# Patient Record
Sex: FEMALE | Race: WHITE | NOT HISPANIC OR LATINO | Employment: OTHER | URBAN - METROPOLITAN AREA
[De-identification: names, ages, dates, MRNs, and addresses within clinical notes are randomized per-mention and may not be internally consistent; named-entity substitution may affect disease eponyms.]

---

## 2017-11-17 ENCOUNTER — HOSPITAL ENCOUNTER (OUTPATIENT)
Dept: FAMILY MEDICINE CLINIC | Facility: CLINIC | Age: 62
Setting detail: SPECIMEN
Discharge: HOME | End: 2017-11-17
Attending: FAMILY MEDICINE | Admitting: FAMILY MEDICINE

## 2017-11-17 LAB
ALBUMIN SERPL-MCNC: 3.8 G/DL (ref 3.5–4.8)
ALBUMIN/GLOB SERPL: 1 {RATIO} (ref 1–1.7)
ALP SERPL-CCNC: 60 IU/L (ref 32–91)
ALT SERPL-CCNC: 13 IU/L (ref 14–54)
ANION GAP SERPL CALC-SCNC: 9.9 MMOL/L (ref 10–20)
AST SERPL-CCNC: 18 IU/L (ref 15–41)
BASOPHILS # BLD AUTO: 0.1 10*3/UL (ref 0–0.2)
BASOPHILS NFR BLD AUTO: 1 % (ref 0–2)
BILIRUB SERPL-MCNC: 0.7 MG/DL (ref 0.3–1.2)
BUN SERPL-MCNC: 11 MG/DL (ref 8–20)
BUN/CREAT SERPL: 12.2 (ref 5.4–26.2)
CALCIUM SERPL-MCNC: 9.4 MG/DL (ref 8.9–10.3)
CHLORIDE SERPL-SCNC: 101 MMOL/L (ref 101–111)
CHOLEST SERPL-MCNC: 262 MG/DL
CHOLEST/HDLC SERPL: 3.2 {RATIO}
CONV CO2: 30 MMOL/L (ref 22–32)
CONV LDL CHOLESTEROL DIRECT: 154 MG/DL (ref 0–100)
CONV TOTAL PROTEIN: 7.5 G/DL (ref 6.1–7.9)
CREAT UR-MCNC: 0.9 MG/DL (ref 0.4–1)
DIFFERENTIAL METHOD BLD: (no result)
EOSINOPHIL # BLD AUTO: 0.2 10*3/UL (ref 0–0.3)
EOSINOPHIL # BLD AUTO: 2 % (ref 0–3)
ERYTHROCYTE [DISTWIDTH] IN BLOOD BY AUTOMATED COUNT: 13.3 % (ref 11.5–14.5)
GLOBULIN UR ELPH-MCNC: 3.7 G/DL (ref 2.5–3.8)
GLUCOSE SERPL-MCNC: 98 MG/DL (ref 65–99)
HCT VFR BLD AUTO: 41.2 % (ref 35–49)
HDLC SERPL-MCNC: 83 MG/DL
HGB BLD-MCNC: 13.5 G/DL (ref 12–15)
LDLC/HDLC SERPL: 1.9 {RATIO}
LIPID INTERPRETATION: ABNORMAL
LYMPHOCYTES # BLD AUTO: 2.9 10*3/UL (ref 0.8–4.8)
LYMPHOCYTES NFR BLD AUTO: 36 % (ref 18–42)
MCH RBC QN AUTO: 29.6 PG (ref 26–32)
MCHC RBC AUTO-ENTMCNC: 32.9 G/DL (ref 32–36)
MCV RBC AUTO: 90.1 FL (ref 80–94)
MONOCYTES # BLD AUTO: 0.5 10*3/UL (ref 0.1–1.3)
MONOCYTES NFR BLD AUTO: 6 % (ref 2–11)
NEUTROPHILS # BLD AUTO: 4.3 10*3/UL (ref 2.3–8.6)
NEUTROPHILS NFR BLD AUTO: 55 % (ref 50–75)
NRBC BLD AUTO-RTO: 0 /100{WBCS}
NRBC/RBC NFR BLD MANUAL: 0 10*3/UL
PLATELET # BLD AUTO: 239 10*3/UL (ref 150–450)
PMV BLD AUTO: 8.8 FL (ref 7.4–10.4)
POTASSIUM SERPL-SCNC: 3.9 MMOL/L (ref 3.6–5.1)
RBC # BLD AUTO: 4.57 10*6/UL (ref 4–5.4)
SODIUM SERPL-SCNC: 137 MMOL/L (ref 136–144)
TRIGL SERPL-MCNC: 142 MG/DL
VIT B12 SERPL-MCNC: 479 PG/ML (ref 180–914)
VLDLC SERPL CALC-MCNC: 25.1 MG/DL
WBC # BLD AUTO: 7.9 10*3/UL (ref 4.5–11.5)

## 2018-11-29 ENCOUNTER — HOSPITAL ENCOUNTER (OUTPATIENT)
Dept: FAMILY MEDICINE CLINIC | Facility: CLINIC | Age: 63
Setting detail: SPECIMEN
Discharge: HOME | End: 2018-11-29
Attending: FAMILY MEDICINE | Admitting: FAMILY MEDICINE

## 2018-11-29 LAB
ALBUMIN SERPL-MCNC: 4.3 G/DL (ref 3.5–4.8)
ALBUMIN/GLOB SERPL: 1.2 {RATIO} (ref 1–1.7)
ALP SERPL-CCNC: 74 IU/L (ref 32–91)
ALT SERPL-CCNC: 15 IU/L (ref 14–54)
ANION GAP SERPL CALC-SCNC: 12.8 MMOL/L (ref 10–20)
AST SERPL-CCNC: 20 IU/L (ref 15–41)
BACTERIA SPEC AEROBE CULT: NORMAL
BASOPHILS # BLD AUTO: 0.1 10*3/UL (ref 0–0.2)
BASOPHILS NFR BLD AUTO: 1 % (ref 0–2)
BILIRUB SERPL-MCNC: 0.7 MG/DL (ref 0.3–1.2)
BILIRUB UR QL STRIP: NEGATIVE MG/DL
BUN SERPL-MCNC: 16 MG/DL (ref 8–20)
BUN/CREAT SERPL: 17.8 (ref 5.4–26.2)
CALCIUM SERPL-MCNC: 9.4 MG/DL (ref 8.9–10.3)
CASTS URNS QL MICRO: ABNORMAL /[LPF]
CHLORIDE SERPL-SCNC: 105 MMOL/L (ref 101–111)
CHOLEST SERPL-MCNC: 254 MG/DL
CHOLEST/HDLC SERPL: 3.5 {RATIO}
COLOR UR: YELLOW
CONV BACTERIA IN URINE MICRO: ABNORMAL
CONV CLARITY OF URINE: CLEAR
CONV CO2: 25 MMOL/L (ref 22–32)
CONV HYALINE CASTS IN URINE MICRO: 3 /[LPF] (ref 0–5)
CONV LDL CHOLESTEROL DIRECT: 163 MG/DL (ref 0–100)
CONV PROTEIN IN URINE BY AUTOMATED TEST STRIP: NEGATIVE MG/DL
CONV SMALL ROUND CELLS: ABNORMAL /[HPF]
CONV TOTAL PROTEIN: 7.8 G/DL (ref 6.1–7.9)
CONV UROBILINOGEN IN URINE BY AUTOMATED TEST STRIP: 0.2 MG/DL
CREAT UR-MCNC: 0.9 MG/DL (ref 0.4–1)
CULTURE INDICATED?: ABNORMAL
DIFFERENTIAL METHOD BLD: (no result)
EOSINOPHIL # BLD AUTO: 0.2 10*3/UL (ref 0–0.3)
EOSINOPHIL # BLD AUTO: 3 % (ref 0–3)
ERYTHROCYTE [DISTWIDTH] IN BLOOD BY AUTOMATED COUNT: 12.7 % (ref 11.5–14.5)
GLOBULIN UR ELPH-MCNC: 3.5 G/DL (ref 2.5–3.8)
GLUCOSE SERPL-MCNC: 114 MG/DL (ref 65–99)
GLUCOSE UR QL: NEGATIVE MG/DL
HCT VFR BLD AUTO: 43.5 % (ref 35–49)
HDLC SERPL-MCNC: 72 MG/DL
HGB BLD-MCNC: 14.8 G/DL (ref 12–15)
HGB UR QL STRIP: ABNORMAL
KETONES UR QL STRIP: NEGATIVE MG/DL
LDLC/HDLC SERPL: 2.3 {RATIO}
LEUKOCYTE ESTERASE UR QL STRIP: ABNORMAL
LIPID INTERPRETATION: ABNORMAL
LYMPHOCYTES # BLD AUTO: 2.5 10*3/UL (ref 0.8–4.8)
LYMPHOCYTES NFR BLD AUTO: 37 % (ref 18–42)
Lab: NORMAL
MCH RBC QN AUTO: 30.6 PG (ref 26–32)
MCHC RBC AUTO-ENTMCNC: 34 G/DL (ref 32–36)
MCV RBC AUTO: 89.8 FL (ref 80–94)
MICRO REPORT STATUS: NORMAL
MONOCYTES # BLD AUTO: 0.5 10*3/UL (ref 0.1–1.3)
MONOCYTES NFR BLD AUTO: 7 % (ref 2–11)
NEUTROPHILS # BLD AUTO: 3.5 10*3/UL (ref 2.3–8.6)
NEUTROPHILS NFR BLD AUTO: 52 % (ref 50–75)
NITRITE UR QL STRIP: NEGATIVE
NRBC BLD AUTO-RTO: 0 /100{WBCS}
NRBC/RBC NFR BLD MANUAL: 0 10*3/UL
PH UR STRIP.AUTO: 5.5 [PH] (ref 4.5–8)
PLATELET # BLD AUTO: 218 10*3/UL (ref 150–450)
PMV BLD AUTO: 9.4 FL (ref 7.4–10.4)
POTASSIUM SERPL-SCNC: 3.8 MMOL/L (ref 3.6–5.1)
RBC # BLD AUTO: 4.84 10*6/UL (ref 4–5.4)
RBC #/AREA URNS HPF: 1 /[HPF] (ref 0–3)
SODIUM SERPL-SCNC: 139 MMOL/L (ref 136–144)
SP GR UR: 1.02 (ref 1–1.03)
SPECIMEN SOURCE: NORMAL
SPERM URNS QL MICRO: ABNORMAL /[HPF]
SQUAMOUS SPT QL MICRO: 6 /[HPF] (ref 0–5)
TRIGL SERPL-MCNC: 148 MG/DL
UNIDENT CRYS URNS QL MICRO: ABNORMAL /[HPF]
VLDLC SERPL CALC-MCNC: 19.2 MG/DL
WBC # BLD AUTO: 6.7 10*3/UL (ref 4.5–11.5)
WBC #/AREA URNS HPF: 30 /[HPF] (ref 0–5)
YEAST SPEC QL WET PREP: ABNORMAL /[HPF]

## 2019-03-28 ENCOUNTER — HOSPITAL ENCOUNTER (OUTPATIENT)
Dept: ORTHOPEDIC SURGERY | Facility: CLINIC | Age: 64
Discharge: HOME | End: 2019-03-28
Attending: ORTHOPAEDIC SURGERY | Admitting: ORTHOPAEDIC SURGERY

## 2019-05-16 ENCOUNTER — HOSPITAL ENCOUNTER (OUTPATIENT)
Dept: PREADMISSION TESTING | Facility: HOSPITAL | Age: 64
Discharge: HOME | End: 2019-05-16
Attending: ORTHOPAEDIC SURGERY | Admitting: ORTHOPAEDIC SURGERY

## 2019-05-16 LAB
ANION GAP SERPL CALC-SCNC: 17.8 MMOL/L (ref 10–20)
APTT BLD: 26.1 SEC (ref 24–31)
BASOPHILS # BLD AUTO: 0.1 10*3/UL (ref 0–0.2)
BASOPHILS NFR BLD AUTO: 1 % (ref 0–2)
BUN SERPL-MCNC: 8 MG/DL (ref 8–20)
BUN/CREAT SERPL: 8 (ref 5.4–26.2)
CALCIUM SERPL-MCNC: 9.6 MG/DL (ref 8.9–10.3)
CHLORIDE SERPL-SCNC: 99 MMOL/L (ref 101–111)
CONV CO2: 24 MMOL/L (ref 22–32)
CREAT UR-MCNC: 1 MG/DL (ref 0.4–1)
DIFFERENTIAL METHOD BLD: (no result)
EOSINOPHIL # BLD AUTO: 0.2 10*3/UL (ref 0–0.3)
EOSINOPHIL # BLD AUTO: 2 % (ref 0–3)
ERYTHROCYTE [DISTWIDTH] IN BLOOD BY AUTOMATED COUNT: 12.5 % (ref 11.5–14.5)
GLUCOSE SERPL-MCNC: 113 MG/DL (ref 65–99)
HCT VFR BLD AUTO: 44.3 % (ref 35–49)
HGB BLD-MCNC: 14.6 G/DL (ref 12–15)
INR PPP: 1
LYMPHOCYTES # BLD AUTO: 2.5 10*3/UL (ref 0.8–4.8)
LYMPHOCYTES NFR BLD AUTO: 28 % (ref 18–42)
MCH RBC QN AUTO: 29.8 PG (ref 26–32)
MCHC RBC AUTO-ENTMCNC: 32.9 G/DL (ref 32–36)
MCV RBC AUTO: 90.7 FL (ref 80–94)
MICRO REPORT STATUS: NORMAL
MONOCYTES # BLD AUTO: 0.5 10*3/UL (ref 0.1–1.3)
MONOCYTES NFR BLD AUTO: 6 % (ref 2–11)
NEUTROPHILS # BLD AUTO: 5.6 10*3/UL (ref 2.3–8.6)
NEUTROPHILS NFR BLD AUTO: 63 % (ref 50–75)
NRBC BLD AUTO-RTO: 0 /100{WBCS}
NRBC/RBC NFR BLD MANUAL: 0 10*3/UL
PLATELET # BLD AUTO: 270 10*3/UL (ref 150–450)
PMV BLD AUTO: 9.1 FL (ref 7.4–10.4)
POTASSIUM SERPL-SCNC: 3.8 MMOL/L (ref 3.6–5.1)
PROTHROMBIN TIME: 10 SEC (ref 9.6–11.7)
RBC # BLD AUTO: 4.89 10*6/UL (ref 4–5.4)
SODIUM SERPL-SCNC: 137 MMOL/L (ref 136–144)
WBC # BLD AUTO: 8.9 10*3/UL (ref 4.5–11.5)

## 2019-05-24 ENCOUNTER — HOSPITAL ENCOUNTER (OUTPATIENT)
Dept: PREOP | Facility: HOSPITAL | Age: 64
Setting detail: HOSPITAL OUTPATIENT SURGERY
Discharge: HOME | End: 2019-05-24
Attending: ORTHOPAEDIC SURGERY | Admitting: ORTHOPAEDIC SURGERY

## 2019-05-28 ENCOUNTER — CONVERSION ENCOUNTER (OUTPATIENT)
Dept: ORTHOPEDIC SURGERY | Facility: CLINIC | Age: 64
End: 2019-05-28

## 2019-06-04 VITALS
HEIGHT: 66 IN | HEART RATE: 57 BPM | WEIGHT: 198.6 LBS | SYSTOLIC BLOOD PRESSURE: 133 MMHG | BODY MASS INDEX: 31.92 KG/M2 | DIASTOLIC BLOOD PRESSURE: 86 MMHG

## 2019-06-06 NOTE — PROGRESS NOTES
Visit Type:  Post op    CC:  Right shoulder scope 19.      Vital Signs:    Patient Profile:    63 Years Old Female  Height:     66 inches  Weight:     198.6 pounds  BMI:        32.05     Pulse rate: 57 / minute  BP Sittin / 86  (left arm)    Cuff size:  large      Problems: Active problems were reviewed with the patient during this visit.  Medications: Medications were reviewed with the patient during this visit.  Allergies: Allergies were reviewed with the patient during this visit.  No Known Allergy.  No Known Drug Allergy.        Vitals Entered By: Amina Huynh CMA (May 28, 2019 12:31 PM)      Past Medical History:     Reviewed history from 2019 and no changes required:        Respiratory Hx: sleep apnea, (obstructive)- never tested        Health Maintenence: stress myoview (normal )        Musculoskeletal Hx: degenerative disc disease        Psychiatric Hx: elke, depression,ADHD        GI Hx: gerd        Cardiovascular Hx: hypertension, hyperlipidemia        Endocrine Hx: obesity         Hx: vaginal delivery x2        Migraine headache, with word finding difficulties and visual auras        high cholesterol        hypertension        No Drug Allergies?     Past Surgical History:     Reviewed history from 2018 and no changes required:        knee surgery (right)()        cholecystectomy        TKR right()        tubal ligation        right ring finger amputation        lap band (2014)        Colonoscopy, normal (2016)        Lap band adjustment (3/20/2018)        Right shoulder scope 19 Dr. Samaniego    Active Medications (reviewed today):  PROMETHAZINE HCL 25 MG ORAL TABLET (PROMETHAZINE HCL) One po every 6 hours as needed for nausea  PERCOCET  MG ORAL TABLET (OXYCODONE-ACETAMINOPHEN) One po every eight hours as needed for pain  NAPROSYN 500 MG ORAL TABLET (NAPROXEN) One PO BID for 14 days  KEFLEX 500 MG ORAL CAPSULE (CEPHALEXIN) One po four times a day for one  day  LAMOTRIGINE TABLET (LAMOTRIGINE TABS)   TOPROL XL 25 MG ORAL TABLET EXTENDED RELEASE 24 HOUR (METOPROLOL SUCCINATE) 1 po q hs for BP  LIPITOR 10 MG ORAL TABLET (ATORVASTATIN CALCIUM) 1 po q d  OMEPRAZOLE DR 40 MG CAPSULE (OMEPRAZOLE) TAKE ONE CAPSULE BY MOUTH DAILY  QUETIAPINE FUMARATE 100 MG TAB (QUETIAPINE FUMARATE) TAKE ONE AND ONE-HALF (1 & 1/2) TABLET BY MOUTH EVERY NIGHT AT BEDTIME  XANAX 2 MG ORAL TABLET (ALPRAZOLAM) take 1 tablet at bedtime    Current Allergies (reviewed today):  No known allergies      Post Operative History:   Facility:       [facility]  Surgeon:        Dr. Be  Surgery date:       05/24/2019  Procedure performed: Right shoulder scope cuff repair  Days post-op:       4        POST OPERATIVE VISIT     CHIEF COMPLAINT:    Andree Deluna Present about 4 days out from having a right shoulder arthroscopy with extensive debridement, rotator cuff repair, and biceps tenodesis.  She reports her pain is controlled.    PHYSICAL EXAMINATION:      Alert, oriented, overweight lady in no acute distress, walking unassisted   right upper extremity shows a well-healing portal sites and incision with no erythema, drainage, open skin lesions.  There is a mild amount of swelling present.  It is grossly well aligned, and she is neurovascularly intact distally.  There is mild   tenderness to palpation.    ASSESSMENT:      Four days status post right shoulder arthroscopy with extensive debridement, rotator cuff repair, biceps tenodesis    PLAN:    Remove dressings in 2 weeks. ok to shower with dressings in place. Continue sling. Start pendulum exercises and shoulder shrugs. protocol given, as well as order to start physical therapy in 2 weeks.  Follow up with Dr. be in 4 weeks.      Impression & Recommendations:    Problem # 1:  Complete rotator cuff tear or rupture of right shoulder, not specified as traumatic (ICD-727.61) (WRA96-W75.121)    Orders:  Post-op Visit (80856)    Future Orders:  Physical Therapy  Consult (PTOC) ... 06/07/2019              Patient Instructions:  1)  Please schedule a follow-up appointment after physical therapy.   2)  Please schedule a follow-up appointment in 4 weeks.             Electronically signed by Jane BURNETTE on 05/28/2019 at 1:10 PM  ________________________________________________________________________       Disclaimer: Converted Note message may not contain all data elements that existed in the [employer] source system. Please see [facility] for the original note details.

## 2019-07-12 ENCOUNTER — TELEPHONE (OUTPATIENT)
Dept: ORTHOPEDIC SURGERY | Facility: CLINIC | Age: 64
End: 2019-07-12

## 2019-07-15 RX ORDER — TRAMADOL HYDROCHLORIDE 50 MG/1
50 TABLET ORAL EVERY 6 HOURS PRN
Qty: 28 TABLET | Refills: 0 | Status: SHIPPED | OUTPATIENT
Start: 2019-07-15 | End: 2019-08-09 | Stop reason: HOSPADM

## 2019-07-18 RX ORDER — QUETIAPINE FUMARATE 100 MG/1
TABLET, FILM COATED ORAL
Qty: 45 TABLET | Refills: 2 | Status: SHIPPED | OUTPATIENT
Start: 2019-07-18 | End: 2019-10-17 | Stop reason: SDUPTHER

## 2019-07-22 RX ORDER — ALPRAZOLAM 2 MG/1
TABLET ORAL
Qty: 30 TABLET | Refills: 2 | Status: SHIPPED | OUTPATIENT
Start: 2019-07-22 | End: 2019-08-09 | Stop reason: HOSPADM

## 2019-08-01 ENCOUNTER — OFFICE VISIT (OUTPATIENT)
Dept: ORTHOPEDIC SURGERY | Facility: CLINIC | Age: 64
End: 2019-08-01

## 2019-08-01 VITALS
WEIGHT: 195 LBS | SYSTOLIC BLOOD PRESSURE: 167 MMHG | DIASTOLIC BLOOD PRESSURE: 96 MMHG | HEART RATE: 74 BPM | BODY MASS INDEX: 31.34 KG/M2 | HEIGHT: 66 IN

## 2019-08-01 DIAGNOSIS — Z47.89 ORTHOPEDIC AFTERCARE: Primary | ICD-10-CM

## 2019-08-01 PROCEDURE — 99024 POSTOP FOLLOW-UP VISIT: CPT | Performed by: ORTHOPAEDIC SURGERY

## 2019-08-01 RX ORDER — ATORVASTATIN CALCIUM 10 MG/1
TABLET, FILM COATED ORAL
COMMUNITY
Start: 2019-06-06 | End: 2019-08-09 | Stop reason: HOSPADM

## 2019-08-01 RX ORDER — OMEPRAZOLE 40 MG/1
CAPSULE, DELAYED RELEASE ORAL
COMMUNITY
Start: 2019-06-24 | End: 2020-03-02

## 2019-08-01 NOTE — PROGRESS NOTES
"     Patient ID: Andree Deluna is a 63 y.o. female.    5/24/19 right shoulder arthroscopy cuff repair bicep tenodesis  Pain mild, in PT    Objective:    /96   Pulse 74   Ht 167.6 cm (66\")   Wt 88.5 kg (195 lb)   BMI 31.47 kg/m²     Physical Examination:    Incisions are healed, passive elevation 120 degrees external rotation 20 degrees and intact repair and tenodesis.Sensory and motor exam are intact all distributions. Radial pulse is palpable and capillary refill is less than two seconds to all digits    Imaging:      Assessment:  Doing well after cuff repair    Plan:  discontinue sling, continue therapy and see me in 6 weeks  "

## 2019-08-04 ENCOUNTER — HOSPITAL ENCOUNTER (EMERGENCY)
Facility: HOSPITAL | Age: 64
Discharge: HOME | End: 2019-08-04
Attending: EMERGENCY MEDICINE | Admitting: EMERGENCY MEDICINE

## 2019-08-04 ENCOUNTER — APPOINTMENT (OUTPATIENT)
Dept: CT IMAGING | Facility: HOSPITAL | Age: 64
End: 2019-08-04

## 2019-08-04 VITALS
HEART RATE: 69 BPM | OXYGEN SATURATION: 97 % | RESPIRATION RATE: 16 BRPM | DIASTOLIC BLOOD PRESSURE: 72 MMHG | BODY MASS INDEX: 29.97 KG/M2 | WEIGHT: 190.92 LBS | HEIGHT: 67 IN | SYSTOLIC BLOOD PRESSURE: 123 MMHG | TEMPERATURE: 98.8 F

## 2019-08-04 DIAGNOSIS — R41.0 CONFUSION: Primary | ICD-10-CM

## 2019-08-04 LAB
ANION GAP SERPL CALCULATED.3IONS-SCNC: 17.9 MMOL/L (ref 5–15)
BASOPHILS # BLD AUTO: 0.1 10*3/MM3 (ref 0–0.2)
BASOPHILS NFR BLD AUTO: 1.3 % (ref 0–1.5)
BUN BLD-MCNC: 12 MG/DL (ref 8–20)
BUN/CREAT SERPL: 12 (ref 5.4–26.2)
CALCIUM SPEC-SCNC: 9.5 MG/DL (ref 8.9–10.3)
CHLORIDE SERPL-SCNC: 95 MMOL/L (ref 101–111)
CO2 SERPL-SCNC: 24 MMOL/L (ref 22–32)
CREAT BLD-MCNC: 1 MG/DL (ref 0.4–1)
DEPRECATED RDW RBC AUTO: 39.4 FL (ref 37–54)
EOSINOPHIL # BLD AUTO: 0.1 10*3/MM3 (ref 0–0.4)
EOSINOPHIL NFR BLD AUTO: 1 % (ref 0.3–6.2)
ERYTHROCYTE [DISTWIDTH] IN BLOOD BY AUTOMATED COUNT: 12.8 % (ref 12.3–15.4)
GFR SERPL CREATININE-BSD FRML MDRD: 56 ML/MIN/1.73
GLUCOSE BLD-MCNC: 108 MG/DL (ref 65–99)
HCT VFR BLD AUTO: 44.8 % (ref 34–46.6)
HGB BLD-MCNC: 15.1 G/DL (ref 12–15.9)
LYMPHOCYTES # BLD AUTO: 3 10*3/MM3 (ref 0.7–3.1)
LYMPHOCYTES NFR BLD AUTO: 28.3 % (ref 19.6–45.3)
MCH RBC QN AUTO: 29.2 PG (ref 26.6–33)
MCHC RBC AUTO-ENTMCNC: 33.8 G/DL (ref 31.5–35.7)
MCV RBC AUTO: 86.6 FL (ref 79–97)
MONOCYTES # BLD AUTO: 0.7 10*3/MM3 (ref 0.1–0.9)
MONOCYTES NFR BLD AUTO: 6.6 % (ref 5–12)
NEUTROPHILS # BLD AUTO: 6.6 10*3/MM3 (ref 1.7–7)
NEUTROPHILS NFR BLD AUTO: 62.8 % (ref 42.7–76)
NRBC BLD AUTO-RTO: 0.1 /100 WBC (ref 0–0.2)
PLATELET # BLD AUTO: 249 10*3/MM3 (ref 140–450)
PMV BLD AUTO: 9.3 FL (ref 6–12)
POTASSIUM BLD-SCNC: 3.9 MMOL/L (ref 3.6–5.1)
RBC # BLD AUTO: 5.17 10*6/MM3 (ref 3.77–5.28)
SODIUM BLD-SCNC: 133 MMOL/L (ref 136–144)
WBC NRBC COR # BLD: 10.5 10*3/MM3 (ref 3.4–10.8)

## 2019-08-04 PROCEDURE — 99284 EMERGENCY DEPT VISIT MOD MDM: CPT

## 2019-08-04 PROCEDURE — 80048 BASIC METABOLIC PNL TOTAL CA: CPT | Performed by: EMERGENCY MEDICINE

## 2019-08-04 PROCEDURE — 70450 CT HEAD/BRAIN W/O DYE: CPT

## 2019-08-04 PROCEDURE — 85025 COMPLETE CBC W/AUTO DIFF WBC: CPT | Performed by: EMERGENCY MEDICINE

## 2019-08-04 RX ORDER — PROMETHAZINE HYDROCHLORIDE 25 MG/1
25 TABLET ORAL EVERY 6 HOURS PRN
COMMUNITY
End: 2019-11-15

## 2019-08-04 RX ORDER — LISINOPRIL AND HYDROCHLOROTHIAZIDE 25; 20 MG/1; MG/1
1 TABLET ORAL DAILY
COMMUNITY
End: 2020-02-24

## 2019-08-04 RX ORDER — LAMOTRIGINE 100 MG/1
100 TABLET ORAL 2 TIMES DAILY
COMMUNITY
End: 2019-12-20

## 2019-08-04 RX ORDER — SODIUM CHLORIDE 0.9 % (FLUSH) 0.9 %
10 SYRINGE (ML) INJECTION AS NEEDED
Status: DISCONTINUED | OUTPATIENT
Start: 2019-08-04 | End: 2019-08-04 | Stop reason: HOSPADM

## 2019-08-04 RX ADMIN — Medication 10 ML: at 13:58

## 2019-08-04 NOTE — ED PROVIDER NOTES
Subjective   Patient is a 63-year-old female who family states has had increasing confusion over the past several months.  They feel like it has been worse over the past several weeks.  The patient's only complaint is mild headache for the past 1 week.  She denies ears throat neck pain cough fever vomiting diarrhea or other complaint.            Review of Systems  Negative for previous headache earache sore throat neck pain cough fever chest pain shortness of breath abdominal pain bombarded dysuria weakness weight loss or other associated complaints  Past Medical History:   Diagnosis Date   • Hypertension        No Known Allergies    Past Surgical History:   Procedure Laterality Date   • CHOLECYSTECTOMY     • FINGER SURGERY     • KNEE SURGERY Right     replaced   • SHOULDER SURGERY Right 05/24/2019    scope/ cuff repair   • TUBAL ABDOMINAL LIGATION         Family History   Problem Relation Age of Onset   • Diabetes Other        Social History     Socioeconomic History   • Marital status:      Spouse name: Not on file   • Number of children: Not on file   • Years of education: Not on file   • Highest education level: Not on file   Tobacco Use   • Smoking status: Never Smoker   • Smokeless tobacco: Never Used   Substance and Sexual Activity   • Alcohol use: No     Frequency: Never   • Drug use: No           Objective   Physical Exam  Neurologic exam is nonfocal.  Patient is alert and oriented x3.  HEENT exam shows TMs to be clear.  Oropharynx, spit sclerae nonicteric.  Neck has no adenopathy JVD or bruits.  Lungs are clear.  Heart has regular rate rhythm without murmur rub or gallop.  Chest is nontender.  Abdomen is soft nontender.  Extremities M shows no cyanosis or edema.  Procedures           ED Course        Results for orders placed or performed during the hospital encounter of 08/04/19   Basic Metabolic Panel   Result Value Ref Range    Glucose 108 (H) 65 - 99 mg/dL    BUN 12 8 - 20 mg/dL    Creatinine  1.00 0.40 - 1.00 mg/dL    Sodium 133 (L) 136 - 144 mmol/L    Potassium 3.9 3.6 - 5.1 mmol/L    Chloride 95 (L) 101 - 111 mmol/L    CO2 24.0 22.0 - 32.0 mmol/L    Calcium 9.5 8.9 - 10.3 mg/dL    eGFR Non African Amer 56 (L) >60 mL/min/1.73    BUN/Creatinine Ratio 12.0 5.4 - 26.2    Anion Gap 17.9 (H) 5.0 - 15.0 mmol/L   CBC Auto Differential   Result Value Ref Range    WBC 10.50 3.40 - 10.80 10*3/mm3    RBC 5.17 3.77 - 5.28 10*6/mm3    Hemoglobin 15.1 12.0 - 15.9 g/dL    Hematocrit 44.8 34.0 - 46.6 %    MCV 86.6 79.0 - 97.0 fL    MCH 29.2 26.6 - 33.0 pg    MCHC 33.8 31.5 - 35.7 g/dL    RDW 12.8 12.3 - 15.4 %    RDW-SD 39.4 37.0 - 54.0 fl    MPV 9.3 6.0 - 12.0 fL    Platelets 249 140 - 450 10*3/mm3    Neutrophil % 62.8 42.7 - 76.0 %    Lymphocyte % 28.3 19.6 - 45.3 %    Monocyte % 6.6 5.0 - 12.0 %    Eosinophil % 1.0 0.3 - 6.2 %    Basophil % 1.3 0.0 - 1.5 %    Neutrophils, Absolute 6.60 1.70 - 7.00 10*3/mm3    Lymphocytes, Absolute 3.00 0.70 - 3.10 10*3/mm3    Monocytes, Absolute 0.70 0.10 - 0.90 10*3/mm3    Eosinophils, Absolute 0.10 0.00 - 0.40 10*3/mm3    Basophils, Absolute 0.10 0.00 - 0.20 10*3/mm3    nRBC 0.1 0.0 - 0.2 /100 WBC     Ct Head Without Contrast    Result Date: 8/4/2019   1. Negative for hemorrhage or mass effect. 2. There is stable, marked decreased density in the white matter, not significantly changed. Differential includes marked chronic small vessel ischemia, demyelinating disease, or other leukoencephalopathy.  Electronically Signed By-Chelsi Worrell On:8/4/2019 2:50 PM This report was finalized on [id number] by  Chelsi Worrell, .              MDM  Number of Diagnoses or Management Options  Diagnosis management comments: Patient is benign physical exam.  She has no focal neurologic deficits.  She is alert and oriented x3.  She has no evidence of infectious process or metabolic abnormalities.  Patient will be discharged and family is instructed to follow-up with her family physician for further  evaluation as needed.    Risk of Complications, Morbidity, and/or Mortality  Presenting problems: high  Diagnostic procedures: high  Management options: high    Patient Progress  Patient progress: stable        Final diagnoses:   Confusion            Von Burks MD  08/04/19 153

## 2019-08-04 NOTE — ED NOTES
Family reports that pt has increased confusion at home over the last few days, reports a fall yesterday, denies hitting the head, family states confusion x saveral months, was seen 2 years ago for same was told pt has decreased vitamin B levels     Toshia Arango, RN  08/04/19 [id number]

## 2019-08-06 ENCOUNTER — APPOINTMENT (OUTPATIENT)
Dept: GENERAL RADIOLOGY | Facility: HOSPITAL | Age: 64
End: 2019-08-06

## 2019-08-06 ENCOUNTER — OFFICE VISIT (OUTPATIENT)
Dept: FAMILY MEDICINE CLINIC | Facility: CLINIC | Age: 64
End: 2019-08-06

## 2019-08-06 ENCOUNTER — HOSPITAL ENCOUNTER (OUTPATIENT)
Dept: CARDIOLOGY | Facility: HOSPITAL | Age: 64
Discharge: HOME | End: 2019-08-06

## 2019-08-06 ENCOUNTER — HOSPITAL ENCOUNTER (INPATIENT)
Facility: HOSPITAL | Age: 64
LOS: 3 days | Discharge: NURSING, REHAB OR LONG-TERM CARE | End: 2019-08-09
Attending: INTERNAL MEDICINE | Admitting: INTERNAL MEDICINE

## 2019-08-06 ENCOUNTER — APPOINTMENT (OUTPATIENT)
Dept: MRI IMAGING | Facility: HOSPITAL | Age: 64
End: 2019-08-06

## 2019-08-06 VITALS
HEART RATE: 80 BPM | WEIGHT: 188 LBS | SYSTOLIC BLOOD PRESSURE: 160 MMHG | RESPIRATION RATE: 8 BRPM | TEMPERATURE: 98.1 F | BODY MASS INDEX: 29.44 KG/M2 | DIASTOLIC BLOOD PRESSURE: 92 MMHG

## 2019-08-06 DIAGNOSIS — I63.89 CEREBROVASCULAR ACCIDENT (CVA) DUE TO OTHER MECHANISM (HCC): ICD-10-CM

## 2019-08-06 DIAGNOSIS — M54.50 ACUTE MIDLINE LOW BACK PAIN WITHOUT SCIATICA: ICD-10-CM

## 2019-08-06 DIAGNOSIS — R41.0 CONFUSION: ICD-10-CM

## 2019-08-06 DIAGNOSIS — H53.40 VISUAL FIELD LOSS: Primary | ICD-10-CM

## 2019-08-06 DIAGNOSIS — I10 ESSENTIAL HYPERTENSION: ICD-10-CM

## 2019-08-06 PROBLEM — F39 MOOD DISORDER: Status: ACTIVE | Noted: 2018-08-21

## 2019-08-06 PROBLEM — R03.0 ELEVATED BLOOD PRESSURE READING WITHOUT DIAGNOSIS OF HYPERTENSION: Status: ACTIVE | Noted: 2018-05-18

## 2019-08-06 PROBLEM — M75.121 COMPLETE ROTATOR CUFF TEAR OR RUPTURE OF RIGHT SHOULDER, NOT SPECIFIED AS TRAUMATIC: Status: ACTIVE | Noted: 2019-05-08

## 2019-08-06 PROBLEM — I63.9 CVA (CEREBRAL VASCULAR ACCIDENT): Status: ACTIVE | Noted: 2019-08-06

## 2019-08-06 PROBLEM — E53.8 B12 DEFICIENCY: Status: ACTIVE | Noted: 2017-08-18

## 2019-08-06 PROBLEM — K21.9 LARYNGOPHARYNGEAL REFLUX: Status: ACTIVE | Noted: 2018-02-16

## 2019-08-06 LAB
ALBUMIN SERPL-MCNC: 4 G/DL (ref 3.5–4.8)
ALBUMIN/GLOB SERPL: 1.1 G/DL (ref 1–1.7)
ALP SERPL-CCNC: 85 U/L (ref 32–91)
ALT SERPL W P-5'-P-CCNC: 16 U/L (ref 14–54)
ANION GAP SERPL CALCULATED.3IONS-SCNC: 18.1 MMOL/L (ref 5–15)
APTT PPP: 22.8 SECONDS (ref 24–31)
ARTICHOKE IGE QN: 126 MG/DL (ref 0–100)
AST SERPL-CCNC: 19 U/L (ref 15–41)
BH CV XLRA MEAS LEFT CCA RATIO VEL: 72.9 CM/SEC
BH CV XLRA MEAS LEFT DIST CCA EDV: 18.2 CM/SEC
BH CV XLRA MEAS LEFT DIST CCA PSV: 57.5 CM/SEC
BH CV XLRA MEAS LEFT DIST ICA EDV: -51 CM/SEC
BH CV XLRA MEAS LEFT DIST ICA PSV: -142 CM/SEC
BH CV XLRA MEAS LEFT ICA RATIO VEL: -142 CM/SEC
BH CV XLRA MEAS LEFT ICA/CCA RATIO: -1.9
BH CV XLRA MEAS LEFT PROX CCA EDV: 21 CM/SEC
BH CV XLRA MEAS LEFT PROX CCA PSV: 72.9 CM/SEC
BH CV XLRA MEAS LEFT PROX ECA PSV: -76.8 CM/SEC
BH CV XLRA MEAS LEFT PROX ICA EDV: -36 CM/SEC
BH CV XLRA MEAS LEFT PROX ICA PSV: -93.3 CM/SEC
BH CV XLRA MEAS LEFT PROX SCLA PSV: 127 CM/SEC
BH CV XLRA MEAS LEFT VERTEBRAL A PSV: -55.7 CM/SEC
BH CV XLRA MEAS RIGHT CCA RATIO VEL: 62.1 CM/SEC
BH CV XLRA MEAS RIGHT DIST CCA EDV: -18.7 CM/SEC
BH CV XLRA MEAS RIGHT DIST CCA PSV: -45.2 CM/SEC
BH CV XLRA MEAS RIGHT DIST ICA EDV: 31.1 CM/SEC
BH CV XLRA MEAS RIGHT DIST ICA PSV: 69.6 CM/SEC
BH CV XLRA MEAS RIGHT ICA RATIO VEL: -132 CM/SEC
BH CV XLRA MEAS RIGHT ICA/CCA RATIO: -2.1
BH CV XLRA MEAS RIGHT MID ICA EDV: -50.3 CM/SEC
BH CV XLRA MEAS RIGHT MID ICA PSV: -121 CM/SEC
BH CV XLRA MEAS RIGHT PROX CCA EDV: 18 CM/SEC
BH CV XLRA MEAS RIGHT PROX CCA PSV: 62.1 CM/SEC
BH CV XLRA MEAS RIGHT PROX ECA PSV: -83.1 CM/SEC
BH CV XLRA MEAS RIGHT PROX ICA EDV: -56.3 CM/SEC
BH CV XLRA MEAS RIGHT PROX ICA PSV: -132 CM/SEC
BH CV XLRA MEAS RIGHT PROX SCLA PSV: 75.8 CM/SEC
BH CV XLRA MEAS RIGHT VERTEBRAL A PSV: -59 CM/SEC
BILIRUB SERPL-MCNC: 1.5 MG/DL (ref 0.3–1.2)
BUN BLD-MCNC: 16 MG/DL (ref 8–20)
BUN/CREAT SERPL: 17.8 (ref 5.4–26.2)
CALCIUM SPEC-SCNC: 9.2 MG/DL (ref 8.9–10.3)
CHLORIDE SERPL-SCNC: 95 MMOL/L (ref 101–111)
CHOLEST SERPL-MCNC: 196 MG/DL
CO2 SERPL-SCNC: 23 MMOL/L (ref 22–32)
CREAT BLD-MCNC: 0.9 MG/DL (ref 0.4–1)
CRP SERPL-MCNC: 0.23 MG/DL (ref 0–0.7)
DEPRECATED RDW RBC AUTO: 39.4 FL (ref 37–54)
ERYTHROCYTE [DISTWIDTH] IN BLOOD BY AUTOMATED COUNT: 12.8 % (ref 12.3–15.4)
ERYTHROCYTE [SEDIMENTATION RATE] IN BLOOD: 20 MM/HR (ref 0–30)
GFR SERPL CREATININE-BSD FRML MDRD: 64 ML/MIN/1.73
GLOBULIN UR ELPH-MCNC: 3.7 GM/DL (ref 2.5–3.8)
GLUCOSE BLD-MCNC: 112 MG/DL (ref 65–99)
GLUCOSE BLDC GLUCOMTR-MCNC: 126 MG/DL (ref 70–105)
HBA1C MFR BLD: 5.4 % (ref 3.5–5.6)
HCT VFR BLD AUTO: 43.2 % (ref 34–46.6)
HDLC SERPL QL: 4.17
HDLC SERPL-MCNC: 47 MG/DL
HGB BLD-MCNC: 14.5 G/DL (ref 12–15.9)
INR PPP: 1.05 (ref 0.9–1.1)
LDLC/HDLC SERPL: 2.46 {RATIO}
MCH RBC QN AUTO: 29.2 PG (ref 26.6–33)
MCHC RBC AUTO-ENTMCNC: 33.7 G/DL (ref 31.5–35.7)
MCV RBC AUTO: 86.7 FL (ref 79–97)
PLATELET # BLD AUTO: 233 10*3/MM3 (ref 140–450)
PMV BLD AUTO: 9.2 FL (ref 6–12)
POTASSIUM BLD-SCNC: 3.1 MMOL/L (ref 3.6–5.1)
PROT SERPL-MCNC: 7.7 G/DL (ref 6.1–7.9)
PROTHROMBIN TIME: 10.7 SECONDS (ref 9.6–11.7)
RBC # BLD AUTO: 4.98 10*6/MM3 (ref 3.77–5.28)
SODIUM BLD-SCNC: 133 MMOL/L (ref 136–144)
TRIGL SERPL-MCNC: 168 MG/DL
TROPONIN I SERPL-MCNC: <0.03 NG/ML (ref 0–0.03)
TROPONIN I SERPL-MCNC: <0.03 NG/ML (ref 0–0.03)
TSH SERPL DL<=0.05 MIU/L-ACNC: 0.83 MIU/ML (ref 0.34–5.6)
VIT B12 BLD-MCNC: 305 PG/ML (ref 180–914)
VLDLC SERPL-MCNC: 33.6 MG/DL
WBC NRBC COR # BLD: 10.2 10*3/MM3 (ref 3.4–10.8)

## 2019-08-06 PROCEDURE — 85027 COMPLETE CBC AUTOMATED: CPT | Performed by: NURSE PRACTITIONER

## 2019-08-06 PROCEDURE — 80061 LIPID PANEL: CPT | Performed by: NURSE PRACTITIONER

## 2019-08-06 PROCEDURE — 86140 C-REACTIVE PROTEIN: CPT | Performed by: PSYCHIATRY & NEUROLOGY

## 2019-08-06 PROCEDURE — 84484 ASSAY OF TROPONIN QUANT: CPT | Performed by: NURSE PRACTITIONER

## 2019-08-06 PROCEDURE — 83036 HEMOGLOBIN GLYCOSYLATED A1C: CPT | Performed by: NURSE PRACTITIONER

## 2019-08-06 PROCEDURE — 84443 ASSAY THYROID STIM HORMONE: CPT | Performed by: NURSE PRACTITIONER

## 2019-08-06 PROCEDURE — 85610 PROTHROMBIN TIME: CPT | Performed by: NURSE PRACTITIONER

## 2019-08-06 PROCEDURE — 85730 THROMBOPLASTIN TIME PARTIAL: CPT | Performed by: PSYCHIATRY & NEUROLOGY

## 2019-08-06 PROCEDURE — 72114 X-RAY EXAM L-S SPINE BENDING: CPT

## 2019-08-06 PROCEDURE — 93880 EXTRACRANIAL BILAT STUDY: CPT

## 2019-08-06 PROCEDURE — 82962 GLUCOSE BLOOD TEST: CPT

## 2019-08-06 PROCEDURE — 72070 X-RAY EXAM THORAC SPINE 2VWS: CPT

## 2019-08-06 PROCEDURE — 99221 1ST HOSP IP/OBS SF/LOW 40: CPT | Performed by: PSYCHIATRY & NEUROLOGY

## 2019-08-06 PROCEDURE — 80053 COMPREHEN METABOLIC PANEL: CPT | Performed by: NURSE PRACTITIONER

## 2019-08-06 PROCEDURE — 70551 MRI BRAIN STEM W/O DYE: CPT

## 2019-08-06 PROCEDURE — 99214 OFFICE O/P EST MOD 30 MIN: CPT | Performed by: FAMILY MEDICINE

## 2019-08-06 PROCEDURE — 85652 RBC SED RATE AUTOMATED: CPT | Performed by: PSYCHIATRY & NEUROLOGY

## 2019-08-06 PROCEDURE — 82607 VITAMIN B-12: CPT | Performed by: NURSE PRACTITIONER

## 2019-08-06 PROCEDURE — 99222 1ST HOSP IP/OBS MODERATE 55: CPT | Performed by: NURSE PRACTITIONER

## 2019-08-06 RX ORDER — HYDROCHLOROTHIAZIDE 12.5 MG/1
12.5 TABLET ORAL DAILY
Status: DISCONTINUED | OUTPATIENT
Start: 2019-08-07 | End: 2019-08-09 | Stop reason: HOSPADM

## 2019-08-06 RX ORDER — MAGNESIUM SULFATE HEPTAHYDRATE 40 MG/ML
2 INJECTION, SOLUTION INTRAVENOUS AS NEEDED
Status: DISCONTINUED | OUTPATIENT
Start: 2019-08-06 | End: 2019-08-09 | Stop reason: HOSPADM

## 2019-08-06 RX ORDER — PROMETHAZINE HYDROCHLORIDE 25 MG/1
25 TABLET ORAL EVERY 6 HOURS PRN
Status: DISCONTINUED | OUTPATIENT
Start: 2019-08-06 | End: 2019-08-09 | Stop reason: HOSPADM

## 2019-08-06 RX ORDER — ASPIRIN 81 MG/1
81 TABLET, CHEWABLE ORAL DAILY
Status: DISCONTINUED | OUTPATIENT
Start: 2019-08-06 | End: 2019-08-09 | Stop reason: HOSPADM

## 2019-08-06 RX ORDER — ATORVASTATIN CALCIUM 10 MG/1
10 TABLET, FILM COATED ORAL NIGHTLY
Status: DISCONTINUED | OUTPATIENT
Start: 2019-08-06 | End: 2019-08-06

## 2019-08-06 RX ORDER — ALPRAZOLAM 1 MG/1
2 TABLET ORAL NIGHTLY PRN
Status: DISCONTINUED | OUTPATIENT
Start: 2019-08-06 | End: 2019-08-09 | Stop reason: HOSPADM

## 2019-08-06 RX ORDER — MAGNESIUM SULFATE 1 G/100ML
1 INJECTION INTRAVENOUS AS NEEDED
Status: DISCONTINUED | OUTPATIENT
Start: 2019-08-06 | End: 2019-08-09 | Stop reason: HOSPADM

## 2019-08-06 RX ORDER — SODIUM CHLORIDE 0.9 % (FLUSH) 0.9 %
3 SYRINGE (ML) INJECTION EVERY 12 HOURS SCHEDULED
Status: DISCONTINUED | OUTPATIENT
Start: 2019-08-06 | End: 2019-08-09 | Stop reason: HOSPADM

## 2019-08-06 RX ORDER — PANTOPRAZOLE SODIUM 40 MG/1
40 TABLET, DELAYED RELEASE ORAL EVERY MORNING
Status: DISCONTINUED | OUTPATIENT
Start: 2019-08-07 | End: 2019-08-09 | Stop reason: HOSPADM

## 2019-08-06 RX ORDER — ACETAMINOPHEN 325 MG/1
650 TABLET ORAL EVERY 4 HOURS PRN
Status: DISCONTINUED | OUTPATIENT
Start: 2019-08-06 | End: 2019-08-09 | Stop reason: HOSPADM

## 2019-08-06 RX ORDER — ONDANSETRON 2 MG/ML
4 INJECTION INTRAMUSCULAR; INTRAVENOUS EVERY 6 HOURS PRN
Status: DISCONTINUED | OUTPATIENT
Start: 2019-08-06 | End: 2019-08-09 | Stop reason: HOSPADM

## 2019-08-06 RX ORDER — ONDANSETRON 2 MG/ML
4 INJECTION INTRAMUSCULAR; INTRAVENOUS EVERY 6 HOURS PRN
Status: DISCONTINUED | OUTPATIENT
Start: 2019-08-06 | End: 2019-08-06

## 2019-08-06 RX ORDER — BISACODYL 5 MG/1
5 TABLET, DELAYED RELEASE ORAL DAILY PRN
Status: DISCONTINUED | OUTPATIENT
Start: 2019-08-06 | End: 2019-08-09 | Stop reason: HOSPADM

## 2019-08-06 RX ORDER — LAMOTRIGINE 100 MG/1
100 TABLET ORAL 2 TIMES DAILY
Status: DISCONTINUED | OUTPATIENT
Start: 2019-08-06 | End: 2019-08-09 | Stop reason: HOSPADM

## 2019-08-06 RX ORDER — TRAMADOL HYDROCHLORIDE 50 MG/1
50 TABLET ORAL EVERY 6 HOURS PRN
Status: DISCONTINUED | OUTPATIENT
Start: 2019-08-06 | End: 2019-08-09 | Stop reason: HOSPADM

## 2019-08-06 RX ORDER — LISINOPRIL 20 MG/1
20 TABLET ORAL
Status: DISCONTINUED | OUTPATIENT
Start: 2019-08-07 | End: 2019-08-09 | Stop reason: HOSPADM

## 2019-08-06 RX ORDER — ACETAMINOPHEN 160 MG/5ML
650 SOLUTION ORAL EVERY 4 HOURS PRN
Status: DISCONTINUED | OUTPATIENT
Start: 2019-08-06 | End: 2019-08-09 | Stop reason: HOSPADM

## 2019-08-06 RX ORDER — ASPIRIN 300 MG/1
300 SUPPOSITORY RECTAL DAILY
Status: DISCONTINUED | OUTPATIENT
Start: 2019-08-06 | End: 2019-08-09 | Stop reason: HOSPADM

## 2019-08-06 RX ORDER — BISACODYL 10 MG
10 SUPPOSITORY, RECTAL RECTAL DAILY PRN
Status: DISCONTINUED | OUTPATIENT
Start: 2019-08-06 | End: 2019-08-09 | Stop reason: HOSPADM

## 2019-08-06 RX ORDER — POTASSIUM CHLORIDE 1.5 G/1.77G
40 POWDER, FOR SOLUTION ORAL AS NEEDED
Status: DISCONTINUED | OUTPATIENT
Start: 2019-08-06 | End: 2019-08-09 | Stop reason: HOSPADM

## 2019-08-06 RX ORDER — BISACODYL 10 MG
10 SUPPOSITORY, RECTAL RECTAL DAILY PRN
Status: DISCONTINUED | OUTPATIENT
Start: 2019-08-06 | End: 2019-08-06

## 2019-08-06 RX ORDER — SODIUM CHLORIDE 0.9 % (FLUSH) 0.9 %
3-10 SYRINGE (ML) INJECTION AS NEEDED
Status: DISCONTINUED | OUTPATIENT
Start: 2019-08-06 | End: 2019-08-09 | Stop reason: HOSPADM

## 2019-08-06 RX ORDER — POTASSIUM CHLORIDE 20 MEQ/1
40 TABLET, EXTENDED RELEASE ORAL AS NEEDED
Status: DISCONTINUED | OUTPATIENT
Start: 2019-08-06 | End: 2019-08-09 | Stop reason: HOSPADM

## 2019-08-06 RX ORDER — ACETAMINOPHEN 650 MG/1
650 SUPPOSITORY RECTAL EVERY 4 HOURS PRN
Status: DISCONTINUED | OUTPATIENT
Start: 2019-08-06 | End: 2019-08-09 | Stop reason: HOSPADM

## 2019-08-06 RX ORDER — ONDANSETRON 4 MG/1
4 TABLET, FILM COATED ORAL EVERY 6 HOURS PRN
Status: DISCONTINUED | OUTPATIENT
Start: 2019-08-06 | End: 2019-08-09 | Stop reason: HOSPADM

## 2019-08-06 RX ORDER — ATORVASTATIN CALCIUM 40 MG/1
80 TABLET, FILM COATED ORAL NIGHTLY
Status: DISCONTINUED | OUTPATIENT
Start: 2019-08-06 | End: 2019-08-09 | Stop reason: HOSPADM

## 2019-08-06 RX ORDER — ASPIRIN 81 MG/1
81 TABLET, CHEWABLE ORAL DAILY
Status: DISCONTINUED | OUTPATIENT
Start: 2019-08-06 | End: 2019-08-06

## 2019-08-06 RX ADMIN — POTASSIUM CHLORIDE 40 MEQ: 20 TABLET, EXTENDED RELEASE ORAL at 20:25

## 2019-08-06 RX ADMIN — POTASSIUM CHLORIDE 40 MEQ: 20 TABLET, EXTENDED RELEASE ORAL at 17:22

## 2019-08-06 RX ADMIN — Medication 3 ML: at 20:22

## 2019-08-06 RX ADMIN — LISINOPRIL: 20 TABLET ORAL at 20:23

## 2019-08-06 RX ADMIN — QUETIAPINE 150 MG: 25 TABLET, FILM COATED ORAL at 20:20

## 2019-08-06 RX ADMIN — METOPROLOL TARTRATE 25 MG: 25 TABLET, FILM COATED ORAL at 20:20

## 2019-08-06 RX ADMIN — LAMOTRIGINE 100 MG: 100 TABLET ORAL at 20:20

## 2019-08-06 RX ADMIN — ASPIRIN 81 MG 81 MG: 81 TABLET ORAL at 17:22

## 2019-08-06 RX ADMIN — ATORVASTATIN CALCIUM 80 MG: 40 TABLET, FILM COATED ORAL at 20:20

## 2019-08-06 RX ADMIN — ACETAMINOPHEN 650 MG: 325 TABLET ORAL at 17:22

## 2019-08-06 RX ADMIN — POTASSIUM CHLORIDE 40 MEQ: 20 TABLET, EXTENDED RELEASE ORAL at 17:23

## 2019-08-06 NOTE — ASSESSMENT & PLAN NOTE
Hypertension is worsening.  Continue current medications.  Medication changes per orders.  Blood pressure will be reassessed at the next regular appointment.  Likely poorly controlled hypertension would be the cause of her stroke.  Hopefully we can stress more compliance of medication in the future

## 2019-08-06 NOTE — CONSULTS
Primary Care Provider: Cong Harvey Jr., MD     Consult requested by: Cong Harvey Jr., MD    Reason for Consultation: Neurological evaluation possible stroke    History taken from: patient chart    Chief complaint stumbling and vision problems       SUBJECTIVE:    History of present illness:   The patient is a very pleasant 63-year-old right-handed white female who was evaluated actually an ultrasound as she was off the floor for testing and she has been admitted to room [facility] at [facility]  Source of information is the patient and evaluation done by admitting team    The patient says that since Saturday, 3 August she has been stumbling and she may have some vision problems on the left side  She is otherwise awake and alert and no focal neurologic deficit has been found  No headaches  No migraines  Or seizures  Or passing out or loss of consciousness  No twitching or other problems  She says that she has hypertension and hyperlipidemia and some anxiety but nothing major otherwise  No change in medication  I do not think she takes aspirin    As per admitting, Ms. Deluna is a 63 year old  female with PMH of HTN, HLD, anxiety/ADHD, DDD who was sent for direct admission 8/6/2019 by PCP Dr. Reis for acute confusion and left visual field loss. Daughter at bedside stated the patient's confusion mainly started this past Friday evening. She was disoriented and making statements that didn't make sense. She was going into closets and bedrooms to use the bathroom. She was off balance and falling into walls. She fell on Saturday and hurt the left side of her mid back. She appeared to have difficulty moving her left leg and had difficulty reaching for objects with her left hand. It appeared she had difficulty seeing with her left eye. She was treated for a UTI after her right rotator cuff repair but seemed to have recovered from that. She has not had any recent illness. Family denied any previous  history of CVA. She was seen here in the ED 8/4/2019 for her symptoms. CT head was negative for hemorrhage or mass effect. Labs were unremarkable. She was discharged and followed up with Dr. Smiley rivera 8/6/2019 who sent her to the hospital for further stroke workup. Pt complains of pain to her right thoracic and into lumbar. She had bilateral temporal headache and occipital headache. No lateralizing weakness noted. Pt does have left eye vision changes. NIH is 3. Stroke workup started          Review of Systems   No fever chills rigors or sweats  No weight issues  No sleep problems  HEENT:  No speech problem, vision changes, facial asymmetry or pain, or neck problem  Chest: No chest pain, clubbing, cyanosis, orthopnea palpitations  Pulmonary:  No shortness of air, cough or expectoration  Abdomen:  No swelling/tension, constipation,diarrhea or pain  No genitourinary symptoms  Extremity problems as discussed  No back problem  No psychotic issues  Neurologic issues as discussed  No hematologic, dermatologic or endocrine problems          PATIENT HISTORY:  Past Medical History:   Diagnosis Date   • ADHD unknown   • Anemia    • Hyperlipidemia unknown   • Hypertension    • Insomnia unknown   • Mood disorder (CMS/HCC)    • Obesity unknown   , Past Surgical History:   Procedure Laterality Date   • CHOLECYSTECTOMY     • FINGER SURGERY     • KNEE ARTHROPLASTY     • KNEE SURGERY Right     replaced   • ROTATOR CUFF REPAIR Right 2019   • SHOULDER SURGERY Right 05/24/2019    scope/ cuff repair   • TUBAL ABDOMINAL LIGATION     , Family History   Problem Relation Age of Onset   • Diabetes Other    , Social History     Tobacco Use   • Smoking status: Never Smoker   • Smokeless tobacco: Never Used   Substance Use Topics   • Alcohol use: No     Frequency: Never   • Drug use: No   , Medications Prior to Admission   Medication Sig Dispense Refill Last Dose   • ALPRAZolam (XANAX) 2 MG tablet TAKE ONE TABLET BY MOUTH EVERY NIGHT AT  BEDTIME 30 tablet 2 Taking   • atorvastatin (LIPITOR) 10 MG tablet    Taking   • lamoTRIgine (LaMICtal) 100 MG tablet Take 100 mg by mouth 2 (Two) Times a Day.      • lisinopril-hydrochlorothiazide (PRINZIDE,ZESTORETIC) 20-25 MG per tablet Take 1 tablet by mouth Daily.      • metoprolol tartrate (LOPRESSOR) 25 MG tablet Take 25 mg by mouth Daily.      • omeprazole (priLOSEC) 40 MG capsule    Taking   • promethazine (PHENERGAN) 25 MG tablet Take 25 mg by mouth Every 6 (Six) Hours As Needed for Nausea or Vomiting.      • QUEtiapine (SEROquel) 100 MG tablet TAKE ONE AND ONE-HALF (1 & 1/2) TABLET BY MOUTH EVERY NIGHT AT BEDTIME 45 tablet 2 Taking   • traMADol (ULTRAM) 50 MG tablet Take 1 tablet by mouth Every 6 (Six) Hours As Needed for Moderate Pain . 28 tablet 0    , Scheduled Meds:    aspirin 81 mg Oral Daily   Or      aspirin 300 mg Rectal Daily   atorvastatin 80 mg Oral Nightly   sodium chloride 3 mL Intravenous Q12H   , Continuous Infusions:   , PRN Meds:  •  acetaminophen  •  acetaminophen  •  acetaminophen  •  bisacodyl  •  bisacodyl  •  magnesium hydroxide  •  magnesium sulfate **OR** magnesium sulfate in D5W 1g/100mL (PREMIX)  •  ondansetron **OR** ondansetron  •  potassium chloride  •  potassium chloride  •  sodium chloride, Allergies:  Patient has no known allergies.    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, lower field,   Pupils are round, reactive to light and accommodation and size of about 3 mm  No ptosis or nystagmus  Funduscopic examination was not successful  Eye movements are conjugate     Sensation on the face and scalp are normal  Muscles of mastication are normal and symmetric  Muscles of  facial expression are  normal and symmetric  Hearing is intact bilaterally  Head turning and shoulder shrugs were unremarkable  Tongue was midline  Uvula is midline and palate elevations were normal     Motor examination:  Normal bulk, tone and strength was 5/5  No fasciculations     Sensory examination:  Intact for soft touch, pain and position sensation  Romberg was not evaluated     Reflexes:  0/4     Coordination:  Normal finger-to-nose to finger, rapid alternating movements  Gait:  Deferred     Toe signs:  [place]    ________________________________________________________   RESULTS REVIEW:    VITAL SIGNS:   Temp:  [97.8 °F (36.6 °C)-99.4 °F (37.4 °C)] 99.4 °F (37.4 °C)  Heart Rate:  [75-80] 75  Resp:  [8-12] 11  BP: (151-160)/() 151/91     LABS:  WBC   Date Value Ref Range Status   08/06/2019 10.20 3.40 - 10.80 10*3/mm3 Final     RBC   Date Value Ref Range Status   08/06/2019 4.98 3.77 - 5.28 10*6/mm3 Final     Hemoglobin   Date Value Ref Range Status   08/06/2019 14.5 12.0 - 15.9 g/dL Final     Hematocrit   Date Value Ref Range Status   08/06/2019 43.2 34.0 - 46.6 % Final     MCV   Date Value Ref Range Status   08/06/2019 86.7 79.0 - 97.0 fL Final     MCH   Date Value Ref Range Status   08/06/2019 29.2 26.6 - 33.0 pg Final     MCHC   Date Value Ref Range Status   08/06/2019 33.7 31.5 - 35.7 g/dL Final     RDW   Date Value Ref Range Status   08/06/2019 12.8 12.3 - 15.4 % Final     RDW-SD   Date Value Ref Range Status   08/06/2019 39.4 37.0 - 54.0 fl Final     MPV   Date Value Ref Range Status   08/06/2019 9.2 6.0 - 12.0 fL Final     Platelets   Date Value Ref Range Status   08/06/2019 233 140 - 450 10*3/mm3 Final     Neutrophil %   Date Value Ref Range Status   08/04/2019 62.8 42.7 - 76.0 % Final     Lymphocyte %   Date Value Ref Range Status   08/04/2019 28.3 19.6 - 45.3 % Final     Monocyte %   Date Value Ref Range Status   08/04/2019 6.6 5.0 - 12.0 % Final     Eosinophil %   Date Value Ref Range Status   08/04/2019 1.0 0.3 -  6.2 % Final     Basophil %   Date Value Ref Range Status   08/04/2019 1.3 0.0 - 1.5 % Final     Neutrophils, Absolute   Date Value Ref Range Status   08/04/2019 6.60 1.70 - 7.00 10*3/mm3 Final     Lymphocytes, Absolute   Date Value Ref Range Status   08/04/2019 3.00 0.70 - 3.10 10*3/mm3 Final     Monocytes, Absolute   Date Value Ref Range Status   08/04/2019 0.70 0.10 - 0.90 10*3/mm3 Final     Eosinophils, Absolute   Date Value Ref Range Status   08/04/2019 0.10 0.00 - 0.40 10*3/mm3 Final     Basophils, Absolute   Date Value Ref Range Status   08/04/2019 0.10 0.00 - 0.20 10*3/mm3 Final     nRBC   Date Value Ref Range Status   08/04/2019 0.1 0.0 - 0.2 /100 WBC Final     Glucose   Date Value Ref Range Status   08/06/2019 112 (H) 65 - 99 mg/dL Final     BUN   Date Value Ref Range Status   08/06/2019 16 8 - 20 mg/dL Final     Creatinine   Date Value Ref Range Status   08/06/2019 0.90 0.40 - 1.00 mg/dL Final     Sodium   Date Value Ref Range Status   08/06/2019 133 (L) 136 - 144 mmol/L Final     Potassium   Date Value Ref Range Status   08/06/2019 3.1 (L) 3.6 - 5.1 mmol/L Final     Chloride   Date Value Ref Range Status   08/06/2019 95 (L) 101 - 111 mmol/L Final     CO2   Date Value Ref Range Status   08/06/2019 23.0 22.0 - 32.0 mmol/L Final     Calcium   Date Value Ref Range Status   08/06/2019 9.2 8.9 - 10.3 mg/dL Final     Total Protein   Date Value Ref Range Status   08/06/2019 7.7 6.1 - 7.9 g/dL Final     Albumin   Date Value Ref Range Status   08/06/2019 4.00 3.50 - 4.80 g/dL Final     ALT (SGPT)   Date Value Ref Range Status   08/06/2019 16 14 - 54 U/L Final     AST (SGOT)   Date Value Ref Range Status   08/06/2019 19 15 - 41 U/L Final     Alkaline Phosphatase   Date Value Ref Range Status   08/06/2019 85 32 - 91 U/L Final     Total Bilirubin   Date Value Ref Range Status   08/06/2019 1.5 (H) 0.3 - 1.2 mg/dL Final     eGFR Non  Amer   Date Value Ref Range Status   08/06/2019 64 >60 mL/min/1.73 Final      BUN/Creatinine Ratio   Date Value Ref Range Status   08/06/2019 17.8 5.4 - 26.2 Final     Anion Gap   Date Value Ref Range Status   08/06/2019 18.1 (H) 5.0 - 15.0 mmol/L Final       Lab Results   Component Value Date    TSH 0.830 08/06/2019     (H) 08/06/2019    HGBA1C 5.4 08/06/2019    [id number] 11/17/2017         IMAGING STUDIES:  No radiology results for the last day    I reviewed the patient's new clinical results.      ________________________________________________________     PROBLEM LIST:    Acute confusion    Anemia in other chronic diseases classified elsewhere    Anxiety    Mood disorder (CMS/HCC)    Hyperlipidemia    Hypertension    Visual field loss left          Assessment/Plan   ASSESSMENT/PLAN:  The patient was admitted for some vision changes and possible some ataxia and there is a small stroke that I can see in the right parietal lobe but there is significant white matter disease and for her age this is too much and I am concerned about some sort of leukoencephalopathy  Will wait for the official read of the MRI and then will go from there  In the meantime continue the present plan and follow-up on the work-up    Modification of stroke risk factors:   - Blood pressure should be less than 130/80 outpatient, HbA1c less than 6.5, LDL less than 70; b12>500 and smoking cessation if applicable. We would be grateful if the primary team / primary care physician keep a close watch on this above targets.  - Stroke education  - Follow up with neurologist of choice      I discussed the patients findings and my recommendations with ?    Yuli Troy MD  08/06/19  3:48 PM

## 2019-08-06 NOTE — PROGRESS NOTES
Rooming Tab(CC,VS,Pt Hx,Fall Screen)  Chief Complaint   Patient presents with   • Memory Loss       Subjective 63-year-old brought in here by her  for memory trouble.  Apparently 8 months ago she started getting forgetful and asking questions over and over again.  This progressively got worse.  This Saturday however things really worsen when the patient was found up in her bedroom doing some type of suppose it stretching exercise.  She said she was trying to look for the bathroom but then ended up in a bedroom and just very strange and unusual behavior.  She apparently fell and hurt her mid to lower back.  There is no head injury.  Her  state she was very confused and had trouble recognizing any landmarks and strange behavior such as eating small bits of paper.  She is having headaches and had poor appetite.  She went to the emergency room and had a negative CT and blood work and was discharged and continues to have quite a bit of issue.  Her  stopped all her medications even her blood pressure medicines because he was afraid that might have affected her mental status.  She really had no new medications added recently.  Patient has a history of poor compliance taking her medications, the  telling me that she stopped her lisinopril because it made her urinate too much.  Even when she was taking her medications, she was doing a poor job of it unfortunately.    I have reviewed and updated her medications, medical history and problem list during today's office visit.     Patient Care Team:  Devaughn Reis MD as PCP - General  Devaughn Reis MD as PCP - Family Medicine    Problem List Tab  Medications Tab  Synopsis Tab  Chart Review Tab  Care Everywhere Tab  Immunizations Tab  Patient History Tab    Social History     Tobacco Use   • Smoking status: Never Smoker   • Smokeless tobacco: Never Used   Substance Use Topics   • Alcohol use: No     Frequency: Never       Review of Systems    Constitutional: Negative for chills, fatigue and fever.   HENT: Negative for nosebleeds.    Respiratory: Negative for chest tightness and shortness of breath.    Cardiovascular: Negative for chest pain and palpitations.   Gastrointestinal: Negative for blood in stool and diarrhea.   Neurological: Positive for dizziness, headache, memory problem and confusion. Negative for syncope.   Psychiatric/Behavioral: Positive for agitation and decreased concentration. Negative for hallucinations.       Objective     Rooming Tab(CC,VS,Pt Hx,Fall Screen)  /92 (BP Location: Left arm, Patient Position: Sitting, Cuff Size: Adult)   Pulse 80   Temp 98.1 °F (36.7 °C) (Oral)   Resp 8   Wt 85.3 kg (188 lb)   BMI 29.44 kg/m²     Body mass index is 29.44 kg/m².    Physical Exam   Constitutional: She appears well-developed and well-nourished. No distress.   Patient is obviously confused, her cognitive function is nowhere near what it generally is.  She talks continuously as I am tending to talk to her  because no useful information is able to be obtained from her   HENT:   Head: Normocephalic and atraumatic.   Nose: Nose normal.   Mouth/Throat: Oropharynx is clear and moist.   Eyes: Conjunctivae, EOM and lids are normal. Pupils are equal, round, and reactive to light.   Neck: Trachea normal and normal range of motion. Neck supple. No JVD present. Carotid bruit is not present. No thyroid mass and no thyromegaly present.   No carotid bruits   Cardiovascular: Normal rate, regular rhythm, normal heart sounds and intact distal pulses.   Pulmonary/Chest: Effort normal and breath sounds normal.   Musculoskeletal: Normal range of motion. She exhibits no edema.   Neurological: She is alert.   She is confused and has a very difficult time following directions.  Even simple things we have to tell her several times.  She has lost her left visual field  She has poor balance and has difficulty with Romberg and certainly cannot even  attempt a heel-to-toe   Skin: Skin is warm and dry.   Psychiatric: Her speech is normal.   Agitated, thought content abnormal, poor judgment She is attentive.   Nursing note and vitals reviewed.       Statin Choice Calculator  Data Reviewed:    Xr Spine Thoracic 2 View    Result Date: 8/6/2019  Impression: No acute fracture or subluxation of the thoracic spine.  Electronically Signed ByMariluz Beaver On:8/6/2019 4:48 PM This report was finalized on [id number] by  Baljeet Beaver, .    Xr Spine Lumbar Complete With Flex & Ext    Result Date: 8/6/2019  Impression:  1. No acute fracture or dislocation. 2. Mild multilevel degenerative disc disease. 3. 5 mm of anterolisthesis of L4 on L5 with flexion.  Electronically Signed ByMariluz Beaver On:8/6/2019 7:17 PM This report was finalized on [id number] by  Baljeet Beaver, .    Ct Head Without Contrast    Result Date: 8/4/2019  Impression:  1. Negative for hemorrhage or mass effect. 2. There is stable, marked decreased density in the white matter, not significantly changed. Differential includes marked chronic small vessel ischemia, demyelinating disease, or other leukoencephalopathy.  Electronically Signed ByYeimy Worrell On:8/4/2019 2:50 PM This report was finalized on [id number] by  Chelsi Worrell .    Mri Brain Without Contrast    Result Date: 8/6/2019  Impression:  1. There is a small area of acute ischemia in the right parietal lobe. There is another small abnormality which is in the right temporal lobe and may be acute or subacute. This was reported to the patient's nurse by myself with instructions to call the attending provider, at 4:29 PM. 2. There is stable moderately severe white matter abnormality. Differential includes chronic small vessel ischemia and nonspecific metabolic or demyelinating process. There is also evidence of stable lacunar infarcts.  Electronically Signed ByYeimy Worrell On:8/6/2019 4:34 PM This report was finalized on [id number] by   Chelsi Worrell, .            Lab Results   Component Value Date    BUN 24 (H) 08/07/2019    CREATININE 1.00 08/07/2019    EGFRIFNONA 56 (L) 08/07/2019     (L) 08/07/2019    K 3.7 08/07/2019    CL 98 (L) 08/07/2019    CALCIUM 9.3 08/07/2019    ALBUMIN 3.60 08/07/2019    BILITOT 1.1 08/07/2019    ALKPHOS 78 08/07/2019    AST 17 08/07/2019    ALT 16 08/07/2019    TRIG 168 (H) 08/06/2019    HDL 47 08/06/2019    VLDL 33.6 08/06/2019     (H) 08/06/2019    LDLHDL 2.46 08/06/2019    WBC 10.50 08/07/2019    RBC 4.70 08/07/2019    HCT 41.1 08/07/2019    MCV 87.4 08/07/2019    MCH 29.3 08/07/2019    TSH 0.830 08/06/2019    INR 1.05 08/06/2019      Assessment/Plan   Order Review Tab  Health Maintenance Tab  Patient Plan/Order Tab  Diagnoses and all orders for this visit:    1. Visual field loss left (Primary)    2. Cerebrovascular accident (CVA) due to other mechanism  Assessment & Plan:  New  I called the hospitalist and we are going to directly admit her to the neuro logical the floor      3. Essential hypertension  Assessment & Plan:  Hypertension is worsening.  Continue current medications.  Medication changes per orders.  Blood pressure will be reassessed at the next regular appointment.  Likely poorly controlled hypertension would be the cause of her stroke.  Hopefully we can stress more compliance of medication in the future      4. Confusion  Assessment & Plan:  Psychological condition is worsening.  admit to [facility]        5. Acute midline low back pain without sciatica  Assessment & Plan:  Will need xrays or MRIs and further evaluation.  I discussed this with the hospitalist        [place] Tab  Return in about 2 weeks (around 8/20/2019) for Recheck.

## 2019-08-06 NOTE — PLAN OF CARE
Problem: Stroke (Ischemic) (Adult)  Goal: Signs and Symptoms of Listed Potential Problems Will be Absent, Minimized or Managed (Stroke)  Outcome: Ongoing (interventions implemented as appropriate)      Problem: Fall Risk (Adult)  Goal: Identify Related Risk Factors and Signs and Symptoms  Outcome: Ongoing (interventions implemented as appropriate)    Goal: Absence of Fall  Outcome: Ongoing (interventions implemented as appropriate)      Problem: Skin Injury Risk (Adult)  Goal: Identify Related Risk Factors and Signs and Symptoms  Outcome: Ongoing (interventions implemented as appropriate)    Goal: Skin Health and Integrity  Outcome: Ongoing (interventions implemented as appropriate)      Problem: Confusion, Acute (Adult)  Goal: Identify Related Risk Factors and Signs and Symptoms  Outcome: Ongoing (interventions implemented as appropriate)    Goal: Cognitive/Functional Impairments Minimized  Outcome: Ongoing (interventions implemented as appropriate)    Goal: Safety  Outcome: Ongoing (interventions implemented as appropriate)

## 2019-08-06 NOTE — CONSULTS
Diabetes Education    Patient Name:  Andree Deluna  YOB: 1955  MRN: [id number]  Admit Date:  8/6/2019    Consult received due to A1c >7.5% per stroke protocol. Chart reviewed and pt's A1c on 8/6/2019 was 5.4%. Pt does not take any DM meds at home and no meds ordered for pt in hospital. No education needed at this time.      Electronically signed by:  Fartun Talavera RN  08/06/19 4:25 PM

## 2019-08-06 NOTE — H&P
[facility] HOSPITALIST     Devaughn Reis MD    CHIEF COMPLAINT:     Direct admit from PCP office for acute confusion, left visual field loss    HISTORY OF PRESENT ILLNESS:    Ms. Deluna is a 63 year old  female with PMH of HTN, HLD, anxiety/ADHD, DDD who was sent for direct admission 8/6/2019 by PCP Dr. Reis for acute confusion and left visual field loss. Daughter at bedside stated the patient's confusion mainly started this past Friday evening. She was disoriented and making statements that didn't make sense. She was going into closets and bedrooms to use the bathroom. She was off balance and falling into walls. She fell on Saturday and hurt the left side of her mid back. She appeared to have difficulty moving her left leg and had difficulty reaching for objects with her left hand. It appeared she had difficulty seeing with her left eye. She was treated for a UTI after her right rotator cuff repair but seemed to have recovered from that. She has not had any recent illness. Family denied any previous history of CVA. She was seen here in the ED 8/4/2019 for her symptoms. CT head was negative for hemorrhage or mass effect. Labs were unremarkable. She was discharged and followed up with Dr. Reis today 8/6/2019 who sent her to the hospital for further stroke workup. Pt complains of pain to her right thoracic and into lumbar. She had bilateral temporal headache and occipital headache. No lateralizing weakness noted. Pt does have left eye vision changes. NIH is 3. Stroke workup started.      Past Medical History:   Diagnosis Date   • ADHD unknown   • Anemia    • Hyperlipidemia unknown   • Hypertension    • Insomnia unknown   • Mood disorder (CMS/HCC)    • Obesity unknown     Past Surgical History:   Procedure Laterality Date   • CHOLECYSTECTOMY     • FINGER SURGERY     • KNEE ARTHROPLASTY     • KNEE SURGERY Right     replaced   • ROTATOR CUFF REPAIR Right 2019   • SHOULDER SURGERY Right  05/24/2019    scope/ cuff repair   • TUBAL ABDOMINAL LIGATION       Family History   Problem Relation Age of Onset   • Diabetes Other      Social History     Tobacco Use   • Smoking status: Never Smoker   • Smokeless tobacco: Never Used   Substance Use Topics   • Alcohol use: No     Frequency: Never   • Drug use: No     Medications Prior to Admission   Medication Sig Dispense Refill Last Dose   • ALPRAZolam (XANAX) 2 MG tablet TAKE ONE TABLET BY MOUTH EVERY NIGHT AT BEDTIME 30 tablet 2 Taking   • atorvastatin (LIPITOR) 10 MG tablet    Taking   • lamoTRIgine (LaMICtal) 100 MG tablet Take 100 mg by mouth 2 (Two) Times a Day.      • lisinopril-hydrochlorothiazide (PRINZIDE,ZESTORETIC) 20-25 MG per tablet Take 1 tablet by mouth Daily.      • metoprolol tartrate (LOPRESSOR) 25 MG tablet Take 25 mg by mouth Daily.      • omeprazole (priLOSEC) 40 MG capsule    Taking   • promethazine (PHENERGAN) 25 MG tablet Take 25 mg by mouth Every 6 (Six) Hours As Needed for Nausea or Vomiting.      • QUEtiapine (SEROquel) 100 MG tablet TAKE ONE AND ONE-HALF (1 & 1/2) TABLET BY MOUTH EVERY NIGHT AT BEDTIME 45 tablet 2 Taking   • traMADol (ULTRAM) 50 MG tablet Take 1 tablet by mouth Every 6 (Six) Hours As Needed for Moderate Pain . 28 tablet 0      Allergies:  Patient has no known allergies.      There is no immunization history on file for this patient.        REVIEW OF SYSTEMS:     Review of Systems   Constitution: Negative.   Eyes: Positive for blurred vision and vision loss in left eye.   Cardiovascular: Negative.  Negative for chest pain.   Respiratory: Negative.  Negative for shortness of breath.    Endocrine: Negative.    Hematologic/Lymphatic: Negative.    Skin: Negative.    Musculoskeletal: Negative.    Gastrointestinal: Negative.    Genitourinary: Negative.    Neurological: Positive for headaches and loss of balance. Negative for paresthesias.   Psychiatric/Behavioral: Negative.    Allergic/Immunologic: Negative.    All other  "systems reviewed and are negative.      Vital Signs  Temp:  [97.8 °F (36.6 °C)-98.1 °F (36.7 °C)] 97.8 °F (36.6 °C)  Heart Rate:  [75-80] 75  Resp:  [8-12] 12  BP: (151-160)/() 151/86    Flowsheet Rows      First Filed Value   Admission Height  167.6 cm (66\") Documented at 08/06/2019 1111   Admission Weight  81.8 kg (180 lb 6.4 oz) Documented at 08/06/2019 1111           Physical Exam:    Physical Exam   Constitutional: She is oriented to person, place, and time. She appears well-developed and well-nourished. No distress.   HENT:   Head: Normocephalic and atraumatic.   Nose: Nose normal.   Eyes: Conjunctivae and EOM are normal. Pupils are equal, round, and reactive to light. Right eye exhibits discharge.   Neck: Normal range of motion.   Cardiovascular: Normal rate, regular rhythm, normal heart sounds and intact distal pulses.   Pulmonary/Chest: Effort normal and breath sounds normal. No respiratory distress.   Abdominal: Soft. Bowel sounds are normal. She exhibits no distension.   Musculoskeletal: Normal range of motion. She exhibits no edema, tenderness or deformity.   Neurological: She is alert and oriented to person, place, and time. She has normal strength. No cranial nerve deficit.   NIH 3 due to left vision loss, some difficulty with labeling stroke pictures    Skin: Skin is warm and dry. No rash noted. She is not diaphoretic. No erythema.   Psychiatric: She has a normal mood and affect. Her behavior is normal.   Nursing note and vitals reviewed.        Results Review:    I reviewed the patient's new clinical results.  Lab Results (most recent)     Procedure Component Value Units Date/Time    Lipid Panel [[id number]]  (Abnormal) Collected:  08/06/19 1203    Specimen:  Blood Updated:  08/06/19 1304     Total Cholesterol 196 mg/dL      Triglycerides 168 mg/dL      HDL Cholesterol 47 mg/dL      LDL Cholesterol  126 mg/dL      VLDL Cholesterol 33.6 mg/dL      LDL/HDL Ratio 2.46     Chol/HDL Ratio 4.17    " Narrative:       The following guidelines have been recommended by the NCEP for Total Cholesterol, Total Triglycerides, LDL Cholesterol, and HDL Cholesterols    Total Cholesterol  Desirable:        <200 mg/dL  Borderline High:  200-239 mg/dL  High:             > or = 240 mg/dL    Total Triglyceride  Normal:           <150 mg/dL  Borderline High:  150-199 mg/dL  High:             200-499 mg/dL  Very High:        > or = 500 mg/dL    HDL Cholesterol  Low HDL:          <40 mg/dL  Normal:           40-60 mg/dL  Desirable:        >60 mg/dL    LDL Cholesterol  Optimal:          <100 mg/dL  Low Risk:         100-129 mg/dL  Borderline High:  130-159 mg/dL  High:             160-189 mg/dL  Very High:        > or = 190 mg/dL    The following ratios of LDL to HDL and Total cholesterol to HDL are for information only:    LDL/HDL Ratio  Desirable:        <5  Optimal:          < or = 3.5    Total Cholesterol/HDL Ratio  Low Risk:         3.3-4.4  Average Risk:     4.4-7.1  Medium Risk:      7.1-11  High Risk:        >11       Comprehensive Metabolic Panel [[id number]]  (Abnormal) Collected:  08/06/19 1203    Specimen:  Blood Updated:  08/06/19 1304     Glucose 112 mg/dL      BUN 16 mg/dL      Creatinine 0.90 mg/dL      Sodium 133 mmol/L      Potassium 3.1 mmol/L      Chloride 95 mmol/L      CO2 23.0 mmol/L      Calcium 9.2 mg/dL      Total Protein 7.7 g/dL      Albumin 4.00 g/dL      ALT (SGPT) 16 U/L      AST (SGOT) 19 U/L      Alkaline Phosphatase 85 U/L      Total Bilirubin 1.5 mg/dL      eGFR Non African Amer 64 mL/min/1.73      Globulin 3.7 gm/dL      A/G Ratio 1.1 g/dL      BUN/Creatinine Ratio 17.8     Anion Gap 18.1 mmol/L     Troponin [[id number]]  (Normal) Collected:  08/06/19 1203    Specimen:  Blood Updated:  08/06/19 1254     Troponin I <0.030 ng/mL     Narrative:       Troponin I Reference Range:    0.00-0.03  Negative.  Repeat testing in 4-6 hours if clinically indicated.    0.04-0.29  Suspicious for myocardial injury.  Serial measurements and clinical  correlation may be necessary to confirm or exclude diagnosis of acute  coronary syndrome.  Repeat testing in 4-6 hours if indicated.     >0.29 Consistent with myocardial injury.  Recommend clinical and laboratory correlation.     Results my be falsely decreased if patient taking Biotin.     Protime-INR [[id number]]  (Normal) Collected:  08/06/19 1204    Specimen:  Blood Updated:  08/06/19 1239     Protime 10.7 Seconds      INR 1.05    CBC (No Diff) [[id number]]  (Normal) Collected:  08/06/19 1204    Specimen:  Blood Updated:  08/06/19 1229     WBC 10.20 10*3/mm3      RBC 4.98 10*6/mm3      Hemoglobin 14.5 g/dL      Hematocrit 43.2 %      MCV 86.7 fL      MCH 29.2 pg      MCHC 33.7 g/dL      RDW 12.8 %      RDW-SD 39.4 fl      MPV 9.2 fL      Platelets 233 10*3/mm3     Hemoglobin A1c [[id number]] Collected:  08/06/19 1204    Specimen:  Blood Updated:  08/06/19 1217    TSH [[id number]] Collected:  08/06/19 1203    Specimen:  Blood Updated:  08/06/19 1217             CT Head Without Contrast  Narrative: CT HEAD WO CONTRAST-     Date of Exam: 8/4/2019 2:12 PM     Indication: Head trauma, mental status change.     Comparison: CT head 07/17/2017     Technique:  Without contrast, contiguous axial CT images of the head  were obtained from skull base to vertex.  Coronal and sagittal  reconstructions were performed.  Automated exposure control and  iterative reconstruction methods were used.     FINDINGS  There is marked decreased density in the white matter, similar to the  prior exam. There are calcifications in the basal ganglia. There is no  intracranial hemorrhage or mass effect. No extra-axial collection is  noted. Ventricles and cisterns are patent. No skull abnormality is  noted. There is mucosal thickening in the left maxillary sinus and in  the right sphenoid sinus.     Impression:    1. Negative for hemorrhage or mass effect.  2. There is stable, marked decreased density in the white  matter, not  significantly changed. Differential includes marked chronic small vessel  ischemia, demyelinating disease, or other leukoencephalopathy.     Electronically Signed By-Chelsi Worrell On:8/4/2019 2:50 PM  This report was finalized on [id number] by  Chelsi Worrell, .      Assessment/Plan     Acute confusion, Left visual field loss  - CT head from 8/4/2019 negative for acute findings  - NIH 3  - cont neuro checks  - stroke workup with MRI brain, check carotid US, 2D echo, tsh, lipid panel, hgb a1c, B12  - aspirin, statin  - neurology consulted    Anemia in other chronic diseases  - hgb normal    Anxiety  - cont home seroquel, lamictal, xanax (INSPECT verified)    Hypertension  - cont home lisinopril/hctz, metoprolol     Hyperlipidemia  - cont home statin    GERD  - ppi    DVT prophylaxis - SCDs      SARAHY Hughes  08/06/19  1:09 PM

## 2019-08-06 NOTE — ASSESSMENT & PLAN NOTE
New  I called the hospitalist and we are going to directly admit her to the neuro logical the floor

## 2019-08-07 ENCOUNTER — HOSPITAL ENCOUNTER (INPATIENT)
Dept: CARDIOLOGY | Facility: HOSPITAL | Age: 64
Discharge: HOME | End: 2019-08-07

## 2019-08-07 LAB
ALBUMIN SERPL-MCNC: 3.6 G/DL (ref 3.5–4.8)
ALBUMIN/GLOB SERPL: 1 G/DL (ref 1–1.7)
ALP SERPL-CCNC: 78 U/L (ref 32–91)
ALT SERPL W P-5'-P-CCNC: 16 U/L (ref 14–54)
ANION GAP SERPL CALCULATED.3IONS-SCNC: 17.7 MMOL/L (ref 5–15)
AST SERPL-CCNC: 17 U/L (ref 15–41)
BACTERIA UR QL AUTO: ABNORMAL /HPF
BASOPHILS # BLD AUTO: 0.1 10*3/MM3 (ref 0–0.2)
BASOPHILS NFR BLD AUTO: 1 % (ref 0–1.5)
BH CV ECHO MEAS - ACS: 1.4 CM
BH CV ECHO MEAS - AO MAX PG (FULL): 2.7 MMHG
BH CV ECHO MEAS - AO MAX PG: 5.8 MMHG
BH CV ECHO MEAS - AO MEAN PG (FULL): 1.1 MMHG
BH CV ECHO MEAS - AO MEAN PG: 2.5 MMHG
BH CV ECHO MEAS - AO ROOT AREA (BSA CORRECTED): 1.6
BH CV ECHO MEAS - AO ROOT AREA: 7.5 CM^2
BH CV ECHO MEAS - AO ROOT DIAM: 3.1 CM
BH CV ECHO MEAS - AO V2 MAX: 120 CM/SEC
BH CV ECHO MEAS - AO V2 MEAN: 74.5 CM/SEC
BH CV ECHO MEAS - AO V2 VTI: 20.1 CM
BH CV ECHO MEAS - AORTIC HR: 58.2 BPM
BH CV ECHO MEAS - AORTIC R-R: 1 SEC
BH CV ECHO MEAS - ASC AORTA: 3 CM
BH CV ECHO MEAS - AVA(I,A): 2.6 CM^2
BH CV ECHO MEAS - AVA(I,D): 2.6 CM^2
BH CV ECHO MEAS - AVA(V,A): 1.9 CM^2
BH CV ECHO MEAS - AVA(V,D): 1.9 CM^2
BH CV ECHO MEAS - BSA(HAYCOCK): 2 M^2
BH CV ECHO MEAS - BSA: 1.9 M^2
BH CV ECHO MEAS - BZI_BMI: 29.2 KILOGRAMS/M^2
BH CV ECHO MEAS - BZI_METRIC_HEIGHT: 167.6 CM
BH CV ECHO MEAS - BZI_METRIC_WEIGHT: 82.1 KG
BH CV ECHO MEAS - CI(AO): 4.6 L/MIN/M^2
BH CV ECHO MEAS - CI(LVOT): 1.6 L/MIN/M^2
BH CV ECHO MEAS - CO(AO): 8.8 L/MIN
BH CV ECHO MEAS - CO(LVOT): 3 L/MIN
BH CV ECHO MEAS - EDV(CUBED): 64.4 ML
BH CV ECHO MEAS - EDV(MOD-SP4): 78.9 ML
BH CV ECHO MEAS - EDV(TEICH): 70.4 ML
BH CV ECHO MEAS - EF(CUBED): 75.7 %
BH CV ECHO MEAS - EF(MOD-BP): 56 %
BH CV ECHO MEAS - EF(MOD-SP4): 55.9 %
BH CV ECHO MEAS - EF(TEICH): 68.2 %
BH CV ECHO MEAS - ESV(CUBED): 15.7 ML
BH CV ECHO MEAS - ESV(MOD-SP4): 34.8 ML
BH CV ECHO MEAS - ESV(TEICH): 22.4 ML
BH CV ECHO MEAS - FS: 37.6 %
BH CV ECHO MEAS - IVS/LVPW: 1.2
BH CV ECHO MEAS - IVSD: 1.2 CM
BH CV ECHO MEAS - LA DIMENSION(2D): 3.2 CM
BH CV ECHO MEAS - LV DIASTOLIC VOL/BSA (35-75): 41.1 ML/M^2
BH CV ECHO MEAS - LV MASS(C)D: 141.5 GRAMS
BH CV ECHO MEAS - LV MASS(C)DI: 73.8 GRAMS/M^2
BH CV ECHO MEAS - LV MAX PG: 3.1 MMHG
BH CV ECHO MEAS - LV MEAN PG: 1.4 MMHG
BH CV ECHO MEAS - LV SYSTOLIC VOL/BSA (12-30): 18.2 ML/M^2
BH CV ECHO MEAS - LV V1 MAX: 88 CM/SEC
BH CV ECHO MEAS - LV V1 MEAN: 55 CM/SEC
BH CV ECHO MEAS - LV V1 VTI: 20.1 CM
BH CV ECHO MEAS - LVIDD: 4 CM
BH CV ECHO MEAS - LVIDS: 2.5 CM
BH CV ECHO MEAS - LVOT AREA: 2.6 CM^2
BH CV ECHO MEAS - LVOT DIAM: 1.8 CM
BH CV ECHO MEAS - LVPWD: 0.98 CM
BH CV ECHO MEAS - MV A MAX VEL: 79.4 CM/SEC
BH CV ECHO MEAS - MV DEC SLOPE: 211.5 CM/SEC^2
BH CV ECHO MEAS - MV DEC TIME: 0.28 SEC
BH CV ECHO MEAS - MV E MAX VEL: 59.3 CM/SEC
BH CV ECHO MEAS - MV E/A: 0.75
BH CV ECHO MEAS - MV MAX PG: 2.7 MMHG
BH CV ECHO MEAS - MV MEAN PG: 1 MMHG
BH CV ECHO MEAS - MV V2 MAX: 81.6 CM/SEC
BH CV ECHO MEAS - MV V2 MEAN: 45.8 CM/SEC
BH CV ECHO MEAS - MV V2 VTI: 23 CM
BH CV ECHO MEAS - MVA(VTI): 2.2 CM^2
BH CV ECHO MEAS - PA ACC TIME: 0.14 SEC
BH CV ECHO MEAS - PA MAX PG (FULL): 1.1 MMHG
BH CV ECHO MEAS - PA MAX PG: 2.6 MMHG
BH CV ECHO MEAS - PA MEAN PG (FULL): 0.67 MMHG
BH CV ECHO MEAS - PA MEAN PG: 1.4 MMHG
BH CV ECHO MEAS - PA PR(ACCEL): 15.6 MMHG
BH CV ECHO MEAS - PA V2 MAX: 79.9 CM/SEC
BH CV ECHO MEAS - PA V2 MEAN: 55.2 CM/SEC
BH CV ECHO MEAS - PA V2 VTI: 14.5 CM
BH CV ECHO MEAS - PULM A REVS DUR: 0.13 SEC
BH CV ECHO MEAS - PULM A REVS VEL: 30.4 CM/SEC
BH CV ECHO MEAS - PULM DIAS VEL: 30.4 CM/SEC
BH CV ECHO MEAS - PULM S/D: 1.7
BH CV ECHO MEAS - PULM SYS VEL: 50.2 CM/SEC
BH CV ECHO MEAS - PVA(I,A): 4.9 CM^2
BH CV ECHO MEAS - PVA(I,D): 4.9 CM^2
BH CV ECHO MEAS - PVA(V,A): 3.8 CM^2
BH CV ECHO MEAS - PVA(V,D): 3.8 CM^2
BH CV ECHO MEAS - QP/QS: 1.4
BH CV ECHO MEAS - RAP SYSTOLE: 3 MMHG
BH CV ECHO MEAS - RV MAX PG: 1.5 MMHG
BH CV ECHO MEAS - RV MEAN PG: 0.71 MMHG
BH CV ECHO MEAS - RV V1 MAX: 60.4 CM/SEC
BH CV ECHO MEAS - RV V1 MEAN: 38 CM/SEC
BH CV ECHO MEAS - RV V1 VTI: 14.2 CM
BH CV ECHO MEAS - RVDD: 2.4 CM
BH CV ECHO MEAS - RVOT AREA: 5 CM^2
BH CV ECHO MEAS - RVOT DIAM: 2.5 CM
BH CV ECHO MEAS - RVSP: 26.2 MMHG
BH CV ECHO MEAS - SI(AO): 78.8 ML/M^2
BH CV ECHO MEAS - SI(CUBED): 25.4 ML/M^2
BH CV ECHO MEAS - SI(LVOT): 26.9 ML/M^2
BH CV ECHO MEAS - SI(MOD-SP4): 23 ML/M^2
BH CV ECHO MEAS - SI(TEICH): 25 ML/M^2
BH CV ECHO MEAS - SV(AO): 151 ML
BH CV ECHO MEAS - SV(CUBED): 48.8 ML
BH CV ECHO MEAS - SV(LVOT): 51.6 ML
BH CV ECHO MEAS - SV(MOD-SP4): 44.1 ML
BH CV ECHO MEAS - SV(RVOT): 71.1 ML
BH CV ECHO MEAS - SV(TEICH): 48 ML
BH CV ECHO MEAS - TR MAX VEL: 240.7 CM/SEC
BILIRUB SERPL-MCNC: 1.1 MG/DL (ref 0.3–1.2)
BILIRUB UR QL STRIP: ABNORMAL
BUN BLD-MCNC: 24 MG/DL (ref 8–20)
BUN/CREAT SERPL: 24 (ref 5.4–26.2)
CALCIUM SPEC-SCNC: 9.3 MG/DL (ref 8.9–10.3)
CHLORIDE SERPL-SCNC: 98 MMOL/L (ref 101–111)
CLARITY UR: ABNORMAL
CO2 SERPL-SCNC: 23 MMOL/L (ref 22–32)
COLOR UR: ABNORMAL
CREAT BLD-MCNC: 1 MG/DL (ref 0.4–1)
DEPRECATED RDW RBC AUTO: 40.3 FL (ref 37–54)
EOSINOPHIL # BLD AUTO: 0.2 10*3/MM3 (ref 0–0.4)
EOSINOPHIL NFR BLD AUTO: 1.8 % (ref 0.3–6.2)
ERYTHROCYTE [DISTWIDTH] IN BLOOD BY AUTOMATED COUNT: 13.1 % (ref 12.3–15.4)
GFR SERPL CREATININE-BSD FRML MDRD: 56 ML/MIN/1.73
GLOBULIN UR ELPH-MCNC: 3.5 GM/DL (ref 2.5–3.8)
GLUCOSE BLD-MCNC: 102 MG/DL (ref 65–99)
GLUCOSE BLDC GLUCOMTR-MCNC: 114 MG/DL (ref 70–105)
GLUCOSE UR STRIP-MCNC: NEGATIVE MG/DL
HCT VFR BLD AUTO: 41.1 % (ref 34–46.6)
HGB BLD-MCNC: 13.8 G/DL (ref 12–15.9)
HGB UR QL STRIP.AUTO: NEGATIVE
HYALINE CASTS UR QL AUTO: ABNORMAL /LPF
KETONES UR QL STRIP: NEGATIVE
LEUKOCYTE ESTERASE UR QL STRIP.AUTO: ABNORMAL
LV EF 2D ECHO EST: 60 %
LYMPHOCYTES # BLD AUTO: 3.8 10*3/MM3 (ref 0.7–3.1)
LYMPHOCYTES NFR BLD AUTO: 36.1 % (ref 19.6–45.3)
MAGNESIUM SERPL-MCNC: 2.3 MG/DL (ref 1.8–2.5)
MAXIMAL PREDICTED HEART RATE: 159 BPM
MCH RBC QN AUTO: 29.3 PG (ref 26.6–33)
MCHC RBC AUTO-ENTMCNC: 33.5 G/DL (ref 31.5–35.7)
MCV RBC AUTO: 87.4 FL (ref 79–97)
MONOCYTES # BLD AUTO: 0.8 10*3/MM3 (ref 0.1–0.9)
MONOCYTES NFR BLD AUTO: 7.7 % (ref 5–12)
MUCOUS THREADS URNS QL MICRO: ABNORMAL /HPF
NEUTROPHILS # BLD AUTO: 5.6 10*3/MM3 (ref 1.7–7)
NEUTROPHILS NFR BLD AUTO: 53.4 % (ref 42.7–76)
NITRITE UR QL STRIP: NEGATIVE
NRBC BLD AUTO-RTO: 0.1 /100 WBC (ref 0–0.2)
PH UR STRIP.AUTO: 5.5 [PH] (ref 5–8)
PLATELET # BLD AUTO: 204 10*3/MM3 (ref 140–450)
PMV BLD AUTO: 9.3 FL (ref 6–12)
POTASSIUM BLD-SCNC: 3.7 MMOL/L (ref 3.6–5.1)
PROT SERPL-MCNC: 7.1 G/DL (ref 6.1–7.9)
PROT UR QL STRIP: ABNORMAL
RBC # BLD AUTO: 4.7 10*6/MM3 (ref 3.77–5.28)
RBC # UR: ABNORMAL /HPF
REF LAB TEST METHOD: ABNORMAL
SODIUM BLD-SCNC: 135 MMOL/L (ref 136–144)
SP GR UR STRIP: 1.02 (ref 1–1.03)
SQUAMOUS #/AREA URNS HPF: ABNORMAL /HPF
STRESS TARGET HR: 135 BPM
TROPONIN I SERPL-MCNC: <0.03 NG/ML (ref 0–0.03)
UROBILINOGEN UR QL STRIP: ABNORMAL
WBC NRBC COR # BLD: 10.5 10*3/MM3 (ref 3.4–10.8)
WBC UR QL AUTO: ABNORMAL /HPF
YEAST URNS QL MICRO: ABNORMAL /HPF

## 2019-08-07 PROCEDURE — 93306 TTE W/DOPPLER COMPLETE: CPT | Performed by: INTERNAL MEDICINE

## 2019-08-07 PROCEDURE — 85025 COMPLETE CBC W/AUTO DIFF WBC: CPT | Performed by: NURSE PRACTITIONER

## 2019-08-07 PROCEDURE — 83735 ASSAY OF MAGNESIUM: CPT | Performed by: NURSE PRACTITIONER

## 2019-08-07 PROCEDURE — 0399T HC MYOCARDL STRAIN IMAG QUAN ASSMT PER SESS: CPT

## 2019-08-07 PROCEDURE — 93306 TTE W/DOPPLER COMPLETE: CPT

## 2019-08-07 PROCEDURE — 81001 URINALYSIS AUTO W/SCOPE: CPT | Performed by: INTERNAL MEDICINE

## 2019-08-07 PROCEDURE — 92523 SPEECH SOUND LANG COMPREHEN: CPT

## 2019-08-07 PROCEDURE — 82962 GLUCOSE BLOOD TEST: CPT

## 2019-08-07 PROCEDURE — 97112 NEUROMUSCULAR REEDUCATION: CPT

## 2019-08-07 PROCEDURE — 97166 OT EVAL MOD COMPLEX 45 MIN: CPT

## 2019-08-07 PROCEDURE — 97530 THERAPEUTIC ACTIVITIES: CPT

## 2019-08-07 PROCEDURE — 87086 URINE CULTURE/COLONY COUNT: CPT | Performed by: INTERNAL MEDICINE

## 2019-08-07 PROCEDURE — 97116 GAIT TRAINING THERAPY: CPT

## 2019-08-07 PROCEDURE — 97162 PT EVAL MOD COMPLEX 30 MIN: CPT

## 2019-08-07 PROCEDURE — 0399T ADULT TRANSTHORACIC ECHO COMPLETE W/ CONT IF NECESSARY PER PROTOCOL: CPT | Performed by: INTERNAL MEDICINE

## 2019-08-07 PROCEDURE — 97535 SELF CARE MNGMENT TRAINING: CPT

## 2019-08-07 PROCEDURE — 99231 SBSQ HOSP IP/OBS SF/LOW 25: CPT | Performed by: PSYCHIATRY & NEUROLOGY

## 2019-08-07 PROCEDURE — 99232 SBSQ HOSP IP/OBS MODERATE 35: CPT | Performed by: INTERNAL MEDICINE

## 2019-08-07 PROCEDURE — 80053 COMPREHEN METABOLIC PANEL: CPT | Performed by: PSYCHIATRY & NEUROLOGY

## 2019-08-07 RX ADMIN — LAMOTRIGINE 100 MG: 100 TABLET ORAL at 21:58

## 2019-08-07 RX ADMIN — ASPIRIN 81 MG 81 MG: 81 TABLET ORAL at 08:29

## 2019-08-07 RX ADMIN — QUETIAPINE 150 MG: 25 TABLET, FILM COATED ORAL at 21:58

## 2019-08-07 RX ADMIN — Medication 3 ML: at 08:29

## 2019-08-07 RX ADMIN — ALPRAZOLAM 2 MG: 1 TABLET ORAL at 21:58

## 2019-08-07 RX ADMIN — LAMOTRIGINE 100 MG: 100 TABLET ORAL at 08:29

## 2019-08-07 RX ADMIN — LISINOPRIL 20 MG: 20 TABLET ORAL at 08:29

## 2019-08-07 RX ADMIN — ACETAMINOPHEN 650 MG: 325 TABLET ORAL at 12:04

## 2019-08-07 RX ADMIN — HYDROCHLOROTHIAZIDE 12.5 MG: 12.5 TABLET ORAL at 08:29

## 2019-08-07 RX ADMIN — METOPROLOL TARTRATE 25 MG: 25 TABLET, FILM COATED ORAL at 08:29

## 2019-08-07 RX ADMIN — ATORVASTATIN CALCIUM 80 MG: 40 TABLET, FILM COATED ORAL at 21:58

## 2019-08-07 RX ADMIN — PANTOPRAZOLE SODIUM 40 MG: 40 TABLET, DELAYED RELEASE ORAL at 08:29

## 2019-08-07 RX ADMIN — Medication 3 ML: at 21:58

## 2019-08-07 NOTE — PLAN OF CARE
Problem: Patient Care Overview  Goal: Plan of Care Review  Outcome: Ongoing (interventions implemented as appropriate)   08/07/19 0329   Coping/Psychosocial   Plan of Care Reviewed With patient   OTHER   Outcome Summary Alert. Confused to situation and time. STM prob evident. Recall less than five min noted. Denies pain/discomfort. Difficulty with simple tasks noted. Unable to flush toilet, unable to understand how to use tv remote or call for nurse. Diff. following two step directions noted.       Problem: Stroke (Ischemic) (Adult)  Goal: Signs and Symptoms of Listed Potential Problems Will be Absent, Minimized or Managed (Stroke)  Outcome: Ongoing (interventions implemented as appropriate)      Problem: Fall Risk (Adult)  Goal: Identify Related Risk Factors and Signs and Symptoms  Outcome: Ongoing (interventions implemented as appropriate)    Goal: Absence of Fall  Outcome: Ongoing (interventions implemented as appropriate)      Problem: Skin Injury Risk (Adult)  Goal: Identify Related Risk Factors and Signs and Symptoms  Outcome: Ongoing (interventions implemented as appropriate)    Goal: Skin Health and Integrity  Outcome: Ongoing (interventions implemented as appropriate)      Problem: Confusion, Acute (Adult)  Goal: Identify Related Risk Factors and Signs and Symptoms  Outcome: Ongoing (interventions implemented as appropriate)    Goal: Cognitive/Functional Impairments Minimized  Outcome: Ongoing (interventions implemented as appropriate)    Goal: Safety  Outcome: Ongoing (interventions implemented as appropriate)

## 2019-08-07 NOTE — THERAPY EVALUATION
Acute Care - Physical Therapy Initial Evaluation  REECE Kam     Patient Name: Andree Deluna  : 1955  MRN: [id number]  Today's Date: 2019                Admit Date: 2019    Visit Dx: No diagnosis found.  Patient Active Problem List   Diagnosis   • Anemia in other chronic diseases classified elsewhere   • Anxiety   • B12 deficiency   • Attention deficit disorder of adult with hyperactivity   • Mood disorder (CMS/HCC)   • Complete rotator cuff tear or rupture of right shoulder, not specified as traumatic   • Degeneration of intervertebral disc of cervical region   • Degeneration of intervertebral disc of lumbosacral region   • Elevated blood pressure reading without diagnosis of hypertension   • Fatigue   • Hay fever   • Hyperlipidemia   • Hyperglycemia   • Hypertension   • Insomnia   • Laryngopharyngeal reflux   • Obesity   • Osteoarthritis of knee   • CVA (cerebral vascular accident) (CMS/HCC)   • Visual field loss left   • Confusion   • Acute midline low back pain without sciatica   • Acute confusion     Past Medical History:   Diagnosis Date   • ADHD unknown   • Anemia    • Hyperlipidemia unknown   • Hypertension    • Insomnia unknown   • Mood disorder (CMS/HCC)    • Obesity unknown     Past Surgical History:   Procedure Laterality Date   • CHOLECYSTECTOMY     • FINGER SURGERY     • KNEE ARTHROPLASTY     • KNEE SURGERY Right     replaced   • ROTATOR CUFF REPAIR Right 2019   • SHOULDER SURGERY Right 2019    scope/ cuff repair   • TUBAL ABDOMINAL LIGATION          PT ASSESSMENT (last 12 hours)      Physical Therapy Evaluation     Row Name 19 1200 19 1100       PT Evaluation Time/Intention    Subjective Information  --  no complaints pt denies problems; family concerned about confusion, etc.  -CM    Document Type  --  evaluation  -CM    Mode of Treatment  --  physical therapy;individual therapy  -CM    Patient Effort  --  good  -CM    Symptoms Noted During/After Treatment  --  other  "(see comments) confusion  -CM    Comment  Rotator cuff repair sx in June, 2019.  -  Family notes that pt remains confused. They are very concerned about her safety.  -    Row Name 08/07/19 1100          General Information    Patient Profile Reviewed?  yes  -CM     Patient Observations  alert;cooperative;agree to therapy  -CM     General Observations of Patient  supine in bed; room air; heart monitor  -CM     Prior Level of Function  independent:;community mobility;driving retired  -CM     Equipment Currently Used at Home  none  -CM     Pertinent History of Current Functional Problem  62 yo female adm on 8/6/19 for confusion & L visual field loss. MRI (+) fro R parietal & temporal lobe infarcts, as well as possible leukoencephalopathy. Pt being screened for possible uti as well.   -CM     Existing Precautions/Restrictions  fall  -CM     Limitations/Impairments  safety/cognitive  -CM     Equipment Issued to Patient  gait belt  -     Row Name 08/07/19 1100          Relationship/Environment    Lives With  spouse  works part time; pt alone while he works.   -     Row Name 08/07/19 1100          Cognitive Assessment/Intervention- PT/OT    Orientation Status (Cognition)  oriented x 4  -CM     Follows Commands (Cognition)  WFL;75-90% accuracy;increased processing time needed;delayed response/completion;physical/tactile prompts required;verbal cues/prompting required  -CM     Safety Deficit (Cognitive)  moderate deficit;at risk behavior observed;awareness of need for assistance;impulsivity;insight into deficits/self awareness;judgment;problem solving  -     Cognitive Assessment/Intervention Comment  has difficulty finding her room when returning from ambulation; does not understand cues for direction of turning; family notes that pt had been hallucinating at one point, but pt insists that \"the little men\" were there.  -     Row Name 08/07/19 1100          Bed Mobility Assessment/Treatment    Bed Mobility " Assessment/Treatment  bed mobility (all) activities  -CM     [place] Level (Bed Mobility)  independent  -CM     Row Name 08/07/19 1100          Transfer Assessment/Treatment    Transfer Assessment/Treatment  sit-stand transfer;stand-sit transfer  -CM     Comment (Transfers)  marked loss of balance w/ coming to stand; falls toward R side.   -CM     Sit-Stand [place] (Transfers)  minimum assist (75% patient effort);other (see comments) needs assist due to loss of balance  -CM     Stand-Sit [place] (Transfers)  minimum assist (75% patient effort)  -CM     Row Name 08/07/19 1100          Gait/Stairs Assessment/Training    Gait/Stairs Assessment/Training  gait/ambulation independence  -CM     [place] Level (Gait)  minimum assist (75% patient effort)  -CM     Distance in Feet (Gait)  50   -CM     Pattern (Gait)  step-through  -CM     Deviations/Abnormal Patterns (Gait)  ataxic;ney decreased;stride length decreased loss of balance laterally; poor foot placement  -CM     Row Name 08/07/19 1200          General ROM    GENERAL ROM COMMENTS  wfl  -CM     Row Name 08/07/19 1200          MMT (Manual Muscle Testing)    General MMT Comments  3+/5 RUE; 4/5 LUE; BLEs 4/5  -CM     Row Name 08/07/19 1200          Balance Interventions    Training Strategies (Balance)  single leg stance - requires assistance for maintaining balance; braiding of LEs - requires assist for balance; retroambulation w/ moderate assistance.  -CM     Systems Impairment Contributing to Balance Disturbance (Balance)  cognitive;neuromuscular  -CM     Identified Impairments Contributing to Balance Disturbance (Balance)  coordination impaired;motor control impaired  -CM     Row Name 08/07/19 1200          Sensory Assessment/Intervention    Sensory General Assessment  no sensation deficits identified  -CM     Row Name 08/07/19 1200          Vision Assessment/Intervention    Vision Assessment Comment  has left lower quadrant homonomous  quadranopsia  -     Row Name 08/07/19 1200          Pain Assessment    Additional Documentation  Pain Scale: Numbers Pre/Post-Treatment (Group)  -CM     Row Name 08/07/19 1200          Pain Scale: Numbers Pre/Post-Treatment    Pain Scale: Numbers, Pretreatment  10/10  -CM     Pain Scale: Numbers, Post-Treatment  10/10  -CM     Pain Location - Side  Left  -CM     Pain Location - Orientation  lower  -CM     Pain Location  back  -CM     Pre/Post Treatment Pain Comment  no bruising noted; very light epidermal abrasion on L lower back/L flank.  -CM     Row Name 08/07/19 1200          Coping    Observed Emotional State  calm;cooperative  -CM     Verbalized Emotional State  disbelief  -     Row Name 08/07/19 1200          Plan of Care Review    Plan of Care Reviewed With  patient;spouse;sibling  -[facility] Name 08/07/19 1200          Physical Therapy Clinical Impression    Criteria for Skilled Interventions Met (PT Clinical Impression)  yes;treatment indicated  -CM     Impairments Found (describe specific impairments)  arousal, attention, and cognition;gait, locomotion, and balance;motor function  -CM     Rehab Potential (PT Clinical Summary)  good, to achieve stated therapy goals  -[facility] Name 08/07/19 1200          Vital Signs    Recovery Time  vital signs stable throughout rx.   -CM     Row Name 08/07/19 1200          Physical Therapy Goals    Transfer Goal Selection (PT)  transfer, PT goal 1  -CM     Gait Training Goal Selection (PT)  gait training, PT goal 1  -CM     Balance Goal Selection (PT)  balance, PT goal 1  -CM     Row Name 08/07/19 1200          Transfer Goal 1 (PT)    Activity/Assistive Device (Transfer Goal 1, PT)  transfers, all  -CM     [place] Level/Cues Needed (Transfer Goal 1, PT)  independent;other (see comments) no loss of balance  -CM     Time Frame (Transfer Goal 1, PT)  2 weeks  -CM     [facility] Name 08/07/19 1200          Gait Training Goal 1 (PT)    Activity/Assistive Device (Gait  Training Goal 1, PT)  gait (walking locomotion)  -CM     [place] Level (Gait Training Goal 1, PT)  independent;other (see comments)  -CM     Distance (Gait Goal 1, PT)  75  -CM     Time Frame (Gait Training Goal 1, PT)  2 weeks  -CM     Row Name 08/07/19 1200          Balance Goal 1 (PT)    Activity/Assistive Device (Balance Goal 1, PT)  standing, dynamic  -CM     [place] Level/Cues Needed (Balance Goal 1, PT)  standby assist  -CM     Time Frame (Balance Goal 1, PT)  2 weeks  -CM     Row Name 08/07/19 1200          Positioning and Restraints    Pre-Treatment Position  in bed  -CM     Post Treatment Position  chair  -CM     In Chair  notified nsg;sitting;call light within reach;encouraged to call for assist;exit alarm on;with family/caregiver  -CM       User Key  (r) = Recorded By, (t) = Taken By, (c) = Cosigned By    Initials Name Provider Type    Jelena Bah, PT Physical Therapist        Physical Therapy Education     Title: PT OT SLP Therapies (In Progress)     Topic: Physical Therapy (In Progress)     Point: Mobility training (Done)     Learning Progress Summary           Patient Acceptance, E,TB, VU by CM at 8/7/2019 12:17 PM   Family Acceptance, E,TB, VU by CM at 8/7/2019 12:17 PM                   Point: Precautions (Done)     Learning Progress Summary           Patient Acceptance, E,TB, VU by CM at 8/7/2019 12:17 PM   Family Acceptance, E,TB, VU by CM at 8/7/2019 12:17 PM                               User Key     Initials Effective Dates Name Provider Type Discipline     03/01/19 -  Jelena Angulo, PT Physical Therapist PT              PT Recommendation and Plan  Anticipated Discharge Disposition (PT): inpatient rehabilitation facility  Planned Therapy Interventions (PT Eval): balance training, gait training, patient/family education, strengthening, neuromuscular re-education, motor coordination training  Therapy Frequency (PT Clinical Impression): 3 times/wk  Outcome  Summary/Treatment Plan (PT)  Anticipated Discharge Disposition (PT): inpatient rehabilitation facility  Plan of Care Reviewed With: patient, spouse, sibling  Outcome Summary: 64 yo female adm for onset of confusion & L visual field loss. MRI (+) for R parietal & temporal lobe infarcts. Pt has marked difficulty w/ motor planning & has loss of balance w/ change in position & w/ ambulation. Very high risk for injurious falls. Will require IP rehab at d/c. Will follow 3xwk while in hospital.   Outcome Measures     Row Name 08/07/19 1200             Modified [place] Scale    Modified [place] Scale  4 - Moderately severe disability.  Unable to walk without assistance, and unable to attend to own bodily needs without assistance.  -CM         Functional Assessment    Outcome Measure Options  Modified [place]  -CM        User Key  (r) = Recorded By, (t) = Taken By, (c) = Cosigned By    Initials Name Provider Type    Jelena Bah, PT Physical Therapist         Time Calculation:   PT Charges     Row Name 08/07/19 1220             Time Calculation    Start Time  1044  -CM      Stop Time  1122  -CM      Time Calculation (min)  38 min  -CM      PT Received On  08/07/19  -CM      PT - Next Appointment  08/09/19  -LANETTE      PT Goal Re-Cert Due Date  08/21/19  -CM         Time Calculation- PT    Total Timed Code Minutes- PT  25 minute(s)  -CM        User Key  (r) = Recorded By, (t) = Taken By, (c) = Cosigned By    Initials Name Provider Type    Jelena Bah, PT Physical Therapist        Therapy Charges for Today     Code Description Service Date Service Provider Modifiers Qty    95406157598 HC PT EVAL MOD COMPLEXITY 4 8/7/2019 Jelena Angulo, PT GP 1    92852249000 HC GAIT TRAINING EA 15 MIN 8/7/2019 Jelena Angulo, PT GP 1    13316409316 HC PT THERAPEUTIC ACT EA 15 MIN 8/7/2019 Jelena Angulo, PT GP 1    45628173164 HC PT NEUROMUSC RE EDUCATION EA 15 MIN 8/7/2019 Jelena Angulo, PT GP 1          PT  G-Codes  Outcome Measure Options: Modified [place]  Modified [place] Scale: 4 - Moderately severe disability.  Unable to walk without assistance, and unable to attend to own bodily needs without assistance.      Jelena Angulo, PT  8/7/2019

## 2019-08-07 NOTE — PROGRESS NOTES
"[facility] Hospitalist Team      Patient Care Team:  Devaughn Reis MD as PCP - General  Devaughn Reis MD as PCP - Family Medicine        Chief Complaint:  CVA    Subjective: Ms. Deluna is a 63 year old  female with PMH of HTN, HLD, anxiety/ADHD, DDD who was sent for direct admission 8/6/2019 by PCP Dr. Reis for acute confusion and left visual field loss. Daughter at bedside stated the patient's confusion mainly started this past Friday evening. She was disoriented and making statements that didn't make sense. She was going into closets and bedrooms to use the bathroom. She was off balance and falling into walls. She fell on Saturday and hurt the left side of her mid back. She appeared to have difficulty moving her left leg and had difficulty reaching for objects with her left hand. It appeared she had difficulty seeing with her left eye. She was treated for a UTI after her right rotator cuff repair but seemed to have recovered from that. She has not had any recent illness. Family denied any previous history of CVA. She was seen here in the ED 8/4/2019 for her symptoms. CT head was negative for hemorrhage or mass effect. Labs were unremarkable. She was discharged and followed up with Dr. Reis today 8/6/2019 who sent her to the hospital for further stroke workup. Pt complains of pain to her right thoracic and into lumbar. She had bilateral temporal headache and occipital headache. No lateralizing weakness noted. Pt does have left eye vision changes. NIH is 3. Stroke workup started.                 Objective: alert/nad        Vital Signs  Temp:  [97.5 °F (36.4 °C)-99.4 °F (37.4 °C)] 97.8 °F (36.6 °C)  Heart Rate:  [63-85] 63  Resp:  [8-21] 21  BP: ()/() 118/67  Oxygen Therapy  SpO2: 93 %  Pulse Oximetry Type: Intermittent  Device (Oxygen Therapy): room air  Flowsheet Rows      First Filed Value   Admission Height  167.6 cm (66\") Documented at 08/06/2019 1111   Admission Weight  81.8 " kg (180 lb 6.4 oz) Documented at 08/06/2019 1111        Intake & Output (last 3 days)     None        Lines, Drains & Airways    Active LDAs     None                Physical Exam:    General Appearance:    Alert, cooperative, in no acute distress   Head:    Normocephalic, without obvious abnormality, atraumatic   Eyes:            Lids and lashes normal, conjunctivae and sclerae normal, no   icterus, no pallor, corneas clear, PERRLA   Back:     No kyphosis present, no scoliosis present, no skin lesions,      erythema or scars, no tenderness to percussion or                   palpation,   range of motion normal   Lungs:     Clear to auscultation,respirations regular, even and                  unlabored    Heart:    Regular rhythm and normal rate, normal S1 and S2, no            murmur, no gallop, no rub, no click   Chest Wall:    No abnormalities observed   Abdomen:     Normal bowel sounds, no masses, no organomegaly, soft        non-tender, non-distended, no guarding, no rebound                tenderness   Extremities:   Moves all extremities well, no edema, no cyanosis, no             redness       Results Review:       Lab Results (last 24 hours)     Procedure Component Value Units Date/Time    Comprehensive Metabolic Panel [[id number]]  (Abnormal) Collected:  08/07/19 0441    Specimen:  Blood Updated:  08/07/19 0552     Glucose 102 mg/dL      BUN 24 mg/dL      Creatinine 1.00 mg/dL      Sodium 135 mmol/L      Potassium 3.7 mmol/L      Chloride 98 mmol/L      CO2 23.0 mmol/L      Calcium 9.3 mg/dL      Total Protein 7.1 g/dL      Albumin 3.60 g/dL      ALT (SGPT) 16 U/L      AST (SGOT) 17 U/L      Alkaline Phosphatase 78 U/L      Total Bilirubin 1.1 mg/dL      eGFR Non African Amer 56 mL/min/1.73      Globulin 3.5 gm/dL      A/G Ratio 1.0 g/dL      BUN/Creatinine Ratio 24.0     Anion Gap 17.7 mmol/L     Magnesium [[id number]]  (Normal) Collected:  08/07/19 0441    Specimen:  Blood Updated:  08/07/19 0534      Magnesium 2.3 mg/dL     CBC Auto Differential [[id number]]  (Abnormal) Collected:  08/07/19 0441    Specimen:  Blood Updated:  08/07/19 0514     WBC 10.50 10*3/mm3      RBC 4.70 10*6/mm3      Hemoglobin 13.8 g/dL      Hematocrit 41.1 %      MCV 87.4 fL      MCH 29.3 pg      MCHC 33.5 g/dL      RDW 13.1 %      RDW-SD 40.3 fl      MPV 9.3 fL      Platelets 204 10*3/mm3      Neutrophil % 53.4 %      Lymphocyte % 36.1 %      Monocyte % 7.7 %      Eosinophil % 1.8 %      Basophil % 1.0 %      Neutrophils, Absolute 5.60 10*3/mm3      Lymphocytes, Absolute 3.80 10*3/mm3      Monocytes, Absolute 0.80 10*3/mm3      Eosinophils, Absolute 0.20 10*3/mm3      Basophils, Absolute 0.10 10*3/mm3      nRBC 0.1 /100 WBC     Troponin [[id number]]  (Normal) Collected:  08/06/19 2334    Specimen:  Blood Updated:  08/07/19 0034     Troponin I <0.030 ng/mL     Narrative:       Troponin I Reference Range:    0.00-0.03  Negative.  Repeat testing in 4-6 hours if clinically indicated.    0.04-0.29  Suspicious for myocardial injury. Serial measurements and clinical  correlation may be necessary to confirm or exclude diagnosis of acute  coronary syndrome.  Repeat testing in 4-6 hours if indicated.     >0.29 Consistent with myocardial injury.  Recommend clinical and laboratory correlation.     Results my be falsely decreased if patient taking Biotin.     POC Glucose Once [[id number]]  (Abnormal) Collected:  08/06/19 2041    Specimen:  Blood Updated:  08/06/19 2042     Glucose 126 mg/dL      Comment: Serial Number: [id number]:  [id number]       Vitamin B12 [[id number]]  (Normal) Collected:  08/06/19 1702    Specimen:  Blood Updated:  08/06/19 1813     Vitamin B-12 305 pg/mL      Comment: Results may be falsely increased if patient taking Biotin.       Sedimentation Rate [[id number]]  (Normal) Collected:  08/06/19 1702    Specimen:  Blood Updated:  08/06/19 1755     Sed Rate 20 mm/hr     C-reactive Protein [[id number]]  (Normal) Collected:   08/06/19 1702    Specimen:  Blood Updated:  08/06/19 1751     C-Reactive Protein 0.23 mg/dL     Troponin [[id number]]  (Normal) Collected:  08/06/19 1702    Specimen:  Blood Updated:  08/06/19 1750     Troponin I <0.030 ng/mL     Narrative:       Troponin I Reference Range:    0.00-0.03  Negative.  Repeat testing in 4-6 hours if clinically indicated.    0.04-0.29  Suspicious for myocardial injury. Serial measurements and clinical  correlation may be necessary to confirm or exclude diagnosis of acute  coronary syndrome.  Repeat testing in 4-6 hours if indicated.     >0.29 Consistent with myocardial injury.  Recommend clinical and laboratory correlation.     Results my be falsely decreased if patient taking Biotin.     aPTT [[id number]]  (Abnormal) Collected:  08/06/19 1702    Specimen:  Blood Updated:  08/06/19 1737     PTT 22.8 seconds     Hemoglobin A1c [[id number]]  (Normal) Collected:  08/06/19 1204    Specimen:  Blood Updated:  08/06/19 1524     Hemoglobin A1C 5.4 %     Narrative:       Hemoglobin A1C Reference Range:    <5.7 %        Normal  5.7-6.4 %     Increased risk for diabetes  > 6.4 %        Diabetes       These guidelines have been recommended by the American Diabetic Association for Hgb A1c.      The following 2010 guidelines have been recommended by the American Diabetes Association for Hemoglobin A1c.    HBA1c 5.7-6.4% Increased risk for future diabetes (pre-diabetes)  HBA1c     >6.4% Diabetes    TSH [[id number]]  (Normal) Collected:  08/06/19 1203    Specimen:  Blood Updated:  08/06/19 1309     TSH 0.830 mIU/mL      Comment: Results may be falsely decreased if patient taking Biotin.       Lipid Panel [[id number]]  (Abnormal) Collected:  08/06/19 1203    Specimen:  Blood Updated:  08/06/19 1304     Total Cholesterol 196 mg/dL      Triglycerides 168 mg/dL      HDL Cholesterol 47 mg/dL      LDL Cholesterol  126 mg/dL      VLDL Cholesterol 33.6 mg/dL      LDL/HDL Ratio 2.46     Chol/HDL Ratio 4.17     Narrative:       The following guidelines have been recommended by the NCEP for Total Cholesterol, Total Triglycerides, LDL Cholesterol, and HDL Cholesterols    Total Cholesterol  Desirable:        <200 mg/dL  Borderline High:  200-239 mg/dL  High:             > or = 240 mg/dL    Total Triglyceride  Normal:           <150 mg/dL  Borderline High:  150-199 mg/dL  High:             200-499 mg/dL  Very High:        > or = 500 mg/dL    HDL Cholesterol  Low HDL:          <40 mg/dL  Normal:           40-60 mg/dL  Desirable:        >60 mg/dL    LDL Cholesterol  Optimal:          <100 mg/dL  Low Risk:         100-129 mg/dL  Borderline High:  130-159 mg/dL  High:             160-189 mg/dL  Very High:        > or = 190 mg/dL    The following ratios of LDL to HDL and Total cholesterol to HDL are for information only:    LDL/HDL Ratio  Desirable:        <5  Optimal:          < or = 3.5    Total Cholesterol/HDL Ratio  Low Risk:         3.3-4.4  Average Risk:     4.4-7.1  Medium Risk:      7.1-11  High Risk:        >11       Comprehensive Metabolic Panel [[id number]]  (Abnormal) Collected:  08/06/19 1203    Specimen:  Blood Updated:  08/06/19 1304     Glucose 112 mg/dL      BUN 16 mg/dL      Creatinine 0.90 mg/dL      Sodium 133 mmol/L      Potassium 3.1 mmol/L      Chloride 95 mmol/L      CO2 23.0 mmol/L      Calcium 9.2 mg/dL      Total Protein 7.7 g/dL      Albumin 4.00 g/dL      ALT (SGPT) 16 U/L      AST (SGOT) 19 U/L      Alkaline Phosphatase 85 U/L      Total Bilirubin 1.5 mg/dL      eGFR Non African Amer 64 mL/min/1.73      Globulin 3.7 gm/dL      A/G Ratio 1.1 g/dL      BUN/Creatinine Ratio 17.8     Anion Gap 18.1 mmol/L     Troponin [[id number]]  (Normal) Collected:  08/06/19 1203    Specimen:  Blood Updated:  08/06/19 1254     Troponin I <0.030 ng/mL     Narrative:       Troponin I Reference Range:    0.00-0.03  Negative.  Repeat testing in 4-6 hours if clinically indicated.    0.04-0.29  Suspicious for myocardial injury.  Serial measurements and clinical  correlation may be necessary to confirm or exclude diagnosis of acute  coronary syndrome.  Repeat testing in 4-6 hours if indicated.     >0.29 Consistent with myocardial injury.  Recommend clinical and laboratory correlation.     Results my be falsely decreased if patient taking Biotin.     Protime-INR [[id number]]  (Normal) Collected:  08/06/19 1204    Specimen:  Blood Updated:  08/06/19 1239     Protime 10.7 Seconds      INR 1.05    CBC (No Diff) [[id number]]  (Normal) Collected:  08/06/19 1204    Specimen:  Blood Updated:  08/06/19 1229     WBC 10.20 10*3/mm3      RBC 4.98 10*6/mm3      Hemoglobin 14.5 g/dL      Hematocrit 43.2 %      MCV 86.7 fL      MCH 29.2 pg      MCHC 33.7 g/dL      RDW 12.8 %      RDW-SD 39.4 fl      MPV 9.2 fL      Platelets 233 10*3/mm3           Imaging Results (last 24 hours)     Procedure Component Value Units Date/Time    XR Spine Lumbar Complete With Flex & Ext [[id number]] Collected:  08/06/19 1914     Updated:  08/06/19 1919    Narrative:       DATE OF EXAM:  8/6/2019 4:15 PM     PROCEDURE:  XR SPINE LUMBAR COMPLETE W FLEX EXT-     INDICATIONS:  lumbar back pain, fall     COMPARISON:  No Comparisons Available     TECHNIQUE:   A minimum of six radiologic views including bending views of the  lumbosacral spine were obtained.     FINDINGS:  No acute fracture or dislocation of the lumbar spine is identified.  There are mild osteophytes at L2-L4. Mild disc space height loss at L2-3  and L3-4. Mild facet arthropathy in the lower lumbar spine. There is 5  mm of subluxation of L4 on L5 with flexion, that reduces with extension  and in neutral position. SI joints are normal. Severe atherosclerotic  aortic calcifications are noted. post lap band.       Impression:          1. No acute fracture or dislocation.  2. Mild multilevel degenerative disc disease.  3. 5 mm of anterolisthesis of L4 on L5 with flexion.     Electronically Signed By-Baljeet Beaver On:8/6/2019  7:17 PM  This report was finalized on [id number] by  Baljeet Beaver, .    XR Spine Thoracic 2 View [[id number]] Collected:  08/06/19 1646     Updated:  08/06/19 1650    Narrative:       DATE OF EXAM:  8/6/2019 4:18 PM     PROCEDURE:  XR SPINE THORACIC 2 VW-     INDICATIONS:  back pain     COMPARISON:  No Comparisons Available     TECHNIQUE:   Two radiologic views of the thoracic spine were obtained.     FINDINGS:  No acute fracture or subluxation of the thoracic spine is identified.  Status post lap banding and cholecystectomy. There are prominent  vascular calcifications are identified. Visualized lungs are clear with  a few scattered granuloma.        Impression:       No acute fracture or subluxation of the thoracic spine.     Electronically Signed By-Baljeet Beaver On:8/6/2019 4:48 PM  This report was finalized on [id number] by  Baljeet Beaver, .    MRI Brain Without Contrast [[id number]] Collected:  08/06/19 1623     Updated:  08/06/19 1636    Narrative:          DATE OF EXAM:   8/6/2019 2:46 PM     PROCEDURE:   MRI BRAIN WO CONTRAST-     INDICATIONS:   Confusion/delirium, altered LOC, unexplained     COMPARISON:  No Comparisons Available     TECHNIQUE:   CT head 08/04/2019, MR brain 07/18/2017     FINDINGS:   There is a 5 mm area of diffusion restriction in the right parietal lobe  with hypointensity on the ADC map. There is a 4 mm area of diffusion  restriction in the right posterior temporal lobe without obvious  hypointensity on the ADC map. Diffusion imaging is otherwise  unremarkable.     There is moderately severe T2 prolongation in the periventricular white  matter. There are several focal areas of T1 and T2 prolongation in the  basal ganglia and caudate nuclei. Major vessels show flow void. There is  no extra-axial collection or mass effect. There is mild signal  abnormality in the richelle. There is no gross orbital abnormality. The  pituitary is within normal in size. There is mucosal thickening in  the  left maxillary sinus.        Impression:          1. There is a small area of acute ischemia in the right parietal lobe.  There is another small abnormality which is in the right temporal lobe  and may be acute or subacute. This was reported to the patient's nurse  by myself with instructions to call the attending provider, at 4:29 PM.  2. There is stable moderately severe white matter abnormality.  Differential includes chronic small vessel ischemia and nonspecific  metabolic or demyelinating process. There is also evidence of stable  lacunar infarcts.     Electronically Signed By-Chelsi Worrell On:8/6/2019 4:34 PM  This report was finalized on [id number] by  Chelsi Worrell, .                                   I reviewed the patient's new clinical results.          ECG/EMG Results (most recent)     None            Medication Review:       Current Facility-Administered Medications:   •  acetaminophen (TYLENOL) 160 MG/5ML solution 650 mg, 650 mg, Oral, Q4H PRN, Shalonda Navale, APRN  •  acetaminophen (TYLENOL) suppository 650 mg, 650 mg, Rectal, Q4H PRN, Shalonda aNvale, APRN  •  acetaminophen (TYLENOL) tablet 650 mg, 650 mg, Oral, Q4H PRN, Shalonda Navale, APRN, 650 mg at 08/06/19 1722  •  ALPRAZolam (XANAX) tablet 2 mg, 2 mg, Oral, Nightly PRN, Karen Nava, APRN  •  aspirin chewable tablet 81 mg, 81 mg, Oral, Daily, 81 mg at 08/06/19 1722 **OR** aspirin suppository 300 mg, 300 mg, Rectal, Daily, Yuli Troy MD  •  atorvastatin (LIPITOR) tablet 80 mg, 80 mg, Oral, Nightly, Yuli Troy MD, 80 mg at 08/06/19 2020  •  bisacodyl (DULCOLAX) EC tablet 5 mg, 5 mg, Oral, Daily PRN, Karen Nava, APRN  •  bisacodyl (DULCOLAX) suppository 10 mg, 10 mg, Rectal, Daily PRN, Karne Nava, APRN  •  hydrochlorothiazide (HYDRODIURIL) tablet 12.5 mg, 12.5 mg, Oral, Daily, Cong Harvey Jr., MD  •  lamoTRIgine (LaMICtal) tablet 100 mg, 100 mg, Oral, BID, Karen Nava,  APRN, 100 mg at 08/06/19 2020  •  lisinopril (PRINIVIL,ZESTRIL) tablet 20 mg, 20 mg, Oral, Q24H, Cong Harvey Jr., MD  •  magnesium hydroxide (MILK OF MAGNESIA) suspension 2400 mg/10mL 10 mL, 10 mL, Oral, Daily PRN, Achterberg, Karen, APRN  •  Magnesium Sulfate 2 gram infusion - Mg less than or equal to 1.5 mg/dL, 2 g, Intravenous, PRN **OR** Magnesium Sulfate 1 gram infusion - Mg 1.6-1.9 mg/dL, 1 g, Intravenous, PRN, Achterberg, Karen, APRN  •  metoprolol tartrate (LOPRESSOR) tablet 25 mg, 25 mg, Oral, Daily, Achterberg, Karen, APRN, 25 mg at 08/06/19 2020  •  ondansetron (ZOFRAN) tablet 4 mg, 4 mg, Oral, Q6H PRN **OR** ondansetron (ZOFRAN) injection 4 mg, 4 mg, Intravenous, Q6H PRN, Achterberg, Karen, APRN  •  pantoprazole (PROTONIX) EC tablet 40 mg, 40 mg, Oral, QAM, Achterberg, Karen, APRN  •  potassium chloride (K-DUR,KLOR-CON) CR tablet 40 mEq, 40 mEq, Oral, PRN, Achterberg, Karen, APRN, 40 mEq at 08/06/19 2025  •  potassium chloride (KLOR-CON) packet 40 mEq, 40 mEq, Oral, PRN, Achterberg, Karen, APRN  •  promethazine (PHENERGAN) tablet 25 mg, 25 mg, Oral, Q6H PRN, Achterberg, Karen, APRN  •  QUEtiapine (SEROquel) tablet 150 mg, 150 mg, Oral, Nightly, Achterberg, Karen, APRN, 150 mg at 08/06/19 2020  •  sodium chloride 0.9 % flush 3 mL, 3 mL, Intravenous, Q12H, Yuli Troy MD, 3 mL at 08/06/19 2022  •  sodium chloride 0.9 % flush 3-10 mL, 3-10 mL, Intravenous, PRN, Yuli Troy MD  •  traMADol (ULTRAM) tablet 50 mg, 50 mg, Oral, Q6H PRN, Karen Nava APRN    I have reviewed the patient's current medication list    Assessment/Plan     Acute confusion, Left visual field loss  - CT head from 8/4/2019 negative for acute findings  - NIH 3  - cont neuro checks  - stroke workup with MRI brain, check carotid US, 2D echo, tsh, lipid panel, hgb a1c, B12  - aspirin, statin  - neurology consulted     Anemia in other chronic diseases  - hgb normal     Anxiety  - cont home  seroquel, lamictal xanax (INSPECT verified)     Hypertension  - cont home lisinopril/hctz, metoprolol     Hyperlipidemia  - cont home statin     GERD  - ppi     DVT prophylaxis - SCDs               Plan for disposition:per pt/ot    Cong Harvey Jr., MD  08/07/19  7:45 AM      Time:

## 2019-08-07 NOTE — THERAPY EVALUATION
Acute Care - Occupational Therapy Initial Evaluation   [facility]     Patient Name: Andree Deluna  : 1955  MRN: [id number]  Today's Date: 2019             Admit Date: 2019     No diagnosis found.  Patient Active Problem List   Diagnosis   • Anemia in other chronic diseases classified elsewhere   • Anxiety   • B12 deficiency   • Attention deficit disorder of adult with hyperactivity   • Mood disorder (CMS/HCC)   • Complete rotator cuff tear or rupture of right shoulder, not specified as traumatic   • Degeneration of intervertebral disc of cervical region   • Degeneration of intervertebral disc of lumbosacral region   • Elevated blood pressure reading without diagnosis of hypertension   • Fatigue   • Hay fever   • Hyperlipidemia   • Hyperglycemia   • Hypertension   • Insomnia   • Laryngopharyngeal reflux   • Obesity   • Osteoarthritis of knee   • CVA (cerebral vascular accident) (CMS/HCC)   • Visual field loss left   • Confusion   • Acute midline low back pain without sciatica   • Acute confusion     Past Medical History:   Diagnosis Date   • ADHD unknown   • Anemia    • Hyperlipidemia unknown   • Hypertension    • Insomnia unknown   • Mood disorder (CMS/HCC)    • Obesity unknown     Past Surgical History:   Procedure Laterality Date   • CHOLECYSTECTOMY     • FINGER SURGERY     • KNEE ARTHROPLASTY     • KNEE SURGERY Right     replaced   • ROTATOR CUFF REPAIR Right 2019   • SHOULDER SURGERY Right 2019    scope/ cuff repair   • TUBAL ABDOMINAL LIGATION            OT ASSESSMENT FLOWSHEET (last 12 hours)      Occupational Therapy Evaluation    No documentation.              OT Recommendation and Plan  Outcome Summary/Treatment Plan (OT)  Anticipated Discharge Disposition (OT): inpatient rehabilitation facility  Therapy Frequency (OT Eval): 5 times/wk  Plan of Care Review  Plan of Care Reviewed With: patient, daughter, spouse  Plan of Care Reviewed With: patient, daughter, spouse  Outcome Summary: Pt  admitted with increased confusion & fall. She has no acute musculoskeletal injury but MRI shows Rt parietotemporal infarcts as well as several older areas of infarct. Family reports pt has shown memory deficits for 8 mos. She is now unsteady when walking & has difficulty with functional adl due to ideational apraxia & working memory loss. SPouse works 2-3 days a week. Pt is still normally able to drive & does not use DME for ADL or mobility. She will benefit from IP neurorehab at d/c.     Outcome Measures     Row Name 08/07/19 1400 08/07/19 1200          How much help from another is currently needed...    Putting on and taking off regular lower body clothing?  3  -MH  --     Bathing (including washing, rinsing, and drying)  3  -MH  --     Toileting (which includes using toilet bed pan or urinal)  4  -MH  --     Putting on and taking off regular upper body clothing  3  -MH  --     Taking care of personal grooming (such as brushing teeth)  3  -MH  --     Eating meals  3  -MH  --     AM-PAC 6 Clicks Score (OT)  19  -  --        Modified Swapnil Scale    Modified Lansford Scale  --  4 - Moderately severe disability.  Unable to walk without assistance, and unable to attend to own bodily needs without assistance.  -CM        Functional Assessment    Outcome Measure Options  AM-PAC 6 Clicks Daily Activity (OT)  -  Modified Lansford  -CM       User Key  (r) = Recorded By, (t) = Taken By, (c) = Cosigned By    Initials Name Provider Type     Bobbi Dyer, OT Occupational Therapist    CM Jelena Angulo, PT Physical Therapist          Time Calculation:   Time Calculation- OT     Row Name 08/07/19 1417             Time Calculation-     OT Start Time  1007  -      OT Stop Time  1045  -      OT Time Calculation (min)  38 min  -      Total Timed Code Minutes- OT  30 minute(s)  -      OT Received On  08/07/19  -      OT - Next Appointment  08/08/19  -      OT Goal Re-Cert Due Date  08/21/19  -        User Key   (r) = Recorded By, (t) = Taken By, (c) = Cosigned By    Initials Name Provider Type     Bobbi Dyer OT Occupational Therapist        Therapy Charges for Today     Code Description Service Date Service Provider Modifiers Qty    36314290816  OT EVAL MOD COMPLEXITY 3 8/7/2019 Bobbi Dyer OT GO 1    54844288151 HC OT SELF CARE/MGMT/TRAIN EA 15 MIN 8/7/2019 Bobbi Dyer OT GO 1    96481683794 HC OT NEUROMUSC RE EDUCATION EA 15 MIN 8/7/2019 Bobbi Dyer OT GO 1    [id number]  OT THERAPEUTIC ACT EA 15 MIN 8/7/2019 Bobbi Dyer OT GO 1               Bobbi Dyer OT  8/7/2019

## 2019-08-07 NOTE — THERAPY EVALUATION
Acute Care - Speech Language Pathology Initial Evaluation   Eddy     Patient Name: Andree Deluna  : 1955  MRN: [id number]  Today's Date: 2019               Admit Date: 2019     Ms. Deluna is a 63 year old  female with PMH of HTN, HLD, anxiety/ADHD, DDD who was sent for direct admission 2019 by PCP Dr. Reis for acute confusion and left visual field loss. Family reports that the patient's confusion mainly started this past Friday evening. She was disoriented and making statements that didn't make sense. She was going into closets and bedrooms to use the bathroom. She was off balance and falling into walls. She fell on Saturday and hurt the left side of her mid back. She appeared to have difficulty moving her left leg and had difficulty reaching for objects with her left hand. It appeared she had difficulty seeing with her left eye. She was treated for a UTI after her right rotator cuff repair but seemed to have recovered from that. She has not had any recent illness. Family denied any previous history of CVA. She was seen here in the ED 2019 for her symptoms. CT  Of the head at that time was negative for hemorrhage or mass effect. Labs were unremarkable. She was discharged and followed up with Dr. Reis 2019 who sent her back to the hospital for further stroke workup. Pt complains of pain to her right thoracic and into lumbar. She had bilateral temporal headache and occipital headache. No lateralizing weakness noted. Pt does have left eye vision changes (portions copied from chart).      MRI of the brain shows:   1. There is a small area of acute ischemia in the right parietal lobe.  There is another small abnormality which is in the right temporal lobe  and may be acute or subacute. This was reported to the patient's nurse  by myself with instructions to call the attending provider, at 4:29 PM.  2. There is stable moderately severe white matter abnormality.  Differential  includes chronic small vessel ischemia and nonspecific  metabolic or demyelinating process. There is also evidence of stable  lacunar infarcts.      Visit Dx:  No diagnosis found.  Patient Active Problem List   Diagnosis   • Anemia in other chronic diseases classified elsewhere   • Anxiety   • B12 deficiency   • Attention deficit disorder of adult with hyperactivity   • Mood disorder (CMS/HCC)   • Complete rotator cuff tear or rupture of right shoulder, not specified as traumatic   • Degeneration of intervertebral disc of cervical region   • Degeneration of intervertebral disc of lumbosacral region   • Elevated blood pressure reading without diagnosis of hypertension   • Fatigue   • Hay fever   • Hyperlipidemia   • Hyperglycemia   • Hypertension   • Insomnia   • Laryngopharyngeal reflux   • Obesity   • Osteoarthritis of knee   • CVA (cerebral vascular accident) (CMS/HCC)   • Visual field loss left   • Confusion   • Acute midline low back pain without sciatica   • Acute confusion     Past Medical History:   Diagnosis Date   • ADHD unknown   • Anemia    • Hyperlipidemia unknown   • Hypertension    • Insomnia unknown   • Mood disorder (CMS/HCC)    • Obesity unknown     Past Surgical History:   Procedure Laterality Date   • CHOLECYSTECTOMY     • FINGER SURGERY     • KNEE ARTHROPLASTY     • KNEE SURGERY Right     replaced   • ROTATOR CUFF REPAIR Right 2019   • SHOULDER SURGERY Right 05/24/2019    scope/ cuff repair   • TUBAL ABDOMINAL LIGATION          SLP EVALUATION (last 72 hours)      SLP SLC Evaluation     Row Name 08/07/19 1500       Communication Assessment/Intervention    Document Type  evaluation  -SM    Subjective Information  no complaints  -SM    Patient Observations  cooperative;agree to therapy;alert  -SM       General Information    Pertinent History Of Current Problem  The patient presents with 8 month history of decreased memory. Imaging positive for acute CVA  -SM       Comprehension  Assessment/Intervention    Comprehension Assessment/Intervention  Auditory Comprehension;Reading Comprehension  -       Auditory Comprehension Assessment/Intervention    Auditory Comprehension (Communication)  moderate impairment  -SM    Auditory Comprehension Communication, Comment  The patient is able to answer simple questions and follow simple one step commands. As length and complexity of information provided increased, comprehension decreased. Pt would frequently ask for repetition of information. Answering questions after a short paragraph read orally was 25% correct.   -       Reading Comprehension Assessment/Intervention    Reading Comprehension (Communication)  moderate impairment;severe impairment  -    Reading Comprehension, Comment  The patient was able to read aloud simple requests with 100% accuracy for oral reading, however comprehesion was 50%.   -       Expression Assessment/Intervention    Expression Assessment/Intervention  verbal expression;graphic expression  -       Verbal Expression Assessment/Intervention    Verbal Expression  moderate impairment  -    Automatic Speech (Communication)  counting 1-20;days of week;months of year  -    Repetition  WFL  -    Responsive Naming  mild impairment  -    Confrontational Naming  neologisms;delayed responses;circumlocution  -    Verbal Expression, Comment  The patient is verbal. Pt uses a rapid rate of speech which results in dec intelligibility. Perseverations and anomia noted periodically. Pt able to name 13-14 items in a given category. Pt able to give biographical information of name, date of birth and address. PT unable to give age. 1 error noted w/phone number however pt able to self correct.   -       Graphic Expression Assessment/Intervention    Graphic Expression  severe impairment;dominant hand  -    Graphic Expression, Comment  The pt exhibited much difficulty with graphic expression. Pt used dominant (right) hand. Pt  "attempted to write her name upon request. PT finally able to write her name after 5 attempts. Pt did write \"3LISA\" at one point. When asked to write single words to dictation the patient would have to draw a line to self-assist. This was 75% correct for single/simple words. Pt able to write numbers from 1-5 with 80% acc. Pt wrote the following : \"1 2 3 4 2\". Pt presented with MUCH difficulty drawing a clock. Spatial errors noted. Pt noted to perseverate and did not recognize errors. Pt unable to put hands on clock for appropriate time. Pt tended to forget directions as well.   -       Oral Musculature and Cranial Nerve Assessment    Oral Motor, Comment  WFL. No overt weakness or asymmetry noted.   -       Motor Speech Assessment/Intervention    Motor Speech, Comment  Pt uses a rapid rate of speech. She is not stimulable to decrease rate when cued.   -       Cognitive Assessment Intervention- SLP    Cognition, Comment  The patient was oriented to person, year and state. She was unable to provide day of the week, date or her age. Pt presented with significant difficulty with functional verbal problem solving. Pt also unable to follow written directions. Pt able to remember 2/5 items after a 4-5 minute delay.   -       Standardized Tests    Cognitive/Memory Tests  SLUMS: [facility] Mental Status Examination  -       SLUMS: [facility] Mental Status Examination    SLUMS Score  10  -    SLUMS Comments  The patient presented with deficits with orientation, short term memory, following directions (both written and verbal), reasoning and problem solving.   -       SLP Clinical Impressions    SLP Diagnosis  The patient presents with moderate to severe language and cognitive deficits characterized by decreaed auditory comprehension, decreased verbal expression, impaired graphic expression, decreased cognition, and reduced reading comprehension  -    Rehab Potential/Prognosis  good  -SM    " [facility] Criteria for Skilled Therapy Interventions Met  yes  -SM    Functional Impact  needs 24 hour supervision;decreased ability to respond to situations safely;Poor Judgement  -       Recommendations    Therapy Frequency (SLP SLC)  5 days per week  -    Predicted Duration Therapy Intervention (Days)  until discharge  -    Anticipated Dischage Disposition  inpatient rehabilitation facility  -       Communication Treatment Objective and Progress Goals (SLP)    Auditory Comprehension Treatment Objectives  Auditory Comprehension Treatment Objectives (Group)  -    Reading Comprehension Treatment Objectives  Reading Comprehension Treatment Objectives (Group)  -    Verbal Expression Treatment Objectives  Verbal Expression Treatment Objectives (Group)  -    Graphic Expression Treatment Objectives  Graphic Expression Treatment Objectives (Group)  -       Auditory Comprehension Treatment Objectives    Words/Phrases/Sentences Selection  words/phrases/sentences, SLP goal 1  -    Comprehend Questions Selection  comprehend questions, SLP goal 1  -    Follow Directions Selection  follow directions, SLP goal 1  -       Comprehend Questions Goal 1 (SLP)    Improve Ability to Comprehend Questions Goal 1 (SLP)  questions about personal information;80%;simple yes/no questions;with minimal cues (75-90%)  -    Time Frame (Comprehend Questions Goal 1, SLP)  1 week  -       Follow Directions Goal 2 (SLP)    Improve Ability to Follow Directions Goal 1 (SLP)  1 step direction without objects;80%;with minimal cues (75-90%)  -    Time Frame (Follow Directions Goal 1, SLP)  1 week  -       Reading Comprehension Treatment Objectives    Comprehension at Phrase and Sentence Level Selection  comprehension at phrase and sentence level, SLP goal 1  -       Comprehension at Phrase and Sentence Level Goal 1 (SLP)    Improve Reading Comprehension at Phrase and Sentence Level Goal 1 (SLP)  phrase;sentence;answer simple  written y/n questions;answer written wh-questions;70%;80%;with minimal cues (75-90%)  -    Time Frame (Reading Comprehension at Phrase and Sentence Level Goal 1, SLP)  1 week  -       Verbal Expression Treatment Objectives    Word Retrieval Skills Selection  word retrieval, SLP goal 1  -       Word Retrieval Skills Goal 1 (SLP)    Improve Word Retrieval Skills By Goal 1 (SLP)  responsive naming task  -    Time Frame (Word Retrieval Goal 1, SLP)  1 week  -       Graphic Expression Treatment Objectives    Graphic Expression of Shapes, Letters and Numbers Selection  --  -    Graphic Expression of Single Words Selection  graphic expression of single words, SLP goal 1  -       Graphic Expression of Words Goal 1 (SLP)    Graphic Expression of Single Words Goal 1 (SLP)  writing dictated word;90%  -      User Key  (r) = Recorded By, (t) = Taken By, (c) = Cosigned By    Initials Name Effective Dates    Loyda Tellez SLP 03/01/19 -              EDUCATION  The patient has been educated in the following areas:     Cognitive Impairment Communication Impairment ADL Safety.    SLP Recommendation and Plan  SLP Diagnosis: The patient presents with moderate to severe language and cognitive deficits characterized by decreaed auditory comprehension, decreased verbal expression, impaired graphic expression, decreased cognition, and reduced reading comprehension     SLC Criteria for Skilled Therapy Interventions Met: yes  SLP Diagnosis: The patient presents with moderate to severe language and cognitive deficits characterized by decreaed auditory comprehension, decreased verbal expression, impaired graphic expression, decreased cognition, and reduced reading comprehension  Anticipated Dischage Disposition: inpatient rehabilitation facility     Predicted Duration Therapy Intervention (Days): until discharge           SLP GOALS     Row Name 08/07/19 1500       Comprehend Questions Goal 1 (SLP)    Improve Ability to  Comprehend Questions Goal 1 (SLP)  questions about personal information;80%;simple yes/no questions;with minimal cues (75-90%)  -SM    Time Frame (Comprehend Questions Goal 1, SLP)  1 week  -SM       Follow Directions Goal 2 (SLP)    Improve Ability to Follow Directions Goal 1 (SLP)  1 step direction without objects;80%;with minimal cues (75-90%)  -SM    Time Frame (Follow Directions Goal 1, SLP)  1 week  -SM       Comprehension at Phrase and Sentence Level Goal 1 (SLP)    Improve Reading Comprehension at Phrase and Sentence Level Goal 1 (SLP)  phrase;sentence;answer simple written y/n questions;answer written wh-questions;70%;80%;with minimal cues (75-90%)  -SM    Time Frame (Reading Comprehension at Phrase and Sentence Level Goal 1, SLP)  1 week  -SM       Word Retrieval Skills Goal 1 (SLP)    Improve Word Retrieval Skills By Goal 1 (SLP)  responsive naming task  -SM    Time Frame (Word Retrieval Goal 1, SLP)  1 week  -SM       Graphic Expression of Words Goal 1 (SLP)    Graphic Expression of Single Words Goal 1 (SLP)  writing dictated word;90%  -      User Key  (r) = Recorded By, (t) = Taken By, (c) = Cosigned By    Initials Name Provider Type     Loyda Olivia SLP Speech and Language Pathologist                  Time Calculation:                        ELOY Kat  8/7/2019

## 2019-08-07 NOTE — PLAN OF CARE
Problem: Patient Care Overview  Goal: Plan of Care Review   08/07/19 1615   OTHER   Outcome Summary Speech/Language evaluation completed. The pt presents with significant speech/language and cognitive deficits including word finding difficulties, impaired auditory comprehension, impaired reading comprehension, decreased memory and decreased awareness of deficits. Pt would benefit from inpatient skilled Speech Services.

## 2019-08-07 NOTE — DISCHARGE PLACEMENT REQUEST
"Andree Sales (63 y.o. Female)     Date of Birth Social Security Number Address Home Phone MRN    1955  [address] [place] [address] [phone] [id number]    Mandaeism Marital Status          None        Admission Date Admission Type Admitting Provider Attending Provider Department, Room/Bed    8/6/19 Elective Cong Harvey Jr., MD Duvall, Lawrence Jr., MD [facility] NEURO HEART, 265/1    Discharge Date Discharge Disposition Discharge Destination                       Attending Provider:  Cong Harvey Jr., MD    Allergies:  No Known Allergies    Isolation:  None   Infection:  None   Code Status:  CPR    Ht:  167.6 cm (66\")   Wt:  82.5 kg (181 lb 14.1 oz)    Admission Cmt:  None   Principal Problem:  Acute confusion [R41.0]                 Active Insurance as of 8/6/2019     Primary Coverage     Payor Plan Insurance Group Employer/Plan Group    HUMANA MEDICARE REPLACEMENT HUMANA MEDICARE REPL [id number]     Payor Plan Address Payor Plan Phone Number Payor Plan Fax Number Effective Dates    [address] [address] [phone]  1/1/2018 - None Entered    [facility] [id number]       Subscriber Name Subscriber Birth Date Member ID       ANDREE SALES 1955 [id number]                 Emergency Contacts      (Rel.) Home Phone Work Phone Mobile Phone    AUSTIN SALES (Spouse) [phone] -- --              "

## 2019-08-07 NOTE — PROGRESS NOTES
Continued Stay Note  REECE Kam     Patient Name: Andree Deluna  MRN: [id number]  Today's Date: 8/7/2019    Admit Date: 8/6/2019    Discharge Plan    [facility] referral made,decision pending on PT /OT notes,needs Precert,no PASRR needed                 [place]

## 2019-08-07 NOTE — PLAN OF CARE
Problem: Patient Care Overview  Goal: Plan of Care Review  Outcome: Ongoing (interventions implemented as appropriate)   08/07/19 1412   Coping/Psychosocial   Plan of Care Reviewed With patient;daughter;spouse   OTHER   Outcome Summary Pt admitted with increased confusion & fall. She has no acute musculoskeletal injury but MRI shows Rt parietotemporal infarcts as well as several older areas of infarct. Family reports pt has shown memory deficits for 8 mos. She is now unsteady when walking & has difficulty with functional adl due to ideational apraxia, visual field loss & working memory impairment. Pt denies or downplays many of her deficits. Spouse works 2-3 days a week. Pt is still normally able to drive & does not use DME for ADL or mobility. She will benefit from IP neurorehab at d/c.    Plan of Care Review   Progress no change

## 2019-08-07 NOTE — PLAN OF CARE
Problem: Patient Care Overview  Goal: Plan of Care Review   08/07/19 1218   OTHER   Outcome Summary 62 yo female adm for onset of confusion & L visual field loss. MRI (+) for R parietal & temporal lobe infarcts. Pt has marked difficulty w/ motor planning & has loss of balance w/ change in position & w/ ambulation. Very high risk for injurious falls. Will require IP rehab at d/c. Will follow 3xwk while in hospital.

## 2019-08-07 NOTE — PROGRESS NOTES
LOS: 1 day     Chief Complaint: None anymore       SUBJECTIVE:  History taken from: patient chart family    Interval History: Patient has improved  No new symptoms  Vision changes are better  PT OT has evaluated her    Patient Complaints: Questionable ataxia      Review of Systems   No fever chills rigors or sweats  No weight issues  No sleep problems  HEENT:  No speech problem, vision changes, facial asymmetry or pain, or neck problem  Chest: No chest pain, clubbing, cyanosis, orthopnea palpitations  Pulmonary:  No shortness of air, cough or expectoration  Abdomen:  No swelling/tension, constipation,diarrhea or pain  No genitourinary symptoms  Extremity problems as discussed  No back problem  No psychotic issues  Neurologic issues as discussed  No hematologic, dermatologic or endocrine problems        ________________________________________________     OBJECTIVE:  Patient better more awake and alert and interactive      Neurologic Exam    Logical examination otherwise unremarkable  Today's exam she has no visual field cut    ________________________________________________   RESULTS REVIEW    VITAL SIGNS:  Temp:  [97.5 °F (36.4 °C)-99.4 °F (37.4 °C)] 97.5 °F (36.4 °C)  Heart Rate:  [56-85] 59  Resp:  [11-21] 20  BP: ()/() 114/74    LABS:   Lab Results   Component Value Date    WBC 10.50 08/07/2019    HGB 13.8 08/07/2019    HCT 41.1 08/07/2019    MCV 87.4 08/07/2019     08/07/2019     Lab Results   Component Value Date    GLUCOSE 102 (H) 08/07/2019    BUN 24 (H) 08/07/2019    CREATININE 1.00 08/07/2019    EGFRIFNONA 56 (L) 08/07/2019    EGFRIFAFRI 91 03/12/2016    BCR 24.0 08/07/2019    K 3.7 08/07/2019    CO2 23.0 08/07/2019    CALCIUM 9.3 08/07/2019    ALBUMIN 3.60 08/07/2019    LABIL2 1.2 11/29/2018    AST 17 08/07/2019    ALT 16 08/07/2019       Lab Results   Component Value Date    TSH 0.830 08/06/2019     (H) 08/06/2019    HGBA1C 5.4 08/06/2019    HIHFHBCM15 305 08/06/2019          IMAGING STUDIES:  Xr Spine Thoracic 2 View    Result Date: 8/6/2019  No acute fracture or subluxation of the thoracic spine.  Electronically Signed ByMariluz Beaver On:8/6/2019 4:48 PM This report was finalized on [id number] by  Baljeet Beaver, .    Xr Spine Lumbar Complete With Flex & Ext    Result Date: 8/6/2019   1. No acute fracture or dislocation. 2. Mild multilevel degenerative disc disease. 3. 5 mm of anterolisthesis of L4 on L5 with flexion.  Electronically Signed ByMariluz Beaver On:8/6/2019 7:17 PM This report was finalized on [id number] by  Baljeet Beaver, .    Mri Brain Without Contrast    Result Date: 8/6/2019   1. There is a small area of acute ischemia in the right parietal lobe. There is another small abnormality which is in the right temporal lobe and may be acute or subacute. This was reported to the patient's nurse by myself with instructions to call the attending provider, at 4:29 PM. 2. There is stable moderately severe white matter abnormality. Differential includes chronic small vessel ischemia and nonspecific metabolic or demyelinating process. There is also evidence of stable lacunar infarcts.  Electronically Signed By-Chelsi Worrell On:8/6/2019 4:34 PM This report was finalized on [id number] by  Chelsi Worrell, .      I reviewed the patient's new clinical results.    ________________________________________________      PROBLEM LIST:    Acute confusion    Anemia in other chronic diseases classified elsewhere    Anxiety    Mood disorder (CMS/HCC)    Hyperlipidemia    Hypertension    Visual field loss left        Assessment/Plan   ASSESSMENT/PLAN:  Ataxia and small single nonhemorrhagic infarct, acute, asymptomatic  She does have significant white matter changes and I found out that she was evaluated for this at [facility]  She had stayed there for 3 days and it looks like she had MRI and LP also and along with other tests, I do not have those records  Since this is a  known problem and she was worked up, I cannot say how much she has progressed or not, my recommendation is that she should follow-up with them and if not then seek another opinion at tertiary center  I recommended that she takes her scan/DVD with her to compare.  She is not keen on doing further testing like LP etc.  Talk to her  and her daughter  He does have small stroke and she would be treated for that  Single white matter or deep cortical lesion  Echo was okay  CTA's are pending and she would be started on aspirin and Lipitor  Other management as discussed already  If she needs rehab that would be fine but it is not from the stroke itself or otherwise her ataxia  I explained to the family that I am concerned about the MRI but since she has work-up done already, and these are not absolute new issues, she would need follow-up with neurology as outpatient anyway, that we do not do here, so it is better to follow-up with established people or get another opinion  They agree    Modification of stroke risk factors:   - Blood pressure should be less than 130/80 outpatient, HbA1c less than 6.5, LDL less than 70; b12>500 and smoking cessation if applicable. We would be grateful if the primary team / primary care physician keep a close watch on this above targets.  - Stroke education  - Follow up with neurologist of choice      **Please refer to previous notes for further details and recommendations.     I discussed the patients findings and my recommendations with patient and family    Yuli Troy MD  08/07/19  1:38 PM

## 2019-08-07 NOTE — PROGRESS NOTES
Discharge Planning Assessment   Eddy     Patient Name: Andree Deluna  MRN: [id number]  Today's Date: 8/7/2019    Admit Date: 8/6/2019    Discharge Needs Assessment     Row Name 08/07/19 1510       Living Environment    Lives With  spouse    Current Living Arrangements  home/apartment/condo    Primary Care Provided by  self    Able to Return to Prior Arrangements  no       Resource/Environmental Concerns    Transportation Concerns  car, none       Transition Planning    Patient/Family Anticipates Transition to  inpatient rehabilitation facility    Patient/Family Anticipated Services at Transition  rehabilitation services    Transportation Anticipated  car, drives self;family or friend will provide       Discharge Needs Assessment    Concerns to be Addressed  discharge planning    Equipment Currently Used at Home  none Has a walker at home but was not using     Anticipated Changes Related to Illness  inability to care for self        Discharge Plan     Row Name 08/07/19 1512       Plan    Plan  PT recommends IP Rehab and patient chose [facility] informed         Destination      Service Provider Request Status Selected Services Address Phone Number Fax Number    [facility] Pending - Request Sent N/A [address] [facility] [address] [phone]             Demographic Summary     Row Name 08/07/19 1509       General Information    Admission Type  inpatient    Arrived From  physician office    Required Notices Provided  Important Message from Medicare    Referral Source  admission list    Reason for Consult  discharge planning    Preferred Language  English        Functional Status     Row Name 08/07/19 1510       Functional Status    Usual Activity Tolerance  good       Functional Status, IADL    Medications  independent    Meal Preparation  independent    Housekeeping  independent    Laundry  independent    Shopping  independent      DC Barriers - precert      Janeen Tariq RN

## 2019-08-08 ENCOUNTER — APPOINTMENT (OUTPATIENT)
Dept: CT IMAGING | Facility: HOSPITAL | Age: 64
End: 2019-08-08

## 2019-08-08 LAB
ALBUMIN SERPL-MCNC: 3.7 G/DL (ref 3.5–4.8)
ALBUMIN/GLOB SERPL: 1.2 G/DL (ref 1–1.7)
ALP SERPL-CCNC: 73 U/L (ref 32–91)
ALT SERPL W P-5'-P-CCNC: 14 U/L (ref 14–54)
ANION GAP SERPL CALCULATED.3IONS-SCNC: 17.6 MMOL/L (ref 5–15)
AST SERPL-CCNC: 18 U/L (ref 15–41)
BACTERIA SPEC AEROBE CULT: NO GROWTH
BASOPHILS # BLD AUTO: 0.1 10*3/MM3 (ref 0–0.2)
BASOPHILS NFR BLD AUTO: 1 % (ref 0–1.5)
BILIRUB SERPL-MCNC: 0.8 MG/DL (ref 0.3–1.2)
BUN BLD-MCNC: 19 MG/DL (ref 8–20)
BUN/CREAT SERPL: 21.1 (ref 5.4–26.2)
CALCIUM SPEC-SCNC: 9.1 MG/DL (ref 8.9–10.3)
CHLORIDE SERPL-SCNC: 102 MMOL/L (ref 101–111)
CO2 SERPL-SCNC: 23 MMOL/L (ref 22–32)
CREAT BLD-MCNC: 0.9 MG/DL (ref 0.4–1)
DEPRECATED RDW RBC AUTO: 39.8 FL (ref 37–54)
EOSINOPHIL # BLD AUTO: 0.2 10*3/MM3 (ref 0–0.4)
EOSINOPHIL NFR BLD AUTO: 2.1 % (ref 0.3–6.2)
ERYTHROCYTE [DISTWIDTH] IN BLOOD BY AUTOMATED COUNT: 12.8 % (ref 12.3–15.4)
GFR SERPL CREATININE-BSD FRML MDRD: 64 ML/MIN/1.73
GLOBULIN UR ELPH-MCNC: 3.2 GM/DL (ref 2.5–3.8)
GLUCOSE BLD-MCNC: 98 MG/DL (ref 65–99)
GLUCOSE BLDC GLUCOMTR-MCNC: 93 MG/DL (ref 70–105)
HCT VFR BLD AUTO: 40.7 % (ref 34–46.6)
HGB BLD-MCNC: 13.5 G/DL (ref 12–15.9)
LYMPHOCYTES # BLD AUTO: 3.5 10*3/MM3 (ref 0.7–3.1)
LYMPHOCYTES NFR BLD AUTO: 41.5 % (ref 19.6–45.3)
MCH RBC QN AUTO: 29 PG (ref 26.6–33)
MCHC RBC AUTO-ENTMCNC: 33.1 G/DL (ref 31.5–35.7)
MCV RBC AUTO: 87.6 FL (ref 79–97)
MONOCYTES # BLD AUTO: 0.6 10*3/MM3 (ref 0.1–0.9)
MONOCYTES NFR BLD AUTO: 7.4 % (ref 5–12)
NEUTROPHILS # BLD AUTO: 4 10*3/MM3 (ref 1.7–7)
NEUTROPHILS NFR BLD AUTO: 48 % (ref 42.7–76)
NRBC BLD AUTO-RTO: 0.3 /100 WBC (ref 0–0.2)
PLATELET # BLD AUTO: 216 10*3/MM3 (ref 140–450)
PMV BLD AUTO: 9.4 FL (ref 6–12)
POTASSIUM BLD-SCNC: 3.6 MMOL/L (ref 3.6–5.1)
PROT SERPL-MCNC: 6.9 G/DL (ref 6.1–7.9)
RBC # BLD AUTO: 4.65 10*6/MM3 (ref 3.77–5.28)
SODIUM BLD-SCNC: 139 MMOL/L (ref 136–144)
WBC NRBC COR # BLD: 8.4 10*3/MM3 (ref 3.4–10.8)

## 2019-08-08 PROCEDURE — 82962 GLUCOSE BLOOD TEST: CPT

## 2019-08-08 PROCEDURE — 97530 THERAPEUTIC ACTIVITIES: CPT

## 2019-08-08 PROCEDURE — 92507 TX SP LANG VOICE COMM INDIV: CPT

## 2019-08-08 PROCEDURE — 70498 CT ANGIOGRAPHY NECK: CPT

## 2019-08-08 PROCEDURE — 70496 CT ANGIOGRAPHY HEAD: CPT

## 2019-08-08 PROCEDURE — 85025 COMPLETE CBC W/AUTO DIFF WBC: CPT | Performed by: INTERNAL MEDICINE

## 2019-08-08 PROCEDURE — 99232 SBSQ HOSP IP/OBS MODERATE 35: CPT | Performed by: INTERNAL MEDICINE

## 2019-08-08 PROCEDURE — 0 IOPAMIDOL PER 1 ML: Performed by: INTERNAL MEDICINE

## 2019-08-08 PROCEDURE — 97535 SELF CARE MNGMENT TRAINING: CPT

## 2019-08-08 PROCEDURE — 80053 COMPREHEN METABOLIC PANEL: CPT | Performed by: INTERNAL MEDICINE

## 2019-08-08 RX ADMIN — TRAMADOL HYDROCHLORIDE 50 MG: 50 TABLET, FILM COATED ORAL at 21:20

## 2019-08-08 RX ADMIN — Medication 3 ML: at 21:21

## 2019-08-08 RX ADMIN — IOPAMIDOL 100 ML: 755 INJECTION, SOLUTION INTRAVENOUS at 12:15

## 2019-08-08 RX ADMIN — LAMOTRIGINE 100 MG: 100 TABLET ORAL at 08:25

## 2019-08-08 RX ADMIN — LISINOPRIL 20 MG: 20 TABLET ORAL at 08:25

## 2019-08-08 RX ADMIN — ATORVASTATIN CALCIUM 80 MG: 40 TABLET, FILM COATED ORAL at 21:20

## 2019-08-08 RX ADMIN — LAMOTRIGINE 100 MG: 100 TABLET ORAL at 21:20

## 2019-08-08 RX ADMIN — METOPROLOL TARTRATE 25 MG: 25 TABLET, FILM COATED ORAL at 08:25

## 2019-08-08 RX ADMIN — ALPRAZOLAM 2 MG: 1 TABLET ORAL at 21:20

## 2019-08-08 RX ADMIN — ASPIRIN 81 MG 81 MG: 81 TABLET ORAL at 08:25

## 2019-08-08 RX ADMIN — PANTOPRAZOLE SODIUM 40 MG: 40 TABLET, DELAYED RELEASE ORAL at 08:25

## 2019-08-08 RX ADMIN — HYDROCHLOROTHIAZIDE 12.5 MG: 12.5 TABLET ORAL at 08:25

## 2019-08-08 RX ADMIN — ACETAMINOPHEN 650 MG: 325 TABLET ORAL at 13:39

## 2019-08-08 RX ADMIN — QUETIAPINE 150 MG: 25 TABLET, FILM COATED ORAL at 21:20

## 2019-08-08 NOTE — PLAN OF CARE
Problem: Patient Care Overview  Goal: Individualization and Mutuality  Outcome: Ongoing (interventions implemented as appropriate)    Goal: Discharge Needs Assessment  Outcome: Ongoing (interventions implemented as appropriate)    Goal: Interprofessional Rounds/Family Conf  Outcome: Ongoing (interventions implemented as appropriate)   08/08/19 1432   Interdisciplinary Rounds/Family Conf   Participants ;nursing;pharmacy;social work/services       Problem: Stroke (Ischemic) (Adult)  Goal: Signs and Symptoms of Listed Potential Problems Will be Absent, Minimized or Managed (Stroke)  Outcome: Ongoing (interventions implemented as appropriate)   08/08/19 1432   Goal/Outcome Evaluation   Problems Assessed (Stroke (Ischemic)) acute neurologic deterioration   Problems Assessed (Stroke (Ischemic)) acute neurologic deterioration       Problem: Fall Risk (Adult)  Goal: Identify Related Risk Factors and Signs and Symptoms  Outcome: Ongoing (interventions implemented as appropriate)   08/08/19 1432   Fall Risk (Adult)   Related Risk Factors (Fall Risk) confusion/agitation;impaired vision     Goal: Absence of Fall  Outcome: Ongoing (interventions implemented as appropriate)   08/08/19 1432   Fall Risk (Adult)   Absence of Fall making progress toward outcome       Problem: Skin Injury Risk (Adult)  Goal: Identify Related Risk Factors and Signs and Symptoms  Outcome: Ongoing (interventions implemented as appropriate)   08/08/19 1432   Skin Injury Risk (Adult)   Related Risk Factors (Skin Injury Risk) cognitive impairment     Goal: Skin Health and Integrity  Outcome: Ongoing (interventions implemented as appropriate)   08/08/19 1432   Skin Injury Risk (Adult)   Skin Health and Integrity making progress toward outcome       Problem: Confusion, Acute (Adult)  Goal: Identify Related Risk Factors and Signs and Symptoms  Outcome: Ongoing (interventions implemented as appropriate)   08/08/19 1432   Confusion, Acute (Adult)    Related Risk Factors (Acute Confusion) advanced age;cognitive impairment   Signs and Symptoms (Acute Confusion) memory disturbed     Goal: Cognitive/Functional Impairments Minimized  Outcome: Ongoing (interventions implemented as appropriate)   08/08/19 1432   Confusion, Acute (Adult)   Cognitive/Functional Impairments Minimized making progress toward outcome     Goal: Safety  Outcome: Ongoing (interventions implemented as appropriate)   08/08/19 1432   Confusion, Acute (Adult)   Safety making progress toward outcome

## 2019-08-08 NOTE — PROGRESS NOTES
"[facility], [place], operated by [facility] Hospitalist Team      Patient Care Team:  Devaughn Reis MD as PCP - General  Devaughn Reis MD as PCP - Family Medicine        Chief Complaint:  CVA    Subjective: Ms. Deluna is a 63 year old  female with PMH of HTN, HLD, anxiety/ADHD, DDD who was sent for direct admission 8/6/2019 by PCP Dr. Reis for acute confusion and left visual field loss. Daughter at bedside stated the patient's confusion mainly started this past Friday evening. She was disoriented and making statements that didn't make sense. She was going into closets and bedrooms to use the bathroom. She was off balance and falling into walls. She fell on Saturday and hurt the left side of her mid back. She appeared to have difficulty moving her left leg and had difficulty reaching for objects with her left hand. It appeared she had difficulty seeing with her left eye. She was treated for a UTI after her right rotator cuff repair but seemed to have recovered from that. She has not had any recent illness. Family denied any previous history of CVA. She was seen here in the ED 8/4/2019 for her symptoms. CT head was negative for hemorrhage or mass effect. Labs were unremarkable. She was discharged and followed up with Dr. Reis today 8/6/2019 who sent her to the hospital for further stroke workup. Pt complains of pain to her right thoracic and into lumbar. She had bilateral temporal headache and occipital headache. No lateralizing weakness noted. Pt does have left eye vision changes. NIH is 3. Stroke workup started.                 Objective: alert/nad        Vital Signs  Temp:  [97.5 °F (36.4 °C)-98.9 °F (37.2 °C)] 98 °F (36.7 °C)  Heart Rate:  [56-73] 68  Resp:  [14-20] 15  BP: (100-167)/() 107/56  Oxygen Therapy  SpO2: 100 %  Pulse Oximetry Type: Continuous  Device (Oxygen Therapy): room air  Flowsheet Rows      First Filed Value   Admission Height  167.6 cm (66\") Documented at 08/06/2019 1111   Admission Weight  81.8 " kg (180 lb 6.4 oz) Documented at 08/06/2019 1111        Intake & Output (last 3 days)       08/05 0701 - 08/06 0700 08/06 0701 - 08/07 0700 08/07 0701 - 08/08 0700 08/08 0701 - 08/09 0700    P.O.   480     Total Intake(mL/kg)   480 (5.8)     Net   +480                 Lines, Drains & Airways    Active LDAs     None                Physical Exam:    General Appearance:    Alert, cooperative, in no acute distress   Head:    Normocephalic, without obvious abnormality, atraumatic   Eyes:            Lids and lashes normal, conjunctivae and sclerae normal, no   icterus, no pallor, corneas clear, PERRLA   Back:     No kyphosis present, no scoliosis present, no skin lesions,      erythema or scars, no tenderness to percussion or                   palpation,   range of motion normal   Lungs:     Clear to auscultation,respirations regular, even and                  unlabored    Heart:    Regular rhythm and normal rate, normal S1 and S2, no            murmur, no gallop, no rub, no click   Chest Wall:    No abnormalities observed   Abdomen:     Normal bowel sounds, no masses, no organomegaly, soft        non-tender, non-distended, no guarding, no rebound                tenderness   Extremities:   Moves all extremities well, no edema, no cyanosis, no             redness       Results Review:       Lab Results (last 24 hours)     Procedure Component Value Units Date/Time    POC Glucose Once [[id number]]  (Normal) Collected:  08/08/19 0614    Specimen:  Blood Updated:  08/08/19 0616     Glucose 93 mg/dL      Comment: Serial Number: [id number]:  [id number]       Comprehensive Metabolic Panel [[id number]]  (Abnormal) Collected:  08/08/19 0247    Specimen:  Blood Updated:  08/08/19 0356     Glucose 98 mg/dL      BUN 19 mg/dL      Creatinine 0.90 mg/dL      Sodium 139 mmol/L      Potassium 3.6 mmol/L      Chloride 102 mmol/L      CO2 23.0 mmol/L      Calcium 9.1 mg/dL      Total Protein 6.9 g/dL      Albumin 3.70 g/dL      ALT  (SGPT) 14 U/L      AST (SGOT) 18 U/L      Alkaline Phosphatase 73 U/L      Total Bilirubin 0.8 mg/dL      eGFR Non African Amer 64 mL/min/1.73      Globulin 3.2 gm/dL      A/G Ratio 1.2 g/dL      BUN/Creatinine Ratio 21.1     Anion Gap 17.6 mmol/L     CBC & Differential [[id number]] Collected:  08/08/19 0247    Specimen:  Blood Updated:  08/08/19 0324    Narrative:       The following orders were created for panel order CBC & Differential.  Procedure                               Abnormality         Status                     ---------                               -----------         ------                     CBC Auto Differential[[id number]]        Abnormal            Final result                 Please view results for these tests on the individual orders.    CBC Auto Differential [[id number]]  (Abnormal) Collected:  08/08/19 0247    Specimen:  Blood Updated:  08/08/19 0324     WBC 8.40 10*3/mm3      RBC 4.65 10*6/mm3      Hemoglobin 13.5 g/dL      Hematocrit 40.7 %      MCV 87.6 fL      MCH 29.0 pg      MCHC 33.1 g/dL      RDW 12.8 %      RDW-SD 39.8 fl      MPV 9.4 fL      Platelets 216 10*3/mm3      Neutrophil % 48.0 %      Lymphocyte % 41.5 %      Monocyte % 7.4 %      Eosinophil % 2.1 %      Basophil % 1.0 %      Neutrophils, Absolute 4.00 10*3/mm3      Lymphocytes, Absolute 3.50 10*3/mm3      Monocytes, Absolute 0.60 10*3/mm3      Eosinophils, Absolute 0.20 10*3/mm3      Basophils, Absolute 0.10 10*3/mm3      nRBC 0.3 /100 WBC     Urinalysis With Culture If Indicated - Urine, Clean Catch [[id number]]  (Abnormal) Collected:  08/07/19 1215    Specimen:  Urine, Clean Catch Updated:  08/07/19 1423     Color, UA Dark Yellow     Appearance, UA Hazy     Comment: Result checked         pH, UA 5.5     Specific Gravity, UA 1.025     Glucose, UA Negative     Ketones, UA Negative     Bilirubin, UA Small (1+)     Comment: Confirmation testing is unavailable.  A serum bilirubin is recommended for further assessment.         Blood, UA Negative     Protein, UA Trace     Leuk Esterase, UA Small (1+)     Nitrite, UA Negative     Urobilinogen, UA 0.2 E.U./dL    Urinalysis, Microscopic Only - Urine, Clean Catch [[id number]]  (Abnormal) Collected:  08/07/19 1215    Specimen:  Urine, Clean Catch Updated:  08/07/19 1423     RBC, UA 3-5 /HPF      WBC, UA 6-12 /HPF      Bacteria, UA Trace /HPF      Squamous Epithelial Cells, UA 3-6 /HPF      Yeast, UA Small/1+ Yeast /HPF      Hyaline Casts, UA 3-6 /LPF      Mucus, UA Trace /HPF      Methodology Manual Light Microscopy    Urine Culture - Urine, Urine, Clean Catch [[id number]] Collected:  08/07/19 1215    Specimen:  Urine, Clean Catch Updated:  08/07/19 1423    POC Glucose Once [[id number]]  (Abnormal) Collected:  08/07/19 0809    Specimen:  Blood Updated:  08/07/19 0810     Glucose 114 mg/dL      Comment: Serial Number: [id number]:  [id number]             Imaging Results (last 24 hours)     ** No results found for the last 24 hours. **                                   I reviewed the patient's new clinical results.          ECG/EMG Results (most recent)     Procedure Component Value Units Date/Time    Adult Transthoracic Echo Complete W/ Cont if Necessary Per Protocol (With Agitated Saline) [[id number]] Collected:  08/07/19 0721     Updated:  08/07/19 1115     LA dimension(2D) 3.2 cm      BSA 1.9 m^2       CV ECHO STEPHEN - RVDD 2.4 cm      IVSd 1.2 cm      LVIDd 4.0 cm      LVIDs 2.5 cm      LVPWd 0.98 cm      IVS/LVPW 1.2     FS 37.6 %      EDV(Teich) 70.4 ml      ESV(Teich) 22.4 ml      EF(Teich) 68.2 %      EDV(cubed) 64.4 ml      ESV(cubed) 15.7 ml      EF(cubed) 75.7 %      LV mass(C)d 141.5 grams      LV mass(C)dI 73.8 grams/m^2      SV(Teich) 48.0 ml      SI(Teich) 25.0 ml/m^2      SV(cubed) 48.8 ml      SI(cubed) 25.4 ml/m^2      Ao root diam 3.1 cm      Ao root area 7.5 cm^2      ACS 1.4 cm      asc Aorta Diam 3.0 cm      LVOT diam 1.8 cm      LVOT area 2.6 cm^2      RVOT diam 2.5 cm       RVOT area 5.0 cm^2      EDV(MOD-sp4) 78.9 ml      ESV(MOD-sp4) 34.8 ml      EF(MOD-sp4) 55.9 %      SV(MOD-sp4) 44.1 ml      SI(MOD-sp4) 23.0 ml/m^2      Ao root area (BSA corrected) 1.6     LV Hdez Vol (BSA corrected) 41.1 ml/m^2      LV Sys Vol (BSA corrected) 18.2 ml/m^2      Aortic R-R 1.0 sec      Aortic HR 58.2 BPM      MV E max rony 59.3 cm/sec      MV A max rony 79.4 cm/sec      MV E/A 0.75     MV V2 max 81.6 cm/sec      MV max PG 2.7 mmHg      MV V2 mean 45.8 cm/sec      MV mean PG 1.0 mmHg      MV V2 VTI 23.0 cm      MVA(VTI) 2.2 cm^2      MV dec slope 211.5 cm/sec^2      MV dec time 0.28 sec      Ao pk rony 120.0 cm/sec      Ao max PG 5.8 mmHg      Ao max PG (full) 2.7 mmHg      Ao V2 mean 74.5 cm/sec      Ao mean PG 2.5 mmHg      Ao mean PG (full) 1.1 mmHg      Ao V2 VTI 20.1 cm      ANNA(I,A) 2.6 cm^2      ANNA(I,D) 2.6 cm^2      ANNA(V,A) 1.9 cm^2      ANNA(V,D) 1.9 cm^2      LV V1 max PG 3.1 mmHg      LV V1 mean PG 1.4 mmHg      LV V1 max 88.0 cm/sec      LV V1 mean 55.0 cm/sec      LV V1 VTI 20.1 cm      CO(Ao) 8.8 l/min      CI(Ao) 4.6 l/min/m^2      SV(Ao) 151.0 ml      SI(Ao) 78.8 ml/m^2      CO(LVOT) 3.0 l/min      CI(LVOT) 1.6 l/min/m^2      SV(LVOT) 51.6 ml      SV(RVOT) 71.1 ml      SI(LVOT) 26.9 ml/m^2      PA V2 max 79.9 cm/sec      PA max PG 2.6 mmHg      PA max PG (full) 1.1 mmHg      PA V2 mean 55.2 cm/sec      PA mean PG 1.4 mmHg      PA mean PG (full) 0.67 mmHg      PA V2 VTI 14.5 cm      PVA(I,A) 4.9 cm^2      BH CV ECHO STEPHEN - PVA(I,D) 4.9 cm^2      BH CV ECHO STEPHEN - PVA(V,A) 3.8 cm^2      BH CV ECHO STEPHEN - PVA(V,D) 3.8 cm^2      PA acc time 0.14 sec      RV V1 max PG 1.5 mmHg      RV V1 mean PG 0.71 mmHg      RV V1 max 60.4 cm/sec      RV V1 mean 38.0 cm/sec      RV V1 VTI 14.2 cm      TR max rony 240.7 cm/sec      RVSP(TR) 26.2 mmHg      RAP systole 3.0 mmHg      PA pr(Accel) 15.6 mmHg      Pulm Sys Rony 50.2 cm/sec      Pulm Hdez Rony 30.4 cm/sec      Pulm S/D 1.7     Qp/Qs 1.4     Pulm  A Revs Dur 0.13 sec      Pulm A Revs Rony 30.4 cm/sec       CV ECHO STEPHEN - BZI_BMI 29.2 kilograms/m^2       CV ECHO STEPHEN - BSA(HAYCOCK) 2.0 m^2       CV ECHO STEPHEN - BZI_METRIC_WEIGHT 82.1 kg       CV ECHO STEPHEN - BZI_METRIC_HEIGHT 167.6 cm      Target HR (85%) 135 bpm      Max. Pred. HR (100%) 159 bpm      EF(MOD-bp) 56.0 %      Echo EF Estimated 60 %     Narrative:       · Left ventricular wall thickness is consistent with mild septal   asymmetric hypertrophy.  · Left ventricular systolic function is normal.  · Left ventricular diastolic dysfunction (grade I) consistent with   impaired relaxation.  · Estimated EF = 60%.               Medication Review:       Current Facility-Administered Medications:   •  acetaminophen (TYLENOL) 160 MG/5ML solution 650 mg, 650 mg, Oral, Q4H PRN, AnaiterShalonda teresale, APRN  •  acetaminophen (TYLENOL) suppository 650 mg, 650 mg, Rectal, Q4H PRN, AnaiterShalonda teresale, APRN  •  acetaminophen (TYLENOL) tablet 650 mg, 650 mg, Oral, Q4H PRN, AnaiterMukul teresahelle, APRN, 650 mg at 08/07/19 1204  •  ALPRAZolam (XANAX) tablet 2 mg, 2 mg, Oral, Nightly PRN, AnaiterShalonda teresale, APRN, 2 mg at 08/07/19 2158  •  aspirin chewable tablet 81 mg, 81 mg, Oral, Daily, 81 mg at 08/07/19 0829 **OR** aspirin suppository 300 mg, 300 mg, Rectal, Daily, Yuli Troy MD  •  atorvastatin (LIPITOR) tablet 80 mg, 80 mg, Oral, Nightly, Yuli Troy MD, 80 mg at 08/07/19 2158  •  bisacodyl (DULCOLAX) EC tablet 5 mg, 5 mg, Oral, Daily PRN, Shalonda Navale, APRN  •  bisacodyl (DULCOLAX) suppository 10 mg, 10 mg, Rectal, Daily PRN, Shalonda Navale, APRN  •  hydrochlorothiazide (HYDRODIURIL) tablet 12.5 mg, 12.5 mg, Oral, Daily, Cong Harvey Jr., MD, 12.5 mg at 08/07/19 0829  •  lamoTRIgine (LaMICtal) tablet 100 mg, 100 mg, Oral, BID, Karen Nava APRN, 100 mg at 08/07/19 2158  •  lisinopril (PRINIVIL,ZESTRIL) tablet 20 mg, 20 mg, Oral, Q24H, Cong Harvey Jr., MD, 20 mg at  08/07/19 0829  •  magnesium hydroxide (MILK OF MAGNESIA) suspension 2400 mg/10mL 10 mL, 10 mL, Oral, Daily PRN, Achterberg, Karen, APRN  •  Magnesium Sulfate 2 gram infusion - Mg less than or equal to 1.5 mg/dL, 2 g, Intravenous, PRN **OR** Magnesium Sulfate 1 gram infusion - Mg 1.6-1.9 mg/dL, 1 g, Intravenous, PRN, Achterberg, Karen, APRN  •  metoprolol tartrate (LOPRESSOR) tablet 25 mg, 25 mg, Oral, Daily, Achterberg, Karen, APRN, 25 mg at 08/07/19 0829  •  ondansetron (ZOFRAN) tablet 4 mg, 4 mg, Oral, Q6H PRN **OR** ondansetron (ZOFRAN) injection 4 mg, 4 mg, Intravenous, Q6H PRN, Achterberg, Karen, APRN  •  pantoprazole (PROTONIX) EC tablet 40 mg, 40 mg, Oral, QAM, Achterberg, Karen, APRN, 40 mg at 08/07/19 0829  •  potassium chloride (K-DUR,KLOR-CON) CR tablet 40 mEq, 40 mEq, Oral, PRN, Achterberg, Karen, APRN, 40 mEq at 08/06/19 2025  •  potassium chloride (KLOR-CON) packet 40 mEq, 40 mEq, Oral, PRN, Achterberg, Karen, APRN  •  promethazine (PHENERGAN) tablet 25 mg, 25 mg, Oral, Q6H PRN, Achterberg, Karen, APRN  •  QUEtiapine (SEROquel) tablet 150 mg, 150 mg, Oral, Nightly, Achterberg, Karen, APRN, 150 mg at 08/07/19 2158  •  sodium chloride 0.9 % flush 3 mL, 3 mL, Intravenous, Q12H, Yuli Troy MD, 3 mL at 08/07/19 2158  •  sodium chloride 0.9 % flush 3-10 mL, 3-10 mL, Intravenous, PRN, Yuli Troy MD  •  traMADol (ULTRAM) tablet 50 mg, 50 mg, Oral, Q6H PRN, Achterberg, Karen, APRN    I have reviewed the patient's current medication list    Assessment/Plan     Acute confusion, Left visual field loss  - CT head from 8/4/2019 negative for acute findings  - NIH 3  - cont neuro checks  - stroke workup with MRI brain, check carotid US, 2D echo, tsh, lipid panel, hgb a1c, B12  - aspirin, statin  - neurology consulted     Anemia in other chronic diseases  - hgb normal     Anxiety  - cont home seroquel, lamictal, xanax (INSPECT verified)     Hypertension  - cont home  lisinopril/hctz, metoprolol     Hyperlipidemia  - cont home statin     GERD  - ppi     DVT prophylaxis - SCDs               Plan for disposition:per inpt rehab    Cong Harvey Jr., MD  08/08/19  7:53 AM      Time:

## 2019-08-08 NOTE — THERAPY TREATMENT NOTE
Acute Care - Occupational Therapy Treatment Note  REECE Kam     Patient Name: Andree Deluna  : 1955  MRN: [id number]  Today's Date: 2019             Admit Date: 2019     No diagnosis found.  Patient Active Problem List   Diagnosis   • Anemia in other chronic diseases classified elsewhere   • Anxiety   • B12 deficiency   • Attention deficit disorder of adult with hyperactivity   • Mood disorder (CMS/HCC)   • Complete rotator cuff tear or rupture of right shoulder, not specified as traumatic   • Degeneration of intervertebral disc of cervical region   • Degeneration of intervertebral disc of lumbosacral region   • Elevated blood pressure reading without diagnosis of hypertension   • Fatigue   • Hay fever   • Hyperlipidemia   • Hyperglycemia   • Hypertension   • Insomnia   • Laryngopharyngeal reflux   • Obesity   • Osteoarthritis of knee   • CVA (cerebral vascular accident) (CMS/HCC)   • Visual field loss left   • Confusion   • Acute midline low back pain without sciatica   • Acute confusion     Past Medical History:   Diagnosis Date   • ADHD unknown   • Anemia    • Hyperlipidemia unknown   • Hypertension    • Insomnia unknown   • Mood disorder (CMS/HCC)    • Obesity unknown     Past Surgical History:   Procedure Laterality Date   • CHOLECYSTECTOMY     • FINGER SURGERY     • KNEE ARTHROPLASTY     • KNEE SURGERY Right     replaced   • ROTATOR CUFF REPAIR Right    • SHOULDER SURGERY Right 2019    scope/ cuff repair   • TUBAL ABDOMINAL LIGATION         Therapy Treatment    Rehabilitation Treatment Summary     Row Name 19 1331             Treatment Time/Intention    Discipline  occupational therapist  -AL      Document Type  therapy note (daily note)  -AL      Subjective Information  complains of;pain back pain from poor positioning in bed  -AL      Mode of Treatment  occupational therapy  -AL      Patient/Family Observations  son present  -AL      Patient Effort  good  -AL      Comment   difficulty following commands; needs frequent cues for safety/attn; repetition of directions needed  -AL      Recorded by [AL] Andie Mccullough, OT 08/08/19 1339      Row Name 08/08/19 1331             Cognitive Assessment/Intervention- PT/OT    Follows Commands (Cognition)  follows one step commands;50-74% accuracy;follows two step commands;25-49% accuracy;delayed response/completion;repetition of directions required;verbal cues/prompting required  -AL      Safety Deficit (Cognitive)  moderate deficit;judgment;problem solving;insight into deficits/self awareness;impulsivity;awareness of need for assistance;ability to follow commands  -AL      Recorded by [AL] Andie Mccullough, OT 08/08/19 1339      Row Name 08/08/19 1331             Bed Mobility Assessment/Treatment    Bed Mobility Assessment/Treatment  bed mobility (all) activities  -AL      Russell Level (Bed Mobility)  supervision;contact guard assist  -AL      Recorded by [AL] Andie Mccullough, OT 08/08/19 1339      Row Name 08/08/19 1331             Functional Mobility    Functional Mobility- Comment  few steps from bed to chair; cga - min a d/t minor lob with turning  -AL      Recorded by [AL] Andie Mccullough, OT 08/08/19 1339      Row Name 08/08/19 1331             Sit-Stand Transfer    Sit-Stand Russell (Transfers)  contact guard;verbal cues;set up;nonverbal cues (demo/gesture) performed x 7 consecutive trials  -AL      Recorded by [AL] Andie Mccullough, OT 08/08/19 1339      Row Name 08/08/19 1331             Stand-Sit Transfer    Stand-Sit Russell (Transfers)  contact guard;verbal cues;set up;nonverbal cues (demo/gesture)  -AL      Recorded by [AL] Andie Mccullough OT 08/08/19 1339      Row Name 08/08/19 1331             ADL Assessment/Intervention    BADL Assessment/Intervention  feeding  -AL      Recorded by [AL] Andie Mccullough OT 08/08/19 1339      Row Name 08/08/19 1331             Grooming Assessment/Training    Russell Level  (Grooming)  set up;verbal cues a to open packages/lids  -AL      Recorded by [AL] Andie Mccullough, OT 08/08/19 1339      Row Name 08/08/19 1331             Self-Feeding Assessment/Training    Comment (Feeding)  a to open packages, remove lids 2* weakness and fmc deficits  -AL      Recorded by [AL] Andie Mccullough, OT 08/08/19 1339      Row Name 08/08/19 1331             Positioning and Restraints    Pre-Treatment Position  in bed  -AL      Post Treatment Position  chair  -AL      In Bed  sitting;call light within reach;encouraged to call for assist;exit alarm on;with family/caregiver  -AL      Recorded by [AL] Andie Mccullough, OT 08/08/19 1339      Row Name 08/08/19 1331             Outcome Summary/Treatment Plan (OT)    Anticipated Discharge Disposition (OT)  inpatient rehabilitation facility  -AL      Recorded by [AL] Andie Mccullough, OT 08/08/19 1339        User Key  (r) = Recorded By, (t) = Taken By, (c) = Cosigned By    Initials Name Effective Dates Discipline    AL Andie Mccullough, OT 03/01/19 -  OT                 OT Recommendation and Plan  Outcome Summary/Treatment Plan (OT)  Anticipated Discharge Disposition (OT): inpatient rehabilitation facility  Plan of Care Review  Plan of Care Reviewed With: patient, son  Plan of Care Reviewed With: patient, son  Outcome Summary: pt showed some improvement in mobility, completing all in room transfers and short distance walk with cga-min a, mild lob w/ turns. pt still having significant impairment w/ memory and ability to follow commands. did not recall SLP coming in to room just a few min prior to OT. lacks insight as to remaining cognitive / physical deficits, raising concerns for falls/injury. dc to rehab remains appropriate.   Outcome Measures     Row Name 08/07/19 1400 08/07/19 1200          How much help from another is currently needed...    Putting on and taking off regular lower body clothing?  3  -MH  --     Bathing (including washing, rinsing, and drying)   3  -MH  --     Toileting (which includes using toilet bed pan or urinal)  4  -MH  --     Putting on and taking off regular upper body clothing  3  -MH  --     Taking care of personal grooming (such as brushing teeth)  3  -MH  --     Eating meals  3  -MH  --     AM-PAC 6 Clicks Score (OT)  19  -MH  --        Modified Swapnil Scale    Modified Bibb Scale  --  4 - Moderately severe disability.  Unable to walk without assistance, and unable to attend to own bodily needs without assistance.  -CM        Functional Assessment    Outcome Measure Options  AM-PAC 6 Clicks Daily Activity (OT)  -  Modified Swapnil  -CM       User Key  (r) = Recorded By, (t) = Taken By, (c) = Cosigned By    Initials Name Provider Type     Bobbi Dyer, OT Occupational Therapist    CM Jelena Angulo, PT Physical Therapist           Time Calculation:   Time Calculation- OT     Row Name 08/08/19 1334             Time Calculation- OT    OT Start Time  1300  -AL      OT Stop Time  1320  -AL      OT Time Calculation (min)  20 min  -AL      Total Timed Code Minutes- OT  20 minute(s)  -AL      OT Received On  08/08/19  -AL      OT - Next Appointment  08/12/19  -AL        User Key  (r) = Recorded By, (t) = Taken By, (c) = Cosigned By    Initials Name Provider Type    Andie Umana, OT Occupational Therapist                 Andie Mccullough, OT  8/8/2019

## 2019-08-08 NOTE — PLAN OF CARE
Problem: Patient Care Overview  Goal: Plan of Care Review  Outcome: Ongoing (interventions implemented as appropriate)   08/08/19 0418   Coping/Psychosocial   Plan of Care Reviewed With patient;spouse   OTHER   Outcome Summary patient still confused at times. resting well. no complaints.    Plan of Care Review   Progress improving       Problem: Stroke (Ischemic) (Adult)  Goal: Signs and Symptoms of Listed Potential Problems Will be Absent, Minimized or Managed (Stroke)  Outcome: Ongoing (interventions implemented as appropriate)      Problem: Fall Risk (Adult)  Goal: Absence of Fall  Outcome: Ongoing (interventions implemented as appropriate)      Problem: Skin Injury Risk (Adult)  Goal: Skin Health and Integrity  Outcome: Ongoing (interventions implemented as appropriate)      Problem: Confusion, Acute (Adult)  Goal: Identify Related Risk Factors and Signs and Symptoms  Outcome: Ongoing (interventions implemented as appropriate)    Goal: Cognitive/Functional Impairments Minimized  Outcome: Ongoing (interventions implemented as appropriate)    Goal: Safety  Outcome: Ongoing (interventions implemented as appropriate)

## 2019-08-08 NOTE — PROGRESS NOTES
Discharge Planning Assessment   Eddy     Patient Name: Andree Deluna  MRN: [id number]  Today's Date: 8/8/2019    Admit Date: 8/6/2019        Discharge Plan     Row Name 08/08/19 1036       Plan    Plan  [facility] accepted acute,precert obtained  8/9    Patient/Family in Agreement with Plan  yes    Final Discharge Disposition Code  03 - skilled nursing facility (SNF)        Destination      Service Provider Request Status Selected Services Address Phone Number Fax Number    [facility] Accepted N/A [address] [facility] [place] [address] [phone]                                  [place]

## 2019-08-08 NOTE — PLAN OF CARE
Problem: Patient Care Overview  Goal: Plan of Care Review   08/08/19 8611   Coping/Psychosocial   Plan of Care Reviewed With patient;son   OTHER   Outcome Summary pt showed some improvement in mobility, completing all in room transfers and short distance walk with cga-min a, mild lob w/ turns. pt still having significant impairment w/ memory and ability to follow commands. did not recall SLP coming in to room just a few min prior to OT. lacks insight as to remaining cognitive / physical deficits, raising concerns for falls/injury. dc to rehab remains appropriate.

## 2019-08-08 NOTE — THERAPY TREATMENT NOTE
"Acute Care - Speech Language Pathology Treatment Note   Eddy     Patient Name: Andree Deluna  : 1955  MRN: [id number]  Today's Date: 2019         Admit Date: 2019    Visit Dx:    No diagnosis found.  Patient Active Problem List   Diagnosis   • Anemia in other chronic diseases classified elsewhere   • Anxiety   • B12 deficiency   • Attention deficit disorder of adult with hyperactivity   • Mood disorder (CMS/HCC)   • Complete rotator cuff tear or rupture of right shoulder, not specified as traumatic   • Degeneration of intervertebral disc of cervical region   • Degeneration of intervertebral disc of lumbosacral region   • Elevated blood pressure reading without diagnosis of hypertension   • Fatigue   • Hay fever   • Hyperlipidemia   • Hyperglycemia   • Hypertension   • Insomnia   • Laryngopharyngeal reflux   • Obesity   • Osteoarthritis of knee   • CVA (cerebral vascular accident) (CMS/HCC)   • Visual field loss left   • Confusion   • Acute midline low back pain without sciatica   • Acute confusion        Therapy Treatment  Rehabilitation Treatment Summary     Row Name 19 0418       Treatment Time/Intention    Discipline  speech language pathologist  -    Document Type  therapy note (daily note)  -    Patient Effort  --    Comment  Pt participated in skilled ST targeting cognitive linguistic & language development for functional communication. The patient demonstrates some denial of deficits stating \"I didn't think I had a problem with my speech but they said I did.\" She was amenable to tx. She reports she does not work but stays at home & watches her 4 year old grandson full time.   -    Recorded by [] Delicia Mendez, SLP 19 1349    Row Name 19 1331           Delicia Mendez, SLP 19 -  SLP          EDUCATION  The patient has been educated in the following areas:   Cognitive Impairment Communication Impairment.    SLP Recommendation and Plan         Recommend " "continued skilled ST target cognitive linguistic language skills for improved safety & communication efficacy. The patient continues to demonstrate deficits in the areas of attention, executive function including problem solving (temporal reasoning, medication management, multi-step problem solving for ADLs).      SLP GOALS           Improve Attention by Goal 1 (SLP)  complete sustained attention task;attending to task;80%  -MC    Time Frame (Attention Goal 1, SLP)  by discharge  -MC    Barriers (Attention Goal 1, SLP)  Pt required cues for redirection to formal tasks/questions due to difficulty with topic maintenance. She was stimulable to single cues/repetitions for redirection.  Tangential speech included describing off topic home scenarios. Reduced in reliability due to reduced intelligibility of speech. This is not representative of a dysarthria, rather intelligibility impacted by excessive rate of speech with reduced vocal intensity & flat affect (reduced pitch variations).    -MC    Progress/Outcomes (Attention Goal 1, SLP)  continuing progress toward goal  -MC    Comment (Attention Goal 1, SLP)  Performance consistent with test scores on initial exam (SLUMS) in the areas of attention/recall of key information of paragraph length and math calculations.   -MC          Improve Problem Solving Through Goal 1 (SLP)  determine solutions to simple ADL/safety problems;with moderate cues (50-74%);answer \"what if\" questions;complete organization/home management task  -MC    Comment (Problem Solving Goal 1, SLP)  Pt answered multi-step problem solving question for medication administration with 100% accuracy but required reptition & cues for each step. She states that at home, her  organizes all of her medications & oversees the administration. She answered ADL scenario problem sovling questions accurately, but responses were incomplete & she required follow up prompts to elaborate for completion. Temporal " problem solving for appointment keeping inaccurate with tangential responses. Pt gave multiple inconsistent answers to 3/3 questions, and did not demosntrate any attempts to self correct demonstrating awareness of deficits.   -MC          Improve Problem Solving Through Goal 2 (SLP)  sequence steps in a task  -MC    Progress (Problem Solving Goal 2, SLP)  80%;with minimal cues (75-90%)  -MC    Comment (Problem Solving Goal 2, SLP)  Responses were accurate but incomplete for verbally sequencing cooking & laundry tasks. Pt required 2 cues per task to redirect & initiate verbalization of further steps necessary to complete target task.    -      User Key  (r) = Recorded By, (t) = Taken By, (c) = Cosigned By    Delicia Martins, SLP Speech and Language Pathologist            ELOY Shelby  8/8/2019

## 2019-08-09 ENCOUNTER — INPATIENT HOSPITAL (OUTPATIENT)
Dept: URBAN - METROPOLITAN AREA HOSPITAL 84 | Facility: HOSPITAL | Age: 64
End: 2019-08-09
Payer: MEDICARE

## 2019-08-09 VITALS
TEMPERATURE: 98.5 F | WEIGHT: 182.98 LBS | DIASTOLIC BLOOD PRESSURE: 89 MMHG | BODY MASS INDEX: 29.41 KG/M2 | HEIGHT: 66 IN | SYSTOLIC BLOOD PRESSURE: 194 MMHG | RESPIRATION RATE: 17 BRPM | HEART RATE: 80 BPM | OXYGEN SATURATION: 96 %

## 2019-08-09 DIAGNOSIS — R93.3 ABNORMAL FINDINGS ON DIAGNOSTIC IMAGING OF OTHER PARTS OF DI: ICD-10-CM

## 2019-08-09 DIAGNOSIS — K21.9 GASTRO-ESOPHAGEAL REFLUX DISEASE WITHOUT ESOPHAGITIS: ICD-10-CM

## 2019-08-09 LAB
ALBUMIN SERPL-MCNC: 3.6 G/DL (ref 3.5–4.8)
ALBUMIN/GLOB SERPL: 1.2 G/DL (ref 1–1.7)
ALP SERPL-CCNC: 76 U/L (ref 32–91)
ALT SERPL W P-5'-P-CCNC: 15 U/L (ref 14–54)
ANION GAP SERPL CALCULATED.3IONS-SCNC: 16.8 MMOL/L (ref 5–15)
AST SERPL-CCNC: 18 U/L (ref 15–41)
BASOPHILS # BLD AUTO: 0.1 10*3/MM3 (ref 0–0.2)
BASOPHILS NFR BLD AUTO: 1.2 % (ref 0–1.5)
BILIRUB SERPL-MCNC: 0.6 MG/DL (ref 0.3–1.2)
BUN BLD-MCNC: 19 MG/DL (ref 8–20)
BUN/CREAT SERPL: 21.1 (ref 5.4–26.2)
CALCIUM SPEC-SCNC: 8.7 MG/DL (ref 8.9–10.3)
CHLORIDE SERPL-SCNC: 100 MMOL/L (ref 101–111)
CO2 SERPL-SCNC: 23 MMOL/L (ref 22–32)
CREAT BLD-MCNC: 0.9 MG/DL (ref 0.4–1)
DEPRECATED RDW RBC AUTO: 42.9 FL (ref 37–54)
EOSINOPHIL # BLD AUTO: 0.3 10*3/MM3 (ref 0–0.4)
EOSINOPHIL NFR BLD AUTO: 3.4 % (ref 0.3–6.2)
ERYTHROCYTE [DISTWIDTH] IN BLOOD BY AUTOMATED COUNT: 13.3 % (ref 12.3–15.4)
GFR SERPL CREATININE-BSD FRML MDRD: 64 ML/MIN/1.73
GLOBULIN UR ELPH-MCNC: 3 GM/DL (ref 2.5–3.8)
GLUCOSE BLD-MCNC: 108 MG/DL (ref 65–99)
HCT VFR BLD AUTO: 41 % (ref 34–46.6)
HGB BLD-MCNC: 13.2 G/DL (ref 12–15.9)
LYMPHOCYTES # BLD AUTO: 4.2 10*3/MM3 (ref 0.7–3.1)
LYMPHOCYTES NFR BLD AUTO: 44.2 % (ref 19.6–45.3)
MCH RBC QN AUTO: 28.9 PG (ref 26.6–33)
MCHC RBC AUTO-ENTMCNC: 32.1 G/DL (ref 31.5–35.7)
MCV RBC AUTO: 90 FL (ref 79–97)
MONOCYTES # BLD AUTO: 0.6 10*3/MM3 (ref 0.1–0.9)
MONOCYTES NFR BLD AUTO: 6.2 % (ref 5–12)
NEUTROPHILS # BLD AUTO: 4.2 10*3/MM3 (ref 1.7–7)
NEUTROPHILS NFR BLD AUTO: 45 % (ref 42.7–76)
NRBC BLD AUTO-RTO: 0.4 /100 WBC (ref 0–0.2)
PLATELET # BLD AUTO: 244 10*3/MM3 (ref 140–450)
PMV BLD AUTO: 9.4 FL (ref 6–12)
POTASSIUM BLD-SCNC: 3.8 MMOL/L (ref 3.6–5.1)
PROT SERPL-MCNC: 6.6 G/DL (ref 6.1–7.9)
RBC # BLD AUTO: 4.56 10*6/MM3 (ref 3.77–5.28)
SODIUM BLD-SCNC: 136 MMOL/L (ref 136–144)
WBC NRBC COR # BLD: 9.4 10*3/MM3 (ref 3.4–10.8)

## 2019-08-09 PROCEDURE — 85025 COMPLETE CBC W/AUTO DIFF WBC: CPT | Performed by: INTERNAL MEDICINE

## 2019-08-09 PROCEDURE — 99238 HOSP IP/OBS DSCHRG MGMT 30/<: CPT | Performed by: INTERNAL MEDICINE

## 2019-08-09 PROCEDURE — 99223 1ST HOSP IP/OBS HIGH 75: CPT | Performed by: NURSE PRACTITIONER

## 2019-08-09 PROCEDURE — 80053 COMPREHEN METABOLIC PANEL: CPT | Performed by: INTERNAL MEDICINE

## 2019-08-09 RX ORDER — ATORVASTATIN CALCIUM 80 MG/1
80 TABLET, FILM COATED ORAL NIGHTLY
Qty: 30 TABLET | Refills: 3 | Status: SHIPPED | OUTPATIENT
Start: 2019-08-09 | End: 2020-12-14 | Stop reason: SDUPTHER

## 2019-08-09 RX ORDER — AMLODIPINE BESYLATE 5 MG/1
10 TABLET ORAL
Status: DISCONTINUED | OUTPATIENT
Start: 2019-08-09 | End: 2019-08-09 | Stop reason: HOSPADM

## 2019-08-09 RX ORDER — AMLODIPINE BESYLATE 10 MG/1
10 TABLET ORAL
Qty: 30 TABLET | Refills: 2 | Status: SHIPPED | OUTPATIENT
Start: 2019-08-09 | End: 2019-08-21

## 2019-08-09 RX ORDER — ASPIRIN 81 MG/1
81 TABLET, CHEWABLE ORAL DAILY
Qty: 30 TABLET | Refills: 1 | Status: SHIPPED | OUTPATIENT
Start: 2019-08-10

## 2019-08-09 RX ADMIN — TRAMADOL HYDROCHLORIDE 50 MG: 50 TABLET, FILM COATED ORAL at 09:09

## 2019-08-09 RX ADMIN — LAMOTRIGINE 100 MG: 100 TABLET ORAL at 09:09

## 2019-08-09 RX ADMIN — PANTOPRAZOLE SODIUM 40 MG: 40 TABLET, DELAYED RELEASE ORAL at 09:09

## 2019-08-09 RX ADMIN — HYDROCHLOROTHIAZIDE 12.5 MG: 12.5 TABLET ORAL at 09:09

## 2019-08-09 RX ADMIN — LISINOPRIL 20 MG: 20 TABLET ORAL at 09:09

## 2019-08-09 RX ADMIN — METOPROLOL TARTRATE 25 MG: 25 TABLET, FILM COATED ORAL at 09:09

## 2019-08-09 RX ADMIN — ASPIRIN 81 MG 81 MG: 81 TABLET ORAL at 09:09

## 2019-08-09 RX ADMIN — Medication 3 ML: at 09:12

## 2019-08-09 RX ADMIN — AMLODIPINE BESYLATE 10 MG: 5 TABLET ORAL at 11:24

## 2019-08-09 NOTE — PLAN OF CARE
Problem: Patient Care Overview  Goal: Plan of Care Review  Outcome: Ongoing (interventions implemented as appropriate)   08/09/19 0329   Coping/Psychosocial   Plan of Care Reviewed With patient   OTHER   Outcome Summary Confusion has improved some. Pt is more alert and oriented. NIH of 0. No complaints or concerns at this time.    Plan of Care Review   Progress improving       Problem: Stroke (Ischemic) (Adult)  Goal: Signs and Symptoms of Listed Potential Problems Will be Absent, Minimized or Managed (Stroke)  Outcome: Ongoing (interventions implemented as appropriate)      Problem: Fall Risk (Adult)  Goal: Absence of Fall  Outcome: Ongoing (interventions implemented as appropriate)      Problem: Skin Injury Risk (Adult)  Goal: Identify Related Risk Factors and Signs and Symptoms  Outcome: Ongoing (interventions implemented as appropriate)      Problem: Confusion, Acute (Adult)  Goal: Cognitive/Functional Impairments Minimized  Outcome: Ongoing (interventions implemented as appropriate)

## 2019-08-09 NOTE — CONSULTS
GI CONSULT  NOTE:    Referring Provider:  Dr Harvey     Chief complaint: Fluid in esophagus on CT     Subjective  I am ready to go to rehab    History of present illness: Patient is a 63-year-old female with a history of GERD, lap banding, hypertension, anxiety, ADHD, degenerative disc disease, and cholecystectomy who was admitted into the hospital as a direct admit for confusion.  Patient was evaluated for stroke.  GI was consulted for fluid in the esophagus on CT scan.  Upon discussing with patient patient does to admit to a history of reflux but is stable as long as she takes her PPI.  Patient also has lap banding which is increases her risk for reflux.      Endo History:  Patient reports colonoscopy in 2016 with Dr. White denies any polyps.  None in GSI     Past Medical History:  Past Medical History:   Diagnosis Date   • ADHD unknown   • Anemia    • Hyperlipidemia unknown   • Hypertension    • Insomnia unknown   • Mood disorder (CMS/HCC)    • Obesity unknown       Past Surgical History:  Past Surgical History:   Procedure Laterality Date   • CHOLECYSTECTOMY     • FINGER SURGERY     • KNEE ARTHROPLASTY     • KNEE SURGERY Right     replaced   • ROTATOR CUFF REPAIR Right 2019   • SHOULDER SURGERY Right 05/24/2019    scope/ cuff repair   • TUBAL ABDOMINAL LIGATION         Social History:  Social History     Tobacco Use   • Smoking status: Never Smoker   • Smokeless tobacco: Never Used   Substance Use Topics   • Alcohol use: No     Frequency: Never   • Drug use: No       Family History:  Family History   Problem Relation Age of Onset   • Diabetes Other        Medications:  Medications Prior to Admission   Medication Sig Dispense Refill Last Dose   • ALPRAZolam (XANAX) 2 MG tablet TAKE ONE TABLET BY MOUTH EVERY NIGHT AT BEDTIME 30 tablet 2 Unknown at Unknown time   • atorvastatin (LIPITOR) 10 MG tablet    Unknown at Unknown time   • lamoTRIgine (LaMICtal) 100 MG tablet Take 100 mg by mouth 2 (Two) Times a Day.    Unknown at Unknown time   • lisinopril-hydrochlorothiazide (PRINZIDE,ZESTORETIC) 20-25 MG per tablet Take 1 tablet by mouth Daily.   Unknown at Unknown time   • metoprolol tartrate (LOPRESSOR) 25 MG tablet Take 25 mg by mouth Daily.   Unknown at Unknown time   • omeprazole (priLOSEC) 40 MG capsule    Unknown at Unknown time   • promethazine (PHENERGAN) 25 MG tablet Take 25 mg by mouth Every 6 (Six) Hours As Needed for Nausea or Vomiting.   Unknown at Unknown time   • QUEtiapine (SEROquel) 100 MG tablet TAKE ONE AND ONE-HALF (1 & 1/2) TABLET BY MOUTH EVERY NIGHT AT BEDTIME 45 tablet 2 Unknown at Unknown time   • traMADol (ULTRAM) 50 MG tablet Take 1 tablet by mouth Every 6 (Six) Hours As Needed for Moderate Pain . 28 tablet 0 Unknown at Unknown time       Scheduled Meds:  aspirin 81 mg Oral Daily   Or      aspirin 300 mg Rectal Daily   atorvastatin 80 mg Oral Nightly   hydrochlorothiazide 12.5 mg Oral Daily   lamoTRIgine 100 mg Oral BID   lisinopril 20 mg Oral Q24H   metoprolol tartrate 25 mg Oral Daily   pantoprazole 40 mg Oral QAM   QUEtiapine 150 mg Oral Nightly   sodium chloride 3 mL Intravenous Q12H     Continuous Infusions:   PRN Meds:.•  acetaminophen  •  acetaminophen  •  acetaminophen  •  ALPRAZolam  •  bisacodyl  •  bisacodyl  •  magnesium hydroxide  •  magnesium sulfate **OR** magnesium sulfate in D5W 1g/100mL (PREMIX)  •  ondansetron **OR** ondansetron  •  potassium chloride  •  potassium chloride  •  promethazine  •  sodium chloride  •  traMADol    ALLERGIES:  Patient has no known allergies.    ROS:  Review of Systems   Constitutional: Negative.    HENT: Negative.    Eyes: Negative.    Respiratory: Negative.    Cardiovascular: Negative.    Gastrointestinal: Negative.    Endocrine: Negative.    Genitourinary: Negative.    Musculoskeletal: Negative.    Skin: Negative.    Allergic/Immunologic: Negative.    Hematological: Negative.    Psychiatric/Behavioral: Positive for confusion.     Objective  Resting in  bed.     Vital Signs:   Vitals:    08/08/19 1838 08/08/19 2215 08/09/19 0310 08/09/19 0620   BP: 125/71      BP Location: Right arm      Patient Position: Sitting      Pulse: 66      Resp: 14 18 17 16   Temp: 98.3 °F (36.8 °C) 97.8 °F (36.6 °C) 98.8 °F (37.1 °C) 98.9 °F (37.2 °C)   TempSrc: Oral Oral Oral Oral   SpO2: 98%  95% 100%   Weight:    83 kg (182 lb 15.7 oz)   Height:           Physical Exam:   General Appearance:    Awake and alert, in no acute distress   Head:    Normocephalic, without obvious abnormality, atraumatic   Eyes:            Conjunctivae normal, anicteric sclera, pupils equal   Ears:    Ears appear intact with no abnormalities noted   Throat:   No oral lesions, no thrush, oral mucosa moist   Neck:   Supple, no JVD   Lungs:     Clear to auscultation bilaterally, respirations regular, even and unlabored    Heart:    Regular rhythm and normal rate, normal S1 and S2, no            Murmur appreciated   Chest Wall:    No abnormalities observed   Abdomen:     Normal bowel sounds, soft, non-tender, no rebound or guarding, non-distended, no hepatosplenomegaly   Rectal:     Deferred   Extremities:   Moves all extremities, no edema, no cyanosis   Pulses:   Pulses palpable and equal bilaterally   Skin:   No rash, no jaundice, normal palpaion   Lymph nodes:   No cervical, supraclavicular or submandibular palpable adenopathy   Neurologic:   Cranial nerves 2 - 12 grossly intact, no asterixis       Results Review:   I reviewed the patient's labs and imaging.  Lab Results (last 24 hours)     Procedure Component Value Units Date/Time    Comprehensive Metabolic Panel [[id number]]  (Abnormal) Collected:  08/09/19 0252    Specimen:  Blood Updated:  08/09/19 0416     Glucose 108 mg/dL      BUN 19 mg/dL      Creatinine 0.90 mg/dL      Sodium 136 mmol/L      Potassium 3.8 mmol/L      Chloride 100 mmol/L      CO2 23.0 mmol/L      Calcium 8.7 mg/dL      Total Protein 6.6 g/dL      Albumin 3.60 g/dL      ALT (SGPT) 15 U/L       AST (SGOT) 18 U/L      Alkaline Phosphatase 76 U/L      Total Bilirubin 0.6 mg/dL      eGFR Non African Amer 64 mL/min/1.73      Globulin 3.0 gm/dL      A/G Ratio 1.2 g/dL      BUN/Creatinine Ratio 21.1     Anion Gap 16.8 mmol/L     CBC & Differential [[id number]] Collected:  08/09/19 0252    Specimen:  Blood Updated:  08/09/19 0347    Narrative:       The following orders were created for panel order CBC & Differential.  Procedure                               Abnormality         Status                     ---------                               -----------         ------                     CBC Auto Differential[[id number]]        Abnormal            Final result                 Please view results for these tests on the individual orders.    CBC Auto Differential [[id number]]  (Abnormal) Collected:  08/09/19 0252    Specimen:  Blood Updated:  08/09/19 0347     WBC 9.40 10*3/mm3      RBC 4.56 10*6/mm3      Hemoglobin 13.2 g/dL      Hematocrit 41.0 %      MCV 90.0 fL      MCH 28.9 pg      MCHC 32.1 g/dL      RDW 13.3 %      RDW-SD 42.9 fl      MPV 9.4 fL      Platelets 244 10*3/mm3      Neutrophil % 45.0 %      Lymphocyte % 44.2 %      Monocyte % 6.2 %      Eosinophil % 3.4 %      Basophil % 1.2 %      Neutrophils, Absolute 4.20 10*3/mm3      Lymphocytes, Absolute 4.20 10*3/mm3      Monocytes, Absolute 0.60 10*3/mm3      Eosinophils, Absolute 0.30 10*3/mm3      Basophils, Absolute 0.10 10*3/mm3      nRBC 0.4 /100 WBC     Urine Culture - Urine, Urine, Clean Catch [[id number]]  (Normal) Collected:  08/07/19 1215    Specimen:  Urine, Clean Catch Updated:  08/08/19 2003     Urine Culture No growth          Imaging Results (last 24 hours)     Procedure Component Value Units Date/Time    CT Angiogram Head With Contrast [[id number]] Collected:  08/08/19 1514     Updated:  08/08/19 1541    Narrative:       DATE OF EXAM:  8/8/2019 11:10 AM     PROCEDURE:  CT ANGIOGRAM HEAD W CONTRAST-, CT ANGIOGRAM NECK W WO CONTRAST-      INDICATIONS:   63-year-old female who presents with confusion, visual disturbances,  dizziness, and bilateral temporal/occipital headache.      COMPARISON:   No Comparisons Available     TECHNIQUE:  CTA of the head and CTA of the neck were performed after the intravenous  administration of 100 mL Isovue 370. Reconstructed coronal and sagittal  images were also obtained. In addition, a 3 D volume rendered image was  obtained after post processing. Automated exposure control and iterative  reconstruction methods were used.      FINDINGS:        NON-VASCULAR FINDINGS: Visualized portions of both lungs are grossly  unremarkable. There is moderate amount of fluid within the mid  esophagus. There is a tiny amount of fluid or mucoperiosteal thickening  within the posterior aspect of the left maxillary sinus.     VASCULAR FINDINGS: The proximal great vessels are patent as are both  subclavian arteries. Both vertebral arteries are patent with left side  dominant caliber. The basilar artery is patent. Both common carotid  arteries grossly patent. There is mild to moderate calcific plaquing in  the region of each carotid bulb with a moderate grade stenosis estimated  on the right side in the 50-60% diameter range with application of  NASCET criteria. The stenosis on the left appears to be less than 50%  diameter narrowing. The remainder of the internal carotid arteries  bilaterally are patent and there is some calcific plaque throughout the  region of each carotid siphon.     The visualized cerebral vasculature is very grossly unremarkable without  evidence of abrupt large vessel occlusion, aneurysm, or other overt  finding. There is fetal origin of the left posterior cerebral artery.          Impression:          1. Grossly unremarkable cerebral vasculature which fails to demonstrate  evidence of abrupt large vessel occlusion, aneurysm, or other obvious  abnormality.  2. Mild to moderate atheromatous calcific plaque in the  region of each  carotid bulb, right a bit greater than left. The estimated degree of  stenosis on the right is in the 50-60% diameter range utilizing NASCET  criteria.  On the left, the stenosis is estimated at less than 50% diameter.  3. Patent bilateral vertebral arteries with the left side dominant.  4. There is patency of the basilar artery and fetal origin of the left  posterior cerebral artery.  5. Incidentally noted moderate amount of fluid filling the lumen of the  esophagus in the upper thoracic region. Recommend clinical correlation.     These findings were communicated by telephone to the patient's nurse,  Dai, at approximately 1530 hours on August 8.     Electronically Signed By-Violet Swift On:8/8/2019 3:39 PM  This report was finalized on [id number] by  Violet Swift, .    CT Angiogram Neck With & Without Contrast [[id number]] Collected:  08/08/19 1514     Updated:  08/08/19 1541    Narrative:       DATE OF EXAM:  8/8/2019 11:10 AM     PROCEDURE:  CT ANGIOGRAM HEAD W CONTRAST-, CT ANGIOGRAM NECK W WO CONTRAST-     INDICATIONS:   63-year-old female who presents with confusion, visual disturbances,  dizziness, and bilateral temporal/occipital headache.      COMPARISON:   No Comparisons Available     TECHNIQUE:  CTA of the head and CTA of the neck were performed after the intravenous  administration of 100 mL Isovue 370. Reconstructed coronal and sagittal  images were also obtained. In addition, a 3 D volume rendered image was  obtained after post processing. Automated exposure control and iterative  reconstruction methods were used.      FINDINGS:        NON-VASCULAR FINDINGS: Visualized portions of both lungs are grossly  unremarkable. There is moderate amount of fluid within the mid  esophagus. There is a tiny amount of fluid or mucoperiosteal thickening  within the posterior aspect of the left maxillary sinus.     VASCULAR FINDINGS: The proximal great vessels are patent as are both  subclavian  arteries. Both vertebral arteries are patent with left side  dominant caliber. The basilar artery is patent. Both common carotid  arteries grossly patent. There is mild to moderate calcific plaquing in  the region of each carotid bulb with a moderate grade stenosis estimated  on the right side in the 50-60% diameter range with application of  NASCET criteria. The stenosis on the left appears to be less than 50%  diameter narrowing. The remainder of the internal carotid arteries  bilaterally are patent and there is some calcific plaque throughout the  region of each carotid siphon.     The visualized cerebral vasculature is very grossly unremarkable without  evidence of abrupt large vessel occlusion, aneurysm, or other overt  finding. There is fetal origin of the left posterior cerebral artery.          Impression:          1. Grossly unremarkable cerebral vasculature which fails to demonstrate  evidence of abrupt large vessel occlusion, aneurysm, or other obvious  abnormality.  2. Mild to moderate atheromatous calcific plaque in the region of each  carotid bulb, right a bit greater than left. The estimated degree of  stenosis on the right is in the 50-60% diameter range utilizing NASCET  criteria.  On the left, the stenosis is estimated at less than 50% diameter.  3. Patent bilateral vertebral arteries with the left side dominant.  4. There is patency of the basilar artery and fetal origin of the left  posterior cerebral artery.  5. Incidentally noted moderate amount of fluid filling the lumen of the  esophagus in the upper thoracic region. Recommend clinical correlation.     These findings were communicated by telephone to the patient's nurse,  Dai, at approximately 1530 hours on August 8.     Electronically Signed By-Violet Swift On:8/8/2019 3:39 PM  This report was finalized on [id number] by  Violet Swift, .             ASSESSMENT AND PLAN:  Abnormal CT showing moderate amount of fluid filling lumen of  esophagus  GERD stable on home PPI  Lap banding     Principal Problem:    Acute confusion  Active Problems:    Anemia in other chronic diseases classified elsewhere    Anxiety    Mood disorder (CMS/HCC)    Hyperlipidemia    Hypertension    Visual field loss left     PLAN:   Normal finding on CT with patient lying supine & history of reflux especially with lap banding.   No need for further investigation or treatment besides daily PPI.   OK for discharge to rehab from GI standpoint.       I discussed the patients findings and my recommendations with the patient.  SARAHY Flores  08/09/19  7:37 AM    Time:

## 2019-08-09 NOTE — DISCHARGE SUMMARY
Date of Admission: 8/6/2019    Date of Discharge:  8/9/2019    Length of stay:  LOS: 3 days     Discharge Diagnosis: CVA    Presenting Problem/History of Present Illness  Active Hospital Problems    Diagnosis  POA   • **Acute confusion [R41.0]  Yes   • Visual field loss left [H53.40]  Yes   • Mood disorder (CMS/HCC) [F39]  Yes   • Hyperlipidemia [E78.5]  Yes   • Anemia in other chronic diseases classified elsewhere [D63.8]  Yes   • Anxiety [F41.9]  Yes   • Hypertension [I10]  Yes      Resolved Hospital Problems   No resolved problems to display.          Hospital Course  Patient is a 63 y.o. female presented with confusion and left visual field loss. MRI showed parietal infarct. Pt seen by neurology and cleared for discharge.      Past Medical History:     Past Medical History:   Diagnosis Date   • ADHD unknown   • Anemia    • Hyperlipidemia unknown   • Hypertension    • Insomnia unknown   • Mood disorder (CMS/HCC)    • Obesity unknown       Past Surgical History:     Past Surgical History:   Procedure Laterality Date   • CHOLECYSTECTOMY     • FINGER SURGERY     • KNEE ARTHROPLASTY     • KNEE SURGERY Right     replaced   • ROTATOR CUFF REPAIR Right 2019   • SHOULDER SURGERY Right 05/24/2019    scope/ cuff repair   • TUBAL ABDOMINAL LIGATION         Social History:   Social History     Socioeconomic History   • Marital status:      Spouse name: Not on file   • Number of children: Not on file   • Years of education: Not on file   • Highest education level: Not on file   Tobacco Use   • Smoking status: Never Smoker   • Smokeless tobacco: Never Used   Substance and Sexual Activity   • Alcohol use: No     Frequency: Never   • Drug use: No   • Sexual activity: Defer       Procedures Performed         Consults:   Consults     Date and Time Order Name Status Description    8/8/2019 1542 Inpatient Gastroenterology Consult      8/6/2019 1153 Inpatient Neurology Consult Stroke            Pertinent Test Results:        Lab Results (most recent)     Procedure Component Value Units Date/Time    Comprehensive Metabolic Panel [[id number]]  (Abnormal) Collected:  08/09/19 0252    Specimen:  Blood Updated:  08/09/19 0416     Glucose 108 mg/dL      BUN 19 mg/dL      Creatinine 0.90 mg/dL      Sodium 136 mmol/L      Potassium 3.8 mmol/L      Chloride 100 mmol/L      CO2 23.0 mmol/L      Calcium 8.7 mg/dL      Total Protein 6.6 g/dL      Albumin 3.60 g/dL      ALT (SGPT) 15 U/L      AST (SGOT) 18 U/L      Alkaline Phosphatase 76 U/L      Total Bilirubin 0.6 mg/dL      eGFR Non African Amer 64 mL/min/1.73      Globulin 3.0 gm/dL      A/G Ratio 1.2 g/dL      BUN/Creatinine Ratio 21.1     Anion Gap 16.8 mmol/L     CBC & Differential [[id number]] Collected:  08/09/19 0252    Specimen:  Blood Updated:  08/09/19 0347    Narrative:       The following orders were created for panel order CBC & Differential.  Procedure                               Abnormality         Status                     ---------                               -----------         ------                     CBC Auto Differential[[id number]]        Abnormal            Final result                 Please view results for these tests on the individual orders.    CBC Auto Differential [[id number]]  (Abnormal) Collected:  08/09/19 0252    Specimen:  Blood Updated:  08/09/19 0347     WBC 9.40 10*3/mm3      RBC 4.56 10*6/mm3      Hemoglobin 13.2 g/dL      Hematocrit 41.0 %      MCV 90.0 fL      MCH 28.9 pg      MCHC 32.1 g/dL      RDW 13.3 %      RDW-SD 42.9 fl      MPV 9.4 fL      Platelets 244 10*3/mm3      Neutrophil % 45.0 %      Lymphocyte % 44.2 %      Monocyte % 6.2 %      Eosinophil % 3.4 %      Basophil % 1.2 %      Neutrophils, Absolute 4.20 10*3/mm3      Lymphocytes, Absolute 4.20 10*3/mm3      Monocytes, Absolute 0.60 10*3/mm3      Eosinophils, Absolute 0.30 10*3/mm3      Basophils, Absolute 0.10 10*3/mm3      nRBC 0.4 /100 WBC     Urine Culture - Urine, Urine, Clean  Catch [[id number]]  (Normal) Collected:  08/07/19 1215    Specimen:  Urine, Clean Catch Updated:  08/08/19 2003     Urine Culture No growth    POC Glucose Once [[id number]]  (Normal) Collected:  08/08/19 0614    Specimen:  Blood Updated:  08/08/19 0616     Glucose 93 mg/dL      Comment: Serial Number: [id number]:  [id number]       Comprehensive Metabolic Panel [[id number]]  (Abnormal) Collected:  08/08/19 0247    Specimen:  Blood Updated:  08/08/19 0356     Glucose 98 mg/dL      BUN 19 mg/dL      Creatinine 0.90 mg/dL      Sodium 139 mmol/L      Potassium 3.6 mmol/L      Chloride 102 mmol/L      CO2 23.0 mmol/L      Calcium 9.1 mg/dL      Total Protein 6.9 g/dL      Albumin 3.70 g/dL      ALT (SGPT) 14 U/L      AST (SGOT) 18 U/L      Alkaline Phosphatase 73 U/L      Total Bilirubin 0.8 mg/dL      eGFR Non African Amer 64 mL/min/1.73      Globulin 3.2 gm/dL      A/G Ratio 1.2 g/dL      BUN/Creatinine Ratio 21.1     Anion Gap 17.6 mmol/L     CBC & Differential [[id number]] Collected:  08/08/19 0247    Specimen:  Blood Updated:  08/08/19 0324    Narrative:       The following orders were created for panel order CBC & Differential.  Procedure                               Abnormality         Status                     ---------                               -----------         ------                     CBC Auto Differential[[id number]]        Abnormal            Final result                 Please view results for these tests on the individual orders.    CBC Auto Differential [[id number]]  (Abnormal) Collected:  08/08/19 0247    Specimen:  Blood Updated:  08/08/19 0324     WBC 8.40 10*3/mm3      RBC 4.65 10*6/mm3      Hemoglobin 13.5 g/dL      Hematocrit 40.7 %      MCV 87.6 fL      MCH 29.0 pg      MCHC 33.1 g/dL      RDW 12.8 %      RDW-SD 39.8 fl      MPV 9.4 fL      Platelets 216 10*3/mm3      Neutrophil % 48.0 %      Lymphocyte % 41.5 %      Monocyte % 7.4 %      Eosinophil % 2.1 %      Basophil % 1.0 %       Neutrophils, Absolute 4.00 10*3/mm3      Lymphocytes, Absolute 3.50 10*3/mm3      Monocytes, Absolute 0.60 10*3/mm3      Eosinophils, Absolute 0.20 10*3/mm3      Basophils, Absolute 0.10 10*3/mm3      nRBC 0.3 /100 WBC     Urinalysis With Culture If Indicated - Urine, Clean Catch [[id number]]  (Abnormal) Collected:  08/07/19 1215    Specimen:  Urine, Clean Catch Updated:  08/07/19 1423     Color, UA Dark Yellow     Appearance, UA Hazy     Comment: Result checked         pH, UA 5.5     Specific Gravity, UA 1.025     Glucose, UA Negative     Ketones, UA Negative     Bilirubin, UA Small (1+)     Comment: Confirmation testing is unavailable.  A serum bilirubin is recommended for further assessment.        Blood, UA Negative     Protein, UA Trace     Leuk Esterase, UA Small (1+)     Nitrite, UA Negative     Urobilinogen, UA 0.2 E.U./dL    Urinalysis, Microscopic Only - Urine, Clean Catch [[id number]]  (Abnormal) Collected:  08/07/19 1215    Specimen:  Urine, Clean Catch Updated:  08/07/19 1423     RBC, UA 3-5 /HPF      WBC, UA 6-12 /HPF      Bacteria, UA Trace /HPF      Squamous Epithelial Cells, UA 3-6 /HPF      Yeast, UA Small/1+ Yeast /HPF      Hyaline Casts, UA 3-6 /LPF      Mucus, UA Trace /HPF      Methodology Manual Light Microscopy    POC Glucose Once [[id number]]  (Abnormal) Collected:  08/07/19 0809    Specimen:  Blood Updated:  08/07/19 0810     Glucose 114 mg/dL      Comment: Serial Number: [id number]:  [id number]       Magnesium [[id number]]  (Normal) Collected:  08/07/19 0441    Specimen:  Blood Updated:  08/07/19 0534     Magnesium 2.3 mg/dL     Troponin [[id number]]  (Normal) Collected:  08/06/19 2334    Specimen:  Blood Updated:  08/07/19 0034     Troponin I <0.030 ng/mL     Narrative:       Troponin I Reference Range:    0.00-0.03  Negative.  Repeat testing in 4-6 hours if clinically indicated.    0.04-0.29  Suspicious for myocardial injury. Serial measurements and clinical  correlation may be  necessary to confirm or exclude diagnosis of acute  coronary syndrome.  Repeat testing in 4-6 hours if indicated.     >0.29 Consistent with myocardial injury.  Recommend clinical and laboratory correlation.     Results my be falsely decreased if patient taking Biotin.     Vitamin B12 [[id number]]  (Normal) Collected:  08/06/19 1702    Specimen:  Blood Updated:  08/06/19 1813     Vitamin B-12 305 pg/mL      Comment: Results may be falsely increased if patient taking Biotin.       Sedimentation Rate [[id number]]  (Normal) Collected:  08/06/19 1702    Specimen:  Blood Updated:  08/06/19 1755     Sed Rate 20 mm/hr     C-reactive Protein [[id number]]  (Normal) Collected:  08/06/19 1702    Specimen:  Blood Updated:  08/06/19 1751     C-Reactive Protein 0.23 mg/dL     Troponin [[id number]]  (Normal) Collected:  08/06/19 1702    Specimen:  Blood Updated:  08/06/19 1750     Troponin I <0.030 ng/mL     Narrative:       Troponin I Reference Range:    0.00-0.03  Negative.  Repeat testing in 4-6 hours if clinically indicated.    0.04-0.29  Suspicious for myocardial injury. Serial measurements and clinical  correlation may be necessary to confirm or exclude diagnosis of acute  coronary syndrome.  Repeat testing in 4-6 hours if indicated.     >0.29 Consistent with myocardial injury.  Recommend clinical and laboratory correlation.     Results my be falsely decreased if patient taking Biotin.     aPTT [[id number]]  (Abnormal) Collected:  08/06/19 1702    Specimen:  Blood Updated:  08/06/19 1737     PTT 22.8 seconds     Hemoglobin A1c [[id number]]  (Normal) Collected:  08/06/19 1204    Specimen:  Blood Updated:  08/06/19 1524     Hemoglobin A1C 5.4 %     Narrative:       Hemoglobin A1C Reference Range:    <5.7 %        Normal  5.7-6.4 %     Increased risk for diabetes  > 6.4 %        Diabetes       These guidelines have been recommended by the American Diabetic Association for Hgb A1c.      The following 2010 guidelines have been  recommended by the American Diabetes Association for Hemoglobin A1c.    HBA1c 5.7-6.4% Increased risk for future diabetes (pre-diabetes)  HBA1c     >6.4% Diabetes    TSH [[id number]]  (Normal) Collected:  08/06/19 1203    Specimen:  Blood Updated:  08/06/19 1309     TSH 0.830 mIU/mL      Comment: Results may be falsely decreased if patient taking Biotin.       Lipid Panel [[id number]]  (Abnormal) Collected:  08/06/19 1203    Specimen:  Blood Updated:  08/06/19 1304     Total Cholesterol 196 mg/dL      Triglycerides 168 mg/dL      HDL Cholesterol 47 mg/dL      LDL Cholesterol  126 mg/dL      VLDL Cholesterol 33.6 mg/dL      LDL/HDL Ratio 2.46     Chol/HDL Ratio 4.17    Narrative:       The following guidelines have been recommended by the NCEP for Total Cholesterol, Total Triglycerides, LDL Cholesterol, and HDL Cholesterols    Total Cholesterol  Desirable:        <200 mg/dL  Borderline High:  200-239 mg/dL  High:             > or = 240 mg/dL    Total Triglyceride  Normal:           <150 mg/dL  Borderline High:  150-199 mg/dL  High:             200-499 mg/dL  Very High:        > or = 500 mg/dL    HDL Cholesterol  Low HDL:          <40 mg/dL  Normal:           40-60 mg/dL  Desirable:        >60 mg/dL    LDL Cholesterol  Optimal:          <100 mg/dL  Low Risk:         100-129 mg/dL  Borderline High:  130-159 mg/dL  High:             160-189 mg/dL  Very High:        > or = 190 mg/dL    The following ratios of LDL to HDL and Total cholesterol to HDL are for information only:    LDL/HDL Ratio  Desirable:        <5  Optimal:          < or = 3.5    Total Cholesterol/HDL Ratio  Low Risk:         3.3-4.4  Average Risk:     4.4-7.1  Medium Risk:      7.1-11  High Risk:        >11       Protime-INR [[id number]]  (Normal) Collected:  08/06/19 1204    Specimen:  Blood Updated:  08/06/19 1239     Protime 10.7 Seconds      INR 1.05    CBC (No Diff) [[id number]]  (Normal) Collected:  08/06/19 1204    Specimen:  Blood Updated:  08/06/19  1229     WBC 10.20 10*3/mm3      RBC 4.98 10*6/mm3      Hemoglobin 14.5 g/dL      Hematocrit 43.2 %      MCV 86.7 fL      MCH 29.2 pg      MCHC 33.7 g/dL      RDW 12.8 %      RDW-SD 39.4 fl      MPV 9.2 fL      Platelets 233 10*3/mm3              Results for orders placed during the hospital encounter of 08/06/19   Adult Transthoracic Echo Complete W/ Cont if Necessary Per Protocol (With Agitated Saline)    Narrative · Left ventricular wall thickness is consistent with mild septal   asymmetric hypertrophy.  · Left ventricular systolic function is normal.  · Left ventricular diastolic dysfunction (grade I) consistent with   impaired relaxation.  · Estimated EF = 60%.          Imaging Results (most recent)     Procedure Component Value Units Date/Time    CT Angiogram Head With Contrast [[id number]] Collected:  08/08/19 1514     Updated:  08/08/19 1541    Narrative:       DATE OF EXAM:  8/8/2019 11:10 AM     PROCEDURE:  CT ANGIOGRAM HEAD W CONTRAST-, CT ANGIOGRAM NECK W WO CONTRAST-     INDICATIONS:   63-year-old female who presents with confusion, visual disturbances,  dizziness, and bilateral temporal/occipital headache.      COMPARISON:   No Comparisons Available     TECHNIQUE:  CTA of the head and CTA of the neck were performed after the intravenous  administration of 100 mL Isovue 370. Reconstructed coronal and sagittal  images were also obtained. In addition, a 3 D volume rendered image was  obtained after post processing. Automated exposure control and iterative  reconstruction methods were used.      FINDINGS:        NON-VASCULAR FINDINGS: Visualized portions of both lungs are grossly  unremarkable. There is moderate amount of fluid within the mid  esophagus. There is a tiny amount of fluid or mucoperiosteal thickening  within the posterior aspect of the left maxillary sinus.     VASCULAR FINDINGS: The proximal great vessels are patent as are both  subclavian arteries. Both vertebral arteries are patent with  left side  dominant caliber. The basilar artery is patent. Both common carotid  arteries grossly patent. There is mild to moderate calcific plaquing in  the region of each carotid bulb with a moderate grade stenosis estimated  on the right side in the 50-60% diameter range with application of  NASCET criteria. The stenosis on the left appears to be less than 50%  diameter narrowing. The remainder of the internal carotid arteries  bilaterally are patent and there is some calcific plaque throughout the  region of each carotid siphon.     The visualized cerebral vasculature is very grossly unremarkable without  evidence of abrupt large vessel occlusion, aneurysm, or other overt  finding. There is fetal origin of the left posterior cerebral artery.          Impression:          1. Grossly unremarkable cerebral vasculature which fails to demonstrate  evidence of abrupt large vessel occlusion, aneurysm, or other obvious  abnormality.  2. Mild to moderate atheromatous calcific plaque in the region of each  carotid bulb, right a bit greater than left. The estimated degree of  stenosis on the right is in the 50-60% diameter range utilizing NASCET  criteria.  On the left, the stenosis is estimated at less than 50% diameter.  3. Patent bilateral vertebral arteries with the left side dominant.  4. There is patency of the basilar artery and fetal origin of the left  posterior cerebral artery.  5. Incidentally noted moderate amount of fluid filling the lumen of the  esophagus in the upper thoracic region. Recommend clinical correlation.     These findings were communicated by telephone to the patient's nurse,  Dai, at approximately 1530 hours on August 8.     Electronically Signed By-Violet Swift On:8/8/2019 3:39 PM  This report was finalized on [id number] by  Violet Swift, .    CT Angiogram Neck With & Without Contrast [[id number]] Collected:  08/08/19 1514     Updated:  08/08/19 1541    Narrative:       DATE OF  EXAM:  8/8/2019 11:10 AM     PROCEDURE:  CT ANGIOGRAM HEAD W CONTRAST-, CT ANGIOGRAM NECK W WO CONTRAST-     INDICATIONS:   63-year-old female who presents with confusion, visual disturbances,  dizziness, and bilateral temporal/occipital headache.      COMPARISON:   No Comparisons Available     TECHNIQUE:  CTA of the head and CTA of the neck were performed after the intravenous  administration of 100 mL Isovue 370. Reconstructed coronal and sagittal  images were also obtained. In addition, a 3 D volume rendered image was  obtained after post processing. Automated exposure control and iterative  reconstruction methods were used.      FINDINGS:        NON-VASCULAR FINDINGS: Visualized portions of both lungs are grossly  unremarkable. There is moderate amount of fluid within the mid  esophagus. There is a tiny amount of fluid or mucoperiosteal thickening  within the posterior aspect of the left maxillary sinus.     VASCULAR FINDINGS: The proximal great vessels are patent as are both  subclavian arteries. Both vertebral arteries are patent with left side  dominant caliber. The basilar artery is patent. Both common carotid  arteries grossly patent. There is mild to moderate calcific plaquing in  the region of each carotid bulb with a moderate grade stenosis estimated  on the right side in the 50-60% diameter range with application of  NASCET criteria. The stenosis on the left appears to be less than 50%  diameter narrowing. The remainder of the internal carotid arteries  bilaterally are patent and there is some calcific plaque throughout the  region of each carotid siphon.     The visualized cerebral vasculature is very grossly unremarkable without  evidence of abrupt large vessel occlusion, aneurysm, or other overt  finding. There is fetal origin of the left posterior cerebral artery.          Impression:          1. Grossly unremarkable cerebral vasculature which fails to demonstrate  evidence of abrupt large vessel  occlusion, aneurysm, or other obvious  abnormality.  2. Mild to moderate atheromatous calcific plaque in the region of each  carotid bulb, right a bit greater than left. The estimated degree of  stenosis on the right is in the 50-60% diameter range utilizing NASCET  criteria.  On the left, the stenosis is estimated at less than 50% diameter.  3. Patent bilateral vertebral arteries with the left side dominant.  4. There is patency of the basilar artery and fetal origin of the left  posterior cerebral artery.  5. Incidentally noted moderate amount of fluid filling the lumen of the  esophagus in the upper thoracic region. Recommend clinical correlation.     These findings were communicated by telephone to the patient's nurse,  Dai, at approximately 1530 hours on August 8.     Electronically Signed By-Violet Swift On:8/8/2019 3:39 PM  This report was finalized on [id number] by  Violet Swift, .    XR Spine Lumbar Complete With Flex & Ext [[id number]] Collected:  08/06/19 1914     Updated:  08/06/19 1919    Narrative:       DATE OF EXAM:  8/6/2019 4:15 PM     PROCEDURE:  XR SPINE LUMBAR COMPLETE W FLEX EXT-     INDICATIONS:  lumbar back pain, fall     COMPARISON:  No Comparisons Available     TECHNIQUE:   A minimum of six radiologic views including bending views of the  lumbosacral spine were obtained.     FINDINGS:  No acute fracture or dislocation of the lumbar spine is identified.  There are mild osteophytes at L2-L4. Mild disc space height loss at L2-3  and L3-4. Mild facet arthropathy in the lower lumbar spine. There is 5  mm of subluxation of L4 on L5 with flexion, that reduces with extension  and in neutral position. SI joints are normal. Severe atherosclerotic  aortic calcifications are noted. post lap band.       Impression:          1. No acute fracture or dislocation.  2. Mild multilevel degenerative disc disease.  3. 5 mm of anterolisthesis of L4 on L5 with flexion.     Electronically Signed By-Baljeet  Sd On:8/6/2019 7:17 PM  This report was finalized on [id number] by  Baljeet Beaver, .    XR Spine Thoracic 2 View [[id number]] Collected:  08/06/19 1646     Updated:  08/06/19 1650    Narrative:       DATE OF EXAM:  8/6/2019 4:18 PM     PROCEDURE:  XR SPINE THORACIC 2 VW-     INDICATIONS:  back pain     COMPARISON:  No Comparisons Available     TECHNIQUE:   Two radiologic views of the thoracic spine were obtained.     FINDINGS:  No acute fracture or subluxation of the thoracic spine is identified.  Status post lap banding and cholecystectomy. There are prominent  vascular calcifications are identified. Visualized lungs are clear with  a few scattered granuloma.        Impression:       No acute fracture or subluxation of the thoracic spine.     Electronically Signed By-Baljeet Beaver On:8/6/2019 4:48 PM  This report was finalized on [id number] by  Baljeet Beaver, .    MRI Brain Without Contrast [[id number]] Collected:  08/06/19 1623     Updated:  08/06/19 1636    Narrative:          DATE OF EXAM:   8/6/2019 2:46 PM     PROCEDURE:   MRI BRAIN WO CONTRAST-     INDICATIONS:   Confusion/delirium, altered LOC, unexplained     COMPARISON:  No Comparisons Available     TECHNIQUE:   CT head 08/04/2019, MR brain 07/18/2017     FINDINGS:   There is a 5 mm area of diffusion restriction in the right parietal lobe  with hypointensity on the ADC map. There is a 4 mm area of diffusion  restriction in the right posterior temporal lobe without obvious  hypointensity on the ADC map. Diffusion imaging is otherwise  unremarkable.     There is moderately severe T2 prolongation in the periventricular white  matter. There are several focal areas of T1 and T2 prolongation in the  basal ganglia and caudate nuclei. Major vessels show flow void. There is  no extra-axial collection or mass effect. There is mild signal  abnormality in the richelle. There is no gross orbital abnormality. The  pituitary is within normal in size. There is mucosal  Patient thickening in the  left maxillary sinus.        Impression:          1. There is a small area of acute ischemia in the right parietal lobe.  There is another small abnormality which is in the right temporal lobe  and may be acute or subacute. This was reported to the patient's nurse  by myself with instructions to call the attending provider, at 4:29 PM.  2. There is stable moderately severe white matter abnormality.  Differential includes chronic small vessel ischemia and nonspecific  metabolic or demyelinating process. There is also evidence of stable  lacunar infarcts.     Electronically Signed By-Chelsi Worrell On:8/6/2019 4:34 PM  This report was finalized on [id number] by  Chelsi Worrell, .          Condition on Discharge:  good    Vital Signs  Temp:  [97.2 °F (36.2 °C)-98.9 °F (37.2 °C)] 98.5 °F (36.9 °C)  Heart Rate:  [59-82] 80  Resp:  [14-18] 17  BP: (103-194)/(62-89) 194/89    Physical Exam:     General Appearance:    Alert, cooperative, in no acute distress   Lungs:     Clear to auscultation,respirations regular, even and                  unlabored    Heart:    Regular rhythm and normal rate, normal S1 and S2, no            murmur, no gallop, no rub, no click   Abdomen:     Normal bowel sounds, no masses, no organomegaly, soft        non-tender, non-distended, no guarding, no rebound                tenderness   Extremities:   Moves all extremities well, no edema, no cyanosis, no             redness       Discharge Disposition  [facility]    Discharge Medications     Discharge Medications      New Medications      Instructions Start Date   amLODIPine 10 MG tablet  Commonly known as:  NORVASC   10 mg, Oral, Every 24 Hours Scheduled      aspirin 81 MG chewable tablet   81 mg, Oral, Daily   Start Date:  8/10/2019        Changes to Medications      Instructions Start Date   atorvastatin 80 MG tablet  Commonly known as:  LIPITOR  What changed:    · medication strength  · how much to take  · how to take this  · when  to take this   80 mg, Oral, Nightly         Continue These Medications      Instructions Start Date   lamoTRIgine 100 MG tablet  Commonly known as:  LaMICtal   100 mg, Oral, 2 Times Daily      lisinopril-hydrochlorothiazide 20-25 MG per tablet  Commonly known as:  PRINZIDE,ZESTORETIC   1 tablet, Oral, Daily      metoprolol tartrate 25 MG tablet  Commonly known as:  LOPRESSOR   25 mg, Oral, Daily      omeprazole 40 MG capsule  Commonly known as:  priLOSEC   No dose, route, or frequency recorded.      promethazine 25 MG tablet  Commonly known as:  PHENERGAN   25 mg, Oral, Every 6 Hours PRN      QUEtiapine 100 MG tablet  Commonly known as:  SEROquel   TAKE ONE AND ONE-HALF (1 & 1/2) TABLET BY MOUTH EVERY NIGHT AT BEDTIME         Stop These Medications    ALPRAZolam 2 MG tablet  Commonly known as:  XANAX     traMADol 50 MG tablet  Commonly known as:  ULTRAM            Discharge Diet: healthy heart      Activity at Discharge: as tolerated      Follow-up Appointments  Future Appointments   Date Time Provider Department Center   8/21/2019  8:30 AM Devaughn Reis MD [facility] None   9/12/2019  2:00 PM Shubham Samaniego MD [facility] None         Test Results Pending at Discharge       Risk for Readmission (LACE) Score: 7 (8/9/2019  6:01 AM)          Cong Harvey Jr., MD  08/09/19  10:37 AM    Time:

## 2019-08-09 NOTE — PROGRESS NOTES
"[facility] Hospitalist Team      Patient Care Team:  Devaughn Reis MD as PCP - General  Devaughn Reis MD as PCP - Family Medicine        Chief Complaint:  CVA    Subjective: Ms. Deluna is a 63 year old  female with PMH of HTN, HLD, anxiety/ADHD, DDD who was sent for direct admission 8/6/2019 by PCP Dr. Reis for acute confusion and left visual field loss. Daughter at bedside stated the patient's confusion mainly started this past Friday evening. She was disoriented and making statements that didn't make sense. She was going into closets and bedrooms to use the bathroom. She was off balance and falling into walls. She fell on Saturday and hurt the left side of her mid back. She appeared to have difficulty moving her left leg and had difficulty reaching for objects with her left hand. It appeared she had difficulty seeing with her left eye. She was treated for a UTI after her right rotator cuff repair but seemed to have recovered from that. She has not had any recent illness. Family denied any previous history of CVA. She was seen here in the ED 8/4/2019 for her symptoms. CT head was negative for hemorrhage or mass effect. Labs were unremarkable. She was discharged and followed up with Dr. Reis today 8/6/2019 who sent her to the hospital for further stroke workup. Pt complains of pain to her right thoracic and into lumbar. She had bilateral temporal headache and occipital headache. No lateralizing weakness noted. Pt does have left eye vision changes. NIH is 3. Stroke workup started.                 Objective: alert/nad        Vital Signs  Temp:  [97.2 °F (36.2 °C)-98.9 °F (37.2 °C)] 98.9 °F (37.2 °C)  Heart Rate:  [59-90] 66  Resp:  [14-18] 16  BP: (103-125)/(62-71) 125/71  Oxygen Therapy  SpO2: 100 %  Pulse Oximetry Type: Continuous  Device (Oxygen Therapy): room air  Flowsheet Rows      First Filed Value   Admission Height  167.6 cm (66\") Documented at 08/06/2019 1111   Admission Weight  81.8 " kg (180 lb 6.4 oz) Documented at 08/06/2019 1111        Intake & Output (last 3 days)       08/06 0701 - 08/07 0700 08/07 0701 - 08/08 0700 08/08 0701 - 08/09 0700 08/09 0701 - 08/10 0700    P.O.  480 360     Total Intake(mL/kg)  480 (5.8) 360 (4.3)     Net  +480 +360             Urine Unmeasured Occurrence   1 x         Lines, Drains & Airways    Active LDAs     None                Physical Exam:    General Appearance:    Alert, cooperative, in no acute distress   Head:    Normocephalic, without obvious abnormality, atraumatic   Eyes:            Lids and lashes normal, conjunctivae and sclerae normal, no   icterus, no pallor, corneas clear, PERRLA   Back:     No kyphosis present, no scoliosis present, no skin lesions,      erythema or scars, no tenderness to percussion or                   palpation,   range of motion normal   Lungs:     Clear to auscultation,respirations regular, even and                  unlabored    Heart:    Regular rhythm and normal rate, normal S1 and S2, no            murmur, no gallop, no rub, no click   Chest Wall:    No abnormalities observed   Abdomen:     Normal bowel sounds, no masses, no organomegaly, soft        non-tender, non-distended, no guarding, no rebound                tenderness   Extremities:   Moves all extremities well, no edema, no cyanosis, no             redness       Results Review:       Lab Results (last 24 hours)     Procedure Component Value Units Date/Time    Comprehensive Metabolic Panel [[id number]]  (Abnormal) Collected:  08/09/19 0252    Specimen:  Blood Updated:  08/09/19 0416     Glucose 108 mg/dL      BUN 19 mg/dL      Creatinine 0.90 mg/dL      Sodium 136 mmol/L      Potassium 3.8 mmol/L      Chloride 100 mmol/L      CO2 23.0 mmol/L      Calcium 8.7 mg/dL      Total Protein 6.6 g/dL      Albumin 3.60 g/dL      ALT (SGPT) 15 U/L      AST (SGOT) 18 U/L      Alkaline Phosphatase 76 U/L      Total Bilirubin 0.6 mg/dL      eGFR Non African Amer 64 mL/min/1.73       Globulin 3.0 gm/dL      A/G Ratio 1.2 g/dL      BUN/Creatinine Ratio 21.1     Anion Gap 16.8 mmol/L     CBC & Differential [[id number]] Collected:  08/09/19 0252    Specimen:  Blood Updated:  08/09/19 0347    Narrative:       The following orders were created for panel order CBC & Differential.  Procedure                               Abnormality         Status                     ---------                               -----------         ------                     CBC Auto Differential[[id number]]        Abnormal            Final result                 Please view results for these tests on the individual orders.    CBC Auto Differential [[id number]]  (Abnormal) Collected:  08/09/19 0252    Specimen:  Blood Updated:  08/09/19 0347     WBC 9.40 10*3/mm3      RBC 4.56 10*6/mm3      Hemoglobin 13.2 g/dL      Hematocrit 41.0 %      MCV 90.0 fL      MCH 28.9 pg      MCHC 32.1 g/dL      RDW 13.3 %      RDW-SD 42.9 fl      MPV 9.4 fL      Platelets 244 10*3/mm3      Neutrophil % 45.0 %      Lymphocyte % 44.2 %      Monocyte % 6.2 %      Eosinophil % 3.4 %      Basophil % 1.2 %      Neutrophils, Absolute 4.20 10*3/mm3      Lymphocytes, Absolute 4.20 10*3/mm3      Monocytes, Absolute 0.60 10*3/mm3      Eosinophils, Absolute 0.30 10*3/mm3      Basophils, Absolute 0.10 10*3/mm3      nRBC 0.4 /100 WBC     Urine Culture - Urine, Urine, Clean Catch [[id number]]  (Normal) Collected:  08/07/19 1215    Specimen:  Urine, Clean Catch Updated:  08/08/19 2003     Urine Culture No growth          Imaging Results (last 24 hours)     Procedure Component Value Units Date/Time    CT Angiogram Head With Contrast [[id number]] Collected:  08/08/19 1514     Updated:  08/08/19 1541    Narrative:       DATE OF EXAM:  8/8/2019 11:10 AM     PROCEDURE:  CT ANGIOGRAM HEAD W CONTRAST-, CT ANGIOGRAM NECK W WO CONTRAST-     INDICATIONS:   63-year-old female who presents with confusion, visual disturbances,  dizziness, and bilateral temporal/occipital  headache.      COMPARISON:   No Comparisons Available     TECHNIQUE:  CTA of the head and CTA of the neck were performed after the intravenous  administration of 100 mL Isovue 370. Reconstructed coronal and sagittal  images were also obtained. In addition, a 3 D volume rendered image was  obtained after post processing. Automated exposure control and iterative  reconstruction methods were used.      FINDINGS:        NON-VASCULAR FINDINGS: Visualized portions of both lungs are grossly  unremarkable. There is moderate amount of fluid within the mid  esophagus. There is a tiny amount of fluid or mucoperiosteal thickening  within the posterior aspect of the left maxillary sinus.     VASCULAR FINDINGS: The proximal great vessels are patent as are both  subclavian arteries. Both vertebral arteries are patent with left side  dominant caliber. The basilar artery is patent. Both common carotid  arteries grossly patent. There is mild to moderate calcific plaquing in  the region of each carotid bulb with a moderate grade stenosis estimated  on the right side in the 50-60% diameter range with application of  NASCET criteria. The stenosis on the left appears to be less than 50%  diameter narrowing. The remainder of the internal carotid arteries  bilaterally are patent and there is some calcific plaque throughout the  region of each carotid siphon.     The visualized cerebral vasculature is very grossly unremarkable without  evidence of abrupt large vessel occlusion, aneurysm, or other overt  finding. There is fetal origin of the left posterior cerebral artery.          Impression:          1. Grossly unremarkable cerebral vasculature which fails to demonstrate  evidence of abrupt large vessel occlusion, aneurysm, or other obvious  abnormality.  2. Mild to moderate atheromatous calcific plaque in the region of each  carotid bulb, right a bit greater than left. The estimated degree of  stenosis on the right is in the 50-60%  diameter range utilizing NASCET  criteria.  On the left, the stenosis is estimated at less than 50% diameter.  3. Patent bilateral vertebral arteries with the left side dominant.  4. There is patency of the basilar artery and fetal origin of the left  posterior cerebral artery.  5. Incidentally noted moderate amount of fluid filling the lumen of the  esophagus in the upper thoracic region. Recommend clinical correlation.     These findings were communicated by telephone to the patient's nurse,  Dai, at approximately 1530 hours on August 8.     Electronically Signed By-Violet Swift On:8/8/2019 3:39 PM  This report was finalized on [id number] by  Violet Swift, .    CT Angiogram Neck With & Without Contrast [[id number]] Collected:  08/08/19 1514     Updated:  08/08/19 1541    Narrative:       DATE OF EXAM:  8/8/2019 11:10 AM     PROCEDURE:  CT ANGIOGRAM HEAD W CONTRAST-, CT ANGIOGRAM NECK W WO CONTRAST-     INDICATIONS:   63-year-old female who presents with confusion, visual disturbances,  dizziness, and bilateral temporal/occipital headache.      COMPARISON:   No Comparisons Available     TECHNIQUE:  CTA of the head and CTA of the neck were performed after the intravenous  administration of 100 mL Isovue 370. Reconstructed coronal and sagittal  images were also obtained. In addition, a 3 D volume rendered image was  obtained after post processing. Automated exposure control and iterative  reconstruction methods were used.      FINDINGS:        NON-VASCULAR FINDINGS: Visualized portions of both lungs are grossly  unremarkable. There is moderate amount of fluid within the mid  esophagus. There is a tiny amount of fluid or mucoperiosteal thickening  within the posterior aspect of the left maxillary sinus.     VASCULAR FINDINGS: The proximal great vessels are patent as are both  subclavian arteries. Both vertebral arteries are patent with left side  dominant caliber. The basilar artery is patent. Both common  carotid  arteries grossly patent. There is mild to moderate calcific plaquing in  the region of each carotid bulb with a moderate grade stenosis estimated  on the right side in the 50-60% diameter range with application of  NASCET criteria. The stenosis on the left appears to be less than 50%  diameter narrowing. The remainder of the internal carotid arteries  bilaterally are patent and there is some calcific plaque throughout the  region of each carotid siphon.     The visualized cerebral vasculature is very grossly unremarkable without  evidence of abrupt large vessel occlusion, aneurysm, or other overt  finding. There is fetal origin of the left posterior cerebral artery.          Impression:          1. Grossly unremarkable cerebral vasculature which fails to demonstrate  evidence of abrupt large vessel occlusion, aneurysm, or other obvious  abnormality.  2. Mild to moderate atheromatous calcific plaque in the region of each  carotid bulb, right a bit greater than left. The estimated degree of  stenosis on the right is in the 50-60% diameter range utilizing NASCET  criteria.  On the left, the stenosis is estimated at less than 50% diameter.  3. Patent bilateral vertebral arteries with the left side dominant.  4. There is patency of the basilar artery and fetal origin of the left  posterior cerebral artery.  5. Incidentally noted moderate amount of fluid filling the lumen of the  esophagus in the upper thoracic region. Recommend clinical correlation.     These findings were communicated by telephone to the patient's nurse,  Dai, at approximately 1530 hours on August 8.     Electronically Signed By-Violet Swift On:8/8/2019 3:39 PM  This report was finalized on [id number] by  Violet Swift, .                                   I reviewed the patient's new clinical results.          ECG/EMG Results (most recent)     Procedure Component Value Units Date/Time    Adult Transthoracic Echo Complete W/ Cont if  Necessary Per Protocol (With Agitated Saline) [[id number]] Collected:  08/07/19 0721     Updated:  08/07/19 1115     LA dimension(2D) 3.2 cm      BSA 1.9 m^2       CV ECHO STEPHEN - RVDD 2.4 cm      IVSd 1.2 cm      LVIDd 4.0 cm      LVIDs 2.5 cm      LVPWd 0.98 cm      IVS/LVPW 1.2     FS 37.6 %      EDV(Teich) 70.4 ml      ESV(Teich) 22.4 ml      EF(Teich) 68.2 %      EDV(cubed) 64.4 ml      ESV(cubed) 15.7 ml      EF(cubed) 75.7 %      LV mass(C)d 141.5 grams      LV mass(C)dI 73.8 grams/m^2      SV(Teich) 48.0 ml      SI(Teich) 25.0 ml/m^2      SV(cubed) 48.8 ml      SI(cubed) 25.4 ml/m^2      Ao root diam 3.1 cm      Ao root area 7.5 cm^2      ACS 1.4 cm      asc Aorta Diam 3.0 cm      LVOT diam 1.8 cm      LVOT area 2.6 cm^2      RVOT diam 2.5 cm      RVOT area 5.0 cm^2      EDV(MOD-sp4) 78.9 ml      ESV(MOD-sp4) 34.8 ml      EF(MOD-sp4) 55.9 %      SV(MOD-sp4) 44.1 ml      SI(MOD-sp4) 23.0 ml/m^2      Ao root area (BSA corrected) 1.6     LV Hdez Vol (BSA corrected) 41.1 ml/m^2      LV Sys Vol (BSA corrected) 18.2 ml/m^2      Aortic R-R 1.0 sec      Aortic HR 58.2 BPM      MV E max rickey 59.3 cm/sec      MV A max rickey 79.4 cm/sec      MV E/A 0.75     MV V2 max 81.6 cm/sec      MV max PG 2.7 mmHg      MV V2 mean 45.8 cm/sec      MV mean PG 1.0 mmHg      MV V2 VTI 23.0 cm      MVA(VTI) 2.2 cm^2      MV dec slope 211.5 cm/sec^2      MV dec time 0.28 sec      Ao pk rickey 120.0 cm/sec      Ao max PG 5.8 mmHg      Ao max PG (full) 2.7 mmHg      Ao V2 mean 74.5 cm/sec      Ao mean PG 2.5 mmHg      Ao mean PG (full) 1.1 mmHg      Ao V2 VTI 20.1 cm      ANNA(I,A) 2.6 cm^2      ANNA(I,D) 2.6 cm^2      ANNA(V,A) 1.9 cm^2      ANNA(V,D) 1.9 cm^2      LV V1 max PG 3.1 mmHg      LV V1 mean PG 1.4 mmHg      LV V1 max 88.0 cm/sec      LV V1 mean 55.0 cm/sec      LV V1 VTI 20.1 cm      CO(Ao) 8.8 l/min      CI(Ao) 4.6 l/min/m^2      SV(Ao) 151.0 ml      SI(Ao) 78.8 ml/m^2      CO(LVOT) 3.0 l/min      CI(LVOT) 1.6 l/min/m^2      SV(LVOT)  51.6 ml      SV(RVOT) 71.1 ml      SI(LVOT) 26.9 ml/m^2      PA V2 max 79.9 cm/sec      PA max PG 2.6 mmHg      PA max PG (full) 1.1 mmHg      PA V2 mean 55.2 cm/sec      PA mean PG 1.4 mmHg      PA mean PG (full) 0.67 mmHg      PA V2 VTI 14.5 cm      PVA(I,A) 4.9 cm^2       CV ECHO STEPHEN - PVA(I,D) 4.9 cm^2       CV ECHO STEPHEN - PVA(V,A) 3.8 cm^2       CV ECHO STEPHEN - PVA(V,D) 3.8 cm^2      PA acc time 0.14 sec      RV V1 max PG 1.5 mmHg      RV V1 mean PG 0.71 mmHg      RV V1 max 60.4 cm/sec      RV V1 mean 38.0 cm/sec      RV V1 VTI 14.2 cm      TR max rony 240.7 cm/sec      RVSP(TR) 26.2 mmHg      RAP systole 3.0 mmHg      PA pr(Accel) 15.6 mmHg      Pulm Sys Rony 50.2 cm/sec      Pulm Hdez Rony 30.4 cm/sec      Pulm S/D 1.7     Qp/Qs 1.4     Pulm A Revs Dur 0.13 sec      Pulm A Revs Rony 30.4 cm/sec       CV ECHO STEPHEN - BZI_BMI 29.2 kilograms/m^2       CV ECHO STEPHEN - BSA(HAYCOCK) 2.0 m^2       CV ECHO STEPHEN - BZI_METRIC_WEIGHT 82.1 kg       CV ECHO STEPHEN - BZI_METRIC_HEIGHT 167.6 cm      Target HR (85%) 135 bpm      Max. Pred. HR (100%) 159 bpm      EF(MOD-bp) 56.0 %      Echo EF Estimated 60 %     Narrative:       · Left ventricular wall thickness is consistent with mild septal   asymmetric hypertrophy.  · Left ventricular systolic function is normal.  · Left ventricular diastolic dysfunction (grade I) consistent with   impaired relaxation.  · Estimated EF = 60%.               Medication Review:       Current Facility-Administered Medications:   •  acetaminophen (TYLENOL) 160 MG/5ML solution 650 mg, 650 mg, Oral, Q4H PRN, Achterberg, Karen, APRN  •  acetaminophen (TYLENOL) suppository 650 mg, 650 mg, Rectal, Q4H PRN, Achterberg, Karen, APRN  •  acetaminophen (TYLENOL) tablet 650 mg, 650 mg, Oral, Q4H PRN, Karen Nava APRN, 650 mg at 08/08/19 1339  •  ALPRAZolam (XANAX) tablet 2 mg, 2 mg, Oral, Nightly PRN, Karen Nava APRN, 2 mg at 08/08/19 2120  •  aspirin chewable tablet 81 mg,  81 mg, Oral, Daily, 81 mg at 08/08/19 0825 **OR** aspirin suppository 300 mg, 300 mg, Rectal, Daily, Yuli Troy MD  •  atorvastatin (LIPITOR) tablet 80 mg, 80 mg, Oral, Nightly, Yuli Troy MD, 80 mg at 08/08/19 2120  •  bisacodyl (DULCOLAX) EC tablet 5 mg, 5 mg, Oral, Daily PRN, Karen Nava, APRN  •  bisacodyl (DULCOLAX) suppository 10 mg, 10 mg, Rectal, Daily PRN, Karen Nava, APRN  •  hydrochlorothiazide (HYDRODIURIL) tablet 12.5 mg, 12.5 mg, Oral, Daily, Cong Harvey Jr., MD, 12.5 mg at 08/08/19 0825  •  lamoTRIgine (LaMICtal) tablet 100 mg, 100 mg, Oral, BID, Karen Nava APRN, 100 mg at 08/08/19 2120  •  lisinopril (PRINIVIL,ZESTRIL) tablet 20 mg, 20 mg, Oral, Q24H, Cong Harvey Jr., MD, 20 mg at 08/08/19 0825  •  magnesium hydroxide (MILK OF MAGNESIA) suspension 2400 mg/10mL 10 mL, 10 mL, Oral, Daily PRN, Karen Nava, APRN  •  Magnesium Sulfate 2 gram infusion - Mg less than or equal to 1.5 mg/dL, 2 g, Intravenous, PRN **OR** Magnesium Sulfate 1 gram infusion - Mg 1.6-1.9 mg/dL, 1 g, Intravenous, PRN, Karen Nava, APRN  •  metoprolol tartrate (LOPRESSOR) tablet 25 mg, 25 mg, Oral, Daily, Karen Nava APRN, 25 mg at 08/08/19 0825  •  ondansetron (ZOFRAN) tablet 4 mg, 4 mg, Oral, Q6H PRN **OR** ondansetron (ZOFRAN) injection 4 mg, 4 mg, Intravenous, Q6H PRN, Karen Nvaa, APRDEDRICK  •  pantoprazole (PROTONIX) EC tablet 40 mg, 40 mg, Oral, QAM, Achterberg, Karen, APRN, 40 mg at 08/08/19 0825  •  potassium chloride (K-DUR,KLOR-CON) CR tablet 40 mEq, 40 mEq, Oral, PRN, Achterberg, Karen, APRN, 40 mEq at 08/06/19 2025  •  potassium chloride (KLOR-CON) packet 40 mEq, 40 mEq, Oral, PRN, Achterberg, Karen, APRN  •  promethazine (PHENERGAN) tablet 25 mg, 25 mg, Oral, Q6H PRN, Achterberg, Karen, APRN  •  QUEtiapine (SEROquel) tablet 150 mg, 150 mg, Oral, Nightly, Achterberg, Karen, APRN, 150 mg at 08/08/19 2120  •  sodium  chloride 0.9 % flush 3 mL, 3 mL, Intravenous, Q12H, Yuli Troy MD, 3 mL at 08/08/19 2121  •  sodium chloride 0.9 % flush 3-10 mL, 3-10 mL, Intravenous, PRN, Yuli Troy MD  •  traMADol (ULTRAM) tablet 50 mg, 50 mg, Oral, Q6H PRN, Karen Nava APRN, 50 mg at 08/08/19 2120    I have reviewed the patient's current medication list    Assessment/Plan     Acute confusion, Left visual field loss  - CT head from 8/4/2019 negative for acute findings  - NIH 3  - cont neuro checks  - stroke workup with MRI brain, check carotid US, 2D echo, tsh, lipid panel, hgb a1c, B12  - aspirin, statin  - neurology consulted  - await rehab placement     Anemia in other chronic diseases  - hgb normal     Anxiety  - cont home seroquel, lamictal, xanax (INSPECT verified)     Hypertension  - cont home lisinopril/hctz, metoprolol     Hyperlipidemia  - cont home statin     GERD  - ppi     DVT prophylaxis - SCDs               Plan for disposition: inpt rehab    Cong Harvey Jr., MD  08/09/19  7:57 AM      Time:

## 2019-08-12 ENCOUNTER — LAB REQUISITION (OUTPATIENT)
Dept: LAB | Facility: HOSPITAL | Age: 64
End: 2019-08-12

## 2019-08-12 DIAGNOSIS — R41.0 DISORIENTATION: ICD-10-CM

## 2019-08-12 LAB
ANION GAP SERPL CALCULATED.3IONS-SCNC: 15.8 MMOL/L (ref 5–15)
BUN BLD-MCNC: 14 MG/DL (ref 8–20)
BUN/CREAT SERPL: 15.6 (ref 5.4–26.2)
CALCIUM SPEC-SCNC: 8.7 MG/DL (ref 8.9–10.3)
CHLORIDE SERPL-SCNC: 100 MMOL/L (ref 101–111)
CO2 SERPL-SCNC: 25 MMOL/L (ref 22–32)
CREAT BLD-MCNC: 0.9 MG/DL (ref 0.4–1)
DEPRECATED RDW RBC AUTO: 44.2 FL (ref 37–54)
ERYTHROCYTE [DISTWIDTH] IN BLOOD BY AUTOMATED COUNT: 13.7 % (ref 12.3–15.4)
GFR SERPL CREATININE-BSD FRML MDRD: 64 ML/MIN/1.73
GLUCOSE BLD-MCNC: 103 MG/DL (ref 65–99)
HCT VFR BLD AUTO: 40.5 % (ref 34–46.6)
HGB BLD-MCNC: 13.2 G/DL (ref 12–15.9)
MCH RBC QN AUTO: 29.4 PG (ref 26.6–33)
MCHC RBC AUTO-ENTMCNC: 32.5 G/DL (ref 31.5–35.7)
MCV RBC AUTO: 90.6 FL (ref 79–97)
PLATELET # BLD AUTO: 181 10*3/MM3 (ref 140–450)
PMV BLD AUTO: 10.2 FL (ref 6–12)
POTASSIUM BLD-SCNC: 3.8 MMOL/L (ref 3.6–5.1)
RBC # BLD AUTO: 4.47 10*6/MM3 (ref 3.77–5.28)
SODIUM BLD-SCNC: 137 MMOL/L (ref 136–144)
WBC NRBC COR # BLD: 9.4 10*3/MM3 (ref 3.4–10.8)

## 2019-08-12 PROCEDURE — 85027 COMPLETE CBC AUTOMATED: CPT

## 2019-08-12 PROCEDURE — 80048 BASIC METABOLIC PNL TOTAL CA: CPT

## 2019-08-12 NOTE — PROGRESS NOTES
Case Management Discharge Note    Final Note: [facility]     Final Discharge Disposition Code: 62 - inpatient rehab facility

## 2019-08-15 ENCOUNTER — HOSPITAL ENCOUNTER (OUTPATIENT)
Dept: GENERAL RADIOLOGY | Facility: HOSPITAL | Age: 64
Discharge: HOME | End: 2019-08-15

## 2019-08-15 ENCOUNTER — LAB REQUISITION (OUTPATIENT)
Dept: LAB | Facility: HOSPITAL | Age: 64
End: 2019-08-15

## 2019-08-15 DIAGNOSIS — I67.9 CEREBROVASCULAR DISEASE: ICD-10-CM

## 2019-08-15 DIAGNOSIS — S46.001A ROTATOR CUFF INJURY, RIGHT, INITIAL ENCOUNTER: ICD-10-CM

## 2019-08-15 LAB
ALBUMIN SERPL-MCNC: 3.9 G/DL (ref 3.5–4.8)
ALBUMIN/GLOB SERPL: 1.1 G/DL (ref 1–1.7)
ALP SERPL-CCNC: 91 U/L (ref 32–91)
ALT SERPL W P-5'-P-CCNC: 34 U/L (ref 14–54)
ANION GAP SERPL CALCULATED.3IONS-SCNC: 17.8 MMOL/L (ref 5–15)
AST SERPL-CCNC: 31 U/L (ref 15–41)
BILIRUB SERPL-MCNC: 0.7 MG/DL (ref 0.3–1.2)
BUN BLD-MCNC: 14 MG/DL (ref 8–20)
BUN/CREAT SERPL: 12.7 (ref 5.4–26.2)
CALCIUM SPEC-SCNC: 9.2 MG/DL (ref 8.9–10.3)
CHLORIDE SERPL-SCNC: 97 MMOL/L (ref 101–111)
CO2 SERPL-SCNC: 26 MMOL/L (ref 22–32)
CREAT BLD-MCNC: 1.1 MG/DL (ref 0.4–1)
DEPRECATED RDW RBC AUTO: 40.3 FL (ref 37–54)
ERYTHROCYTE [DISTWIDTH] IN BLOOD BY AUTOMATED COUNT: 13 % (ref 12.3–15.4)
GFR SERPL CREATININE-BSD FRML MDRD: 50 ML/MIN/1.73
GLOBULIN UR ELPH-MCNC: 3.4 GM/DL (ref 2.5–3.8)
GLUCOSE BLD-MCNC: 127 MG/DL (ref 65–99)
HCT VFR BLD AUTO: 39.8 % (ref 34–46.6)
HGB BLD-MCNC: 13.5 G/DL (ref 12–15.9)
MCH RBC QN AUTO: 29.7 PG (ref 26.6–33)
MCHC RBC AUTO-ENTMCNC: 33.8 G/DL (ref 31.5–35.7)
MCV RBC AUTO: 87.8 FL (ref 79–97)
PLATELET # BLD AUTO: 291 10*3/MM3 (ref 140–450)
PMV BLD AUTO: 9.5 FL (ref 6–12)
POTASSIUM BLD-SCNC: 3.8 MMOL/L (ref 3.6–5.1)
PROT SERPL-MCNC: 7.3 G/DL (ref 6.1–7.9)
RBC # BLD AUTO: 4.54 10*6/MM3 (ref 3.77–5.28)
SODIUM BLD-SCNC: 137 MMOL/L (ref 136–144)
WBC NRBC COR # BLD: 6.9 10*3/MM3 (ref 3.4–10.8)

## 2019-08-15 PROCEDURE — 80053 COMPREHEN METABOLIC PANEL: CPT

## 2019-08-15 PROCEDURE — 85027 COMPLETE CBC AUTOMATED: CPT

## 2019-08-15 PROCEDURE — 73030 X-RAY EXAM OF SHOULDER: CPT

## 2019-08-16 ENCOUNTER — LAB REQUISITION (OUTPATIENT)
Dept: LAB | Facility: HOSPITAL | Age: 64
End: 2019-08-16

## 2019-08-16 DIAGNOSIS — I67.9 CEREBROVASCULAR DISEASE: ICD-10-CM

## 2019-08-16 LAB
BILIRUB UR QL STRIP: NEGATIVE
CLARITY UR: CLEAR
COLOR UR: YELLOW
GLUCOSE UR STRIP-MCNC: NEGATIVE MG/DL
HGB UR QL STRIP.AUTO: NEGATIVE
KETONES UR QL STRIP: NEGATIVE
LEUKOCYTE ESTERASE UR QL STRIP.AUTO: NEGATIVE
NITRITE UR QL STRIP: NEGATIVE
PH UR STRIP.AUTO: 5.5 [PH] (ref 5–8)
PROT UR QL STRIP: NEGATIVE
SP GR UR STRIP: 1.02 (ref 1–1.03)
UROBILINOGEN UR QL STRIP: NORMAL

## 2019-08-16 PROCEDURE — 81003 URINALYSIS AUTO W/O SCOPE: CPT

## 2019-08-17 ENCOUNTER — LAB REQUISITION (OUTPATIENT)
Dept: LAB | Facility: HOSPITAL | Age: 64
End: 2019-08-17

## 2019-08-17 DIAGNOSIS — Z00.00 ENCOUNTER FOR GENERAL ADULT MEDICAL EXAMINATION WITHOUT ABNORMAL FINDINGS: ICD-10-CM

## 2019-08-17 LAB — S PYO AG THROAT QL: NEGATIVE

## 2019-08-17 PROCEDURE — 87651 STREP A DNA AMP PROBE: CPT | Performed by: PHYSICAL MEDICINE & REHABILITATION

## 2019-08-21 ENCOUNTER — OFFICE VISIT (OUTPATIENT)
Dept: FAMILY MEDICINE CLINIC | Facility: CLINIC | Age: 64
End: 2019-08-21

## 2019-08-21 VITALS
DIASTOLIC BLOOD PRESSURE: 80 MMHG | WEIGHT: 190 LBS | TEMPERATURE: 98.3 F | BODY MASS INDEX: 30.67 KG/M2 | HEART RATE: 80 BPM | SYSTOLIC BLOOD PRESSURE: 140 MMHG | RESPIRATION RATE: 8 BRPM

## 2019-08-21 DIAGNOSIS — I10 ESSENTIAL HYPERTENSION: ICD-10-CM

## 2019-08-21 DIAGNOSIS — E78.00 PURE HYPERCHOLESTEROLEMIA: ICD-10-CM

## 2019-08-21 DIAGNOSIS — H53.40 VISUAL FIELD LOSS: ICD-10-CM

## 2019-08-21 DIAGNOSIS — I63.89 CEREBROVASCULAR ACCIDENT (CVA) DUE TO OTHER MECHANISM (HCC): Primary | ICD-10-CM

## 2019-08-21 LAB
ALBUMIN SERPL-MCNC: 3.9 G/DL (ref 3.5–4.8)
ALBUMIN/GLOB SERPL: 1.2 G/DL (ref 1–1.7)
ALP SERPL-CCNC: 100 U/L (ref 32–91)
ALT SERPL W P-5'-P-CCNC: 63 U/L (ref 14–54)
ANION GAP SERPL CALCULATED.3IONS-SCNC: 16 MMOL/L (ref 5–15)
ARTICHOKE IGE QN: 68 MG/DL (ref 0–100)
AST SERPL-CCNC: 32 U/L (ref 15–41)
BILIRUB SERPL-MCNC: 0.7 MG/DL (ref 0.3–1.2)
BUN BLD-MCNC: 14 MG/DL (ref 8–20)
BUN/CREAT SERPL: 15.6 (ref 5.4–26.2)
CALCIUM SPEC-SCNC: 9.6 MG/DL (ref 8.9–10.3)
CHLORIDE SERPL-SCNC: 99 MMOL/L (ref 101–111)
CHOLEST SERPL-MCNC: 138 MG/DL
CO2 SERPL-SCNC: 29 MMOL/L (ref 22–32)
CREAT BLD-MCNC: 0.9 MG/DL (ref 0.4–1)
GFR SERPL CREATININE-BSD FRML MDRD: 64 ML/MIN/1.73
GLOBULIN UR ELPH-MCNC: 3.3 GM/DL (ref 2.5–3.8)
GLUCOSE BLD-MCNC: 106 MG/DL (ref 65–99)
HDLC SERPL QL: 2.76
HDLC SERPL-MCNC: 50 MG/DL
LDLC/HDLC SERPL: 1.25 {RATIO}
POTASSIUM BLD-SCNC: 4 MMOL/L (ref 3.6–5.1)
PROT SERPL-MCNC: 7.2 G/DL (ref 6.1–7.9)
SODIUM BLD-SCNC: 140 MMOL/L (ref 136–144)
TRIGL SERPL-MCNC: 128 MG/DL
VLDLC SERPL-MCNC: 25.6 MG/DL

## 2019-08-21 PROCEDURE — 80061 LIPID PANEL: CPT | Performed by: FAMILY MEDICINE

## 2019-08-21 PROCEDURE — 99214 OFFICE O/P EST MOD 30 MIN: CPT | Performed by: FAMILY MEDICINE

## 2019-08-21 PROCEDURE — 80053 COMPREHEN METABOLIC PANEL: CPT | Performed by: FAMILY MEDICINE

## 2019-08-21 RX ORDER — ALPRAZOLAM 2 MG/1
2 TABLET ORAL NIGHTLY PRN
Qty: 30 TABLET | Refills: 5 | COMMUNITY
Start: 2019-08-21 | End: 2019-10-18 | Stop reason: SDUPTHER

## 2019-08-21 RX ORDER — METOPROLOL SUCCINATE 25 MG/1
TABLET, EXTENDED RELEASE ORAL
COMMUNITY
Start: 2019-08-18 | End: 2020-01-08

## 2019-08-21 NOTE — ASSESSMENT & PLAN NOTE
Lipid abnormalities are improving with treatment.  Nutritional counseling was provided.  Lipids will be reassessed 4 weeks.

## 2019-08-21 NOTE — ASSESSMENT & PLAN NOTE
Hypertension is improving with treatment.  Continue current treatment regimen.  Dietary sodium restriction.  Weight loss.  Continue current medications.  Blood pressure will be reassessed in 4 weeks.

## 2019-09-06 DIAGNOSIS — Z47.89 ORTHOPEDIC AFTERCARE: Primary | ICD-10-CM

## 2019-09-06 DIAGNOSIS — M75.121 COMPLETE ROTATOR CUFF TEAR OR RUPTURE OF RIGHT SHOULDER, NOT SPECIFIED AS TRAUMATIC: ICD-10-CM

## 2019-09-20 ENCOUNTER — OFFICE VISIT (OUTPATIENT)
Dept: FAMILY MEDICINE CLINIC | Facility: CLINIC | Age: 64
End: 2019-09-20

## 2019-09-20 VITALS
HEART RATE: 67 BPM | RESPIRATION RATE: 12 BRPM | BODY MASS INDEX: 31.12 KG/M2 | DIASTOLIC BLOOD PRESSURE: 78 MMHG | SYSTOLIC BLOOD PRESSURE: 114 MMHG | WEIGHT: 192.8 LBS

## 2019-09-20 DIAGNOSIS — F41.9 ANXIETY: ICD-10-CM

## 2019-09-20 DIAGNOSIS — I63.89 CEREBROVASCULAR ACCIDENT (CVA) DUE TO OTHER MECHANISM (HCC): ICD-10-CM

## 2019-09-20 DIAGNOSIS — I10 ESSENTIAL HYPERTENSION: Primary | ICD-10-CM

## 2019-09-20 PROCEDURE — 99213 OFFICE O/P EST LOW 20 MIN: CPT | Performed by: FAMILY MEDICINE

## 2019-09-20 NOTE — PATIENT INSTRUCTIONS
Exercising to Stay Healthy  To become healthy and stay healthy, it is recommended that you do moderate-intensity and vigorous-intensity exercise. You can tell that you are exercising at a moderate intensity if your heart starts beating faster and you start breathing faster but can still hold a conversation. You can tell that you are exercising at a vigorous intensity if you are breathing much harder and faster and cannot hold a conversation while exercising.  Exercising regularly is important. It has many health benefits, such as:  · Improving overall fitness, flexibility, and endurance.  · Increasing bone density.  · Helping with weight control.  · Decreasing body fat.  · Increasing muscle strength.  · Reducing stress and tension.  · Improving overall health.  How often should I exercise?  Choose an activity that you enjoy, and set realistic goals. Your health care provider can help you make an activity plan that works for you.  Exercise regularly as told by your health care provider. This may include:  · Doing strength training two times a week, such as:  ? Lifting weights.  ? Using resistance bands.  ? Push-ups.  ? Sit-ups.  ? Yoga.  · Doing a certain intensity of exercise for a given amount of time. Choose from these options:  ? A total of 150 minutes of moderate-intensity exercise every week.  ? A total of 75 minutes of vigorous-intensity exercise every week.  ? A mix of moderate-intensity and vigorous-intensity exercise every week.  Children, pregnant women, people who have not exercised regularly, people who are overweight, and older adults may need to talk with a health care provider about what activities are safe to do. If you have a medical condition, be sure to talk with your health care provider before you start a new exercise program.  What are some exercise ideas?  Moderate-intensity exercise ideas include:  · Walking 1 mile (1.6 km) in about 15  minutes.  · Biking.  · Hiking.  · Golfing.  · Dancing.  · Water aerobics.  Vigorous-intensity exercise ideas include:  · Walking 4.5 miles (7.2 km) or more in about 1 hour.  · Jogging or running 5 miles (8 km) in about 1 hour.  · Biking 10 miles (16.1 km) or more in about 1 hour.  · Lap swimming.  · Roller-skating or in-line skating.  · Cross-country skiing.  · Vigorous competitive sports, such as football, basketball, and soccer.  · Jumping rope.  · Aerobic dancing.  What are some everyday activities that can help me to get exercise?  · Yard work, such as:  ? Pushing a .  ? Raking and bagging leaves.  · Washing your car.  · Pushing a stroller.  · Shoveling snow.  · Gardening.  · Washing windows or floors.  How can I be more active in my day-to-day activities?  · Use stairs instead of an elevator.  · Take a walk during your lunch break.  · If you drive, park your car farther away from your work or school.  · If you take public transportation, get off one stop early and walk the rest of the way.  · Stand up or walk around during all of your indoor phone calls.  · Get up, stretch, and walk around every 30 minutes throughout the day.  · Enjoy exercise with a friend. Support to continue exercising will help you keep a regular routine of activity.  What guidelines can I follow while exercising?  · Before you start a new exercise program, talk with your health care provider.  · Do not exercise so much that you hurt yourself, feel dizzy, or get very short of breath.  · Wear comfortable clothes and wear shoes with good support.  · Drink plenty of water while you exercise to prevent dehydration or heat stroke.  · Work out until your breathing and your heartbeat get faster.  Where to find more information  · [place] Department of Health and Human Services: www.hhs.gov  · Centers for Disease Control and Prevention (CDC): www.cdc.gov  Summary  · Exercising regularly is important. It will improve your overall fitness,  flexibility, and endurance.  · Regular exercise also will improve your overall health. It can help you control your weight, reduce stress, and improve your bone density.  · Do not exercise so much that you hurt yourself, feel dizzy, or get very short of breath.  · Before you start a new exercise program, talk with your health care provider.  This information is not intended to replace advice given to you by your health care provider. Make sure you discuss any questions you have with your health care provider.  Document Released: 01/20/2012 Document Revised: 11/08/2018 Document Reviewed: 11/08/2018  [employer] Interactive Patient Education © 2019 [employer]      Mediterranean Diet  A Mediterranean diet refers to food and lifestyle choices that are based on the traditions of countries located on the [place]. This way of eating has been shown to help prevent certain conditions and improve outcomes for people who have chronic diseases, like kidney disease and heart disease.  What are tips for following this plan?  Lifestyle  · Cook and eat meals together with your family, when possible.  · Drink enough fluid to keep your urine clear or pale yellow.  · Be physically active every day. This includes:  ? Aerobic exercise like running or swimming.  ? Leisure activities like gardening, walking, or housework.  · Get 7-8 hours of sleep each night.  · If recommended by your health care provider, drink red wine in moderation. This means 1 glass a day for nonpregnant women and 2 glasses a day for men. A glass of wine equals 5 oz (150 mL).  Reading food labels    · Check the serving size of packaged foods. For foods such as rice and pasta, the serving size refers to the amount of cooked product, not dry.  · Check the total fat in packaged foods. Avoid foods that have saturated fat or trans fats.  · Check the ingredients list for added sugars, such as corn syrup.  Shopping  · At the grocery store, buy most of your food  from the areas near the walls of the store. This includes:  ? Fresh fruits and vegetables (produce).  ? Grains, beans, nuts, and seeds. Some of these may be available in unpackaged forms or large amounts (in bulk).  ? Fresh seafood.  ? Poultry and eggs.  ? Low-fat dairy products.  · Buy whole ingredients instead of prepackaged foods.  · Buy fresh fruits and vegetables in-season from local farmers markets.  · Buy frozen fruits and vegetables in resealable bags.  · If you do not have access to quality fresh seafood, buy precooked frozen shrimp or canned fish, such as tuna, salmon, or sardines.  · Buy small amounts of raw or cooked vegetables, salads, or olives from the deli or salad bar at your store.  · Stock your pantry so you always have certain foods on hand, such as olive oil, canned tuna, canned tomatoes, rice, pasta, and beans.  Cooking  · Cook foods with extra-virgin olive oil instead of using butter or other vegetable oils.  · Have meat as a side dish, and have vegetables or grains as your main dish. This means having meat in small portions or adding small amounts of meat to foods like pasta or stew.  · Use beans or vegetables instead of meat in common dishes like chili or lasagna.  · [place] with different cooking methods. Try roasting or broiling vegetables instead of steaming or sautéeing them.  · Add frozen vegetables to soups, stews, pasta, or rice.  · Add nuts or seeds for added healthy fat at each meal. You can add these to yogurt, salads, or vegetable dishes.  · Marinate fish or vegetables using olive oil, lemon juice, garlic, and fresh herbs.  Meal planning    · Plan to eat 1 vegetarian meal one day each week. Try to work up to 2 vegetarian meals, if possible.  · Eat seafood 2 or more times a week.  · Have healthy snacks readily available, such as:  ? Vegetable sticks with hummus.  ? Greek yogurt.  ? Fruit and nut trail mix.  · Eat balanced meals throughout the week. This includes:  ? Fruit: 2-3  servings a day  ? Vegetables: 4-5 servings a day  ? Low-fat dairy: 2 servings a day  ? Fish, poultry, or lean meat: 1 serving a day  ? Beans and legumes: 2 or more servings a week  ? Nuts and seeds: 1-2 servings a day  ? Whole grains: 6-8 servings a day  ? Extra-virgin olive oil: 3-4 servings a day  · Limit red meat and sweets to only a few servings a month  What are my food choices?  · Mediterranean diet  ? Recommended  ? Grains: Whole-grain pasta. Brown rice. Bulgar wheat. Polenta. Couscous. Whole-wheat bread. Oatmeal. Quinoa.  ? Vegetables: Artichokes. Beets. Broccoli. Cabbage. Carrots. Eggplant. Green beans. Chard. Kale. Spinach. Onions. Leeks. Peas. Squash. Tomatoes. Peppers. Radishes.  ? Fruits: Apples. Apricots. Avocado. Berries. Bananas. Cherries. Dates. Figs. Grapes. Beba. Melon. Oranges. Peaches. Plums. Pomegranate.  ? Meats and other protein foods: Beans. Almonds. Sunflower seeds. Pine nuts. Peanuts. Cod. [place]. Scallops. Shrimp. Tuna. Tilapia. Clams. Oysters. Eggs.  ? Dairy: Low-fat milk. Cheese. Greek yogurt.  ? Beverages: Water. Red wine. Herbal tea.  ? Fats and oils: Extra virgin olive oil. Avocado oil. Grape seed oil.  ? Sweets and desserts: Greek yogurt with honey. Baked apples. Poached pears. Trail mix.  ? Seasoning and other foods: Basil. Cilantro. Coriander. Cumin. Mint. Parsley. Ramiro. Rosemary. Tarragon. Garlic. Oregano. Thyme. Pepper. Balsalmic vinegar. Tahini. Hummus. Tomato sauce. Olives. Mushrooms.  ? Limit these  ? Grains: Prepackaged pasta or rice dishes. Prepackaged cereal with added sugar.  ? Vegetables: Deep fried potatoes (french fries).  ? Fruits: Fruit canned in syrup.  ? Meats and other protein foods: Beef. Pork. Lamb. Poultry with skin. Hot dogs. Harris.  ? Dairy: Ice cream. Sour cream. Whole milk.  ? Beverages: Juice. Sugar-sweetened soft drinks. Beer. Liquor and spirits.  ? Fats and oils: Butter. Canola oil. Vegetable oil. Beef fat (tallow). Lard.  ? Sweets and desserts:  Cookies. Cakes. Pies. Candy.  ? Seasoning and other foods: Mayonnaise. Premade sauces and marinades.  ? The items listed may not be a complete list. Talk with your dietitian about what dietary choices are right for you.  Summary  · The Mediterranean diet includes both food and lifestyle choices.  · Eat a variety of fresh fruits and vegetables, beans, nuts, seeds, and whole grains.  · Limit the amount of red meat and sweets that you eat.  · Talk with your health care provider about whether it is safe for you to drink red wine in moderation. This means 1 glass a day for nonpregnant women and 2 glasses a day for men. A glass of wine equals 5 oz (150 mL).  This information is not intended to replace advice given to you by your health care provider. Make sure you discuss any questions you have with your health care provider.  Document Released: 08/10/2017 Document Revised: 09/12/2017 Document Reviewed: 08/10/2017  [employer] Interactive Patient Education © 2019 [employer]

## 2019-09-20 NOTE — PROGRESS NOTES
Rooming Tab(CC,VS,Pt Hx,Fall Screen)  Chief Complaint   Patient presents with   • Cerebrovascular Accident       Subjective Patient is here for follow-up of her stroke.  Her memory is still somewhat poor with worsening after her stroke.  She has a lot of anxiety as well and worries about everything, even prior worse than her prior situation.  She is unable to be alone or if her  leaves, she really has a lot of anxiety and panic gets panicky.  Any type of noise or racquet in the house causes her anxiety.  Denies any depression.  She will read about everything even things that are not overly significant.  He does not feel like her cognitive function is what it was prior to her stroke.  She is at [facility] rehab getting cognitive therapy and OT.  Here for follow-up of her blood pressure.  Her blood pressure is very good now.  Is no chest pain dizziness or syncope.    I have reviewed and updated her medications, medical history and problem list during today's office visit.     Patient Care Team:  Devaughn Reis MD as PCP - General  Devaughn Reis MD as PCP - Family Medicine    Problem List Tab  Medications Tab  Synopsis Tab  Chart Review Tab  Care Everywhere Tab  Immunizations Tab  Patient History Tab    Social History     Tobacco Use   • Smoking status: Never Smoker   • Smokeless tobacco: Never Used   Substance Use Topics   • Alcohol use: No     Frequency: Never       Review of Systems   Constitutional: Negative for chills, fatigue and fever.   HENT: Negative for nosebleeds.    Eyes: Negative for double vision.   Respiratory: Negative for chest tightness and shortness of breath.    Cardiovascular: Negative for chest pain and palpitations.   Gastrointestinal: Negative for blood in stool.   Genitourinary: Negative for dysuria and hematuria.   Neurological: Negative for dizziness and syncope.   Psychiatric/Behavioral: Positive for agitation and positive for hyperactivity. Negative for suicidal ideas  and depressed mood. The patient is nervous/anxious.        Objective     Rooming Tab(CC,VS,Pt Hx,Fall Screen)  /78 (BP Location: Right arm, Patient Position: Sitting, Cuff Size: Large Adult)   Pulse 67   Resp 12   Wt 87.5 kg (192 lb 12.8 oz)   BMI 31.12 kg/m²     Body mass index is 31.12 kg/m².    Physical Exam   Constitutional: She is oriented to person, place, and time. She appears well-developed and well-nourished. No distress.   Anxious   HENT:   Head: Normocephalic and atraumatic.   Nose: Nose normal.   Mouth/Throat: Oropharynx is clear and moist.   Eyes: Conjunctivae, EOM and lids are normal. Pupils are equal, round, and reactive to light.   Neck: Trachea normal and normal range of motion. Neck supple. No JVD present. Carotid bruit is not present. No thyroid mass and no thyromegaly present.   No carotid bruits   Cardiovascular: Normal rate, regular rhythm, normal heart sounds and intact distal pulses.   Pulmonary/Chest: Effort normal and breath sounds normal.   Musculoskeletal:   No c/c/e   Neurological: She is alert and oriented to person, place, and time. No cranial nerve deficit.   Skin: Skin is warm and dry.   Psychiatric: Her speech is normal.   He is very talkative and very anxious even in the office.  No  SI She is attentive.   Nursing note and vitals reviewed.       Statin Choice Calculator  Data Reviewed:               Lab Results   Component Value Date    BUN 14 08/21/2019    CREATININE 0.90 08/21/2019    EGFRIFNONA 64 08/21/2019     08/21/2019    K 4.0 08/21/2019    CL 99 (L) 08/21/2019    CALCIUM 9.6 08/21/2019    ALBUMIN 3.90 08/21/2019    BILITOT 0.7 08/21/2019    ALKPHOS 100 (H) 08/21/2019    AST 32 08/21/2019    ALT 63 (H) 08/21/2019    TRIG 128 08/21/2019    HDL 50 08/21/2019    VLDL 25.6 08/21/2019    LDL 68 08/21/2019    LDLHDL 1.25 08/21/2019    WBC 6.90 08/15/2019    RBC 4.54 08/15/2019    HCT 39.8 08/15/2019    MCV 87.8 08/15/2019    MCH 29.7 08/15/2019    TSH 0.830  08/06/2019    INR 1.05 08/06/2019      Assessment/Plan   Order Review Tab  Health Maintenance Tab  Patient Plan/Order Tab  Diagnoses and all orders for this visit:    1. Essential hypertension (Primary)  Assessment & Plan:  Hypertension is improving with treatment.  Continue current treatment regimen.  Weight loss.  Regular aerobic exercise.  Continue current medications.  Blood pressure will be reassessed 3 months.      2. Cerebrovascular accident (CVA) due to other mechanism  Assessment & Plan:  stable      3. Anxiety  Assessment & Plan:  Worse  Add vraylar 1.5mg q d       Other orders  -     Cariprazine HCl (VRAYLAR) 1.5 MG capsule capsule; Take 1 capsule by mouth Daily.  Dispense: 30 capsule; Refill: 3      Wrapup Tab  Return in about 3 months (around 12/20/2019) for Recheck.

## 2019-09-20 NOTE — ASSESSMENT & PLAN NOTE
Hypertension is improving with treatment.  Continue current treatment regimen.  Weight loss.  Regular aerobic exercise.  Continue current medications.  Blood pressure will be reassessed 3 months.

## 2019-10-09 ENCOUNTER — TELEPHONE (OUTPATIENT)
Dept: FAMILY MEDICINE CLINIC | Facility: CLINIC | Age: 64
End: 2019-10-09

## 2019-10-09 NOTE — TELEPHONE ENCOUNTER
Pt is requesting more Vraylar samples but wants the 3mg instead of 1.5mg. Please let me know if this is OK and if samples are available. Thank you.

## 2019-10-17 RX ORDER — QUETIAPINE FUMARATE 100 MG/1
TABLET, FILM COATED ORAL
Qty: 45 TABLET | Refills: 1 | Status: SHIPPED | OUTPATIENT
Start: 2019-10-17 | End: 2019-12-14 | Stop reason: SDUPTHER

## 2019-10-18 ENCOUNTER — TELEPHONE (OUTPATIENT)
Dept: FAMILY MEDICINE CLINIC | Facility: CLINIC | Age: 64
End: 2019-10-18

## 2019-10-18 RX ORDER — ALPRAZOLAM 2 MG/1
2 TABLET ORAL NIGHTLY PRN
Qty: 30 TABLET | Refills: 5 | Status: SHIPPED | OUTPATIENT
Start: 2019-10-18 | End: 2020-03-16

## 2019-10-21 RX ORDER — ALPRAZOLAM 2 MG/1
TABLET ORAL
Qty: 30 TABLET | Refills: 1 | Status: SHIPPED | OUTPATIENT
Start: 2019-10-21 | End: 2019-11-15 | Stop reason: SDUPTHER

## 2019-10-23 PROCEDURE — 88305 TISSUE EXAM BY PATHOLOGIST: CPT | Performed by: FAMILY MEDICINE

## 2019-11-15 ENCOUNTER — OFFICE VISIT (OUTPATIENT)
Dept: FAMILY MEDICINE CLINIC | Facility: CLINIC | Age: 64
End: 2019-11-15

## 2019-11-15 VITALS
HEART RATE: 67 BPM | RESPIRATION RATE: 12 BRPM | WEIGHT: 193.8 LBS | SYSTOLIC BLOOD PRESSURE: 123 MMHG | DIASTOLIC BLOOD PRESSURE: 86 MMHG | BODY MASS INDEX: 31.28 KG/M2

## 2019-11-15 DIAGNOSIS — I10 ESSENTIAL HYPERTENSION: ICD-10-CM

## 2019-11-15 DIAGNOSIS — K20.90 ESOPHAGITIS: ICD-10-CM

## 2019-11-15 DIAGNOSIS — K21.9 LARYNGOPHARYNGEAL REFLUX: Primary | ICD-10-CM

## 2019-11-15 DIAGNOSIS — K21.00 GERD WITH ESOPHAGITIS: ICD-10-CM

## 2019-11-15 PROCEDURE — 99213 OFFICE O/P EST LOW 20 MIN: CPT | Performed by: FAMILY MEDICINE

## 2019-11-15 RX ORDER — SUCRALFATE 1 G/1
1 TABLET ORAL 4 TIMES DAILY
Qty: 120 TABLET | Refills: 5 | Status: SHIPPED | OUTPATIENT
Start: 2019-11-15 | End: 2021-01-29

## 2019-11-15 RX ORDER — ONDANSETRON 4 MG/1
4 TABLET, FILM COATED ORAL EVERY 8 HOURS PRN
Qty: 20 TABLET | Refills: 1 | Status: SHIPPED | OUTPATIENT
Start: 2019-11-15 | End: 2021-01-29

## 2019-11-15 RX ORDER — BENZONATATE 200 MG/1
200 CAPSULE ORAL 3 TIMES DAILY PRN
Qty: 30 CAPSULE | Refills: 1 | Status: SHIPPED | OUTPATIENT
Start: 2019-11-15 | End: 2020-09-10

## 2019-11-15 RX ORDER — METOCLOPRAMIDE 10 MG/1
10 TABLET ORAL
Qty: 120 TABLET | Refills: 5 | Status: SHIPPED | OUTPATIENT
Start: 2019-11-15 | End: 2019-12-20

## 2019-11-15 NOTE — PROGRESS NOTES
Rooming Tab(CC,VS,Pt Hx,Fall Screen)  Chief Complaint   Patient presents with   • Heartburn     The patient is here for a ER f/u       Subjective Patient is a 64-year-old here for follow-up from her emergency room visit where she went to the emergency room complaining of chest pain that was 10 out of 10 substernal pressure that was quite severe.  It was also sharp in nature.  She had no associated shortness of breath nausea vomiting or diaphoresis.  It did not radiate into her arm or into her neck.  She had gone to the emergency room was noted to have a normal chest x-ray and a normal EKG and was discharged.  She is had 2 other recurrences.  She does not feel like it is her heart but feels like it is heartburn because she throws up every time she is eat.  Is been going on for several months were she has severe GERD, worse when she lays down and it becomes a severe cough and she will wake up with acid in her mouth.  She is taking omeprazole twice daily and denies any NSAIDs or smoking or alcohol use.    I have reviewed and updated her medications, medical history and problem list during today's office visit.     Patient Care Team:  Devaughn Reis MD as PCP - General  Devaughn Reis MD as PCP - Family Medicine    Problem List Tab  Medications Tab  Synopsis Tab  Chart Review Tab  Care Everywhere Tab  Immunizations Tab  Patient History Tab    Social History     Tobacco Use   • Smoking status: Never Smoker   • Smokeless tobacco: Never Used   Substance Use Topics   • Alcohol use: No     Frequency: Never       Review of Systems   Constitutional: Negative for chills, fatigue and fever.   HENT: Negative for nosebleeds.    Eyes: Negative for double vision.   Respiratory: Negative for chest tightness and shortness of breath.    Cardiovascular: Positive for chest pain. Negative for palpitations.   Gastrointestinal: Positive for indigestion. Negative for blood in stool.   Genitourinary: Negative for dysuria and hematuria.    Neurological: Negative for dizziness and syncope.   Psychiatric/Behavioral: Negative for depressed mood.       Objective     Rooming Tab(CC,VS,Pt Hx,Fall Screen)  /86 (BP Location: Left arm, Patient Position: Sitting, Cuff Size: Large Adult)   Pulse 67   Resp 12   Wt 87.9 kg (193 lb 12.8 oz)   BMI 31.28 kg/m²     Body mass index is 31.28 kg/m².    Physical Exam   Constitutional: She is oriented to person, place, and time. She appears well-developed and well-nourished. No distress.   HENT:   Head: Normocephalic and atraumatic.   Nose: Nose normal.   Mouth/Throat: Oropharynx is clear and moist.   Eyes: Conjunctivae, EOM and lids are normal. Pupils are equal, round, and reactive to light.   Neck: Trachea normal and normal range of motion. Neck supple. No JVD present. Carotid bruit is not present. No thyroid mass and no thyromegaly present.   No carotid bruits   Cardiovascular: Normal rate, regular rhythm, normal heart sounds and intact distal pulses.   Pulmonary/Chest: Effort normal and breath sounds normal.   Abdominal: Soft. Bowel sounds are normal. There is no tenderness.   Musculoskeletal:   No c/c/e   Neurological: She is alert and oriented to person, place, and time. No cranial nerve deficit.   Skin: Skin is warm and dry.   Psychiatric: She has a normal mood and affect. Her speech is normal and behavior is normal. She is attentive.   Nursing note and vitals reviewed.       Statin Choice Calculator  Data Reviewed:    Xr Chest 2 View    Result Date: 10/23/2019  Impression: No acute process.  Electronically Signed By-Vu Arrieta On:10/23/2019 11:47 AM This report was finalized on [id number] by  Vu Arrieta, .            Lab Results   Component Value Date    BUN 14 08/21/2019    CREATININE 0.90 08/21/2019    EGFRIFNONA 64 08/21/2019     08/21/2019    K 4.0 08/21/2019    CL 99 (L) 08/21/2019    CALCIUM 9.6 08/21/2019    ALBUMIN 3.90 08/21/2019    BILITOT 0.7 08/21/2019    ALKPHOS 100 (H)  08/21/2019    AST 32 08/21/2019    ALT 63 (H) 08/21/2019    TRIG 128 08/21/2019    HDL 50 08/21/2019    VLDL 25.6 08/21/2019    LDL 68 08/21/2019    LDLHDL 1.25 08/21/2019      Assessment/Plan   Order Review Tab  Health Maintenance Tab  Patient Plan/Order Tab  Diagnoses and all orders for this visit:    1. Laryngopharyngeal reflux (Primary)  Assessment & Plan:  This apparently is gotten a lot worse and she is going to need a work-up for this.  Add Reglan 10 mg before meals and block 4 inch blocks under her board    Orders:  -     Ambulatory Referral to Gastroenterology    2. Esophagitis  Assessment & Plan:  As written  Worse      3. GERD with esophagitis  -     Ambulatory Referral to Gastroenterology    4. Essential hypertension  Assessment & Plan:  Hypertension is improving with treatment.  Continue current treatment regimen.  Dietary sodium restriction.  Weight loss.  Regular aerobic exercise.  Continue current medications.  Blood pressure will be reassessed at the next regular appointment.      Other orders  -     metoclopramide (REGLAN) 10 MG tablet; Take 1 tablet by mouth 4 (Four) Times a Day Before Meals & at Bedtime.  Dispense: 120 tablet; Refill: 5  -     sucralfate (CARAFATE) 1 g tablet; Take 1 tablet by mouth 4 (Four) Times a Day.  Dispense: 120 tablet; Refill: 5  -     benzonatate (TESSALON) 200 MG capsule; Take 1 capsule by mouth 3 (Three) Times a Day As Needed for Cough.  Dispense: 30 capsule; Refill: 1  -     ondansetron (ZOFRAN) 4 MG tablet; Take 1 tablet by mouth Every 8 (Eight) Hours As Needed for Nausea or Vomiting.  Dispense: 20 tablet; Refill: 1      Wrapup Tab  Return for Next scheduled follow up.

## 2019-11-17 PROBLEM — K20.90 ESOPHAGITIS: Status: ACTIVE | Noted: 2019-11-17

## 2019-11-17 PROBLEM — K21.00 GERD WITH ESOPHAGITIS: Status: ACTIVE | Noted: 2019-11-17

## 2019-11-17 NOTE — ASSESSMENT & PLAN NOTE
As written  Worse   Spoke with patient. Pt stated that he was returning a call from Dr Collins, but there were no notes documenting call, therefore I am unable to tell him what it was in reference to.  Informed patient that Dr Collins was away from her desk and I would send her a message about his return call. Pt verbalizes understanding.

## 2019-11-17 NOTE — ASSESSMENT & PLAN NOTE
This apparently is gotten a lot worse and she is going to need a work-up for this.  Add Reglan 10 mg before meals and block 4 inch blocks under her board

## 2019-11-26 ENCOUNTER — TELEPHONE (OUTPATIENT)
Dept: FAMILY MEDICINE CLINIC | Facility: CLINIC | Age: 64
End: 2019-11-26

## 2019-11-26 NOTE — TELEPHONE ENCOUNTER
You referred patient to Dr. Murtaza Mcbride for EGD. Patient is wanting to have this done, but according to her. Dr. Mcbride's office needs additional information from you (she did not know what). She is asking if you've received anything from their office. She has been waiting 3 weeks to get EGD scheduled by them. Thank you.

## 2019-12-03 ENCOUNTER — TELEPHONE (OUTPATIENT)
Dept: FAMILY MEDICINE CLINIC | Facility: CLINIC | Age: 64
End: 2019-12-03

## 2019-12-03 DIAGNOSIS — K21.00 GERD WITH ESOPHAGITIS: Primary | ICD-10-CM

## 2019-12-03 DIAGNOSIS — K21.9 LARYNGOPHARYNGEAL REFLUX: ICD-10-CM

## 2019-12-04 ENCOUNTER — TELEPHONE (OUTPATIENT)
Dept: FAMILY MEDICINE CLINIC | Facility: CLINIC | Age: 64
End: 2019-12-04

## 2019-12-04 NOTE — TELEPHONE ENCOUNTER
Daughter called back to see if patient could be worked in today. S/W Sherrie and she stated we could not work her in today and recommended [facility]. Daughter voiced understanding.

## 2019-12-04 NOTE — TELEPHONE ENCOUNTER
Patient called to report she was at Dr. Murtaza Mcbride's office in [place] and they were setting her up to get an EGD done at a later date. At the office visit, her oxygen level was at 73% and she has been coughing a lot. Please call her. Her 's phone number is listed as well. Dr. Mcbride's number is [phone].  Thank you.

## 2019-12-16 RX ORDER — QUETIAPINE FUMARATE 100 MG/1
TABLET, FILM COATED ORAL
Qty: 45 TABLET | Refills: 0 | Status: SHIPPED | OUTPATIENT
Start: 2019-12-16 | End: 2020-01-24

## 2019-12-20 ENCOUNTER — OFFICE VISIT (OUTPATIENT)
Dept: FAMILY MEDICINE CLINIC | Facility: CLINIC | Age: 64
End: 2019-12-20

## 2019-12-20 VITALS
RESPIRATION RATE: 12 BRPM | SYSTOLIC BLOOD PRESSURE: 120 MMHG | BODY MASS INDEX: 32.28 KG/M2 | HEART RATE: 69 BPM | WEIGHT: 200 LBS | DIASTOLIC BLOOD PRESSURE: 83 MMHG

## 2019-12-20 DIAGNOSIS — I10 ESSENTIAL HYPERTENSION: Primary | ICD-10-CM

## 2019-12-20 DIAGNOSIS — H53.40 VISUAL FIELD LOSS: ICD-10-CM

## 2019-12-20 DIAGNOSIS — K21.00 GERD WITH ESOPHAGITIS: ICD-10-CM

## 2019-12-20 DIAGNOSIS — F41.9 ANXIETY: ICD-10-CM

## 2019-12-20 DIAGNOSIS — I63.89 CEREBROVASCULAR ACCIDENT (CVA) DUE TO OTHER MECHANISM (HCC): ICD-10-CM

## 2019-12-20 PROCEDURE — 99214 OFFICE O/P EST MOD 30 MIN: CPT | Performed by: FAMILY MEDICINE

## 2019-12-20 NOTE — PROGRESS NOTES
nRooming Tab(CC,VS,Pt Hx,Fall Screen)  Chief Complaint   Patient presents with   • Hypertension   • Hyperlipidemia       Subjective 64-year-old here for follow-up of her stroke.  The patient feels like her visual field is back to normal but she is not really been tested that I am aware of.  She wants to start driving again, I told her I was not certain that was something I could decide today.  From what I can gather from her history, she has not been to see the ophthalmologist for a peripheral visual field evaluation and I think she needs to go to [facility] for a  assessment.  She is not real happy about this but I do not really know how else to give her permission to drive.  She had a stroke which affected her visual field at least at one time and her cognitive function and I have no way of determining her driving capabilities.  She just needs to see the ophthalmologist and get cleared and needs to go to  assessment.  She denies any headache or chest pain.   She is always anxious and continues to stay depressed, she did not take Vraylar.  She cannot really give me a good reason why she did not do it she just did not.  Her compliance is always been poor.  She also was not taking Reglan for her symptoms of reflux.  She did get her EGD and had a biopsy and dilation by Dr. Mcbride put it.  She also went to see her surgeon and had her lap band reduced.  She was also noted to have carotid artery disease although not overly severe.    I have reviewed and updated her medications, medical history and problem list during today's office visit.     Patient Care Team:  Devaughn Reis MD as PCP - General  Devaughn Reis MD as PCP - Family Medicine    Problem List Tab  Medications Tab  Synopsis Tab  Chart Review Tab  Care Everywhere Tab  Immunizations Tab  Patient History Tab    Social History     Tobacco Use   • Smoking status: Never Smoker   • Smokeless tobacco: Never Used   Substance Use Topics    • Alcohol use: No     Frequency: Never       Review of Systems   Constitutional: Positive for fatigue. Negative for chills and fever.   HENT: Negative for nosebleeds.    Eyes: Negative for double vision.   Respiratory: Negative for chest tightness and shortness of breath.    Cardiovascular: Negative for chest pain and palpitations.   Gastrointestinal: Negative for blood in stool.   Genitourinary: Negative for dysuria and hematuria.   Neurological: Negative for dizziness, seizures and syncope.   Psychiatric/Behavioral: Positive for agitation, decreased concentration and depressed mood. Negative for suicidal ideas. The patient is nervous/anxious.        Objective     Rooming Tab(CC,VS,Pt Hx,Fall Screen)  /83 (BP Location: Right arm, Patient Position: Sitting, Cuff Size: Large Adult)   Pulse 69   Resp 12   Wt 90.7 kg (200 lb)   BMI 32.28 kg/m²     Body mass index is 32.28 kg/m².    Physical Exam   Constitutional: She is oriented to person, place, and time. She appears well-developed and well-nourished. No distress.   The patient today is basically all over the place and trying to obtain a reasonable history.  She is very tangential in thought and switches subjects and I have difficulty getting very good answers out of her own direct questions   HENT:   Head: Normocephalic and atraumatic.   Nose: Nose normal.   Mouth/Throat: Oropharynx is clear and moist.   Eyes: Pupils are equal, round, and reactive to light. Conjunctivae, EOM and lids are normal.   I do not note direct visual field defect on my exam today   Neck: Trachea normal and normal range of motion. Neck supple. No JVD present. Carotid bruit is not present. No thyroid mass and no thyromegaly present.   No carotid bruits   Cardiovascular: Normal rate, regular rhythm, normal heart sounds and intact distal pulses.   Pulmonary/Chest: Effort normal and breath sounds normal.   Musculoskeletal:   No c/c/e   Neurological: She is alert and oriented to person,  place, and time. No cranial nerve deficit.   No obvious focal defects   Skin: Skin is warm and dry.   Psychiatric: Her speech is normal.   Patient is extremely agitated as far as hyperactive, tangential thought pattern, talks throughout the entire interview, changing subjects and having difficulty get any kind of good answers from her.  She is attentive.   Nursing note and vitals reviewed.       Statin Choice Calculator  Data Reviewed:    Xr Chest 2 View    Result Date: 12/4/2019  Impression: No active cardiopulmonary disease.   Electronically Signed By-Jb Molina DO. On:12/4/2019 11:40 PM This report was finalized on [id number] by  Jb Molina DO..                Assessment/Plan   Order Review Tab  Health Maintenance Tab  Patient Plan/Order Tab  Diagnoses and all orders for this visit:    1. Essential hypertension (Primary)  Assessment & Plan:  Hypertension is improving with treatment.  Continue current treatment regimen.  Dietary sodium restriction.  Weight loss.  Regular aerobic exercise.  Continue current medications.  Blood pressure will be reassessed at the next regular appointment.      2. Cerebrovascular accident (CVA) due to other mechanism (CMS/HCC)  Assessment & Plan:  Visual field defect seems better as far as I can tell, she still needs to go see an ophthalmologist for an official visual field evaluation.  I cannot comment on her ability to drive, I do not think she should drive until she has a  assessment at [facility] and I told her she should not be driving until this is done.    Orders:  -     Ambulatory Referral to Physical Therapy    3. Visual field loss left  Assessment & Plan:  Again this seems better by my exam but she needs to have better testing if we are going to let her drive, she needs to ensure that her visual field is back to normal or close enough for driving capabilities    Orders:  -     Ambulatory Referral to Ophthalmology    4. GERD with  esophagitis  Assessment & Plan:  Apparently this is better after having an EGD with dilation or may be more after having her lap band decreased and fluid drawn off of it.  I cannot tell which one helped her more.      5. Anxiety  Assessment & Plan:  She is obviously worse, she really needs to take her Vraylar or at least try it.      Other orders  -     Cariprazine HCl (VRAYLAR) 1.5 MG capsule capsule; Take 1 capsule by mouth Daily.  Dispense: 30 capsule; Refill: 3      Wrapup Tab  Return for Next scheduled follow up.     Long discussion with patient on compliance and stopping medicine or not taking medication has been prescribed to her.  She has had a lot of difficulty in the past with compliance and it seems like this is unfortunately going to be ongoing.  I think she would greatly benefit from being on Vraylar and I will hope she takes it

## 2019-12-21 NOTE — ASSESSMENT & PLAN NOTE
Visual field defect seems better as far as I can tell, she still needs to go see an ophthalmologist for an official visual field evaluation.  I cannot comment on her ability to drive, I do not think she should drive until she has a  assessment at [facility] and I told her she should not be driving until this is done.

## 2019-12-21 NOTE — ASSESSMENT & PLAN NOTE
Apparently this is better after having an EGD with dilation or may be more after having her lap band decreased and fluid drawn off of it.  I cannot tell which one helped her more.

## 2019-12-21 NOTE — ASSESSMENT & PLAN NOTE
Again this seems better by my exam but she needs to have better testing if we are going to let her drive, she needs to ensure that her visual field is back to normal or close enough for driving capabilities

## 2020-01-08 RX ORDER — METOPROLOL SUCCINATE 25 MG/1
TABLET, EXTENDED RELEASE ORAL
Qty: 30 TABLET | Refills: 10 | Status: SHIPPED | OUTPATIENT
Start: 2020-01-08 | End: 2021-01-05 | Stop reason: SDUPTHER

## 2020-01-14 ENCOUNTER — TELEPHONE (OUTPATIENT)
Dept: FAMILY MEDICINE CLINIC | Facility: CLINIC | Age: 65
End: 2020-01-14

## 2020-01-14 NOTE — TELEPHONE ENCOUNTER
Patient called stating that script for Cariprazine HCl (VRAYLAR) 1.5 MG was going to cost over $400 with insurance. Patient states that she cannot afford this and is asking if she can get some samples or try a different med.

## 2020-01-24 RX ORDER — QUETIAPINE FUMARATE 100 MG/1
TABLET, FILM COATED ORAL
Qty: 45 TABLET | Refills: 0 | Status: SHIPPED | OUTPATIENT
Start: 2020-01-24 | End: 2020-03-02

## 2020-02-03 ENCOUNTER — TELEPHONE (OUTPATIENT)
Dept: FAMILY MEDICINE CLINIC | Facility: CLINIC | Age: 65
End: 2020-02-03

## 2020-02-03 RX ORDER — CIPROFLOXACIN 250 MG/1
250 TABLET, FILM COATED ORAL 2 TIMES DAILY
Qty: 14 TABLET | Refills: 0 | Status: SHIPPED | OUTPATIENT
Start: 2020-02-03 | End: 2020-09-10

## 2020-02-10 RX ORDER — LAMOTRIGINE 100 MG/1
TABLET ORAL
Qty: 60 TABLET | Refills: 10 | Status: SHIPPED | OUTPATIENT
Start: 2020-02-10 | End: 2021-03-08 | Stop reason: SDUPTHER

## 2020-02-24 RX ORDER — LISINOPRIL AND HYDROCHLOROTHIAZIDE 25; 20 MG/1; MG/1
TABLET ORAL
Qty: 30 TABLET | Refills: 10 | Status: SHIPPED | OUTPATIENT
Start: 2020-02-24 | End: 2021-01-23

## 2020-03-02 RX ORDER — OMEPRAZOLE 40 MG/1
CAPSULE, DELAYED RELEASE ORAL
Qty: 30 CAPSULE | Refills: 9 | Status: SHIPPED | OUTPATIENT
Start: 2020-03-02 | End: 2020-12-30

## 2020-03-02 RX ORDER — QUETIAPINE FUMARATE 100 MG/1
TABLET, FILM COATED ORAL
Qty: 45 TABLET | Refills: 0 | Status: SHIPPED | OUTPATIENT
Start: 2020-03-02 | End: 2020-03-30

## 2020-03-16 RX ORDER — ALPRAZOLAM 2 MG/1
TABLET ORAL
Qty: 30 TABLET | Refills: 4 | Status: SHIPPED | OUTPATIENT
Start: 2020-03-16 | End: 2020-08-17

## 2020-03-23 ENCOUNTER — TELEPHONE (OUTPATIENT)
Dept: FAMILY MEDICINE CLINIC | Facility: CLINIC | Age: 65
End: 2020-03-23

## 2020-03-23 NOTE — TELEPHONE ENCOUNTER
PT CALLED SAYING THAT SHE SAW ON TV THAT IT HAS BEEN ADVISED THAT PEOPLE STOP TAKING ASPIRIN DUE TO COVID-19. SHE WOULD LIKE TO KNOW WHAT SHE IS SUPPOSED TO TAKE?

## 2020-03-30 RX ORDER — QUETIAPINE FUMARATE 100 MG/1
TABLET, FILM COATED ORAL
Qty: 45 TABLET | Refills: 0 | Status: SHIPPED | OUTPATIENT
Start: 2020-03-30 | End: 2020-04-30

## 2020-04-30 RX ORDER — QUETIAPINE FUMARATE 100 MG/1
TABLET, FILM COATED ORAL
Qty: 45 TABLET | Refills: 0 | Status: SHIPPED | OUTPATIENT
Start: 2020-04-30 | End: 2020-05-28

## 2020-05-28 RX ORDER — QUETIAPINE FUMARATE 100 MG/1
TABLET, FILM COATED ORAL
Qty: 45 TABLET | Refills: 0 | Status: SHIPPED | OUTPATIENT
Start: 2020-05-28 | End: 2020-06-25

## 2020-06-25 RX ORDER — QUETIAPINE FUMARATE 100 MG/1
TABLET, FILM COATED ORAL
Qty: 45 TABLET | Refills: 0 | Status: SHIPPED | OUTPATIENT
Start: 2020-06-25 | End: 2020-07-23

## 2020-07-23 RX ORDER — QUETIAPINE FUMARATE 100 MG/1
TABLET, FILM COATED ORAL
Qty: 45 TABLET | Refills: 0 | Status: SHIPPED | OUTPATIENT
Start: 2020-07-23 | End: 2020-12-11

## 2020-08-17 RX ORDER — ALPRAZOLAM 2 MG/1
TABLET ORAL
Qty: 30 TABLET | Refills: 3 | Status: SHIPPED | OUTPATIENT
Start: 2020-08-17 | End: 2020-12-03 | Stop reason: SDUPTHER

## 2020-09-04 ENCOUNTER — TELEPHONE (OUTPATIENT)
Dept: FAMILY MEDICINE CLINIC | Facility: CLINIC | Age: 65
End: 2020-09-04

## 2020-09-04 NOTE — TELEPHONE ENCOUNTER
Patient called in requesting a hospital follow up, she was seen for dizziness at [facility]. Patient said she needs to be seen asap.    Best call back # [phone]

## 2020-09-08 NOTE — TELEPHONE ENCOUNTER
Spoke with patient at 2:46pm and scheduled an appt with DEISI on 09/10/2020 at 11am.      Sherrie - you might want to try to get the reports from [facility].

## 2020-09-10 ENCOUNTER — OFFICE VISIT (OUTPATIENT)
Dept: FAMILY MEDICINE CLINIC | Facility: CLINIC | Age: 65
End: 2020-09-10

## 2020-09-10 VITALS
DIASTOLIC BLOOD PRESSURE: 84 MMHG | TEMPERATURE: 97.1 F | RESPIRATION RATE: 12 BRPM | HEART RATE: 76 BPM | BODY MASS INDEX: 35.51 KG/M2 | SYSTOLIC BLOOD PRESSURE: 137 MMHG | WEIGHT: 220 LBS

## 2020-09-10 DIAGNOSIS — I10 ESSENTIAL HYPERTENSION: Primary | ICD-10-CM

## 2020-09-10 DIAGNOSIS — R42 VERTIGO: ICD-10-CM

## 2020-09-10 DIAGNOSIS — I65.21 STENOSIS OF RIGHT CAROTID ARTERY: ICD-10-CM

## 2020-09-10 DIAGNOSIS — N32.81 OVERACTIVE BLADDER: ICD-10-CM

## 2020-09-10 DIAGNOSIS — F41.9 ANXIETY: ICD-10-CM

## 2020-09-10 PROCEDURE — 99214 OFFICE O/P EST MOD 30 MIN: CPT | Performed by: FAMILY MEDICINE

## 2020-09-10 RX ORDER — OXYBUTYNIN CHLORIDE 5 MG/1
5 TABLET ORAL EVERY MORNING
Qty: 30 TABLET | Refills: 5 | Status: SHIPPED | OUTPATIENT
Start: 2020-09-10 | End: 2021-03-05 | Stop reason: SDUPTHER

## 2020-09-10 NOTE — ASSESSMENT & PLAN NOTE
This was likely a benign positional vertigo reaction, she has had no recurrence.  It seems to have resolved.  The patient has an appointment with neurology as an outpatient or she supposed to at least.  I recommend she call and double check on her appointment and keep it

## 2020-09-10 NOTE — ASSESSMENT & PLAN NOTE
Her blood pressure seems stable on current medications.  I stressed the importance of persistent compliance on taking her medication daily.  Recommend routine aerobic exercise for cardiovascular health and weight loss.  Low-salt diet.

## 2020-09-10 NOTE — ASSESSMENT & PLAN NOTE
We will make a referral to vascular surgery as it seems she has an 80% obstruction on the right.  Of course decreasing risk factors including taken her baby aspirin daily, consider adding Plavix, cholesterol control and blood pressure control.

## 2020-09-10 NOTE — ASSESSMENT & PLAN NOTE
Worsening apparently.  Unfortunately she did not call for replacement for Vraylar 9 months ago.  We will try Zoloft 50 mg daily.  Stressed compliance and follow-up

## 2020-09-10 NOTE — PROGRESS NOTES
Rooming Tab(CC,VS,Pt Hx,Fall Screen)  Chief Complaint   Patient presents with   • Dizziness     The patient is here for a f/u from [facility] ER from 9/2/20.       Subjective Patient is here for follow-up of multiple problems.  Apparently Thursday of last week she was getting out of bed and got very dizzy which seems to me to be more of a vertigo complaint.  She was off balance and felt very poorly as if she might fall.  She had no ringing in her ear no tinnitus no palpitations no chest pain and no headache.  She had no loss of hearing.  This went on for couple of hours and she told her son about it so he took her to [facility] stroke [place], emergency room where she underwent a work-up including a negative MRI, CTA showing 80% right internal carotid artery obstruction.  Her blood pressure initially was high but improved over the course of her stay.  Her vertigo resolved while she was in the emergency room.  She had no further symptoms and she was felt stable for discharge home.  Her CBC was normal urinalysis was unremarkable, CMP was unremarkable.  Patient was evaluated by neurology and felt stable for discharge home.  She has not had any recurrence of this dizziness.  She does not have any symptoms consistent with orthostasis.  She does have hypertension and the last time I saw her for her after her stroke she was supposed to do a follow-up but did not keep the appointment.  She is been very noncompliant in the past and follow-up and in taking her medication.  I suspect that she has had some poor compliance with her medication and of course with follow-up she is already had poor compliance by missing her last appointment.  She states she primarily takes her blood pressure medicine and feels like she takes it most days but then admits to missing days.  The last time I saw her for her anxiety we tried her on Vraylar and she stopped taking the medication because it was too expensive but did not call for any  type of replacement or keep her appointment.  I do not really know what other medication she is on because she did not bring them.  She does complain of a lot of anxiety still.  She takes Xanax for anxiety but seems to need something more than that.  She denies any depression but she worries about just about everything.  She also complains of urinating frequently.  She has an urgency and frequency but no hematuria or dysuria.  She only drinks 1 cup of coffee a day and does not drink much other caffeine.  This does not seem to bother her very much at night.    I have reviewed and updated her medications, medical history and problem list during today's office visit.     Patient Care Team:  Devaughn Reis MD as PCP - General  Devaughn Reis MD as PCP - Family Medicine    Problem List Tab  Medications Tab  Synopsis Tab  Chart Review Tab  Care Everywhere Tab  Immunizations Tab  Patient History Tab    Social History     Tobacco Use   • Smoking status: Never Smoker   • Smokeless tobacco: Never Used   Substance Use Topics   • Alcohol use: No     Frequency: Never       Review of Systems   Constitutional: Positive for fatigue. Negative for chills and fever.   HENT: Negative for nosebleeds.    Eyes: Negative for double vision.   Respiratory: Negative for chest tightness and shortness of breath.    Cardiovascular: Negative for chest pain and palpitations.   Gastrointestinal: Negative for blood in stool.   Genitourinary: Positive for frequency and urgency. Negative for dysuria and hematuria.   Neurological: Positive for dizziness. Negative for syncope.   Psychiatric/Behavioral: Positive for stress. Negative for self-injury, suicidal ideas and depressed mood. The patient is nervous/anxious.        Objective     Rooming Tab(CC,VS,Pt Hx,Fall Screen)  /84   Pulse 76   Temp 97.1 °F (36.2 °C)   Resp 12   Wt 99.8 kg (220 lb)   BMI 35.51 kg/m²     Body mass index is 35.51 kg/m².    Physical Exam   Constitutional: She is  oriented to person, place, and time. She appears well-developed and well-nourished. No distress.   Pleasant but anxious female in no acute distress.   HENT:   Head: Normocephalic and atraumatic.   Nose: Nose normal.   Mouth/Throat: Oropharynx is clear and moist.   Eyes: Pupils are equal, round, and reactive to light. Conjunctivae, EOM and lids are normal.   Neck: Trachea normal and normal range of motion. Neck supple. No JVD present. Carotid bruit is not present. No thyroid mass and no thyromegaly present.   No carotid bruits   Cardiovascular: Normal rate, regular rhythm, normal heart sounds and intact distal pulses.   Pulmonary/Chest: Effort normal and breath sounds normal.   Musculoskeletal:   No c/c/e   Neurological: She is alert and oriented to person, place, and time. No cranial nerve deficit.   No focal deficits, she actually had a good Romberg and heel-to-toe was normal.  Her visual field seem normal as well   Skin: Skin is warm and dry.   Psychiatric: She has a normal mood and affect. Her speech is normal.   Anxious She is attentive.   Nursing note and vitals reviewed.       Statin Choice Calculator  Data Reviewed:                   Assessment/Plan   Order Review Tab  Health Maintenance Tab  Patient Plan/Order Tab  Diagnoses and all orders for this visit:    1. Essential hypertension (Primary)  Assessment & Plan:  Her blood pressure seems stable on current medications.  I stressed the importance of persistent compliance on taking her medication daily.  Recommend routine aerobic exercise for cardiovascular health and weight loss.  Low-salt diet.      2. Stenosis of right carotid artery  Assessment & Plan:  We will make a referral to vascular surgery as it seems she has an 80% obstruction on the right.  Of course decreasing risk factors including taken her baby aspirin daily, consider adding Plavix, cholesterol control and blood pressure control.    Orders:  -     Ambulatory Referral to Vascular Surgery    3.  Vertigo  Assessment & Plan:  This was likely a benign positional vertigo reaction, she has had no recurrence.  It seems to have resolved.  The patient has an appointment with neurology as an outpatient or she supposed to at least.  I recommend she call and double check on her appointment and keep it      4. Anxiety  Assessment & Plan:  Worsening apparently.  Unfortunately she did not call for replacement for Vraylar 9 months ago.  We will try Zoloft 50 mg daily.  Stressed compliance and follow-up      5. Overactive bladder  Assessment & Plan:  Oxybutynin 5 mg p.o. every morning      Other orders  -     sertraline (Zoloft) 50 MG tablet; Take 1 tablet by mouth Daily.  Dispense: 30 tablet; Refill: 5  -     oxybutynin (DITROPAN) 5 MG tablet; Take 1 tablet by mouth Every Morning. For bladder  Dispense: 30 tablet; Refill: 5      Wrapup Tab  Return in about 4 weeks (around 10/8/2020) for Recheck.

## 2020-10-08 ENCOUNTER — APPOINTMENT (OUTPATIENT)
Dept: VASCULAR SURGERY | Facility: HOSPITAL | Age: 65
End: 2020-10-08

## 2020-10-08 PROCEDURE — G0463 HOSPITAL OUTPT CLINIC VISIT: HCPCS

## 2020-10-09 ENCOUNTER — OFFICE VISIT (OUTPATIENT)
Dept: FAMILY MEDICINE CLINIC | Facility: CLINIC | Age: 65
End: 2020-10-09

## 2020-10-09 VITALS
SYSTOLIC BLOOD PRESSURE: 138 MMHG | BODY MASS INDEX: 34.35 KG/M2 | WEIGHT: 212.8 LBS | RESPIRATION RATE: 12 BRPM | TEMPERATURE: 98 F | HEART RATE: 67 BPM | DIASTOLIC BLOOD PRESSURE: 86 MMHG

## 2020-10-09 DIAGNOSIS — F41.9 ANXIETY: ICD-10-CM

## 2020-10-09 DIAGNOSIS — I65.21 STENOSIS OF RIGHT CAROTID ARTERY: Primary | ICD-10-CM

## 2020-10-09 DIAGNOSIS — I10 ESSENTIAL HYPERTENSION: ICD-10-CM

## 2020-10-09 DIAGNOSIS — I63.89 CEREBROVASCULAR ACCIDENT (CVA) DUE TO OTHER MECHANISM (HCC): ICD-10-CM

## 2020-10-09 PROCEDURE — 99213 OFFICE O/P EST LOW 20 MIN: CPT | Performed by: FAMILY MEDICINE

## 2020-10-09 PROCEDURE — 90686 IIV4 VACC NO PRSV 0.5 ML IM: CPT | Performed by: FAMILY MEDICINE

## 2020-10-09 PROCEDURE — G0008 ADMIN INFLUENZA VIRUS VAC: HCPCS | Performed by: FAMILY MEDICINE

## 2020-10-09 RX ORDER — SERTRALINE HYDROCHLORIDE 100 MG/1
100 TABLET, FILM COATED ORAL DAILY
Qty: 30 TABLET | Refills: 5 | Status: SHIPPED | OUTPATIENT
Start: 2020-10-09 | End: 2020-12-11

## 2020-10-09 NOTE — PROGRESS NOTES
Rooming Tab(CC,VS,Pt Hx,Fall Screen)  Chief Complaint   Patient presents with   • Hypertension       Subjective 65-year-old here for her follow-up.   She had the visit to the [facility] for her vertigo, had 80% internal carotid artery obstruction.  She is supposed to see vascular surgery but I do not know that she seen them yet.  She states she has seen vascular surgery but I do not see any records.  I am not sure what their plan for her is.  She continues with her anxiety and obsessive-compulsive traits.  She could not  afford Vraylar so we put her on Zoloft 50 mg daily and she feels like maybe it is helping a little bit.  She continues to be anxious.  She also has severe hypertension which she states she is not taking her medications at the same time every day which is unfortunate.  We have discussed this multiple times in the past.  She also wants to know why she cannot drive, in the past she had a left visual field defect from her stroke.  If she wants to drive [facility] as her  and son do not feel it is safe for her to drive.  I told her the only way I would be okay with her driving as of she passed a test there.    I have reviewed and updated her medications, medical history and problem list during today's office visit.     Patient Care Team:  Devaughn Reis MD as PCP - General  Devaughn Reis MD as PCP - Family Medicine    Problem List Tab  Medications Tab  Synopsis Tab  Chart Review Tab  Care Everywhere Tab  Immunizations Tab  Patient History Tab    Social History     Tobacco Use   • Smoking status: Never Smoker   • Smokeless tobacco: Never Used   Substance Use Topics   • Alcohol use: No     Frequency: Never       Review of Systems   Constitutional: Negative for chills, fatigue and fever.   HENT: Negative for congestion and nosebleeds.    Eyes: Negative for blurred vision and double vision.   Respiratory: Negative for chest tightness  and shortness of breath.    Cardiovascular: Negative for chest pain and palpitations.   Gastrointestinal: Negative for abdominal pain and blood in stool.   Genitourinary: Negative for dysuria and hematuria.   Neurological: Negative for dizziness and syncope.   Psychiatric/Behavioral: Positive for stress. Negative for self-injury and depressed mood. The patient is nervous/anxious.        Objective     Rooming Tab(CC,VS,Pt Hx,Fall Screen)  /86   Pulse 67   Temp 98 °F (36.7 °C)   Resp 12   Wt 96.5 kg (212 lb 12.8 oz)   BMI 34.35 kg/m²     Body mass index is 34.35 kg/m².    Physical Exam  Vitals signs and nursing note reviewed.   Constitutional:       General: She is not in acute distress.     Appearance: She is well-developed.      Comments: Obese, alert pleasant no acute distress.     HENT:      Head: Normocephalic and atraumatic.      Right Ear: Tympanic membrane and ear canal normal.      Left Ear: Tympanic membrane and ear canal normal.      Nose: Nose normal.      Mouth/Throat:      Mouth: Mucous membranes are moist.      Pharynx: Oropharynx is clear.   Eyes:      General: Lids are normal.      Extraocular Movements: Extraocular movements intact.      Conjunctiva/sclera: Conjunctivae normal.      Pupils: Pupils are equal, round, and reactive to light.   Neck:      Musculoskeletal: Normal range of motion and neck supple.      Thyroid: No thyroid mass or thyromegaly.      Vascular: No carotid bruit or JVD.      Trachea: Trachea normal.      Comments: No carotid bruits  Cardiovascular:      Rate and Rhythm: Normal rate and regular rhythm.      Heart sounds: Normal heart sounds.   Pulmonary:      Effort: Pulmonary effort is normal.      Breath sounds: Normal breath sounds.   Musculoskeletal:      Comments: No c/c/e   Skin:     General: Skin is warm and dry.   Neurological:      Mental Status: She is alert and oriented to person, place, and time.      Cranial Nerves: No cranial nerve deficit.   Psychiatric:          Attention and Perception: She is attentive.         Speech: Speech normal.         Behavior: Behavior normal.          Statin Choice Calculator  Data Reviewed:                   Assessment/Plan   Order Review Tab  Health Maintenance Tab  Patient Plan/Order Tab  Diagnoses and all orders for this visit:    1. Stenosis of right carotid artery (Primary)  Assessment & Plan:  Will await to see what vascular surgery says      2. Essential hypertension  Assessment & Plan:  Her blood pressure seems stable but I discussed with the patient that she needs to take her medications at the same time every day and not at different times.  She needs to schedule set up a schedule where her medications are taken appropriately.      3. Anxiety  Assessment & Plan:  Persistent, will increase her Zoloft 200 mg daily.      4. Cerebrovascular accident (CVA) due to other mechanism (CMS/[facility])  Assessment & Plan:  As far as her ability to drive, as I outlined to her, I think she would best to go to [facility] rehab for evaluation of her driving skills.      Other orders  -     sertraline (Zoloft) 100 MG tablet; Take 1 tablet by mouth Daily.  Dispense: 30 tablet; Refill: 5  -     FluLaval Quad >6 Months ([id number])      Wrapup Tab  Return in about 2 months (around 12/9/2020).

## 2020-10-11 NOTE — ASSESSMENT & PLAN NOTE
As far as her ability to drive, as I outlined to her, I think she would best to [facility].

## 2020-10-11 NOTE — ASSESSMENT & PLAN NOTE
Her blood pressure seems stable but I discussed with the patient that she needs to take her medications at the same time every day and not at different times.  She needs to schedule set up a schedule where her medications are taken appropriately.

## 2020-10-22 ENCOUNTER — LAB (OUTPATIENT)
Dept: LAB | Facility: HOSPITAL | Age: 65
End: 2020-10-22

## 2020-10-22 ENCOUNTER — HOSPITAL ENCOUNTER (OUTPATIENT)
Dept: CARDIOLOGY | Facility: HOSPITAL | Age: 65
Discharge: HOME | End: 2020-10-22

## 2020-10-22 ENCOUNTER — HOSPITAL ENCOUNTER (OUTPATIENT)
Dept: GENERAL RADIOLOGY | Facility: HOSPITAL | Age: 65
Discharge: HOME | End: 2020-10-22

## 2020-10-22 LAB
ABO GROUP BLD: NORMAL
ANION GAP SERPL CALCULATED.3IONS-SCNC: 11 MMOL/L (ref 5–15)
APTT PPP: 25.6 SECONDS (ref 24–31)
BLD GP AB SCN SERPL QL: NEGATIVE
BUN SERPL-MCNC: 10 MG/DL (ref 8–23)
BUN/CREAT SERPL: 11.1 (ref 7–25)
CALCIUM SPEC-SCNC: 9.6 MG/DL (ref 8.6–10.5)
CHLORIDE SERPL-SCNC: 101 MMOL/L (ref 98–107)
CO2 SERPL-SCNC: 28 MMOL/L (ref 22–29)
CREAT SERPL-MCNC: 0.9 MG/DL (ref 0.57–1)
DEPRECATED RDW RBC AUTO: 40.2 FL (ref 37–54)
ERYTHROCYTE [DISTWIDTH] IN BLOOD BY AUTOMATED COUNT: 12.4 % (ref 12.3–15.4)
GFR SERPL CREATININE-BSD FRML MDRD: 63 ML/MIN/1.73
GLUCOSE SERPL-MCNC: 101 MG/DL (ref 65–99)
HCT VFR BLD AUTO: 43 % (ref 34–46.6)
HGB BLD-MCNC: 13.8 G/DL (ref 12–15.9)
INR PPP: 0.96 (ref 0.93–1.1)
MCH RBC QN AUTO: 28.3 PG (ref 26.6–33)
MCHC RBC AUTO-ENTMCNC: 32.1 G/DL (ref 31.5–35.7)
MCV RBC AUTO: 88.3 FL (ref 79–97)
PLATELET # BLD AUTO: 264 10*3/MM3 (ref 140–450)
PMV BLD AUTO: 11.3 FL (ref 6–12)
POTASSIUM SERPL-SCNC: 3.8 MMOL/L (ref 3.5–5.2)
PROTHROMBIN TIME: 10.6 SECONDS (ref 9.6–11.7)
RBC # BLD AUTO: 4.87 10*6/MM3 (ref 3.77–5.28)
RH BLD: NEGATIVE
SODIUM SERPL-SCNC: 140 MMOL/L (ref 136–145)
T&S EXPIRATION DATE: NORMAL
WBC # BLD AUTO: 6.97 10*3/MM3 (ref 3.4–10.8)

## 2020-10-22 PROCEDURE — 93010 ELECTROCARDIOGRAM REPORT: CPT | Performed by: INTERNAL MEDICINE

## 2020-10-22 PROCEDURE — 86901 BLOOD TYPING SEROLOGIC RH(D): CPT

## 2020-10-22 PROCEDURE — 71046 X-RAY EXAM CHEST 2 VIEWS: CPT

## 2020-10-22 PROCEDURE — 86900 BLOOD TYPING SEROLOGIC ABO: CPT | Performed by: SURGERY

## 2020-10-22 PROCEDURE — 85027 COMPLETE CBC AUTOMATED: CPT

## 2020-10-22 PROCEDURE — 80048 BASIC METABOLIC PNL TOTAL CA: CPT

## 2020-10-22 PROCEDURE — 86900 BLOOD TYPING SEROLOGIC ABO: CPT

## 2020-10-22 PROCEDURE — 93005 ELECTROCARDIOGRAM TRACING: CPT | Performed by: SURGERY

## 2020-10-22 PROCEDURE — 86850 RBC ANTIBODY SCREEN: CPT | Performed by: SURGERY

## 2020-10-22 PROCEDURE — 85610 PROTHROMBIN TIME: CPT

## 2020-10-22 PROCEDURE — 85730 THROMBOPLASTIN TIME PARTIAL: CPT

## 2020-10-22 PROCEDURE — 86901 BLOOD TYPING SEROLOGIC RH(D): CPT | Performed by: SURGERY

## 2020-10-28 ENCOUNTER — LAB (OUTPATIENT)
Dept: LAB | Facility: HOSPITAL | Age: 65
End: 2020-10-28

## 2020-10-28 PROCEDURE — U0004 COV-19 TEST NON-CDC HGH THRU: HCPCS

## 2020-10-28 PROCEDURE — C9803 HOPD COVID-19 SPEC COLLECT: HCPCS

## 2020-10-29 ENCOUNTER — ANESTHESIA EVENT (OUTPATIENT)
Dept: PERIOP | Facility: HOSPITAL | Age: 65
End: 2020-10-29

## 2020-10-29 LAB — SARS-COV-2 RNA RESP QL NAA+PROBE: NOT DETECTED

## 2020-10-30 ENCOUNTER — ANESTHESIA (OUTPATIENT)
Dept: PERIOP | Facility: HOSPITAL | Age: 65
End: 2020-10-30

## 2020-10-30 ENCOUNTER — APPOINTMENT (OUTPATIENT)
Dept: CARDIOLOGY | Facility: HOSPITAL | Age: 65
End: 2020-10-30

## 2020-11-07 ENCOUNTER — LAB (OUTPATIENT)
Dept: LAB | Facility: HOSPITAL | Age: 65
End: 2020-11-07

## 2020-11-07 LAB
ABO GROUP BLD: NORMAL
APTT PPP: 27.5 SECONDS (ref 24–31)
BLD GP AB SCN SERPL QL: NEGATIVE
INR PPP: 1 (ref 0.93–1.1)
PROTHROMBIN TIME: 11 SECONDS (ref 9.6–11.7)
RH BLD: NEGATIVE
T&S EXPIRATION DATE: NORMAL

## 2020-11-07 PROCEDURE — C9803 HOPD COVID-19 SPEC COLLECT: HCPCS

## 2020-11-07 PROCEDURE — U0004 COV-19 TEST NON-CDC HGH THRU: HCPCS

## 2020-11-07 PROCEDURE — 86901 BLOOD TYPING SEROLOGIC RH(D): CPT | Performed by: SURGERY

## 2020-11-07 PROCEDURE — 85730 THROMBOPLASTIN TIME PARTIAL: CPT

## 2020-11-07 PROCEDURE — 86850 RBC ANTIBODY SCREEN: CPT | Performed by: SURGERY

## 2020-11-07 PROCEDURE — 86900 BLOOD TYPING SEROLOGIC ABO: CPT | Performed by: SURGERY

## 2020-11-07 PROCEDURE — 85610 PROTHROMBIN TIME: CPT

## 2020-11-08 LAB — SARS-COV-2 RNA RESP QL NAA+PROBE: NOT DETECTED

## 2020-11-10 ENCOUNTER — HOSPITAL ENCOUNTER (OUTPATIENT)
Dept: CARDIOLOGY | Facility: HOSPITAL | Age: 65
Discharge: HOME | End: 2020-11-10

## 2020-11-10 ENCOUNTER — HOSPITAL ENCOUNTER (INPATIENT)
Facility: HOSPITAL | Age: 65
LOS: 1 days | Discharge: HOME | End: 2020-11-11
Attending: SURGERY | Admitting: SURGERY

## 2020-11-10 DIAGNOSIS — I65.23 CAROTID STENOSIS, ASYMPTOMATIC, BILATERAL: Primary | ICD-10-CM

## 2020-11-10 DIAGNOSIS — I65.21 STENOSIS OF RIGHT CAROTID ARTERY: ICD-10-CM

## 2020-11-10 PROBLEM — Z86.73 HISTORY OF STROKE: Status: ACTIVE | Noted: 2020-11-10

## 2020-11-10 LAB
ACT BLD: 175 SECONDS (ref 89–137)
ACT BLD: 235 SECONDS (ref 89–137)
ANION GAP SERPL CALCULATED.3IONS-SCNC: 9 MMOL/L (ref 5–15)
B PARAPERT DNA SPEC QL NAA+PROBE: NOT DETECTED
B PERT DNA SPEC QL NAA+PROBE: NOT DETECTED
BASOPHILS # BLD AUTO: 0 10*3/MM3 (ref 0–0.2)
BASOPHILS NFR BLD AUTO: 0.4 % (ref 0–1.5)
BH CV XLRA MEAS RIGHT DIST CCA EDV: 11.8 CM/SEC
BH CV XLRA MEAS RIGHT DIST CCA PSV: 47 CM/SEC
BH CV XLRA MEAS RIGHT DIST ICA EDV: -16.9 CM/SEC
BH CV XLRA MEAS RIGHT DIST ICA PSV: -40.5 CM/SEC
BH CV XLRA MEAS RIGHT ICA/CCA RATIO: 0.99
BH CV XLRA MEAS RIGHT PROX ICA EDV: -17.9 CM/SEC
BH CV XLRA MEAS RIGHT PROX ICA PSV: -46.3 CM/SEC
BUN SERPL-MCNC: 12 MG/DL (ref 8–23)
BUN/CREAT SERPL: 17.6 (ref 7–25)
C PNEUM DNA NPH QL NAA+NON-PROBE: NOT DETECTED
CALCIUM SPEC-SCNC: 8.4 MG/DL (ref 8.6–10.5)
CHLORIDE SERPL-SCNC: 103 MMOL/L (ref 98–107)
CO2 SERPL-SCNC: 30 MMOL/L (ref 22–29)
CREAT SERPL-MCNC: 0.68 MG/DL (ref 0.57–1)
DEPRECATED RDW RBC AUTO: 42.4 FL (ref 37–54)
EOSINOPHIL # BLD AUTO: 0 10*3/MM3 (ref 0–0.4)
EOSINOPHIL NFR BLD AUTO: 0.1 % (ref 0.3–6.2)
ERYTHROCYTE [DISTWIDTH] IN BLOOD BY AUTOMATED COUNT: 13.7 % (ref 12.3–15.4)
FLUAV H1 2009 PAND RNA NPH QL NAA+PROBE: NOT DETECTED
FLUAV H1 HA GENE NPH QL NAA+PROBE: NOT DETECTED
FLUAV H3 RNA NPH QL NAA+PROBE: NOT DETECTED
FLUAV SUBTYP SPEC NAA+PROBE: NOT DETECTED
FLUBV RNA ISLT QL NAA+PROBE: NOT DETECTED
GFR SERPL CREATININE-BSD FRML MDRD: 87 ML/MIN/1.73
GLUCOSE SERPL-MCNC: 134 MG/DL (ref 65–99)
HADV DNA SPEC NAA+PROBE: NOT DETECTED
HCOV 229E RNA SPEC QL NAA+PROBE: NOT DETECTED
HCOV HKU1 RNA SPEC QL NAA+PROBE: NOT DETECTED
HCOV NL63 RNA SPEC QL NAA+PROBE: NOT DETECTED
HCOV OC43 RNA SPEC QL NAA+PROBE: NOT DETECTED
HCT VFR BLD AUTO: 39.6 % (ref 34–46.6)
HGB BLD-MCNC: 12.9 G/DL (ref 12–15.9)
HMPV RNA NPH QL NAA+NON-PROBE: NOT DETECTED
HPIV1 RNA SPEC QL NAA+PROBE: NOT DETECTED
HPIV2 RNA SPEC QL NAA+PROBE: NOT DETECTED
HPIV3 RNA NPH QL NAA+PROBE: NOT DETECTED
HPIV4 P GENE NPH QL NAA+PROBE: NOT DETECTED
INR PPP: 0.96 (ref 0.93–1.1)
LYMPHOCYTES # BLD AUTO: 0.9 10*3/MM3 (ref 0.7–3.1)
LYMPHOCYTES NFR BLD AUTO: 7.3 % (ref 19.6–45.3)
M PNEUMO IGG SER IA-ACNC: NOT DETECTED
MCH RBC QN AUTO: 28.6 PG (ref 26.6–33)
MCHC RBC AUTO-ENTMCNC: 32.7 G/DL (ref 31.5–35.7)
MCV RBC AUTO: 87.7 FL (ref 79–97)
MONOCYTES # BLD AUTO: 0.2 10*3/MM3 (ref 0.1–0.9)
MONOCYTES NFR BLD AUTO: 1.8 % (ref 5–12)
MRSA DNA SPEC QL NAA+PROBE: NORMAL
NEUTROPHILS NFR BLD AUTO: 11.1 10*3/MM3 (ref 1.7–7)
NEUTROPHILS NFR BLD AUTO: 90.4 % (ref 42.7–76)
NRBC BLD AUTO-RTO: 0 /100 WBC (ref 0–0.2)
PLATELET # BLD AUTO: 212 10*3/MM3 (ref 140–450)
PMV BLD AUTO: 8.7 FL (ref 6–12)
POTASSIUM SERPL-SCNC: 3.8 MMOL/L (ref 3.5–5.2)
PROTHROMBIN TIME: 10.6 SECONDS (ref 9.6–11.7)
RBC # BLD AUTO: 4.51 10*6/MM3 (ref 3.77–5.28)
RHINOVIRUS RNA SPEC NAA+PROBE: NOT DETECTED
RSV RNA NPH QL NAA+NON-PROBE: NOT DETECTED
SARS-COV-2 RNA NPH QL NAA+NON-PROBE: NOT DETECTED
SODIUM SERPL-SCNC: 142 MMOL/L (ref 136–145)
WBC # BLD AUTO: 12.3 10*3/MM3 (ref 3.4–10.8)

## 2020-11-10 PROCEDURE — 03CK0ZZ EXTIRPATION OF MATTER FROM RIGHT INTERNAL CAROTID ARTERY, OPEN APPROACH: ICD-10-PCS | Performed by: SURGERY

## 2020-11-10 PROCEDURE — 25010000002 FENTANYL CITRATE (PF) 100 MCG/2ML SOLUTION: Performed by: NURSE ANESTHETIST, CERTIFIED REGISTERED

## 2020-11-10 PROCEDURE — 25010000002 PROPOFOL 10 MG/ML EMULSION: Performed by: NURSE ANESTHETIST, CERTIFIED REGISTERED

## 2020-11-10 PROCEDURE — 25010000002 HEPARIN (PORCINE) PER 1000 UNITS: Performed by: SURGERY

## 2020-11-10 PROCEDURE — C1768 GRAFT, VASCULAR: HCPCS | Performed by: SURGERY

## 2020-11-10 PROCEDURE — 25010000002 DEXAMETHASONE PER 1 MG: Performed by: NURSE ANESTHETIST, CERTIFIED REGISTERED

## 2020-11-10 PROCEDURE — 25010000002 ONDANSETRON PER 1 MG: Performed by: NURSE ANESTHETIST, CERTIFIED REGISTERED

## 2020-11-10 PROCEDURE — 85347 COAGULATION TIME ACTIVATED: CPT

## 2020-11-10 PROCEDURE — 25010000002 HYDROMORPHONE PER 4 MG: Performed by: NURSE ANESTHETIST, CERTIFIED REGISTERED

## 2020-11-10 PROCEDURE — 85025 COMPLETE CBC W/AUTO DIFF WBC: CPT | Performed by: INTERNAL MEDICINE

## 2020-11-10 PROCEDURE — 25010000002 CEFAZOLIN PER 500 MG: Performed by: SURGERY

## 2020-11-10 PROCEDURE — 80048 BASIC METABOLIC PNL TOTAL CA: CPT | Performed by: INTERNAL MEDICINE

## 2020-11-10 PROCEDURE — 25010000003 CEFAZOLIN PER 500 MG: Performed by: SURGERY

## 2020-11-10 PROCEDURE — 25010000002 HEPARIN (PORCINE) PER 1000 UNITS: Performed by: NURSE ANESTHETIST, CERTIFIED REGISTERED

## 2020-11-10 PROCEDURE — 87641 MR-STAPH DNA AMP PROBE: CPT | Performed by: INTERNAL MEDICINE

## 2020-11-10 PROCEDURE — 93882 EXTRACRANIAL UNI/LTD STUDY: CPT

## 2020-11-10 PROCEDURE — 03UK0KZ SUPPLEMENT RIGHT INTERNAL CAROTID ARTERY WITH NONAUTOLOGOUS TISSUE SUBSTITUTE, OPEN APPROACH: ICD-10-PCS | Performed by: SURGERY

## 2020-11-10 PROCEDURE — 25010000002 PROTAMINE SULFATE PER 10 MG: Performed by: NURSE ANESTHETIST, CERTIFIED REGISTERED

## 2020-11-10 PROCEDURE — 25010000002 MIDAZOLAM PER 1 MG: Performed by: NURSE ANESTHETIST, CERTIFIED REGISTERED

## 2020-11-10 PROCEDURE — 0202U NFCT DS 22 TRGT SARS-COV-2: CPT | Performed by: NURSE PRACTITIONER

## 2020-11-10 PROCEDURE — 85610 PROTHROMBIN TIME: CPT | Performed by: INTERNAL MEDICINE

## 2020-11-10 DEVICE — VASCU-GUARD PERIPHERAL VASCULAR PATCH IS PREPARED FROM BOVINE PERICARDIUM WHICH IS CROSS-LINKED WITH GLUTARALDEHYDE. THE PERICARDIUM IS PROCURED FROM CATTLE ORIGINATING IN THE UNITED STATES. VASCU-GUARD PERIPHERAL VASCULAR PATCH IS CHEMICALLY STERILIZED USING ETHANOL AND PROPYLENE OXIDE. VASCU-GUARD PERIPHERAL VASCULAR PATCH HAS BEEN TREATED WITH 1 MOLAR SODIUM HYDROXIDE FOR A MINIMUM OF 60 MINUTES AT 20 - 25°C.  VASCU-GUARD PERIPHERAL VASCULAR PATCH IS PACKAGED IN A CONTAINER FILLED WITH STERILE, NON-PYROGENIC WATER CONTAINING PROPYLENE OXIDE. THE CONTENTS OF THE UNOPENED, UNDAMAGED CONTAINER ARE STERILE.
Type: IMPLANTABLE DEVICE | Site: NECK | Status: FUNCTIONAL
Brand: VASCU-GUARD PERIPHERAL VASCULAR PATCH

## 2020-11-10 DEVICE — LIGACLIP MCA MULTIPLE CLIP APPLIERS, 20 SMALL CLIPS
Type: IMPLANTABLE DEVICE | Site: NECK | Status: FUNCTIONAL
Brand: LIGACLIP

## 2020-11-10 RX ORDER — ACETAMINOPHEN 650 MG/1
650 SUPPOSITORY RECTAL ONCE AS NEEDED
Status: DISCONTINUED | OUTPATIENT
Start: 2020-11-10 | End: 2020-11-10 | Stop reason: HOSPADM

## 2020-11-10 RX ORDER — GLYCOPYRROLATE 1 MG/5 ML
SYRINGE (ML) INTRAVENOUS AS NEEDED
Status: DISCONTINUED | OUTPATIENT
Start: 2020-11-10 | End: 2020-11-10 | Stop reason: SURG

## 2020-11-10 RX ORDER — HYDROCODONE BITARTRATE AND ACETAMINOPHEN 7.5; 325 MG/1; MG/1
1 TABLET ORAL ONCE AS NEEDED
Status: DISCONTINUED | OUTPATIENT
Start: 2020-11-10 | End: 2020-11-10 | Stop reason: HOSPADM

## 2020-11-10 RX ORDER — ALPRAZOLAM 1 MG/1
2 TABLET ORAL NIGHTLY PRN
Status: DISCONTINUED | OUTPATIENT
Start: 2020-11-10 | End: 2020-11-11 | Stop reason: HOSPADM

## 2020-11-10 RX ORDER — SODIUM CHLORIDE, SODIUM LACTATE, POTASSIUM CHLORIDE, CALCIUM CHLORIDE 600; 310; 30; 20 MG/100ML; MG/100ML; MG/100ML; MG/100ML
9 INJECTION, SOLUTION INTRAVENOUS CONTINUOUS PRN
Status: DISCONTINUED | OUTPATIENT
Start: 2020-11-10 | End: 2020-11-10 | Stop reason: HOSPADM

## 2020-11-10 RX ORDER — HYDROMORPHONE HCL 110MG/55ML
PATIENT CONTROLLED ANALGESIA SYRINGE INTRAVENOUS AS NEEDED
Status: DISCONTINUED | OUTPATIENT
Start: 2020-11-10 | End: 2020-11-10 | Stop reason: SURG

## 2020-11-10 RX ORDER — ATORVASTATIN CALCIUM 40 MG/1
80 TABLET, FILM COATED ORAL NIGHTLY
Status: DISCONTINUED | OUTPATIENT
Start: 2020-11-10 | End: 2020-11-11 | Stop reason: HOSPADM

## 2020-11-10 RX ORDER — PANTOPRAZOLE SODIUM 40 MG/1
40 TABLET, DELAYED RELEASE ORAL
Status: DISCONTINUED | OUTPATIENT
Start: 2020-11-11 | End: 2020-11-11 | Stop reason: HOSPADM

## 2020-11-10 RX ORDER — PROMETHAZINE HYDROCHLORIDE 25 MG/1
25 SUPPOSITORY RECTAL ONCE AS NEEDED
Status: DISCONTINUED | OUTPATIENT
Start: 2020-11-10 | End: 2020-11-10 | Stop reason: HOSPADM

## 2020-11-10 RX ORDER — HYDROMORPHONE HCL 110MG/55ML
0.5 PATIENT CONTROLLED ANALGESIA SYRINGE INTRAVENOUS
Status: DISCONTINUED | OUTPATIENT
Start: 2020-11-10 | End: 2020-11-10 | Stop reason: HOSPADM

## 2020-11-10 RX ORDER — LIDOCAINE HYDROCHLORIDE 20 MG/ML
INJECTION, SOLUTION EPIDURAL; INFILTRATION; INTRACAUDAL; PERINEURAL AS NEEDED
Status: DISCONTINUED | OUTPATIENT
Start: 2020-11-10 | End: 2020-11-10 | Stop reason: SURG

## 2020-11-10 RX ORDER — ONDANSETRON 2 MG/ML
4 INJECTION INTRAMUSCULAR; INTRAVENOUS ONCE AS NEEDED
Status: DISCONTINUED | OUTPATIENT
Start: 2020-11-10 | End: 2020-11-10 | Stop reason: HOSPADM

## 2020-11-10 RX ORDER — SERTRALINE HYDROCHLORIDE 100 MG/1
100 TABLET, FILM COATED ORAL DAILY
Status: DISCONTINUED | OUTPATIENT
Start: 2020-11-11 | End: 2020-11-11 | Stop reason: HOSPADM

## 2020-11-10 RX ORDER — HYDROCODONE BITARTRATE AND ACETAMINOPHEN 5; 325 MG/1; MG/1
1 TABLET ORAL EVERY 4 HOURS PRN
Status: DISCONTINUED | OUTPATIENT
Start: 2020-11-10 | End: 2020-11-11 | Stop reason: HOSPADM

## 2020-11-10 RX ORDER — ROCURONIUM BROMIDE 10 MG/ML
INJECTION, SOLUTION INTRAVENOUS AS NEEDED
Status: DISCONTINUED | OUTPATIENT
Start: 2020-11-10 | End: 2020-11-10 | Stop reason: SURG

## 2020-11-10 RX ORDER — ASPIRIN 81 MG/1
81 TABLET, CHEWABLE ORAL DAILY
Status: DISCONTINUED | OUTPATIENT
Start: 2020-11-11 | End: 2020-11-11 | Stop reason: HOSPADM

## 2020-11-10 RX ORDER — SODIUM CHLORIDE 0.9 % (FLUSH) 0.9 %
10 SYRINGE (ML) INJECTION EVERY 12 HOURS SCHEDULED
Status: DISCONTINUED | OUTPATIENT
Start: 2020-11-10 | End: 2020-11-11 | Stop reason: HOSPADM

## 2020-11-10 RX ORDER — SODIUM CHLORIDE 0.9 % (FLUSH) 0.9 %
10 SYRINGE (ML) INJECTION EVERY 12 HOURS SCHEDULED
Status: DISCONTINUED | OUTPATIENT
Start: 2020-11-10 | End: 2020-11-10 | Stop reason: HOSPADM

## 2020-11-10 RX ORDER — ESMOLOL HYDROCHLORIDE 10 MG/ML
INJECTION INTRAVENOUS AS NEEDED
Status: DISCONTINUED | OUTPATIENT
Start: 2020-11-10 | End: 2020-11-10 | Stop reason: SURG

## 2020-11-10 RX ORDER — SODIUM CHLORIDE 0.9 % (FLUSH) 0.9 %
10 SYRINGE (ML) INJECTION AS NEEDED
Status: DISCONTINUED | OUTPATIENT
Start: 2020-11-10 | End: 2020-11-10 | Stop reason: HOSPADM

## 2020-11-10 RX ORDER — MIDAZOLAM HYDROCHLORIDE 1 MG/ML
INJECTION INTRAMUSCULAR; INTRAVENOUS AS NEEDED
Status: DISCONTINUED | OUTPATIENT
Start: 2020-11-10 | End: 2020-11-10 | Stop reason: SURG

## 2020-11-10 RX ORDER — SODIUM CHLORIDE, SODIUM LACTATE, POTASSIUM CHLORIDE, CALCIUM CHLORIDE 600; 310; 30; 20 MG/100ML; MG/100ML; MG/100ML; MG/100ML
75 INJECTION, SOLUTION INTRAVENOUS CONTINUOUS
Status: DISCONTINUED | OUTPATIENT
Start: 2020-11-10 | End: 2020-11-11

## 2020-11-10 RX ORDER — SODIUM CHLORIDE 0.9 % (FLUSH) 0.9 %
10 SYRINGE (ML) INJECTION AS NEEDED
Status: DISCONTINUED | OUTPATIENT
Start: 2020-11-10 | End: 2020-11-11 | Stop reason: HOSPADM

## 2020-11-10 RX ORDER — LABETALOL HYDROCHLORIDE 5 MG/ML
5 INJECTION, SOLUTION INTRAVENOUS
Status: DISCONTINUED | OUTPATIENT
Start: 2020-11-10 | End: 2020-11-10 | Stop reason: HOSPADM

## 2020-11-10 RX ORDER — KETOROLAC TROMETHAMINE 15 MG/ML
15 INJECTION, SOLUTION INTRAMUSCULAR; INTRAVENOUS EVERY 6 HOURS PRN
Status: DISCONTINUED | OUTPATIENT
Start: 2020-11-10 | End: 2020-11-11 | Stop reason: HOSPADM

## 2020-11-10 RX ORDER — PROPOFOL 10 MG/ML
VIAL (ML) INTRAVENOUS AS NEEDED
Status: DISCONTINUED | OUTPATIENT
Start: 2020-11-10 | End: 2020-11-10 | Stop reason: SURG

## 2020-11-10 RX ORDER — NEOSTIGMINE METHYLSULFATE 5 MG/5 ML
SYRINGE (ML) INTRAVENOUS AS NEEDED
Status: DISCONTINUED | OUTPATIENT
Start: 2020-11-10 | End: 2020-11-10 | Stop reason: SURG

## 2020-11-10 RX ORDER — ACETAMINOPHEN 325 MG/1
650 TABLET ORAL ONCE AS NEEDED
Status: DISCONTINUED | OUTPATIENT
Start: 2020-11-10 | End: 2020-11-10 | Stop reason: HOSPADM

## 2020-11-10 RX ORDER — NICARDIPINE HYDROCHLORIDE 2.5 MG/ML
INJECTION INTRAVENOUS AS NEEDED
Status: DISCONTINUED | OUTPATIENT
Start: 2020-11-10 | End: 2020-11-10 | Stop reason: SURG

## 2020-11-10 RX ORDER — ONDANSETRON 4 MG/1
4 TABLET, FILM COATED ORAL EVERY 6 HOURS PRN
Status: DISCONTINUED | OUTPATIENT
Start: 2020-11-10 | End: 2020-11-11 | Stop reason: HOSPADM

## 2020-11-10 RX ORDER — METOPROLOL SUCCINATE 25 MG/1
25 TABLET, EXTENDED RELEASE ORAL
Status: DISCONTINUED | OUTPATIENT
Start: 2020-11-11 | End: 2020-11-11 | Stop reason: HOSPADM

## 2020-11-10 RX ORDER — FENTANYL CITRATE 50 UG/ML
INJECTION, SOLUTION INTRAMUSCULAR; INTRAVENOUS AS NEEDED
Status: DISCONTINUED | OUTPATIENT
Start: 2020-11-10 | End: 2020-11-10 | Stop reason: SURG

## 2020-11-10 RX ORDER — PROTAMINE SULFATE 10 MG/ML
INJECTION, SOLUTION INTRAVENOUS AS NEEDED
Status: DISCONTINUED | OUTPATIENT
Start: 2020-11-10 | End: 2020-11-10 | Stop reason: SURG

## 2020-11-10 RX ORDER — ONDANSETRON 2 MG/ML
INJECTION INTRAMUSCULAR; INTRAVENOUS AS NEEDED
Status: DISCONTINUED | OUTPATIENT
Start: 2020-11-10 | End: 2020-11-10 | Stop reason: SURG

## 2020-11-10 RX ORDER — HEPARIN SODIUM 1000 [USP'U]/ML
INJECTION, SOLUTION INTRAVENOUS; SUBCUTANEOUS AS NEEDED
Status: DISCONTINUED | OUTPATIENT
Start: 2020-11-10 | End: 2020-11-10 | Stop reason: SURG

## 2020-11-10 RX ORDER — DEXAMETHASONE SODIUM PHOSPHATE 4 MG/ML
INJECTION, SOLUTION INTRA-ARTICULAR; INTRALESIONAL; INTRAMUSCULAR; INTRAVENOUS; SOFT TISSUE AS NEEDED
Status: DISCONTINUED | OUTPATIENT
Start: 2020-11-10 | End: 2020-11-10 | Stop reason: SURG

## 2020-11-10 RX ORDER — ONDANSETRON 2 MG/ML
4 INJECTION INTRAMUSCULAR; INTRAVENOUS EVERY 6 HOURS PRN
Status: DISCONTINUED | OUTPATIENT
Start: 2020-11-10 | End: 2020-11-11 | Stop reason: HOSPADM

## 2020-11-10 RX ORDER — LAMOTRIGINE 100 MG/1
100 TABLET ORAL 2 TIMES DAILY
Status: DISCONTINUED | OUTPATIENT
Start: 2020-11-10 | End: 2020-11-11 | Stop reason: HOSPADM

## 2020-11-10 RX ORDER — PROMETHAZINE HYDROCHLORIDE 25 MG/1
25 TABLET ORAL ONCE AS NEEDED
Status: DISCONTINUED | OUTPATIENT
Start: 2020-11-10 | End: 2020-11-10 | Stop reason: HOSPADM

## 2020-11-10 RX ORDER — OXYBUTYNIN CHLORIDE 5 MG/1
5 TABLET ORAL DAILY
Status: DISCONTINUED | OUTPATIENT
Start: 2020-11-10 | End: 2020-11-11 | Stop reason: HOSPADM

## 2020-11-10 RX ADMIN — PROTAMINE SULFATE 40 MG: 10 INJECTION, SOLUTION INTRAVENOUS at 13:26

## 2020-11-10 RX ADMIN — PROPOFOL 150 MG: 10 INJECTION, EMULSION INTRAVENOUS at 11:25

## 2020-11-10 RX ADMIN — HEPARIN SODIUM 2500 UNITS: 1000 INJECTION INTRAVENOUS; SUBCUTANEOUS at 13:04

## 2020-11-10 RX ADMIN — CEFAZOLIN SODIUM 2 G: 1 INJECTION, POWDER, FOR SOLUTION INTRAMUSCULAR; INTRAVENOUS at 11:32

## 2020-11-10 RX ADMIN — ATORVASTATIN CALCIUM 80 MG: 40 TABLET, FILM COATED ORAL at 21:00

## 2020-11-10 RX ADMIN — FENTANYL CITRATE 50 MCG: 50 INJECTION, SOLUTION INTRAMUSCULAR; INTRAVENOUS at 11:31

## 2020-11-10 RX ADMIN — HYDROMORPHONE HYDROCHLORIDE 0.5 MG: 2 INJECTION, SOLUTION INTRAMUSCULAR; INTRAVENOUS; SUBCUTANEOUS at 14:27

## 2020-11-10 RX ADMIN — SODIUM CHLORIDE, POTASSIUM CHLORIDE, SODIUM LACTATE AND CALCIUM CHLORIDE: 600; 310; 30; 20 INJECTION, SOLUTION INTRAVENOUS at 13:19

## 2020-11-10 RX ADMIN — HYDROMORPHONE HYDROCHLORIDE 0.5 MG: 2 INJECTION, SOLUTION INTRAMUSCULAR; INTRAVENOUS; SUBCUTANEOUS at 15:20

## 2020-11-10 RX ADMIN — NICARDIPINE HYDROCHLORIDE 1.5 MG: 2.5 INJECTION INTRAVENOUS at 12:37

## 2020-11-10 RX ADMIN — LIDOCAINE HYDROCHLORIDE 100 MG: 20 INJECTION, SOLUTION EPIDURAL; INFILTRATION; INTRACAUDAL; PERINEURAL at 11:25

## 2020-11-10 RX ADMIN — LAMOTRIGINE 100 MG: 100 TABLET ORAL at 21:00

## 2020-11-10 RX ADMIN — HYDROCODONE BITARTRATE AND ACETAMINOPHEN 1 TABLET: 5; 325 TABLET ORAL at 21:00

## 2020-11-10 RX ADMIN — HEPARIN SODIUM 8000 UNITS: 1000 INJECTION INTRAVENOUS; SUBCUTANEOUS at 12:02

## 2020-11-10 RX ADMIN — SODIUM CHLORIDE, POTASSIUM CHLORIDE, SODIUM LACTATE AND CALCIUM CHLORIDE: 600; 310; 30; 20 INJECTION, SOLUTION INTRAVENOUS at 11:14

## 2020-11-10 RX ADMIN — SODIUM CHLORIDE, POTASSIUM CHLORIDE, SODIUM LACTATE AND CALCIUM CHLORIDE 9 ML/HR: 600; 310; 30; 20 INJECTION, SOLUTION INTRAVENOUS at 11:02

## 2020-11-10 RX ADMIN — NICARDIPINE HYDROCHLORIDE 0.5 MG: 2.5 INJECTION INTRAVENOUS at 13:42

## 2020-11-10 RX ADMIN — ESMOLOL HYDROCHLORIDE 30 MG: 10 INJECTION, SOLUTION INTRAVENOUS at 11:34

## 2020-11-10 RX ADMIN — ROCURONIUM BROMIDE 40 MG: 10 INJECTION, SOLUTION INTRAVENOUS at 11:25

## 2020-11-10 RX ADMIN — MIDAZOLAM 2 MG: 1 INJECTION INTRAMUSCULAR; INTRAVENOUS at 11:17

## 2020-11-10 RX ADMIN — HYDROMORPHONE HYDROCHLORIDE 0.5 MG: 2 INJECTION INTRAMUSCULAR; INTRAVENOUS; SUBCUTANEOUS at 14:00

## 2020-11-10 RX ADMIN — ALPRAZOLAM 2 MG: 1 TABLET ORAL at 21:00

## 2020-11-10 RX ADMIN — NICARDIPINE HYDROCHLORIDE 0.5 MG: 2.5 INJECTION INTRAVENOUS at 12:33

## 2020-11-10 RX ADMIN — Medication 10 ML: at 21:01

## 2020-11-10 RX ADMIN — ESMOLOL HYDROCHLORIDE 20 MG: 10 INJECTION, SOLUTION INTRAVENOUS at 11:44

## 2020-11-10 RX ADMIN — CEFAZOLIN SODIUM 2 G: 10 INJECTION, POWDER, FOR SOLUTION INTRAVENOUS at 22:10

## 2020-11-10 RX ADMIN — ESMOLOL HYDROCHLORIDE 30 MG: 10 INJECTION, SOLUTION INTRAVENOUS at 11:49

## 2020-11-10 RX ADMIN — SODIUM CHLORIDE, POTASSIUM CHLORIDE, SODIUM LACTATE AND CALCIUM CHLORIDE 75 ML/HR: 600; 310; 30; 20 INJECTION, SOLUTION INTRAVENOUS at 21:02

## 2020-11-10 RX ADMIN — DEXAMETHASONE SODIUM PHOSPHATE 8 MG: 4 INJECTION, SOLUTION INTRAMUSCULAR; INTRAVENOUS at 11:32

## 2020-11-10 RX ADMIN — FENTANYL CITRATE 50 MCG: 50 INJECTION, SOLUTION INTRAMUSCULAR; INTRAVENOUS at 11:22

## 2020-11-10 RX ADMIN — ONDANSETRON 4 MG: 2 INJECTION INTRAMUSCULAR; INTRAVENOUS at 13:28

## 2020-11-10 RX ADMIN — Medication 0.6 MG: at 13:40

## 2020-11-10 RX ADMIN — HYDROMORPHONE HYDROCHLORIDE 0.5 MG: 2 INJECTION INTRAMUSCULAR; INTRAVENOUS; SUBCUTANEOUS at 13:55

## 2020-11-10 RX ADMIN — HYDROMORPHONE HYDROCHLORIDE 1 MG: 2 INJECTION INTRAMUSCULAR; INTRAVENOUS; SUBCUTANEOUS at 11:48

## 2020-11-10 RX ADMIN — NICARDIPINE HYDROCHLORIDE 1 MG: 2.5 INJECTION INTRAVENOUS at 13:46

## 2020-11-10 RX ADMIN — NICARDIPINE HYDROCHLORIDE 1 MG: 2.5 INJECTION INTRAVENOUS at 12:21

## 2020-11-10 RX ADMIN — NICARDIPINE HYDROCHLORIDE 1 MG: 2.5 INJECTION INTRAVENOUS at 12:23

## 2020-11-10 RX ADMIN — Medication 3 MG: at 13:40

## 2020-11-10 NOTE — ANESTHESIA POSTPROCEDURE EVALUATION
Patient: Andree Deluna    Procedure Summary     Date: 11/10/20 Room / Location: [facility] OR  / [facility] MAIN OR    Anesthesia Start: 1114 Anesthesia Stop: 1404    Procedure: CAROTID ENDARTERECTOMY (Right Neck) Diagnosis:       Carotid stenosis      (Carotid stenosis [I65.29])    Surgeon: Tavo Das MD Provider: Shaw Lobo MD    Anesthesia Type: general ASA Status: 3          Anesthesia Type: general    Vitals  Vitals Value Taken Time   /75 11/10/20 1505   Temp 98.2 °F (36.8 °C) 11/10/20 1401   Pulse 90 11/10/20 1506   Resp 10 11/10/20 1446   SpO2 96 % 11/10/20 1506   Vitals shown include unvalidated device data.        Post Anesthesia Care and Evaluation    Patient location during evaluation: bedside  Patient participation: complete - patient participated  Level of consciousness: awake and alert  Pain score: 1  Pain management: adequate  Airway patency: patent  Anesthetic complications: No anesthetic complications  PONV Status: none  Cardiovascular status: acceptable  Respiratory status: acceptable  Hydration status: acceptable  Post Neuraxial Block status: Motor and sensory function returned to baseline

## 2020-11-10 NOTE — ANESTHESIA PREPROCEDURE EVALUATION
Anesthesia Evaluation     Patient summary reviewed and Nursing notes reviewed   history of anesthetic complications:  NPO Solid Status: > 6 hours  NPO Liquid Status: > 6 hours           Airway   Mallampati: II  TM distance: >3 FB  Neck ROM: full  No difficulty expected  Dental - normal exam     Pulmonary - normal exam    breath sounds clear to auscultation  (+) sleep apnea,   Cardiovascular - normal exam    ECG reviewed  Rhythm: regular  Rate: normal    (+) hypertension, hyperlipidemia,  carotid artery disease      Neuro/Psych  (+) CVA, dizziness/light headedness, psychiatric history,     GI/Hepatic/Renal/Endo    (+) obesity, morbid obesity, GERD,      Musculoskeletal     Abdominal  - normal exam    Abdomen: soft.  Bowel sounds: normal.   Substance History - negative use     OB/GYN negative ob/gyn ROS         Other   arthritis,                      Anesthesia Plan    ASA 3     general   (GA tyrese)  intravenous induction     Anesthetic plan, all risks, benefits, and alternatives have been provided, discussed and informed consent has been obtained with: patient.  Use of blood products discussed with patient .   Plan discussed with CRNA.

## 2020-11-10 NOTE — OP NOTE
Date of Admission:  11/10/2020  Today's Date:  11/10/20  Tavo Das MD  [facility]    Preoperative Diagnosis:   Asymptomatic high-grade right internal carotid artery stenosis    Postoperative Diagnosis:   Asymptomatic high-grade right internal carotid artery stenosis    Procedure Performed:   Right carotid endarterectomy with bovine pericardial patch angioplasty.    Surgeon:   Tavo Das MD    Assistant:    Kamla SHAH , Provided critical assistance in exposure, retraction, and suction that overall decrease blood loss and operative time.    Anesthesia:   General    Estimated Blood Loss:    cc    Findings:    Distal carotid bifurcation.  Complex calcified plaque with high-grade stenosis.  Nonpulsatile backbleeding in internal carotid artery with common carotid artery clamped, so shunt was used for procedure.  Normal intraoperative duplex scan.    Implants:    Implant Name Type Inv. Item Serial No.  Lot No. LRB No. Used Action   CLIPAPPLR M/ ENDO LIGACLIP 9 3/8IN SM - [id number] Implant CLIPAPPLR M/ ENDO LIGACLIP 9 3/8IN SM  [employer]  DIV OF J AND J [id number] Right 1 Implanted   PTCH VASCUGUARD 0.8X8CM - [id number] Implant PTCH VASCUGUARD 0.8X8CM  [employer] [id number] Right 1 Implanted     Staff:   Circulator: Pham Fontanez, RADHA; Lashay Montiel RN; Boni Lora RN  Scrub Person: Christian Newby; Amelie Muñoz  Assistant: Kamla Dailey CSA    Specimen:   None    Complications:   None    Dispo:   to PACU    Indication for procedure:   65 y.o. female with a remote history of stroke, likely lacunar, who recently has been evaluated for nonspecific neurologic symptoms including dizziness, memory loss and frequent falls.  She was identified with high-grade right ICA stenosis and moderate grade left ICA stenosis.  For stroke prevention she submits now for carotid endarterectomy.    Description of procedure:   The patient was given Kefzol IV.  She was  transferred to the operating room and positioned supine the operating table.  After the induction of general endotracheal anesthesia a radial arterial line was placed.  A shoulder roll was placed.  The right neck and chest were prepared with ChloraPrep and draped in a sterile fashion.  An oblique cervical incision was made and carried through the subcutaneous and platysma layers.  The anterior margin of the sternocleidomastoid was defined.  The internal jugular vein was identified in its medial margin was dissected.  The facial vein was identified low in the neck, just superior to the omohyoid muscle.  Care was taken to ensure that there were no nearby nerves, and the facial vein was divided between suture ligatures.  The internal jugular vein was retracted laterally.  The common carotid artery was identified and isolated in the base of the neck.  The vagus nerve was identified and preserved intact throughout the procedure.  Distal dissection facilitated isolation of the external carotid artery.  The superior thyroid artery was very small, and was occluded with a Hemoclip.  The internal carotid artery was posterior and medial to the external carotid artery, which made the dissection very difficult.  The hypoglossal nerve was identified in two separate slips, and both were preserved intact.  The ansa cervicalis was divided between hemoclips.  With a fair amount of difficulty, the distal internal carotid artery was isolated.  The patient was heparinized.  ACT maintained twice normal.  After adequate circulation time the internal, external and common carotid arteries were clamped.  The internal carotid artery clamp was removed, but minimal pulse was palpated in the common carotid artery distal to the clamp.  The internal carotid artery clamp was replaced and antegrade flow was restored first to the external and then to the internal carotid artery.  An external Sundt shunt was prepared in the usual fashion.  The  internal/external and common carotid arteries were again clamped.  A longitudinal common carotid arteriotomy was made, which was extended onto the internal carotid artery through a densely calcified highly stenotic lesion.  The lesion extended a fair amount distally, as expected.  The shunt was passed antegrade into the ICA distally where its position was fixed with a shunt clamp.  The arteriotomy was allowed to fill with blood from the shunt, and the shunt was then passed retrograde into the common carotid artery where its position was fixed with another shunt clamp.  Proper shunt function was confirmed with the Doppler.  A deep medial endarterectomy plane was established in the common carotid artery, where the plaque was transected sharply.  The endarterectomy continued distally.  Eversion endarterectomy of the external carotid artery was performed.  The endarterectomy continued distally.  There was a large posteriorly-based plaque which extended distally, but which feathered very nicely.  Remaining debris was removed from the endarterectomy site.  The site was irrigated with heparinized saline.  There was no remaining debris as the endpoints were very favorable.  Bovine pericardial patch angioplasty was performed in the usual fashion.  Prior to suture line completion, the shunt was clamped and removed, and the common and internal carotid artery clamps were reapplied.  The suture line was continued and flushing maneuvers were performed.  The suture line was completed.  Antegrade flow restored first to the external and then to the internal carotid artery.  Good pulse palpated in the internal and external carotid arteries following the procedure.  The configuration of the bifurcation was favorable.  Intraoperative duplex ultrasonography demonstrated widely patent common internal and external carotid arteries in the neck.  There were no technical imperfections.  Normal velocities.  The result was accepted.  Heparin  was reversed with protamine 40 mg IV.  When suture line and wound hemostasis were complete, the wound was closed in layers with Vicryl sutures.  Dermabond was applied.  At his conclusion the patient had tolerated the procedure well without apparent complication.  Sponge and needle counts were correct.  The patient emerged from general anesthesia in the operating room and was seen to be moving all of her extremities well to command and protruding her tongue in the midline.  She was taken to the recovery area in stable condition.    Tavo Das MD  11/10/20     Active Hospital Problems    Diagnosis  POA   • **Carotid stenosis, bilateral [I65.23]  Yes   • History of stroke [Z86.73]  Not Applicable   • Carotid stenosis, asymptomatic, bilateral [I65.23]  Yes      Resolved Hospital Problems   No resolved problems to display.

## 2020-11-10 NOTE — ANESTHESIA PROCEDURE NOTES
Airway  Urgency: elective    Date/Time: 11/10/2020 11:28 AM  Airway not difficult    General Information and Staff    Patient location during procedure: OR  Anesthesiologist: Shaw Lobo MD  CRNA: Sherrie Fong CRNA    Indications and Patient Condition  Indications for airway management: airway protection    Preoxygenated: yes  Mask difficulty assessment: 1 - vent by mask    Final Airway Details  Final airway type: endotracheal airway      Successful airway: ETT  Cuffed: yes   Successful intubation technique: direct laryngoscopy  Facilitating devices/methods: cricoid pressure and intubating stylet  Endotracheal tube insertion site: oral  Blade: Derrek  Blade size: 3  ETT size (mm): 7.5  Cormack-Lehane Classification: grade I - full view of glottis  Placement verified by: chest auscultation and capnometry   Measured from: lips  ETT/EBT  to lips (cm): 21  Number of attempts at approach: 1  Assessment: lips, teeth, and gum same as pre-op

## 2020-11-10 NOTE — H&P
Name: Andree Deluna ADMIT: 11/10/2020   : 1955  PCP: Devaughn Reis MD    MRN: [id number] LOS: 0 days   AGE/SEX: 65 y.o. female  ROOM: [facility]    Pre-operative H&P    Patient Care Team:  Devaughn Reis MD as PCP - General  Devaughn Reis MD as PCP - Family Medicine  No chief complaint on file.    CC: Carotid stenosis    Subjective     Andree Deluna is a 65-year-old woman who has a history of previous stroke, likely lacunar, and 2019.  At that time she had uncontrolled hypertension.  She experienced memory loss and blurred vision but her visual disturbance resolved.  She had no other hemispheric neurologic symptoms.  More recently she has been experiencing severe dizziness.  A [facility] stroke neurology consultation and CT angiogram of the head and neck were obtained.  She was felt not to have an acute stroke.  CTA was remarkable for high-grade right ICA stenosis and moderate grade left ICA stenosis.  I have just reviewed her CTA.  Recently she has not been having any focal hemispheric neurologic symptoms related to the right cerebral hemisphere.  She has forgetfulness and unsteady gait with resulting frequent falls.  She submits now for elective CEA.    Review of Systems   Constitutional: Negative.    HENT: Negative.    Eyes: Negative.    Respiratory: Negative.    Cardiovascular: Negative.    Gastrointestinal: Negative.    Endocrine: Negative.    Genitourinary: Negative.    Musculoskeletal: Negative.    Skin: Negative.    Allergic/Immunologic: Negative.    Neurological: Negative for seizures, facial asymmetry, speech difficulty, weakness and numbness.        Patient reports memory loss and unsteady balance, but no focal neurologic symptoms.   Hematological: Negative.      Past Medical History:   Diagnosis Date   • ADHD unknown   • Anemia    • Anxiety    • Carotid artery disease (CMS/HCC)     80% right internal carotid artery   • CVA (cerebral vascular accident) (CMS/HCC)      right parietal right temporal   • DDD (degenerative disc disease), lumbar    • Depression    • GERD (gastroesophageal reflux disease)    • Hyperlipidemia unknown   • Hypertension    • Insomnia unknown   • Laryngopharyngeal reflux    • Mood disorder (CMS/HCC)    • Obesity unknown   • PONV (postoperative nausea and vomiting)    • Sleep apnea     Suspected sleep apnea she has refused to be tested   • Visual field defect     Left     Past Surgical History:   Procedure Laterality Date   • CHOLECYSTECTOMY     • COLONOSCOPY      2016   • FINGER SURGERY     • KNEE ARTHROPLASTY     • KNEE SURGERY Right     replaced   • LAPAROSCOPIC GASTRIC BANDING     • ROTATOR CUFF REPAIR Right 2019   • SHOULDER SURGERY Right 05/24/2019    scope/ cuff repair   • TUBAL ABDOMINAL LIGATION       Family History   Problem Relation Age of Onset   • Diabetes Other    • Heart disease Mother    • Diabetes Mother    • Heart disease Father        Social History     Tobacco Use   • Smoking status: Never Smoker   • Smokeless tobacco: Never Used   Substance Use Topics   • Alcohol use: No     Frequency: Never   • Drug use: No     Medications Prior to Admission   Medication Sig Dispense Refill Last Dose   • ALPRAZolam (XANAX) 2 MG tablet TAKE ONE TABLET BY MOUTH ONCE NIGHTLY AS NEEDED FOR ANXIETY (Patient taking differently: Take 2 mg by mouth At Night As Needed.) 30 tablet 3 11/9/2020 at Unknown time   • atorvastatin (LIPITOR) 80 MG tablet Take 1 tablet by mouth Every Night. 30 tablet 3 11/9/2020 at Unknown time   • lamoTRIgine (LaMICtal) 100 MG tablet TAKE ONE TABLET BY MOUTH TWICE A DAY 60 tablet 10 11/10/2020 at Unknown time   • lisinopril-hydrochlorothiazide (PRINZIDE,ZESTORETIC) 20-25 MG per tablet TAKE ONE TABLET BY MOUTH DAILY 30 tablet 10 11/9/2020 at Unknown time   • metoprolol succinate XL (TOPROL-XL) 25 MG 24 hr tablet TAKE ONE TABLET BY MOUTH EVERY NIGHT AT BEDTIME FOR BLOOD PRESSURE 30 tablet 10 11/9/2020 at Unknown time   • omeprazole  (priLOSEC) 40 MG capsule TAKE ONE CAPSULE BY MOUTH DAILY 30 capsule 9 11/10/2020 at Unknown time   • oxybutynin (DITROPAN) 5 MG tablet Take 1 tablet by mouth Every Morning. For bladder 30 tablet 5 11/10/2020 at Unknown time   • sertraline (Zoloft) 100 MG tablet Take 1 tablet by mouth Daily. 30 tablet 5 11/10/2020 at Unknown time   • aspirin 81 MG chewable tablet Chew 1 tablet Daily. 30 tablet 1 11/4/2020   • ondansetron (ZOFRAN) 4 MG tablet Take 1 tablet by mouth Every 8 (Eight) Hours As Needed for Nausea or Vomiting. 20 tablet 1    • QUEtiapine (SEROquel) 100 MG tablet TAKE 1 AND 1/2 TABLET BY MOUTH EVERY NIGHT AT BEDTIME 45 tablet 0    • sucralfate (CARAFATE) 1 g tablet Take 1 tablet by mouth 4 (Four) Times a Day. 120 tablet 5      ceFAZolin (ANCEF) in SWFI 2 g/20ml IV PUSH syringe, 2 g, Intravenous, Once  sodium chloride, 10 mL, Intravenous, Q12H      Patient has no known allergies.    Objective     Physical Exam:  Physical Exam  Constitutional:       General: She is not in acute distress.     Appearance: Normal appearance. She is obese. She is not ill-appearing, toxic-appearing or diaphoretic.   HENT:      Head: Normocephalic and atraumatic.      Right Ear: External ear normal.      Left Ear: External ear normal.      Nose: Nose normal.      Mouth/Throat:      Mouth: Mucous membranes are dry.      Pharynx: Oropharynx is clear. No oropharyngeal exudate or posterior oropharyngeal erythema.   Eyes:      General: No scleral icterus.     Extraocular Movements: Extraocular movements intact.      Conjunctiva/sclera: Conjunctivae normal.      Pupils: Pupils are equal, round, and reactive to light.   Neck:      Musculoskeletal: Normal range of motion and neck supple.   Cardiovascular:      Rate and Rhythm: Normal rate and regular rhythm.      Pulses: Normal pulses.      Heart sounds: Normal heart sounds.   Pulmonary:      Effort: Pulmonary effort is normal.      Breath sounds: Normal breath sounds.   Abdominal:       "General: Abdomen is flat. There is no distension.      Palpations: Abdomen is soft. There is no mass.      Tenderness: There is no abdominal tenderness.   Musculoskeletal: Normal range of motion.   Skin:     General: Skin is warm and dry.      Capillary Refill: Capillary refill takes less than 2 seconds.      Coloration: Skin is not jaundiced or pale.      Findings: No bruising or erythema.   Neurological:      General: No focal deficit present.      Mental Status: She is alert and oriented to person, place, and time.      Cranial Nerves: No cranial nerve deficit.   Psychiatric:         Mood and Affect: Mood normal.         Behavior: Behavior normal.         Thought Content: Thought content normal.         Judgment: Judgment normal.     Vital Signs and Labs:  Vital Signs   Patient Vitals for the past 24 hrs:   BP Temp Temp src Pulse Resp SpO2 Height Weight   11/10/20 1050 177/75 99.8 °F (37.7 °C) Temporal 63 14 97 % 170.2 cm (67\") 95.1 kg (209 lb 9.6 oz)     BMI:  Body mass index is 32.83 kg/m².    Active Hospital Problems    Diagnosis  POA   • **Carotid stenosis, bilateral [I65.23]  Yes   • History of stroke [Z86.73]  Not Applicable      Resolved Hospital Problems   No resolved problems to display.     Assessment/Plan       Carotid stenosis, bilateral    History of stroke    65 y.o. female with history of probable lacunar stroke in August 2019, who presents with high-grade right ICA stenosis, probably asymptomatic.  She has no focal neurologic symptoms or signs at this time.  Evaluation including CTA of the head and neck demonstrates 80% right and 50% left ICA stenosis.  For elective CEA for stroke prophylaxis.  Informed consent.  Questions answered.    I discussed the patients findings and my recommendations with patient.    Tavo Das MD  11/10/20  11:03 EST    Please call my office with any question: [phone]            "

## 2020-11-10 NOTE — CONSULTS
PULMONARY/ CRITICAL CARE/ SLEEP MEDICINE CONSULT NOTE        Patient Name:  Andree Deluna    :  1955    Medical Record:  [id number]    REQUESTING PHYSICIAN    Tavo Das, *    PRIMARY CARE PHYSICIAN     Devaughn Reis MD    REASON FOR CONSULTATION    Andree Deluna is a 65 y.o. female who was referred for consultation for ICU management.  I saw the patient in PACU and she is still somewhat sleepy from her recent surgery and anesthesia and is not able to provide me a very good history and would fall asleep quickly during the interview.  Most of the history was obtained from the chart and talking to patient's nurse at bedside.  Patient has history of hypertension and had some issues with memory loss and blurred vision.  During the work-up she underwent CT angiogram of head and neck and was noted to have high-grade right ICA stenosis and moderate grade left ICA stenosis.  She subsequently underwent elective right carotid endarterectomy with bovine pericardial patch angioplasty procedure today.  Patient was successfully extubated and transferred to PACU after her surgery.  She is being transferred to ICU for close monitoring overnight.  At time of my evaluation patient awakens easily to tactile stimuli.  She denies any significant pain issues or any problems with focal weakness.    REVIEW OF SYSTEMS    Limited  MEDICAL HISTORY    Past Medical History:   Diagnosis Date   • ADHD unknown   • Anemia    • Anxiety    • Carotid artery disease (CMS/HCC)     80% right internal carotid artery   • CVA (cerebral vascular accident) (CMS/HCC)     right parietal right temporal   • DDD (degenerative disc disease), lumbar    • Depression    • GERD (gastroesophageal reflux disease)    • Hyperlipidemia unknown   • Hypertension    • Insomnia unknown   • Laryngopharyngeal reflux    • Mood disorder (CMS/HCC)    • Obesity unknown   • PONV (postoperative nausea and vomiting)    • Sleep apnea     Suspected sleep apnea  she has refused to be tested   • Visual field defect     Left        SURGICAL HISTORY    Past Surgical History:   Procedure Laterality Date   • CHOLECYSTECTOMY     • COLONOSCOPY      2016   • FINGER SURGERY     • KNEE ARTHROPLASTY     • KNEE SURGERY Right     replaced   • LAPAROSCOPIC GASTRIC BANDING     • ROTATOR CUFF REPAIR Right 2019   • SHOULDER SURGERY Right 2019    scope/ cuff repair   • TUBAL ABDOMINAL LIGATION          FAMILY HISTORY    Family History   Problem Relation Age of Onset   • Diabetes Other    • Heart disease Mother    • Diabetes Mother    • Heart disease Father        SOCIAL HISTORY    Social History     Tobacco Use   • Smoking status: Never Smoker   • Smokeless tobacco: Never Used   Substance Use Topics   • Alcohol use: No     Frequency: Never        ALLERGIES    No Known Allergies    MEDICATIONS    Scheduled Meds:sodium chloride, 10 mL, Intravenous, Q12H      Continuous Infusions:lactated ringers, 9 mL/hr, Last Rate: Stopped (11/10/20 1357)      PRN Meds:.•  acetaminophen **OR** acetaminophen  •  HYDROcodone-acetaminophen  •  HYDROmorphone  •  labetalol  •  lactated ringers  •  ondansetron  •  promethazine **OR** promethazine  •  sodium chloride      PHYSICAL EXAM    tMax 24 hrs:  Temp (24hrs), Av °F (37.2 °C), Min:98.2 °F (36.8 °C), Max:99.8 °F (37.7 °C)    Vitals Ranges:  Temp:  [98.2 °F (36.8 °C)-99.8 °F (37.7 °C)] 98.2 °F (36.8 °C)  Heart Rate:  [63-97] 92  Resp:  [8-14] 10  BP: (120-177)/(68-82) 120/68  Arterial Line BP: (144-165)/(69-81) 144/69  Intake and Output Last 3 Shifts:  No intake/output data recorded.    Constitutional:  Well developed, well nourished, no acute distress, non-toxic appearance   Eyes:conjunctiva normal   HENT:  Atraumatic, external ears normal, nose normal, oropharynx moist, no pharyngeal exudates.   Neck- normal range of motion, surgical wound right neck  Respiratory:  No respiratory distress, normal breath sounds, no rales, no wheezing   Cardiovascular:   Normal rate, normal rhythm, no murmurs, no gallops, no rubs   GI:  Soft, nondistended, normal bowel sounds, nontender, no organomegaly, no mass, no rebound, no guarding   :  No costovertebral angle tenderness   Musculoskeletal:  No edema, no tenderness, no deformities. Back- no tenderness  Integument:  Well hydrated, no rash   Lymphatic:  No lymphadenopathy noted   Neurologic: Awakens.  Moving all extremities on commands  Psychiatric: As above    LABS    Lab Results (last 24 hours)     Procedure Component Value Units Date/Time    POC Activated Clotting Time [[id number]]  (Abnormal) Collected: 11/10/20 1210    Specimen: Arterial Blood Updated: 11/10/20 1224     Activated Clotting Time  235 Seconds      Comment: Serial Number: [id number]:  [id number]       POC Activated Clotting Time [[id number]]  (Abnormal) Collected: 11/10/20 1148    Specimen: Arterial Blood Updated: 11/10/20 1224     Activated Clotting Time  175 Seconds      Comment: Serial Number: [id number]:  [id number]            Microbiology Results (last 10 days)     Procedure Component Value - Date/Time    COVID PRE-OP / PRE-PROCEDURE SCREENING ORDER (NO ISOLATION) - Swab, Nasopharynx [[id number]] Collected: 11/07/20 1133    Lab Status: Final result Specimen: Swab from Nasopharynx Updated: 11/08/20 1947    Narrative:      The following orders were created for panel order COVID PRE-OP / PRE-PROCEDURE SCREENING ORDER (NO ISOLATION) - Swab, Nasopharynx.  Procedure                               Abnormality         Status                     ---------                               -----------         ------                     COVID-19,[facility], NP ...[[id number]]                      Final result                 Please view results for these tests on the individual orders.    COVID-19,[facility], NP SWAB IN LEXAR VIRAL TRANSPORT MEDIA 24-30 HR TAT - Swab, Nasopharynx [[id number]] Collected: 11/07/20 1133    Lab Status: Final result Specimen: Swab from Nasopharynx  Updated: 11/08/20 1947     SARS-CoV-2 KAUR Not Detected         CBC        BMP        CMP       TROPONIN        CoAg  Results from last 7 days   Lab Units 11/07/20  1129   INR  1.00   APTT seconds 27.5       Creatinine Clearance  Estimated Creatinine Clearance: 75.8 mL/min (by C-G formula based on SCr of 0.9 mg/dL).    ABG      IMAGING & OTHER STUDIES    Imaging Results (Last 72 Hours)     ** No results found for the last 72 hours. **          ASSESSMENT      Carotid stenosis, bilateral    History of stroke    Carotid stenosis, asymptomatic, bilateral  Carotid stenosis status post right carotid endarterectomy.  History of hypertension  History of CVA  Mood disorder  Reported history of probable sleep apnea but patient refused any work-up for that  PLAN    We will monitor the patient in the ICU.  Blood pressure control.  Monitor for any bleeding.  Pain control  Her other home medications will be continued as appropriate.  Monitor urine output.  SCDs for DVT prophylaxis  I thank you for this opportunity to take part in this patient's care and will follow the patient along with you.  This document has been electronically signed by  Arjun Marroquin MD  15:10 EST

## 2020-11-11 ENCOUNTER — READMISSION MANAGEMENT (OUTPATIENT)
Dept: CALL CENTER | Facility: HOSPITAL | Age: 65
End: 2020-11-11

## 2020-11-11 VITALS
WEIGHT: 217.81 LBS | SYSTOLIC BLOOD PRESSURE: 127 MMHG | BODY MASS INDEX: 34.19 KG/M2 | HEIGHT: 67 IN | RESPIRATION RATE: 18 BRPM | HEART RATE: 73 BPM | TEMPERATURE: 98.4 F | DIASTOLIC BLOOD PRESSURE: 62 MMHG | OXYGEN SATURATION: 93 %

## 2020-11-11 LAB
ACT BLD: 235 SECONDS (ref 89–137)
ANION GAP SERPL CALCULATED.3IONS-SCNC: 7 MMOL/L (ref 5–15)
BASOPHILS # BLD AUTO: 0 10*3/MM3 (ref 0–0.2)
BASOPHILS NFR BLD AUTO: 0.3 % (ref 0–1.5)
BUN SERPL-MCNC: 11 MG/DL (ref 8–23)
BUN/CREAT SERPL: 18.3 (ref 7–25)
CALCIUM SPEC-SCNC: 8.7 MG/DL (ref 8.6–10.5)
CHLORIDE SERPL-SCNC: 104 MMOL/L (ref 98–107)
CO2 SERPL-SCNC: 30 MMOL/L (ref 22–29)
CREAT SERPL-MCNC: 0.6 MG/DL (ref 0.57–1)
DEPRECATED RDW RBC AUTO: 42.4 FL (ref 37–54)
EOSINOPHIL # BLD AUTO: 0 10*3/MM3 (ref 0–0.4)
EOSINOPHIL NFR BLD AUTO: 0 % (ref 0.3–6.2)
ERYTHROCYTE [DISTWIDTH] IN BLOOD BY AUTOMATED COUNT: 13.7 % (ref 12.3–15.4)
GFR SERPL CREATININE-BSD FRML MDRD: 101 ML/MIN/1.73
GLUCOSE SERPL-MCNC: 103 MG/DL (ref 65–99)
HCT VFR BLD AUTO: 36.5 % (ref 34–46.6)
HGB BLD-MCNC: 11.8 G/DL (ref 12–15.9)
INR PPP: 0.98 (ref 0.93–1.1)
LYMPHOCYTES # BLD AUTO: 1.8 10*3/MM3 (ref 0.7–3.1)
LYMPHOCYTES NFR BLD AUTO: 17.8 % (ref 19.6–45.3)
MCH RBC QN AUTO: 28.6 PG (ref 26.6–33)
MCHC RBC AUTO-ENTMCNC: 32.2 G/DL (ref 31.5–35.7)
MCV RBC AUTO: 88.8 FL (ref 79–97)
MONOCYTES # BLD AUTO: 0.8 10*3/MM3 (ref 0.1–0.9)
MONOCYTES NFR BLD AUTO: 7.6 % (ref 5–12)
NEUTROPHILS NFR BLD AUTO: 7.5 10*3/MM3 (ref 1.7–7)
NEUTROPHILS NFR BLD AUTO: 74.3 % (ref 42.7–76)
NRBC BLD AUTO-RTO: 0 /100 WBC (ref 0–0.2)
PLATELET # BLD AUTO: 203 10*3/MM3 (ref 140–450)
PMV BLD AUTO: 9 FL (ref 6–12)
POTASSIUM SERPL-SCNC: 3.7 MMOL/L (ref 3.5–5.2)
PROTHROMBIN TIME: 10.8 SECONDS (ref 9.6–11.7)
RBC # BLD AUTO: 4.11 10*6/MM3 (ref 3.77–5.28)
SODIUM SERPL-SCNC: 141 MMOL/L (ref 136–145)
WBC # BLD AUTO: 10.1 10*3/MM3 (ref 3.4–10.8)

## 2020-11-11 PROCEDURE — 80048 BASIC METABOLIC PNL TOTAL CA: CPT | Performed by: INTERNAL MEDICINE

## 2020-11-11 PROCEDURE — 25010000002 KETOROLAC TROMETHAMINE PER 15 MG: Performed by: SURGERY

## 2020-11-11 PROCEDURE — 85025 COMPLETE CBC W/AUTO DIFF WBC: CPT | Performed by: INTERNAL MEDICINE

## 2020-11-11 PROCEDURE — 85610 PROTHROMBIN TIME: CPT | Performed by: INTERNAL MEDICINE

## 2020-11-11 PROCEDURE — 25010000002 CEFAZOLIN PER 500 MG: Performed by: SURGERY

## 2020-11-11 RX ORDER — HYDROCODONE BITARTRATE AND ACETAMINOPHEN 5; 325 MG/1; MG/1
1 TABLET ORAL EVERY 4 HOURS PRN
Qty: 10 TABLET | Refills: 0 | Status: SHIPPED | OUTPATIENT
Start: 2020-11-11 | End: 2020-11-17

## 2020-11-11 RX ADMIN — SERTRALINE 100 MG: 100 TABLET, FILM COATED ORAL at 09:41

## 2020-11-11 RX ADMIN — ASPIRIN 81 MG CHEWABLE TABLET 81 MG: 81 TABLET CHEWABLE at 09:35

## 2020-11-11 RX ADMIN — KETOROLAC TROMETHAMINE 15 MG: 15 INJECTION, SOLUTION INTRAMUSCULAR; INTRAVENOUS at 06:21

## 2020-11-11 RX ADMIN — CEFAZOLIN SODIUM 2 G: 10 INJECTION, POWDER, FOR SOLUTION INTRAVENOUS at 06:20

## 2020-11-11 RX ADMIN — HYDROCHLOROTHIAZIDE: 12.5 TABLET ORAL at 09:35

## 2020-11-11 RX ADMIN — Medication 10 ML: at 09:40

## 2020-11-11 RX ADMIN — HYDROCODONE BITARTRATE AND ACETAMINOPHEN 1 TABLET: 5; 325 TABLET ORAL at 06:22

## 2020-11-11 RX ADMIN — LAMOTRIGINE 100 MG: 100 TABLET ORAL at 09:35

## 2020-11-11 RX ADMIN — METOPROLOL SUCCINATE 25 MG: 25 TABLET, EXTENDED RELEASE ORAL at 09:35

## 2020-11-11 RX ADMIN — PANTOPRAZOLE SODIUM 40 MG: 40 TABLET, DELAYED RELEASE ORAL at 06:22

## 2020-11-11 RX ADMIN — OXYBUTYNIN CHLORIDE 5 MG: 5 TABLET ORAL at 09:35

## 2020-11-11 NOTE — NURSING NOTE
Patient discharged from unit via wheelchair to front patient pickup area. Spouse to take patient home via personal vehicle. Patient alert,stable.

## 2020-11-11 NOTE — PLAN OF CARE
Goal Outcome Evaluation:  Plan of Care Reviewed With: patient     Outcome Summary: Pt resting on and off through shift. Pt BP staying betweed 140's to 160's through the night without cardene. Pt did void 3 times this shift and has demonstrated no neuro deficits. Will continue to monitor.

## 2020-11-11 NOTE — DISCHARGE SUMMARY
Pulmonary/ Critical Care/ sleep medicine discharge summary Note    Date of Discharge:  11/11/2020    Discharge Diagnosis: Right carotid stenosis    Presenting Problem/History of Present Illness  Active Hospital Problems    Diagnosis  POA   • **Carotid stenosis, bilateral [I65.23]  Yes   • History of stroke [Z86.73]  Not Applicable   • Carotid stenosis, asymptomatic, bilateral [I65.23]  Yes      Resolved Hospital Problems   No resolved problems to display.          Hospital Course  65 year old female who presented to the hospital 11/10/20 and underwent a right carotid endarterectomy secondary to stenosis.  She was extubated post-operatively and admitted to the ICU for continued care.  No issues reported overnight.  She has been out of bed and has a diet.  Vascular surgery is ok with her discharging home today.      Procedures Performed    Procedure(s):  CAROTID ENDARTERECTOMY  -------------------       Labs/radiological studies:  Lab Results (last 72 hours)     Procedure Component Value Units Date/Time    POC Activated Clotting Time [[id number]]  (Abnormal) Collected: 11/10/20 1314    Specimen: Arterial Blood Updated: 11/11/20 0611     Activated Clotting Time  235 Seconds      Comment: Serial Number: [id number]:  [id number]       Basic Metabolic Panel [[id number]]  (Abnormal) Collected: 11/11/20 0453    Specimen: Blood Updated: 11/11/20 0536     Glucose 103 mg/dL      BUN 11 mg/dL      Creatinine 0.60 mg/dL      Sodium 141 mmol/L      Potassium 3.7 mmol/L      Chloride 104 mmol/L      CO2 30.0 mmol/L      Calcium 8.7 mg/dL      eGFR Non African Amer 101 mL/min/1.73      BUN/Creatinine Ratio 18.3     Anion Gap 7.0 mmol/L     Narrative:      GFR Normal >60  Chronic Kidney Disease <60  Kidney Failure <15      Protime-INR [[id number]]  (Normal) Collected: 11/11/20 0453    Specimen: Blood Updated: 11/11/20 0521     Protime 10.8 Seconds      INR 0.98    CBC & Differential [[id number]]  (Abnormal) Collected: 11/11/20 0453     Specimen: Blood Updated: 11/11/20 0506    Narrative:      The following orders were created for panel order CBC & Differential.  Procedure                               Abnormality         Status                     ---------                               -----------         ------                     CBC Auto Differential[[id number]]        Abnormal            Final result                 Please view results for these tests on the individual orders.    CBC Auto Differential [[id number]]  (Abnormal) Collected: 11/11/20 0453    Specimen: Blood Updated: 11/11/20 0506     WBC 10.10 10*3/mm3      RBC 4.11 10*6/mm3      Hemoglobin 11.8 g/dL      Hematocrit 36.5 %      MCV 88.8 fL      MCH 28.6 pg      MCHC 32.2 g/dL      RDW 13.7 %      RDW-SD 42.4 fl      MPV 9.0 fL      Platelets 203 10*3/mm3      Neutrophil % 74.3 %      Lymphocyte % 17.8 %      Monocyte % 7.6 %      Eosinophil % 0.0 %      Basophil % 0.3 %      Neutrophils, Absolute 7.50 10*3/mm3      Lymphocytes, Absolute 1.80 10*3/mm3      Monocytes, Absolute 0.80 10*3/mm3      Eosinophils, Absolute 0.00 10*3/mm3      Basophils, Absolute 0.00 10*3/mm3      nRBC 0.0 /100 WBC     MRSA Screen, PCR (Inpatient) - Swab, Nares [[id number]]  (Normal) Collected: 11/10/20 1811    Specimen: Swab from Nares Updated: 11/10/20 1943     MRSA PCR No MRSA Detected    Respiratory Panel PCR w/COVID-19(SARS-CoV-2) [facility] In-House, NP Swab in [place], 3-4 HR TAT - Swab, Nasopharynx [[id number]]  (Normal) Collected: 11/10/20 1811    Specimen: Swab from Nasopharynx Updated: 11/10/20 1925     ADENOVIRUS, PCR Not Detected     Coronavirus 229E Not Detected     Coronavirus HKU1 Not Detected     Coronavirus NL63 Not Detected     Coronavirus OC43 Not Detected     COVID19 Not Detected     Human Metapneumovirus Not Detected     Human Rhinovirus/Enterovirus Not Detected     Influenza A PCR Not Detected     Influenza A H1 Not Detected     Influenza A H1 2009 PCR Not Detected      Influenza A H3 Not Detected     Influenza B PCR Not Detected     Parainfluenza Virus 1 Not Detected     Parainfluenza Virus 2 Not Detected     Parainfluenza Virus 3 Not Detected     Parainfluenza Virus 4 Not Detected     RSV, PCR Not Detected     Bordetella pertussis pcr Not Detected     Bordetella parapertussis PCR Not Detected     Chlamydophila pneumoniae PCR Not Detected     Mycoplasma pneumo by PCR Not Detected    Narrative:      Fact sheet for providers: https://docs.[employer]/wp-content/uploads/ZHM7961-4640-BG7.1-EUA-Provider-Fact-Sheet-3.pdf    Fact sheet for patients: https://docs.[employer]/wp-content/uploads/BYD5991-5024-JY5.1-EUA-Patient-Fact-Sheet-1.pdf    Basic Metabolic Panel [[id number]]  (Abnormal) Collected: 11/10/20 1631    Specimen: Blood Updated: 11/10/20 1658     Glucose 134 mg/dL      BUN 12 mg/dL      Creatinine 0.68 mg/dL      Sodium 142 mmol/L      Potassium 3.8 mmol/L      Comment: Slight hemolysis detected by analyzer. Results may be affected.        Chloride 103 mmol/L      CO2 30.0 mmol/L      Calcium 8.4 mg/dL      eGFR Non African Amer 87 mL/min/1.73      BUN/Creatinine Ratio 17.6     Anion Gap 9.0 mmol/L     Narrative:      GFR Normal >60  Chronic Kidney Disease <60  Kidney Failure <15      Protime-INR [[id number]]  (Normal) Collected: 11/10/20 1631    Specimen: Blood Updated: 11/10/20 1650     Protime 10.6 Seconds      INR 0.96    CBC & Differential [[id number]]  (Abnormal) Collected: 11/10/20 1631    Specimen: Blood Updated: 11/10/20 1639    Narrative:      The following orders were created for panel order CBC & Differential.  Procedure                               Abnormality         Status                     ---------                               -----------         ------                     CBC Auto Differential[[id number]]        Abnormal            Final result                 Please view results for these tests on the individual orders.    CBC Auto Differential  [[id number]]  (Abnormal) Collected: 11/10/20 1631    Specimen: Blood Updated: 11/10/20 1639     WBC 12.30 10*3/mm3      RBC 4.51 10*6/mm3      Hemoglobin 12.9 g/dL      Hematocrit 39.6 %      MCV 87.7 fL      MCH 28.6 pg      MCHC 32.7 g/dL      RDW 13.7 %      RDW-SD 42.4 fl      MPV 8.7 fL      Platelets 212 10*3/mm3      Neutrophil % 90.4 %      Lymphocyte % 7.3 %      Monocyte % 1.8 %      Eosinophil % 0.1 %      Basophil % 0.4 %      Neutrophils, Absolute 11.10 10*3/mm3      Lymphocytes, Absolute 0.90 10*3/mm3      Monocytes, Absolute 0.20 10*3/mm3      Eosinophils, Absolute 0.00 10*3/mm3      Basophils, Absolute 0.00 10*3/mm3      nRBC 0.0 /100 WBC     POC Activated Clotting Time [[id number]]  (Abnormal) Collected: 11/10/20 1210    Specimen: Arterial Blood Updated: 11/10/20 1224     Activated Clotting Time  235 Seconds      Comment: Serial Number: [id number]:  [id number]       POC Activated Clotting Time [[id number]]  (Abnormal) Collected: 11/10/20 1148    Specimen: Arterial Blood Updated: 11/10/20 1224     Activated Clotting Time  175 Seconds      Comment: Serial Number: [id number]:  [id number]           Xr Chest Pa & Lateral    Result Date: 10/22/2020  No acute chest findings.  Electronically Signed By-Dr. Mary Linares MD On:10/22/2020 12:35 PM This report was finalized on [id number] by Dr. Mary Linares MD.      Consults:   Consults     Date and Time Order Name Status Description    11/10/2020 1438 Inpatient Intensivist Consult Completed           Pertinent Test Results: as listed     Condition on Discharge:  Stable    Vital Signs  Temp:  [98.2 °F (36.8 °C)-99.5 °F (37.5 °C)] 98.4 °F (36.9 °C)  Heart Rate:  [63-97] 73  Resp:  [8-22] 18  BP: (114-163)/(51-82) 127/62  Arterial Line BP: (131-166)/(57-89) 136/57    Physical Exam:  Constitutional:  Well developed, well nourished, no acute distress, non-toxic appearance   Eyes:conjunctiva normal   HENT:  Atraumatic, external ears normal, nose normal,  oropharynx moist, no pharyngeal exudates.   Neck- normal range of motion, surgical wound right neck  Respiratory:  No respiratory distress, normal breath sounds, no rales, no wheezing   Cardiovascular:  Normal rate, normal rhythm, no murmurs, no gallops, no rubs   GI:  Soft, nondistended, normal bowel sounds, nontender, no organomegaly, no mass, no rebound, no guarding   :  No costovertebral angle tenderness   Musculoskeletal:  No edema, no tenderness, no deformities. Back- no tenderness  Integument:  Well hydrated, no rash, right neck surgical incision intact    Lymphatic:  No lymphadenopathy noted   Neurologic: Awake, alert, and oriented, no focal deficits  Psychiatric: calm       Discharge Disposition  Home or Self Care    Discharge Medications     Discharge Medications      New Medications      Instructions Start Date   HYDROcodone-acetaminophen 5-325 MG per tablet  Commonly known as: NORCO   1 tablet, Oral, Every 4 Hours PRN         Changes to Medications      Instructions Start Date   ALPRAZolam 2 MG tablet  Commonly known as: XANAX  What changed: additional instructions   TAKE ONE TABLET BY MOUTH ONCE NIGHTLY AS NEEDED FOR ANXIETY         Continue These Medications      Instructions Start Date   aspirin 81 MG chewable tablet   81 mg, Oral, Daily      atorvastatin 80 MG tablet  Commonly known as: LIPITOR   80 mg, Oral, Nightly      lamoTRIgine 100 MG tablet  Commonly known as: LaMICtal   TAKE ONE TABLET BY MOUTH TWICE A DAY      lisinopril-hydrochlorothiazide 20-25 MG per tablet  Commonly known as: PRINZIDE,ZESTORETIC   TAKE ONE TABLET BY MOUTH DAILY      metoprolol succinate XL 25 MG 24 hr tablet  Commonly known as: TOPROL-XL   TAKE ONE TABLET BY MOUTH EVERY NIGHT AT BEDTIME FOR BLOOD PRESSURE      omeprazole 40 MG capsule  Commonly known as: priLOSEC   TAKE ONE CAPSULE BY MOUTH DAILY      ondansetron 4 MG tablet  Commonly known as: Zofran   4 mg, Oral, Every 8 Hours PRN      oxybutynin 5 MG  tablet  Commonly known as: DITROPAN   5 mg, Oral, Every Morning, For bladder      QUEtiapine 100 MG tablet  Commonly known as: SEROquel   TAKE 1 AND 1/2 TABLET BY MOUTH EVERY NIGHT AT BEDTIME      sertraline 100 MG tablet  Commonly known as: Zoloft   100 mg, Oral, Daily      sucralfate 1 g tablet  Commonly known as: Carafate   1 g, Oral, 4 Times Daily             Discharge Diet:    Regular Diet     Activity at Discharge: as tolerated.  Avoid strain, stress, or heavy lifting until cleared by surgeon or PCP    Follow-up Appointments  Future Appointments   Date Time Provider Department Center   12/11/2020  1:00 PM Devaughn Reis MD [facility]     Additional Instructions for the Follow-ups that You Need to Schedule     COVID PRE-OP / PRE-PROCEDURE SCREENING ORDER (NO ISOLATION) - Swab, Nasopharynx    Oct 30, 2020 (Approximate)      Please select your location: [facility]    COVID Screening Order: [facility], NP SWAB IN [facility] 24-30 HR TAT [[id number]]    Previously tested for COVID-19?: Yes    Employed in healthcare setting?: Unknown    Symptomatic for COVID-19 as defined by CDC?: Unknown    Hospitalized for COVID-19?: Unknown    Admitted to ICU for COVID-19?: Unknown    Resident in a congregate (group) care setting?: Unknown    Pregnant?: Unknown               Test Results Pending at Discharge  none       Time: Discharge 32 min      Note scribed by me for Dr. Marroquin  Electronically signed by SARAHY Silvestre, 11/11/20, 11:25 AM EST.  Attending physician statement:  Above note scribed by nurse practitioner for me and later reviewed for accuracy. I've examined the patient and reviewed all labs and images.   I have directly participated in the evaluation and management of this patient.  Arjun Marroquin MD

## 2020-11-11 NOTE — PROGRESS NOTES
Name: Andree Deluna ADMIT: 11/10/2020   : 1955  PCP: Devaughn Reis MD    MRN: [id number] LOS: 1 days   AGE/SEX: 65 y.o. female  ROOM: [facility]/[address] [place] [address]    [place], [employer]    No chief complaint on file.    CC: feeling good, hungry  Subjective     65 y.o. female doing well after right CEA. No complaints.    Review of Systems   Constitutional: Negative.    Respiratory: Negative.    Cardiovascular: Negative.    Gastrointestinal: Negative.    Neurological: Negative.    Psychiatric/Behavioral: Negative.        Objective     Scheduled Medications:   aspirin, 81 mg, Oral, Daily  atorvastatin, 80 mg, Oral, Nightly  enoxaparin, 40 mg, Subcutaneous, Daily  lamoTRIgine, 100 mg, Oral, BID  lisinopril-hydroCHLOROthiazide (ZESTORTIC) 20-12.5 mg combo dose, , Oral, Q24H  metoprolol succinate XL, 25 mg, Oral, Q24H  oxybutynin, 5 mg, Oral, Daily  pantoprazole, 40 mg, Oral, QAM AC  sertraline, 100 mg, Oral, Daily  sodium chloride, 10 mL, Intravenous, Q12H        Active Infusions:  lactated ringers, 75 mL/hr, Last Rate: 75 mL/hr (11/10/20 2102)  niCARdipine, 5-15 mg/hr, Last Rate: Stopped (11/10/20 2102)        As Needed Medications:  •  ALPRAZolam  •  HYDROcodone-acetaminophen  •  influenza vaccine  •  ketorolac  •  magnesium hydroxide  •  ondansetron **OR** ondansetron  •  sodium chloride    Vital Signs  Vital Signs Patient Vitals for the past 24 hrs:   BP Temp Temp src Pulse Resp SpO2 Height Weight   20 0500 -- -- -- 69 -- -- -- --   20 0400 -- 98.4 °F (36.9 °C) Oral 76 -- -- -- --   20 0100 -- -- -- 77 -- -- -- --   20 0020 163/82 -- -- 65 -- -- -- --   20 0000 -- 98.9 °F (37.2 °C) Oral 69 18 97 % -- --   11/10/20 2300 -- -- -- 78 -- -- -- --   11/10/20 2200 -- -- -- 81 -- -- -- --   11/10/20 2100 -- -- -- 70 -- -- -- --   11/10/20 2000 -- 98.2 °F (36.8 °C) Oral 77 20 96 % -- --   11/10/20 1930 -- -- -- 83 -- 98 % -- --   11/10/20 1915 -- -- -- 79 -- 96 % -- --   11/10/20  "1900 -- -- -- 77 -- 95 % -- --   11/10/20 1845 -- -- -- 83 -- 98 % -- --   11/10/20 1830 -- -- -- 79 -- 95 % -- --   11/10/20 1815 -- -- -- 68 -- 97 % -- --   11/10/20 1800 -- -- -- 80 -- 97 % -- --   11/10/20 1745 -- -- -- 83 -- 97 % -- --   11/10/20 1715 -- -- -- 86 -- 98 % -- --   11/10/20 1701 115/51 99.5 °F (37.5 °C) Temporal 85 16 95 % -- --   11/10/20 1631 114/57 -- -- 87 10 96 % -- --   11/10/20 1601 124/68 -- -- 88 10 93 % -- --   11/10/20 1546 122/65 -- -- 90 10 93 % -- --   11/10/20 1531 131/74 -- -- 88 12 94 % -- --   11/10/20 1516 134/61 -- -- 86 22 95 % -- --   11/10/20 1512 -- -- -- -- -- -- -- 98.8 kg (217 lb 13 oz)   11/10/20 1501 125/72 -- -- 88 20 96 % -- --   11/10/20 1446 120/68 -- -- 92 10 97 % -- --   11/10/20 1431 140/75 -- -- 89 12 98 % -- --   11/10/20 1416 145/77 -- -- 94 12 97 % -- --   11/10/20 1411 140/82 -- -- 95 13 95 % -- --   11/10/20 1406 140/78 -- -- 97 13 95 % -- --   11/10/20 1401 127/80 98.2 °F (36.8 °C) Temporal -- 8 -- -- --   11/10/20 1050 177/75 99.8 °F (37.7 °C) Temporal 63 14 97 % 170.2 cm (67\") 95.1 kg (209 lb 9.6 oz)       Physical Exam:  Physical Exam  Vitals signs reviewed.   Neck:      Musculoskeletal: Neck supple.      Comments: Right neck incision healing well with no signs of infection or hematoma  Cardiovascular:      Rate and Rhythm: Normal rate and regular rhythm.      Pulses: Normal pulses.      Heart sounds: Normal heart sounds.   Pulmonary:      Effort: Pulmonary effort is normal.      Breath sounds: Normal breath sounds.   Abdominal:      General: Abdomen is flat. Bowel sounds are normal.      Palpations: Abdomen is soft.   Skin:     Capillary Refill: Capillary refill takes less than 2 seconds.   Neurological:      General: No focal deficit present.      Mental Status: She is alert and oriented to person, place, and time.      Cranial Nerves: No cranial nerve deficit.      Sensory: No sensory deficit.      Motor: No weakness.   Psychiatric:         Mood and " Affect: Mood normal.         Behavior: Behavior normal.         Thought Content: Thought content normal.         Judgment: Judgment normal.          Results Review:     CBC    Results from last 7 days   Lab Units 11/11/20  0453 11/10/20  1631   WBC 10*3/mm3 10.10 12.30*   HEMOGLOBIN g/dL 11.8* 12.9   PLATELETS 10*3/mm3 203 212     BMP   Results from last 7 days   Lab Units 11/11/20  0453 11/10/20  1631   SODIUM mmol/L 141 142   POTASSIUM mmol/L 3.7 3.8   CHLORIDE mmol/L 104 103   CO2 mmol/L 30.0* 30.0*   BUN mg/dL 11 12   CREATININE mg/dL 0.60 0.68   GLUCOSE mg/dL 103* 134*       Assessment/Plan     Assessment & Plan      Carotid stenosis, bilateral    History of stroke    Carotid stenosis, asymptomatic, bilateral      65 y.o. female doing well after right CEA. Discharge after breakfast and ambulates. Discharge instructions discussed with patient and nurse.      Personal protective equipment used for this patient encounter:  Patient wearing surgical mask [x]    Provider wearing a surgical mask [x]    Gloves [x]    Eye protection [x]    Face Shield []    Gown []    N 95 respirator or CAPR/PAPR []   Duration of interaction 20 minutes    Marlon Parikh MD  11/11/20  08:51 EST    Please call my office with any question: [phone]    Active Hospital Problems    Diagnosis  POA   • **Carotid stenosis, bilateral [I65.23]  Yes   • History of stroke [Z86.73]  Not Applicable   • Carotid stenosis, asymptomatic, bilateral [I65.23]  Yes      Resolved Hospital Problems   No resolved problems to display.

## 2020-11-12 ENCOUNTER — TRANSITIONAL CARE MANAGEMENT TELEPHONE ENCOUNTER (OUTPATIENT)
Dept: CALL CENTER | Facility: HOSPITAL | Age: 65
End: 2020-11-12

## 2020-11-12 NOTE — PROGRESS NOTES
Case Management Discharge Note                Selected Continued Care - Discharged on 11/11/2020 Admission date: 11/10/2020 - Discharge disposition: Home or Self Care                 Final Discharge Disposition Code: 01 - home or self-care

## 2020-11-12 NOTE — OUTREACH NOTE
Prep Survey      Responses   [facility] facility patient discharged from?  [facility]   Is LACE score < 7 ?  Yes   [facility]   Date of Admission  11/10/20   Date of Discharge  11/11/20   Discharge Disposition  Home or Self Care   Discharge diagnosis  Carotid stenosis, bilateral-Endarterectomy this visit   Does the patient have one of the following disease processes/diagnoses(primary or secondary)?  General Surgery   Does the patient have Home health ordered?  No   Is there a DME ordered?  No   Prep survey completed?  Yes          Sharon Martinez RN

## 2020-11-24 ENCOUNTER — APPOINTMENT (OUTPATIENT)
Dept: VASCULAR SURGERY | Facility: HOSPITAL | Age: 65
End: 2020-11-24

## 2020-11-24 PROCEDURE — G0463 HOSPITAL OUTPT CLINIC VISIT: HCPCS

## 2020-12-01 ENCOUNTER — TRANSCRIBE ORDERS (OUTPATIENT)
Dept: ADMINISTRATIVE | Facility: HOSPITAL | Age: 65
End: 2020-12-01

## 2020-12-01 DIAGNOSIS — I65.23 BILATERAL CAROTID ARTERY OCCLUSION: Primary | ICD-10-CM

## 2020-12-03 DIAGNOSIS — F41.9 ANXIETY: Primary | ICD-10-CM

## 2020-12-03 NOTE — TELEPHONE ENCOUNTER
Caller: Andree Sales    Relationship: Self    Best call back number: [phone]  Medication needed:   Requested Prescriptions     Pending Prescriptions Disp Refills   • ALPRAZolam (XANAX) 2 MG tablet 30 tablet 3     Sig: Take 1 tablet by mouth At Night As Needed. for anxiety       When do you need the refill by: TODAY    What details did the patient provide when requesting the medication: MS. SALES WILL BE OUT ON 12/12/2020          Does the patient have less than a 3 day supply:  [x] Yes  [x] No    What is the patient's preferred pharmacy: [facility], [place] - [address] [facility] [phone] [facility] [phone] FX

## 2020-12-04 RX ORDER — ALPRAZOLAM 2 MG/1
2 TABLET ORAL NIGHTLY PRN
Qty: 30 TABLET | Refills: 3 | Status: SHIPPED | OUTPATIENT
Start: 2020-12-04 | End: 2021-04-02 | Stop reason: SDUPTHER

## 2020-12-11 ENCOUNTER — OFFICE VISIT (OUTPATIENT)
Dept: FAMILY MEDICINE CLINIC | Facility: CLINIC | Age: 65
End: 2020-12-11

## 2020-12-11 ENCOUNTER — LAB (OUTPATIENT)
Dept: FAMILY MEDICINE CLINIC | Facility: CLINIC | Age: 65
End: 2020-12-11

## 2020-12-11 VITALS
DIASTOLIC BLOOD PRESSURE: 88 MMHG | SYSTOLIC BLOOD PRESSURE: 153 MMHG | RESPIRATION RATE: 12 BRPM | WEIGHT: 208.4 LBS | TEMPERATURE: 97.5 F | HEART RATE: 67 BPM | BODY MASS INDEX: 32.64 KG/M2

## 2020-12-11 DIAGNOSIS — Z12.31 BREAST CANCER SCREENING BY MAMMOGRAM: ICD-10-CM

## 2020-12-11 DIAGNOSIS — I10 ESSENTIAL HYPERTENSION: Primary | ICD-10-CM

## 2020-12-11 DIAGNOSIS — F41.9 ANXIETY: ICD-10-CM

## 2020-12-11 DIAGNOSIS — E78.00 PURE HYPERCHOLESTEROLEMIA: ICD-10-CM

## 2020-12-11 LAB
CHOLEST SERPL-MCNC: 299 MG/DL (ref 0–200)
HDLC SERPL-MCNC: 72 MG/DL (ref 40–60)
LDLC SERPL CALC-MCNC: 186 MG/DL (ref 0–100)
LDLC/HDLC SERPL: 2.54 {RATIO}
TRIGL SERPL-MCNC: 220 MG/DL (ref 0–150)
VLDLC SERPL-MCNC: 41 MG/DL (ref 5–40)

## 2020-12-11 PROCEDURE — 80061 LIPID PANEL: CPT | Performed by: FAMILY MEDICINE

## 2020-12-11 PROCEDURE — 99214 OFFICE O/P EST MOD 30 MIN: CPT | Performed by: FAMILY MEDICINE

## 2020-12-11 RX ORDER — AMLODIPINE BESYLATE 2.5 MG/1
2.5 TABLET ORAL DAILY
Qty: 30 TABLET | Refills: 5 | Status: SHIPPED | OUTPATIENT
Start: 2020-12-11 | End: 2021-06-07 | Stop reason: SDUPTHER

## 2020-12-11 RX ORDER — SERTRALINE HYDROCHLORIDE 100 MG/1
TABLET, FILM COATED ORAL
Qty: 30 TABLET | Refills: 11 | Status: SHIPPED | OUTPATIENT
Start: 2020-12-11 | End: 2021-01-29 | Stop reason: SDUPTHER

## 2020-12-11 NOTE — PROGRESS NOTES
Rooming Tab(CC,VS,Pt Hx,Fall Screen)  Chief Complaint   Patient presents with   • Anxiety       Subjective 65-year-old here for follow-up of her hypertension.  Patient had a right carotid endarterectomy as well secondary to 80% stenosis in the internal carotid artery.  She sees Dr. Altamirano for this.  She has had no further TIA like symptoms or stroke symptoms.  She feels like her cognitive function is close to baseline.  She does have quite a bit of anxiety does not like the Zoloft is really helping very much but not having much in the way side effect.  We increase the dose 200 mg short time ago.  Patient has hypertension and states her blood pressure at home is probably in the 140/70 range.  She states now she is taking her medication appropriately.    I have reviewed and updated her medications, medical history and problem list during today's office visit.     Patient Care Team:  Devaughn Reis MD as PCP - General  Devaughn Reis MD as PCP - Family Medicine    Problem List Tab  Medications Tab  Synopsis Tab  Chart Review Tab  Care Everywhere Tab  Immunizations Tab  Patient History Tab    Social History     Tobacco Use   • Smoking status: Never Smoker   • Smokeless tobacco: Never Used   Substance Use Topics   • Alcohol use: No     Frequency: Never       Review of Systems   Constitutional: Negative for chills, fatigue and fever.   HENT: Negative for nosebleeds.    Eyes: Negative for double vision.   Respiratory: Negative for chest tightness and shortness of breath.    Cardiovascular: Negative for chest pain and palpitations.   Gastrointestinal: Negative for blood in stool.   Genitourinary: Negative for dysuria and hematuria.   Neurological: Negative for dizziness and syncope.   Psychiatric/Behavioral: Positive for stress. Negative for self-injury and depressed mood. The patient is nervous/anxious.        Objective     Rooming Tab(CC,VS,Pt Hx,Fall Screen)  /88   Pulse 67   Temp 97.5 °F (36.4 °C)   Resp  12   Wt 94.5 kg (208 lb 6.4 oz)   BMI 32.64 kg/m²     Body mass index is 32.64 kg/m².    Physical Exam  Vitals signs and nursing note reviewed.   Constitutional:       General: She is not in acute distress.     Appearance: She is well-developed.      Comments: Pleasant female who is in no acute distress.  She seems calmer today than usual.   HENT:      Head: Normocephalic and atraumatic.      Nose: Nose normal.      Mouth/Throat:      Mouth: Mucous membranes are moist.   Eyes:      General: Lids are normal.      Extraocular Movements: Extraocular movements intact.      Conjunctiva/sclera: Conjunctivae normal.      Pupils: Pupils are equal, round, and reactive to light.   Neck:      Musculoskeletal: Normal range of motion and neck supple.      Thyroid: No thyroid mass or thyromegaly.      Vascular: No carotid bruit or JVD.      Trachea: Trachea normal.      Comments: No carotid bruits  Cardiovascular:      Rate and Rhythm: Normal rate and regular rhythm.      Heart sounds: Normal heart sounds.   Pulmonary:      Effort: Pulmonary effort is normal.      Breath sounds: Normal breath sounds.   Musculoskeletal:      Comments: No c/c/e   Skin:     General: Skin is warm and dry.   Neurological:      General: No focal deficit present.      Mental Status: She is alert and oriented to person, place, and time.      Cranial Nerves: No cranial nerve deficit.   Psychiatric:         Attention and Perception: She is attentive.         Mood and Affect: Mood normal.         Speech: Speech normal.         Behavior: Behavior normal.          Statin Choice Calculator  Data Reviewed:               Lab Results   Component Value Date    BUN 11 11/11/2020    CREATININE 0.60 11/11/2020    EGFRIFNONA 101 11/11/2020     11/11/2020    K 3.7 11/11/2020     11/11/2020    CALCIUM 8.7 11/11/2020    TRIG 220 (H) 12/11/2020    HDL 72 (H) 12/11/2020    VLDL 41 (H) 12/11/2020     (H) 12/11/2020    LDLHDL 2.54 12/11/2020    WBC 10.10  11/11/2020    RBC 4.11 11/11/2020    HCT 36.5 11/11/2020    MCV 88.8 11/11/2020    MCH 28.6 11/11/2020    INR 0.98 11/11/2020      Assessment/Plan   Order Review Tab  Health Maintenance Tab  Patient Plan/Order Tab  Diagnoses and all orders for this visit:    1. Essential hypertension (Primary)  Assessment & Plan:  Hypertension is unchanged.  Dietary sodium restriction.  Weight loss.  Regular aerobic exercise.  Medication changes per orders.  Blood pressure will be reassessed 2 months.  Add Norvasc 2.5 mg daily      2. Pure hypercholesterolemia  Assessment & Plan:  We will try to keep her LDL cholesterol less than 70.  We will recheck levels today.  Continue on her atorvastatin 80 mg for now    Orders:  -     Lipid Panel    3. Breast cancer screening by mammogram  -     Mammo Screening Digital Tomosynthesis Bilateral With CAD; Future    4. Anxiety  Assessment & Plan:  Patient does not feel like she is any better but she certainly seems a bit better to me.  We will increase her Zoloft to 150 mg daily      Other orders  -     sertraline (Zoloft) 100 MG tablet; 1.5 po q d  Dispense: 30 tablet; Refill: 11  -     amLODIPine (Norvasc) 2.5 MG tablet; Take 1 tablet by mouth Daily.  Dispense: 30 tablet; Refill: 5      Wrapup Tab  Return in about 7 weeks (around 1/30/2021) for Recheck.

## 2020-12-12 NOTE — ASSESSMENT & PLAN NOTE
We will try to keep her LDL cholesterol less than 70.  We will recheck levels today.  Continue on her atorvastatin 80 mg for now

## 2020-12-12 NOTE — ASSESSMENT & PLAN NOTE
Hypertension is unchanged.  Dietary sodium restriction.  Weight loss.  Regular aerobic exercise.  Medication changes per orders.  Blood pressure will be reassessed 2 months.  Add Norvasc 2.5 mg daily

## 2020-12-12 NOTE — ASSESSMENT & PLAN NOTE
Patient does not feel like she is any better but she certainly seems a bit better to me.  We will increase her Zoloft to 150 mg daily

## 2020-12-14 RX ORDER — ATORVASTATIN CALCIUM 80 MG/1
80 TABLET, FILM COATED ORAL NIGHTLY
Qty: 30 TABLET | Refills: 11 | Status: SHIPPED | OUTPATIENT
Start: 2020-12-14 | End: 2021-12-16 | Stop reason: SDUPTHER

## 2020-12-30 RX ORDER — OMEPRAZOLE 40 MG/1
CAPSULE, DELAYED RELEASE ORAL
Qty: 30 CAPSULE | Refills: 8 | Status: SHIPPED | OUTPATIENT
Start: 2020-12-30 | End: 2021-10-27 | Stop reason: SDUPTHER

## 2021-01-05 NOTE — TELEPHONE ENCOUNTER
Caller: Andree Sales    Relationship: Self    Best call back number: [phone]     Medication needed:   Requested Prescriptions     Pending Prescriptions Disp Refills   • metoprolol succinate XL (TOPROL-XL) 25 MG 24 hr tablet 30 tablet 10       When do you need the refill by: TODAY    What details did the patient provide when requesting the medication:     MS. SALES HAS BEEN OUT OF MEDICATION FOR A WEEK   Does the patient have less than a 3 day supply:  [x] Yes  [] No    What is the patient's preferred pharmacy: [facility] - [place], [place] - [address] [facility] [phone] [facility] [phone] FX

## 2021-01-06 RX ORDER — METOPROLOL SUCCINATE 25 MG/1
25 TABLET, EXTENDED RELEASE ORAL DAILY
Qty: 30 TABLET | Refills: 10 | Status: SHIPPED | OUTPATIENT
Start: 2021-01-06 | End: 2021-12-16 | Stop reason: SDUPTHER

## 2021-01-20 ENCOUNTER — APPOINTMENT (OUTPATIENT)
Dept: VASCULAR SURGERY | Facility: HOSPITAL | Age: 66
End: 2021-01-20

## 2021-01-20 ENCOUNTER — HOSPITAL ENCOUNTER (OUTPATIENT)
Dept: CARDIOLOGY | Facility: HOSPITAL | Age: 66
Discharge: HOME | End: 2021-01-20

## 2021-01-20 DIAGNOSIS — I65.23 BILATERAL CAROTID ARTERY OCCLUSION: ICD-10-CM

## 2021-01-20 LAB
BH CV XLRA MEAS LEFT BULB EDV: 29.6 CM/SEC
BH CV XLRA MEAS LEFT BULB PSV: 81.8 CM/SEC
BH CV XLRA MEAS LEFT CCA RATIO VEL: 72.2 CM/SEC
BH CV XLRA MEAS LEFT DIST CCA EDV: 14.9 CM/SEC
BH CV XLRA MEAS LEFT DIST CCA PSV: 46.5 CM/SEC
BH CV XLRA MEAS LEFT DIST ICA EDV: -38.5 CM/SEC
BH CV XLRA MEAS LEFT DIST ICA PSV: -96.9 CM/SEC
BH CV XLRA MEAS LEFT ICA RATIO VEL: -96.9 CM/SEC
BH CV XLRA MEAS LEFT ICA/CCA RATIO: -1.3
BH CV XLRA MEAS LEFT PROX CCA EDV: 20.3 CM/SEC
BH CV XLRA MEAS LEFT PROX CCA PSV: 72.2 CM/SEC
BH CV XLRA MEAS LEFT PROX ECA PSV: -79.6 CM/SEC
BH CV XLRA MEAS LEFT PROX ICA EDV: -34.2 CM/SEC
BH CV XLRA MEAS LEFT PROX ICA PSV: -91.3 CM/SEC
BH CV XLRA MEAS LEFT PROX SCLA PSV: 114 CM/SEC
BH CV XLRA MEAS LEFT VERTEBRAL A PSV: -54.9 CM/SEC
BH CV XLRA MEAS RIGHT BULB PSV: -32.3 CM/SEC
BH CV XLRA MEAS RIGHT CCA RATIO VEL: 55.3 CM/SEC
BH CV XLRA MEAS RIGHT DIST CCA EDV: -12.3 CM/SEC
BH CV XLRA MEAS RIGHT DIST CCA PSV: -40 CM/SEC
BH CV XLRA MEAS RIGHT DIST ICA EDV: -18.4 CM/SEC
BH CV XLRA MEAS RIGHT DIST ICA PSV: -61.5 CM/SEC
BH CV XLRA MEAS RIGHT ICA RATIO VEL: -61.5 CM/SEC
BH CV XLRA MEAS RIGHT ICA/CCA RATIO: -1.1
BH CV XLRA MEAS RIGHT PROX CCA EDV: 16.8 CM/SEC
BH CV XLRA MEAS RIGHT PROX CCA PSV: 55.3 CM/SEC
BH CV XLRA MEAS RIGHT PROX ECA PSV: -178 CM/SEC
BH CV XLRA MEAS RIGHT PROX ICA EDV: -21.1 CM/SEC
BH CV XLRA MEAS RIGHT PROX ICA PSV: -57.1 CM/SEC
BH CV XLRA MEAS RIGHT PROX SCLA PSV: 104 CM/SEC
BH CV XLRA MEAS RIGHT VERTEBRAL A PSV: -39.1 CM/SEC

## 2021-01-20 PROCEDURE — G0463 HOSPITAL OUTPT CLINIC VISIT: HCPCS

## 2021-01-20 PROCEDURE — 93880 EXTRACRANIAL BILAT STUDY: CPT

## 2021-01-23 RX ORDER — LISINOPRIL AND HYDROCHLOROTHIAZIDE 25; 20 MG/1; MG/1
TABLET ORAL
Qty: 30 TABLET | Refills: 9 | Status: SHIPPED | OUTPATIENT
Start: 2021-01-23 | End: 2021-11-30

## 2021-01-25 ENCOUNTER — TRANSCRIBE ORDERS (OUTPATIENT)
Dept: ADMINISTRATIVE | Facility: HOSPITAL | Age: 66
End: 2021-01-25

## 2021-01-25 DIAGNOSIS — I65.23 BILATERAL CAROTID ARTERY OCCLUSION: Primary | ICD-10-CM

## 2021-01-29 ENCOUNTER — OFFICE VISIT (OUTPATIENT)
Dept: FAMILY MEDICINE CLINIC | Facility: CLINIC | Age: 66
End: 2021-01-29

## 2021-01-29 VITALS
TEMPERATURE: 97.8 F | SYSTOLIC BLOOD PRESSURE: 138 MMHG | WEIGHT: 211 LBS | HEART RATE: 67 BPM | RESPIRATION RATE: 12 BRPM | DIASTOLIC BLOOD PRESSURE: 80 MMHG | BODY MASS INDEX: 33.05 KG/M2

## 2021-01-29 DIAGNOSIS — D63.8 ANEMIA IN OTHER CHRONIC DISEASES CLASSIFIED ELSEWHERE: ICD-10-CM

## 2021-01-29 DIAGNOSIS — E53.8 B12 DEFICIENCY: ICD-10-CM

## 2021-01-29 DIAGNOSIS — M54.12 LEFT CERVICAL RADICULOPATHY: ICD-10-CM

## 2021-01-29 DIAGNOSIS — R53.83 FATIGUE, UNSPECIFIED TYPE: ICD-10-CM

## 2021-01-29 DIAGNOSIS — M25.511 ACUTE PAIN OF RIGHT SHOULDER: ICD-10-CM

## 2021-01-29 DIAGNOSIS — E78.00 PURE HYPERCHOLESTEROLEMIA: Primary | ICD-10-CM

## 2021-01-29 DIAGNOSIS — F41.9 ANXIETY: ICD-10-CM

## 2021-01-29 DIAGNOSIS — I10 ESSENTIAL HYPERTENSION: ICD-10-CM

## 2021-01-29 PROCEDURE — 99214 OFFICE O/P EST MOD 30 MIN: CPT | Performed by: FAMILY MEDICINE

## 2021-01-29 RX ORDER — BUPROPION HYDROCHLORIDE 150 MG/1
150 TABLET ORAL DAILY
Qty: 30 TABLET | Refills: 5 | Status: SHIPPED | OUTPATIENT
Start: 2021-01-29 | End: 2021-07-26 | Stop reason: SDUPTHER

## 2021-01-29 RX ORDER — SERTRALINE HYDROCHLORIDE 100 MG/1
TABLET, FILM COATED ORAL
Qty: 60 TABLET | Refills: 11 | Status: SHIPPED | OUTPATIENT
Start: 2021-01-29 | End: 2022-05-18

## 2021-01-29 RX ORDER — MELOXICAM 15 MG/1
15 TABLET ORAL DAILY
Qty: 30 TABLET | Refills: 11 | Status: SHIPPED | OUTPATIENT
Start: 2021-01-29 | End: 2022-08-18

## 2021-02-01 PROBLEM — M54.12 LEFT CERVICAL RADICULOPATHY: Status: ACTIVE | Noted: 2021-02-01

## 2021-02-01 PROBLEM — M25.511 ACUTE PAIN OF RIGHT SHOULDER: Status: ACTIVE | Noted: 2021-02-01

## 2021-02-01 NOTE — ASSESSMENT & PLAN NOTE
I recommend the patient stop watching the news.  I informed her that the news is there to incite fear and if it is bothering her that much she needs to quit.  Increase Zoloft to 200 mg daily.  Sitter therapist.

## 2021-02-01 NOTE — ASSESSMENT & PLAN NOTE
I recommend the patient try Mobic 15 mg daily.  Consider physical therapy for her neck.  Follow-up as needed

## 2021-02-01 NOTE — ASSESSMENT & PLAN NOTE
Check labs.  A lot of it is probably related to untreated sleep apnea which she will not even consider.  Also will check blood work.

## 2021-02-02 ENCOUNTER — LAB (OUTPATIENT)
Dept: FAMILY MEDICINE CLINIC | Facility: CLINIC | Age: 66
End: 2021-02-02

## 2021-02-02 LAB
ALBUMIN SERPL-MCNC: 4.3 G/DL (ref 3.5–5.2)
ALBUMIN/GLOB SERPL: 1.6 G/DL
ALP SERPL-CCNC: 89 U/L (ref 39–117)
ALT SERPL W P-5'-P-CCNC: 14 U/L (ref 1–33)
ANION GAP SERPL CALCULATED.3IONS-SCNC: 10.4 MMOL/L (ref 5–15)
AST SERPL-CCNC: 20 U/L (ref 1–32)
BASOPHILS # BLD AUTO: 0.1 10*3/MM3 (ref 0–0.2)
BASOPHILS NFR BLD AUTO: 1.3 % (ref 0–1.5)
BILIRUB SERPL-MCNC: 0.4 MG/DL (ref 0–1.2)
BUN SERPL-MCNC: 13 MG/DL (ref 8–23)
BUN/CREAT SERPL: 14.3 (ref 7–25)
CALCIUM SPEC-SCNC: 9.7 MG/DL (ref 8.6–10.5)
CHLORIDE SERPL-SCNC: 97 MMOL/L (ref 98–107)
CHOLEST SERPL-MCNC: 161 MG/DL (ref 0–200)
CO2 SERPL-SCNC: 29.6 MMOL/L (ref 22–29)
CREAT SERPL-MCNC: 0.91 MG/DL (ref 0.57–1)
DEPRECATED RDW RBC AUTO: 38.8 FL (ref 37–54)
EOSINOPHIL # BLD AUTO: 0.25 10*3/MM3 (ref 0–0.4)
EOSINOPHIL NFR BLD AUTO: 3.1 % (ref 0.3–6.2)
ERYTHROCYTE [DISTWIDTH] IN BLOOD BY AUTOMATED COUNT: 12.5 % (ref 12.3–15.4)
ERYTHROCYTE [SEDIMENTATION RATE] IN BLOOD: 16 MM/HR (ref 0–30)
GFR SERPL CREATININE-BSD FRML MDRD: 62 ML/MIN/1.73
GLOBULIN UR ELPH-MCNC: 2.7 GM/DL
GLUCOSE SERPL-MCNC: 98 MG/DL (ref 65–99)
HCT VFR BLD AUTO: 41.2 % (ref 34–46.6)
HCV AB SER DONR QL: NORMAL
HDLC SERPL-MCNC: 73 MG/DL (ref 40–60)
HGB BLD-MCNC: 13.5 G/DL (ref 12–15.9)
IMM GRANULOCYTES # BLD AUTO: 0.02 10*3/MM3 (ref 0–0.05)
IMM GRANULOCYTES NFR BLD AUTO: 0.3 % (ref 0–0.5)
LDLC SERPL CALC-MCNC: 66 MG/DL (ref 0–100)
LDLC/HDLC SERPL: 0.86 {RATIO}
LYMPHOCYTES # BLD AUTO: 2.21 10*3/MM3 (ref 0.7–3.1)
LYMPHOCYTES NFR BLD AUTO: 27.7 % (ref 19.6–45.3)
MCH RBC QN AUTO: 28.2 PG (ref 26.6–33)
MCHC RBC AUTO-ENTMCNC: 32.8 G/DL (ref 31.5–35.7)
MCV RBC AUTO: 86.2 FL (ref 79–97)
MONOCYTES # BLD AUTO: 0.55 10*3/MM3 (ref 0.1–0.9)
MONOCYTES NFR BLD AUTO: 6.9 % (ref 5–12)
NEUTROPHILS NFR BLD AUTO: 4.84 10*3/MM3 (ref 1.7–7)
NEUTROPHILS NFR BLD AUTO: 60.7 % (ref 42.7–76)
NRBC BLD AUTO-RTO: 0 /100 WBC (ref 0–0.2)
PLATELET # BLD AUTO: 213 10*3/MM3 (ref 140–450)
PMV BLD AUTO: 11.9 FL (ref 6–12)
POTASSIUM SERPL-SCNC: 4.2 MMOL/L (ref 3.5–5.2)
PROT SERPL-MCNC: 7 G/DL (ref 6–8.5)
RBC # BLD AUTO: 4.78 10*6/MM3 (ref 3.77–5.28)
SODIUM SERPL-SCNC: 137 MMOL/L (ref 136–145)
TRIGL SERPL-MCNC: 126 MG/DL (ref 0–150)
TSH SERPL DL<=0.05 MIU/L-ACNC: 1.08 UIU/ML (ref 0.27–4.2)
VLDLC SERPL-MCNC: 22 MG/DL (ref 5–40)
WBC # BLD AUTO: 7.97 10*3/MM3 (ref 3.4–10.8)

## 2021-02-02 PROCEDURE — 86803 HEPATITIS C AB TEST: CPT | Performed by: FAMILY MEDICINE

## 2021-02-02 PROCEDURE — 85025 COMPLETE CBC W/AUTO DIFF WBC: CPT | Performed by: FAMILY MEDICINE

## 2021-02-02 PROCEDURE — 80053 COMPREHEN METABOLIC PANEL: CPT | Performed by: FAMILY MEDICINE

## 2021-02-02 PROCEDURE — 36415 COLL VENOUS BLD VENIPUNCTURE: CPT | Performed by: FAMILY MEDICINE

## 2021-02-02 PROCEDURE — 86038 ANTINUCLEAR ANTIBODIES: CPT | Performed by: FAMILY MEDICINE

## 2021-02-02 PROCEDURE — 85652 RBC SED RATE AUTOMATED: CPT | Performed by: FAMILY MEDICINE

## 2021-02-02 PROCEDURE — 84443 ASSAY THYROID STIM HORMONE: CPT | Performed by: FAMILY MEDICINE

## 2021-02-02 PROCEDURE — 80061 LIPID PANEL: CPT | Performed by: FAMILY MEDICINE

## 2021-02-03 LAB — ANA SER QL: NEGATIVE

## 2021-03-05 RX ORDER — OXYBUTYNIN CHLORIDE 5 MG/1
5 TABLET ORAL EVERY MORNING
Qty: 90 TABLET | Refills: 1 | Status: SHIPPED | OUTPATIENT
Start: 2021-03-05 | End: 2021-08-30

## 2021-03-09 RX ORDER — LAMOTRIGINE 100 MG/1
100 TABLET ORAL 2 TIMES DAILY
Qty: 60 TABLET | Refills: 3 | Status: SHIPPED | OUTPATIENT
Start: 2021-03-09 | End: 2021-12-03

## 2021-04-02 DIAGNOSIS — F41.9 ANXIETY: ICD-10-CM

## 2021-04-02 RX ORDER — ALPRAZOLAM 2 MG/1
2 TABLET ORAL NIGHTLY PRN
Qty: 30 TABLET | Refills: 3 | Status: SHIPPED | OUTPATIENT
Start: 2021-04-02 | End: 2021-07-14 | Stop reason: SDUPTHER

## 2021-04-02 NOTE — TELEPHONE ENCOUNTER
Caller: SOSA SALES    Relationship: Emergency Contact    Best call back number: [phone]    Medication needed:   Requested Prescriptions     Pending Prescriptions Disp Refills   • ALPRAZolam (XANAX) 2 MG tablet 30 tablet 3     Sig: Take 1 tablet by mouth At Night As Needed for Anxiety or Sleep. for anxiety         When do you need the refill by: TODAY    What additional details did the patient provide when requesting the   medication: 2 DAYS OF MEDICATION REMAINING, WILL BE OUT OF MEDICATION Sunday 4/4/2021    Does the patient have less than a 3 day supply:  [x] Yes  [] No      What is the patient's preferred pharmacy: [facility] - [place], [place] - [address] [place] [address] - [phone] [facility] [phone] FX

## 2021-06-08 RX ORDER — AMLODIPINE BESYLATE 2.5 MG/1
2.5 TABLET ORAL DAILY
Qty: 90 TABLET | Refills: 0 | Status: SHIPPED | OUTPATIENT
Start: 2021-06-08 | End: 2021-09-10

## 2021-06-22 ENCOUNTER — OFFICE VISIT (OUTPATIENT)
Dept: FAMILY MEDICINE CLINIC | Facility: CLINIC | Age: 66
End: 2021-06-22

## 2021-06-22 ENCOUNTER — LAB (OUTPATIENT)
Dept: FAMILY MEDICINE CLINIC | Facility: CLINIC | Age: 66
End: 2021-06-22

## 2021-06-22 VITALS
WEIGHT: 213.38 LBS | SYSTOLIC BLOOD PRESSURE: 140 MMHG | BODY MASS INDEX: 33.49 KG/M2 | HEART RATE: 62 BPM | DIASTOLIC BLOOD PRESSURE: 84 MMHG | OXYGEN SATURATION: 98 % | HEIGHT: 67 IN | RESPIRATION RATE: 16 BRPM

## 2021-06-22 DIAGNOSIS — E78.2 MIXED HYPERLIPIDEMIA: ICD-10-CM

## 2021-06-22 DIAGNOSIS — I65.23 CAROTID STENOSIS, BILATERAL: ICD-10-CM

## 2021-06-22 DIAGNOSIS — Z12.31 BREAST CANCER SCREENING BY MAMMOGRAM: ICD-10-CM

## 2021-06-22 DIAGNOSIS — I10 ESSENTIAL HYPERTENSION: ICD-10-CM

## 2021-06-22 DIAGNOSIS — F33.1 MODERATE EPISODE OF RECURRENT MAJOR DEPRESSIVE DISORDER (HCC): Primary | ICD-10-CM

## 2021-06-22 PROBLEM — R90.89 MAGNETIC RESONANCE IMAGING OF BRAIN ABNORMAL: Status: RESOLVED | Noted: 2021-06-22 | Resolved: 2021-06-22

## 2021-06-22 PROBLEM — K20.90 ESOPHAGITIS: Status: RESOLVED | Noted: 2019-11-17 | Resolved: 2021-06-22

## 2021-06-22 PROBLEM — G43.909 MIGRAINE HEADACHE: Status: ACTIVE | Noted: 2021-06-22

## 2021-06-22 PROBLEM — R41.0 ACUTE CONFUSION: Status: RESOLVED | Noted: 2019-08-06 | Resolved: 2021-06-22

## 2021-06-22 PROBLEM — R42 VERTIGO: Status: RESOLVED | Noted: 2020-09-10 | Resolved: 2021-06-22

## 2021-06-22 PROBLEM — R41.0 CONFUSION: Status: RESOLVED | Noted: 2019-08-06 | Resolved: 2021-06-22

## 2021-06-22 PROBLEM — R90.89 MAGNETIC RESONANCE IMAGING OF BRAIN ABNORMAL: Status: ACTIVE | Noted: 2021-06-22

## 2021-06-22 PROBLEM — K21.9 LARYNGOPHARYNGEAL REFLUX: Status: RESOLVED | Noted: 2018-02-16 | Resolved: 2021-06-22

## 2021-06-22 PROCEDURE — 99214 OFFICE O/P EST MOD 30 MIN: CPT | Performed by: PHYSICIAN ASSISTANT

## 2021-06-22 PROCEDURE — 85025 COMPLETE CBC W/AUTO DIFF WBC: CPT | Performed by: PHYSICIAN ASSISTANT

## 2021-06-22 PROCEDURE — 36415 COLL VENOUS BLD VENIPUNCTURE: CPT

## 2021-06-22 PROCEDURE — 80053 COMPREHEN METABOLIC PANEL: CPT | Performed by: PHYSICIAN ASSISTANT

## 2021-06-22 PROCEDURE — 80061 LIPID PANEL: CPT | Performed by: PHYSICIAN ASSISTANT

## 2021-06-22 NOTE — PROGRESS NOTES
"Subjective   Andree Deluna is a 65 y.o. female.     Chief Complaint   Patient presents with   • Hypertension     5 month f/u       /84 (BP Location: Right arm, Patient Position: Sitting, Cuff Size: Adult)   Pulse 62   Resp 16   Ht 170.2 cm (67\")   Wt 96.8 kg (213 lb 6 oz)   SpO2 98%   BMI 33.42 kg/m²     BP Readings from Last 3 Encounters:   06/22/21 140/84   01/29/21 138/80   12/11/20 153/88       Wt Readings from Last 3 Encounters:   06/22/21 96.8 kg (213 lb 6 oz)   01/29/21 95.7 kg (211 lb)   12/11/20 94.5 kg (208 lb 6.4 oz)       HPI Patient is here for management of hypertension.  Patient is currently taking norvasc, Zestoretic, metoprolol and is tolerating this medication.  Patient denies chest pain, soa, headaches.  Patient does take home blood pressures. She has a history of anxiety and MDD and she is taking wellbutrin, xanax, zoloft, lamictal. Feels like anxiety has worsened over the past 1 year. She worries about things that she can not change. She does worry about having another stroke.       The following portions of the patient's history were reviewed and updated as appropriate: allergies, current medications, past family history, past medical history, past social history, past surgical history and problem list.    Review of Systems    Objective   Physical Exam  Constitutional:       Appearance: She is well-developed. She is obese.   HENT:      Head: Normocephalic and atraumatic.   Eyes:      Conjunctiva/sclera: Conjunctivae normal.      Pupils: Pupils are equal, round, and reactive to light.   Cardiovascular:      Rate and Rhythm: Normal rate and regular rhythm.      Heart sounds: No murmur heard.     Pulmonary:      Effort: Pulmonary effort is normal.      Breath sounds: Normal breath sounds.   Abdominal:      General: Bowel sounds are normal.      Palpations: Abdomen is soft.      Tenderness: There is no abdominal tenderness.   Musculoskeletal:         General: No deformity. Normal " range of motion.      Cervical back: Normal range of motion and neck supple.   Lymphadenopathy:      Cervical: No cervical adenopathy.   Skin:     General: Skin is warm and dry.      Capillary Refill: Capillary refill takes less than 2 seconds.      Findings: No rash.   Neurological:      Mental Status: She is alert and oriented to person, place, and time.      Cranial Nerves: No cranial nerve deficit.   Psychiatric:         Behavior: Behavior normal.         Thought Content: Thought content normal.         Judgment: Judgment normal.      Comments: anxious           Diagnoses and all orders for this visit:    1. Moderate episode of recurrent major depressive disorder (CMS/HCC) (Primary)  Comments:  worsening- we discussed alot of coping mechanisms. No changes at this time but consider 2nd gen in place of lamictal in the future.     2. Essential hypertension  -     Lipid panel; Future  -     Comprehensive metabolic panel; Future  -     CBC w AUTO Differential; Future    3. Mixed hyperlipidemia  -     Lipid panel; Future  -     Comprehensive metabolic panel; Future  -     CBC w AUTO Differential; Future    4. Carotid stenosis, bilateral  Comments:  Doppler was ordered already- reprinted this and she will get this.     5. Breast cancer screening by mammogram  Comments:  reprinted.         Return in about 4 months (around 10/22/2021), or if symptoms worsen or fail to improve.

## 2021-06-23 LAB
ALBUMIN SERPL-MCNC: 4.6 G/DL (ref 3.5–5.2)
ALBUMIN/GLOB SERPL: 1.5 G/DL
ALP SERPL-CCNC: 97 U/L (ref 39–117)
ALT SERPL W P-5'-P-CCNC: 12 U/L (ref 1–33)
ANION GAP SERPL CALCULATED.3IONS-SCNC: 10.5 MMOL/L (ref 5–15)
AST SERPL-CCNC: 18 U/L (ref 1–32)
BASOPHILS # BLD AUTO: 0.1 10*3/MM3 (ref 0–0.2)
BASOPHILS NFR BLD AUTO: 1.1 % (ref 0–1.5)
BILIRUB SERPL-MCNC: 0.4 MG/DL (ref 0–1.2)
BUN SERPL-MCNC: 19 MG/DL (ref 8–23)
BUN/CREAT SERPL: 18.8 (ref 7–25)
CALCIUM SPEC-SCNC: 9.8 MG/DL (ref 8.6–10.5)
CHLORIDE SERPL-SCNC: 98 MMOL/L (ref 98–107)
CHOLEST SERPL-MCNC: 178 MG/DL (ref 0–200)
CO2 SERPL-SCNC: 29.5 MMOL/L (ref 22–29)
CREAT SERPL-MCNC: 1.01 MG/DL (ref 0.57–1)
DEPRECATED RDW RBC AUTO: 40.9 FL (ref 37–54)
EOSINOPHIL # BLD AUTO: 0.33 10*3/MM3 (ref 0–0.4)
EOSINOPHIL NFR BLD AUTO: 3.6 % (ref 0.3–6.2)
ERYTHROCYTE [DISTWIDTH] IN BLOOD BY AUTOMATED COUNT: 13.3 % (ref 12.3–15.4)
GFR SERPL CREATININE-BSD FRML MDRD: 55 ML/MIN/1.73
GLOBULIN UR ELPH-MCNC: 3.1 GM/DL
GLUCOSE SERPL-MCNC: 92 MG/DL (ref 65–99)
HCT VFR BLD AUTO: 41.5 % (ref 34–46.6)
HDLC SERPL-MCNC: 75 MG/DL (ref 40–60)
HGB BLD-MCNC: 13.7 G/DL (ref 12–15.9)
IMM GRANULOCYTES # BLD AUTO: 0.04 10*3/MM3 (ref 0–0.05)
IMM GRANULOCYTES NFR BLD AUTO: 0.4 % (ref 0–0.5)
LDLC SERPL CALC-MCNC: 83 MG/DL (ref 0–100)
LDLC/HDLC SERPL: 1.06 {RATIO}
LYMPHOCYTES # BLD AUTO: 2.09 10*3/MM3 (ref 0.7–3.1)
LYMPHOCYTES NFR BLD AUTO: 22.5 % (ref 19.6–45.3)
MCH RBC QN AUTO: 28.7 PG (ref 26.6–33)
MCHC RBC AUTO-ENTMCNC: 33 G/DL (ref 31.5–35.7)
MCV RBC AUTO: 86.8 FL (ref 79–97)
MONOCYTES # BLD AUTO: 0.57 10*3/MM3 (ref 0.1–0.9)
MONOCYTES NFR BLD AUTO: 6.1 % (ref 5–12)
NEUTROPHILS NFR BLD AUTO: 6.14 10*3/MM3 (ref 1.7–7)
NEUTROPHILS NFR BLD AUTO: 66.3 % (ref 42.7–76)
NRBC BLD AUTO-RTO: 0 /100 WBC (ref 0–0.2)
PLATELET # BLD AUTO: 235 10*3/MM3 (ref 140–450)
PMV BLD AUTO: 12.1 FL (ref 6–12)
POTASSIUM SERPL-SCNC: 4.2 MMOL/L (ref 3.5–5.2)
PROT SERPL-MCNC: 7.7 G/DL (ref 6–8.5)
RBC # BLD AUTO: 4.78 10*6/MM3 (ref 3.77–5.28)
SODIUM SERPL-SCNC: 138 MMOL/L (ref 136–145)
TRIGL SERPL-MCNC: 117 MG/DL (ref 0–150)
VLDLC SERPL-MCNC: 20 MG/DL (ref 5–40)
WBC # BLD AUTO: 9.27 10*3/MM3 (ref 3.4–10.8)

## 2021-06-24 ENCOUNTER — TELEPHONE (OUTPATIENT)
Dept: FAMILY MEDICINE CLINIC | Facility: CLINIC | Age: 66
End: 2021-06-24

## 2021-06-24 NOTE — TELEPHONE ENCOUNTER
Caller: Andree Deluna    Relationship: Self    Best call back number: [phone]    What is the best time to reach you: ANYTHING    Who are you requesting to speak with (clinical staff, provider,  specific staff member): CLINICAL STAFF    Do you know the name of the person who called: ANDREE CREWS    What was the call regarding: PATIENT SAID SHE MISSED A CALL FROM THE OFFICE, REQUESTED CALLBACK    SHE SAID IT MAY BE RELATED TO TEST RESULTS FROM BLOOD WORK    Do you require a callback: YES

## 2021-07-14 DIAGNOSIS — F41.9 ANXIETY: ICD-10-CM

## 2021-07-14 NOTE — TELEPHONE ENCOUNTER
Caller: MikieAndree    Relationship: Self    Best call back number: [phone]    Medication needed:   Requested Prescriptions     Pending Prescriptions Disp Refills   • ALPRAZolam (XANAX) 2 MG tablet 30 tablet 3     Sig: Take 1 tablet by mouth At Night As Needed for Anxiety or Sleep. for anxiety       When do you need the refill by: 7/16/21    What additional details did the patient provide when requesting the medication: WILL BE GOING OUT OF TOWN AND WILL NOT BE BACK TIL 8/03/21. WILL NEED THIS FILLED BEFORE SHE GOES    Does the patient have less than a 3 day supply:  [] Yes  [x] No    What is the patient's preferred pharmacy: [facility] [place] [place] [facility] [address] [place] [facility] [phone] [facility] [phone] FX

## 2021-07-15 DIAGNOSIS — F41.9 ANXIETY: ICD-10-CM

## 2021-07-15 RX ORDER — ALPRAZOLAM 2 MG/1
TABLET ORAL
Qty: 30 TABLET | Refills: 2 | OUTPATIENT
Start: 2021-07-15

## 2021-07-15 RX ORDER — ALPRAZOLAM 2 MG/1
2 TABLET ORAL NIGHTLY PRN
Qty: 30 TABLET | Refills: 3 | Status: SHIPPED | OUTPATIENT
Start: 2021-07-15 | End: 2021-11-04

## 2021-07-16 ENCOUNTER — TELEPHONE (OUTPATIENT)
Dept: FAMILY MEDICINE CLINIC | Facility: CLINIC | Age: 66
End: 2021-07-16

## 2021-07-16 NOTE — TELEPHONE ENCOUNTER
Caller: HENRRY AUSTIN    Relationship to patient: Emergency Contact    Best call back number: [phone]  Patient is needing:     MR. SALES SAYS [facility] IS NEEDING A VERBAL TO RELEASE ALPRAZOLAM EARLY. MS SALES WILL BE LEAVING TOWN TODAY.       ALPRAZolam (XANAX) 2 MG tablet  2 mg, Nightly PRN 3 ordered               Summary: Take 1 tablet by mouth At Night As Needed for Anxiety or Sleep. for anxiety, Starting

## 2021-07-22 ENCOUNTER — TELEPHONE (OUTPATIENT)
Dept: FAMILY MEDICINE CLINIC | Facility: CLINIC | Age: 66
End: 2021-07-22

## 2021-07-22 DIAGNOSIS — Z12.31 BREAST CANCER SCREENING BY MAMMOGRAM: Primary | ICD-10-CM

## 2021-07-27 RX ORDER — BUPROPION HYDROCHLORIDE 150 MG/1
150 TABLET ORAL DAILY
Qty: 90 TABLET | Refills: 1 | Status: SHIPPED | OUTPATIENT
Start: 2021-07-27 | End: 2022-01-26

## 2021-08-04 ENCOUNTER — HOSPITAL ENCOUNTER (OUTPATIENT)
Dept: MAMMOGRAPHY | Facility: HOSPITAL | Age: 66
Discharge: HOME | End: 2021-08-04
Admitting: PHYSICIAN ASSISTANT

## 2021-08-04 ENCOUNTER — APPOINTMENT (OUTPATIENT)
Dept: OTHER | Facility: HOSPITAL | Age: 66
End: 2021-08-04

## 2021-08-04 DIAGNOSIS — Z12.31 BREAST CANCER SCREENING BY MAMMOGRAM: ICD-10-CM

## 2021-08-04 DIAGNOSIS — Z09 FOLLOW UP: ICD-10-CM

## 2021-08-04 PROCEDURE — 77063 BREAST TOMOSYNTHESIS BI: CPT

## 2021-08-04 PROCEDURE — 77067 SCR MAMMO BI INCL CAD: CPT

## 2021-08-30 RX ORDER — OXYBUTYNIN CHLORIDE 5 MG/1
TABLET ORAL
Qty: 90 TABLET | Refills: 1 | Status: SHIPPED | OUTPATIENT
Start: 2021-08-30 | End: 2022-02-28

## 2021-09-10 RX ORDER — AMLODIPINE BESYLATE 2.5 MG/1
TABLET ORAL
Qty: 90 TABLET | Refills: 0 | Status: SHIPPED | OUTPATIENT
Start: 2021-09-10

## 2021-09-28 ENCOUNTER — OFFICE VISIT (OUTPATIENT)
Dept: FAMILY MEDICINE CLINIC | Facility: CLINIC | Age: 66
End: 2021-09-28

## 2021-09-28 VITALS
RESPIRATION RATE: 16 BRPM | DIASTOLIC BLOOD PRESSURE: 82 MMHG | WEIGHT: 216 LBS | BODY MASS INDEX: 33.9 KG/M2 | SYSTOLIC BLOOD PRESSURE: 136 MMHG | HEIGHT: 67 IN | OXYGEN SATURATION: 95 % | HEART RATE: 58 BPM

## 2021-09-28 DIAGNOSIS — M54.31 SCIATIC NERVE PAIN, RIGHT: ICD-10-CM

## 2021-09-28 DIAGNOSIS — G57.01 PIRIFORMIS SYNDROME, RIGHT: Primary | ICD-10-CM

## 2021-09-28 PROCEDURE — 99213 OFFICE O/P EST LOW 20 MIN: CPT | Performed by: PHYSICIAN ASSISTANT

## 2021-09-28 RX ORDER — METHYLPREDNISOLONE 4 MG/1
TABLET ORAL
Qty: 1 EACH | Refills: 0 | Status: SHIPPED | OUTPATIENT
Start: 2021-09-28 | End: 2021-10-25

## 2021-09-28 RX ORDER — CYCLOBENZAPRINE HCL 5 MG
5 TABLET ORAL 3 TIMES DAILY PRN
Qty: 14 TABLET | Refills: 0 | Status: SHIPPED | OUTPATIENT
Start: 2021-09-28 | End: 2021-10-25

## 2021-09-28 NOTE — PROGRESS NOTES
"Subjective   Andree Deluna is a 66 y.o. female.     Chief Complaint   Patient presents with   • Leg Pain     glute, right sided       /82 (BP Location: Left arm, Patient Position: Sitting, Cuff Size: Adult)   Pulse 58   Resp 16   Ht 170.2 cm (67\")   Wt 98 kg (216 lb)   SpO2 95%   BMI 33.83 kg/m²     BP Readings from Last 3 Encounters:   09/28/21 136/82   06/22/21 140/84   01/29/21 138/80       Wt Readings from Last 3 Encounters:   09/28/21 98 kg (216 lb)   06/22/21 96.8 kg (213 lb 6 oz)   01/29/21 95.7 kg (211 lb)       HPI Leg pain especially in the buttock on the right that has been present for the past 1 month. No injury. Radiates down the leg. No bowel or bladder incontinence/retention. No injury. Did radiate all the way to the foot one time.     The following portions of the patient's history were reviewed and updated as appropriate: allergies, current medications, past family history, past medical history, past social history, past surgical history and problem list.    Review of Systems    Objective   Physical Exam  Constitutional:       Appearance: Normal appearance.   Eyes:      Extraocular Movements: Extraocular movements intact.      Pupils: Pupils are equal, round, and reactive to light.   Musculoskeletal:         General: Tenderness (right sciatic notch pain ) present. Normal range of motion.   Neurological:      General: No focal deficit present.      Mental Status: She is alert and oriented to person, place, and time.   Psychiatric:         Mood and Affect: Mood normal.         Behavior: Behavior normal.           Diagnoses and all orders for this visit:    1. Piriformis syndrome, right (Primary)    2. Sciatic nerve pain, right    Other orders  -     methylPREDNISolone (MEDROL) 4 MG dose pack; Take as directed on package instructions.  Dispense: 1 each; Refill: 0  -     cyclobenzaprine (FLEXERIL) 5 MG tablet; Take 1 tablet by mouth 3 (Three) Times a Day As Needed for Muscle Spasms.  " Dispense: 14 tablet; Refill: 0      PT if no improvement.     Return if symptoms worsen or fail to improve.

## 2021-10-06 ENCOUNTER — TELEPHONE (OUTPATIENT)
Dept: FAMILY MEDICINE CLINIC | Facility: CLINIC | Age: 66
End: 2021-10-06

## 2021-10-06 NOTE — TELEPHONE ENCOUNTER
Caller: Andree Deluna    Relationship: Self    Best call back number: (c) [phone]  OR (Q) [phone]     What medications are you currently taking:   Current Outpatient Medications on File Prior to Visit   Medication Sig Dispense Refill   • ALPRAZolam (XANAX) 2 MG tablet Take 1 tablet by mouth At Night As Needed for Anxiety or Sleep. for anxiety 30 tablet 3   • amLODIPine (NORVASC) 2.5 MG tablet TAKE ONE TABLET BY MOUTH DAILY 90 tablet 0   • aspirin 81 MG chewable tablet Chew 1 tablet Daily. 30 tablet 1   • atorvastatin (LIPITOR) 80 MG tablet Take 1 tablet by mouth Every Night. 30 tablet 11   • buPROPion XL (Wellbutrin XL) 150 MG 24 hr tablet Take 1 tablet by mouth Daily. 90 tablet 1   • cyclobenzaprine (FLEXERIL) 5 MG tablet Take 1 tablet by mouth 3 (Three) Times a Day As Needed for Muscle Spasms. 14 tablet 0   • lamoTRIgine (LaMICtal) 100 MG tablet Take 1 tablet by mouth 2 (Two) Times a Day. 60 tablet 3   • lisinopril-hydrochlorothiazide (PRINZIDE,ZESTORETIC) 20-25 MG per tablet TAKE ONE TABLET BY MOUTH DAILY 30 tablet 9   • meloxicam (Mobic) 15 MG tablet Take 1 tablet by mouth Daily. 30 tablet 11   • methylPREDNISolone (MEDROL) 4 MG dose pack Take as directed on package instructions. 1 each 0   • metoprolol succinate XL (TOPROL-XL) 25 MG 24 hr tablet Take 1 tablet by mouth Daily. 30 tablet 10   • omeprazole (priLOSEC) 40 MG capsule TAKE ONE CAPSULE BY MOUTH DAILY 30 capsule 8   • oxybutynin (DITROPAN) 5 MG tablet TAKE ONE TABLET BY MOUTH EVERY MORNING FOR BLADDER 90 tablet 1   • sertraline (Zoloft) 100 MG tablet 2 po q d 60 tablet 11     No current facility-administered medications on file prior to visit.          When did you start taking these medications: 9/28/2021    Which medication are you concerned about: CYCLOBENZOPRINE , METHYLPREDNISOLONE TABLET 4 MG    Who prescribed you this medication: SHAYLEE WHITE    What are your concerns:      ANDREE HAS COMPLETED ALL HER MEDICATION,  MEDICATION IS NOT HELPING WITH BACK PAIN. SHE WOULD LIKE TO KNOW IF SHAYLEE WHITE WILL PRESCRIBE HER DIFFERENT MEDICATION     How long have you had these concerns: 1 WEEK    [facility] - [place], [place] - [address] [place] [address] - [phone]  - [phone]   [phone]

## 2021-10-07 NOTE — TELEPHONE ENCOUNTER
Caller: Mikie Andree LOVELY    Relationship to patient: Self    Best call back number:[phone]   Patient is needing:     MS SALES WOULD LIKE A CALL BACK REGARDING HER BACK PAIN SHE SAYS THAT IT HAS NOT GOTTEN ANY BETTER WITH MEDICATION PRESCRIBED. SHE WOULD LIKE TO GET ANOTHER REFILL OF MEDICATION SENT IN       [address], [place] - [address] [facility] - [phone]  - [phone]   [phone]

## 2021-10-08 DIAGNOSIS — G57.01 PIRIFORMIS SYNDROME, RIGHT: Primary | ICD-10-CM

## 2021-10-08 RX ORDER — METHOCARBAMOL 500 MG/1
500 TABLET, FILM COATED ORAL 3 TIMES DAILY PRN
Qty: 30 TABLET | Refills: 0 | Status: SHIPPED | OUTPATIENT
Start: 2021-10-08 | End: 2021-10-25

## 2021-10-25 ENCOUNTER — OFFICE VISIT (OUTPATIENT)
Dept: FAMILY MEDICINE CLINIC | Facility: CLINIC | Age: 66
End: 2021-10-25

## 2021-10-25 VITALS
HEART RATE: 55 BPM | WEIGHT: 212 LBS | TEMPERATURE: 96.9 F | SYSTOLIC BLOOD PRESSURE: 120 MMHG | HEIGHT: 67 IN | DIASTOLIC BLOOD PRESSURE: 74 MMHG | BODY MASS INDEX: 33.27 KG/M2 | OXYGEN SATURATION: 97 % | RESPIRATION RATE: 18 BRPM

## 2021-10-25 DIAGNOSIS — E11.9 TYPE 2 DIABETES MELLITUS WITHOUT COMPLICATION, WITHOUT LONG-TERM CURRENT USE OF INSULIN (HCC): Chronic | ICD-10-CM

## 2021-10-25 DIAGNOSIS — I10 PRIMARY HYPERTENSION: Primary | ICD-10-CM

## 2021-10-25 DIAGNOSIS — G57.01 PIRIFORMIS SYNDROME, RIGHT: ICD-10-CM

## 2021-10-25 DIAGNOSIS — I65.23 CAROTID STENOSIS, BILATERAL: ICD-10-CM

## 2021-10-25 DIAGNOSIS — E78.2 MIXED HYPERLIPIDEMIA: ICD-10-CM

## 2021-10-25 PROCEDURE — 99214 OFFICE O/P EST MOD 30 MIN: CPT | Performed by: PHYSICIAN ASSISTANT

## 2021-10-25 RX ORDER — SEMAGLUTIDE 1.34 MG/ML
0.5 INJECTION, SOLUTION SUBCUTANEOUS WEEKLY
Qty: 3 PEN | Refills: 0 | Status: SHIPPED | OUTPATIENT
Start: 2021-10-25 | End: 2022-01-20

## 2021-10-25 NOTE — PROGRESS NOTES
"Subjective   Andree Deluna is a 66 y.o. female.     Chief Complaint   Patient presents with   • Hypertension       /74 (BP Location: Left arm, Patient Position: Sitting, Cuff Size: Adult)   Pulse 55   Temp 96.9 °F (36.1 °C) (Temporal)   Resp 18   Ht 170.2 cm (67\")   Wt 96.2 kg (212 lb)   SpO2 97%   BMI 33.20 kg/m²     BP Readings from Last 3 Encounters:   10/25/21 120/74   09/28/21 136/82   06/22/21 140/84       Wt Readings from Last 3 Encounters:   10/25/21 96.2 kg (212 lb)   09/28/21 98 kg (216 lb)   06/22/21 96.8 kg (213 lb 6 oz)       HPI Presents to the clinic for management of elevated blood glucose, htn, piriformis syndrome, and bilateral occlusion of her carotid arteries. She has had recent gi illness but this has resolved. Was seen for piriformis syndrome around 3-4 weeks ago and this has improved. No chest pain or soa. Is due for ultrasound of the carotid arteries. BP is much better and the LDL is much better at 83 and HDL was 75 mg.     The following portions of the patient's history were reviewed and updated as appropriate: allergies, current medications, past family history, past medical history, past social history, past surgical history and problem list.    Review of Systems    Objective   Physical Exam  Constitutional:       Appearance: She is well-developed.   HENT:      Head: Normocephalic and atraumatic.   Eyes:      Conjunctiva/sclera: Conjunctivae normal.      Pupils: Pupils are equal, round, and reactive to light.   Cardiovascular:      Rate and Rhythm: Normal rate and regular rhythm.      Heart sounds: No murmur heard.      Pulmonary:      Effort: Pulmonary effort is normal.      Breath sounds: Normal breath sounds.   Abdominal:      General: Bowel sounds are normal.      Palpations: Abdomen is soft.      Tenderness: There is no abdominal tenderness.   Musculoskeletal:         General: No deformity. Normal range of motion.      Cervical back: Normal range of motion and neck " supple.   Lymphadenopathy:      Cervical: No cervical adenopathy.   Skin:     General: Skin is warm and dry.      Capillary Refill: Capillary refill takes less than 2 seconds.      Findings: No rash.   Neurological:      Mental Status: She is alert and oriented to person, place, and time.      Cranial Nerves: No cranial nerve deficit.   Psychiatric:         Behavior: Behavior normal.         Thought Content: Thought content normal.         Judgment: Judgment normal.         Diagnoses and all orders for this visit:    1. Primary hypertension (Primary)  Comments:  doing great at this time.     2. Mixed hyperlipidemia  Comments:  continue cholesterol she is doing much better on her lipid panel.     3. Piriformis syndrome, right  Comments:  improved.     4. Carotid stenosis, bilateral  Comments:  continue blood pressure medication, lipitor 80mg,   Orders:  -     Duplex Carotid Ultrasound CAR; Future    5. Type 2 diabetes mellitus without complication, without long-term current use of insulin (HCC)  Comments:  trial of ozempic to help with weight loss and elevated blood sugars.         Return in about 4 months (around 2/25/2022), or if symptoms worsen or fail to improve.

## 2021-10-27 RX ORDER — OMEPRAZOLE 40 MG/1
40 CAPSULE, DELAYED RELEASE ORAL DAILY
Qty: 90 CAPSULE | Refills: 1 | Status: SHIPPED | OUTPATIENT
Start: 2021-10-27 | End: 2021-12-16 | Stop reason: SDUPTHER

## 2021-11-04 DIAGNOSIS — F41.9 ANXIETY: ICD-10-CM

## 2021-11-04 RX ORDER — ALPRAZOLAM 2 MG/1
TABLET ORAL
Qty: 30 TABLET | Refills: 0 | Status: SHIPPED | OUTPATIENT
Start: 2021-11-04 | End: 2021-12-02

## 2021-11-29 ENCOUNTER — TELEPHONE (OUTPATIENT)
Dept: FAMILY MEDICINE CLINIC | Facility: CLINIC | Age: 66
End: 2021-11-29

## 2021-11-29 DIAGNOSIS — M54.40 ACUTE BACK PAIN WITH SCIATICA, UNSPECIFIED LATERALITY: Primary | ICD-10-CM

## 2021-11-30 RX ORDER — LISINOPRIL AND HYDROCHLOROTHIAZIDE 25; 20 MG/1; MG/1
TABLET ORAL
Qty: 90 TABLET | Refills: 1 | Status: SHIPPED | OUTPATIENT
Start: 2021-11-30 | End: 2022-06-09

## 2021-12-01 ENCOUNTER — TELEPHONE (OUTPATIENT)
Dept: FAMILY MEDICINE CLINIC | Facility: CLINIC | Age: 66
End: 2021-12-01

## 2021-12-01 NOTE — TELEPHONE ENCOUNTER
El, This is what the Spine surgeon office sent me back regarding the referral on this patient.  PT WILL NEED A MINIMUM OF X-RAYS FOR THE AREA OF DX IN ORDER TO BE SCHEDULED WITH OUR OFFICE AND WILL SEE THE APRN WITH X-RAYS-IF PT WISHES TO SEE THE M.D PT WILL NEED TO HAVE MRI. IMAGING NEEDS TO BE RECENT IMAGING WITHIN AT LEAST THE LAST 6 MONTHS PLEASE HOLD REFERRAL UNTIL IMAGING IS ORDERED AND ROUTE BACK ONCE COMPLETED FOR SCHEDULING THANK YOU      Please advise,    Thanks, Shell

## 2021-12-02 DIAGNOSIS — M54.40 ACUTE BACK PAIN WITH SCIATICA, UNSPECIFIED LATERALITY: Primary | ICD-10-CM

## 2021-12-02 DIAGNOSIS — F41.9 ANXIETY: ICD-10-CM

## 2021-12-02 RX ORDER — ALPRAZOLAM 2 MG/1
TABLET ORAL
Qty: 30 TABLET | Refills: 3 | Status: SHIPPED | OUTPATIENT
Start: 2021-12-02 | End: 2021-12-03

## 2021-12-02 NOTE — TELEPHONE ENCOUNTER
Caller: Andree Deluna    Relationship: Self    Best call back number: [phone]     Requested Prescriptions:   Requested Prescriptions     Pending Prescriptions Disp Refills   • ALPRAZolam (XANAX) 2 MG tablet [Pharmacy Med Name: ALPRAZolam 2 MG TABLET] 30 tablet 3     Sig: TAKE ONE TABLET BY MOUTH ONCE NIGHTLY AS NEEDED FOR ANXIETY OR SLEEP        Pharmacy where request should be sent: [facility] [address] [place] [address], [place] - [address] [facility] [phone] [facility] [phone]      Additional details provided by patient: OUT OF MEDICATION 12/3/2021 , REQUEST 3 REFILLS     Does the patient have less than a 3 day supply:  [x] Yes  [] No    Yulissa Vernon   12/02/21 11:41 EST

## 2021-12-02 NOTE — TELEPHONE ENCOUNTER
Yes patient was referred to Spine Surgery but they are wanting some type of imaging done within the last 6 months. PT WILL NEED A MINIMUM OF X-RAYS FOR THE AREA OF DX IN ORDER TO BE SCHEDULED WITH OUR OFFICE AND WILL SEE THE APRN WITH X-RAYS-IF PT WISHES TO SEE THE M.D PT WILL NEED TO HAVE MRI-ROUTING BACK TO AMBULATORY. IMAGING NEEDS TO BE RECENT IMAGING WITHIN AT LEAST THE LAST 6 MONTHS PLEASE HOLD REFERRAL UNTIL IMAGING IS ORDERED AND ROUTE BACK ONCE COMPLETED FOR SCHEDULING THANK YOU  This was sent from the Spine surgery office.        Thanks, Shell

## 2021-12-02 NOTE — TELEPHONE ENCOUNTER
Caller: Andree Deluna    Relationship: Self    Best call back number: [phone]     What is the best time to reach you: ANYTIME     Who are you requesting to speak with (clinical staff, provider,  specific staff member): CLINICAL     Do you know the name of the person who called: ANDREE    What was the call regarding: ANDERE IS WANTING A CALL BACK REGARDING SPECIALIST REFERRAL REQUESTED. SHE SAYS SHE CALLED 2 TIMES REGARDING MATTER     Do you require a callback: YES

## 2021-12-03 DIAGNOSIS — F41.9 ANXIETY: ICD-10-CM

## 2021-12-03 RX ORDER — ALPRAZOLAM 2 MG/1
TABLET ORAL
Qty: 30 TABLET | Refills: 0 | Status: SHIPPED | OUTPATIENT
Start: 2021-12-03 | End: 2022-02-22 | Stop reason: SDUPTHER

## 2021-12-03 RX ORDER — LAMOTRIGINE 100 MG/1
TABLET ORAL
Qty: 60 TABLET | Refills: 3 | Status: SHIPPED | OUTPATIENT
Start: 2021-12-03 | End: 2022-04-25

## 2021-12-03 NOTE — TELEPHONE ENCOUNTER
Please explain to Andree that the specialist will not see her without imaging. I placed an order. She can be seen in our office prior if her pain is still present.

## 2021-12-10 NOTE — TELEPHONE ENCOUNTER
I tried to call the patient and let her know that the order was in chart she could go to [facility]

## 2021-12-16 ENCOUNTER — HOSPITAL ENCOUNTER (OUTPATIENT)
Dept: GENERAL RADIOLOGY | Facility: HOSPITAL | Age: 66
Discharge: HOME | End: 2021-12-16
Admitting: PHYSICIAN ASSISTANT

## 2021-12-16 DIAGNOSIS — M54.40 ACUTE BACK PAIN WITH SCIATICA, UNSPECIFIED LATERALITY: ICD-10-CM

## 2021-12-16 PROCEDURE — 72110 X-RAY EXAM L-2 SPINE 4/>VWS: CPT

## 2021-12-16 RX ORDER — OMEPRAZOLE 40 MG/1
40 CAPSULE, DELAYED RELEASE ORAL DAILY
Qty: 90 CAPSULE | Refills: 1 | Status: SHIPPED | OUTPATIENT
Start: 2021-12-16 | End: 2022-10-23

## 2021-12-16 RX ORDER — METOPROLOL SUCCINATE 25 MG/1
25 TABLET, EXTENDED RELEASE ORAL DAILY
Qty: 30 TABLET | Refills: 10 | Status: SHIPPED | OUTPATIENT
Start: 2021-12-16 | End: 2022-10-25

## 2021-12-16 RX ORDER — ATORVASTATIN CALCIUM 80 MG/1
80 TABLET, FILM COATED ORAL NIGHTLY
Qty: 30 TABLET | Refills: 11 | Status: SHIPPED | OUTPATIENT
Start: 2021-12-16 | End: 2022-12-14

## 2021-12-16 NOTE — TELEPHONE ENCOUNTER
Caller: AUSTIN SALES    Relationship: Emergency Contact    Best call back number: [phone]    Requested Prescriptions:   Requested Prescriptions     Pending Prescriptions Disp Refills   • metoprolol succinate XL (TOPROL-XL) 25 MG 24 hr tablet 30 tablet 10     Sig: Take 1 tablet by mouth Daily.   • atorvastatin (LIPITOR) 80 MG tablet 30 tablet 11     Sig: Take 1 tablet by mouth Every Night.   • omeprazole (priLOSEC) 40 MG capsule 90 capsule 1     Sig: Take 1 capsule by mouth Daily.        Pharmacy where request should be sent: [facility] - [place], [place] - [address] - [phone] [facility] [phone]      Additional details provided by patient: IS OUT COMPLETELY    Does the patient have less than a 3 day supply:  [x] Yes  [] No    Silvano Almaraz   12/16/21 09:20 EST

## 2021-12-17 DIAGNOSIS — M43.16 ANTEROLISTHESIS OF LUMBAR SPINE: Primary | ICD-10-CM

## 2021-12-27 ENCOUNTER — TELEPHONE (OUTPATIENT)
Dept: FAMILY MEDICINE CLINIC | Facility: CLINIC | Age: 66
End: 2021-12-27

## 2021-12-27 NOTE — TELEPHONE ENCOUNTER
Caller: Andree Deluna    Relationship: Self    Best call back number: [phone]    Caller requesting test results: PATIENT    What test was performed: X-RAY    When was the test performed: 12/17/21    Where was the test performed: [facility]    Additional notes: PATIENT CALLED TO REQUEST CALLBACK WITH RESULTS OF HER X-RAY    SHE IS WANTING TO SEE IF SHE'S HAD HER BACK X-RAYS YET, SHE THINKS SHE HAS JUST HAD NECK X-RAYS     SHE ALSO SAID IF SHE DOES HAVE TO HAVE SURGERY ON HER NECK, SHE WANTS TO USE THE SAME SURGEON THAT DID HER OTHER SIDE

## 2021-12-28 NOTE — TELEPHONE ENCOUNTER
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 Will have her see the same person. Lets find out who that was and we can refer her.

## 2021-12-29 ENCOUNTER — TELEPHONE (OUTPATIENT)
Dept: FAMILY MEDICINE CLINIC | Facility: CLINIC | Age: 66
End: 2021-12-29

## 2021-12-29 NOTE — TELEPHONE ENCOUNTER
Patient wants an rx sent in for her pain.  She said not a low dose aspirin or something like that.  Said she is in a lot of pain.

## 2021-12-29 NOTE — TELEPHONE ENCOUNTER
Sherrie - Patient called and has too many questions for me to answer.  Wants to speak with you asap.  Please call her at [phone].

## 2021-12-29 NOTE — TELEPHONE ENCOUNTER
The referral had been sent over and patient is scheduled in that office for Neurosurgery on 1/6/2022.

## 2021-12-30 DIAGNOSIS — M43.16 ANTEROLISTHESIS OF LUMBAR SPINE: Primary | ICD-10-CM

## 2021-12-30 RX ORDER — TRAMADOL HYDROCHLORIDE 50 MG/1
50 TABLET ORAL EVERY 6 HOURS PRN
Qty: 28 TABLET | Refills: 0 | Status: SHIPPED | OUTPATIENT
Start: 2021-12-30 | End: 2022-01-06

## 2022-01-12 ENCOUNTER — TELEPHONE (OUTPATIENT)
Dept: NEUROSURGERY | Facility: CLINIC | Age: 67
End: 2022-01-12

## 2022-01-12 NOTE — TELEPHONE ENCOUNTER
Caller: Andree Deluna    Relationship to patient: Self    Best call back number: [phone]  Patient is needing: PATIENT STATES SHE RECEIVED A CALL FROM OUR OFFICE REGARDING A REFERRAL?  UNKNOWN WHAT     WARM TRANSFERRED, LAURA TOOK THE CALL

## 2022-01-14 ENCOUNTER — OFFICE VISIT (OUTPATIENT)
Dept: NEUROSURGERY | Facility: CLINIC | Age: 67
End: 2022-01-14

## 2022-01-14 VITALS
WEIGHT: 206 LBS | TEMPERATURE: 98.2 F | OXYGEN SATURATION: 96 % | HEIGHT: 67 IN | HEART RATE: 62 BPM | SYSTOLIC BLOOD PRESSURE: 122 MMHG | DIASTOLIC BLOOD PRESSURE: 84 MMHG | BODY MASS INDEX: 32.33 KG/M2 | RESPIRATION RATE: 16 BRPM

## 2022-01-14 DIAGNOSIS — M54.50 LOW BACK PAIN OF OVER 3 MONTHS DURATION: Primary | ICD-10-CM

## 2022-01-14 PROCEDURE — 99204 OFFICE O/P NEW MOD 45 MIN: CPT

## 2022-01-14 RX ORDER — CYCLOBENZAPRINE HCL 10 MG
5 TABLET ORAL 3 TIMES DAILY PRN
Qty: 30 TABLET | Refills: 0 | Status: SHIPPED | OUTPATIENT
Start: 2022-01-14 | End: 2022-05-18

## 2022-01-14 NOTE — PROGRESS NOTES
Subjective     Andree Deluna is a 66 y.o. female is being seen for consultation today at the request of Naga Griffith PA-C anterolisthesis of lumbar spine.    Chief Complaint   Patient presents with   • Back Pain     lower right sided back pain that radiates into the right buttock that radiates to the upper right leg.       HPI: Patient is a 66-year-old WF with a PMH significant for HTN, obesity, knee replacement and previous stroke in 2019 currently complaining of low back pain x4 months.  Patient denies any inciting event or injury such as a fall that might have caused the pain.  She says pain is mostly in her right low back area and radiates through her right gluteal area down the side of her right thigh and the front of her shin stopping at her ankle.  Pain does not radiate into her foot.  Patient denies numbness and tingling.  Pain is worst when transitioning from laying to sitting or standing.  She says it is most notable particularly when getting in and out of a car.  She is able to lay, sit, and stand/walk with minimal pain, it is transitioning from one to the other that exacerbates her pain in general.  Patient denies bowel or bladder dysfunction.  Denies saddle anesthesia.  No history of spine surgery.  She does report difficulty with balance and coordination as well as some difficulty dropping things frequently, though she feels these issues have been going on longer than her low back pain and may be more associated with her previous stroke.  Patient has not tried formal physical therapy or epidural steroid injections for this current complaint.  She says she has had steroid injections in other joints which have helped tremendously in the past.      PMH:  Patient Active Problem List   Diagnosis   • Anemia in other chronic diseases classified elsewhere   • Anxiety   • B12 deficiency   • Attention deficit disorder of adult with hyperactivity   • Mood disorder (HCC)   • Complete rotator cuff tear or  rupture of right shoulder, not specified as traumatic   • Degeneration of intervertebral disc of cervical region   • Degeneration of intervertebral disc of lumbosacral region   • Fatigue   • Hyperlipidemia   • Hypertension   • Insomnia   • Obesity   • Osteoarthritis of knee   • CVA (cerebral vascular accident) (HCC)   • Visual field loss left   • Acute midline low back pain without sciatica   • GERD with esophagitis   • Carotid stenosis, bilateral   • Overactive bladder   • History of stroke   • Acute pain of right shoulder   • Left cervical radiculopathy   • Migraine headache   • Breast cancer screening by mammogram   • Moderate episode of recurrent major depressive disorder (HCC)   • Piriformis syndrome, right          Current Outpatient Medications:   •  ALPRAZolam (XANAX) 2 MG tablet, TAKE ONE TABLET BY MOUTH ONCE NIGHTLY AS NEEDED FOR ANXIETY OR SLEEP, Disp: 30 tablet, Rfl: 0  •  amLODIPine (NORVASC) 2.5 MG tablet, TAKE ONE TABLET BY MOUTH DAILY, Disp: 90 tablet, Rfl: 0  •  aspirin 81 MG chewable tablet, Chew 1 tablet Daily., Disp: 30 tablet, Rfl: 1  •  atorvastatin (LIPITOR) 80 MG tablet, Take 1 tablet by mouth Every Night., Disp: 30 tablet, Rfl: 11  •  buPROPion XL (Wellbutrin XL) 150 MG 24 hr tablet, Take 1 tablet by mouth Daily., Disp: 90 tablet, Rfl: 1  •  lamoTRIgine (LaMICtal) 100 MG tablet, TAKE ONE TABLET BY MOUTH TWICE A DAY, Disp: 60 tablet, Rfl: 3  •  lisinopril-hydrochlorothiazide (PRINZIDE,ZESTORETIC) 20-25 MG per tablet, TAKE ONE TABLET BY MOUTH DAILY, Disp: 90 tablet, Rfl: 1  •  meloxicam (Mobic) 15 MG tablet, Take 1 tablet by mouth Daily., Disp: 30 tablet, Rfl: 11  •  metoprolol succinate XL (TOPROL-XL) 25 MG 24 hr tablet, Take 1 tablet by mouth Daily., Disp: 30 tablet, Rfl: 10  •  omeprazole (priLOSEC) 40 MG capsule, Take 1 capsule by mouth Daily., Disp: 90 capsule, Rfl: 1  •  oxybutynin (DITROPAN) 5 MG tablet, TAKE ONE TABLET BY MOUTH EVERY MORNING FOR BLADDER, Disp: 90 tablet, Rfl: 1  •   Semaglutide, 1 MG/DOSE, (Ozempic, 1 MG/DOSE,) 2 MG/1.5ML solution pen-injector, Inject 0.5 mg under the skin into the appropriate area as directed 1 (One) Time Per Week for 90 days., Disp: 3 pen, Rfl: 0  •  sertraline (Zoloft) 100 MG tablet, 2 po q d, Disp: 60 tablet, Rfl: 11  •  cyclobenzaprine (FLEXERIL) 10 MG tablet, Take 0.5 tablets by mouth 3 (Three) Times a Day As Needed for Muscle Spasms., Disp: 30 tablet, Rfl: 0     No Known Allergies     Past Surgical History:   Procedure Laterality Date   • CAROTID ENDARTERECTOMY Right 11/10/2020    Procedure: CAROTID ENDARTERECTOMY;  Surgeon: Tavo Das MD;  Location: [facility] MAIN OR;  Service: Vascular;  Laterality: Right;   • CHOLECYSTECTOMY     • COLONOSCOPY      2016   • FINGER SURGERY     • KNEE ARTHROPLASTY     • KNEE SURGERY Right     replaced   • LAPAROSCOPIC GASTRIC BANDING     • ROTATOR CUFF REPAIR Right 2019   • SHOULDER SURGERY Right 05/24/2019    scope/ cuff repair   • TUBAL ABDOMINAL LIGATION          Social Hx:  Social History     Tobacco Use   Smoking Status Never Smoker   Smokeless Tobacco Never Used         Alcohol Use: Not At Risk   • Frequency of Alcohol Consumption: Never   • Average Number of Drinks: Not on file   • Frequency of Binge Drinking: Not on file      Social History     Substance and Sexual Activity   Drug Use No          Review of Systems   Constitutional: Positive for activity change.   HENT: Negative.    Eyes: Negative.    Respiratory: Negative.  Negative for chest tightness and shortness of breath.    Cardiovascular: Negative.  Negative for chest pain.   Gastrointestinal: Negative.    Endocrine: Negative.    Genitourinary: Negative.    Musculoskeletal: Positive for arthralgias ( right hip), back pain, gait problem ( almost fell in the office- reports balance issues) and myalgias.        Right leg pain   Skin: Negative.    Allergic/Immunologic: Negative.    Neurological: Negative for numbness.   Hematological: Negative.   "  Psychiatric/Behavioral: Positive for sleep disturbance.         Objective     /84   Pulse 62   Temp 98.2 °F (36.8 °C)   Resp 16   Ht 170.2 cm (67\")   Wt 93.4 kg (206 lb)   SpO2 96%   BMI 32.26 kg/m²    Body mass index is 32.26 kg/m².      Physical Exam  Vitals reviewed.   Constitutional:       General: She is not in acute distress.     Appearance: Normal appearance. She is obese.   HENT:      Head: Normocephalic and atraumatic.   Eyes:      General: No scleral icterus.     Extraocular Movements: Extraocular movements intact.      Pupils: Pupils are equal, round, and reactive to light.   Cardiovascular:      Rate and Rhythm: Normal rate and regular rhythm.   Pulmonary:      Effort: Pulmonary effort is normal. No respiratory distress.   Abdominal:      General: Abdomen is flat. There is no distension.      Palpations: Abdomen is soft.   Musculoskeletal:         General: Normal range of motion.      Cervical back: Normal range of motion and neck supple. No tenderness.   Skin:     General: Skin is warm and dry.   Neurological:      General: No focal deficit present.      Mental Status: She is alert and oriented to person, place, and time.      GCS: GCS eye subscore is 4. GCS verbal subscore is 5. GCS motor subscore is 6.      Cranial Nerves: Cranial nerves are intact.      Sensory: Sensation is intact.      Motor: Motor function is intact.      Coordination: Coordination is intact.      Gait: Gait is intact.      Deep Tendon Reflexes:      Reflex Scores:       Patellar reflexes are 2+ on the right side and 2+ on the left side.       Achilles reflexes are 2+ on the right side and 2+ on the left side.     Comments: Negative Michelle  Negative clonus  Positive JOSE LUIS  5/5 strength bilateral upper and lower extremities            Results Review  I personally reviewed and interpreted the images from the following studies:    XR Lumbar Spine 12/2/21    No evidence of fracture in the lumbar spine or adjacent " structures.     There is a large amount of degenerative change facet joints L4-5 and  L5-S1 with periarticular spurring. 3 mm of associated anterolisthesis of  L4-L5.     Moderate degenerative disc space narrowing at L2-3 with mild narrowing  at the other levels. No evidence of pars defect either side.    Assessment/Plan     MDM: Andree Deluna is a 66 y.o. female with a history of prior stroke currently complaining of 4 months of low back pain radiating down her right lower extremity.  She does not have any red flag signs or symptoms for cauda equina syndrome or myelopathy.  She did not have any neurogenic claudication but does describe possible radiculopathy along the L4 or L5 dermatome.  She does describe some coordination and gait issues as well as some difficulty with dropping things; however, she does not have any signs on physical exam concerning for central canal stenosis and I believe the subjective symptoms are more likely residual from her previous stroke in 2019.  At this juncture it is difficult to discern between a lumbar spine etiology and sacroiliac etiology. Given her description of radiculopathy on the right side I will send her for epidural steroid injections with pain management.  I am also sending her to physical therapy.  I would like to follow-up with her in 4 to 6 weeks to see if those conservative therapies helped and decide whether further imaging is required.  Patient was prescribed methocarbamol by her primary care provider which she says did not help.  She has taken Flexeril in the past and believes it did help before.  I told her I would give her a short short supply of Flexeril but that she should see her PCP or pain management for long-term management.    Diagnoses and all orders for this visit:    1. Low back pain of over 3 months duration (Primary)  -     Ambulatory Referral to Physical Therapy Evaluate and treat  -     Ambulatory Referral to Pain Management    Other orders  -      cyclobenzaprine (FLEXERIL) 10 MG tablet; Take 0.5 tablets by mouth 3 (Three) Times a Day As Needed for Muscle Spasms.  Dispense: 30 tablet; Refill: 0    Instructed patient to discontinue using methocarbamol and replace with Flexeril as needed, and not to take them both simultaneously.  Patient voiced understanding.    Return in about 6 weeks (around 2/25/2022) for Recheck.      Andree Deluna  reports that she has never smoked. She has never used smokeless tobacco.. I have educated her on the risk of diseases from using tobacco products such as cancer, COPD and heart disease.     I spent 3  minutes counseling the patient.         Patient's Body mass index is 32.26 kg/m². indicating that she is obese (BMI >30). Obesity-related health conditions include the following: hypertension, dyslipidemias and osteoarthritis. Obesity is unchanged. BMI is is above average; BMI management plan is completed. We discussed portion control and increasing exercise..         This patient was examined wearing appropriate personal protective equipment.    I spent 40 min total in face-to-face evaluation, review of records and imaging, medical decision making, patient education, and documentation.          Russell Gagnon PA-C    01/14/22  16:02 EST

## 2022-01-20 RX ORDER — SEMAGLUTIDE 1.34 MG/ML
INJECTION, SOLUTION SUBCUTANEOUS
Qty: 4.5 ML | Refills: 2 | Status: SHIPPED | OUTPATIENT
Start: 2022-01-20 | End: 2022-05-18

## 2022-01-26 RX ORDER — BUPROPION HYDROCHLORIDE 150 MG/1
TABLET ORAL
Qty: 90 TABLET | Refills: 1 | Status: SHIPPED | OUTPATIENT
Start: 2022-01-26 | End: 2022-07-27

## 2022-01-31 ENCOUNTER — TELEPHONE (OUTPATIENT)
Dept: FAMILY MEDICINE CLINIC | Facility: CLINIC | Age: 67
End: 2022-01-31

## 2022-01-31 NOTE — TELEPHONE ENCOUNTER
I called patient and let her know to contact pain management for appointment. The referral has been placed in the system.

## 2022-01-31 NOTE — TELEPHONE ENCOUNTER
Caller: Andree Deluna    Relationship: Self    Best call back number: [phone]    What is the best time to reach you: AFTER 12 PM   Who are you requesting to speak with (clinical staff, provider,  specific staff member): CLINICAL    Do you know the name of the person who called: ANDREE    What was the call regarding: ANDREE SAYS THAT SHE HAS COMPLETED REHAB TREATMENTS, SHE IS NOW WHANTING TO KNOW IF SHE CAN BE SCHEDULED FOR BACK INJECTIONS, SHE IS STILL HAVING BACK PAIN. SHE WOULD LIKE THE SOONEST  APPOINTMENT       Do you require a callback: YES

## 2022-02-10 NOTE — PROGRESS NOTES
CHIEF COMPLAINT  Lower back pain.       Subjective   Andree Deluna is a 66 y.o. female who was referred by Russell Gagnon PA to our pain management clinic for consultation, evaluation and treatment of lower back pain and epidural injection.  Lower back pain started about 4 months ago without any significant injuries and has been getting worse since then.    Lower back pain (right side, right buttock) is 9/10 on VAS, at maximum is 10/10. Pain is deep, stabbing, pressure, sharp in nature. Pain is referred right buttock. Right leg aches, but denies any sharp shooting pain to the right leg.  She has some pain in her right ankle.  The pain is constant. The pain is improved by sitting in recliner. The pain is worse with in and out of car (wrose) and standing from sitting position.Denies any numbness or tingling.  She also complains of balance issues. Her worse pain is in right buttock.  Denies any numbness or tingling.    PHQ-9- 12  SOAPP- 1    PMH:   Hypertension, obesity, right knee replacement, CVA-2019- memory issues, B12 deficiency, ADHD, rotator cuff tear of right shoulder status post repair-2019, overactive bladder, GERD, carotid stenosis status post carotid enterectomy, migraines, MDD    Current Medications:   Meloxicam 15 mg daily as needed  Flexeril 10 mg 0.5 mg TID PRN - didn't help so she stopped.   Xanax-2 mg nightly      Past Medications:  Bupropion  Zoloft    Past Modalities:  TENS:       no          Physical Therapy Within The Last 6 Months     Yes (currently in PT)  Psychotherapy     no  Massage Therapy      no    Patient Complains Of:  Uro-Fecal Incontinence Yes (overactive bladder)  Weight Gain/Loss  no  Fever/Chills   no  Weakness   no      PEG Assessment   What number best describes your pain on average in the past week?10  What number best describes how, during the past week, pain has interfered with your enjoyment of life?10  What number best describes how, during the past week, pain has  interfered with your general activity?  10        Current Outpatient Medications:   •  ALPRAZolam (XANAX) 2 MG tablet, TAKE ONE TABLET BY MOUTH ONCE NIGHTLY AS NEEDED FOR ANXIETY OR SLEEP, Disp: 30 tablet, Rfl: 0  •  amLODIPine (NORVASC) 2.5 MG tablet, TAKE ONE TABLET BY MOUTH DAILY, Disp: 90 tablet, Rfl: 0  •  aspirin 81 MG chewable tablet, Chew 1 tablet Daily., Disp: 30 tablet, Rfl: 1  •  atorvastatin (LIPITOR) 80 MG tablet, Take 1 tablet by mouth Every Night., Disp: 30 tablet, Rfl: 11  •  buPROPion XL (WELLBUTRIN XL) 150 MG 24 hr tablet, TAKE ONE TABLET BY MOUTH DAILY, Disp: 90 tablet, Rfl: 1  •  lisinopril-hydrochlorothiazide (PRINZIDE,ZESTORETIC) 20-25 MG per tablet, TAKE ONE TABLET BY MOUTH DAILY, Disp: 90 tablet, Rfl: 1  •  meloxicam (Mobic) 15 MG tablet, Take 1 tablet by mouth Daily., Disp: 30 tablet, Rfl: 11  •  metoprolol succinate XL (TOPROL-XL) 25 MG 24 hr tablet, Take 1 tablet by mouth Daily., Disp: 30 tablet, Rfl: 10  •  omeprazole (priLOSEC) 40 MG capsule, Take 1 capsule by mouth Daily., Disp: 90 capsule, Rfl: 1  •  oxybutynin (DITROPAN) 5 MG tablet, TAKE ONE TABLET BY MOUTH EVERY MORNING FOR BLADDER, Disp: 90 tablet, Rfl: 1  •  Ozempic, 0.25 or 0.5 MG/DOSE, 2 MG/1.5ML solution pen-injector, DIAL AND INJECT UNDER THE SKIN 0.5 MG WEEKLY, Disp: 4.5 mL, Rfl: 2  •  cyclobenzaprine (FLEXERIL) 10 MG tablet, Take 0.5 tablets by mouth 3 (Three) Times a Day As Needed for Muscle Spasms., Disp: 30 tablet, Rfl: 0  •  lamoTRIgine (LaMICtal) 100 MG tablet, TAKE ONE TABLET BY MOUTH TWICE A DAY, Disp: 60 tablet, Rfl: 3  •  sertraline (Zoloft) 100 MG tablet, 2 po q d, Disp: 60 tablet, Rfl: 11    The following portions of the patient's history were reviewed and updated as appropriate: allergies, current medications, past family history, past medical history, past social history, past surgical history, and problem list.      REVIEW OF PERTINENT MEDICAL DATA    Past Medical History:   Diagnosis Date   • ADHD unknown   •  "Anemia    • Anxiety    • Carotid artery disease (HCC)     80% right internal carotid artery   • CVA (cerebral vascular accident) (HCC)     right parietal right temporal   • DDD (degenerative disc disease), lumbar    • Depression    • Extremity pain     Ankle pain   • GERD (gastroesophageal reflux disease)    • Hyperlipidemia unknown   • Hypertension    • Insomnia unknown   • Laryngopharyngeal reflux    • Low back pain    • Mood disorder (HCC)    • Obesity unknown   • Skin cancer of face    • Sleep apnea     Suspected sleep apnea she has refused to be tested   • Stroke (cerebrum) ([facility])    • Visual field defect     Left     Past Surgical History:   Procedure Laterality Date   • CAROTID ENDARTERECTOMY Right 11/10/2020    Procedure: CAROTID ENDARTERECTOMY;  Surgeon: Tavo Das MD;  Location: [facility] MAIN OR;  Service: Vascular;  Laterality: Right;   • CHOLECYSTECTOMY     • COLONOSCOPY      2016   • FINGER SURGERY     • KNEE ARTHROPLASTY     • KNEE SURGERY Right     replaced   • LAPAROSCOPIC GASTRIC BANDING     • ROTATOR CUFF REPAIR Right 2019   • SHOULDER SURGERY Right 05/24/2019    scope/ cuff repair   • TUBAL ABDOMINAL LIGATION       Family History   Problem Relation Age of Onset   • Diabetes Other    • Heart disease Mother    • Diabetes Mother    • Heart disease Father      Social History     Socioeconomic History   • Marital status:    Tobacco Use   • Smoking status: Never Smoker   • Smokeless tobacco: Never Used   Vaping Use   • Vaping Use: Never used   Substance and Sexual Activity   • Alcohol use: No   • Drug use: No   • Sexual activity: Defer         Review of Systems   Musculoskeletal: Positive for arthralgias and back pain.         Vitals:    02/14/22 1027   BP: 134/76   Pulse: 61   Resp: 16   SpO2: 98%   Weight: 99.8 kg (220 lb)   Height: 170.2 cm (67\")   PainSc: 10-Worst pain ever         Objective   Physical Exam  Musculoskeletal:         General: Tenderness present.        " Legs:    Neurological:      Deep Tendon Reflexes:      Reflex Scores:       Patellar reflexes are 2+ on the right side and 2+ on the left side.       Achilles reflexes are 2+ on the right side and 2+ on the left side.     Comments: Motor strength 5/5 b/l LE  Sensory intact b/l LE             Imaging Reviewed:    X-ray lumbar spine-12/2021  -Significant degenerative changes in the facet joints L4-5 and L5-S1 with periarticular spurring.  -3 mm anterolisthesis of L4 on L5.  -Moderate degenerative disc space narrowing at L2-3 with mild narrowing at other levels.      Assessment:    1. Sacroiliac joint dysfunction    2. DDD (degenerative disc disease), lumbar    3. Lumbar spondylosis         Plan:   1. Defer UDS. Not a good candidate for opioids due to concurrent use of Benzo.   2. We discussed trying a course of formal physical therapy.  Physical therapy can help strengthen and stretch the muscles around the joints. Continue to be as active as possible.  She is currently in physical therapy.  Recommended to continue it.  3.  I believe most of her pain currently is originating from her right sacroiliac joint. Patient has pain in the lower back, right SI joint tenderness was positive. Dewayne test, SI joint compression and thigh thurst test were performed and were positive for SI joint pathology. Discussed right SI joint injection. Risk includes infection, bleeding, nerve damage, headache, paraplegia. Patient understands and agrees with the plan.   4.  We will consider lumbar epidural in future if radicular pain worsens or she continues to have pain after right SI joint injection.      RTC for injection and then 3 week follow up.     Aleksey Drummond DO  Pain Management   [facility]         INSPECT REPORT    As part of the patient's treatment plan, I may be prescribing controlled substances. The patient has been made aware of appropriate use of such medications, including potential risk of somnolence, limited ability to  drive and/or work safely, and the potential for dependence or overdose. It has also been made clear that these medications are for use by this patient only, without concomitant use of alcohol or other substances unless prescribed.     Patient has completed prescribing agreement detailing terms of continued prescribing of controlled substances, including monitoring INSPECT reports, urine drug screening, and pill counts if necessary. The patient is aware that inappropriate use will results in cessation of prescribing such medications.    INSPECT report has been reviewed and scanned into the patient's chart.      EMR Dragon/Transcription Disclaimer:   Much of this encounter note is an electronic transcription/translation of spoken language to printed text. The electronic translation of spoken language may permit erroneous, or at times, nonsensical words or phrases to be inadvertently transcribed; Although I have reviewed the note for such errors, some may still exist.

## 2022-02-14 ENCOUNTER — TELEPHONE (OUTPATIENT)
Dept: PAIN MEDICINE | Facility: CLINIC | Age: 67
End: 2022-02-14

## 2022-02-14 ENCOUNTER — OFFICE VISIT (OUTPATIENT)
Dept: PAIN MEDICINE | Facility: CLINIC | Age: 67
End: 2022-02-14

## 2022-02-14 VITALS
SYSTOLIC BLOOD PRESSURE: 134 MMHG | WEIGHT: 220 LBS | HEART RATE: 61 BPM | RESPIRATION RATE: 16 BRPM | HEIGHT: 67 IN | BODY MASS INDEX: 34.53 KG/M2 | DIASTOLIC BLOOD PRESSURE: 76 MMHG | OXYGEN SATURATION: 98 %

## 2022-02-14 DIAGNOSIS — M53.3 SACROILIAC JOINT DYSFUNCTION: Primary | ICD-10-CM

## 2022-02-14 DIAGNOSIS — M47.816 LUMBAR SPONDYLOSIS: ICD-10-CM

## 2022-02-14 DIAGNOSIS — M51.36 DDD (DEGENERATIVE DISC DISEASE), LUMBAR: ICD-10-CM

## 2022-02-14 PROCEDURE — 99204 OFFICE O/P NEW MOD 45 MIN: CPT | Performed by: STUDENT IN AN ORGANIZED HEALTH CARE EDUCATION/TRAINING PROGRAM

## 2022-02-18 ENCOUNTER — HOSPITAL ENCOUNTER (OUTPATIENT)
Dept: PAIN MEDICINE | Facility: HOSPITAL | Age: 67
Discharge: HOME | End: 2022-02-18

## 2022-02-18 VITALS
WEIGHT: 220 LBS | HEIGHT: 67 IN | DIASTOLIC BLOOD PRESSURE: 79 MMHG | OXYGEN SATURATION: 99 % | SYSTOLIC BLOOD PRESSURE: 154 MMHG | HEART RATE: 54 BPM | RESPIRATION RATE: 16 BRPM | BODY MASS INDEX: 34.53 KG/M2 | TEMPERATURE: 97.3 F

## 2022-02-18 DIAGNOSIS — M53.3 SACROILIAC JOINT DYSFUNCTION: Primary | ICD-10-CM

## 2022-02-18 DIAGNOSIS — R52 PAIN: ICD-10-CM

## 2022-02-18 PROCEDURE — 27096 INJECT SACROILIAC JOINT: CPT | Performed by: STUDENT IN AN ORGANIZED HEALTH CARE EDUCATION/TRAINING PROGRAM

## 2022-02-18 PROCEDURE — 25010000002 METHYLPREDNISOLONE PER 40 MG: Performed by: STUDENT IN AN ORGANIZED HEALTH CARE EDUCATION/TRAINING PROGRAM

## 2022-02-18 PROCEDURE — 77003 FLUOROGUIDE FOR SPINE INJECT: CPT

## 2022-02-18 RX ORDER — BUPIVACAINE HYDROCHLORIDE 2.5 MG/ML
10 INJECTION, SOLUTION EPIDURAL; INFILTRATION; INTRACAUDAL ONCE
Status: COMPLETED | OUTPATIENT
Start: 2022-02-18 | End: 2022-02-18

## 2022-02-18 RX ORDER — METHYLPREDNISOLONE ACETATE 40 MG/ML
40 INJECTION, SUSPENSION INTRA-ARTICULAR; INTRALESIONAL; INTRAMUSCULAR; SOFT TISSUE ONCE
Status: COMPLETED | OUTPATIENT
Start: 2022-02-18 | End: 2022-02-18

## 2022-02-18 RX ADMIN — BUPIVACAINE HYDROCHLORIDE 10 ML: 2.5 INJECTION, SOLUTION EPIDURAL; INFILTRATION; INTRACAUDAL; PERINEURAL at 13:38

## 2022-02-18 RX ADMIN — METHYLPREDNISOLONE ACETATE 40 MG: 40 INJECTION, SUSPENSION INTRA-ARTICULAR; INTRALESIONAL; INTRAMUSCULAR; INTRASYNOVIAL; SOFT TISSUE at 13:38

## 2022-02-18 NOTE — PROCEDURES
RIGHT SI Joint Arthropathy      POSTOPERATIVE DIAGNOSIS:  Same.     PROCEDURE: RIGHT sacroiliac joint steroid injection     PROCEDURE IN DETAIL:  The patient was placed in a prone position and the lower back was prepped with chloraprep and draped in the usual sterile fashion.  The skin overlaying the RIGHT SI joint was infiltrated with 1% lidocaine for local anesthesia.  A 22-gauge 3.5 inch spinal needle was inserted through the skin under fluoroscopic guidance until we got to the lower third of the SI joint. Another needle was placed in the upper third of the joint. 2 cc of 0.25% marcaine with depomedrol was injected at each level. A total of 4 cc of 0.25% marcaine with 40 mg of depo-medrol was injected.     After the procedure the needles were flushed with preservative free local anesthetic and removed. Skin was cleaned and a sterile dressing was applied.    Following the procedure the patient's vital signs were stable. The patient was discharged home in good condition after being given discharge instructions.    COMPLICATIONS: None    Aleksey Drummond DO  Pain Management   [facility]

## 2022-02-18 NOTE — H&P
H and P reviewed from previous visit and no changes to patient's clinical presentation. Will proceed with procedure as planned. Patient denies history of DM and being on blood thinners.    Aleksey Drummond DO  Pain Management   [facility]

## 2022-02-22 ENCOUNTER — OFFICE VISIT (OUTPATIENT)
Dept: FAMILY MEDICINE CLINIC | Facility: CLINIC | Age: 67
End: 2022-02-22

## 2022-02-22 VITALS
OXYGEN SATURATION: 95 % | DIASTOLIC BLOOD PRESSURE: 64 MMHG | HEIGHT: 67 IN | SYSTOLIC BLOOD PRESSURE: 131 MMHG | BODY MASS INDEX: 32.93 KG/M2 | WEIGHT: 209.8 LBS | HEART RATE: 62 BPM | RESPIRATION RATE: 12 BRPM

## 2022-02-22 DIAGNOSIS — M54.40 ACUTE BACK PAIN WITH SCIATICA, UNSPECIFIED LATERALITY: ICD-10-CM

## 2022-02-22 DIAGNOSIS — E66.9 OBESITY (BMI 30.0-34.9): Primary | ICD-10-CM

## 2022-02-22 DIAGNOSIS — F41.9 ANXIETY: ICD-10-CM

## 2022-02-22 PROCEDURE — 99214 OFFICE O/P EST MOD 30 MIN: CPT | Performed by: PHYSICIAN ASSISTANT

## 2022-02-22 RX ORDER — ALPRAZOLAM 2 MG/1
2 TABLET ORAL NIGHTLY PRN
Qty: 30 TABLET | Refills: 3 | Status: SHIPPED | OUTPATIENT
Start: 2022-02-22 | End: 2022-06-17

## 2022-02-22 NOTE — PROGRESS NOTES
"Subjective   Andree Deluna is a 66 y.o. female.     Chief Complaint   Patient presents with   • Hypertension   • Hyperlipidemia       /64   Pulse 62   Resp 12   Ht 170.2 cm (67\")   Wt 95.2 kg (209 lb 12.8 oz)   SpO2 95%   BMI 32.86 kg/m²     BP Readings from Last 3 Encounters:   02/22/22 131/64   02/18/22 154/79   02/14/22 134/76       Wt Readings from Last 3 Encounters:   02/22/22 95.2 kg (209 lb 12.8 oz)   02/18/22 99.8 kg (220 lb)   02/14/22 99.8 kg (220 lb)       HPI Presents to the clinic for htn and hld. She is having less pain after the SI joint injections. She is worried about weight and she has a history of lap band but this does not seem to work anymore. She would like to touch base with a new bariatric doctor. She has anxiety that is controlled on current medication. Is getting SI joint injections.     The following portions of the patient's history were reviewed and updated as appropriate: allergies, current medications, past family history, past medical history, past social history, past surgical history and problem list.    Review of Systems    Objective   Physical Exam  Constitutional:       Appearance: She is well-developed. She is obese.   HENT:      Head: Normocephalic and atraumatic.   Eyes:      Conjunctiva/sclera: Conjunctivae normal.      Pupils: Pupils are equal, round, and reactive to light.   Cardiovascular:      Rate and Rhythm: Normal rate and regular rhythm.      Heart sounds: No murmur heard.      Pulmonary:      Effort: Pulmonary effort is normal.      Breath sounds: Normal breath sounds.   Abdominal:      General: Bowel sounds are normal.      Palpations: Abdomen is soft.      Tenderness: There is no abdominal tenderness.   Musculoskeletal:         General: No deformity. Normal range of motion.      Cervical back: Normal range of motion and neck supple.   Lymphadenopathy:      Cervical: No cervical adenopathy.   Skin:     General: Skin is warm and dry.      Capillary " Refill: Capillary refill takes less than 2 seconds.      Findings: No rash.   Neurological:      Mental Status: She is alert and oriented to person, place, and time.      Cranial Nerves: No cranial nerve deficit.   Psychiatric:         Behavior: Behavior normal.         Thought Content: Thought content normal.         Judgment: Judgment normal.           Diagnoses and all orders for this visit:    1. Obesity (BMI 30.0-34.9) (Primary)  -     Ambulatory Referral to Bariatric Surgery    2. Anxiety  -     ALPRAZolam (XANAX) 2 MG tablet; Take 1 tablet by mouth At Night As Needed for Anxiety.  Dispense: 30 tablet; Refill: 3    3. Acute back pain with sciatica, unspecified laterality        No follow-ups on file.

## 2022-02-28 RX ORDER — OXYBUTYNIN CHLORIDE 5 MG/1
TABLET ORAL
Qty: 90 TABLET | Refills: 1 | Status: SHIPPED | OUTPATIENT
Start: 2022-02-28 | End: 2022-09-12

## 2022-03-02 NOTE — PROGRESS NOTES
Subjective   Andree Deluna is a 66 y.o. female is here for follow up for lower back pain. Patient was last seen on 2/18/22 for R SI joint injection with 100% pain relief.     On last visit:     Lower back pain (R side, R buttock) is 0/10 on VAS.     Previous Injection:   2/18/22- R SI Joint injection - 100% pain relief.     Hx: Referred by Russell Gagnon PA for lower back pain  Lower back pain started about 4 months ago without any significant injuries and has been getting worse since then.       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   Xanax-2 mg nightly        Past Medications:  Bupropion  Zoloft     Past Modalities:  TENS:                                                                          no                                                  Physical Therapy Within The Last 6 Months              Yes (currently in PT)  Psychotherapy                                                            no  Massage Therapy                                                       no     Patient Complains Of:  Uro-Fecal Incontinence          Yes (overactive bladder)  Weight Gain/Loss                   no  Fever/Chills                             no  Weakness                               no        Current Outpatient Medications:   •  ALPRAZolam (XANAX) 2 MG tablet, Take 1 tablet by mouth At Night As Needed for Anxiety., Disp: 30 tablet, Rfl: 3  •  amLODIPine (NORVASC) 2.5 MG tablet, TAKE ONE TABLET BY MOUTH DAILY, Disp: 90 tablet, Rfl: 0  •  aspirin 81 MG chewable tablet, Chew 1 tablet Daily., Disp: 30 tablet, Rfl: 1  •  atorvastatin (LIPITOR) 80 MG tablet, Take 1  tablet by mouth Every Night., Disp: 30 tablet, Rfl: 11  •  buPROPion XL (WELLBUTRIN XL) 150 MG 24 hr tablet, TAKE ONE TABLET BY MOUTH DAILY, Disp: 90 tablet, Rfl: 1  •  lamoTRIgine (LaMICtal) 100 MG tablet, TAKE ONE TABLET BY MOUTH TWICE A DAY, Disp: 60 tablet, Rfl: 3  •  lisinopril-hydrochlorothiazide (PRINZIDE,ZESTORETIC) 20-25 MG per tablet, TAKE ONE TABLET BY MOUTH DAILY, Disp: 90 tablet, Rfl: 1  •  meloxicam (Mobic) 15 MG tablet, Take 1 tablet by mouth Daily., Disp: 30 tablet, Rfl: 11  •  metoprolol succinate XL (TOPROL-XL) 25 MG 24 hr tablet, Take 1 tablet by mouth Daily., Disp: 30 tablet, Rfl: 10  •  omeprazole (priLOSEC) 40 MG capsule, Take 1 capsule by mouth Daily., Disp: 90 capsule, Rfl: 1  •  oxybutynin (DITROPAN) 5 MG tablet, TAKE ONE TABLET BY MOUTH EVERY MORNING FOR BLADDER, Disp: 90 tablet, Rfl: 1  •  Ozempic, 0.25 or 0.5 MG/DOSE, 2 MG/1.5ML solution pen-injector, DIAL AND INJECT UNDER THE SKIN 0.5 MG WEEKLY, Disp: 4.5 mL, Rfl: 2  •  sertraline (Zoloft) 100 MG tablet, 2 po q d, Disp: 60 tablet, Rfl: 11  •  cyclobenzaprine (FLEXERIL) 10 MG tablet, Take 0.5 tablets by mouth 3 (Three) Times a Day As Needed for Muscle Spasms., Disp: 30 tablet, Rfl: 0    The following portions of the patient's history were reviewed and updated as appropriate: allergies, current medications, past family history, past medical history, past social history, past surgical history, and problem list.      REVIEW OF PERTINENT MEDICAL DATA    Past Medical History:   Diagnosis Date   • ADHD unknown   • Anemia    • Anxiety    • Carotid artery disease (HCC)     80% right internal carotid artery   • CVA (cerebral vascular accident) (HCC)     right parietal right temporal   • DDD (degenerative disc disease), lumbar    • Depression    • Extremity pain     Ankle pain   • GERD (gastroesophageal reflux disease)    • Hyperlipidemia unknown   • Hypertension    • Insomnia unknown   • Laryngopharyngeal reflux    • Low back pain    • Mood  "disorder (HCC)    • Obesity unknown   • Skin cancer of face    • Sleep apnea     Suspected sleep apnea she has refused to be tested   • Stroke (cerebrum) ([facility])    • Visual field defect     Left     Past Surgical History:   Procedure Laterality Date   • CAROTID ENDARTERECTOMY Right 11/10/2020    Procedure: CAROTID ENDARTERECTOMY;  Surgeon: Tavo Das MD;  Location: [facility] MAIN OR;  Service: Vascular;  Laterality: Right;   • CHOLECYSTECTOMY     • COLONOSCOPY      2016   • FINGER SURGERY     • KNEE ARTHROPLASTY     • KNEE SURGERY Right     replaced   • LAPAROSCOPIC GASTRIC BANDING     • ROTATOR CUFF REPAIR Right 2019   • SHOULDER SURGERY Right 05/24/2019    scope/ cuff repair   • TUBAL ABDOMINAL LIGATION       Family History   Problem Relation Age of Onset   • Diabetes Other    • Heart disease Mother    • Diabetes Mother    • Heart disease Father      Social History     Socioeconomic History   • Marital status:    Tobacco Use   • Smoking status: Never Smoker   • Smokeless tobacco: Never Used   Vaping Use   • Vaping Use: Never used   Substance and Sexual Activity   • Alcohol use: No   • Drug use: No   • Sexual activity: Defer         Review of Systems      Vitals:    03/07/22 1554   BP: 142/64   Pulse: 63   Resp: 16   SpO2: 96%   Weight: 94.8 kg (209 lb)   Height: 170.2 cm (67.01\")   PainSc: 0-No pain         Objective   Physical Exam      Imaging Reviewed:  X-ray lumbar spine-12/2021  -Significant degenerative changes in the facet joints L4-5 and L5-S1 with periarticular spurring.  -3 mm anterolisthesis of L4 on L5.  -Moderate degenerative disc space narrowing at L2-3 with mild narrowing at other levels.         Assessment:    1. Sacroiliac joint dysfunction    2. Lumbar spondylosis           Plan:   1. Defer UDS. Not a good candidate for opioids due to concurrent use of Benzo.   2. Excellent relief from Right sided SI joint injection. Will repeat in future if needed.      RTC as needed.      Aleksey NOONAN" Bhanu MENDOZA  Pain Management   [facility]         INSPECT REPORT    As part of the patient's treatment plan, I may be prescribing controlled substances. The patient has been made aware of appropriate use of such medications, including potential risk of somnolence, limited ability to drive and/or work safely, and the potential for dependence or overdose. It has also been made clear that these medications are for use by this patient only, without concomitant use of alcohol or other substances unless prescribed.     Patient has completed prescribing agreement detailing terms of continued prescribing of controlled substances, including monitoring INSPECT reports, urine drug screening, and pill counts if necessary. The patient is aware that inappropriate use will results in cessation of prescribing such medications.    INSPECT report has been reviewed and scanned into the patient's chart.

## 2022-03-07 ENCOUNTER — OFFICE VISIT (OUTPATIENT)
Dept: PAIN MEDICINE | Facility: CLINIC | Age: 67
End: 2022-03-07

## 2022-03-07 VITALS
RESPIRATION RATE: 16 BRPM | SYSTOLIC BLOOD PRESSURE: 142 MMHG | OXYGEN SATURATION: 96 % | WEIGHT: 209 LBS | HEART RATE: 63 BPM | BODY MASS INDEX: 32.8 KG/M2 | HEIGHT: 67 IN | DIASTOLIC BLOOD PRESSURE: 64 MMHG

## 2022-03-07 DIAGNOSIS — M47.816 LUMBAR SPONDYLOSIS: ICD-10-CM

## 2022-03-07 DIAGNOSIS — M53.3 SACROILIAC JOINT DYSFUNCTION: Primary | ICD-10-CM

## 2022-03-07 PROCEDURE — 99213 OFFICE O/P EST LOW 20 MIN: CPT | Performed by: STUDENT IN AN ORGANIZED HEALTH CARE EDUCATION/TRAINING PROGRAM

## 2022-03-28 DIAGNOSIS — F43.0 ANXIETY AS ACUTE REACTION TO EXCEPTIONAL STRESS: Primary | ICD-10-CM

## 2022-03-28 DIAGNOSIS — F41.1 ANXIETY AS ACUTE REACTION TO EXCEPTIONAL STRESS: Primary | ICD-10-CM

## 2022-03-28 RX ORDER — ALPRAZOLAM 1 MG/1
1 TABLET ORAL DAILY PRN
Qty: 30 TABLET | Refills: 0 | Status: SHIPPED | OUTPATIENT
Start: 2022-03-28 | End: 2022-05-02

## 2022-04-25 RX ORDER — LAMOTRIGINE 100 MG/1
TABLET ORAL
Qty: 60 TABLET | Refills: 3 | Status: SHIPPED | OUTPATIENT
Start: 2022-04-25 | End: 2022-08-29

## 2022-05-02 DIAGNOSIS — F41.1 ANXIETY AS ACUTE REACTION TO EXCEPTIONAL STRESS: ICD-10-CM

## 2022-05-02 DIAGNOSIS — F43.0 ANXIETY AS ACUTE REACTION TO EXCEPTIONAL STRESS: ICD-10-CM

## 2022-05-02 RX ORDER — ALPRAZOLAM 1 MG/1
TABLET ORAL
Qty: 30 TABLET | Refills: 1 | Status: SHIPPED | OUTPATIENT
Start: 2022-05-02 | End: 2022-08-18

## 2022-05-16 ENCOUNTER — OFFICE VISIT (OUTPATIENT)
Dept: ORTHOPEDIC SURGERY | Facility: CLINIC | Age: 67
End: 2022-05-16

## 2022-05-16 VITALS
HEIGHT: 67 IN | WEIGHT: 209 LBS | HEART RATE: 67 BPM | BODY MASS INDEX: 32.8 KG/M2 | DIASTOLIC BLOOD PRESSURE: 89 MMHG | SYSTOLIC BLOOD PRESSURE: 154 MMHG

## 2022-05-16 DIAGNOSIS — M25.512 ACUTE PAIN OF LEFT SHOULDER: Primary | ICD-10-CM

## 2022-05-16 PROCEDURE — 99213 OFFICE O/P EST LOW 20 MIN: CPT | Performed by: ORTHOPAEDIC SURGERY

## 2022-05-16 NOTE — PROGRESS NOTES
Patient ID: Andree Deluna is a 66 y.o. female.    Chief Complaint:    Chief Complaint   Patient presents with   • Left Shoulder - Initial Evaluation, Pain     Pain 7       HPI:  This is a 66-year-old female here with left shoulder pain for the last several months.  There is no injury.  She has pain deep in the shoulder problems lifting her arm.  There is pain at night despite previous treatment with oral medication and a steroid injection  Past Medical History:   Diagnosis Date   • ADHD unknown   • Anemia    • Anxiety    • Carotid artery disease (HCC)     80% right internal carotid artery   • CVA (cerebral vascular accident) ([facility])     right parietal right temporal   • DDD (degenerative disc disease), lumbar    • Depression    • Extremity pain     Ankle pain   • GERD (gastroesophageal reflux disease)    • Hyperlipidemia unknown   • Hypertension    • Insomnia unknown   • Laryngopharyngeal reflux    • Low back pain    • Mood disorder ([facility])    • Obesity unknown   • Skin cancer of face    • Sleep apnea     Suspected sleep apnea she has refused to be tested   • Stroke (cerebrum) ([facility])    • Visual field defect     Left       Past Surgical History:   Procedure Laterality Date   • CAROTID ENDARTERECTOMY Right 11/10/2020    Procedure: CAROTID ENDARTERECTOMY;  Surgeon: Tavo Das MD;  Location: [facility];  Service: Vascular;  Laterality: Right;   • CHOLECYSTECTOMY     • COLONOSCOPY      2016   • FINGER SURGERY     • KNEE ARTHROPLASTY     • KNEE SURGERY Right     replaced   • LAPAROSCOPIC GASTRIC BANDING     • ROTATOR CUFF REPAIR Right 2019   • SHOULDER SURGERY Right 05/24/2019    scope/ cuff repair   • TUBAL ABDOMINAL LIGATION         Family History   Problem Relation Age of Onset   • Diabetes Other    • Heart disease Mother    • Diabetes Mother    • Heart disease Father           Social History     Occupational History   • Not on file   Tobacco Use   • Smoking status: Never Smoker   • Smokeless  "tobacco: Never Used   Vaping Use   • Vaping Use: Never used   Substance and Sexual Activity   • Alcohol use: No   • Drug use: No   • Sexual activity: Defer      Review of Systems   Cardiovascular: Negative for chest pain.   Musculoskeletal: Positive for arthralgias.       Objective:    /89   Pulse 67   Ht 170.2 cm (67.01\")   Wt 94.8 kg (209 lb)   BMI 32.72 kg/m²     Physical Examination:  Left shoulder has intact skin with mild pain in the impingement area.  Passive elevation 170 abduction 140 external rotation 50 internal rotation S1 with pain and weakness on Speed, Esteban, supraspinatus testing.  Belly press and liftoff are 4/5.  Sensory and motor exam are intact all distributions. Radial pulse is palpable and capillary refill is less than two seconds to all digits.    Imaging:  left Shoulder X-Ray  Indication: Left shoulder pain no trauma  AP Y and Lateral views  Findings: Mild joint space narrowing with degenerative changes of the glenoid  no bony lesion  Soft tissues normal  decreased joint spaces  Hardware appropriately positioned not applicable      no prior studies available for comparison.    Assessment:  Left shoulder pain possible cuff tear    Plan:  I recommend MRI      Procedures         Disclaimer: Part of this note may be an electronic transcription/translation of spoken language to printed text using the Dragon Dictation System  "

## 2022-05-16 NOTE — PATIENT INSTRUCTIONS
MRI follow-up instructions    Today at your office visit, Dr. Samaniego recommended an MRI (magnetic resonance imaging) to evaluate your joint pain.  This requires a precertification process, which our office will do, and then we will contact you when it is approved and go over scheduling options.  We typically recommend these to be performed at [facility] or [facility].  If for some reason it is performed elsewhere please arrange to have that facility give you a disc with your images on it so Dr. Samaniego can review it at your follow-up visit.    When checking out today we recommend making an appointment to go over your results in approximately two weeks.  If your MRI is done sooner than that we would be happy to schedule you sooner to go over your results, just contact us at [phone] or through the [email or web address] portal to let us know your MRI is completed.  Seeing you in person for the results gives us the best opportunity to look at your images together and explain the diagnosis and treatment options to best help you.

## 2022-05-17 NOTE — PROGRESS NOTES
Subjective   Andree Deluna is a 66 y.o. female is here for follow-up for lower back pain.  Last seen on 3/7/2022. She is returning with R sided buttock pain 2 weeks ago.  She has been seeing Dr. Sinclair for left shoulder for possible rotator cuff pathology.  MRI has been recommended    On last visit:     Lower back pain is 9/10 on VAS, at maximum is 10/10. Pain is aching, sharp and stabbing in nature. Pain is referred R ankle. The pain is intermittent. The pain is improved by R SI Joint injection. The pain is worse with sitting.      Previous Injection:   2/18/22- R SI Joint injection - 100% pain relief 1.5 months relief.     Hx: Referred by Russell Gagnon PA for lower back pain  Lower back pain started about 4 months ago without any significant injuries and has been getting worse since then.       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      Past Modalities:  TENS:                                                                          no                                                  Physical Therapy Within The Last 6 Months              Yes (currently in PT)  Psychotherapy                                                            no  Massage Therapy                                                       no     Patient Complains Of:  Uro-Fecal Incontinence          Yes (overactive bladder)  Weight Gain/Loss                   no  Fever/Chills                             no  Weakness                               no        Current Outpatient Medications:   •  ALPRAZolam (XANAX) 1 MG tablet,  TAKE ONE TABLET BY MOUTH DAILY AS NEEDED FOR ANXIETY, Disp: 30 tablet, Rfl: 1  •  ALPRAZolam (XANAX) 2 MG tablet, Take 1 tablet by mouth At Night As Needed for Anxiety., Disp: 30 tablet, Rfl: 3  •  amLODIPine (NORVASC) 2.5 MG tablet, TAKE ONE TABLET BY MOUTH DAILY, Disp: 90 tablet, Rfl: 0  •  aspirin 81 MG chewable tablet, Chew 1 tablet Daily., Disp: 30 tablet, Rfl: 1  •  atorvastatin (LIPITOR) 80 MG tablet, Take 1 tablet by mouth Every Night., Disp: 30 tablet, Rfl: 11  •  buPROPion XL (WELLBUTRIN XL) 150 MG 24 hr tablet, TAKE ONE TABLET BY MOUTH DAILY, Disp: 90 tablet, Rfl: 1  •  lamoTRIgine (LaMICtal) 100 MG tablet, TAKE ONE TABLET BY MOUTH TWICE A DAY, Disp: 60 tablet, Rfl: 3  •  lisinopril-hydrochlorothiazide (PRINZIDE,ZESTORETIC) 20-25 MG per tablet, TAKE ONE TABLET BY MOUTH DAILY, Disp: 90 tablet, Rfl: 1  •  meloxicam (Mobic) 15 MG tablet, Take 1 tablet by mouth Daily., Disp: 30 tablet, Rfl: 11  •  metoprolol succinate XL (TOPROL-XL) 25 MG 24 hr tablet, Take 1 tablet by mouth Daily., Disp: 30 tablet, Rfl: 10  •  omeprazole (priLOSEC) 40 MG capsule, Take 1 capsule by mouth Daily., Disp: 90 capsule, Rfl: 1  •  oxybutynin (DITROPAN) 5 MG tablet, TAKE ONE TABLET BY MOUTH EVERY MORNING FOR BLADDER, Disp: 90 tablet, Rfl: 1  •  methylPREDNISolone (MEDROL) 4 MG dose pack, Take as directed on package instructions., Disp: 21 tablet, Rfl: 0    The following portions of the patient's history were reviewed and updated as appropriate: allergies, current medications, past family history, past medical history, past social history, past surgical history, and problem list.      REVIEW OF PERTINENT MEDICAL DATA    Past Medical History:   Diagnosis Date   • ADHD unknown   • Anemia    • Anxiety    • Carotid artery disease (HCC)     80% right internal carotid artery   • CVA (cerebral vascular accident) (HCC)     right parietal right temporal   • DDD (degenerative disc disease), lumbar    • Depression    • Extremity pain     Ankle  pain   • GERD (gastroesophageal reflux disease)    • Hyperlipidemia unknown   • Hypertension    • Insomnia unknown   • Laryngopharyngeal reflux    • Low back pain    • Mood disorder (HCC)    • Obesity unknown   • Skin cancer of face    • Sleep apnea     Suspected sleep apnea she has refused to be tested   • Stroke (cerebrum) (HCC)    • Visual field defect     Left     Past Surgical History:   Procedure Laterality Date   • CAROTID ENDARTERECTOMY Right 11/10/2020    Procedure: CAROTID ENDARTERECTOMY;  Surgeon: Tavo Das MD;  Location: [facility] MAIN OR;  Service: Vascular;  Laterality: Right;   • CHOLECYSTECTOMY     • COLONOSCOPY      2016   • FINGER SURGERY     • KNEE ARTHROPLASTY     • KNEE SURGERY Right     replaced   • LAPAROSCOPIC GASTRIC BANDING     • ROTATOR CUFF REPAIR Right 2019   • SHOULDER SURGERY Right 05/24/2019    scope/ cuff repair   • TUBAL ABDOMINAL LIGATION       Family History   Problem Relation Age of Onset   • Diabetes Other    • Heart disease Mother    • Diabetes Mother    • Heart disease Father      Social History     Socioeconomic History   • Marital status:    Tobacco Use   • Smoking status: Never Smoker   • Smokeless tobacco: Never Used   Vaping Use   • Vaping Use: Never used   Substance and Sexual Activity   • Alcohol use: No   • Drug use: No   • Sexual activity: Defer         Review of Systems   Musculoskeletal: Positive for arthralgias and back pain.         Vitals:    05/18/22 1021   BP: 130/68   Pulse: 56   Resp: 16   SpO2: 98%   Weight: 94.8 kg (209 lb)   PainSc:   9         Objective   Physical Exam  Musculoskeletal:         General: Tenderness present.        Legs:            Imaging Reviewed:  X-ray lumbar spine-12/2021  -Significant degenerative changes in the facet joints L4-5 and L5-S1 with periarticular spurring.  -3 mm anterolisthesis of L4 on L5.  -Moderate degenerative disc space narrowing at L2-3 with mild narrowing at other levels.         Assessment:    1.  Sacroiliac joint dysfunction    2. Lumbar spondylosis           Plan:   1. Defer UDS. Not a good candidate for opioids due to concurrent use of Benzo.   2. Patient has pain in the lower back, right SI joint tenderness was positive. Dewayne test, SI joint compression and thigh thurst test were performed and were positive for SI joint pathology. Discussed repeat right SI joint injection. Risk includes infection, bleeding, nerve damage, headache, paraplegia. Patient understands and agrees with the plan.  3. Sent Medrol dose pack to pharmacy to get her through while we are awaiting for injection.        RTC for injection for 3 weeks follow up.      Aleksey Drummond DO  Pain Management   [facility]         INSPECT REPORT    As part of the patient's treatment plan, I may be prescribing controlled substances. The patient has been made aware of appropriate use of such medications, including potential risk of somnolence, limited ability to drive and/or work safely, and the potential for dependence or overdose. It has also been made clear that these medications are for use by this patient only, without concomitant use of alcohol or other substances unless prescribed.     Patient has completed prescribing agreement detailing terms of continued prescribing of controlled substances, including monitoring INSPECT reports, urine drug screening, and pill counts if necessary. The patient is aware that inappropriate use will results in cessation of prescribing such medications.    INSPECT report has been reviewed and scanned into the patient's chart.

## 2022-05-18 ENCOUNTER — TELEPHONE (OUTPATIENT)
Dept: ORTHOPEDIC SURGERY | Facility: CLINIC | Age: 67
End: 2022-05-18

## 2022-05-18 ENCOUNTER — OFFICE VISIT (OUTPATIENT)
Dept: PAIN MEDICINE | Facility: CLINIC | Age: 67
End: 2022-05-18

## 2022-05-18 VITALS
DIASTOLIC BLOOD PRESSURE: 68 MMHG | RESPIRATION RATE: 16 BRPM | BODY MASS INDEX: 32.72 KG/M2 | OXYGEN SATURATION: 98 % | WEIGHT: 209 LBS | SYSTOLIC BLOOD PRESSURE: 130 MMHG | HEART RATE: 56 BPM

## 2022-05-18 DIAGNOSIS — M53.3 SACROILIAC JOINT DYSFUNCTION: Primary | ICD-10-CM

## 2022-05-18 DIAGNOSIS — M47.816 LUMBAR SPONDYLOSIS: ICD-10-CM

## 2022-05-18 PROCEDURE — 99214 OFFICE O/P EST MOD 30 MIN: CPT | Performed by: STUDENT IN AN ORGANIZED HEALTH CARE EDUCATION/TRAINING PROGRAM

## 2022-05-18 RX ORDER — METHYLPREDNISOLONE 4 MG/1
TABLET ORAL
Qty: 21 TABLET | Refills: 0 | Status: SHIPPED | OUTPATIENT
Start: 2022-05-18 | End: 2022-06-02

## 2022-05-18 NOTE — TELEPHONE ENCOUNTER
Provider: DR OCONNOR  Caller: [employer]  Relationship to Patient: PATIENT  Pharmacy: [address] [place] PHONE [phone], [phone]  Phone Number: [phone] -PATIENT'S PH  Reason for Call: SCHEDULED AT [facility] FOR L/SHOULDER MRI. 06/02/22.  BECAUSE IT IS A CLOSED MRI SHE WILL NEED AN RX TO CALM HER PRIOR TO PROCEDURE.  PLEASE ADVISE IF THERE IS SOMETHING SHE CAN BE GIVEN AND IF IT WILL BE AT HOSPITAL FOR THEM TO GIVE TO HER PRIOR TO MRI OF IF IT WILL BE SENT TO HER [facility] IN [place].    PLEASE CALL PATIENT TO DISCUSS

## 2022-05-19 DIAGNOSIS — M25.512 ACUTE PAIN OF LEFT SHOULDER: Primary | ICD-10-CM

## 2022-05-19 RX ORDER — DIAZEPAM 5 MG/1
5 TABLET ORAL 2 TIMES DAILY PRN
Qty: 2 TABLET | Refills: 0 | Status: SHIPPED | OUTPATIENT
Start: 2022-05-19 | End: 2022-08-18

## 2022-06-02 ENCOUNTER — HOSPITAL ENCOUNTER (OUTPATIENT)
Dept: MRI IMAGING | Facility: HOSPITAL | Age: 67
Discharge: HOME | End: 2022-06-02
Admitting: ORTHOPAEDIC SURGERY

## 2022-06-02 DIAGNOSIS — M47.816 LUMBAR SPONDYLOSIS: ICD-10-CM

## 2022-06-02 DIAGNOSIS — M53.3 SACROILIAC JOINT DYSFUNCTION: ICD-10-CM

## 2022-06-02 DIAGNOSIS — M25.512 ACUTE PAIN OF LEFT SHOULDER: ICD-10-CM

## 2022-06-02 PROCEDURE — 73221 MRI JOINT UPR EXTREM W/O DYE: CPT

## 2022-06-02 RX ORDER — METHYLPREDNISOLONE 4 MG/1
TABLET ORAL
Qty: 21 TABLET | Refills: 0 | OUTPATIENT
Start: 2022-06-02

## 2022-06-02 RX ORDER — METHYLPREDNISOLONE 4 MG/1
TABLET ORAL
Qty: 21 TABLET | Refills: 0 | Status: SHIPPED | OUTPATIENT
Start: 2022-06-02 | End: 2022-06-14

## 2022-06-09 RX ORDER — LISINOPRIL AND HYDROCHLOROTHIAZIDE 25; 20 MG/1; MG/1
TABLET ORAL
Qty: 90 TABLET | Refills: 1 | Status: SHIPPED | OUTPATIENT
Start: 2022-06-09 | End: 2023-02-13

## 2022-06-14 ENCOUNTER — HOSPITAL ENCOUNTER (OUTPATIENT)
Dept: PAIN MEDICINE | Facility: HOSPITAL | Age: 67
Discharge: HOME | End: 2022-06-14

## 2022-06-14 VITALS
SYSTOLIC BLOOD PRESSURE: 136 MMHG | RESPIRATION RATE: 18 BRPM | DIASTOLIC BLOOD PRESSURE: 83 MMHG | HEIGHT: 66 IN | OXYGEN SATURATION: 98 % | TEMPERATURE: 96.9 F | HEART RATE: 54 BPM | BODY MASS INDEX: 33.59 KG/M2 | WEIGHT: 209 LBS

## 2022-06-14 DIAGNOSIS — R52 PAIN: ICD-10-CM

## 2022-06-14 DIAGNOSIS — M47.816 LUMBAR SPONDYLOSIS: ICD-10-CM

## 2022-06-14 DIAGNOSIS — M53.3 SACROILIAC JOINT DYSFUNCTION: ICD-10-CM

## 2022-06-14 PROCEDURE — 77003 FLUOROGUIDE FOR SPINE INJECT: CPT

## 2022-06-14 PROCEDURE — 25010000002 METHYLPREDNISOLONE PER 40 MG: Performed by: STUDENT IN AN ORGANIZED HEALTH CARE EDUCATION/TRAINING PROGRAM

## 2022-06-14 PROCEDURE — 27096 INJECT SACROILIAC JOINT: CPT | Performed by: STUDENT IN AN ORGANIZED HEALTH CARE EDUCATION/TRAINING PROGRAM

## 2022-06-14 RX ORDER — BUPIVACAINE HYDROCHLORIDE 2.5 MG/ML
10 INJECTION, SOLUTION EPIDURAL; INFILTRATION; INTRACAUDAL ONCE
Status: COMPLETED | OUTPATIENT
Start: 2022-06-14 | End: 2022-06-14

## 2022-06-14 RX ORDER — METHYLPREDNISOLONE ACETATE 40 MG/ML
40 INJECTION, SUSPENSION INTRA-ARTICULAR; INTRALESIONAL; INTRAMUSCULAR; SOFT TISSUE ONCE
Status: COMPLETED | OUTPATIENT
Start: 2022-06-14 | End: 2022-06-14

## 2022-06-14 RX ADMIN — BUPIVACAINE HYDROCHLORIDE 5 ML: 2.5 INJECTION, SOLUTION EPIDURAL; INFILTRATION; INTRACAUDAL; PERINEURAL at 13:42

## 2022-06-14 RX ADMIN — METHYLPREDNISOLONE ACETATE 40 MG: 40 INJECTION, SUSPENSION INTRA-ARTICULAR; INTRALESIONAL; INTRAMUSCULAR; INTRASYNOVIAL; SOFT TISSUE at 13:42

## 2022-06-14 NOTE — PROCEDURES
RIGHT SI Joint Arthropathy      POSTOPERATIVE DIAGNOSIS:  Same.     PROCEDURE: RIGHT sacroiliac joint steroid injection     PROCEDURE IN DETAIL:  The patient was placed in a prone position and the lower back was prepped with chloraprep and draped in the usual sterile fashion.  The skin overlaying the RIGHT SI joint was infiltrated with 1% lidocaine for local anesthesia.  A 22-gauge 3.5 inch spinal needle was inserted through the skin under fluoroscopic guidance until we got to the lower third of the SI joint. Another needle was placed in the upper third of the joint. 2 cc of 0.25% marcaine with depomedrol was injected at each level. A total of 4 cc of 0.25% marcaine with 40 mg of depo-medrol was injected.     After the procedure the needles were flushed with preservative free local anesthetic and removed. Skin was cleaned and a sterile dressing was applied.    Following the procedure the patient's vital signs were stable. The patient was discharged home in good condition after being given discharge instructions.    COMPLICATIONS: None    Aleksey Drummond DO  Pain Management   [facility]

## 2022-06-14 NOTE — DISCHARGE INSTRUCTIONS
Sacroiliac Joint Injection, Care After  This sheet gives you information about how to care for yourself after your procedure. Your health care provider may also give you more specific instructions. If you have problems or questions, contact your health care provider.  What can I expect after the procedure?  After the procedure, it is common to have bruising or soreness at the injection site.  Follow these instructions at home:  Managing pain and swelling         Keep a record of your pain (a pain log) to share with your health care provider at your follow-up visit. If a long-acting anti-inflammatory medicine (steroid) was included in your injection, you may not notice an improvement in your pain level for a few days.  Do not use a heating pad, and do not apply heat directly to the area after the procedure for 24-48 hours  If directed, put ice on the affected area. To do this:  Put ice in a plastic bag.  Place a towel between your skin and the bag.  Leave the ice on for 20 minutes, 2-3 times a day.  Remove the ice if your skin turns bright red. This is very important. If you cannot feel pain, heat, or cold, you have a greater risk of damage to the area.  Injection site care  Check your injection site every day for signs of infection. Check for:  Redness, swelling, or pain.  Fluid or blood.  Warmth.  Pus or a bad smell.  Do not take baths, swim, or use a hot tub until your health care provider says that it is safe. You may take showers.  Activity  Rest on the day of your procedure. Avoid doing a lot of activity on that day.  Avoid any activities that take a lot of effort for 24 hours after the injection.  Return to your normal activities the following day or as instructed by your health care provider. Ask your health care provider what activities are safe for you.  General instructions  Take over-the-counter and prescription medicines only as told by your health care provider.  Since dye was used during your  procedure, drink plenty of water to flush the dye out of your body.  No driving for 24 hours after your procedure if directed by your provider  If you are a Diabetic, monitor your blood sugar for the next 48 hours.  If your blood sugar is above 250, contact the physician that helps you monitor your blood sugar.  Keep all follow-up visits. This is important.  Contact a health care provider if:  Your pain does not improve or it gets worse.  You have numbness, tingling, or weakness.  You have a fever or chills.  You have redness, swelling, or pain around your injection site.  You have fluid or blood coming from your injection site.  Your injection site feels warm to the touch.  You have pus or a bad smell coming from your injection site.  Get help right away if:  You have chest pain or shortness of breath.  These symptoms may represent a serious problem that is an emergency. Do not wait to see if the symptoms will go away. Get medical help right away. Call your local emergency services (911 in the [place] Do not drive yourself to the hospital.  Summary  After the procedure, it is common to have bruising or soreness at the injection site.  Keep a record of your pain (a pain log) to share with your health care provider at your follow-up visit.  Return to your normal activities as told by your health care provider. Ask your health care provider what activities are safe for you.  Contact your health care provider if you have pain that is getting worse, weakness, numbness, or any sign of infection at your injection site.  This information is not intended to replace advice given to you by your health care provider. Make sure you discuss any questions you have with your health care provider.  Document Revised: 04/29/2021 Document Reviewed: 04/29/2021  Elsevier Patient Education © 2021 [employer]

## 2022-06-14 NOTE — H&P
H and P reviewed from previous visit and no changes to patient's clinical presentation. Will proceed with procedure as planned. Patient denies history of DM and being on blood thinners.    Aleksey Drummond DO  Pain Management   [facility]

## 2022-06-15 ENCOUNTER — TELEPHONE (OUTPATIENT)
Dept: PAIN MEDICINE | Facility: HOSPITAL | Age: 67
End: 2022-06-15

## 2022-06-15 DIAGNOSIS — F41.9 ANXIETY: ICD-10-CM

## 2022-06-15 NOTE — TELEPHONE ENCOUNTER
Attempted post procedure phone call but no answer.  No message left on answering machine voice box was full.

## 2022-06-17 RX ORDER — ALPRAZOLAM 2 MG/1
TABLET ORAL
Qty: 30 TABLET | Refills: 0 | Status: SHIPPED | OUTPATIENT
Start: 2022-06-17 | End: 2022-07-14 | Stop reason: SDUPTHER

## 2022-06-17 NOTE — TELEPHONE ENCOUNTER
Caller: AUSTIN [employer]    Relationship to patient: Emergency Contact    Best call back number: [phone]    Patient is needing: PATIENT'S  CALLED AND SAID THAT HE WOULD LIKE TO GET THIS REFILLED TODAY IF POSSIBLE, HE SAID HIM AND HIS WIFE ARE GOING CAMPING THIS WEEKEND AND WON'T BE BACK UNTIL SUNDAY

## 2022-06-30 ENCOUNTER — OFFICE VISIT (OUTPATIENT)
Dept: ORTHOPEDIC SURGERY | Facility: CLINIC | Age: 67
End: 2022-06-30

## 2022-06-30 VITALS
HEART RATE: 56 BPM | SYSTOLIC BLOOD PRESSURE: 120 MMHG | DIASTOLIC BLOOD PRESSURE: 71 MMHG | HEIGHT: 66 IN | WEIGHT: 209 LBS | BODY MASS INDEX: 33.59 KG/M2

## 2022-06-30 DIAGNOSIS — M19.012 OSTEOARTHRITIS OF LEFT GLENOHUMERAL JOINT: Primary | ICD-10-CM

## 2022-06-30 PROCEDURE — 20610 DRAIN/INJ JOINT/BURSA W/O US: CPT | Performed by: ORTHOPAEDIC SURGERY

## 2022-06-30 PROCEDURE — 99213 OFFICE O/P EST LOW 20 MIN: CPT | Performed by: ORTHOPAEDIC SURGERY

## 2022-06-30 NOTE — PROGRESS NOTES
"     Patient ID: Andree Deluna is a 66 y.o. female.  Left shoulder pain  Returns with continued left shoulder pain    Review of Systems:  Left shoulder pain      Objective:    /71   Pulse 56   Ht 167.6 cm (66\")   Wt 94.8 kg (209 lb)   BMI 33.73 kg/m²     Physical Examination:      Left shoulder has intact skin with mild pain in the impingement area.  Passive elevation 170 abduction 140 external rotation 50 internal rotation S1 with pain and weakness on Speed, Esteban, supraspinatus testing.  Belly press and liftoff are 4/5.  Sensory and motor exam are intact all distributions. Radial pulse is palpable and capillary refill is less than two seconds to all digits.    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 Risks and benefits of the injection were discussed. Under sterile technique and after timeout and verbal consent I injected 80 mg of Kenalog and 2 mL of 1% Lidocaine in the shoulder and subacromial space. It was well tolerated.  See me as needed      Procedures          Disclaimer: Part of this note may be an electronic transcription/translation of spoken language to printed text using the Dragon Dictation System  "

## 2022-07-01 RX ORDER — TRIAMCINOLONE ACETONIDE 40 MG/ML
80 INJECTION, SUSPENSION INTRA-ARTICULAR; INTRAMUSCULAR ONCE
Status: COMPLETED | OUTPATIENT
Start: 2022-07-01 | End: 2022-07-01

## 2022-07-01 RX ADMIN — TRIAMCINOLONE ACETONIDE 80 MG: 40 INJECTION, SUSPENSION INTRA-ARTICULAR; INTRAMUSCULAR at 07:17

## 2022-07-06 ENCOUNTER — TELEPHONE (OUTPATIENT)
Dept: PAIN MEDICINE | Facility: CLINIC | Age: 67
End: 2022-07-06

## 2022-07-06 NOTE — TELEPHONE ENCOUNTER
Caller: Andree Deluna    Relationship: Self    Best call back number:     What is the best time to reach you: ANY    Who are you requesting to speak with (clinical staff, provider,  specific staff member): CLINICAL    What was the call regarding: PATIENT MISSED APPT TODAY.  SHE IS WONDERING DOES SHE HAVE TO COME IN AND BE SEEN.      Do you require a callback: YES

## 2022-07-14 DIAGNOSIS — F41.9 ANXIETY: ICD-10-CM

## 2022-07-15 RX ORDER — ALPRAZOLAM 2 MG/1
2 TABLET ORAL NIGHTLY PRN
Qty: 30 TABLET | Refills: 1 | Status: SHIPPED | OUTPATIENT
Start: 2022-07-15 | End: 2022-08-18

## 2022-07-27 RX ORDER — BUPROPION HYDROCHLORIDE 150 MG/1
TABLET ORAL
Qty: 90 TABLET | Refills: 1 | Status: SHIPPED | OUTPATIENT
Start: 2022-07-27

## 2022-08-02 ENCOUNTER — TELEPHONE (OUTPATIENT)
Dept: FAMILY MEDICINE CLINIC | Facility: CLINIC | Age: 67
End: 2022-08-02

## 2022-08-02 NOTE — TELEPHONE ENCOUNTER
Caller: Andree Deluna    Relationship: Self    Best call back number: [phone]     What is the best time to reach you: ANYTIME AFTER 1 PM     Who are you requesting to speak with (clinical staff, provider,  specific staff member): CLINICAL    Do you know the name of the person who called: ANDREE     What was the call regarding: ANDREE WOULD LIKE TO KNOW THE NAME AND CONTACT INFORMATION  OF THE SURGEON THAT CLEANED HER RIGHT  AEORTIC ARTERY AT [facility]  , SHE DOES NOT REMEMBER WHAT YEAR THE PROCEDURE WAS DONE.        Do you require a callback: YES

## 2022-08-03 NOTE — TELEPHONE ENCOUNTER
TRIED TO REACH PATIENT AT 14:02 ABOUT WHO THE DOCTOR WAS. COULDN'T LVM BECAUSE HER MAILBOX IS FULL. IF SHE CALLS BACK REGARDING THIS PLEASE TELL HER THIS    FOR HUB TO SAY: Tavo Das MD;

## 2022-08-11 ENCOUNTER — TELEPHONE (OUTPATIENT)
Dept: FAMILY MEDICINE CLINIC | Facility: CLINIC | Age: 67
End: 2022-08-11

## 2022-08-11 NOTE — TELEPHONE ENCOUNTER
Caller: Mikie Andree LOVELY    Relationship: Self    Best call back number: [phone]    What medications are you currently taking:   Current Outpatient Medications on File Prior to Visit   Medication Sig Dispense Refill   • buPROPion XL (WELLBUTRIN XL) 150 MG 24 hr tablet TAKE ONE TABLET BY MOUTH DAILY 90 tablet 1   • ALPRAZolam (XANAX) 1 MG tablet TAKE ONE TABLET BY MOUTH DAILY AS NEEDED FOR ANXIETY 30 tablet 1   • ALPRAZolam (XANAX) 2 MG tablet Take 1 tablet by mouth At Night As Needed for Anxiety. 30 tablet 1   • amLODIPine (NORVASC) 2.5 MG tablet TAKE ONE TABLET BY MOUTH DAILY 90 tablet 0   • aspirin 81 MG chewable tablet Chew 1 tablet Daily. 30 tablet 1   • atorvastatin (LIPITOR) 80 MG tablet Take 1 tablet by mouth Every Night. 30 tablet 11   • diazePAM (Valium) 5 MG tablet Take 1 tablet by mouth 2 (Two) Times a Day As Needed for Anxiety (take one before leaving home, then repeat as needed at MRI). 2 tablet 0   • lamoTRIgine (LaMICtal) 100 MG tablet TAKE ONE TABLET BY MOUTH TWICE A DAY 60 tablet 3   • lisinopril-hydrochlorothiazide (PRINZIDE,ZESTORETIC) 20-25 MG per tablet TAKE ONE TABLET BY MOUTH DAILY 90 tablet 1   • meloxicam (Mobic) 15 MG tablet Take 1 tablet by mouth Daily. 30 tablet 11   • metoprolol succinate XL (TOPROL-XL) 25 MG 24 hr tablet Take 1 tablet by mouth Daily. 30 tablet 10   • omeprazole (priLOSEC) 40 MG capsule Take 1 capsule by mouth Daily. 90 capsule 1   • oxybutynin (DITROPAN) 5 MG tablet TAKE ONE TABLET BY MOUTH EVERY MORNING FOR BLADDER 90 tablet 1     No current facility-administered medications on file prior to visit.        Which medication are you concerned about: ALPRAZolam (XANAX) 2 MG tablet    Who prescribed you this medication: SHAYLEE WHITE    What are your concerns:SCRIPT USE TO BE 90 DAY SUPPLY AND NOW IT IS 30 DAY SUPPLY.     PATIENT WAS UPSET THAT THIS WAS CHANGED AND DOES NOT WANT TO DRIVE 45 MINUTES EVERY MONTH FOR SCRIPT

## 2022-08-15 NOTE — TELEPHONE ENCOUNTER
Dr. Pennington, you can disregard this message. She will discuss this with Naga on Thursday. She was upset that only 1 refill was sent in vs 3 refills. I told him that would be a discussion for Naga in person at her appointment on Thursday. She has a refill for this month already.

## 2022-08-18 ENCOUNTER — LAB (OUTPATIENT)
Dept: FAMILY MEDICINE CLINIC | Facility: CLINIC | Age: 67
End: 2022-08-18

## 2022-08-18 ENCOUNTER — OFFICE VISIT (OUTPATIENT)
Dept: FAMILY MEDICINE CLINIC | Facility: CLINIC | Age: 67
End: 2022-08-18

## 2022-08-18 VITALS
RESPIRATION RATE: 16 BRPM | WEIGHT: 212 LBS | SYSTOLIC BLOOD PRESSURE: 124 MMHG | BODY MASS INDEX: 34.07 KG/M2 | HEART RATE: 69 BPM | OXYGEN SATURATION: 96 % | HEIGHT: 66 IN | DIASTOLIC BLOOD PRESSURE: 82 MMHG

## 2022-08-18 DIAGNOSIS — I10 PRIMARY HYPERTENSION: ICD-10-CM

## 2022-08-18 DIAGNOSIS — I10 PRIMARY HYPERTENSION: Primary | ICD-10-CM

## 2022-08-18 DIAGNOSIS — E78.2 MIXED HYPERLIPIDEMIA: ICD-10-CM

## 2022-08-18 DIAGNOSIS — F41.9 ANXIETY: ICD-10-CM

## 2022-08-18 LAB
BASOPHILS # BLD AUTO: 0.09 10*3/MM3 (ref 0–0.2)
BASOPHILS NFR BLD AUTO: 1 % (ref 0–1.5)
DEPRECATED RDW RBC AUTO: 44.2 FL (ref 37–54)
EOSINOPHIL # BLD AUTO: 0.21 10*3/MM3 (ref 0–0.4)
EOSINOPHIL NFR BLD AUTO: 2.3 % (ref 0.3–6.2)
ERYTHROCYTE [DISTWIDTH] IN BLOOD BY AUTOMATED COUNT: 13.3 % (ref 12.3–15.4)
HCT VFR BLD AUTO: 44.3 % (ref 34–46.6)
HGB BLD-MCNC: 14.4 G/DL (ref 12–15.9)
IMM GRANULOCYTES # BLD AUTO: 0.04 10*3/MM3 (ref 0–0.05)
IMM GRANULOCYTES NFR BLD AUTO: 0.4 % (ref 0–0.5)
LYMPHOCYTES # BLD AUTO: 2.74 10*3/MM3 (ref 0.7–3.1)
LYMPHOCYTES NFR BLD AUTO: 29.8 % (ref 19.6–45.3)
MCH RBC QN AUTO: 29.4 PG (ref 26.6–33)
MCHC RBC AUTO-ENTMCNC: 32.5 G/DL (ref 31.5–35.7)
MCV RBC AUTO: 90.6 FL (ref 79–97)
MONOCYTES # BLD AUTO: 0.57 10*3/MM3 (ref 0.1–0.9)
MONOCYTES NFR BLD AUTO: 6.2 % (ref 5–12)
NEUTROPHILS NFR BLD AUTO: 5.54 10*3/MM3 (ref 1.7–7)
NEUTROPHILS NFR BLD AUTO: 60.3 % (ref 42.7–76)
NRBC BLD AUTO-RTO: 0 /100 WBC (ref 0–0.2)
PLATELET # BLD AUTO: 278 10*3/MM3 (ref 140–450)
PMV BLD AUTO: 11.2 FL (ref 6–12)
RBC # BLD AUTO: 4.89 10*6/MM3 (ref 3.77–5.28)
WBC NRBC COR # BLD: 9.19 10*3/MM3 (ref 3.4–10.8)

## 2022-08-18 PROCEDURE — 99214 OFFICE O/P EST MOD 30 MIN: CPT | Performed by: PHYSICIAN ASSISTANT

## 2022-08-18 PROCEDURE — 85025 COMPLETE CBC W/AUTO DIFF WBC: CPT | Performed by: PHYSICIAN ASSISTANT

## 2022-08-18 PROCEDURE — 80061 LIPID PANEL: CPT | Performed by: PHYSICIAN ASSISTANT

## 2022-08-18 PROCEDURE — 36415 COLL VENOUS BLD VENIPUNCTURE: CPT

## 2022-08-18 PROCEDURE — 80053 COMPREHEN METABOLIC PANEL: CPT | Performed by: PHYSICIAN ASSISTANT

## 2022-08-18 RX ORDER — ALPRAZOLAM 2 MG/1
2 TABLET ORAL NIGHTLY PRN
Qty: 30 TABLET | Refills: 3 | Status: SHIPPED | OUTPATIENT
Start: 2022-08-18 | End: 2022-12-28 | Stop reason: SDUPTHER

## 2022-08-18 NOTE — PROGRESS NOTES
"Subjective   Andree Deluna is a 66 y.o. female.     Chief Complaint   Patient presents with   • Hypertension       /82 (BP Location: Right arm)   Pulse 69   Resp 16   Ht 167.6 cm (66\")   Wt 96.2 kg (212 lb)   SpO2 96%   BMI 34.22 kg/m²     BP Readings from Last 3 Encounters:   08/18/22 124/82   06/30/22 120/71   06/14/22 136/83       Wt Readings from Last 3 Encounters:   08/18/22 96.2 kg (212 lb)   06/30/22 94.8 kg (209 lb)   06/14/22 94.8 kg (209 lb)       HPI Presents to the clinic for htn, SI joint dysfunction, and anxiety. She is taking xanax and has been compliant with this. She has had great relief with si joint injections. She had a shoulder injection from ortho and she had good relief from this as well.     The following portions of the patient's history were reviewed and updated as appropriate: allergies, current medications, past family history, past medical history, past social history, past surgical history and problem list.    Review of Systems    Objective   Physical Exam  Constitutional:       Appearance: Normal appearance.   Eyes:      Extraocular Movements: Extraocular movements intact.      Pupils: Pupils are equal, round, and reactive to light.   Cardiovascular:      Rate and Rhythm: Normal rate.      Heart sounds: No murmur heard.  Pulmonary:      Effort: Pulmonary effort is normal.      Breath sounds: No wheezing.   Neurological:      General: No focal deficit present.      Mental Status: She is alert and oriented to person, place, and time.   Psychiatric:         Mood and Affect: Mood normal.         Behavior: Behavior normal.           Diagnoses and all orders for this visit:    1. Primary hypertension (Primary)  -     CBC w AUTO Differential; Future  -     Comprehensive metabolic panel; Future  -     Lipid panel; Future    2. Anxiety  -     ALPRAZolam (XANAX) 2 MG tablet; Take 1 tablet by mouth At Night As Needed for Anxiety.  Dispense: 30 tablet; Refill: 3    3. Mixed " hyperlipidemia  -     CBC w AUTO Differential; Future  -     Comprehensive metabolic panel; Future  -     Lipid panel; Future        Return in about 6 months (around 2/18/2023), or if symptoms worsen or fail to improve.

## 2022-08-18 NOTE — TELEPHONE ENCOUNTER
HUB RELAYED MESSAGE LABELED HUB TO READ, PATIENT VOICED UNDERSTANDING     Caller: Andree Deluna    Relationship to patient: Self    Best call back number: [phone]    Patient is needing: PATIENT WILL CALL DR. BOWMAN CONCERNING HER NEED

## 2022-08-19 ENCOUNTER — TELEPHONE (OUTPATIENT)
Dept: FAMILY MEDICINE CLINIC | Facility: CLINIC | Age: 67
End: 2022-08-19

## 2022-08-19 LAB
ALBUMIN SERPL-MCNC: 4.1 G/DL (ref 3.5–5.2)
ALBUMIN/GLOB SERPL: 1.4 G/DL
ALP SERPL-CCNC: 104 U/L (ref 39–117)
ALT SERPL W P-5'-P-CCNC: 17 U/L (ref 1–33)
ANION GAP SERPL CALCULATED.3IONS-SCNC: 14.6 MMOL/L (ref 5–15)
AST SERPL-CCNC: 17 U/L (ref 1–32)
BILIRUB SERPL-MCNC: 0.5 MG/DL (ref 0–1.2)
BUN SERPL-MCNC: 15 MG/DL (ref 8–23)
BUN/CREAT SERPL: 15.3 (ref 7–25)
CALCIUM SPEC-SCNC: 9.6 MG/DL (ref 8.6–10.5)
CHLORIDE SERPL-SCNC: 97 MMOL/L (ref 98–107)
CHOLEST SERPL-MCNC: 178 MG/DL (ref 0–200)
CO2 SERPL-SCNC: 27.4 MMOL/L (ref 22–29)
CREAT SERPL-MCNC: 0.98 MG/DL (ref 0.57–1)
EGFRCR SERPLBLD CKD-EPI 2021: 64.6 ML/MIN/1.73
GLOBULIN UR ELPH-MCNC: 3 GM/DL
GLUCOSE SERPL-MCNC: 94 MG/DL (ref 65–99)
HDLC SERPL-MCNC: 83 MG/DL (ref 40–60)
LDLC SERPL CALC-MCNC: 76 MG/DL (ref 0–100)
LDLC/HDLC SERPL: 0.89 {RATIO}
POTASSIUM SERPL-SCNC: 4 MMOL/L (ref 3.5–5.2)
PROT SERPL-MCNC: 7.1 G/DL (ref 6–8.5)
SODIUM SERPL-SCNC: 139 MMOL/L (ref 136–145)
TRIGL SERPL-MCNC: 107 MG/DL (ref 0–150)
VLDLC SERPL-MCNC: 19 MG/DL (ref 5–40)

## 2022-08-19 NOTE — TELEPHONE ENCOUNTER
I LET PATIENT KNOW THAT I SAW NO NEW MESSAGES AND THAT IF SOMEONE TRIED TO CALL HER THEY WILL CALL HER BACK.

## 2022-08-19 NOTE — TELEPHONE ENCOUNTER
Caller: Andree Deluna    Relationship: Self    Best call back number: [phone]    What is the best time to reach you: ANYTIME    Who are you requesting to speak with (clinical staff, provider,  specific staff member): CLINICAL STAFF    Do you know the name of the person who called: PATIENT     What was the call regarding: PATIENT MISSED CALL FROM THE OFFICE YESTERDAY, 08/18/22, ATTEMPTED TO RETURN IT    Do you require a callback: YES

## 2022-08-26 ENCOUNTER — TRANSCRIBE ORDERS (OUTPATIENT)
Dept: ADMINISTRATIVE | Facility: HOSPITAL | Age: 67
End: 2022-08-26

## 2022-08-26 DIAGNOSIS — I65.23 BILATERAL CAROTID ARTERY OCCLUSION: Primary | ICD-10-CM

## 2022-08-29 RX ORDER — LAMOTRIGINE 100 MG/1
TABLET ORAL
Qty: 60 TABLET | Refills: 3 | Status: SHIPPED | OUTPATIENT
Start: 2022-08-29 | End: 2023-01-04

## 2022-09-12 RX ORDER — OXYBUTYNIN CHLORIDE 5 MG/1
TABLET ORAL
Qty: 90 TABLET | Refills: 1 | Status: SHIPPED | OUTPATIENT
Start: 2022-09-12 | End: 2022-11-28 | Stop reason: SDUPTHER

## 2022-10-03 ENCOUNTER — TELEPHONE (OUTPATIENT)
Dept: FAMILY MEDICINE CLINIC | Facility: CLINIC | Age: 67
End: 2022-10-03

## 2022-10-03 NOTE — TELEPHONE ENCOUNTER
Caller: Andree Deluna    Relationship to patient: Self    Best call back number: [phone]    Date of exposure: N/A    Date of positive COVID19 test: NOT TESTED    Date if possible COVID19 exposure: N/A    COVID19 symptoms: COUGH, LOW GRADE FEVER, FATIGUE, RED AND TIRED EYES    Date of initial quarantine: 10/03/22    Additional information or concerns: PATIENT CALLED AND SAID SHE STARTED TO GET SICK YESTERDAY    SHE IS WANTING TO SEE WHAT SHAYLEE WHITE ADVISES SHE TAKE, AND WHETHER SHE CAN GET A COVID-19 TEST MAILED TO HER OR BROUGHT TO HER     What is the patients preferred pharmacy: [facility] [id number] - [place], [place] - [address] - [phone] [facility] [phone]   [phone]

## 2022-10-04 RX ORDER — AZITHROMYCIN 250 MG/1
TABLET, FILM COATED ORAL
Qty: 6 TABLET | Refills: 0 | Status: SHIPPED | OUTPATIENT
Start: 2022-10-04 | End: 2022-11-07

## 2022-10-04 RX ORDER — PREDNISONE 20 MG/1
40 TABLET ORAL DAILY
Qty: 10 TABLET | Refills: 0 | Status: SHIPPED | OUTPATIENT
Start: 2022-10-04 | End: 2022-10-09

## 2022-10-18 ENCOUNTER — TELEPHONE (OUTPATIENT)
Dept: FAMILY MEDICINE CLINIC | Facility: CLINIC | Age: 67
End: 2022-10-18

## 2022-10-18 NOTE — TELEPHONE ENCOUNTER
HUB TO SHARE:  AMELIA FULL. PLEASE LET PATIENT KNOW WE DON'T SEE WHERE ANYONE FROM HERE CALLED HER. IF SHE IS NOT FEELING WELL SHE NEEDS TO MAKE AN APPT.

## 2022-10-18 NOTE — TELEPHONE ENCOUNTER
HUB READ THE PATIENT MESSAGE, AND PATIENT STATES SHE DOES NOT SEE THE POINT TO COME BACK TO THE OFFICE SINCE SHE WAS THERE 2 WEEKS AGO AND IF THE SAME THING IS GOING TO BE GIVEN TO HER.    PATIENT STATES SHE HAS ACTUALLY GOTTEN WORSE SINCE HER VISIT TWO WEEKS AGO.  PATIENT ALSO WAS QUESTIONING THE RESULTS OF HER BLOOD WORK STATING IT WAS DONE 5-6 WEEKS AGO AND SHE HAS NEVER HEARD BACK ON THAT.   PATIENT WANTS A CALLBACK WITH HER LAB RESULTS [phone].   .

## 2022-10-18 NOTE — TELEPHONE ENCOUNTER
Caller: Andree Deluna    Relationship: Self    Best call back number: [phone]    Caller requesting test results: PATIENT     What test was performed: BLOOD WORK     When was the test performed: 8/18/22    Where was the test performed: OFFICE    Additional notes: PATIENT STATES STILL NOT FEELING WELL AND HAVING A LOT OF FATIGUE ALSO STATES GOT A CALL FROM THE OFFICE AND NO MESSAGE LEFT PLEASE CALL AND ADVISE

## 2022-10-18 NOTE — TELEPHONE ENCOUNTER
Still needs to be seen from phone note 10/3/22.  Maybe the phone call was from [facility] office since she was a no show yesterday

## 2022-10-19 ENCOUNTER — TELEPHONE (OUTPATIENT)
Dept: FAMILY MEDICINE CLINIC | Facility: CLINIC | Age: 67
End: 2022-10-19

## 2022-10-19 ENCOUNTER — OFFICE VISIT (OUTPATIENT)
Dept: FAMILY MEDICINE CLINIC | Facility: CLINIC | Age: 67
End: 2022-10-19

## 2022-10-19 VITALS
RESPIRATION RATE: 20 BRPM | HEART RATE: 89 BPM | OXYGEN SATURATION: 97 % | TEMPERATURE: 98.2 F | WEIGHT: 214 LBS | SYSTOLIC BLOOD PRESSURE: 144 MMHG | BODY MASS INDEX: 34.54 KG/M2 | DIASTOLIC BLOOD PRESSURE: 80 MMHG

## 2022-10-19 DIAGNOSIS — J06.9 UPPER RESPIRATORY TRACT INFECTION, UNSPECIFIED TYPE: Primary | ICD-10-CM

## 2022-10-19 DIAGNOSIS — R05.8 COUGH PRESENT FOR GREATER THAN 3 WEEKS: ICD-10-CM

## 2022-10-19 DIAGNOSIS — R06.02 SHORTNESS OF BREATH: ICD-10-CM

## 2022-10-19 PROCEDURE — 99213 OFFICE O/P EST LOW 20 MIN: CPT | Performed by: NURSE PRACTITIONER

## 2022-10-19 RX ORDER — METHYLPREDNISOLONE 4 MG/1
TABLET ORAL
Qty: 1 EACH | Refills: 0 | Status: SHIPPED | OUTPATIENT
Start: 2022-10-19 | End: 2022-11-07

## 2022-10-19 RX ORDER — AMOXICILLIN 500 MG/1
1000 CAPSULE ORAL 2 TIMES DAILY
Qty: 40 CAPSULE | Refills: 0 | Status: SHIPPED | OUTPATIENT
Start: 2022-10-19 | End: 2022-11-07

## 2022-10-19 RX ORDER — ALBUTEROL SULFATE 90 UG/1
2 AEROSOL, METERED RESPIRATORY (INHALATION) EVERY 6 HOURS PRN
Qty: 18 G | Refills: 0 | Status: SHIPPED | OUTPATIENT
Start: 2022-10-19

## 2022-10-19 RX ORDER — BENZONATATE 100 MG/1
100 CAPSULE ORAL 3 TIMES DAILY PRN
Qty: 15 CAPSULE | Refills: 0 | Status: SHIPPED | OUTPATIENT
Start: 2022-10-19 | End: 2022-11-07

## 2022-10-19 NOTE — TELEPHONE ENCOUNTER
Hub staff attempted to follow warm transfer process and was unsuccessful     Caller: Andree Deluna    Relationship to patient: Self    Best call back number: [phone]    Patient is needing: LAB RESULTS

## 2022-10-19 NOTE — PROGRESS NOTES
Chief Complaint  Fatigue, Nasal Congestion, and Cough    Subjective          Andree Deluna presents to [facility]  History of Present Illness    Is here today with c/o feeling bad for three weeks    Review of Systems   Constitutional: Positive for appetite change, chills, diaphoresis and fatigue. Negative for fever.   HENT: Positive for congestion and rhinorrhea.         Rhinorrhea is clear   Respiratory: Positive for cough, shortness of breath and wheezing.         Cough is mostly dry, occasionally is productive of clear-yellow sputum   Cardiovascular: Negative.  Negative for chest pain.   Gastrointestinal: Negative.  Negative for diarrhea, nausea and vomiting.   Musculoskeletal: Positive for myalgias.   Allergic/Immunologic: Negative for environmental allergies.   Neurological: Positive for headaches.   Psychiatric/Behavioral: Negative for sleep disturbance.       Objective   Vital Signs:  /80 (BP Location: Right arm, Patient Position: Sitting)   Pulse 89   Temp 98.2 °F (36.8 °C)   Resp 20   Wt 97.1 kg (214 lb)   SpO2 97%   BMI 34.54 kg/m²     BP Readings from Last 3 Encounters:   10/19/22 144/80   08/18/22 124/82   06/30/22 120/71        Wt Readings from Last 3 Encounters:   10/19/22 97.1 kg (214 lb)   08/18/22 96.2 kg (212 lb)   06/30/22 94.8 kg (209 lb)              Physical Exam  Vitals reviewed.   Neck:      Vascular: No carotid bruit.   Cardiovascular:      Rate and Rhythm: Normal rate and regular rhythm.      Pulses: Normal pulses.      Heart sounds: Normal heart sounds.   Pulmonary:      Effort: Pulmonary effort is normal.      Breath sounds: Normal breath sounds. No wheezing, rhonchi or rales.   Musculoskeletal:      Cervical back: Neck supple. No tenderness.   Lymphadenopathy:      Cervical: No cervical adenopathy.   Skin:     General: Skin is warm and dry.   Neurological:      Mental Status: She is alert and oriented to person, place, and time.         Result Review :                 Assessment and Plan    Diagnoses and all orders for this visit:    1. Upper respiratory tract infection, unspecified type (Primary)  -     amoxicillin (AMOXIL) 500 MG capsule; Take 2 capsules by mouth 2 (Two) Times a Day.  Dispense: 40 capsule; Refill: 0  -     methylPREDNISolone (MEDROL) 4 MG dose pack; Take as directed on package instructions.  Dispense: 1 each; Refill: 0    2. Cough present for greater than 3 weeks  -     methylPREDNISolone (MEDROL) 4 MG dose pack; Take as directed on package instructions.  Dispense: 1 each; Refill: 0  -     benzonatate (Tessalon Perles) 100 MG capsule; Take 1 capsule by mouth 3 (Three) Times a Day As Needed for Cough.  Dispense: 15 capsule; Refill: 0  -     albuterol sulfate  (90 Base) MCG/ACT inhaler; Inhale 2 puffs Every 6 (Six) Hours As Needed for Shortness of Air.  Dispense: 18 g; Refill: 0    3. Shortness of breath  -     albuterol sulfate  (90 Base) MCG/ACT inhaler; Inhale 2 puffs Every 6 (Six) Hours As Needed for Shortness of Air.  Dispense: 18 g; Refill: 0           Follow Up   Return if symptoms worsen or fail to improve.  Patient was given instructions and counseling regarding her condition or for health maintenance advice. Please see specific information pulled into the AVS if appropriate.

## 2022-10-23 RX ORDER — OMEPRAZOLE 40 MG/1
CAPSULE, DELAYED RELEASE ORAL
Qty: 90 CAPSULE | Refills: 1 | Status: SHIPPED | OUTPATIENT
Start: 2022-10-23

## 2022-10-25 RX ORDER — METOPROLOL SUCCINATE 25 MG/1
TABLET, EXTENDED RELEASE ORAL
Qty: 30 TABLET | Refills: 10 | Status: SHIPPED | OUTPATIENT
Start: 2022-10-25

## 2022-11-07 ENCOUNTER — OFFICE VISIT (OUTPATIENT)
Dept: FAMILY MEDICINE CLINIC | Facility: CLINIC | Age: 67
End: 2022-11-07

## 2022-11-07 VITALS
RESPIRATION RATE: 20 BRPM | OXYGEN SATURATION: 98 % | DIASTOLIC BLOOD PRESSURE: 79 MMHG | BODY MASS INDEX: 34.49 KG/M2 | TEMPERATURE: 96.4 F | WEIGHT: 214.6 LBS | HEART RATE: 57 BPM | SYSTOLIC BLOOD PRESSURE: 156 MMHG | HEIGHT: 66 IN

## 2022-11-07 DIAGNOSIS — Z87.19 HISTORY OF BARRETT'S ESOPHAGUS: ICD-10-CM

## 2022-11-07 DIAGNOSIS — Z00.00 MEDICARE ANNUAL WELLNESS VISIT, SUBSEQUENT: Primary | ICD-10-CM

## 2022-11-07 DIAGNOSIS — R07.2 PRECORDIAL PAIN: ICD-10-CM

## 2022-11-07 DIAGNOSIS — F33.1 MODERATE EPISODE OF RECURRENT MAJOR DEPRESSIVE DISORDER: ICD-10-CM

## 2022-11-07 PROCEDURE — 99214 OFFICE O/P EST MOD 30 MIN: CPT | Performed by: PHYSICIAN ASSISTANT

## 2022-11-07 PROCEDURE — 1160F RVW MEDS BY RX/DR IN RCRD: CPT | Performed by: PHYSICIAN ASSISTANT

## 2022-11-07 PROCEDURE — G0439 PPPS, SUBSEQ VISIT: HCPCS | Performed by: PHYSICIAN ASSISTANT

## 2022-11-07 PROCEDURE — 1170F FXNL STATUS ASSESSED: CPT | Performed by: PHYSICIAN ASSISTANT

## 2022-11-07 RX ORDER — ALPRAZOLAM 2 MG/1
TABLET ORAL
COMMUNITY
Start: 2022-09-10 | End: 2022-11-07

## 2022-11-07 NOTE — PROGRESS NOTES
The ABCs of the Annual Wellness Visit  Subsequent Medicare Wellness Visit    Chief Complaint   Patient presents with   • Medicare Wellness-subsequent      Subjective    History of Present Illness:  Andree Deluna is a 67 y.o. female who presents for a Subsequent Medicare Wellness Visit.    The following portions of the patient's history were reviewed and   updated as appropriate: She  has a past medical history of ADHD (unknown), Anemia, Anxiety, Carotid artery disease (HCC), CVA (cerebral vascular accident) (HCC), DDD (degenerative disc disease), lumbar, Depression, Extremity pain, GERD (gastroesophageal reflux disease), Hyperlipidemia (unknown), Hypertension, Insomnia (unknown), Laryngopharyngeal reflux, Low back pain, Mood disorder (HCC), Obesity (unknown), Skin cancer of face, Sleep apnea, Stroke (cerebrum) (HCC), and Visual field defect..    Compared to one year ago, the patient feels her physical   health is worse.    Compared to one year ago, the patient feels her mental   health is worse.    Recent Hospitalizations:  She was not admitted to the hospital during the last year.       Current Medical Providers:  Patient Care Team:  Naga Griffith PA-C as PCP - General (Physician Assistant)    Outpatient Medications Prior to Visit   Medication Sig Dispense Refill   • albuterol sulfate  (90 Base) MCG/ACT inhaler Inhale 2 puffs Every 6 (Six) Hours As Needed for Shortness of Air. 18 g 0   • ALPRAZolam (XANAX) 2 MG tablet Take 1 tablet by mouth At Night As Needed for Anxiety. 30 tablet 3   • amLODIPine (NORVASC) 2.5 MG tablet TAKE ONE TABLET BY MOUTH DAILY 90 tablet 0   • aspirin 81 MG chewable tablet Chew 1 tablet Daily. 30 tablet 1   • atorvastatin (LIPITOR) 80 MG tablet Take 1 tablet by mouth Every Night. 30 tablet 11   • buPROPion XL (WELLBUTRIN XL) 150 MG 24 hr tablet TAKE ONE TABLET BY MOUTH DAILY 90 tablet 1   • lamoTRIgine (LaMICtal) 100 MG tablet TAKE ONE TABLET BY MOUTH TWICE A DAY 60 tablet 3    • lisinopril-hydrochlorothiazide (PRINZIDE,ZESTORETIC) 20-25 MG per tablet TAKE ONE TABLET BY MOUTH DAILY 90 tablet 1   • metoprolol succinate XL (TOPROL-XL) 25 MG 24 hr tablet TAKE ONE TABLET BY MOUTH DAILY 30 tablet 10   • omeprazole (priLOSEC) 40 MG capsule TAKE ONE CAPSULE BY MOUTH DAILY 90 capsule 1   • oxybutynin (DITROPAN) 5 MG tablet TAKE ONE TABLET BY MOUTH EVERY MORNING FOR BLADDER 90 tablet 1   • ALPRAZolam (XANAX) 2 MG tablet      • amoxicillin (AMOXIL) 500 MG capsule Take 2 capsules by mouth 2 (Two) Times a Day. 40 capsule 0   • azithromycin (Zithromax Z-Eduar) 250 MG tablet Take 2 tablets by mouth on day 1, then 1 tablet daily on days 2-5 6 tablet 0   • benzonatate (Tessalon Perles) 100 MG capsule Take 1 capsule by mouth 3 (Three) Times a Day As Needed for Cough. 15 capsule 0   • methylPREDNISolone (MEDROL) 4 MG dose pack Take as directed on package instructions. 1 each 0     No facility-administered medications prior to visit.       No opioid medication identified on active medication list. I have reviewed chart for other potential  high risk medication/s and harmful drug interactions in the elderly.          Aspirin is on active medication list. Aspirin use is indicated based on review of current medical condition/s. Pros and cons of this therapy have been discussed today. Benefits of this medication outweigh potential harm.  Patient has been encouraged to continue taking this medication.  .      Patient Active Problem List   Diagnosis   • Anemia in other chronic diseases classified elsewhere   • Anxiety   • B12 deficiency   • Attention deficit disorder of adult with hyperactivity   • Mood disorder (HCC)   • Complete rotator cuff tear or rupture of right shoulder, not specified as traumatic   • Degeneration of intervertebral disc of cervical region   • Degeneration of intervertebral disc of lumbosacral region   • Fatigue   • Hyperlipidemia   • Hypertension   • Insomnia   • Obesity   • Osteoarthritis  "of knee   • CVA (cerebral vascular accident) (HCC)   • Visual field loss left   • Acute midline low back pain without sciatica   • GERD with esophagitis   • Carotid stenosis, bilateral   • Overactive bladder   • History of stroke   • Acute pain of right shoulder   • Left cervical radiculopathy   • Migraine headache   • Medicare annual wellness visit, subsequent   • Moderate episode of recurrent major depressive disorder (HCC)   • Piriformis syndrome, right   • Precordial pain   • History of Grimaldo's esophagus     Advance Care Planning  Advance Directive is not on file.  ACP discussion was held with the patient during this visit. Patient does not have an advance directive, information provided.          Objective    Vitals:    11/07/22 1538   BP: 156/79   Pulse: 57   Resp: 20   Temp: 96.4 °F (35.8 °C)   TempSrc: Infrared   SpO2: 98%   Weight: 97.3 kg (214 lb 9.6 oz)   Height: 167.6 cm (65.98\")     Estimated body mass index is 34.65 kg/m² as calculated from the following:    Height as of this encounter: 167.6 cm (65.98\").    Weight as of this encounter: 97.3 kg (214 lb 9.6 oz).    {BMI is >= 30 and <35. (Class 1 Obesity). The following options were offered after discussion;:[id number]}      Does the patient have evidence of cognitive impairment? {Yes/No:59353}    Physical Exam  Constitutional:       Appearance: She is well-developed.   HENT:      Head: Normocephalic and atraumatic.      Right Ear: Tympanic membrane normal.      Left Ear: Tympanic membrane normal.      Nose: No congestion.      Mouth/Throat:      Pharynx: No oropharyngeal exudate.   Eyes:      Conjunctiva/sclera: Conjunctivae normal.      Pupils: Pupils are equal, round, and reactive to light.   Cardiovascular:      Rate and Rhythm: Normal rate and regular rhythm.      Heart sounds: No murmur heard.  Pulmonary:      Effort: Pulmonary effort is normal.      Breath sounds: Normal breath sounds.   Abdominal:      General: Bowel sounds are normal.      " Palpations: Abdomen is soft.      Tenderness: There is no abdominal tenderness.   Musculoskeletal:         General: No deformity. Normal range of motion.      Cervical back: Normal range of motion and neck supple.   Lymphadenopathy:      Cervical: No cervical adenopathy.   Skin:     General: Skin is warm and dry.      Capillary Refill: Capillary refill takes less than 2 seconds.      Findings: No rash.   Neurological:      Mental Status: She is alert and oriented to person, place, and time.      Cranial Nerves: No cranial nerve deficit.   Psychiatric:         Behavior: Behavior normal.         Thought Content: Thought content normal.         Judgment: Judgment normal.       Lab Results   Component Value Date    TRIG 107 08/18/2022    HDL 83 (H) 08/18/2022    LDL 76 08/18/2022    VLDL 19 08/18/2022            HEALTH RISK ASSESSMENT    Smoking Status:  Social History     Tobacco Use   Smoking Status Never   Smokeless Tobacco Never     Alcohol Consumption:  Social History     Substance and Sexual Activity   Alcohol Use No     Fall Risk Screen:    WHIT Fall Risk Assessment was completed, and patient is at HIGH risk for falls. Assessment completed on:11/7/2022    Depression Screening:  PHQ-2/PHQ-9 Depression Screening 11/7/2022   Retired PHQ-9 Total Score -   Retired Total Score -   Little Interest or Pleasure in Doing Things 3-->nearly every day   Feeling Down, Depressed or Hopeless 3-->nearly every day   Trouble Falling or Staying Asleep, or Sleeping Too Much 0-->not at all   Feeling Tired or Having Little Energy 3-->nearly every day   Poor Appetite or Overeating 3-->nearly every day   Feeling Bad about Yourself - or that You are a Failure or Have Let Yourself or Your Family Down 0-->not at all   Trouble Concentrating on Things, Such as Reading the Newspaper or Watching Television 3-->nearly every day   Moving or Speaking So Slowly that Other People Could Have Noticed? Or the Opposite - Being So Fidgety 0-->not at  all   Thoughts that You Would be Better Off Dead or of Hurting Yourself in Some Way 0-->not at all   PHQ-9: Brief Depression Severity Measure Score 15   If You Checked Off Any Problems, How Difficult Have These Problems Made It For You to Do Your Work, Take Care of Things at Home, or Get Along with Other People? not difficult at all       Health Habits and Functional and Cognitive Screening:  Functional & Cognitive Status 11/7/2022   Do you have difficulty preparing food and eating? Yes   Do you have difficulty bathing yourself, getting dressed or grooming yourself? No   Do you have difficulty using the toilet? No   Do you have difficulty moving around from place to place? No   Do you have trouble with steps or getting out of a bed or a chair? No   Current Diet Well Balanced Diet   Dental Exam Not up to date   Eye Exam Up to date   Exercise (times per week) 3 times per week   Current Exercises Include Yard Work;Walking;Gardening;Swimming   Do you need help using the phone?  No   Are you deaf or do you have serious difficulty hearing?  No   Do you need help with transportation? No   Do you need help shopping? No   Do you need help preparing meals?  Yes   Do you need help with housework?  Yes   Do you need help with laundry? No   Do you need help taking your medications? No   Do you need help managing money? No   Do you ever drive or ride in a car without wearing a seat belt? No       Age-appropriate Screening Schedule:  Refer to the list below for future screening recommendations based on patient's age, sex and/or medical conditions. Orders for these recommended tests are listed in the plan section. The patient has been provided with a written plan.    Health Maintenance   Topic Date Due   • URINE MICROALBUMIN  Never done   • DXA SCAN  Never done   • TDAP/TD VACCINES (1 - Tdap) Never done   • ZOSTER VACCINE (1 of 2) Never done   • PAP SMEAR  Never done   • INFLUENZA VACCINE  08/01/2022   • MAMMOGRAM  08/04/2023   •  LIPID PANEL  08/18/2023   • DIABETIC FOOT EXAM  Discontinued   • HEMOGLOBIN A1C  Discontinued   • DIABETIC EYE EXAM  Discontinued              Assessment & Plan   CMS Preventative Services Quick Reference  Risk Factors Identified During Encounter  Cardiovascular Disease  Chronic Pain   Inactivity/Sedentary  The above risks/problems have been discussed with the patient.  Follow up actions/plans if indicated are seen below in the Assessment/Plan Section.  Pertinent information has been shared with the patient in the After Visit Summary.    Diagnoses and all orders for this visit:    1. Medicare annual wellness visit, subsequent (Primary)    2. Precordial pain  -     Adult Stress Echo W/ Cont or Stress Agent if Necessary Per Protocol; Future    3. History of Grimaldo's esophagus        Follow Up:   Return if symptoms worsen or fail to improve.     An After Visit Summary and PPPS were made available to the patient.            I spent 45 minutes caring for Andree on this date of service. This time includes time spent by me in the following activities:preparing for the visit, reviewing tests, obtaining and/or reviewing a separately obtained history, performing a medically appropriate examination and/or evaluation , counseling and educating the patient/family/caregiver, ordering medications, tests, or procedures and documenting information in the medical record

## 2022-11-07 NOTE — PROGRESS NOTES
The ABCs of the Annual Wellness Visit  Subsequent Medicare Wellness Visit    Chief Complaint   Patient presents with   • Medicare Wellness-subsequent      Subjective    History of Present Illness:  Andree Deluna is a 67 y.o. female who presents for a Subsequent Medicare Wellness Visit.who presents for a Subsequent Medicare Wellness Visit. Also following up for anxiety, htn, depression, and gerd. She has been doing well other than having chest discomfort at times. She states that the chest discomfort comes and goes. Not associated with exertion. She denies radiation. No soa with the pain. No diaphoresis.     The following portions of the patient's history were reviewed and   updated as appropriate: allergies, current medications, past family history, past medical history, past social history, past surgical history and problem list.    Compared to one year ago, the patient feels her physical   health is the same.    Compared to one year ago, the patient feels her mental   health is the same.    Recent Hospitalizations:  She was not admitted to the hospital during the last year.       Current Medical Providers:  Patient Care Team:  Naga Griffith PA-C as PCP - General (Physician Assistant)    Outpatient Medications Prior to Visit   Medication Sig Dispense Refill   • albuterol sulfate  (90 Base) MCG/ACT inhaler Inhale 2 puffs Every 6 (Six) Hours As Needed for Shortness of Air. 18 g 0   • ALPRAZolam (XANAX) 2 MG tablet Take 1 tablet by mouth At Night As Needed for Anxiety. 30 tablet 3   • amLODIPine (NORVASC) 2.5 MG tablet TAKE ONE TABLET BY MOUTH DAILY 90 tablet 0   • aspirin 81 MG chewable tablet Chew 1 tablet Daily. 30 tablet 1   • atorvastatin (LIPITOR) 80 MG tablet Take 1 tablet by mouth Every Night. 30 tablet 11   • buPROPion XL (WELLBUTRIN XL) 150 MG 24 hr tablet TAKE ONE TABLET BY MOUTH DAILY 90 tablet 1   • lamoTRIgine (LaMICtal) 100 MG tablet TAKE ONE TABLET BY MOUTH TWICE A DAY 60 tablet 3   •  lisinopril-hydrochlorothiazide (PRINZIDE,ZESTORETIC) 20-25 MG per tablet TAKE ONE TABLET BY MOUTH DAILY 90 tablet 1   • metoprolol succinate XL (TOPROL-XL) 25 MG 24 hr tablet TAKE ONE TABLET BY MOUTH DAILY 30 tablet 10   • omeprazole (priLOSEC) 40 MG capsule TAKE ONE CAPSULE BY MOUTH DAILY 90 capsule 1   • oxybutynin (DITROPAN) 5 MG tablet TAKE ONE TABLET BY MOUTH EVERY MORNING FOR BLADDER 90 tablet 1   • ALPRAZolam (XANAX) 2 MG tablet      • amoxicillin (AMOXIL) 500 MG capsule Take 2 capsules by mouth 2 (Two) Times a Day. 40 capsule 0   • azithromycin (Zithromax Z-Eduar) 250 MG tablet Take 2 tablets by mouth on day 1, then 1 tablet daily on days 2-5 6 tablet 0   • benzonatate (Tessalon Perles) 100 MG capsule Take 1 capsule by mouth 3 (Three) Times a Day As Needed for Cough. 15 capsule 0   • methylPREDNISolone (MEDROL) 4 MG dose pack Take as directed on package instructions. 1 each 0     No facility-administered medications prior to visit.       No opioid medication identified on active medication list. I have reviewed chart for other potential  high risk medication/s and harmful drug interactions in the elderly.          Aspirin is on active medication list. Aspirin use is indicated based on review of current medical condition/s. Pros and cons of this therapy have been discussed today. Benefits of this medication outweigh potential harm.  Patient has been encouraged to continue taking this medication.  .      Patient Active Problem List   Diagnosis   • Anemia in other chronic diseases classified elsewhere   • Anxiety   • B12 deficiency   • Attention deficit disorder of adult with hyperactivity   • Mood disorder (HCC)   • Complete rotator cuff tear or rupture of right shoulder, not specified as traumatic   • Degeneration of intervertebral disc of cervical region   • Degeneration of intervertebral disc of lumbosacral region   • Fatigue   • Hyperlipidemia   • Hypertension   • Insomnia   • Obesity   • Osteoarthritis of  "knee   • CVA (cerebral vascular accident) (HCC)   • Visual field loss left   • Acute midline low back pain without sciatica   • GERD with esophagitis   • Carotid stenosis, bilateral   • Overactive bladder   • History of stroke   • Acute pain of right shoulder   • Left cervical radiculopathy   • Migraine headache   • Medicare annual wellness visit, subsequent   • Moderate episode of recurrent major depressive disorder (HCC)   • Piriformis syndrome, right   • Precordial pain   • History of Grimaldo's esophagus     Advance Care Planning  Advance Directive is not on file.  ACP discussion was held with the patient during this visit. Patient has an advance directive (not in EMR), copy requested.          Objective    Vitals:    11/07/22 1538   BP: 156/79   Pulse: 57   Resp: 20   Temp: 96.4 °F (35.8 °C)   TempSrc: Infrared   SpO2: 98%   Weight: 97.3 kg (214 lb 9.6 oz)   Height: 167.6 cm (65.98\")     Estimated body mass index is 34.65 kg/m² as calculated from the following:    Height as of this encounter: 167.6 cm (65.98\").    Weight as of this encounter: 97.3 kg (214 lb 9.6 oz).    BMI is >= 30 and <35. (Class 1 Obesity). The following options were offered after discussion;: exercise counseling/recommendations and nutrition counseling/recommendations      Does the patient have evidence of cognitive impairment? No    Physical Exam  Constitutional:       Appearance: She is well-developed.   HENT:      Head: Normocephalic and atraumatic.      Right Ear: Tympanic membrane normal.      Left Ear: Tympanic membrane normal.      Nose: No congestion.      Mouth/Throat:      Mouth: Mucous membranes are moist.      Pharynx: No oropharyngeal exudate.   Eyes:      Conjunctiva/sclera: Conjunctivae normal.      Pupils: Pupils are equal, round, and reactive to light.   Cardiovascular:      Rate and Rhythm: Normal rate and regular rhythm.      Heart sounds: No murmur heard.  Pulmonary:      Effort: Pulmonary effort is normal.      Breath " sounds: Normal breath sounds.   Abdominal:      General: Bowel sounds are normal.      Palpations: Abdomen is soft.      Tenderness: There is no abdominal tenderness.   Musculoskeletal:         General: No deformity. Normal range of motion.      Cervical back: Normal range of motion and neck supple.   Lymphadenopathy:      Cervical: No cervical adenopathy.   Skin:     General: Skin is warm and dry.      Findings: No rash.   Neurological:      Mental Status: She is alert and oriented to person, place, and time.      Cranial Nerves: No cranial nerve deficit.   Psychiatric:         Behavior: Behavior normal.         Thought Content: Thought content normal.         Judgment: Judgment normal.       Lab Results   Component Value Date    TRIG 107 08/18/2022    HDL 83 (H) 08/18/2022    LDL 76 08/18/2022    VLDL 19 08/18/2022            HEALTH RISK ASSESSMENT    Smoking Status:  Social History     Tobacco Use   Smoking Status Never   Smokeless Tobacco Never     Alcohol Consumption:  Social History     Substance and Sexual Activity   Alcohol Use No     Fall Risk Screen:    WHIT Fall Risk Assessment was completed, and patient is at HIGH risk for falls. Assessment completed on:11/7/2022    Depression Screening:  PHQ-2/PHQ-9 Depression Screening 11/7/2022   Retired PHQ-9 Total Score -   Retired Total Score -   Little Interest or Pleasure in Doing Things 3-->nearly every day   Feeling Down, Depressed or Hopeless 3-->nearly every day   Trouble Falling or Staying Asleep, or Sleeping Too Much 0-->not at all   Feeling Tired or Having Little Energy 3-->nearly every day   Poor Appetite or Overeating 3-->nearly every day   Feeling Bad about Yourself - or that You are a Failure or Have Let Yourself or Your Family Down 0-->not at all   Trouble Concentrating on Things, Such as Reading the Newspaper or Watching Television 3-->nearly every day   Moving or Speaking So Slowly that Other People Could Have Noticed? Or the Opposite - Being So  Fidgety 0-->not at all   Thoughts that You Would be Better Off Dead or of Hurting Yourself in Some Way 0-->not at all   PHQ-9: Brief Depression Severity Measure Score 15   If You Checked Off Any Problems, How Difficult Have These Problems Made It For You to Do Your Work, Take Care of Things at Home, or Get Along with Other People? not difficult at all       Health Habits and Functional and Cognitive Screening:  Functional & Cognitive Status 11/7/2022   Do you have difficulty preparing food and eating? Yes   Do you have difficulty bathing yourself, getting dressed or grooming yourself? No   Do you have difficulty using the toilet? No   Do you have difficulty moving around from place to place? No   Do you have trouble with steps or getting out of a bed or a chair? No   Current Diet Well Balanced Diet   Dental Exam Not up to date   Eye Exam Up to date   Exercise (times per week) 3 times per week   Current Exercises Include Yard Work;Walking;Gardening;Swimming   Do you need help using the phone?  No   Are you deaf or do you have serious difficulty hearing?  No   Do you need help with transportation? No   Do you need help shopping? No   Do you need help preparing meals?  Yes   Do you need help with housework?  Yes   Do you need help with laundry? No   Do you need help taking your medications? No   Do you need help managing money? No   Do you ever drive or ride in a car without wearing a seat belt? No       Age-appropriate Screening Schedule:  Refer to the list below for future screening recommendations based on patient's age, sex and/or medical conditions. Orders for these recommended tests are listed in the plan section. The patient has been provided with a written plan.    Health Maintenance   Topic Date Due   • URINE MICROALBUMIN  Never done   • DXA SCAN  Never done   • TDAP/TD VACCINES (1 - Tdap) Never done   • ZOSTER VACCINE (1 of 2) Never done   • PAP SMEAR  Never done   • INFLUENZA VACCINE  08/01/2022   •  MAMMOGRAM  08/04/2023   • LIPID PANEL  08/18/2023   • DIABETIC FOOT EXAM  Discontinued   • HEMOGLOBIN A1C  Discontinued   • DIABETIC EYE EXAM  Discontinued              Assessment & Plan   CMS Preventative Services Quick Reference  Risk Factors Identified During Encounter  Cardiovascular Disease  Chronic Pain   Inactivity/Sedentary  Urinary Incontinence  The above risks/problems have been discussed with the patient.  Follow up actions/plans if indicated are seen below in the Assessment/Plan Section.  Pertinent information has been shared with the patient in the After Visit Summary.    Diagnoses and all orders for this visit:    1. Medicare annual wellness visit, subsequent (Primary)  Comments:  healthy lifestyle choices. mammogram utd.     2. History of Grimaldo's esophagus  Comments:  schedule egd with gastro.     3. Precordial pain  -     Adult Stress Echo W/ Cont or Stress Agent if Necessary Per Protocol; Future    4. Moderate episode of recurrent major depressive disorder (HCC)        Follow Up:   Return if symptoms worsen or fail to improve.     An After Visit Summary and PPPS were made available to the patient.            I spent 45 minutes caring for Andree on this date of service. This time includes time spent by me in the following activities:preparing for the visit, reviewing tests, obtaining and/or reviewing a separately obtained history, performing a medically appropriate examination and/or evaluation , counseling and educating the patient/family/caregiver, ordering medications, tests, or procedures and documenting information in the medical record

## 2022-11-15 ENCOUNTER — HOSPITAL ENCOUNTER (OUTPATIENT)
Dept: CARDIOLOGY | Facility: HOSPITAL | Age: 67
Discharge: HOME | End: 2022-11-15

## 2022-11-15 DIAGNOSIS — R07.2 PRECORDIAL PAIN: ICD-10-CM

## 2022-11-15 DIAGNOSIS — I65.23 BILATERAL CAROTID ARTERY OCCLUSION: ICD-10-CM

## 2022-11-15 LAB
BH CV XLRA MEAS LEFT DIST CCA EDV: 20.6 CM/SEC
BH CV XLRA MEAS LEFT DIST CCA PSV: 54.5 CM/SEC
BH CV XLRA MEAS LEFT ICA/CCA RATIO: 1.8
BH CV XLRA MEAS LEFT PROX CCA EDV: 11.2 CM/SEC
BH CV XLRA MEAS LEFT PROX CCA PSV: 57.7 CM/SEC
BH CV XLRA MEAS LEFT PROX ECA EDV: -17.2 CM/SEC
BH CV XLRA MEAS LEFT PROX ECA PSV: -73.2 CM/SEC
BH CV XLRA MEAS LEFT PROX ICA EDV: -48.5 CM/SEC
BH CV XLRA MEAS LEFT PROX ICA PSV: -104 CM/SEC
BH CV XLRA MEAS LEFT PROX SCLA PSV: 81.6 CM/SEC
BH CV XLRA MEAS LEFT VERTEBRAL A EDV: 15.6 CM/SEC
BH CV XLRA MEAS LEFT VERTEBRAL A PSV: 57.2 CM/SEC
BH CV XLRA MEAS RIGHT DIST CCA EDV: 18.7 CM/SEC
BH CV XLRA MEAS RIGHT DIST CCA PSV: 52.6 CM/SEC
BH CV XLRA MEAS RIGHT DIST ICA EDV: -27 CM/SEC
BH CV XLRA MEAS RIGHT DIST ICA PSV: -80.2 CM/SEC
BH CV XLRA MEAS RIGHT ICA/CCA RATIO: 1.1
BH CV XLRA MEAS RIGHT PROX CCA EDV: 14.3 CM/SEC
BH CV XLRA MEAS RIGHT PROX CCA PSV: 72.1 CM/SEC
BH CV XLRA MEAS RIGHT PROX ECA EDV: -21.7 CM/SEC
BH CV XLRA MEAS RIGHT PROX ECA PSV: -127 CM/SEC
BH CV XLRA MEAS RIGHT PROX ICA EDV: -16.8 CM/SEC
BH CV XLRA MEAS RIGHT PROX ICA PSV: -45.5 CM/SEC
BH CV XLRA MEAS RIGHT PROX SCLA PSV: -50.9 CM/SEC
BH CV XLRA MEAS RIGHT VERTEBRAL A EDV: 10.1 CM/SEC
BH CV XLRA MEAS RIGHT VERTEBRAL A PSV: 33.2 CM/SEC
MAXIMAL PREDICTED HEART RATE: 155 BPM
STRESS TARGET HR: 132 BPM

## 2022-11-15 PROCEDURE — 93880 EXTRACRANIAL BILAT STUDY: CPT

## 2022-11-22 ENCOUNTER — TELEPHONE (OUTPATIENT)
Dept: FAMILY MEDICINE CLINIC | Facility: CLINIC | Age: 67
End: 2022-11-22

## 2022-11-22 NOTE — TELEPHONE ENCOUNTER
Caller: Andree Deluna    Relationship: Self    Best call back number:     Caller requesting test results:     What test was performed: ARTERY TEST    When was the test performed: 1 WEEK AGO    Where was the test performed: [facility]    Additional notes: PATIENT IS CALLING IN TO SEE IF DR WHITE HAS THE RESULTS FROM HER ARTERY TEST THAT SHE HAD DONE LAST WEEK.

## 2022-11-28 ENCOUNTER — APPOINTMENT (OUTPATIENT)
Dept: CARDIOLOGY | Facility: HOSPITAL | Age: 67
End: 2022-11-28

## 2022-11-28 ENCOUNTER — TELEPHONE (OUTPATIENT)
Dept: FAMILY MEDICINE CLINIC | Facility: CLINIC | Age: 67
End: 2022-11-28

## 2022-11-28 RX ORDER — OXYBUTYNIN CHLORIDE 5 MG/1
5 TABLET ORAL DAILY
Qty: 90 TABLET | Refills: 1 | Status: SHIPPED | OUTPATIENT
Start: 2022-11-28

## 2022-11-28 RX ORDER — PREDNISONE 20 MG/1
40 TABLET ORAL DAILY
Qty: 10 TABLET | Refills: 0 | Status: SHIPPED | OUTPATIENT
Start: 2022-11-28 | End: 2022-12-03

## 2022-11-28 RX ORDER — AZITHROMYCIN 250 MG/1
TABLET, FILM COATED ORAL
Qty: 6 TABLET | Refills: 0 | Status: SHIPPED | OUTPATIENT
Start: 2022-11-28 | End: 2023-01-19

## 2022-11-28 NOTE — TELEPHONE ENCOUNTER
"LVM FOR PATIENT WITH RESULTS. TOLD HER TO GET A HOLD OF DR. HILLS OFFICE IF SHE HAD ANY OTHER QUESTIONS.     FOR HUB TO SAY:    \"I think  ordered this, but the test looks ok. No concerns. Mild stenosis in one carotid. Not enough for intervention.\"  "

## 2022-11-28 NOTE — TELEPHONE ENCOUNTER
Caller: Andree Deluna    Relationship: Self    Best call back number: [phone]    Requested Prescriptions:   Requested Prescriptions     Pending Prescriptions Disp Refills   • oxybutynin (DITROPAN) 5 MG tablet 90 tablet 1        Pharmacy where request should be sent: [facility] [id number], [place] - [address] - [phone]  - [phone] FX     Additional details provided by patient: PATIENT IS OUT OF THIS MEDICATION     Does the patient have less than a 3 day supply:  [x] Yes  [] No    Tanisha Nguyen Rep   11/28/22 13:10 EST

## 2022-11-28 NOTE — TELEPHONE ENCOUNTER
Caller: Andree Deluna    Relationship: Self    Best call back number: [phone]    What medication are you requesting: UNKNOWN     What are your current symptoms: COUGH, COUGH CAUSING SORENESS IN CHEST, FEVER ON AND OFF, NO ENERGY     How long have you been experiencing symptoms: COUPLE WEEKS    If a prescription is needed, what is your preferred pharmacy and phone number:  [facility] [id number] - [place], [place] - [address] - [phone] [facility] [phone]     Additional notes: PATIENT IS REQUESTING SOME MEDICATION TO HELP WITH HER SYMPTOMS. PATIENT WAS SEEN IN OFFICE NOT THAT LONG AGO FOR THE SAME ISSUE.     PLEASE CALL PATIENT WHEN PRESCRIPTION IS SENT

## 2022-11-29 NOTE — PROGRESS NOTES
Discharge Planning Assessment  [facility]     Patient Name: Andree Deluna  MRN: [id number]  Today's Date: 11/11/2020    Admit Date: 11/10/2020    Discharge Needs Assessment     Row Name 11/11/20 0900       Living Environment    Lives With  spouse    Current Living Arrangements  home/apartment/condo    Quality of Family Relationships  supportive    Able to Return to Prior Arrangements  yes       Resource/Environmental Concerns    Transportation Concerns  car, none       Transition Planning    Patient/Family Anticipates Transition to  home with family    Patient/Family Anticipated Services at Transition  none    Transportation Anticipated  car, drives self       Discharge Needs Assessment    Readmission Within the Last 30 Days  no previous admission in last 30 days    Equipment Currently Used at Home  none        Discharge Plan     Row Name 11/11/20 0900       Plan    Plan  D/C Plan : Home    Patient/Family in Agreement with Plan  yes    Plan Comments  S/P CEA . PLAN D/C TODAY        Continued Care and Services - Admitted Since 11/10/2020    Coordination has not been started for this encounter.       Expected Discharge Date and Time     Expected Discharge Date Expected Discharge Time    Nov 11, 2020         Demographic Summary     Row Name 11/11/20 0859       General Information    Admission Type  inpatient    Arrived From  operating room    Preferred Language  English     Used During This Interaction  no        Functional Status     Row Name 11/11/20 0900       Functional Status    Usual Activity Tolerance  good    Current Activity Tolerance  good       Mental Status    General Appearance WDL  WDL       Mental Status Summary    Recent Changes in Mental Status/Cognitive Functioning  no changes                Leandra Jack RN     none

## 2022-12-14 RX ORDER — ATORVASTATIN CALCIUM 80 MG/1
TABLET, FILM COATED ORAL
Qty: 30 TABLET | Refills: 11 | Status: SHIPPED | OUTPATIENT
Start: 2022-12-14

## 2022-12-28 ENCOUNTER — TELEPHONE (OUTPATIENT)
Dept: FAMILY MEDICINE CLINIC | Facility: CLINIC | Age: 67
End: 2022-12-28
Payer: MEDICARE

## 2022-12-28 DIAGNOSIS — F41.9 ANXIETY: ICD-10-CM

## 2022-12-28 NOTE — TELEPHONE ENCOUNTER
Caller: Andree Deluna    Relationship: Self    Best call back number: [phone]    Requested Prescriptions:   Requested Prescriptions     Pending Prescriptions Disp Refills   • ALPRAZolam (XANAX) 2 MG tablet 30 tablet 3     Sig: Take 1 tablet by mouth At Night As Needed for Anxiety.        Pharmacy where request should be sent: [facility] [address] [place], [place] [phone] [place] [phone] FX     Additional details provided by patient: PATIENT STATES SHE HAS 10 DAYS LEFT ON THIS PRESCRIPTION.     PATIENT STATES SHE WOULD LIKE A 3 MONTH SUPPLY OF THIS PRESCRIPTION.     Does the patient have less than a 3 day supply:  [] Yes  [x] No    Would you like a call back once the refill request has been completed: [x] Yes [] No    If the office needs to give you a call back, can they leave a voicemail: [] Yes [x] No    Tanisha Schmidt Rep   12/28/22 10:08 EST

## 2022-12-28 NOTE — TELEPHONE ENCOUNTER
Caller: Andree Deluna    Relationship: Self    Best call back number: [phone]    What orders are you requesting (i.e. lab or imaging): STRESS TEST    In what timeframe would the patient need to come in: AS SOON AS POSSIBLE     Where will you receive your lab/imaging services: [facility]    Additional notes: PATIENT STATES THAT SHE WAS TOLD FOR HER LAST STRESS TEST THEY WERE OUT OF THE MEDICATION FOR THE TEST.     PATIENT STATES THAT SHE CANNOT DO THE STRESS TEST WITH THE TREADMILL.

## 2022-12-29 RX ORDER — ALPRAZOLAM 2 MG/1
2 TABLET ORAL NIGHTLY PRN
Qty: 30 TABLET | Refills: 3 | Status: SHIPPED | OUTPATIENT
Start: 2022-12-29 | End: 2023-01-06

## 2023-01-04 RX ORDER — LAMOTRIGINE 100 MG/1
TABLET ORAL
Qty: 60 TABLET | Refills: 3 | Status: SHIPPED | OUTPATIENT
Start: 2023-01-04

## 2023-01-06 DIAGNOSIS — F41.9 ANXIETY: ICD-10-CM

## 2023-01-06 RX ORDER — ALPRAZOLAM 2 MG/1
TABLET ORAL
Qty: 30 TABLET | Refills: 0 | Status: SHIPPED | OUTPATIENT
Start: 2023-01-06 | End: 2023-03-09

## 2023-01-19 ENCOUNTER — OFFICE VISIT (OUTPATIENT)
Dept: FAMILY MEDICINE CLINIC | Facility: CLINIC | Age: 68
End: 2023-01-19
Payer: MEDICARE

## 2023-01-19 VITALS
OXYGEN SATURATION: 98 % | RESPIRATION RATE: 18 BRPM | DIASTOLIC BLOOD PRESSURE: 82 MMHG | WEIGHT: 208 LBS | HEIGHT: 66 IN | SYSTOLIC BLOOD PRESSURE: 158 MMHG | HEART RATE: 86 BPM | TEMPERATURE: 97.5 F | BODY MASS INDEX: 33.43 KG/M2

## 2023-01-19 DIAGNOSIS — R05.3 CHRONIC COUGH: Primary | ICD-10-CM

## 2023-01-19 DIAGNOSIS — F41.1 GAD (GENERALIZED ANXIETY DISORDER): ICD-10-CM

## 2023-01-19 PROCEDURE — 99214 OFFICE O/P EST MOD 30 MIN: CPT | Performed by: PHYSICIAN ASSISTANT

## 2023-01-19 RX ORDER — ALBUTEROL SULFATE 90 UG/1
2 AEROSOL, METERED RESPIRATORY (INHALATION) EVERY 4 HOURS PRN
Qty: 18 G | Refills: 3 | Status: SHIPPED | OUTPATIENT
Start: 2023-01-19 | End: 2023-01-19 | Stop reason: SDUPTHER

## 2023-01-19 RX ORDER — VILAZODONE HYDROCHLORIDE 20 MG/1
20 TABLET ORAL DAILY
Qty: 90 TABLET | Refills: 3 | Status: SHIPPED | OUTPATIENT
Start: 2023-01-19

## 2023-01-19 RX ORDER — ALBUTEROL SULFATE 90 UG/1
2 AEROSOL, METERED RESPIRATORY (INHALATION) EVERY 4 HOURS PRN
Qty: 18 G | Refills: 3 | Status: SHIPPED | OUTPATIENT
Start: 2023-01-19

## 2023-01-19 NOTE — PROGRESS NOTES
"Subjective   Andree Deluna is a 67 y.o. female.     Chief Complaint   Patient presents with   • Cough     Follow up on cough . Patient has had the cough for about 4 months.  Chest hurts this has been going on for 3 months. Patient went to cardiology and was sent home because they could not run the test because they did not have the medication they needed to do the test about a month ago per patient.       /82   Pulse 86   Temp 97.5 °F (36.4 °C) (Infrared)   Resp 18   Ht 167.6 cm (65.98\")   Wt 94.3 kg (208 lb)   SpO2 98%   BMI 33.59 kg/m²     BP Readings from Last 3 Encounters:   01/19/23 158/82   11/07/22 156/79   10/19/22 144/80       Wt Readings from Last 3 Encounters:   01/19/23 94.3 kg (208 lb)   11/07/22 97.3 kg (214 lb 9.6 oz)   10/19/22 97.1 kg (214 lb)       HPI Presents to the clinic for chronic cough that has been present for over 3-4 months. Has been fairly consistent. No history of smoking but has been around it for a long time. The cough is not productive. No swelling in legs. No orthopnea. No PND. She coughs when she exerts herself. No worsening reflux. Has barretts. On Lisinopril. Has anxiety. Worsening anxiety due to issues with son.     The following portions of the patient's history were reviewed and updated as appropriate: allergies, current medications, past family history, past medical history, past social history, past surgical history and problem list.    Review of Systems    Objective   Physical Exam  Constitutional:       Appearance: Normal appearance.   Eyes:      Extraocular Movements: Extraocular movements intact.      Pupils: Pupils are equal, round, and reactive to light.   Cardiovascular:      Rate and Rhythm: Normal rate.      Heart sounds: No murmur heard.  Pulmonary:      Effort: Pulmonary effort is normal.      Breath sounds: No wheezing.   Neurological:      General: No focal deficit present.      Mental Status: She is alert and oriented to person, place, and time. "   Psychiatric:         Mood and Affect: Mood normal.         Behavior: Behavior normal.           Diagnoses and all orders for this visit:    1. Chronic cough (Primary)  Comments:  discussed ddx. sounds like reactive airway issue. check cxr. albuterol. consider stopping lisinopril. may need to do laba/ics.   Orders:  -     XR Chest PA & Lateral; Future    2. PATEL (generalized anxiety disorder)  Comments:  add viibryd. follow up in 1 month to let me know how she is doing.     Other orders  -     Discontinue: albuterol sulfate  (90 Base) MCG/ACT inhaler; Inhale 2 puffs Every 4 (Four) Hours As Needed for Wheezing.  Dispense: 18 g; Refill: 3  -     albuterol sulfate  (90 Base) MCG/ACT inhaler; Inhale 2 puffs Every 4 (Four) Hours As Needed for Wheezing.  Dispense: 18 g; Refill: 3  -     vilazodone (Viibryd) 20 MG tablet tablet; Take 1 tablet by mouth Daily.  Dispense: 90 tablet; Refill: 3        Return if symptoms worsen or fail to improve, for Recheck.

## 2023-01-30 ENCOUNTER — TELEPHONE (OUTPATIENT)
Dept: FAMILY MEDICINE CLINIC | Facility: CLINIC | Age: 68
End: 2023-01-30
Payer: MEDICARE

## 2023-02-03 ENCOUNTER — TELEPHONE (OUTPATIENT)
Dept: FAMILY MEDICINE CLINIC | Facility: CLINIC | Age: 68
End: 2023-02-03
Payer: MEDICARE

## 2023-02-03 DIAGNOSIS — R05.3 CHRONIC COUGH: ICD-10-CM

## 2023-02-03 NOTE — TELEPHONE ENCOUNTER
Caller: [facility] [address] [facility], [place] - [address] - [phone]  - [phone] FX    Relationship to patient: Pharmacy    Best call back number: [phone]    Patient is needing:      DIEGO  AT [facility] SAYS RODRIGUEZ IS GETTING ALPRAZOLAM AT [facility] AND [facility], PRESCRIPTIONS ARE OVERLAPPING WOULD LIKE TO KNOW IFF SHAYLEE WHITE WOULD LIKE TO DEACTIVATE.    RODRIGUEZ  HAD HER  PRESCRIPTION'S FILLED     12/8/2022 - [facility],     12/30/2022 AT [facility],     1/6/2023 @ [facility],     CAME TO [facility] FOR A REFILL 2/3/2023    PLEASE ADVISE

## 2023-02-06 ENCOUNTER — TELEPHONE (OUTPATIENT)
Dept: FAMILY MEDICINE CLINIC | Facility: CLINIC | Age: 68
End: 2023-02-06

## 2023-02-06 ENCOUNTER — TELEPHONE (OUTPATIENT)
Dept: FAMILY MEDICINE CLINIC | Facility: CLINIC | Age: 68
End: 2023-02-06
Payer: MEDICARE

## 2023-02-06 NOTE — TELEPHONE ENCOUNTER
PATIENT'S PHONE IS NOT WORKING AND WANTED TO KNOW IF YOU COULD CALL THIS NUMBER INSTEAD  [employer] [employer] () [phone] (Mobile)

## 2023-02-06 NOTE — TELEPHONE ENCOUNTER
Caller: Andree Deluna    Relationship: Self    Best call back number: [phone]    What is the best time to reach you: ANYTIME    Who are you requesting to speak with (clinical staff, provider,  specific staff member): CLINICAL    What was the call regarding: PATIENT IS WANTING CALL BACK REGARDING STRESS TEST AND TAKING HER MEDICATIONS. SHE WAS ADVISED NOT TO TAKE HER BLOOD PRESSURE MEDICATION/BETA BLOCKERS FOR 24 HOURS PRIOR TO THE STRESS TEST.     PATIENT IS CONCERNED ABOUT GOING WITHOUT THIS MEDICATION.     PLEASE CALL TO DISCUSS AND ADVISE.

## 2023-02-06 NOTE — TELEPHONE ENCOUNTER
OCCUPATIONAL THERAPY UPPER EXTREMITY EVALUATION   Referring Physician: Dr. Pranav Gee  Diagnosis: L Hand pain   (M79.642)  L Finger Pain ( U59.892  )  Date of Service: 6/4/2018     PATIENT Evaristo Webber is a 50year old y/o female who presents to  PATIENT CALLED AND STATES  SHE WOULD LIKE TO HAVE SOMEONE CALL HER BACK IN REGARDS TO HER ENLARGED HEART, THAT SHE FOUND OUT ABOUT TODAY. SHE IS NEEDING SOME INFORMATION. SHE WOULD LIKE TO HAVE SHAYLEE WHITE CALL    PLEASE CALL [phone]       tasks.    Precautions:  Allergies  OBJECTIVE    OBSERVATION: Unremarkable     ORTHOTICS:   none    SCAR:   none    SENSORY:  [place]- [place]   = WNL volar and dorsal  surfaces  L hand/wrist     EDEMA:  None        CIRCUMFERENTIAL EDEMA (cm):  WRIST: R= 18 and has agreed to actively participate in planning and for this course of care. Thank you for your referral. Please co-sign or sign and return this letter via fax as soon as possible to [phone].  If you have any questions, please contact me at Dept:

## 2023-02-13 RX ORDER — LISINOPRIL AND HYDROCHLOROTHIAZIDE 25; 20 MG/1; MG/1
TABLET ORAL
Qty: 90 TABLET | Refills: 1 | Status: SHIPPED | OUTPATIENT
Start: 2023-02-13

## 2023-02-15 ENCOUNTER — TELEPHONE (OUTPATIENT)
Dept: FAMILY MEDICINE CLINIC | Facility: CLINIC | Age: 68
End: 2023-02-15
Payer: MEDICARE

## 2023-02-15 NOTE — TELEPHONE ENCOUNTER
Lili with [facility] called to say that patient is scheduled tomorrow for a stress test.  Patient absolutely refuses to do a treadmill test and she refuses to stop her beta blocker.  They cannot do Dobutamine because there is a national shortage.  You will need to cancel patient's stress test and order either a Myoview or Lexiscan.

## 2023-02-15 NOTE — TELEPHONE ENCOUNTER
Now the patient has called back upset that the wrong test was ordered for her and for the 2nd time she is not able to have the proper test done.  Said she cannot and will not do a treadmill test because she is so out of breath.  She is getting worried and wants a new test ordered for her ASAP and wants it scheduled ASAP.

## 2023-02-16 ENCOUNTER — TELEPHONE (OUTPATIENT)
Dept: FAMILY MEDICINE CLINIC | Facility: CLINIC | Age: 68
End: 2023-02-16

## 2023-02-16 DIAGNOSIS — R06.02 SHORTNESS OF BREATH: ICD-10-CM

## 2023-02-16 DIAGNOSIS — R07.2 PRECORDIAL PAIN: Primary | ICD-10-CM

## 2023-02-16 NOTE — TELEPHONE ENCOUNTER
I called patient's home number and spoke with patient's  Mike at 2:14pm and gave him El's message.  I told him to have patient call [facility] to get scheduled.

## 2023-02-16 NOTE — TELEPHONE ENCOUNTER
Caller: [employer]    Relationship to patient: Emergency Contact    Best call back number: [phone]    Patient is needing: PATIENT  IS CALLING TO INFORM THAT PATIENT WAS NOT ABLE TO PREFORM STRESS TEST AND THEY WOULD LIKE A CALL BACK TO DISCUSS WHAT IS NEEDED TO DO TO HELP HIS WIFE'S HEART    THEY WAS SUPPOSED TO TO A ECHO ON HEART AND THEY STATED IT WAS NO ORDER

## 2023-02-23 ENCOUNTER — TELEPHONE (OUTPATIENT)
Dept: FAMILY MEDICINE CLINIC | Facility: CLINIC | Age: 68
End: 2023-02-23
Payer: MEDICARE

## 2023-02-23 RX ORDER — BENZONATATE 200 MG/1
200 CAPSULE ORAL 3 TIMES DAILY PRN
Qty: 45 CAPSULE | Refills: 0 | Status: SHIPPED | OUTPATIENT
Start: 2023-02-23 | End: 2023-03-10

## 2023-02-23 NOTE — TELEPHONE ENCOUNTER
Caller: MikieAndree    Relationship: Self    Best call back number: [phone], [phone], [phone]    What medication are you requesting: COUGH MEDICINE     What are your current symptoms: COUGH     How long have you been experiencing symptoms: 3 WEEKS     Have you had these symptoms before:    [x] Yes  [] No    Have you been treated for these symptoms before:   [x] Yes  [] No    If a prescription is needed, what is your preferred pharmacy and phone number: [facility] [address] [address] [facility] [phone] [facility] [phone]      Additional notes: PATIENT STATES SHE HAS BEEN COUGHING  FOR 3 MONTHS NOW SOMETIMES SO HARD SHE VOMITS. THE LITTLE PEARLS DON'T HELP HER AND NOW HER  IS COUGHING AS WELL. SHE HAS BEEN TO THE ER AND BEEN SEEN BY SHAYLEE. PLEASE SEND SOMETHING IN THAT WILL HELP ASAP.

## 2023-02-24 ENCOUNTER — TELEPHONE (OUTPATIENT)
Dept: FAMILY MEDICINE CLINIC | Facility: CLINIC | Age: 68
End: 2023-02-24

## 2023-02-24 NOTE — TELEPHONE ENCOUNTER
Caller: Andree Deluna    Relationship: Self    Best call back number: [phone]    What medication are you requesting: COUGH MEDICATION/SYRUP     What are your current symptoms: COUGH    How long have you been experiencing symptoms: FEW WEEKS, OFF AND ON    Have you had these symptoms before:  [x] Yes  [] No    Have you been treated for these symptoms before:   [x] Yes  [] No    If a prescription is needed, what is your preferred pharmacy and phone number: [place] [id number] [facility] [address] [facility] [phone] [facility] [phone] FX     Additional notes:      TESSALON COUGH PERLES DO NOT HELP AT ALL. THIS IS WHAT WAS CALLED IN FOR HER AND THEY JUST DON'THELP.  PATIENT IS HOPING THIS CAN GET CALLED IN RIGHT AWAY.  HER GRANDDAUGHTER HAS HER HOME COMING TONIGHT AND SHE WOULD LOVE TO BE PRESENT, SUPER IMPORTANT TO HER    PLEASE ADVISE

## 2023-02-28 RX ORDER — DEXTROMETHORPHAN HYDROBROMIDE AND PROMETHAZINE HYDROCHLORIDE 15; 6.25 MG/5ML; MG/5ML
5 SYRUP ORAL 4 TIMES DAILY PRN
Qty: 240 ML | Refills: 0 | Status: SHIPPED | OUTPATIENT
Start: 2023-02-28

## 2023-03-06 ENCOUNTER — TELEPHONE (OUTPATIENT)
Dept: FAMILY MEDICINE CLINIC | Facility: CLINIC | Age: 68
End: 2023-03-06
Payer: MEDICARE

## 2023-03-06 NOTE — TELEPHONE ENCOUNTER
Caller: Andree Deluna    Relationship: Self    Best call back number: [phone] (Mobile)      What was the call regarding: WANTED TO SPEAK WITH CHIQUITA REGARDING SOME PRESCRIPTIONS THAT ARE RESTRICTED AT HER PHARMACY     ALPRAZOLAM RX       Do you require a callback: YES PLEASE

## 2023-03-07 ENCOUNTER — TELEPHONE (OUTPATIENT)
Dept: FAMILY MEDICINE CLINIC | Facility: CLINIC | Age: 68
End: 2023-03-07
Payer: MEDICARE

## 2023-03-07 DIAGNOSIS — F41.9 ANXIETY: ICD-10-CM

## 2023-03-07 NOTE — TELEPHONE ENCOUNTER
Advised patient that we did not deny her xanax that her pharmacy was the one restricting it. LY                  Patient called because her xanax was denied. Patient states she has been taking this medication for over 10 years.[phone] Yvan kim

## 2023-03-07 NOTE — TELEPHONE ENCOUNTER
Patient said she was returning a call to Shelbie.  Patient isn't sure what Shelbie wanted.  If Shelbie cannot reach patient at [phone] then call her  Mike at [phone].

## 2023-03-09 RX ORDER — ALPRAZOLAM 2 MG/1
TABLET ORAL
Qty: 30 TABLET | Refills: 0 | Status: SHIPPED | OUTPATIENT
Start: 2023-03-09 | End: 2023-03-20

## 2023-03-16 ENCOUNTER — OFFICE VISIT (OUTPATIENT)
Dept: ORTHOPEDIC SURGERY | Facility: CLINIC | Age: 68
End: 2023-03-16
Payer: MEDICARE

## 2023-03-16 VITALS — BODY MASS INDEX: 33.75 KG/M2 | WEIGHT: 210 LBS | HEIGHT: 66 IN | OXYGEN SATURATION: 98 %

## 2023-03-16 DIAGNOSIS — M19.012 OSTEOARTHRITIS OF LEFT GLENOHUMERAL JOINT: Primary | ICD-10-CM

## 2023-03-16 RX ADMIN — TRIAMCINOLONE ACETONIDE 80 MG: 40 INJECTION, SUSPENSION INTRA-ARTICULAR; INTRAMUSCULAR at 03:20

## 2023-03-16 NOTE — PROGRESS NOTES
"   Patient ID: Andree Deluna is a 67 y.o. female is presenting with her , \"Shai\" Mike, for evaluation of chronic left shoulder pain for years.  She is complaining of left shoulder pain and decreased range of motion due to this pain.  She wishes to have a subacromial/intra-articular shoulder injection repeated today.    Objective:  Ht 167.6 cm (65.98\")   Wt 95.3 kg (210 lb)   SpO2 98%   BMI 33.92 kg/m²     Physical Examination:  Right shoulder:  Intact skin.  No effusion  Active fwd flexion 65, abduction 45, ER 20    Imaging:   XR Shoulder 2+ View Left (05/16/2022 14:12)  left Shoulder X-Ray  Indication: Left shoulder pain no trauma  AP Y and Lateral views  Findings: Mild joint space narrowing with degenerative changes of the glenoid  no bony lesion  Soft tissues normal  decreased joint spaces  Hardware appropriately positioned not applicable    Assessment:    Diagnoses and all orders for this visit:    1. Osteoarthritis of left glenohumeral joint (Primary)      Plan: I recommend subacromial and intraarticular steroid injection to the left shoulder today. Shoulder exercises provided today. Perform 3-5 times weekly. Follow up as needed.       Risks and benefits of the injection were discussed. Under sterile technique and written consent I injected 80mg of Kenalog and 2cc of 1% Lidocaine plain into the shoulder and subacromial space. It was well tolerated. Postinjection instructions were given.    Large Joint Arthrocentesis: L subacromial bursa  Date/Time: 3/16/2023 10:36 AM  Consent given by: patient  Timeout: Immediately prior to procedure a time out was called to verify the correct patient, procedure, equipment, support staff and site/side marked as required   Supporting Documentation  Indications: pain   Procedure Details  Location: shoulder - L subacromial bursa  Needle size: 25 G  Approach: posterior  Medications administered: 80 mg triamcinolone acetonide 40 MG/ML; 2 mL lidocaine PF 1% 1 " %  Patient tolerance: patient tolerated the procedure well with no immediate complications            Disclaimer: Part of this note may be an electronic transcription/translation of spoken language to printed text using the Dragon Dictation System

## 2023-03-17 DIAGNOSIS — F41.9 ANXIETY: ICD-10-CM

## 2023-03-17 RX ORDER — TRIAMCINOLONE ACETONIDE 40 MG/ML
80 INJECTION, SUSPENSION INTRA-ARTICULAR; INTRAMUSCULAR ONCE
Status: COMPLETED | OUTPATIENT
Start: 2023-03-17 | End: 2023-03-16

## 2023-03-19 RX ORDER — LIDOCAINE HYDROCHLORIDE 10 MG/ML
2 INJECTION, SOLUTION EPIDURAL; INFILTRATION; INTRACAUDAL; PERINEURAL
Status: COMPLETED | OUTPATIENT
Start: 2023-03-19 | End: 2023-03-19

## 2023-03-19 RX ORDER — TRIAMCINOLONE ACETONIDE 40 MG/ML
80 INJECTION, SUSPENSION INTRA-ARTICULAR; INTRAMUSCULAR
Status: COMPLETED | OUTPATIENT
Start: 2023-03-19 | End: 2023-03-19

## 2023-03-19 RX ADMIN — TRIAMCINOLONE ACETONIDE 80 MG: 40 INJECTION, SUSPENSION INTRA-ARTICULAR; INTRAMUSCULAR at 10:36

## 2023-03-19 RX ADMIN — LIDOCAINE HYDROCHLORIDE 2 ML: 10 INJECTION, SOLUTION EPIDURAL; INFILTRATION; INTRACAUDAL; PERINEURAL at 10:36

## 2023-03-20 RX ORDER — ALPRAZOLAM 2 MG/1
TABLET ORAL
Qty: 30 TABLET | Refills: 1 | Status: SHIPPED | OUTPATIENT
Start: 2023-03-20

## 2023-03-22 DIAGNOSIS — F41.9 ANXIETY: ICD-10-CM

## 2023-03-23 RX ORDER — ALPRAZOLAM 2 MG/1
TABLET ORAL
Qty: 30 TABLET | Refills: 0 | OUTPATIENT
Start: 2023-03-23

## 2023-04-05 DIAGNOSIS — F41.9 ANXIETY: ICD-10-CM

## 2023-04-05 RX ORDER — ALPRAZOLAM 2 MG/1
TABLET ORAL
Qty: 30 TABLET | Refills: 0 | OUTPATIENT
Start: 2023-04-05

## 2023-04-09 DIAGNOSIS — F41.9 ANXIETY: ICD-10-CM

## 2023-04-10 RX ORDER — ALPRAZOLAM 2 MG/1
TABLET ORAL
Qty: 30 TABLET | Refills: 0 | Status: SHIPPED | OUTPATIENT
Start: 2023-04-10

## 2023-04-20 RX ORDER — OMEPRAZOLE 40 MG/1
CAPSULE, DELAYED RELEASE ORAL
Qty: 90 CAPSULE | Refills: 1 | Status: SHIPPED | OUTPATIENT
Start: 2023-04-20

## 2023-04-30 ENCOUNTER — HOSPITAL ENCOUNTER (INPATIENT)
Facility: HOSPITAL | Age: 68
LOS: 13 days | Discharge: HOME | DRG: 217 | End: 2023-05-13
Attending: EMERGENCY MEDICINE | Admitting: STUDENT IN AN ORGANIZED HEALTH CARE EDUCATION/TRAINING PROGRAM
Payer: MEDICARE

## 2023-04-30 ENCOUNTER — APPOINTMENT (OUTPATIENT)
Dept: GENERAL RADIOLOGY | Facility: HOSPITAL | Age: 68
DRG: 217 | End: 2023-04-30
Payer: MEDICARE

## 2023-04-30 DIAGNOSIS — Z86.73 HISTORY OF STROKE: ICD-10-CM

## 2023-04-30 DIAGNOSIS — I10 PRIMARY HYPERTENSION: ICD-10-CM

## 2023-04-30 DIAGNOSIS — F41.9 ANXIETY: ICD-10-CM

## 2023-04-30 DIAGNOSIS — E66.09 CLASS 1 OBESITY DUE TO EXCESS CALORIES WITH SERIOUS COMORBIDITY AND BODY MASS INDEX (BMI) OF 32.0 TO 32.9 IN ADULT: ICD-10-CM

## 2023-04-30 DIAGNOSIS — R93.1 ABNORMAL FINDINGS ON DIAGNOSTIC IMAGING OF HEART AND CORONARY CIRCULATION: ICD-10-CM

## 2023-04-30 DIAGNOSIS — R77.8 ELEVATED TROPONIN: ICD-10-CM

## 2023-04-30 DIAGNOSIS — I25.110 CORONARY ARTERY DISEASE INVOLVING NATIVE CORONARY ARTERY OF NATIVE HEART WITH UNSTABLE ANGINA PECTORIS: ICD-10-CM

## 2023-04-30 DIAGNOSIS — R07.9 CHEST PAIN, UNSPECIFIED TYPE: ICD-10-CM

## 2023-04-30 DIAGNOSIS — Z95.1 S/P CABG X 3: ICD-10-CM

## 2023-04-30 DIAGNOSIS — I21.4 NON-STEMI (NON-ST ELEVATED MYOCARDIAL INFARCTION): Primary | ICD-10-CM

## 2023-04-30 DIAGNOSIS — E78.5 DYSLIPIDEMIA: ICD-10-CM

## 2023-04-30 LAB
ALBUMIN SERPL-MCNC: 4.1 G/DL (ref 3.5–5.2)
ALBUMIN/GLOB SERPL: 1.5 G/DL
ALP SERPL-CCNC: 103 U/L (ref 39–117)
ALT SERPL W P-5'-P-CCNC: 19 U/L (ref 1–33)
ANION GAP SERPL CALCULATED.3IONS-SCNC: 13 MMOL/L (ref 5–15)
APTT PPP: 25.9 SECONDS (ref 61–76.5)
AST SERPL-CCNC: 24 U/L (ref 1–32)
BASOPHILS # BLD AUTO: 0 10*3/MM3 (ref 0–0.2)
BASOPHILS NFR BLD AUTO: 0.2 % (ref 0–1.5)
BILIRUB SERPL-MCNC: 0.2 MG/DL (ref 0–1.2)
BUN SERPL-MCNC: 16 MG/DL (ref 8–23)
BUN/CREAT SERPL: 13.9 (ref 7–25)
CALCIUM SPEC-SCNC: 9.5 MG/DL (ref 8.6–10.5)
CHLORIDE SERPL-SCNC: 105 MMOL/L (ref 98–107)
CO2 SERPL-SCNC: 27 MMOL/L (ref 22–29)
CREAT SERPL-MCNC: 1.15 MG/DL (ref 0.57–1)
DEPRECATED RDW RBC AUTO: 43.8 FL (ref 37–54)
EGFRCR SERPLBLD CKD-EPI 2021: 53 ML/MIN/1.73
EOSINOPHIL # BLD AUTO: 0.1 10*3/MM3 (ref 0–0.4)
EOSINOPHIL NFR BLD AUTO: 1.4 % (ref 0.3–6.2)
ERYTHROCYTE [DISTWIDTH] IN BLOOD BY AUTOMATED COUNT: 14 % (ref 12.3–15.4)
GLOBULIN UR ELPH-MCNC: 2.7 GM/DL
GLUCOSE SERPL-MCNC: 107 MG/DL (ref 65–99)
HCT VFR BLD AUTO: 39.5 % (ref 34–46.6)
HGB BLD-MCNC: 13 G/DL (ref 12–15.9)
INR PPP: 0.95 (ref 0.93–1.1)
LYMPHOCYTES # BLD AUTO: 3.1 10*3/MM3 (ref 0.7–3.1)
LYMPHOCYTES NFR BLD AUTO: 34.4 % (ref 19.6–45.3)
MCH RBC QN AUTO: 29.7 PG (ref 26.6–33)
MCHC RBC AUTO-ENTMCNC: 32.9 G/DL (ref 31.5–35.7)
MCV RBC AUTO: 90.4 FL (ref 79–97)
MONOCYTES # BLD AUTO: 0.6 10*3/MM3 (ref 0.1–0.9)
MONOCYTES NFR BLD AUTO: 6.3 % (ref 5–12)
NEUTROPHILS NFR BLD AUTO: 5.2 10*3/MM3 (ref 1.7–7)
NEUTROPHILS NFR BLD AUTO: 57.7 % (ref 42.7–76)
NRBC BLD AUTO-RTO: 0 /100 WBC (ref 0–0.2)
PLATELET # BLD AUTO: 260 10*3/MM3 (ref 140–450)
PMV BLD AUTO: 8.9 FL (ref 6–12)
POTASSIUM SERPL-SCNC: 3.6 MMOL/L (ref 3.5–5.2)
PROT SERPL-MCNC: 6.8 G/DL (ref 6–8.5)
PROTHROMBIN TIME: 10.2 SECONDS (ref 9.6–11.7)
RBC # BLD AUTO: 4.37 10*6/MM3 (ref 3.77–5.28)
SODIUM SERPL-SCNC: 145 MMOL/L (ref 136–145)
TROPONIN T SERPL HS-MCNC: 54 NG/L
WBC NRBC COR # BLD: 8.9 10*3/MM3 (ref 3.4–10.8)

## 2023-04-30 PROCEDURE — 85730 THROMBOPLASTIN TIME PARTIAL: CPT | Performed by: EMERGENCY MEDICINE

## 2023-04-30 PROCEDURE — 83036 HEMOGLOBIN GLYCOSYLATED A1C: CPT | Performed by: NURSE PRACTITIONER

## 2023-04-30 PROCEDURE — 80053 COMPREHEN METABOLIC PANEL: CPT | Performed by: EMERGENCY MEDICINE

## 2023-04-30 PROCEDURE — 84443 ASSAY THYROID STIM HORMONE: CPT | Performed by: NURSE PRACTITIONER

## 2023-04-30 PROCEDURE — 85610 PROTHROMBIN TIME: CPT | Performed by: EMERGENCY MEDICINE

## 2023-04-30 PROCEDURE — 71045 X-RAY EXAM CHEST 1 VIEW: CPT

## 2023-04-30 PROCEDURE — 85025 COMPLETE CBC W/AUTO DIFF WBC: CPT | Performed by: EMERGENCY MEDICINE

## 2023-04-30 PROCEDURE — 84484 ASSAY OF TROPONIN QUANT: CPT | Performed by: EMERGENCY MEDICINE

## 2023-04-30 PROCEDURE — 93005 ELECTROCARDIOGRAM TRACING: CPT

## 2023-04-30 PROCEDURE — 99284 EMERGENCY DEPT VISIT MOD MDM: CPT

## 2023-04-30 RX ORDER — CALCIUM CARBONATE 200(500)MG
2 TABLET,CHEWABLE ORAL 2 TIMES DAILY PRN
Status: DISCONTINUED | OUTPATIENT
Start: 2023-04-30 | End: 2023-05-06

## 2023-04-30 RX ORDER — ACETAMINOPHEN 650 MG/1
650 SUPPOSITORY RECTAL EVERY 4 HOURS PRN
Status: DISCONTINUED | OUTPATIENT
Start: 2023-04-30 | End: 2023-05-06

## 2023-04-30 RX ORDER — MAGNESIUM SULFATE HEPTAHYDRATE 40 MG/ML
2 INJECTION, SOLUTION INTRAVENOUS AS NEEDED
Status: DISCONTINUED | OUTPATIENT
Start: 2023-04-30 | End: 2023-05-06

## 2023-04-30 RX ORDER — CHOLECALCIFEROL (VITAMIN D3) 125 MCG
5 CAPSULE ORAL NIGHTLY PRN
Status: DISCONTINUED | OUTPATIENT
Start: 2023-04-30 | End: 2023-05-06

## 2023-04-30 RX ORDER — ALUMINA, MAGNESIA, AND SIMETHICONE 2400; 2400; 240 MG/30ML; MG/30ML; MG/30ML
15 SUSPENSION ORAL EVERY 6 HOURS PRN
Status: DISCONTINUED | OUTPATIENT
Start: 2023-04-30 | End: 2023-05-06

## 2023-04-30 RX ORDER — ACETAMINOPHEN 160 MG/5ML
650 SOLUTION ORAL EVERY 4 HOURS PRN
Status: DISCONTINUED | OUTPATIENT
Start: 2023-04-30 | End: 2023-05-06

## 2023-04-30 RX ORDER — SODIUM CHLORIDE 0.9 % (FLUSH) 0.9 %
10 SYRINGE (ML) INJECTION AS NEEDED
Status: DISCONTINUED | OUTPATIENT
Start: 2023-04-30 | End: 2023-05-06

## 2023-04-30 RX ORDER — NITROGLYCERIN 0.4 MG/1
0.4 TABLET SUBLINGUAL
Status: DISCONTINUED | OUTPATIENT
Start: 2023-04-30 | End: 2023-05-06

## 2023-04-30 RX ORDER — SODIUM CHLORIDE 0.9 % (FLUSH) 0.9 %
10 SYRINGE (ML) INJECTION EVERY 12 HOURS SCHEDULED
Status: DISCONTINUED | OUTPATIENT
Start: 2023-04-30 | End: 2023-05-06

## 2023-04-30 RX ORDER — ONDANSETRON 4 MG/1
4 TABLET, FILM COATED ORAL EVERY 6 HOURS PRN
Status: DISCONTINUED | OUTPATIENT
Start: 2023-04-30 | End: 2023-05-06

## 2023-04-30 RX ORDER — SODIUM CHLORIDE 9 MG/ML
40 INJECTION, SOLUTION INTRAVENOUS AS NEEDED
Status: DISCONTINUED | OUTPATIENT
Start: 2023-04-30 | End: 2023-05-06

## 2023-04-30 RX ORDER — ONDANSETRON 2 MG/ML
4 INJECTION INTRAMUSCULAR; INTRAVENOUS EVERY 6 HOURS PRN
Status: DISCONTINUED | OUTPATIENT
Start: 2023-04-30 | End: 2023-05-06

## 2023-04-30 RX ORDER — ONDANSETRON 2 MG/ML
4 INJECTION INTRAMUSCULAR; INTRAVENOUS ONCE
Status: DISCONTINUED | OUTPATIENT
Start: 2023-04-30 | End: 2023-05-06

## 2023-04-30 RX ORDER — ACETAMINOPHEN 325 MG/1
650 TABLET ORAL EVERY 4 HOURS PRN
Status: DISCONTINUED | OUTPATIENT
Start: 2023-04-30 | End: 2023-05-06

## 2023-04-30 RX ORDER — POTASSIUM CHLORIDE 20 MEQ/1
40 TABLET, EXTENDED RELEASE ORAL AS NEEDED
Status: DISCONTINUED | OUTPATIENT
Start: 2023-04-30 | End: 2023-05-06

## 2023-04-30 RX ORDER — POTASSIUM CHLORIDE 1.5 G/1.77G
40 POWDER, FOR SOLUTION ORAL AS NEEDED
Status: DISCONTINUED | OUTPATIENT
Start: 2023-04-30 | End: 2023-05-06

## 2023-04-30 RX ORDER — AMOXICILLIN 250 MG
1 CAPSULE ORAL NIGHTLY PRN
Status: DISCONTINUED | OUTPATIENT
Start: 2023-04-30 | End: 2023-05-06

## 2023-04-30 RX ORDER — MAGNESIUM SULFATE HEPTAHYDRATE 40 MG/ML
4 INJECTION, SOLUTION INTRAVENOUS AS NEEDED
Status: DISCONTINUED | OUTPATIENT
Start: 2023-04-30 | End: 2023-05-06

## 2023-04-30 RX ADMIN — NITROGLYCERIN 0.4 MG: 0.4 TABLET SUBLINGUAL at 22:15

## 2023-04-30 NOTE — Clinical Note
Prepped: right groin. Prepped with: ChloraPrep. The site was clipped. The patient was draped in a sterile fashion. Zpack ok.

## 2023-05-01 PROBLEM — R77.8 ELEVATED TROPONIN: Chronic | Status: ACTIVE | Noted: 2023-04-30

## 2023-05-01 PROBLEM — R79.89 ELEVATED TROPONIN: Chronic | Status: ACTIVE | Noted: 2023-04-30

## 2023-05-01 PROBLEM — R79.89 ELEVATED TROPONIN: Status: ACTIVE | Noted: 2023-04-30

## 2023-05-01 PROBLEM — I21.4 NON-STEMI (NON-ST ELEVATED MYOCARDIAL INFARCTION): Status: ACTIVE | Noted: 2023-04-30

## 2023-05-01 PROBLEM — R77.8 ELEVATED TROPONIN: Status: ACTIVE | Noted: 2023-04-30

## 2023-05-01 PROBLEM — Z86.73 HISTORY OF STROKE: Chronic | Status: ACTIVE | Noted: 2020-11-10

## 2023-05-01 PROBLEM — I21.4 NON-STEMI (NON-ST ELEVATED MYOCARDIAL INFARCTION): Chronic | Status: ACTIVE | Noted: 2023-04-30

## 2023-05-01 LAB
ANION GAP SERPL CALCULATED.3IONS-SCNC: 10 MMOL/L (ref 5–15)
BASOPHILS # BLD AUTO: 0.2 10*3/MM3 (ref 0–0.2)
BASOPHILS NFR BLD AUTO: 1.7 % (ref 0–1.5)
BUN SERPL-MCNC: 17 MG/DL (ref 8–23)
BUN/CREAT SERPL: 17.9 (ref 7–25)
CALCIUM SPEC-SCNC: 9 MG/DL (ref 8.6–10.5)
CHLORIDE SERPL-SCNC: 105 MMOL/L (ref 98–107)
CO2 SERPL-SCNC: 27 MMOL/L (ref 22–29)
CREAT SERPL-MCNC: 0.95 MG/DL (ref 0.57–1)
DEPRECATED RDW RBC AUTO: 45.5 FL (ref 37–54)
EGFRCR SERPLBLD CKD-EPI 2021: 66.6 ML/MIN/1.73
EOSINOPHIL # BLD AUTO: 0.2 10*3/MM3 (ref 0–0.4)
EOSINOPHIL NFR BLD AUTO: 1.6 % (ref 0.3–6.2)
ERYTHROCYTE [DISTWIDTH] IN BLOOD BY AUTOMATED COUNT: 14.2 % (ref 12.3–15.4)
GEN 5 2HR TROPONIN T REFLEX: 120 NG/L
GLUCOSE SERPL-MCNC: 115 MG/DL (ref 65–99)
HBA1C MFR BLD: 5.8 % (ref 4.8–5.6)
HCT VFR BLD AUTO: 38.3 % (ref 34–46.6)
HGB BLD-MCNC: 12.3 G/DL (ref 12–15.9)
LYMPHOCYTES # BLD AUTO: 3.3 10*3/MM3 (ref 0.7–3.1)
LYMPHOCYTES NFR BLD AUTO: 33.8 % (ref 19.6–45.3)
MAGNESIUM SERPL-MCNC: 2.2 MG/DL (ref 1.6–2.4)
MCH RBC QN AUTO: 29.5 PG (ref 26.6–33)
MCHC RBC AUTO-ENTMCNC: 32 G/DL (ref 31.5–35.7)
MCV RBC AUTO: 92.3 FL (ref 79–97)
MONOCYTES # BLD AUTO: 0.6 10*3/MM3 (ref 0.1–0.9)
MONOCYTES NFR BLD AUTO: 5.7 % (ref 5–12)
NEUTROPHILS NFR BLD AUTO: 5.5 10*3/MM3 (ref 1.7–7)
NEUTROPHILS NFR BLD AUTO: 57.2 % (ref 42.7–76)
NRBC BLD AUTO-RTO: 0.1 /100 WBC (ref 0–0.2)
PLATELET # BLD AUTO: 245 10*3/MM3 (ref 140–450)
PMV BLD AUTO: 8.9 FL (ref 6–12)
POTASSIUM SERPL-SCNC: 3.4 MMOL/L (ref 3.5–5.2)
QT INTERVAL: 408 MS
RBC # BLD AUTO: 4.15 10*6/MM3 (ref 3.77–5.28)
SODIUM SERPL-SCNC: 142 MMOL/L (ref 136–145)
TROPONIN T DELTA: 66 NG/L
TSH SERPL DL<=0.05 MIU/L-ACNC: 1.88 UIU/ML (ref 0.27–4.2)
WBC NRBC COR # BLD: 9.7 10*3/MM3 (ref 3.4–10.8)

## 2023-05-01 PROCEDURE — C1760 CLOSURE DEV, VASC: HCPCS | Performed by: INTERNAL MEDICINE

## 2023-05-01 PROCEDURE — 36415 COLL VENOUS BLD VENIPUNCTURE: CPT | Performed by: NURSE PRACTITIONER

## 2023-05-01 PROCEDURE — C1894 INTRO/SHEATH, NON-LASER: HCPCS | Performed by: INTERNAL MEDICINE

## 2023-05-01 PROCEDURE — 25010000002 FENTANYL CITRATE (PF) 100 MCG/2ML SOLUTION: Performed by: INTERNAL MEDICINE

## 2023-05-01 PROCEDURE — 25010000002 MIDAZOLAM PER 1 MG: Performed by: INTERNAL MEDICINE

## 2023-05-01 PROCEDURE — B2151ZZ FLUOROSCOPY OF LEFT HEART USING LOW OSMOLAR CONTRAST: ICD-10-PCS | Performed by: INTERNAL MEDICINE

## 2023-05-01 PROCEDURE — G0378 HOSPITAL OBSERVATION PER HR: HCPCS

## 2023-05-01 PROCEDURE — 4A023N7 MEASUREMENT OF CARDIAC SAMPLING AND PRESSURE, LEFT HEART, PERCUTANEOUS APPROACH: ICD-10-PCS | Performed by: INTERNAL MEDICINE

## 2023-05-01 PROCEDURE — 0 LIDOCAINE 1 % SOLUTION: Performed by: INTERNAL MEDICINE

## 2023-05-01 PROCEDURE — 93005 ELECTROCARDIOGRAM TRACING: CPT | Performed by: NURSE PRACTITIONER

## 2023-05-01 PROCEDURE — C1769 GUIDE WIRE: HCPCS | Performed by: INTERNAL MEDICINE

## 2023-05-01 PROCEDURE — 83735 ASSAY OF MAGNESIUM: CPT | Performed by: NURSE PRACTITIONER

## 2023-05-01 PROCEDURE — 99152 MOD SED SAME PHYS/QHP 5/>YRS: CPT | Performed by: INTERNAL MEDICINE

## 2023-05-01 PROCEDURE — 85025 COMPLETE CBC W/AUTO DIFF WBC: CPT | Performed by: NURSE PRACTITIONER

## 2023-05-01 PROCEDURE — 84484 ASSAY OF TROPONIN QUANT: CPT | Performed by: EMERGENCY MEDICINE

## 2023-05-01 PROCEDURE — 93459 L HRT ART/GRFT ANGIO: CPT | Performed by: INTERNAL MEDICINE

## 2023-05-01 PROCEDURE — 99222 1ST HOSP IP/OBS MODERATE 55: CPT | Performed by: INTERNAL MEDICINE

## 2023-05-01 PROCEDURE — 25510000001 IOPAMIDOL PER 1 ML: Performed by: INTERNAL MEDICINE

## 2023-05-01 PROCEDURE — 93010 ELECTROCARDIOGRAM REPORT: CPT | Performed by: INTERNAL MEDICINE

## 2023-05-01 PROCEDURE — B2111ZZ FLUOROSCOPY OF MULTIPLE CORONARY ARTERIES USING LOW OSMOLAR CONTRAST: ICD-10-PCS | Performed by: INTERNAL MEDICINE

## 2023-05-01 PROCEDURE — 80048 BASIC METABOLIC PNL TOTAL CA: CPT | Performed by: NURSE PRACTITIONER

## 2023-05-01 RX ORDER — SODIUM CHLORIDE 9 MG/ML
100 INJECTION, SOLUTION INTRAVENOUS CONTINUOUS
Status: DISCONTINUED | OUTPATIENT
Start: 2023-05-01 | End: 2023-05-05

## 2023-05-01 RX ORDER — VILAZODONE HYDROCHLORIDE 40 MG/1
20 TABLET ORAL DAILY
Status: DISCONTINUED | OUTPATIENT
Start: 2023-05-01 | End: 2023-05-01

## 2023-05-01 RX ORDER — ASPIRIN 81 MG/1
81 TABLET, CHEWABLE ORAL DAILY
Status: DISCONTINUED | OUTPATIENT
Start: 2023-05-01 | End: 2023-05-06

## 2023-05-01 RX ORDER — LIDOCAINE HYDROCHLORIDE 10 MG/ML
INJECTION, SOLUTION INFILTRATION; PERINEURAL
Status: DISCONTINUED | OUTPATIENT
Start: 2023-05-01 | End: 2023-05-01 | Stop reason: HOSPADM

## 2023-05-01 RX ORDER — AMLODIPINE BESYLATE 5 MG/1
5 TABLET ORAL DAILY
Status: DISCONTINUED | OUTPATIENT
Start: 2023-05-02 | End: 2023-05-06

## 2023-05-01 RX ORDER — BUPROPION HYDROCHLORIDE 150 MG/1
150 TABLET ORAL DAILY
Status: DISCONTINUED | OUTPATIENT
Start: 2023-05-01 | End: 2023-05-01

## 2023-05-01 RX ORDER — LABETALOL HYDROCHLORIDE 5 MG/ML
INJECTION, SOLUTION INTRAVENOUS
Status: DISCONTINUED | OUTPATIENT
Start: 2023-05-01 | End: 2023-05-01 | Stop reason: HOSPADM

## 2023-05-01 RX ORDER — SODIUM CHLORIDE 9 MG/ML
INJECTION, SOLUTION INTRAVENOUS
Status: COMPLETED | OUTPATIENT
Start: 2023-05-01 | End: 2023-05-01

## 2023-05-01 RX ORDER — ALPRAZOLAM 1 MG/1
2 TABLET ORAL NIGHTLY PRN
Status: DISCONTINUED | OUTPATIENT
Start: 2023-05-01 | End: 2023-05-06

## 2023-05-01 RX ORDER — LAMOTRIGINE 25 MG/1
100 TABLET ORAL 2 TIMES DAILY
Status: DISCONTINUED | OUTPATIENT
Start: 2023-05-01 | End: 2023-05-01

## 2023-05-01 RX ORDER — ATORVASTATIN CALCIUM 40 MG/1
80 TABLET, FILM COATED ORAL NIGHTLY
Status: DISCONTINUED | OUTPATIENT
Start: 2023-05-01 | End: 2023-05-06

## 2023-05-01 RX ORDER — OXYBUTYNIN CHLORIDE 5 MG/1
5 TABLET ORAL DAILY
Status: DISCONTINUED | OUTPATIENT
Start: 2023-05-01 | End: 2023-05-06

## 2023-05-01 RX ORDER — PANTOPRAZOLE SODIUM 40 MG/1
40 TABLET, DELAYED RELEASE ORAL
Status: DISCONTINUED | OUTPATIENT
Start: 2023-05-01 | End: 2023-05-06

## 2023-05-01 RX ORDER — METOPROLOL SUCCINATE 25 MG/1
25 TABLET, EXTENDED RELEASE ORAL DAILY
Status: DISCONTINUED | OUTPATIENT
Start: 2023-05-01 | End: 2023-05-06

## 2023-05-01 RX ORDER — LISINOPRIL 20 MG/1
20 TABLET ORAL
Status: DISCONTINUED | OUTPATIENT
Start: 2023-05-01 | End: 2023-05-06

## 2023-05-01 RX ORDER — FENTANYL CITRATE 50 UG/ML
INJECTION, SOLUTION INTRAMUSCULAR; INTRAVENOUS
Status: DISCONTINUED | OUTPATIENT
Start: 2023-05-01 | End: 2023-05-01 | Stop reason: HOSPADM

## 2023-05-01 RX ORDER — MIDAZOLAM HYDROCHLORIDE 1 MG/ML
INJECTION INTRAMUSCULAR; INTRAVENOUS
Status: DISCONTINUED | OUTPATIENT
Start: 2023-05-01 | End: 2023-05-01 | Stop reason: HOSPADM

## 2023-05-01 RX ORDER — AMLODIPINE BESYLATE 2.5 MG/1
2.5 TABLET ORAL DAILY
Status: DISCONTINUED | OUTPATIENT
Start: 2023-05-01 | End: 2023-05-01

## 2023-05-01 RX ORDER — SODIUM CHLORIDE 9 MG/ML
1 INJECTION, SOLUTION INTRAVENOUS CONTINUOUS
Status: DISPENSED | OUTPATIENT
Start: 2023-05-01 | End: 2023-05-02

## 2023-05-01 RX ORDER — HYDROCHLOROTHIAZIDE 25 MG/1
25 TABLET ORAL
Status: DISCONTINUED | OUTPATIENT
Start: 2023-05-01 | End: 2023-05-01

## 2023-05-01 RX ORDER — ACETAMINOPHEN 325 MG/1
650 TABLET ORAL EVERY 4 HOURS PRN
Status: DISCONTINUED | OUTPATIENT
Start: 2023-05-01 | End: 2023-05-06

## 2023-05-01 RX ADMIN — LAMOTRIGINE 100 MG: 100 TABLET ORAL at 07:32

## 2023-05-01 RX ADMIN — SODIUM CHLORIDE 100 ML/HR: 9 INJECTION, SOLUTION INTRAVENOUS at 20:54

## 2023-05-01 RX ADMIN — OXYBUTYNIN CHLORIDE 5 MG: 5 TABLET ORAL at 07:33

## 2023-05-01 RX ADMIN — POTASSIUM CHLORIDE 40 MEQ: 1500 TABLET, EXTENDED RELEASE ORAL at 09:28

## 2023-05-01 RX ADMIN — Medication 10 ML: at 07:31

## 2023-05-01 RX ADMIN — PANTOPRAZOLE SODIUM 40 MG: 40 TABLET, DELAYED RELEASE ORAL at 05:31

## 2023-05-01 RX ADMIN — ATORVASTATIN CALCIUM 80 MG: 40 TABLET, FILM COATED ORAL at 20:46

## 2023-05-01 RX ADMIN — LISINOPRIL 20 MG: 20 TABLET ORAL at 09:28

## 2023-05-01 RX ADMIN — HYDROCHLOROTHIAZIDE 25 MG: 25 TABLET ORAL at 09:28

## 2023-05-01 RX ADMIN — ALPRAZOLAM 2 MG: 1 TABLET ORAL at 01:12

## 2023-05-01 RX ADMIN — ASPIRIN 81 MG CHEWABLE TABLET 81 MG: 81 TABLET CHEWABLE at 07:32

## 2023-05-01 RX ADMIN — Medication 10 ML: at 01:13

## 2023-05-01 RX ADMIN — ALPRAZOLAM 2 MG: 1 TABLET ORAL at 20:49

## 2023-05-01 RX ADMIN — AMLODIPINE BESYLATE 2.5 MG: 2.5 TABLET ORAL at 09:28

## 2023-05-01 RX ADMIN — Medication 10 ML: at 20:54

## 2023-05-01 RX ADMIN — METOPROLOL SUCCINATE 25 MG: 25 TABLET, EXTENDED RELEASE ORAL at 09:28

## 2023-05-01 RX ADMIN — POTASSIUM CHLORIDE 40 MEQ: 1500 TABLET, EXTENDED RELEASE ORAL at 05:31

## 2023-05-01 NOTE — ED PROVIDER NOTES
Subjective   History of Present Illness  Chief complaint: Chest pain    67-year-old female presents with chest pain.  Patient states pain is in the center of her chest and radiates to her back.  She describes it as a squeezing sensation.  She has also had pain in her left arm and neck.  She has had associated shortness of breath.  She also had an episode of vomiting prior to arrival.  Patient states symptoms started 2 or 3 hours ago.  She took aspirin prior to arrival.  She denies any alleviating or exacerbating factors.    History provided by:  Patient      Review of Systems   Constitutional: Negative for fever.   HENT: Negative for congestion.    Respiratory: Positive for shortness of breath. Negative for cough.    Cardiovascular: Positive for chest pain.   Gastrointestinal: Positive for nausea and vomiting. Negative for abdominal pain and diarrhea.   Musculoskeletal: Positive for back pain.   Neurological: Negative for headaches.       Past Medical History:   Diagnosis Date   • ADHD unknown   • Anemia    • Anxiety    • Carotid artery disease     80% right internal carotid artery   • CVA (cerebral vascular accident)     right parietal right temporal   • DDD (degenerative disc disease), lumbar    • Depression    • Extremity pain     Ankle pain   • GERD (gastroesophageal reflux disease)    • Hyperlipidemia unknown   • Hypertension    • Insomnia unknown   • Laryngopharyngeal reflux    • Low back pain    • Mood disorder    • Obesity unknown   • Skin cancer of face    • Sleep apnea     Suspected sleep apnea she has refused to be tested   • Stroke (cerebrum)    • Visual field defect     Left       No Known Allergies    Past Surgical History:   Procedure Laterality Date   • CAROTID ENDARTERECTOMY Right 11/10/2020    Procedure: CAROTID ENDARTERECTOMY;  Surgeon: Tavo Das MD;  Location[facility] OR;  Service: Vascular;  Laterality: Right;   • CHOLECYSTECTOMY     • COLONOSCOPY      2016   • FINGER SURGERY    "  • KNEE ARTHROPLASTY     • KNEE SURGERY Right     replaced   • LAPAROSCOPIC GASTRIC BANDING     • ROTATOR CUFF REPAIR Right 2019   • SHOULDER SURGERY Right 05/24/2019    scope/ cuff repair   • TUBAL ABDOMINAL LIGATION         Family History   Problem Relation Age of Onset   • Diabetes Other    • Heart disease Mother    • Diabetes Mother    • Heart disease Father        Social History     Socioeconomic History   • Marital status:    Tobacco Use   • Smoking status: Never   • Smokeless tobacco: Never   Vaping Use   • Vaping Use: Never used   Substance and Sexual Activity   • Alcohol use: No   • Drug use: No   • Sexual activity: Defer       /82   Pulse 67   Temp 97.8 °F (36.6 °C) (Oral)   Resp 16   Ht 167.6 cm (66\")   Wt 92.7 kg (204 lb 5.9 oz)   SpO2 94%   BMI 32.99 kg/m²       Objective   Physical Exam  Vitals and nursing note reviewed.   Constitutional:       Appearance: She is well-developed.   HENT:      Head: Normocephalic and atraumatic.   Cardiovascular:      Rate and Rhythm: Normal rate and regular rhythm.      Heart sounds: Normal heart sounds.   Pulmonary:      Effort: Pulmonary effort is normal. No respiratory distress.      Breath sounds: Normal breath sounds.   Abdominal:      General: Bowel sounds are normal.      Palpations: Abdomen is soft.      Tenderness: There is no abdominal tenderness.   Musculoskeletal:      Right lower leg: No tenderness. No edema.      Left lower leg: No tenderness. No edema.   Skin:     General: Skin is warm and dry.   Neurological:      Mental Status: She is alert and oriented to person, place, and time.         Procedures           ED Course      My interpretation of EKG shows sinus rhythm, rate of 74, ST depression laterally, no ST elevation     Results for orders placed or performed during the hospital encounter of 04/30/23   Comprehensive Metabolic Panel    Specimen: Blood   Result Value Ref Range    Glucose 107 (H) 65 - 99 mg/dL    BUN 16 8 - 23 mg/dL "    Creatinine 1.15 (H) 0.57 - 1.00 mg/dL    Sodium 145 136 - 145 mmol/L    Potassium 3.6 3.5 - 5.2 mmol/L    Chloride 105 98 - 107 mmol/L    CO2 27.0 22.0 - 29.0 mmol/L    Calcium 9.5 8.6 - 10.5 mg/dL    Total Protein 6.8 6.0 - 8.5 g/dL    Albumin 4.1 3.5 - 5.2 g/dL    ALT (SGPT) 19 1 - 33 U/L    AST (SGOT) 24 1 - 32 U/L    Alkaline Phosphatase 103 39 - 117 U/L    Total Bilirubin 0.2 0.0 - 1.2 mg/dL    Globulin 2.7 gm/dL    A/G Ratio 1.5 g/dL    BUN/Creatinine Ratio 13.9 7.0 - 25.0    Anion Gap 13.0 5.0 - 15.0 mmol/L    eGFR 53.0 (L) >60.0 mL/min/1.73   High Sensitivity Troponin T    Specimen: Blood   Result Value Ref Range    HS Troponin T 54 (C) <10 ng/L   CBC Auto Differential    Specimen: Blood   Result Value Ref Range    WBC 8.90 3.40 - 10.80 10*3/mm3    RBC 4.37 3.77 - 5.28 10*6/mm3    Hemoglobin 13.0 12.0 - 15.9 g/dL    Hematocrit 39.5 34.0 - 46.6 %    MCV 90.4 79.0 - 97.0 fL    MCH 29.7 26.6 - 33.0 pg    MCHC 32.9 31.5 - 35.7 g/dL    RDW 14.0 12.3 - 15.4 %    RDW-SD 43.8 37.0 - 54.0 fl    MPV 8.9 6.0 - 12.0 fL    Platelets 260 140 - 450 10*3/mm3    Neutrophil % 57.7 42.7 - 76.0 %    Lymphocyte % 34.4 19.6 - 45.3 %    Monocyte % 6.3 5.0 - 12.0 %    Eosinophil % 1.4 0.3 - 6.2 %    Basophil % 0.2 0.0 - 1.5 %    Neutrophils, Absolute 5.20 1.70 - 7.00 10*3/mm3    Lymphocytes, Absolute 3.10 0.70 - 3.10 10*3/mm3    Monocytes, Absolute 0.60 0.10 - 0.90 10*3/mm3    Eosinophils, Absolute 0.10 0.00 - 0.40 10*3/mm3    Basophils, Absolute 0.00 0.00 - 0.20 10*3/mm3    nRBC 0.0 0.0 - 0.2 /100 WBC   ECG 12 Lead Chest Pain   Result Value Ref Range    QT Interval 408 ms          My interpretation of chest x-ray shows cardiomegaly with no acute infiltrate or effusion, pending radiology review.                           Medical Decision Making  Chest pain, unspecified type: acute illness or injury  Elevated troponin: acute illness or injury  Amount and/or Complexity of Data Reviewed  Labs: ordered. Decision-making details  documented in ED Course.  Radiology: ordered and independent interpretation performed. Decision-making details documented in ED Course.  ECG/medicine tests: ordered and independent interpretation performed. Decision-making details documented in ED Course.      Risk  Prescription drug management.  Decision regarding hospitalization.         Patient had the above evaluation.  Results were discussed with the patient.  Chest x-ray shows no acute disease.  EKG shows lateral ST depressions but no STEMI criteria.  Initial troponin is elevated at 54.  CMP is unremarkable.  White blood cell count is normal.  Patient was given sublingual nitro and her symptoms have completely resolved at this time.  I discussed with the nurse practitioner on-call for the hospitalist and the patient will be admitted for further evaluation and management.      Final diagnoses:   Chest pain, unspecified type   Elevated troponin       ED Disposition  ED Disposition     ED Disposition   Decision to Admit    Condition   --    Comment   Level of Care: Telemetry [5]   Admitting Physician: DEMI REAL [[id number]]               No follow-up provider specified.       Medication List      No changes were made to your prescriptions during this visit.          Jomar Mejia MD  04/30/23 [id number]

## 2023-05-01 NOTE — H&P
[facility] Medicine Services  History & Physical    Patient Name: Andree Deluna  : 1955  MRN: [id number]  Primary Care Physician:  Naga Griffith PA-C  Date of admission: 2023  Date and Time of Service: 2023 at 0008    Subjective      Chief Complaint:     History of Present Illness: Andree Deluna is a 67 y.o. female with past medical history of stroke, hypertension, hyperlipidemia, anxiety who presented to [facility] on 2023 complaining of intermittent midsternal chest pressure that has been ongoing for several months, but was progressively worse on 2023 after eating dinner.  Chest pain was alleviated after sublingual nitro given in the ED.  Aggravated by exertion and better with rest.  Patient complains of radiation between her shoulder blades.  Associated symptoms of lightheadedness, shortness of air, nausea, vomiting.  She denies any recent medication changes.  She has a history of left shoulder arthritis, and complains of left shoulder and arm pain.  She denies any cardiac history, however states both her mother and father had heart attacks at a young age.    In the ED, chest x-ray pending.  EKG showed ST depression.  Initial high speed troponin 54.  All other labs unremarkable.  All vital signs unremarkable except /96.  Patient admitted to hospitalist service for further evaluation and treatment.    Review of Systems   Constitutional: Negative.   HENT: Negative.    Eyes: Negative.    Cardiovascular: Positive for chest pain and dyspnea on exertion. Negative for syncope.   Respiratory: Negative.    Endocrine: Negative.    Skin: Negative.    Musculoskeletal: Negative.    Gastrointestinal: Negative.    Genitourinary: Negative.    Neurological: Negative.    Psychiatric/Behavioral: Negative.    Allergic/Immunologic: Negative.         Personal History     Past Medical History:   Diagnosis Date   • ADHD unknown   • Anemia    • Anxiety    • Carotid artery  disease     80% right internal carotid artery   • CVA (cerebral vascular accident)     right parietal right temporal   • DDD (degenerative disc disease), lumbar    • Depression    • Extremity pain     Ankle pain   • GERD (gastroesophageal reflux disease)    • Hyperlipidemia unknown   • Hypertension    • Insomnia unknown   • Laryngopharyngeal reflux    • Low back pain    • Mood disorder    • Obesity unknown   • Skin cancer of face    • Sleep apnea     Suspected sleep apnea she has refused to be tested   • Stroke (cerebrum)    • Visual field defect     Left       Past Surgical History:   Procedure Laterality Date   • CAROTID ENDARTERECTOMY Right 11/10/2020    Procedure: CAROTID ENDARTERECTOMY;  Surgeon: Tavo Das MD;  Location: [facility] MAIN OR;  Service: Vascular;  Laterality: Right;   • CHOLECYSTECTOMY     • COLONOSCOPY      2016   • FINGER SURGERY     • KNEE ARTHROPLASTY     • KNEE SURGERY Right     replaced   • LAPAROSCOPIC GASTRIC BANDING     • ROTATOR CUFF REPAIR Right 2019   • SHOULDER SURGERY Right 05/24/2019    scope/ cuff repair   • TUBAL ABDOMINAL LIGATION         Family History: family history includes Diabetes in her mother and another family member; Heart disease in her father and mother. Otherwise pertinent FHx was reviewed and not pertinent to current issue.    Social History:  reports that she has never smoked. She has never used smokeless tobacco. She reports that she does not drink alcohol and does not use drugs.    Home Medications:  Prior to Admission Medications     Prescriptions Last Dose Informant Patient Reported? Taking?    albuterol sulfate  (90 Base) MCG/ACT inhaler   No No    Inhale 2 puffs Every 6 (Six) Hours As Needed for Shortness of Air.    Patient not taking:  Reported on 3/16/2023    albuterol sulfate  (90 Base) MCG/ACT inhaler   No No    Inhale 2 puffs Every 4 (Four) Hours As Needed for Wheezing.    Patient not taking:  Reported on 3/16/2023    ALPRAZolam  (XANAX) 2 MG tablet   No No    TAKE 1 TABLET BY MOUTH AT NIGHT AS NEEDED FOR ANXIETY    amLODIPine (NORVASC) 2.5 MG tablet   No No    TAKE ONE TABLET BY MOUTH DAILY    aspirin 81 MG chewable tablet   No No    Chew 1 tablet Daily.    atorvastatin (LIPITOR) 80 MG tablet   No No    TAKE ONE TABLET BY MOUTH ONCE NIGHTLY    buPROPion XL (WELLBUTRIN XL) 150 MG 24 hr tablet   No No    TAKE ONE TABLET BY MOUTH DAILY    lamoTRIgine (LaMICtal) 100 MG tablet   No No    TAKE ONE TABLET BY MOUTH TWICE A DAY    lisinopril-hydrochlorothiazide (PRINZIDE,ZESTORETIC) 20-25 MG per tablet   No No    TAKE ONE TABLET BY MOUTH DAILY    metoprolol succinate XL (TOPROL-XL) 25 MG 24 hr tablet   No No    TAKE ONE TABLET BY MOUTH DAILY    omeprazole (priLOSEC) 40 MG capsule   No No    TAKE ONE CAPSULE BY MOUTH DAILY    oxybutynin (DITROPAN) 5 MG tablet   No No    Take 1 tablet by mouth Daily.    promethazine-dextromethorphan (PROMETHAZINE-DM) 6.25-15 MG/5ML syrup   No No    Take 5 mL by mouth 4 (Four) Times a Day As Needed for Cough.    vilazodone (Viibryd) 20 MG tablet tablet   No No    Take 1 tablet by mouth Daily.            Allergies:  No Known Allergies    Objective      Vitals:   Temp:  [97.8 °F (36.6 °C)] 97.8 °F (36.6 °C)  Heart Rate:  [61-85] 67  Resp:  [16] 16  BP: (128-176)/(79-96) 128/82    Physical Exam  Vitals and nursing note reviewed.   Constitutional:       Appearance: Normal appearance. She is obese.   HENT:      Head: Normocephalic.      Right Ear: External ear normal.      Left Ear: External ear normal.      Nose: Nose normal.      Mouth/Throat:      Pharynx: Oropharynx is clear.   Eyes:      Extraocular Movements: Extraocular movements intact.   Cardiovascular:      Rate and Rhythm: Normal rate and regular rhythm.      Pulses: Normal pulses.      Heart sounds: Normal heart sounds.   Pulmonary:      Effort: Pulmonary effort is normal.      Breath sounds: Normal breath sounds.   Abdominal:      General: Bowel sounds are  normal.      Palpations: Abdomen is soft.   Musculoskeletal:         General: Normal range of motion.      Cervical back: Normal range of motion.   Skin:     General: Skin is warm and dry.   Neurological:      Mental Status: She is alert and oriented to person, place, and time.   Psychiatric:         Mood and Affect: Mood normal.         Behavior: Behavior normal.        Result Review    Result Review:  I have personally reviewed the results from the time of this admission to 5/1/2023 00:34 EDT and agree with these findings:  [x]  Laboratory  []  Microbiology  [x]  Radiology  [x]  EKG/Telemetry   []  Cardiology/Vascular   []  Pathology  []  Old records  []  Other:  Most notable findings include: as above      Assessment & Plan        Active Hospital Problems:  Active Hospital Problems    Diagnosis    • **Chest pain, unspecified type    • Chronic cough    • History of stroke    • Overactive bladder    • GERD with esophagitis    • Mood disorder    • Hyperlipidemia    • Obesity    • Anxiety    • Hypertension      Plan:     Chest pain, unspecified type  Hyperlipidemia  Hypertension  -Chest x-ray pending  -EKG showed ST depression  -Initial high-sensitivity troponin 54  -Serial troponins ordered  -Lipid panel unremarkable on 8/18/2022  -N.p.o. after midnight  -Continue home beta-blocker, lisinopril-hydrochlorothiazide, aspirin, Norvasc, statin  -Cardiology consult      GERD with esophagitis  -Continue home Prilosec    Overactive bladder    History of stroke  -No deficits    Chronic cough     Mood disorder  -Stable  -Continue home vilazodone, Xanax (INSPECT verified)    Obesity  -BMI 32.99  -Encourage lifestyle and dietary modifications    DVT prophylaxis:  Mechanical DVT prophylaxis orders are present.    CODE STATUS:    Code Status (Patient has no pulse and is not breathing): CPR (Attempt to Resuscitate)  Medical Interventions (Patient has pulse or is breathing): Full Support    Admission Status:  I believe this patient  meets observation status.    I discussed the patient's findings and my recommendations with patient and family.    This patient has been examined wearing appropriate Personal Protective Equipment. 05/01/23      Signature: Electronically signed by SARAHY Jose, 05/01/23, 00:34 EDT.  [facility] Hospitalist Team

## 2023-05-01 NOTE — PROGRESS NOTES
Cardiology Progress Note    Patient Identification:  Name: Andree Deluna  Age: 67 y.o.  Sex: female  :  1955  MRN: [id number]                 Follow Up / Chief Complaint: NSTEMI  Chief Complaint   Patient presents with   • Chest Pain     Pt reports midsternal CP that radiates to back that started 2 hrs PTA.  Pt describes pain as a squeezing pain that is intermittent.  With associated SOA.  Pt reports she took 2 81mg ASA PTA.        Interval History:       Subjective:      Objective:    History of Present Illness:       Ms.Elisa LOVELY Deluna has PMH of     Hypertension  Diastolic dysfunction-echo 2019 revealing septal asymmetric hypertrophy EF 60%, diastolic dysfunction  Dyslipidemia  CVA  80% right carotid disease  WEN  Carotid endarterectomy, cholecystectomy, tubal ligation, shoulder/knee/finger surgery  Family history of premature CAD father fatal MI at 54 mother fatal MI at 64.     Presented through emergency room 2023 with midsternal chest pain radiating to the back, squeezing in sensation, radiating to the left arm and neck, stated with shortness of breath and nausea.  Patient has been having symptoms since many months but has been progressively worse.     Work-up here 2023/2023 revealed elevated troponin at 54-->120.  Glucose elevated.  Potassium 3.4.  A1c of 5.8.  Chest x-ray stable cardiomegaly.  EKG done 2023 reviewed/interpreted by me reveals sinus rhythm with a rate of 74 bpm with  ST segment depression in 1 and aVL        Assessment:  :     New onset chest pain at rest consistent with unstable angina  Elevated troponin consistent with acute non-ST elevation MI  Hypertension  Dyslipidemia  History of CVA and carotid disease  PAD  Hyperglycemia, prediabetes with A1c of 5.8  Obesity with BMI over 30           Recommendations / Plan:         Reviewed EKG results with patient  Patient has new onset prolonged chest pain and has elevated troponin consistent with acute non-ST  elevation MI  We will start her on aspirin beta-blockers statins as tolerated  Will proceed with cardiac cath to evaluate coronary anatomy, since her likelihood of having CAD is high.  Risk benefits alternatives discussed.  Patient underwent cardiac cath 5/1/2023 which revealed severe triple-vessel disease.  We will transferred to [facility].  Check echocardiogram.  Consult CT surgery.  Discussed with .          Copied text in this portion of the note has been reviewed and is accurate as of 5/1/2023    Past Medical History:  Past Medical History:   Diagnosis Date   • ADHD unknown   • Anemia    • Anxiety    • Carotid artery disease     80% right internal carotid artery   • CVA (cerebral vascular accident)     right parietal right temporal   • DDD (degenerative disc disease), lumbar    • Depression    • Extremity pain     Ankle pain   • GERD (gastroesophageal reflux disease)    • Hyperlipidemia unknown   • Hypertension    • Insomnia unknown   • Laryngopharyngeal reflux    • Low back pain    • Mood disorder    • Obesity unknown   • Skin cancer of face    • Sleep apnea     Suspected sleep apnea she has refused to be tested   • Stroke (cerebrum)    • Visual field defect     Left     Past Surgical History:  Past Surgical History:   Procedure Laterality Date   • CAROTID ENDARTERECTOMY Right 11/10/2020    Procedure: CAROTID ENDARTERECTOMY;  Surgeon: aTvo Das MD;  Location: [facility] OR;  Service: Vascular;  Laterality: Right;   • CHOLECYSTECTOMY     • COLONOSCOPY      2016   • FINGER SURGERY     • KNEE ARTHROPLASTY     • KNEE SURGERY Right     replaced   • LAPAROSCOPIC GASTRIC BANDING     • ROTATOR CUFF REPAIR Right 2019   • SHOULDER SURGERY Right 05/24/2019    scope/ cuff repair   • TUBAL ABDOMINAL LIGATION          Social History:   Social History     Tobacco Use   • Smoking status: Never   • Smokeless tobacco: Never   Substance Use Topics   • Alcohol use: No      Family History:  Family History  "  Problem Relation Age of Onset   • Diabetes Other    • Heart disease Mother    • Diabetes Mother    • Heart disease Father           Allergies:  No Known Allergies  Scheduled Meds:  [START ON 5/2/2023] amLODIPine, 5 mg, Daily  [MAR Hold] aspirin, 81 mg, Daily  [MAR Hold] atorvastatin, 80 mg, Nightly  [MAR Hold] lisinopril, 20 mg, Q24H   And  [MAR Hold] hydroCHLOROthiazide, 25 mg, Q24H  lamoTRIgine, 100 mg, BID  metoprolol succinate XL, 25 mg, Daily  [MAR Hold] ondansetron, 4 mg, Once  [MAR Hold] oxybutynin, 5 mg, Daily  [MAR Hold] pantoprazole, 40 mg, Q AM  [MAR Hold] sodium chloride, 10 mL, Q12H          Review of Systems:   ROS  Review of Systems   Constitution: Negative for chills and fever.   Cardiovascular: Negative for chest pain and palpitations.   Respiratory: Negative for cough and hemoptysis.    Gastrointestinal: Negative for nausea.        Constitutional:  Temp:  [97.5 °F (36.4 °C)-97.9 °F (36.6 °C)] 97.5 °F (36.4 °C)  Heart Rate:  [53-85] 56  Resp:  [16-20] 17  BP: (128-184)/() 155/94    Physical Exam   /94   Pulse 56   Temp 97.5 °F (36.4 °C)   Resp 17   Ht 167.6 cm (66\")   Wt 92.7 kg (204 lb 5.9 oz)   SpO2 100%   BMI 32.99 kg/m²   General:  Appears in no acute distress  Eyes: Sclera is anicteric,  conjunctiva is clear   HEENT:  No JVD. Thyroid not visibly enlarged. No mucosal pallor or cyanosis  Respiratory: Respirations regular and unlabored at rest.  Bilaterally good breath sounds, with good air entry in all fields. No crackles, rubs or wheezes auscultated  Cardiovascular: S1,S2 Regular rate and rhythm. No murmur, rub or gallop auscultated.  . No pretibial pitting edema  Gastrointestinal: Abdomen nondistended, soft  Musculoskeletal:  No abnormal movements  Extremities: No digital clubbing or cyanosis  Skin: Color pink. Skin warm and dry to touch. No rashes  No xanthoma  Neuro: Alert and awake, no lateralizing deficits appreciated    INTAKE AND OUTPUT:    Intake/Output Summary (Last " 24 hours) at 5/1/2023 1722  Last data filed at 5/1/2023 1500  Gross per 24 hour   Intake 120 ml   Output --   Net 120 ml       Cardiographics  Telemetry: Sinus    ECG:   ECG 12 Lead Chest Pain   Final Result   HEART RATE= 74  bpm   RR Interval= 816  ms   ND Interval= 140  ms   P Horizontal Axis= -20  deg   P Front Axis= 17  deg   QRSD Interval= 89  ms   QT Interval= 408  ms   QRS Axis= -2  deg   T Wave Axis= 71  deg   - ABNORMAL ECG -   Sinus rhythm   Probable left ventricular hypertrophy   ST depression, consider ischemia, lateral lds   When compared with ECG of 22-Oct-2020 11:28:25,   Significant rate increase   Significant axis, voltage or hypertrophy change   Electronically Signed By: Jmoar Mejia (Shekhar) 01-May-2023 09:11:21   Date and Time of Study: 2023-04-30 21:37:55      SCANNED - TELEMETRY     Final Result      SCANNED - TELEMETRY     Final Result      ECG 12 Lead Chest Pain    (Results Pending)     I have personally reviewed EKG    Echocardiogram: Results for orders placed during the hospital encounter of 08/06/19    Adult Transthoracic Echo Complete W/ Cont if Necessary Per Protocol (With Agitated Saline)    Interpretation Summary  · Left ventricular wall thickness is consistent with mild septal asymmetric hypertrophy.  · Left ventricular systolic function is normal.  · Left ventricular diastolic dysfunction (grade I) consistent with impaired relaxation.  · Estimated EF = 60%.      Lab Review   I have reviewed the labs  Results from last 7 days   Lab Units 05/01/23 0138 04/30/23 2248   HSTROP T ng/L 120* 54*     Results from last 7 days   Lab Units 05/01/23  0138   MAGNESIUM mg/dL 2.2     Results from last 7 days   Lab Units 05/01/23  0138   SODIUM mmol/L 142   POTASSIUM mmol/L 3.4*   BUN mg/dL 17   CREATININE mg/dL 0.95   CALCIUM mg/dL 9.0         Results from last 7 days   Lab Units 05/01/23 0138 04/30/23  2248   WBC 10*3/mm3 9.70 8.90   HEMOGLOBIN g/dL 12.3 13.0   HEMATOCRIT % 38.3 39.5   PLATELETS  "10*3/mm3 245 260     Results from last 7 days   Lab Units 04/30/23  2248   INR  0.95   APTT seconds 25.9*       RADIOLOGY:  Imaging Results (Last 24 Hours)     Procedure Component Value Units Date/Time    XR Chest 1 View [[id number]] Collected: 05/01/23 0709     Updated: 05/01/23 0712    Narrative:      XR CHEST 1 VW    Date of Exam: 4/30/2023 10:25 PM EDT    Indication: chest pain    Comparison: Chest 2 views 2/3/2023    Findings:  The heart is enlarged. There is no focal consolidation, pneumothorax, or significant pleural effusion. The osseous structures are intact.      Impression:      Impression:  Stable cardiomegaly without acute process.      Electronically Signed: Vu Mousesami    5/1/2023 7:10 AM EDT    Workstation ID: [id number]                )5/1/2023  Kye Alcaraz MD      [facility] Dragon/Transcription:   \"Dictated utilizing Dragon dictation\".   "

## 2023-05-01 NOTE — CASE MANAGEMENT/SOCIAL WORK
Discharge Planning Assessment  [facility]     Patient Name: Andree Deluna  MRN: [id number]  Today's Date: 5/1/2023    Admit Date: 4/30/2023    Plan: Return home with spouse who will transport   Discharge Needs Assessment     Row Name 05/01/23 1421       Living Environment    People in Home spouse    Name(s) of People in Home Mike    Current Living Arrangements home    Primary Care Provided by self    Provides Primary Care For no one    Family Caregiver if Needed spouse    Family Caregiver Names Mike    Quality of Family Relationships involved;helpful;supportive    Able to Return to Prior Arrangements yes       Resource/Environmental Concerns    Transportation Concerns none       Transition Planning    Patient/Family Anticipates Transition to home with family    Patient/Family Anticipated Services at Transition none    Transportation Anticipated family or friend will provide;car, drives self       Discharge Needs Assessment    Readmission Within the Last 30 Days no previous admission in last 30 days    Equipment Currently Used at Home none    Concerns to be Addressed no discharge needs identified    Anticipated Changes Related to Illness none    Equipment Needed After Discharge none               Discharge Plan     Row Name 05/01/23 1423       Plan    Plan Return home with spouse who will transport    Plan Comments Barriers: Heart Cath pending. CM met with  and Mrs. Deluna who live together. She drive and is independent without equipment. Verified PCP and Pharm. No anticipated discharge needs              Continued Care and Services - Admitted Since 4/30/2023    Coordination has not been started for this encounter.          Demographic Summary     Row Name 05/01/23 1419       General Information    Admission Type observation    Arrived From emergency department    Referral Source admission list    Reason for Consult discharge planning    Preferred Language English       Contact Information    Permission  Granted to Share Info With     Contact Information Obtained for                Functional Status     Row Name 05/01/23 1420       Functional Status    Usual Activity Tolerance good    Current Activity Tolerance moderate       Functional Status, IADL    Medications independent    Meal Preparation independent    Housekeeping independent    Laundry independent    Shopping independent            Met with patient in room wearing PPE: mask,   Maintained distance greater than six feet and spent less than 15 minutes in the room.            Kendal SIMMS,RN Case Manager  [facility]  Phone: Desk- [phone] cell- [phone]

## 2023-05-01 NOTE — CONSULTS
Cardiology Consult Note    Patient Identification:  Name: Andree Deluna  Age: 67 y.o.  Sex: female  :  1955  MRN: [id number]             Requesting Physician :  Krista See DO    Reason for Consultation / Chief Complaint : Chest pain, elevated troponin    History of Present Illness:      Ms.Elisa LOVELY Deluna has PMH of    Hypertension  Diastolic dysfunction-echo 2019 revealing septal asymmetric hypertrophy EF 60%, diastolic dysfunction  Dyslipidemia  CVA  80% right carotid disease  WEN  Carotid endarterectomy, cholecystectomy, tubal ligation, shoulder/knee/finger surgery  Family history of premature CAD father fatal MI at 54 mother fatal MI at 64.    Presented through emergency room 2023 with midsternal chest pain radiating to the back, squeezing in sensation, radiating to the left arm and neck, stated with shortness of breath and nausea.  Patient has been having symptoms since many months but has been progressively worse.    Work-up here 2023/2023 revealed elevated troponin at 54-->120.  Glucose elevated.  Potassium 3.4.  A1c of 5.8.  Chest x-ray stable cardiomegaly.  EKG done 2023 reviewed/interpreted by me reveals sinus rhythm with a rate of 74 bpm with  ST segment depression in 1 and aVL      Assessment:  :    New onset chest pain at rest consistent with unstable angina  Elevated troponin consistent with acute non-ST elevation MI  Hypertension  Dyslipidemia  History of CVA and carotid disease  PAD  Hyperglycemia, prediabetes with A1c of 5.8  Obesity with BMI over 30        Recommendations / Plan:        Reviewed EKG results with patient  Patient has new onset prolonged chest pain and has elevated troponin consistent with acute non-ST elevation MI  We will start her on aspirin beta-blockers statins as tolerated  Will proceed with cardiac cath to evaluate coronary anatomy, since her likelihood of having CAD is high.  Risk benefits alternatives discussed.  We will follow-up  and consider further evaluation treatment.             Diagnosis Plan   1. Non-STEMI (non-ST elevated myocardial infarction)  Case Request Cath Lab: Left Heart Cath    Case Request Cath Lab: Left Heart Cath      2. Chest pain, unspecified type        3. Elevated troponin  Case Request Cath Lab: Left Heart Cath    Case Request Cath Lab: Left Heart Cath      4. History of stroke  Case Request Cath Lab: Left Heart Cath    Case Request Cath Lab: Left Heart Cath      5. Class 1 obesity due to excess calories with serious comorbidity and body mass index (BMI) of 32.0 to 32.9 in adult  Case Request Cath Lab: Left Heart Cath    Case Request Cath Lab: Left Heart Cath      6. Primary hypertension  Case Request Cath Lab: Left Heart Cath    Case Request Cath Lab: Left Heart Cath      7. Dyslipidemia  Case Request Cath Lab: Left Heart Cath    Case Request Cath Lab: Left Heart Cath                 Past Medical History:  Past Medical History:   Diagnosis Date   • ADHD unknown   • Anemia    • Anxiety    • Carotid artery disease     80% right internal carotid artery   • CVA (cerebral vascular accident)     right parietal right temporal   • DDD (degenerative disc disease), lumbar    • Depression    • Extremity pain     Ankle pain   • GERD (gastroesophageal reflux disease)    • Hyperlipidemia unknown   • Hypertension    • Insomnia unknown   • Laryngopharyngeal reflux    • Low back pain    • Mood disorder    • Obesity unknown   • Skin cancer of face    • Sleep apnea     Suspected sleep apnea she has refused to be tested   • Stroke (cerebrum)    • Visual field defect     Left     Past Surgical History:  Past Surgical History:   Procedure Laterality Date   • CAROTID ENDARTERECTOMY Right 11/10/2020    Procedure: CAROTID ENDARTERECTOMY;  Surgeon: Tavo Das MD;  Location: [facility] MAIN OR;  Service: Vascular;  Laterality: Right;   • CHOLECYSTECTOMY     • COLONOSCOPY      2016   • FINGER SURGERY     • KNEE ARTHROPLASTY     • KNEE  SURGERY Right     replaced   • LAPAROSCOPIC GASTRIC BANDING     • ROTATOR CUFF REPAIR Right 2019   • SHOULDER SURGERY Right 05/24/2019    scope/ cuff repair   • TUBAL ABDOMINAL LIGATION        Allergies:  No Known Allergies  Home Meds:  Medications Prior to Admission   Medication Sig Dispense Refill Last Dose   • ALPRAZolam (XANAX) 2 MG tablet TAKE 1 TABLET BY MOUTH AT NIGHT AS NEEDED FOR ANXIETY 30 tablet 0    • amLODIPine (NORVASC) 2.5 MG tablet TAKE ONE TABLET BY MOUTH DAILY 90 tablet 0 Past Week   • aspirin 81 MG chewable tablet Chew 1 tablet Daily. 30 tablet 1 4/30/2023   • atorvastatin (LIPITOR) 80 MG tablet TAKE ONE TABLET BY MOUTH ONCE NIGHTLY 30 tablet 11    • lamoTRIgine (LaMICtal) 100 MG tablet TAKE ONE TABLET BY MOUTH TWICE A DAY 60 tablet 3    • lisinopril-hydrochlorothiazide (PRINZIDE,ZESTORETIC) 20-25 MG per tablet TAKE ONE TABLET BY MOUTH DAILY 90 tablet 1    • metoprolol succinate XL (TOPROL-XL) 25 MG 24 hr tablet TAKE ONE TABLET BY MOUTH DAILY 30 tablet 10    • omeprazole (priLOSEC) 40 MG capsule TAKE ONE CAPSULE BY MOUTH DAILY 90 capsule 1    • oxybutynin (DITROPAN) 5 MG tablet Take 1 tablet by mouth Daily. 90 tablet 1 Past Week   • albuterol sulfate  (90 Base) MCG/ACT inhaler Inhale 2 puffs Every 6 (Six) Hours As Needed for Shortness of Air. (Patient not taking: Reported on 3/16/2023) 18 g 0    • albuterol sulfate  (90 Base) MCG/ACT inhaler Inhale 2 puffs Every 4 (Four) Hours As Needed for Wheezing. (Patient not taking: Reported on 3/16/2023) 18 g 3      Current Meds:     Current Facility-Administered Medications:   •  acetaminophen (TYLENOL) tablet 650 mg, 650 mg, Oral, Q4H PRN **OR** acetaminophen (TYLENOL) 160 MG/5ML solution 650 mg, 650 mg, Oral, Q4H PRN **OR** acetaminophen (TYLENOL) suppository 650 mg, 650 mg, Rectal, Q4H PRN, Alsop, Gemma L, APRN  •  ALPRAZolam (XANAX) tablet 2 mg, 2 mg, Oral, Nightly PRN, Alsop, Gemma L, APRN, 2 mg at 05/01/23 0112  •  aluminum-magnesium  hydroxide-simethicone (MAALOX MAX) 400-400-40 MG/5ML suspension 15 mL, 15 mL, Oral, Q6H PRN, Alsop, Gemma L, APRN  •  [START ON 5/2/2023] amLODIPine (NORVASC) tablet 5 mg, 5 mg, Oral, Daily, Rivka Cui, APRN  •  aspirin chewable tablet 81 mg, 81 mg, Oral, Daily, Alsop, Gemma L, APRN, 81 mg at 05/01/23 0732  •  atorvastatin (LIPITOR) tablet 80 mg, 80 mg, Oral, Nightly, Alsop, Gemma L, APRN  •  calcium carbonate (TUMS) chewable tablet 500 mg (200 mg elemental), 2 tablet, Oral, BID PRN, Alsop, Gemma L, APRN  •  lisinopril (PRINIVIL,ZESTRIL) tablet 20 mg, 20 mg, Oral, Q24H, 20 mg at 05/01/23 0928 **AND** hydroCHLOROthiazide (HYDRODIURIL) tablet 25 mg, 25 mg, Oral, Q24H, Alsop, Gemma L, APRN, 25 mg at 05/01/23 0928  •  lamoTRIgine (LaMICtal) tablet 100 mg, 100 mg, Oral, BID, Alsop, Gemma L, APRN, 100 mg at 05/01/23 0732  •  Magnesium Sulfate - Total Dose 10 grams - Magnesium 1 or Less, 2 g, Intravenous, PRN **OR** Magnesium Sulfate - Total Dose 6 grams - Magnesium 1.1 - 1.5, 2 g, Intravenous, PRN **OR** Magnesium Sulfate - Total Dose 4 grams - Magnesium 1.6 - 1.9, 4 g, Intravenous, PRN, Alsop, Gemma L, APRN  •  melatonin tablet 5 mg, 5 mg, Oral, Nightly PRN, Alsop, Gemma L, APRN  •  metoprolol succinate XL (TOPROL-XL) 24 hr tablet 25 mg, 25 mg, Oral, Daily, Alsop, Gemma L, APRN, 25 mg at 05/01/23 0928  •  nitroglycerin (NITROSTAT) SL tablet 0.4 mg, 0.4 mg, Sublingual, Q5 Min PRN, Jomar Mejia MD, 0.4 mg at 04/30/23 2215  •  ondansetron (ZOFRAN) injection 4 mg, 4 mg, Intravenous, Once, Jomar Mejia MD  •  ondansetron (ZOFRAN) tablet 4 mg, 4 mg, Oral, Q6H PRN **OR** ondansetron (ZOFRAN) injection 4 mg, 4 mg, Intravenous, Q6H PRN, Alsop, Gemma L, APRN  •  oxybutynin (DITROPAN) tablet 5 mg, 5 mg, Oral, Daily, Alsop, Gemma L, APRN, 5 mg at 05/01/23 0733  •  pantoprazole (PROTONIX) EC tablet 40 mg, 40 mg, Oral, Q AM, Alsop, Gemma L, APRN, 40 mg at 05/01/23 0531  •  potassium chloride (K-DUR,KLOR-CON) CR tablet 40 mEq,  "40 mEq, Oral, PRN, Alsop, Gemma L, APRN, 40 mEq at 05/01/23 0928  •  potassium chloride (KLOR-CON) packet 40 mEq, 40 mEq, Oral, PRN, Alsop, Gemma L, APRN  •  sennosides-docusate (PERICOLACE) 8.6-50 MG per tablet 1 tablet, 1 tablet, Oral, Nightly PRN, Alsop, Gemma L, APRN  •  [COMPLETED] Insert Peripheral IV, , , Once **AND** sodium chloride 0.9 % flush 10 mL, 10 mL, Intravenous, PRN, Jomar Mejia MD  •  sodium chloride 0.9 % flush 10 mL, 10 mL, Intravenous, Q12H, Alsop, Gemma L, APRN, 10 mL at 05/01/23 0731  •  sodium chloride 0.9 % flush 10 mL, 10 mL, Intravenous, PRN, Alsop, Gemma L, APRN  •  sodium chloride 0.9 % infusion 40 mL, 40 mL, Intravenous, PRN, Alsop, Gemma L, APRN  Social History:   Social History     Tobacco Use   • Smoking status: Never   • Smokeless tobacco: Never   Substance Use Topics   • Alcohol use: No      Family History:  Family History   Problem Relation Age of Onset   • Diabetes Other    • Heart disease Mother    • Diabetes Mother    • Heart disease Father         Review of Systems : Review of Systems   All other systems reviewed and are negative.                 Constitutional:  Temp:  [97.6 °F (36.4 °C)-97.9 °F (36.6 °C)] 97.6 °F (36.4 °C)  Heart Rate:  [59-85] 60  Resp:  [16] 16  BP: (128-184)/() 184/108    Physical Exam   BP (!) 184/108   Pulse 60   Temp 97.6 °F (36.4 °C) (Oral)   Resp 16   Ht 167.6 cm (66\")   Wt 92.7 kg (204 lb 5.9 oz)   SpO2 95%   BMI 32.99 kg/m²   Physical Exam  General:  Appears in no acute distress  Eyes: Sclerae are anicteric,  conjunctivae are clear   HEENT:  No JVD. Thyroid not visibly enlarged. No mucosal pallor or cyanosis  Respiratory: Respirations regular and unlabored at rest.  Bilaterally good breath sounds with good air entry in all fields. No crackles, rubs or wheezes auscultated  Cardiovascular: S1,S2 Regular rate and rhythm. No murmur, rub or gallop auscultated. No pretibial pitting edema  Gastrointestinal: Abdomen soft, flat, nontender. " Bowel sounds present.   Musculoskeletal:  No abnormal movements  Extremities: No digital clubbing or cyanosis  Skin: Color pink. Skin warm and dry to touch. No rashes  No xanthoma  Neuro: Alert and awake, no lateralizing deficits appreciated    Cardiographics  ECG: EKG tracing was  personally reviewed/interpreted by me  ECG 12 Lead Chest Pain   Final Result   HEART RATE= 74  bpm   RR Interval= 816  ms   MO Interval= 140  ms   P Horizontal Axis= -20  deg   P Front Axis= 17  deg   QRSD Interval= 89  ms   QT Interval= 408  ms   QRS Axis= -2  deg   T Wave Axis= 71  deg   - ABNORMAL ECG -   Sinus rhythm   Probable left ventricular hypertrophy   ST depression, consider ischemia, lateral lds   When compared with ECG of 22-Oct-2020 11:28:25,   Significant rate increase   Significant axis, voltage or hypertrophy change   Electronically Signed By: Jomar Mejia (Shekhar) 01-May-2023 09:11:21   Date and Time of Study: 2023-04-30 21:37:55      ECG 12 Lead Chest Pain    (Results Pending)       Telemetry: Sinus rhythm    Echocardiogram:   Results for orders placed during the hospital encounter of 08/06/19    Adult Transthoracic Echo Complete W/ Cont if Necessary Per Protocol (With Agitated Saline)    Interpretation Summary  · Left ventricular wall thickness is consistent with mild septal asymmetric hypertrophy.  · Left ventricular systolic function is normal.  · Left ventricular diastolic dysfunction (grade I) consistent with impaired relaxation.  · Estimated EF = 60%.      Imaging  Chest X-ray:   Imaging Results (Last 24 Hours)     Procedure Component Value Units Date/Time    XR Chest 1 View [[id number]] Collected: 05/01/23 0709     Updated: 05/01/23 0712    Narrative:      XR CHEST 1 VW    Date of Exam: 4/30/2023 10:25 PM EDT    Indication: chest pain    Comparison: Chest 2 views 2/3/2023    Findings:  The heart is enlarged. There is no focal consolidation, pneumothorax, or significant pleural effusion. The osseous structures are  intact.      Impression:      Impression:  Stable cardiomegaly without acute process.      Electronically Signed: Vu Arrieta    5/1/2023 7:10 AM EDT    Workstation ID: [id number]          Lab Review: I have reviewed the labs  Results from last 7 days   Lab Units 05/01/23 0138 04/30/23 2248   HSTROP T ng/L 120* 54*     Results from last 7 days   Lab Units 05/01/23 0138   MAGNESIUM mg/dL 2.2     Results from last 7 days   Lab Units 05/01/23  0138   SODIUM mmol/L 142   POTASSIUM mmol/L 3.4*   BUN mg/dL 17   CREATININE mg/dL 0.95   CALCIUM mg/dL 9.0             Results from last 7 days   Lab Units 05/01/23 0138 04/30/23 2248   WBC 10*3/mm3 9.70 8.90   HEMOGLOBIN g/dL 12.3 13.0   HEMATOCRIT % 38.3 39.5   PLATELETS 10*3/mm3 245 260     Results from last 7 days   Lab Units 04/30/23 2248   INR  0.95   APTT seconds 25.9*             Kye Alcaraz MD  5/1/2023, 10:35 EDT      EMR Dragon/Transcription:   Dictated utilizing Dragon dictation

## 2023-05-01 NOTE — PROGRESS NOTES
[facility] Medicine Services   Daily Progress Note    Patient Name: Andree Deluna  : 1955  MRN: [id number]  Primary Care Physician:  Naga Griffith PA-C  Date of admission: 2023  Date and Time of Service: 2023 at 1343    Subjective      Chief Complaint: Chest pain   Patient Reports Chest pain     Review of Systems   Cardiovascular: Positive for chest pain.   Respiratory: Positive for shortness of breath.    All other systems reviewed and are negative.      Objective      Vitals:   Temp:  [97.6 °F (36.4 °C)-97.9 °F (36.6 °C)] 97.6 °F (36.4 °C)  Heart Rate:  [59-85] 59  Resp:  [16-20] 20  BP: (128-184)/() 150/80    Physical Exam  Constitutional:       Appearance: Normal appearance.   HENT:      Head: Normocephalic and atraumatic.      Mouth/Throat:      Mouth: Mucous membranes are moist.   Eyes:      Extraocular Movements: Extraocular movements intact.      Pupils: Pupils are equal, round, and reactive to light.   Cardiovascular:      Rate and Rhythm: Normal rate and regular rhythm.   Pulmonary:      Breath sounds: Normal breath sounds.   Abdominal:      Palpations: Abdomen is soft.   Musculoskeletal:         General: Normal range of motion.   Skin:     General: Skin is warm.   Neurological:      General: No focal deficit present.      Mental Status: She is alert.   Psychiatric:         Mood and Affect: Mood normal.             Result Review    Result Review:  I have personally reviewed the results from the time of this admission to 2023 13:42 EDT and agree with these findings:  [x]  Laboratory  [x]  Microbiology  [x]  Radiology  [x]  EKG/Telemetry   []  Cardiology/Vascular   []  Pathology  []  Old records  []  Other:  Most notable findings include: troponin level          Assessment & Plan      Brief Patient Summary:  Andree Deluna is a 67 y.o. female who presented to the hospital last night with chest pain       [START ON 2023] amLODIPine, 5 mg, Oral,  Daily  aspirin, 81 mg, Oral, Daily  atorvastatin, 80 mg, Oral, Nightly  lisinopril, 20 mg, Oral, Q24H   And  hydroCHLOROthiazide, 25 mg, Oral, Q24H  lamoTRIgine, 100 mg, Oral, BID  metoprolol succinate XL, 25 mg, Oral, Daily  ondansetron, 4 mg, Intravenous, Once  oxybutynin, 5 mg, Oral, Daily  pantoprazole, 40 mg, Oral, Q AM  sodium chloride, 10 mL, Intravenous, Q12H             Active Hospital Problems:  Active Hospital Problems    Diagnosis    • **Chest pain, unspecified type    • Elevated troponin    • Non-STEMI (non-ST elevated myocardial infarction)    • Chronic cough    • History of stroke    • Overactive bladder    • GERD with esophagitis    • Mood disorder    • Hyperlipidemia    • Obesity    • Hypertension      Plan:   The patient was evaluated by cardiologist , she's scheduled for cardiac cath this afternoon   Further recommendations per cardiology    DVT prophylaxis:  Mechanical DVT prophylaxis orders are present.    CODE STATUS:    Code Status (Patient has no pulse and is not breathing): CPR (Attempt to Resuscitate)  Medical Interventions (Patient has pulse or is breathing): Full Support      Disposition:  I expect patient to be discharged in 24 hours  This patient has been seen and examined and discussed with nursing staff. 05/01/23      Electronically signed by Jimmie Muñiz MD, 05/01/23, 13:42 EDT.  [facility] Hospitalist Team

## 2023-05-01 NOTE — PLAN OF CARE
Problem: Adult Inpatient Plan of Care  Goal: Plan of Care Review  Outcome: Ongoing, Progressing   Goal Outcome Evaluation:               Pt admitted via ER for chest pain. VSS. Pt had no c/o pain. Pt made NPO for pending myoview. Cardio consult completed. Pt educated on current plan of care, verbalized understanding.

## 2023-05-02 ENCOUNTER — APPOINTMENT (OUTPATIENT)
Dept: CARDIOLOGY | Facility: HOSPITAL | Age: 68
DRG: 217 | End: 2023-05-02
Payer: MEDICARE

## 2023-05-02 ENCOUNTER — ANESTHESIA EVENT (OUTPATIENT)
Dept: PERIOP | Facility: HOSPITAL | Age: 68
DRG: 217 | End: 2023-05-02
Payer: MEDICARE

## 2023-05-02 PROBLEM — I25.110 CORONARY ARTERY DISEASE INVOLVING NATIVE CORONARY ARTERY OF NATIVE HEART WITH UNSTABLE ANGINA PECTORIS: Status: ACTIVE | Noted: 2023-04-30

## 2023-05-02 PROBLEM — R93.1 ABNORMAL FINDINGS ON DIAGNOSTIC IMAGING OF HEART AND CORONARY CIRCULATION: Status: ACTIVE | Noted: 2023-04-30

## 2023-05-02 LAB
ANION GAP SERPL CALCULATED.3IONS-SCNC: 12 MMOL/L (ref 5–15)
BASOPHILS # BLD AUTO: 0.1 10*3/MM3 (ref 0–0.2)
BASOPHILS NFR BLD AUTO: 0.9 % (ref 0–1.5)
BH CV ECHO MEAS - ACS: 1.7 CM
BH CV ECHO MEAS - AI P1/2T: 999.9 MSEC
BH CV ECHO MEAS - AO MAX PG: 13.1 MMHG
BH CV ECHO MEAS - AO MEAN PG: 6 MMHG
BH CV ECHO MEAS - AO ROOT DIAM: 3.5 CM
BH CV ECHO MEAS - AO V2 MAX: 181 CM/SEC
BH CV ECHO MEAS - AO V2 VTI: 36.6 CM
BH CV ECHO MEAS - AVA(I,D): 1.79 CM2
BH CV ECHO MEAS - EDV(CUBED): 91.1 ML
BH CV ECHO MEAS - EDV(MOD-SP2): 60.1 ML
BH CV ECHO MEAS - EDV(MOD-SP4): 82.1 ML
BH CV ECHO MEAS - EF(MOD-BP): 62.7 %
BH CV ECHO MEAS - EF(MOD-SP2): 67.7 %
BH CV ECHO MEAS - EF(MOD-SP4): 56.5 %
BH CV ECHO MEAS - ESV(MOD-SP2): 19.4 ML
BH CV ECHO MEAS - ESV(MOD-SP4): 35.7 ML
BH CV ECHO MEAS - FS: 31.1 %
BH CV ECHO MEAS - IVS/LVPW: 1.11 CM
BH CV ECHO MEAS - IVSD: 1 CM
BH CV ECHO MEAS - LA DIMENSION: 3.9 CM
BH CV ECHO MEAS - LAT PEAK E' VEL: 9.2 CM/SEC
BH CV ECHO MEAS - LV DIASTOLIC VOL/BSA (35-75): 40.6 CM2
BH CV ECHO MEAS - LV MASS(C)D: 142.9 GRAMS
BH CV ECHO MEAS - LV MAX PG: 3.3 MMHG
BH CV ECHO MEAS - LV MEAN PG: 2 MMHG
BH CV ECHO MEAS - LV SYSTOLIC VOL/BSA (12-30): 17.7 CM2
BH CV ECHO MEAS - LV V1 MAX: 90.2 CM/SEC
BH CV ECHO MEAS - LV V1 VTI: 20.8 CM
BH CV ECHO MEAS - LVIDD: 4.5 CM
BH CV ECHO MEAS - LVIDS: 3.1 CM
BH CV ECHO MEAS - LVOT AREA: 3.1 CM2
BH CV ECHO MEAS - LVOT DIAM: 2 CM
BH CV ECHO MEAS - LVPWD: 0.9 CM
BH CV ECHO MEAS - MED PEAK E' VEL: 7.3 CM/SEC
BH CV ECHO MEAS - MR MAX PG: 108.6 MMHG
BH CV ECHO MEAS - MR MAX VEL: 521 CM/SEC
BH CV ECHO MEAS - MV A DUR: 0.16 SEC
BH CV ECHO MEAS - MV A MAX VEL: 102 CM/SEC
BH CV ECHO MEAS - MV DEC SLOPE: 338 CM/SEC2
BH CV ECHO MEAS - MV DEC TIME: 0.21 MSEC
BH CV ECHO MEAS - MV E MAX VEL: 79.1 CM/SEC
BH CV ECHO MEAS - MV E/A: 0.78
BH CV ECHO MEAS - MV MAX PG: 4.2 MMHG
BH CV ECHO MEAS - MV MEAN PG: 1 MMHG
BH CV ECHO MEAS - MV P1/2T: 81.5 MSEC
BH CV ECHO MEAS - MV V2 VTI: 36.4 CM
BH CV ECHO MEAS - MVA(P1/2T): 2.7 CM2
BH CV ECHO MEAS - MVA(VTI): 1.8 CM2
BH CV ECHO MEAS - PA V2 MAX: 85.8 CM/SEC
BH CV ECHO MEAS - PULM A REVS DUR: 0.14 SEC
BH CV ECHO MEAS - PULM A REVS VEL: 28.8 CM/SEC
BH CV ECHO MEAS - PULM DIAS VEL: 30.1 CM/SEC
BH CV ECHO MEAS - PULM S/D: 1.4
BH CV ECHO MEAS - PULM SYS VEL: 42.2 CM/SEC
BH CV ECHO MEAS - RAP SYSTOLE: 3 MMHG
BH CV ECHO MEAS - RV MAX PG: 1.36 MMHG
BH CV ECHO MEAS - RV V1 MAX: 58.3 CM/SEC
BH CV ECHO MEAS - RV V1 VTI: 12.8 CM
BH CV ECHO MEAS - RVSP: 31.9 MMHG
BH CV ECHO MEAS - SI(MOD-SP2): 20.1 ML/M2
BH CV ECHO MEAS - SI(MOD-SP4): 23 ML/M2
BH CV ECHO MEAS - SV(LVOT): 65.3 ML
BH CV ECHO MEAS - SV(MOD-SP2): 40.7 ML
BH CV ECHO MEAS - SV(MOD-SP4): 46.4 ML
BH CV ECHO MEAS - TAPSE (>1.6): 2.6 CM
BH CV ECHO MEAS - TR MAX PG: 28.9 MMHG
BH CV ECHO MEAS - TR MAX VEL: 269 CM/SEC
BH CV ECHO MEASUREMENTS AVERAGE E/E' RATIO: 9.59
BH CV XLRA - RV BASE: 3.8 CM
BH CV XLRA - RV LENGTH: 6.6 CM
BH CV XLRA - RV MID: 2.5 CM
BH CV XLRA - TDI S': 11 CM/SEC
BH CV XLRA MEAS - DIST GSV CALF DIST LEFT: 0.1 CM
BH CV XLRA MEAS - DIST GSV CALF DIST RIGHT: 0.07 CM
BH CV XLRA MEAS - DIST GSV THIGH DIST LEFT: 0.4 CM
BH CV XLRA MEAS - DIST GSV THIGH DIST RIGHT: 0.28 CM
BH CV XLRA MEAS - MID GSV CALF LEFT: 0.12 CM
BH CV XLRA MEAS - MID GSV CALF RIGHT: 0.13 CM
BH CV XLRA MEAS - MID GSV THIGH  LEFT: 0.35 CM
BH CV XLRA MEAS - MID GSV THIGH  RIGHT: 0.3 CM
BH CV XLRA MEAS - PROX GSV CALF DIST LEFT: 0.31 CM
BH CV XLRA MEAS - PROX GSV CALF DIST RIGHT: 0.2 CM
BH CV XLRA MEAS - PROX GSV THIGH  LEFT: 0.4 CM
BH CV XLRA MEAS - PROX GSV THIGH  RIGHT: 0.68 CM
BH CV XLRA MEAS LEFT DIST CCA EDV: 17.2 CM/SEC
BH CV XLRA MEAS LEFT DIST CCA PSV: 56 CM/SEC
BH CV XLRA MEAS LEFT DIST ICA EDV: -18.6 CM/SEC
BH CV XLRA MEAS LEFT DIST ICA PSV: -59.6 CM/SEC
BH CV XLRA MEAS LEFT ICA/CCA RATIO: -1.4
BH CV XLRA MEAS LEFT PROX CCA EDV: 16.2 CM/SEC
BH CV XLRA MEAS LEFT PROX CCA PSV: 87.6 CM/SEC
BH CV XLRA MEAS LEFT PROX ECA PSV: -91.3 CM/SEC
BH CV XLRA MEAS LEFT PROX ICA EDV: -37.1 CM/SEC
BH CV XLRA MEAS LEFT PROX ICA PSV: -123 CM/SEC
BH CV XLRA MEAS LEFT PROX SCLA PSV: 138 CM/SEC
BH CV XLRA MEAS LEFT VERTEBRAL A PSV: 52.2 CM/SEC
BH CV XLRA MEAS RIGHT DIST CCA EDV: -10 CM/SEC
BH CV XLRA MEAS RIGHT DIST CCA PSV: -43.9 CM/SEC
BH CV XLRA MEAS RIGHT DIST ICA EDV: -23.6 CM/SEC
BH CV XLRA MEAS RIGHT DIST ICA PSV: -66.4 CM/SEC
BH CV XLRA MEAS RIGHT ICA/CCA RATIO: -1.02
BH CV XLRA MEAS RIGHT PROX CCA EDV: 10.2 CM/SEC
BH CV XLRA MEAS RIGHT PROX CCA PSV: 65.1 CM/SEC
BH CV XLRA MEAS RIGHT PROX ECA PSV: -179 CM/SEC
BH CV XLRA MEAS RIGHT PROX ICA EDV: -20.4 CM/SEC
BH CV XLRA MEAS RIGHT PROX ICA PSV: -66.6 CM/SEC
BH CV XLRA MEAS RIGHT PROX SCLA PSV: -118 CM/SEC
BH CV XLRA MEAS RIGHT VERTEBRAL A PSV: -33.3 CM/SEC
BILIRUB UR QL STRIP: NEGATIVE
BUN SERPL-MCNC: 13 MG/DL (ref 8–23)
BUN/CREAT SERPL: 15.7 (ref 7–25)
CALCIUM SPEC-SCNC: 9.1 MG/DL (ref 8.6–10.5)
CHLORIDE SERPL-SCNC: 106 MMOL/L (ref 98–107)
CLARITY UR: CLEAR
CO2 SERPL-SCNC: 25 MMOL/L (ref 22–29)
COLOR UR: YELLOW
CREAT SERPL-MCNC: 0.83 MG/DL (ref 0.57–1)
DEPRECATED RDW RBC AUTO: 47.3 FL (ref 37–54)
EGFRCR SERPLBLD CKD-EPI 2021: 78.3 ML/MIN/1.73
EOSINOPHIL # BLD AUTO: 0.1 10*3/MM3 (ref 0–0.4)
EOSINOPHIL NFR BLD AUTO: 1.4 % (ref 0.3–6.2)
ERYTHROCYTE [DISTWIDTH] IN BLOOD BY AUTOMATED COUNT: 14.3 % (ref 12.3–15.4)
GLUCOSE SERPL-MCNC: 98 MG/DL (ref 65–99)
GLUCOSE UR STRIP-MCNC: NEGATIVE MG/DL
HCT VFR BLD AUTO: 36.5 % (ref 34–46.6)
HGB BLD-MCNC: 12 G/DL (ref 12–15.9)
HGB UR QL STRIP.AUTO: NEGATIVE
KETONES UR QL STRIP: NEGATIVE
LEFT ATRIUM VOLUME INDEX: 45.1 ML/M2
LEUKOCYTE ESTERASE UR QL STRIP.AUTO: NEGATIVE
LYMPHOCYTES # BLD AUTO: 2.7 10*3/MM3 (ref 0.7–3.1)
LYMPHOCYTES NFR BLD AUTO: 32 % (ref 19.6–45.3)
MAGNESIUM SERPL-MCNC: 2 MG/DL (ref 1.6–2.4)
MAXIMAL PREDICTED HEART RATE: 155 BPM
MCH RBC QN AUTO: 29.5 PG (ref 26.6–33)
MCHC RBC AUTO-ENTMCNC: 32.8 G/DL (ref 31.5–35.7)
MCV RBC AUTO: 90.1 FL (ref 79–97)
MONOCYTES # BLD AUTO: 0.5 10*3/MM3 (ref 0.1–0.9)
MONOCYTES NFR BLD AUTO: 6.1 % (ref 5–12)
MRSA DNA SPEC QL NAA+PROBE: NORMAL
NEUTROPHILS NFR BLD AUTO: 5 10*3/MM3 (ref 1.7–7)
NEUTROPHILS NFR BLD AUTO: 59.6 % (ref 42.7–76)
NITRITE UR QL STRIP: NEGATIVE
NRBC BLD AUTO-RTO: 0.3 /100 WBC (ref 0–0.2)
PH UR STRIP.AUTO: 5.5 [PH] (ref 5–8)
PLATELET # BLD AUTO: 219 10*3/MM3 (ref 140–450)
PMV BLD AUTO: 8.7 FL (ref 6–12)
POTASSIUM SERPL-SCNC: 4.1 MMOL/L (ref 3.5–5.2)
PROT UR QL STRIP: NEGATIVE
RBC # BLD AUTO: 4.05 10*6/MM3 (ref 3.77–5.28)
SARS-COV-2 RNA RESP QL NAA+PROBE: NOT DETECTED
SINUS: 3.3 CM
SODIUM SERPL-SCNC: 143 MMOL/L (ref 136–145)
SP GR UR STRIP: 1.01 (ref 1–1.03)
STJ: 2.8 CM
STRESS TARGET HR: 132 BPM
UROBILINOGEN UR QL STRIP: NORMAL
WBC NRBC COR # BLD: 8.3 10*3/MM3 (ref 3.4–10.8)

## 2023-05-02 PROCEDURE — 85025 COMPLETE CBC W/AUTO DIFF WBC: CPT | Performed by: INTERNAL MEDICINE

## 2023-05-02 PROCEDURE — 81003 URINALYSIS AUTO W/O SCOPE: CPT | Performed by: NURSE PRACTITIONER

## 2023-05-02 PROCEDURE — 99232 SBSQ HOSP IP/OBS MODERATE 35: CPT | Performed by: INTERNAL MEDICINE

## 2023-05-02 PROCEDURE — 83735 ASSAY OF MAGNESIUM: CPT | Performed by: INTERNAL MEDICINE

## 2023-05-02 PROCEDURE — 87635 SARS-COV-2 COVID-19 AMP PRB: CPT

## 2023-05-02 PROCEDURE — G0378 HOSPITAL OBSERVATION PER HR: HCPCS

## 2023-05-02 PROCEDURE — 93306 TTE W/DOPPLER COMPLETE: CPT | Performed by: INTERNAL MEDICINE

## 2023-05-02 PROCEDURE — 93010 ELECTROCARDIOGRAM REPORT: CPT | Performed by: INTERNAL MEDICINE

## 2023-05-02 PROCEDURE — 93306 TTE W/DOPPLER COMPLETE: CPT

## 2023-05-02 PROCEDURE — 80048 BASIC METABOLIC PNL TOTAL CA: CPT | Performed by: INTERNAL MEDICINE

## 2023-05-02 PROCEDURE — 93880 EXTRACRANIAL BILAT STUDY: CPT

## 2023-05-02 PROCEDURE — 87641 MR-STAPH DNA AMP PROBE: CPT | Performed by: NURSE PRACTITIONER

## 2023-05-02 PROCEDURE — 93005 ELECTROCARDIOGRAM TRACING: CPT | Performed by: INTERNAL MEDICINE

## 2023-05-02 PROCEDURE — 93970 EXTREMITY STUDY: CPT

## 2023-05-02 PROCEDURE — 36415 COLL VENOUS BLD VENIPUNCTURE: CPT | Performed by: INTERNAL MEDICINE

## 2023-05-02 RX ORDER — DEXTROSE MONOHYDRATE 25 G/50ML
10-50 INJECTION, SOLUTION INTRAVENOUS
Status: DISCONTINUED | OUTPATIENT
Start: 2023-05-02 | End: 2023-05-06 | Stop reason: HOSPADM

## 2023-05-02 RX ORDER — IBUPROFEN 600 MG/1
1 TABLET ORAL
Status: DISCONTINUED | OUTPATIENT
Start: 2023-05-02 | End: 2023-05-06 | Stop reason: HOSPADM

## 2023-05-02 RX ORDER — CHLORHEXIDINE GLUCONATE 0.12 MG/ML
15 RINSE ORAL EVERY 12 HOURS SCHEDULED
Status: COMPLETED | OUTPATIENT
Start: 2023-05-04 | End: 2023-05-05

## 2023-05-02 RX ORDER — NICOTINE POLACRILEX 4 MG
15 LOZENGE BUCCAL
Status: DISCONTINUED | OUTPATIENT
Start: 2023-05-02 | End: 2023-05-06 | Stop reason: HOSPADM

## 2023-05-02 RX ORDER — ALPRAZOLAM 0.25 MG/1
0.25 TABLET ORAL EVERY 8 HOURS PRN
Status: DISCONTINUED | OUTPATIENT
Start: 2023-05-02 | End: 2023-05-06

## 2023-05-02 RX ORDER — SODIUM CHLORIDE 0.9 % (FLUSH) 0.9 %
30 SYRINGE (ML) INJECTION ONCE AS NEEDED
Status: DISCONTINUED | OUTPATIENT
Start: 2023-05-02 | End: 2023-05-06 | Stop reason: HOSPADM

## 2023-05-02 RX ORDER — CHLORHEXIDINE GLUCONATE 500 MG/1
1 CLOTH TOPICAL EVERY 12 HOURS
Status: DISCONTINUED | OUTPATIENT
Start: 2023-05-02 | End: 2023-05-06

## 2023-05-02 RX ORDER — SODIUM CHLORIDE 9 MG/ML
30 INJECTION, SOLUTION INTRAVENOUS CONTINUOUS PRN
Status: DISCONTINUED | OUTPATIENT
Start: 2023-05-02 | End: 2023-05-05

## 2023-05-02 RX ADMIN — OXYBUTYNIN CHLORIDE 5 MG: 5 TABLET ORAL at 10:28

## 2023-05-02 RX ADMIN — PANTOPRAZOLE SODIUM 40 MG: 40 TABLET, DELAYED RELEASE ORAL at 05:09

## 2023-05-02 RX ADMIN — LISINOPRIL 20 MG: 20 TABLET ORAL at 10:28

## 2023-05-02 RX ADMIN — METOPROLOL SUCCINATE 25 MG: 25 TABLET, EXTENDED RELEASE ORAL at 10:27

## 2023-05-02 RX ADMIN — ASPIRIN 81 MG CHEWABLE TABLET 81 MG: 81 TABLET CHEWABLE at 10:27

## 2023-05-02 RX ADMIN — AMLODIPINE BESYLATE 5 MG: 5 TABLET ORAL at 10:27

## 2023-05-02 RX ADMIN — CHLORHEXIDINE GLUCONATE 1 APPLICATION: 500 CLOTH TOPICAL at 21:39

## 2023-05-02 RX ADMIN — ACETAMINOPHEN 650 MG: 325 TABLET, FILM COATED ORAL at 22:56

## 2023-05-02 RX ADMIN — Medication 10 ML: at 22:06

## 2023-05-02 RX ADMIN — ATORVASTATIN CALCIUM 80 MG: 40 TABLET, FILM COATED ORAL at 21:38

## 2023-05-02 RX ADMIN — ALPRAZOLAM 2 MG: 1 TABLET ORAL at 21:36

## 2023-05-02 NOTE — CONSULTS
"  Patient Care Team:  Naga Griffith PA-C as PCP - General (Physician Assistant)  Referring Provider:  Dr. Alcaraz  Reason for consultation:  MV CAD    Chief complaint:  Chest    Subjective     History of Present Illness:  68 y/o woman presented to [facility] with c/o CP radiating to her back, left arm, shoulders, and neck.  Associated symptoms included SOA, vomiting.  She reports these symptoms have been going for \"awhile.\"  She has known HTN, HLD, hx stroke without residual, s/p right CEA (), and anxiety.  Both her parents  from premature CAD.  She ruled in for NSTEMI.  Cardiology was consulted.  She underwent cardiac cath revealing MV CAD, 1-2+ MR, and EF 50%.  Thoracic echo pending.  Dr. Noonan was consulted for surgical evaluation.    Review of Systems   Constitutional: Positive for fatigue.   Respiratory: Positive for shortness of breath.    Cardiovascular: Positive for chest pain.   Gastrointestinal: Positive for nausea and vomiting.   Musculoskeletal: Positive for neck pain.   Neurological: Positive for numbness.        Past Medical History:   Diagnosis Date   • ADHD unknown   • Anemia    • Anxiety    • Carotid artery disease     80% right internal carotid artery   • CVA (cerebral vascular accident)     right parietal right temporal   • DDD (degenerative disc disease), lumbar    • Depression    • Extremity pain     Ankle pain   • GERD (gastroesophageal reflux disease)    • Hyperlipidemia unknown   • Hypertension    • Insomnia unknown   • Laryngopharyngeal reflux    • Low back pain    • Mood disorder    • Obesity unknown   • Skin cancer of face    • Sleep apnea     Suspected sleep apnea she has refused to be tested   • Stroke (cerebrum)    • Visual field defect     Left     Past Surgical History:   Procedure Laterality Date   • CARDIAC CATHETERIZATION N/A 2023    Procedure: Left Heart Cath;  Surgeon: Kye Alcaraz MD;  Location: [facility] CATH INVASIVE LOCATION;  Service: " Cardiovascular;  Laterality: N/A;   • CAROTID ENDARTERECTOMY Right 11/10/2020    Procedure: CAROTID ENDARTERECTOMY;  Surgeon: Tavo Das MD;  Location: [facility] MAIN OR;  Service: Vascular;  Laterality: Right;   • CHOLECYSTECTOMY     • COLONOSCOPY      2016   • FINGER SURGERY     • KNEE ARTHROPLASTY     • KNEE SURGERY Right     replaced   • LAPAROSCOPIC GASTRIC BANDING     • ROTATOR CUFF REPAIR Right 2019   • SHOULDER SURGERY Right 05/24/2019    scope/ cuff repair   • TUBAL ABDOMINAL LIGATION       Family History   Problem Relation Age of Onset   • Diabetes Other    • Heart disease Mother    • Diabetes Mother    • Heart disease Father      Social History     Tobacco Use   • Smoking status: Never   • Smokeless tobacco: Never   Vaping Use   • Vaping Use: Never used   Substance Use Topics   • Alcohol use: No   • Drug use: No     Medications Prior to Admission   Medication Sig Dispense Refill Last Dose   • ALPRAZolam (XANAX) 2 MG tablet TAKE 1 TABLET BY MOUTH AT NIGHT AS NEEDED FOR ANXIETY 30 tablet 0    • amLODIPine (NORVASC) 2.5 MG tablet TAKE ONE TABLET BY MOUTH DAILY 90 tablet 0 Past Week   • aspirin 81 MG chewable tablet Chew 1 tablet Daily. 30 tablet 1 4/30/2023   • atorvastatin (LIPITOR) 80 MG tablet TAKE ONE TABLET BY MOUTH ONCE NIGHTLY 30 tablet 11    • lamoTRIgine (LaMICtal) 100 MG tablet TAKE ONE TABLET BY MOUTH TWICE A DAY 60 tablet 3    • lisinopril-hydrochlorothiazide (PRINZIDE,ZESTORETIC) 20-25 MG per tablet TAKE ONE TABLET BY MOUTH DAILY 90 tablet 1    • metoprolol succinate XL (TOPROL-XL) 25 MG 24 hr tablet TAKE ONE TABLET BY MOUTH DAILY 30 tablet 10    • omeprazole (priLOSEC) 40 MG capsule TAKE ONE CAPSULE BY MOUTH DAILY 90 capsule 1    • oxybutynin (DITROPAN) 5 MG tablet Take 1 tablet by mouth Daily. 90 tablet 1 Past Week   • albuterol sulfate  (90 Base) MCG/ACT inhaler Inhale 2 puffs Every 6 (Six) Hours As Needed for Shortness of Air. (Patient not taking: Reported on 3/16/2023) 18 g  "0    • albuterol sulfate  (90 Base) MCG/ACT inhaler Inhale 2 puffs Every 4 (Four) Hours As Needed for Wheezing. (Patient not taking: Reported on 3/16/2023) 18 g 3      amLODIPine, 5 mg, Oral, Daily  aspirin, 81 mg, Oral, Daily  atorvastatin, 80 mg, Oral, Nightly  lisinopril, 20 mg, Oral, Q24H  metoprolol succinate XL, 25 mg, Oral, Daily  ondansetron, 4 mg, Intravenous, Once  oxybutynin, 5 mg, Oral, Daily  pantoprazole, 40 mg, Oral, Q AM  sodium chloride, 10 mL, Intravenous, Q12H      Allergies:  Patient has no known allergies.    Objective      Vital Signs  Temp:  [97 °F (36.1 °C)-98.5 °F (36.9 °C)] 98.5 °F (36.9 °C)  Heart Rate:  [48-71] 57  Resp:  [12-20] 19  BP: (104-169)/() 106/60    Flowsheet Rows    Flowsheet Row First Filed Value   Admission Height 167.6 cm (66\") Documented at 04/30/2023 2132   Admission Weight 92.7 kg (204 lb 5.9 oz) Documented at 04/30/2023 2132        167.6 cm (66\")    Physical Exam  Vitals and nursing note reviewed.   Constitutional:       General: She is awake.      Appearance: Normal appearance. She is well-developed and well-groomed.      Comments:  and sister at bedside   HENT:      Head: Normocephalic and atraumatic.      Nose: Nose normal.      Mouth/Throat:      Lips: Pink.      Mouth: Mucous membranes are moist.      Pharynx: Uvula midline.   Eyes:      General: Lids are normal. No scleral icterus.     Extraocular Movements: Extraocular movements intact.      Conjunctiva/sclera: Conjunctivae normal.      Pupils: Pupils are equal, round, and reactive to light.   Neck:      Thyroid: No thyroid mass or thyromegaly.      Vascular: Normal carotid pulses. No carotid bruit, hepatojugular reflux or JVD.      Trachea: Trachea normal.   Cardiovascular:      Rate and Rhythm: Normal rate and regular rhythm.      Pulses:           Carotid pulses are 2+ on the right side and 2+ on the left side.       Radial pulses are 2+ on the right side and 2+ on the left side.        " Femoral pulses are 2+ on the right side and 2+ on the left side.       Popliteal pulses are 2+ on the right side and 2+ on the left side.        Dorsalis pedis pulses are 2+ on the right side and 2+ on the left side.        Posterior tibial pulses are 2+ on the right side and 2+ on the left side.      Heart sounds: Normal heart sounds. No murmur heard.     Comments: Tele:  SR 70s  Pulmonary:      Effort: Pulmonary effort is normal.      Breath sounds: Normal breath sounds.   Abdominal:      General: Bowel sounds are normal. There is no distension.      Palpations: Abdomen is soft.      Tenderness: There is no abdominal tenderness.   Musculoskeletal:      Cervical back: Neck supple.      Right lower leg: No edema.      Left lower leg: No edema.      Comments: Gait steady and strong without use of assistive devices   Lymphadenopathy:      Cervical: No cervical adenopathy.      Upper Body:      Right upper body: No supraclavicular adenopathy.      Left upper body: No supraclavicular adenopathy.   Skin:     General: Skin is warm and dry.      Capillary Refill: Capillary refill takes less than 2 seconds.      Findings: No erythema or rash.      Nails: There is no clubbing.   Neurological:      Mental Status: She is alert and oriented to person, place, and time.      GCS: GCS eye subscore is 4. GCS verbal subscore is 5. GCS motor subscore is 6.   Psychiatric:         Attention and Perception: Attention and perception normal.         Mood and Affect: Mood and affect normal.         Speech: Speech normal.         Behavior: Behavior normal. Behavior is cooperative.         Thought Content: Thought content normal.         Cognition and Memory: Cognition and memory normal.         Judgment: Judgment normal.         Results Review:   Lab Results (last 24 hours)     Procedure Component Value Units Date/Time    CBC & Differential [[id number]]  (Abnormal) Collected: 05/02/23 0551    Specimen: Blood Updated: 05/02/23 0604     Narrative:      The following orders were created for panel order CBC & Differential.  Procedure                               Abnormality         Status                     ---------                               -----------         ------                     CBC Auto Differential[[id number]]        Abnormal            Final result                 Please view results for these tests on the individual orders.    CBC Auto Differential [[id number]]  (Abnormal) Collected: 05/02/23 0551    Specimen: Blood Updated: 05/02/23 0610     WBC 8.30 10*3/mm3      RBC 4.05 10*6/mm3      Hemoglobin 12.0 g/dL      Hematocrit 36.5 %      MCV 90.1 fL      MCH 29.5 pg      MCHC 32.8 g/dL      RDW 14.3 %      RDW-SD 47.3 fl      MPV 8.7 fL      Platelets 219 10*3/mm3      Neutrophil % 59.6 %      Lymphocyte % 32.0 %      Monocyte % 6.1 %      Eosinophil % 1.4 %      Basophil % 0.9 %      Neutrophils, Absolute 5.00 10*3/mm3      Lymphocytes, Absolute 2.70 10*3/mm3      Monocytes, Absolute 0.50 10*3/mm3      Eosinophils, Absolute 0.10 10*3/mm3      Basophils, Absolute 0.10 10*3/mm3      nRBC 0.3 /100 WBC     Basic Metabolic Panel [[id number]]  (Normal) Collected: 05/02/23 0335    Specimen: Blood Updated: 05/02/23 0431     Glucose 98 mg/dL      BUN 13 mg/dL      Creatinine 0.83 mg/dL      Sodium 143 mmol/L      Potassium 4.1 mmol/L      Chloride 106 mmol/L      CO2 25.0 mmol/L      Calcium 9.1 mg/dL      BUN/Creatinine Ratio 15.7     Anion Gap 12.0 mmol/L      eGFR 78.3 mL/min/1.73     Narrative:      GFR Normal >60  Chronic Kidney Disease <60  Kidney Failure <15      Magnesium [[id number]]  (Normal) Collected: 05/02/23 0335    Specimen: Blood Updated: 05/02/23 0431     Magnesium 2.0 mg/dL               Assessment & Plan       Chest pain, unspecified type    Mood disorder    Hyperlipidemia    Hypertension    Obesity    GERD with esophagitis    Overactive bladder    History of stroke    Chronic cough    Elevated troponin    Non-STEMI (non-ST  elevated myocardial infarction)      Assessment & Plan     - MV CAD with NSTEMI presentation, EF 50% (cath)--surgical workup in progress  - Mild to moderate mitral regurgitation--Per cath, echo pending  - HTN--stable  - HLD--statin  - Hyperglycemia--a1c 5.8  - Hx stroke ()--s/p right CEA (Dr. Das, )  - Strong family hx premature CAD--father  of MI age 54/ mother  from MI age 64  - Anxiety--on home Xanax, she is no longer on Viibyrd  - Obesity, stage 1--BMI 33.2    Thank you for allowing us to participate in the care of this patient.  Dr. Noonan reviewed films and d/w pt/family.  Recommendation for CABG given and pt/family agreeable.  Plans for CABG on Friday afternoon.  Preops initiated.  CT head without ordered d/t stroke hx.  Carotid duplex in Nov noted but reordered per Dr. Noonan's request.    Tina Chao, SARAHY  23  09:54 EDT    **all problems new to this examiner  **EKG and CXR independently reviewed and interpreted

## 2023-05-02 NOTE — PROGRESS NOTES
[facility] Medicine Services   Daily Progress Note    Patient Name: Andree Deluna  : 1955  MRN: [id number]  Primary Care Physician:  Naga Griffith PA-C  Date of admission: 2023  Date and Time of Service: 2023 at 0730      Subjective      Chief Complaint:chest pain    Patient Reports no chest pain this morning    Review of Systems   All other systems reviewed and are negative.         Objective      Vitals:   Temp:  [97 °F (36.1 °C)-98.5 °F (36.9 °C)] 98.5 °F (36.9 °C)  Heart Rate:  [48-71] 57  Resp:  [12-20] 19  BP: (104-184)/() 106/60  Flow (L/min):  [2] 2    Physical Exam  Constitutional:       Appearance: Normal appearance. She is obese.   HENT:      Head: Normocephalic and atraumatic.   Eyes:      Extraocular Movements: Extraocular movements intact.      Pupils: Pupils are equal, round, and reactive to light.   Cardiovascular:      Rate and Rhythm: Tachycardia present. Rhythm irregular.      Pulses: Normal pulses.      Heart sounds: Normal heart sounds.   Abdominal:      General: Abdomen is flat.      Palpations: Abdomen is soft.   Musculoskeletal:         General: Normal range of motion.      Cervical back: Normal range of motion.   Neurological:      General: No focal deficit present.      Mental Status: She is alert.   Psychiatric:         Mood and Affect: Mood normal.         Behavior: Behavior normal.         Thought Content: Thought content normal.         Judgment: Judgment normal.             Result Review    Result Review:  I have personally reviewed the results from the time of this admission to 2023 07:37 EDT and agree with these findings:  [x]  Laboratory  []  Microbiology  []  Radiology  []  EKG/Telemetry   []  Cardiology/Vascular   []  Pathology  []  Old records  []  Other:  Most notable findings include: troponin level at 120          Assessment & Plan      Brief Patient Summary:  Andree Deluna is a 67 y.o. female with past medical history of  stroke, hypertension, hyperlipidemia, anxiety who presented to [facility] on 4/30/2023 complaining of intermittent midsternal chest pressure that has been ongoing for several months, but was progressively worse on 4/30/2023 after eating dinner.  Chest pain was alleviated after sublingual nitro given in the ED.  Aggravated by exertion and better with rest.  Patient complains of radiation between her shoulder blades.  Associated symptoms of lightheadedness, shortness of air, nausea, vomiting.  She denies any recent medication changes.  She has a history of left shoulder arthritis, and complains of left shoulder and arm pain.  She denies any cardiac history, however states both her mother and father had heart attacks at a young age.      amLODIPine, 5 mg, Oral, Daily  aspirin, 81 mg, Oral, Daily  atorvastatin, 80 mg, Oral, Nightly  lisinopril, 20 mg, Oral, Q24H  metoprolol succinate XL, 25 mg, Oral, Daily  ondansetron, 4 mg, Intravenous, Once  oxybutynin, 5 mg, Oral, Daily  pantoprazole, 40 mg, Oral, Q AM  sodium chloride, 10 mL, Intravenous, Q12H       sodium chloride, 100 mL/hr, Last Rate: 100 mL/hr (05/01/23 2054)         Active Hospital Problems:  Active Hospital Problems    Diagnosis    • **Chest pain, unspecified type    • Elevated troponin    • Non-STEMI (non-ST elevated myocardial infarction)    • Chronic cough    • History of stroke    • Overactive bladder    • GERD with esophagitis    • Mood disorder    • Hyperlipidemia    • Obesity    • Hypertension      Plan:   The patient had cardiac catheterization done yesterday that showed severe triple-vessel disease, cardiothoracic surgeon consult was placed.  Further recommendation per their services    DVT prophylaxis:  Mechanical DVT prophylaxis orders are present.    CODE STATUS:    Level Of Support Discussed With: Patient  Code Status (Patient has no pulse and is not breathing): CPR (Attempt to Resuscitate)  Medical Interventions (Patient has pulse or  is breathing): Full Support  Release to patient: Routine Release      Disposition:  I expect patient to be discharged 5 days    This patient has been and examined  and discussed with nursing staff. 05/02/23      Electronically signed by Jimmie Muñiz MD, 05/02/23, 07:37 EDT.  [facility] Hospitalist Team

## 2023-05-02 NOTE — CASE MANAGEMENT/SOCIAL WORK
Continued Stay Note   [facility]     Patient Name: Andree Deluna  MRN: [id number]  Today's Date: 5/2/2023    Admit Date: 4/30/2023    Plan: DC Plan: Pending Clinical Course and PT/OT evaluations. From home with spouse. CABG work up in progress.   Discharge Plan     Row Name 05/02/23 1345       Plan    Plan DC Plan: Pending Clinical Course and PT/OT evaluations. From home with spouse. CABG work up in progress.    Provided Post Acute Provider List? N/A    Provided Post Acute Provider Quality & Resource List? N/A    Plan Comments CM spoke with patient’s nurse and [facility] NP Tina Hall to obtain clinical updates. CABG workup in progress. CM will continue to follow for any further needs and adjust discharge plan accordingly. DC Barriers: Pending testing results, and CABG date TBD, cardiac monitoring.           Expected Discharge Date and Time     Expected Discharge Date Expected Discharge Time    May 9, 2023             Hattie Alicea RN     Office Phone: [phone]  Office Cell:     [phone]

## 2023-05-02 NOTE — PROGRESS NOTES
Cardiology Progress Note    Patient Identification:  Name: Andree Deluna  Age: 67 y.o.  Sex: female  :  1955  MRN: [id number]                 Follow Up / Chief Complaint: NSTEMI  Chief Complaint   Patient presents with   • Chest Pain     Pt reports midsternal CP that radiates to back that started 2 hrs PTA.  Pt describes pain as a squeezing pain that is intermittent.  With associated SOA.  Pt reports she took 2 81mg ASA PTA.        Interval History: Patient presented with chest pain. Underwent cardiac cath that showed MV CAD and CV surgery has been consulted.       NP NOTE:  Patient seen and examined; she is sitting in chair without any complaints.  No chest pain.  Wants to get surgery moving.  Advised surgery will speak with her regarding timing.     Electronically signed by SARAHY Steele, 23, 1:24 PM EDT.    Cardiology attending addendum :    I have personally performed a face-to-face diagnostic evaluation, physical exam and reviewed data on this patient.  I have reviewed documentation done by me and nurse practitioner  and corrected as needed.  And agree with the different components of documentation.Greater than 50% of the time spent in the care of this patient was provided by attending consultant/me.             Subjective: Patient seen and examined.  Chart reviewed.  Labs reviewed.  Discussed with RN taking care of patient.      Objective: HS troponin 54---120  2023: bmp unremarkable, CBC unremarkable     History of Present Illness:       Ms.Elisa LOVELY Deluna has PMH of     Hypertension  Diastolic dysfunction-echo 2019 revealing septal asymmetric hypertrophy EF 60%, diastolic dysfunction  Dyslipidemia  CVA  80% right carotid disease  WEN  Carotid endarterectomy, cholecystectomy, tubal ligation, shoulder/knee/finger surgery  Family history of premature CAD father fatal MI at 54 mother fatal MI at 64.     Presented through emergency room 2023 with midsternal chest pain  radiating to the back, squeezing in sensation, radiating to the left arm and neck, stated with shortness of breath and nausea.  Patient has been having symptoms since many months but has been progressively worse.     Work-up here 4/30/2023/5/1/2023 revealed elevated troponin at 54-->120.  Glucose elevated.  Potassium 3.4.  A1c of 5.8.  Chest x-ray stable cardiomegaly.  EKG done 4/30/2023 reviewed/interpreted by me reveals sinus rhythm with a rate of 74 bpm with  ST segment depression in 1 and aVL        Assessment:  :     New onset chest pain at rest consistent with unstable angina  Elevated troponin consistent with acute non-ST elevation MI  CAD cardiac cath 5/1/2023 revealing multivessel coronary artery disease  Hypertension  Dyslipidemia  History of CVA 2019 and carotid disease, right CEA 2020  PAD  Hyperglycemia, prediabetes with A1c of 5.8  Obesity with BMI over 30           Recommendations / Plan:         Telemetry is revealing sinus rhythm.  Patient has new onset prolonged chest pain and has elevated troponin consistent with acute non-ST elevation MI  Patient was started on aspirin, beta-blockers, statins.  We will continue as tolerated.   patient underwent cardiac cath 5/1/2023 which revealed severe triple-vessel disease.  Patient was transferred to [facility].  CT surgery was consulted.  Patient was seen by .  Discussed with   Patient is being evaluated for CABG.  Will defer timing to CT service.  Discussed with patient's family by bedside.  Discussed with RN taking care of patient.       Copied text in this portion of the note has been reviewed and is accurate as of 5/2/2023    Past Medical History:  Past Medical History:   Diagnosis Date   • ADHD unknown   • Anemia    • Anxiety    • Carotid artery disease     80% right internal carotid artery   • CVA (cerebral vascular accident)     right parietal right temporal   • DDD (degenerative disc disease), lumbar    • Depression    •  Extremity pain     Ankle pain   • GERD (gastroesophageal reflux disease)    • Hyperlipidemia unknown   • Hypertension    • Insomnia unknown   • Laryngopharyngeal reflux    • Low back pain    • Mood disorder    • Obesity unknown   • Skin cancer of face    • Sleep apnea     Suspected sleep apnea she has refused to be tested   • Stroke (cerebrum)    • Visual field defect     Left     Past Surgical History:  Past Surgical History:   Procedure Laterality Date   • CARDIAC CATHETERIZATION N/A 5/1/2023    Procedure: Left Heart Cath;  Surgeon: Kye Alcaraz MD;  Location: [facility] CATH INVASIVE LOCATION;  Service: Cardiovascular;  Laterality: N/A;   • CAROTID ENDARTERECTOMY Right 11/10/2020    Procedure: CAROTID ENDARTERECTOMY;  Surgeon: Tavo Das MD;  Location: [facility] MAIN OR;  Service: Vascular;  Laterality: Right;   • CHOLECYSTECTOMY     • COLONOSCOPY      2016   • FINGER SURGERY     • KNEE ARTHROPLASTY     • KNEE SURGERY Right     replaced   • LAPAROSCOPIC GASTRIC BANDING     • ROTATOR CUFF REPAIR Right 2019   • SHOULDER SURGERY Right 05/24/2019    scope/ cuff repair   • TUBAL ABDOMINAL LIGATION          Social History:   Social History     Tobacco Use   • Smoking status: Never   • Smokeless tobacco: Never   Substance Use Topics   • Alcohol use: No      Family History:  Family History   Problem Relation Age of Onset   • Diabetes Other    • Heart disease Mother    • Diabetes Mother    • Heart disease Father           Allergies:  No Known Allergies  Scheduled Meds:  amLODIPine, 5 mg, Daily  aspirin, 81 mg, Daily  atorvastatin, 80 mg, Nightly  [START ON 5/5/2023] ceFAZolin, 2 g, Once  [START ON 5/4/2023] chlorhexidine, 15 mL, Q12H  Chlorhexidine Gluconate Cloth, 1 application, Q12H  lisinopril, 20 mg, Q24H  metoprolol succinate XL, 25 mg, Daily  [START ON 5/5/2023] metoprolol tartrate, 12.5 mg, Once  [START ON 5/4/2023] mupirocin, 1 application, Q12H  ondansetron, 4 mg, Once  oxybutynin, 5 mg,  "Daily  pantoprazole, 40 mg, Q AM  sodium chloride, 10 mL, Q12H          Review of Systems:   ROS  Review of Systems   Constitution: Negative for chills and fever.   Cardiovascular: Negative for chest pain and palpitations.   Respiratory: Negative for cough and hemoptysis.    Gastrointestinal: Negative for nausea.        Constitutional:  Temp:  [97.6 °F (36.4 °C)-98.6 °F (37 °C)] 98 °F (36.7 °C)  Heart Rate:  [48-71] 60  Resp:  [12-19] 14  BP: (104-138)/(51-77) 122/72    Physical Exam   /72 (BP Location: Left arm, Patient Position: Sitting)   Pulse 60   Temp 98 °F (36.7 °C) (Oral)   Resp 14   Ht 167.6 cm (66\")   Wt 93 kg (205 lb)   SpO2 93%   BMI 33.09 kg/m²   General:  Appears in no acute distress  Eyes: Sclera is anicteric,  conjunctiva is clear   HEENT:  No JVD. Thyroid not visibly enlarged. No mucosal pallor or cyanosis  Respiratory: Respirations regular and unlabored at rest.  Bilaterally good breath sounds, with good air entry in all fields. No crackles, rubs or wheezes auscultated  Cardiovascular: S1,S2 Regular rate and rhythm. No murmur, rub or gallop auscultated.  . No pretibial pitting edema  Gastrointestinal: Abdomen nondistended, soft  Musculoskeletal:  No abnormal movements  Extremities: No digital clubbing or cyanosis  Skin: Color pink. Skin warm and dry to touch. No rashes  No xanthoma  Neuro: Alert and awake, no lateralizing deficits appreciated    INTAKE AND OUTPUT:    Intake/Output Summary (Last 24 hours) at 5/2/2023 1839  Last data filed at 5/2/2023 1600  Gross per 24 hour   Intake 1955 ml   Output 1050 ml   Net 905 ml       Cardiographics  Telemetry: Sinus    ECG:   ECG 12 Lead   Preliminary Result   HEART RATE= 56  bpm   RR Interval= 1072  ms   IN Interval= 144  ms   P Horizontal Axis= 5  deg   P Front Axis= 17  deg   QRSD Interval= 93  ms   QT Interval= 477  ms   QRS Axis= 8  deg   T Wave Axis= -63  deg   - ABNORMAL ECG -   Sinus bradycardia   Nonspecific repol abnormality, diffuse " leads   Electronically Signed By:    Date and Time of Study: 2023-05-02 10:51:40      ECG 12 Lead Chest Pain   Final Result   HEART RATE= 74  bpm   RR Interval= 816  ms   VT Interval= 140  ms   P Horizontal Axis= -20  deg   P Front Axis= 17  deg   QRSD Interval= 89  ms   QT Interval= 408  ms   QRS Axis= -2  deg   T Wave Axis= 71  deg   - ABNORMAL ECG -   Sinus rhythm   Probable left ventricular hypertrophy   ST depression, consider ischemia, lateral lds   When compared with ECG of 22-Oct-2020 11:28:25,   Significant rate increase   Significant axis, voltage or hypertrophy change   Electronically Signed By: Jmoar Mejia (Shekhar) 01-May-2023 09:11:21   Date and Time of Study: 2023-04-30 21:37:55      SCANNED - TELEMETRY     Final Result      SCANNED - TELEMETRY     Final Result      ECG 12 Lead Chest Pain    (Results Pending)     I have personally reviewed EKG    Echocardiogram: Results for orders placed during the hospital encounter of 08/06/19    Adult Transthoracic Echo Complete W/ Cont if Necessary Per Protocol (With Agitated Saline)    Interpretation Summary  · Left ventricular wall thickness is consistent with mild septal asymmetric hypertrophy.  · Left ventricular systolic function is normal.  · Left ventricular diastolic dysfunction (grade I) consistent with impaired relaxation.  · Estimated EF = 60%.      Lab Review   I have reviewed the labs  Results from last 7 days   Lab Units 05/01/23 0138 04/30/23  2248   HSTROP T ng/L 120* 54*     Results from last 7 days   Lab Units 05/02/23  0335   MAGNESIUM mg/dL 2.0     Results from last 7 days   Lab Units 05/02/23  0335   SODIUM mmol/L 143   POTASSIUM mmol/L 4.1   BUN mg/dL 13   CREATININE mg/dL 0.83   CALCIUM mg/dL 9.1         Results from last 7 days   Lab Units 05/02/23  0551 05/01/23  0138 04/30/23  2248   WBC 10*3/mm3 8.30 9.70 8.90   HEMOGLOBIN g/dL 12.0 12.3 13.0   HEMATOCRIT % 36.5 38.3 39.5   PLATELETS 10*3/mm3 219 245 260     Results from last 7 days   Lab  "Units 04/30/23  2248   INR  0.95   APTT seconds 25.9*       RADIOLOGY:  Imaging Results (Last 24 Hours)     ** No results found for the last 24 hours. **                )5/2/2023  MD CAPO Ivory/Transcription:   \"Dictated utilizing Dragon dictation\".   "

## 2023-05-03 ENCOUNTER — APPOINTMENT (OUTPATIENT)
Dept: CT IMAGING | Facility: HOSPITAL | Age: 68
DRG: 217 | End: 2023-05-03
Payer: MEDICARE

## 2023-05-03 LAB
ALBUMIN SERPL-MCNC: 3.5 G/DL (ref 3.5–5.2)
ALBUMIN/GLOB SERPL: 1.3 G/DL
ALP SERPL-CCNC: 91 U/L (ref 39–117)
ALT SERPL W P-5'-P-CCNC: 15 U/L (ref 1–33)
ANION GAP SERPL CALCULATED.3IONS-SCNC: 10 MMOL/L (ref 5–15)
APTT PPP: 24.6 SECONDS (ref 24–31)
ARTERIAL PATENCY WRIST A: POSITIVE
AST SERPL-CCNC: 22 U/L (ref 1–32)
ATMOSPHERIC PRESS: ABNORMAL MM[HG]
BASE EXCESS BLDA CALC-SCNC: 4.3 MMOL/L (ref 0–3)
BASOPHILS # BLD AUTO: 0.1 10*3/MM3 (ref 0–0.2)
BASOPHILS NFR BLD AUTO: 1.5 % (ref 0–1.5)
BDY SITE: ABNORMAL
BILIRUB SERPL-MCNC: 0.5 MG/DL (ref 0–1.2)
BUN SERPL-MCNC: 14 MG/DL (ref 8–23)
BUN/CREAT SERPL: 17.3 (ref 7–25)
CALCIUM SPEC-SCNC: 9.1 MG/DL (ref 8.6–10.5)
CHLORIDE SERPL-SCNC: 105 MMOL/L (ref 98–107)
CHOLEST SERPL-MCNC: 150 MG/DL (ref 0–200)
CLOSE TME COLL+ADP + EPINEP PNL BLD: 99 % (ref 86–100)
CO2 BLDA-SCNC: 30.5 MMOL/L (ref 22–29)
CO2 SERPL-SCNC: 25 MMOL/L (ref 22–29)
CREAT SERPL-MCNC: 0.81 MG/DL (ref 0.57–1)
DEPRECATED RDW RBC AUTO: 45.1 FL (ref 37–54)
EGFRCR SERPLBLD CKD-EPI 2021: 80.7 ML/MIN/1.73
EOSINOPHIL # BLD AUTO: 0.2 10*3/MM3 (ref 0–0.4)
EOSINOPHIL NFR BLD AUTO: 2.1 % (ref 0.3–6.2)
ERYTHROCYTE [DISTWIDTH] IN BLOOD BY AUTOMATED COUNT: 14.3 % (ref 12.3–15.4)
GLOBULIN UR ELPH-MCNC: 2.7 GM/DL
GLUCOSE SERPL-MCNC: 116 MG/DL (ref 65–99)
HCO3 BLDA-SCNC: 29.2 MMOL/L (ref 21–28)
HCT VFR BLD AUTO: 38.2 % (ref 34–46.6)
HDLC SERPL-MCNC: 62 MG/DL (ref 40–60)
HEMODILUTION: NO
HGB BLD-MCNC: 12.2 G/DL (ref 12–15.9)
HOLD SPECIMEN: NORMAL
INHALED O2 CONCENTRATION: 21 %
INR PPP: 0.99 (ref 0.93–1.1)
LDLC SERPL CALC-MCNC: 69 MG/DL (ref 0–100)
LDLC/HDLC SERPL: 1.08 {RATIO}
LYMPHOCYTES # BLD AUTO: 2.8 10*3/MM3 (ref 0.7–3.1)
LYMPHOCYTES NFR BLD AUTO: 33.6 % (ref 19.6–45.3)
MAGNESIUM SERPL-MCNC: 2.1 MG/DL (ref 1.6–2.4)
MCH RBC QN AUTO: 29.3 PG (ref 26.6–33)
MCHC RBC AUTO-ENTMCNC: 31.9 G/DL (ref 31.5–35.7)
MCV RBC AUTO: 92 FL (ref 79–97)
MODALITY: ABNORMAL
MONOCYTES # BLD AUTO: 0.5 10*3/MM3 (ref 0.1–0.9)
MONOCYTES NFR BLD AUTO: 5.7 % (ref 5–12)
NEUTROPHILS NFR BLD AUTO: 4.8 10*3/MM3 (ref 1.7–7)
NEUTROPHILS NFR BLD AUTO: 57.1 % (ref 42.7–76)
NRBC BLD AUTO-RTO: 0 /100 WBC (ref 0–0.2)
PCO2 BLDA: 43.4 MM HG (ref 35–48)
PH BLDA: 7.43 PH UNITS (ref 7.35–7.45)
PLATELET # BLD AUTO: 239 10*3/MM3 (ref 140–450)
PMV BLD AUTO: 8.9 FL (ref 6–12)
PO2 BLDA: 80.9 MM HG (ref 83–108)
POTASSIUM SERPL-SCNC: 4 MMOL/L (ref 3.5–5.2)
PROT SERPL-MCNC: 6.2 G/DL (ref 6–8.5)
PROTHROMBIN TIME: 10.6 SECONDS (ref 9.6–11.7)
QT INTERVAL: 459 MS
QT INTERVAL: 477 MS
RBC # BLD AUTO: 4.16 10*6/MM3 (ref 3.77–5.28)
SAO2 % BLDCOA: 96.2 % (ref 94–98)
SODIUM SERPL-SCNC: 140 MMOL/L (ref 136–145)
TRIGL SERPL-MCNC: 104 MG/DL (ref 0–150)
VLDLC SERPL-MCNC: 19 MG/DL (ref 5–40)
WBC NRBC COR # BLD: 8.3 10*3/MM3 (ref 3.4–10.8)

## 2023-05-03 PROCEDURE — 36600 WITHDRAWAL OF ARTERIAL BLOOD: CPT

## 2023-05-03 PROCEDURE — 85610 PROTHROMBIN TIME: CPT | Performed by: NURSE PRACTITIONER

## 2023-05-03 PROCEDURE — 80061 LIPID PANEL: CPT | Performed by: NURSE PRACTITIONER

## 2023-05-03 PROCEDURE — 83735 ASSAY OF MAGNESIUM: CPT | Performed by: INTERNAL MEDICINE

## 2023-05-03 PROCEDURE — G0378 HOSPITAL OBSERVATION PER HR: HCPCS

## 2023-05-03 PROCEDURE — 85025 COMPLETE CBC W/AUTO DIFF WBC: CPT | Performed by: NURSE PRACTITIONER

## 2023-05-03 PROCEDURE — 70450 CT HEAD/BRAIN W/O DYE: CPT

## 2023-05-03 PROCEDURE — 82803 BLOOD GASES ANY COMBINATION: CPT

## 2023-05-03 PROCEDURE — 99232 SBSQ HOSP IP/OBS MODERATE 35: CPT | Performed by: INTERNAL MEDICINE

## 2023-05-03 PROCEDURE — 80053 COMPREHEN METABOLIC PANEL: CPT | Performed by: NURSE PRACTITIONER

## 2023-05-03 PROCEDURE — 85576 BLOOD PLATELET AGGREGATION: CPT | Performed by: NURSE PRACTITIONER

## 2023-05-03 PROCEDURE — 85730 THROMBOPLASTIN TIME PARTIAL: CPT | Performed by: NURSE PRACTITIONER

## 2023-05-03 RX ADMIN — CHLORHEXIDINE GLUCONATE 1 APPLICATION: 500 CLOTH TOPICAL at 06:38

## 2023-05-03 RX ADMIN — PANTOPRAZOLE SODIUM 40 MG: 40 TABLET, DELAYED RELEASE ORAL at 06:38

## 2023-05-03 RX ADMIN — LISINOPRIL 20 MG: 20 TABLET ORAL at 08:18

## 2023-05-03 RX ADMIN — OXYBUTYNIN CHLORIDE 5 MG: 5 TABLET ORAL at 08:19

## 2023-05-03 RX ADMIN — METOPROLOL SUCCINATE 25 MG: 25 TABLET, EXTENDED RELEASE ORAL at 08:18

## 2023-05-03 RX ADMIN — Medication 10 ML: at 08:18

## 2023-05-03 RX ADMIN — ASPIRIN 81 MG CHEWABLE TABLET 81 MG: 81 TABLET CHEWABLE at 08:18

## 2023-05-03 RX ADMIN — ALPRAZOLAM 2 MG: 1 TABLET ORAL at 21:07

## 2023-05-03 RX ADMIN — ATORVASTATIN CALCIUM 80 MG: 40 TABLET, FILM COATED ORAL at 21:07

## 2023-05-03 RX ADMIN — ACETAMINOPHEN 650 MG: 325 TABLET, FILM COATED ORAL at 22:35

## 2023-05-03 RX ADMIN — Medication 10 ML: at 21:08

## 2023-05-03 RX ADMIN — AMLODIPINE BESYLATE 5 MG: 5 TABLET ORAL at 08:18

## 2023-05-03 NOTE — PROGRESS NOTES
[facility]   Daily Progress Note    Patient Name: Andree Deluna  : 1955  MRN: [id number]  Primary Care Physician:  Naga Griffith PA-C  Date of admission: 2023  Date and Time of Service: 5/3/2023 at 1525    Subjective      Chief Complaint: chest pain   Patient Reports denies cailin chest pain , no shortness of breath     Review of Systems   All other systems reviewed and are negative.         Objective      Vitals:   Temp:  [97.4 °F (36.3 °C)-98.1 °F (36.7 °C)] 97.5 °F (36.4 °C)  Heart Rate:  [52-79] 79  Resp:  [12-15] 15  BP: (114-142)/(52-95) 142/95    Physical Exam  Constitutional:       Appearance: Normal appearance.   HENT:      Head: Normocephalic and atraumatic.   Eyes:      Extraocular Movements: Extraocular movements intact.      Pupils: Pupils are equal, round, and reactive to light.   Cardiovascular:      Rate and Rhythm: Normal rate and regular rhythm.      Pulses: Normal pulses.   Pulmonary:      Effort: Pulmonary effort is normal.      Breath sounds: Normal breath sounds.   Abdominal:      General: Abdomen is flat.      Palpations: Abdomen is soft.   Musculoskeletal:         General: Normal range of motion.      Cervical back: Normal range of motion.   Neurological:      General: No focal deficit present.      Mental Status: She is alert and oriented to person, place, and time.   Psychiatric:         Mood and Affect: Mood normal.         Behavior: Behavior normal.         Thought Content: Thought content normal.             Result Review    Result Review:  I have personally reviewed the results from the time of this admission to 5/3/2023 15:35 EDT and agree with these findings:  [x]  Laboratory  []  Microbiology  []  Radiology  []  EKG/Telemetry   []  Cardiology/Vascular   []  Pathology  []  Old records  []  Other:            Assessment & Plan      Brief Patient Summary:  Andree Deluna is a 67 y.o. female with past medical history of stroke,  hypertension, hyperlipidemia, anxiety who presented to [facility]/30[facility]2023 complaining of intermittent midsternal chest pressure that has been ongoing for several months, but was progressively worse on 4/30/2023 after eating dinner.  Chest pain was alleviated after sublingual nitro given in the ED.  Aggravated by exertion and better with rest.  Patient complains of radiation between her shoulder blades.  Associated symptoms of lightheadedness, shortness of air, nausea, vomiting.  She denies any recent medication changes.  She has a history of left shoulder arthritis, and complains of left shoulder and arm pain.  She denies any cardiac history, however states both her mother and father had heart attacks at a young age.    amLODIPine, 5 mg, Oral, Daily  aspirin, 81 mg, Oral, Daily  atorvastatin, 80 mg, Oral, Nightly  [START ON 5/5/2023] ceFAZolin, 2 g, Intravenous, Once  [START ON 5/4/2023] chlorhexidine, 15 mL, Mouth/Throat, Q12H  Chlorhexidine Gluconate Cloth, 1 application, Topical, Q12H  lisinopril, 20 mg, Oral, Q24H  metoprolol succinate XL, 25 mg, Oral, Daily  [START ON 5/5/2023] metoprolol tartrate, 12.5 mg, Oral, Once  [START ON 5/4/2023] mupirocin, 1 application, Each Nare, Q12H  ondansetron, 4 mg, Intravenous, Once  oxybutynin, 5 mg, Oral, Daily  pantoprazole, 40 mg, Oral, Q AM  sodium chloride, 10 mL, Intravenous, Q12H       [START ON 5/5/2023] insulin, 0-100 Units/hr  sodium chloride, 100 mL/hr, Last Rate: 100 mL/hr (05/01/23 2054)  sodium chloride, 30 mL/hr         Active Hospital Problems:  Active Hospital Problems    Diagnosis    • **Chest pain, unspecified type    • Elevated troponin    • Non-STEMI (non-ST elevated myocardial infarction)    • Coronary artery disease involving native coronary artery of native heart with unstable angina pectoris    • Abnormal findings on diagnostic imaging of heart and coronary circulation    • Chronic cough    • History of stroke    • Overactive bladder     • GERD with esophagitis    • Mood disorder    • Hyperlipidemia    • Obesity    • Hypertension      Plan:       • **Chest pain, unspecified type     • Elevated troponin     • Non-STEMI (non-ST elevated myocardial infarction)     • Chronic cough     • History of stroke     • Overactive bladder     • GERD with esophagitis     • Mood disorder     • Hyperlipidemia     • Obesity     • Hypertension        Plan:   The patient had cardiac catheterization done on 5/1/2023that showed severe triple-vessel disease, cardiothoracic surgeon consult was placed.  Patient is scheduled for CABG on 5/5/2023   DVT prophylaxis:  Mechanical DVT prophylaxis orders are present.      DVT prophylaxis:  Mechanical DVT prophylaxis orders are present.    CODE STATUS:    Level Of Support Discussed With: Patient  Code Status (Patient has no pulse and is not breathing): CPR (Attempt to Resuscitate)  Medical Interventions (Patient has pulse or is breathing): Full Support  Release to patient: Routine Release      Disposition:  I expect patient to be discharged  5 to 7 days     This patient has been seen and examined and discussed with nursing staff. 05/03/23      Electronically signed by Jimmie Muñiz MD, 05/03/23, 15:35 EDT.  [facility], [place] Hospitalist Team

## 2023-05-03 NOTE — PROGRESS NOTES
" LOS: 0 days   Patient Care Team:  Naga Griffith PA-C as PCP - General (Physician Assistant)    Chief Complaint: CAD/mitral regurgitation    Subjective:  Symptoms:  No shortness of breath, cough or chest pain.    Diet:  Adequate intake.  No nausea or vomiting.    Activity level: Normal.    Pain:  She complains of pain that is mild.      C/o left shoulder pain which is chronic for patient    Vital Signs  Temp:  [97.4 °F (36.3 °C)-98.1 °F (36.7 °C)] 98.1 °F (36.7 °C)  Heart Rate:  [52-71] 71  Resp:  [12-14] 14  BP: (106-142)/(52-95) 142/95  Body mass index is 32.51 kg/m².    Intake/Output Summary (Last 24 hours) at 5/3/2023 1121  Last data filed at 5/3/2023 0600  Gross per 24 hour   Intake 600 ml   Output 1200 ml   Net -600 ml     No intake/output data recorded.            05/02/23  0500 05/02/23  1218 05/03/23  0500   Weight: 93.4 kg (205 lb 14.6 oz) 93 kg (205 lb) 91.4 kg (201 lb 6.4 oz)         Objective:  General Appearance:  Comfortable and in no acute distress.    Vital signs: (most recent): Blood pressure 142/95, pulse 71, temperature 98.1 °F (36.7 °C), temperature source Oral, resp. rate 14, height 167.6 cm (66\"), weight 91.4 kg (201 lb 6.4 oz), SpO2 98 %, not currently breastfeeding.  Vital signs are normal.  No fever.    Output: Producing urine.    Lungs:  Normal effort and normal respiratory rate.  Breath sounds clear to auscultation.    Heart: Normal rate.  Regular rhythm.  Positive for murmur.    Abdomen: Abdomen is soft.  Bowel sounds are normal.     Extremities: There is dependent edema.    Pulses: Distal pulses are intact.    Neurological: Patient is alert and oriented to person, place and time.    Skin:  Warm and dry.          Results Review:        WBC WBC   Date Value Ref Range Status   05/03/2023 8.30 3.40 - 10.80 10*3/mm3 Final   05/02/2023 8.30 3.40 - 10.80 10*3/mm3 Final   05/01/2023 9.70 3.40 - 10.80 10*3/mm3 Final   04/30/2023 8.90 3.40 - 10.80 10*3/mm3 Final      HGB Hemoglobin   Date " Value Ref Range Status   05/03/2023 12.2 12.0 - 15.9 g/dL Final   05/02/2023 12.0 12.0 - 15.9 g/dL Final   05/01/2023 12.3 12.0 - 15.9 g/dL Final   04/30/2023 13.0 12.0 - 15.9 g/dL Final      HCT Hematocrit   Date Value Ref Range Status   05/03/2023 38.2 34.0 - 46.6 % Final   05/02/2023 36.5 34.0 - 46.6 % Final   05/01/2023 38.3 34.0 - 46.6 % Final   04/30/2023 39.5 34.0 - 46.6 % Final      Platelets Platelets   Date Value Ref Range Status   05/03/2023 239 140 - 450 10*3/mm3 Final   05/02/2023 219 140 - 450 10*3/mm3 Final   05/01/2023 245 140 - 450 10*3/mm3 Final   04/30/2023 260 140 - 450 10*3/mm3 Final        PT/INR:    Protime   Date Value Ref Range Status   05/03/2023 10.6 9.6 - 11.7 Seconds Final   04/30/2023 10.2 9.6 - 11.7 Seconds Final   /  INR   Date Value Ref Range Status   05/03/2023 0.99 0.93 - 1.10 Final   04/30/2023 0.95 0.93 - 1.10 Final       Sodium Sodium   Date Value Ref Range Status   05/03/2023 140 136 - 145 mmol/L Final   05/02/2023 143 136 - 145 mmol/L Final   05/01/2023 142 136 - 145 mmol/L Final   04/30/2023 145 136 - 145 mmol/L Final      Potassium Potassium   Date Value Ref Range Status   05/03/2023 4.0 3.5 - 5.2 mmol/L Final     Comment:     Slight hemolysis detected by analyzer. Results may be affected.   05/02/2023 4.1 3.5 - 5.2 mmol/L Final   05/01/2023 3.4 (L) 3.5 - 5.2 mmol/L Final   04/30/2023 3.6 3.5 - 5.2 mmol/L Final      Chloride Chloride   Date Value Ref Range Status   05/03/2023 105 98 - 107 mmol/L Final   05/02/2023 106 98 - 107 mmol/L Final   05/01/2023 105 98 - 107 mmol/L Final   04/30/2023 105 98 - 107 mmol/L Final      Bicarbonate CO2   Date Value Ref Range Status   05/03/2023 25.0 22.0 - 29.0 mmol/L Final   05/02/2023 25.0 22.0 - 29.0 mmol/L Final   05/01/2023 27.0 22.0 - 29.0 mmol/L Final   04/30/2023 27.0 22.0 - 29.0 mmol/L Final      BUN BUN   Date Value Ref Range Status   05/03/2023 14 8 - 23 mg/dL Final   05/02/2023 13 8 - 23 mg/dL Final   05/01/2023 17 8 - 23 mg/dL  Final   04/30/2023 16 8 - 23 mg/dL Final      Creatinine Creatinine   Date Value Ref Range Status   05/03/2023 0.81 0.57 - 1.00 mg/dL Final   05/02/2023 0.83 0.57 - 1.00 mg/dL Final   05/01/2023 0.95 0.57 - 1.00 mg/dL Final   04/30/2023 1.15 (H) 0.57 - 1.00 mg/dL Final      Calcium Calcium   Date Value Ref Range Status   05/03/2023 9.1 8.6 - 10.5 mg/dL Final   05/02/2023 9.1 8.6 - 10.5 mg/dL Final   05/01/2023 9.0 8.6 - 10.5 mg/dL Final   04/30/2023 9.5 8.6 - 10.5 mg/dL Final      Magnesium Magnesium   Date Value Ref Range Status   05/03/2023 2.1 1.6 - 2.4 mg/dL Final   05/02/2023 2.0 1.6 - 2.4 mg/dL Final   05/01/2023 2.2 1.6 - 2.4 mg/dL Final          amLODIPine, 5 mg, Oral, Daily  aspirin, 81 mg, Oral, Daily  atorvastatin, 80 mg, Oral, Nightly  [START ON 5/5/2023] ceFAZolin, 2 g, Intravenous, Once  [START ON 5/4/2023] chlorhexidine, 15 mL, Mouth/Throat, Q12H  Chlorhexidine Gluconate Cloth, 1 application, Topical, Q12H  lisinopril, 20 mg, Oral, Q24H  metoprolol succinate XL, 25 mg, Oral, Daily  [START ON 5/5/2023] metoprolol tartrate, 12.5 mg, Oral, Once  [START ON 5/4/2023] mupirocin, 1 application, Each Nare, Q12H  ondansetron, 4 mg, Intravenous, Once  oxybutynin, 5 mg, Oral, Daily  pantoprazole, 40 mg, Oral, Q AM  sodium chloride, 10 mL, Intravenous, Q12H      [START ON 5/5/2023] insulin, 0-100 Units/hr  sodium chloride, 100 mL/hr, Last Rate: 100 mL/hr (05/01/23 2054)  sodium chloride, 30 mL/hr            Patient Active Problem List   Diagnosis Code   • Anemia in other chronic diseases classified elsewhere D63.8   • Anxiety F41.9   • B12 deficiency E53.8   • Attention deficit disorder of adult with hyperactivity F90.9   • Mood disorder F39   • Complete rotator cuff tear or rupture of right shoulder, not specified as traumatic M75.121   • Degeneration of intervertebral disc of cervical region M50.30   • Degeneration of intervertebral disc of lumbosacral region M51.37   • Fatigue R53.83   • Hyperlipidemia E78.5    • Hypertension I10   • Insomnia G47.00   • Obesity E66.9   • Osteoarthritis of knee M17.9   • CVA (cerebral vascular accident) I63.9   • Visual field loss left H53.40   • Acute midline low back pain without sciatica M54.50   • GERD with esophagitis K21.00   • Carotid stenosis, bilateral I65.23   • Overactive bladder N32.81   • History of stroke Z86.73   • Acute pain of right shoulder M25.511   • Left cervical radiculopathy M54.12   • Migraine headache G43.909   • Medicare annual wellness visit, subsequent Z00.00   • Moderate episode of recurrent major depressive disorder F33.1   • Piriformis syndrome, right G57.01   • Precordial pain R07.2   • History of Grimaldo's esophagus Z87.19   • Chronic cough R05.3   • PATEL (generalized anxiety disorder) F41.1   • Chest pain, unspecified type R07.9   • Elevated troponin R77.8   • Non-STEMI (non-ST elevated myocardial infarction) I21.4   • Coronary artery disease involving native coronary artery of native heart with unstable angina pectoris I25.110   • Abnormal findings on diagnostic imaging of heart and coronary circulation R93.1       Assessment & Plan   - MV CAD with NSTEMI presentation, EF 50% (cath)--surgical workup in progress  - moderate mitral regurgitation--per echo   - HTN--stable  - HLD--statin  - Hyperglycemia--a1c 5.8  - Hx stroke (2019)--s/p right CEA (Dr. Das, )  - Strong family hx premature CAD--father  of MI age 54/ mother  from MI age 64  - Anxiety--on home Xanax, she is no longer on Viibyrd  - Obesity, stage 1--BMI 33.2    Denies chest pain overnight  Preoperative work up nearly complete. Will have Dr. Noonan review head CT. Adequate conduit in bilateral thighs. Will need repeat COVID test tomorrow  Echo yesterday with normal EF, and moderate mitral regurgitation. Discussed with Dr. Noonan and plan for possible mitral repair/replacement in addition to CABG. Will evaluate further with intraop GALA  Plan for surgery Friday afternoon  with Dr. Noonan  Questions answered with verbalized understanding    Andie Ayoub, SARAHY  05/03/23  11:21 EDT

## 2023-05-03 NOTE — CASE MANAGEMENT/SOCIAL WORK
Continued Stay Note  [facility]     Patient Name: Andree Deluna  MRN: [id number]  Today's Date: 5/3/2023    Admit Date: 4/30/2023    Plan: DC Plan: Pending Clinical Course and PT/OT evaluations. IP Rehab, HH, OP Rehab, and DME lists provided and awaiting choices. CABG planned 5/5/23.   Discharge Plan     Row Name 05/03/23 1428       Plan    Plan DC Plan: Pending Clinical Course and PT/OT evaluations. IP Rehab, HH, OP Rehab, and DME lists provided and awaiting choices. CABG planned 5/5/23.    Provided Post Acute Provider List? N/A    Provided Post Acute Provider Quality & Resource List? N/A    Plan Comments CM spoke to patient at bedside to discuss discharge plans.CM provided Home Health, IP Rehab, OP Rehab and DME lists for potential discharge needs pending PT/OT evaluations. CM requested patient/family to select 2-3 choices from each list and report back to CM when possible. CM provided contact information and Medicare.gov website for any questions and guidance. Patient/Family verbalized understanding. CM will continue to follow for any further needs and adjust discharge plan accordingly. DC Barriers: Pending OHS 5/5/23           Expected Discharge Date and Time     Expected Discharge Date Expected Discharge Time    May 10, 2023             Hattie Alicea RN      Office Phone: [phone]  Office Cell:     [phone]

## 2023-05-03 NOTE — PROGRESS NOTES
Cardiology Progress Note    Patient Identification:  Name: Andree Deluna  Age: 67 y.o.  Sex: female  :  1955  MRN: [id number]                 Follow Up / Chief Complaint: NSTEMI  Chief Complaint   Patient presents with   • Chest Pain     Pt reports midsternal CP that radiates to back that started 2 hrs PTA.  Pt describes pain as a squeezing pain that is intermittent.  With associated SOA.  Pt reports she took 2 81mg ASA PTA.        Interval History: Patient presented with chest pain. Underwent cardiac cath that showed MV CAD and CV surgery has been consulted.       NP NOTE:  Patient seen with Dr. Alcaraz.  Patient denies any chest pain or shortness of breath.  Patient is scheduled for CABG on Friday morning.     Electronically signed by SARAHY Steele, 23, 11:25 AM EDT.         Cardiology attending addendum :    I have personally performed a face-to-face diagnostic evaluation, physical exam and reviewed data on this patient.  I have reviewed documentation done by me and nurse practitioner  and corrected as needed.  And agree with the different components of documentation.Greater than 50% of the time spent in the care of this patient was provided by attending consultant/me.       Subjective: Patient seen and examined.  Chart reviewed.  Labs reviewed.  Discussed with RN taking care of patient.      Objective: HS troponin 54---120  2023: bmp unremarkable, CBC unremarkable   5/3/2023: glucose 116 unremarkable  CMP HLD 62 LDL 69 CBC normal.       History of Present Illness:       Ms.Elisa LOVELY Deluna has PMH of     Hypertension  Diastolic dysfunction-echo 2019 revealing septal asymmetric hypertrophy EF 60%, diastolic dysfunction  Dyslipidemia  CVA  80% right carotid disease  WEN  Carotid endarterectomy, cholecystectomy, tubal ligation, shoulder/knee/finger surgery  Family history of premature CAD father fatal MI at 54 mother fatal MI at 64.     Presented through emergency room 2023  with midsternal chest pain radiating to the back, squeezing in sensation, radiating to the left arm and neck, stated with shortness of breath and nausea.  Patient has been having symptoms since many months but has been progressively worse.     Work-up here 4/30/2023/5/1/2023 revealed elevated troponin at 54-->120.  Glucose elevated.  Potassium 3.4.  A1c of 5.8.  Chest x-ray stable cardiomegaly.  EKG done 4/30/2023 reviewed/interpreted by me reveals sinus rhythm with a rate of 74 bpm with  ST segment depression in 1 and aVL        Assessment:  :     New onset chest pain at rest consistent with unstable angina  Elevated troponin consistent with acute non-ST elevation MI  CAD cardiac cath 5/1/2023 revealing multivessel coronary artery disease  Hypertension  Dyslipidemia  History of CVA 2019 and carotid disease, right CEA 2020  PAD  Hyperglycemia, prediabetes with A1c of 5.8  Obesity with BMI over 30           Recommendations / Plan:         Telemetry is revealing sinus rhythm.  Patient has new onset prolonged chest pain and has elevated troponin consistent with acute non-ST elevation MI  Patient was started on aspirin, beta-blockers, statins.  We will continue as tolerated.   patient underwent cardiac cath 5/1/2023 which revealed severe triple-vessel disease.  Patient was transferred to [facility].  CT surgery was consulted.  Patient was seen by .  Patient is scheduled for CABG Friday morning.  Patient is being evaluated for CABG.  Will defer timing to CT service.  Discussed with patient's family by bedside.  Answered all questions patient had about CABG and recovery.  Discussed with RN taking care of patient.       Copied text in this portion of the note has been reviewed and is accurate as of 5/3/2023    Past Medical History:  Past Medical History:   Diagnosis Date   • ADHD unknown   • Anemia    • Anxiety    • Carotid artery disease     80% right internal carotid artery   • CVA (cerebral vascular accident)      right parietal right temporal   • DDD (degenerative disc disease), lumbar    • Depression    • Extremity pain     Ankle pain   • GERD (gastroesophageal reflux disease)    • Hyperlipidemia unknown   • Hypertension    • Insomnia unknown   • Laryngopharyngeal reflux    • Low back pain    • Mood disorder    • Obesity unknown   • Skin cancer of face    • Sleep apnea     Suspected sleep apnea she has refused to be tested   • Stroke (cerebrum)    • Visual field defect     Left     Past Surgical History:  Past Surgical History:   Procedure Laterality Date   • CARDIAC CATHETERIZATION N/A 5/1/2023    Procedure: Left Heart Cath;  Surgeon: yKe Alcaraz MD;  Location: [facility] CATH INVASIVE LOCATION;  Service: Cardiovascular;  Laterality: N/A;   • CAROTID ENDARTERECTOMY Right 11/10/2020    Procedure: CAROTID ENDARTERECTOMY;  Surgeon: Tavo Das MD;  Location: [facility] MAIN OR;  Service: Vascular;  Laterality: Right;   • CHOLECYSTECTOMY     • COLONOSCOPY      2016   • FINGER SURGERY     • KNEE ARTHROPLASTY     • KNEE SURGERY Right     replaced   • LAPAROSCOPIC GASTRIC BANDING     • ROTATOR CUFF REPAIR Right 2019   • SHOULDER SURGERY Right 05/24/2019    scope/ cuff repair   • TUBAL ABDOMINAL LIGATION          Social History:   Social History     Tobacco Use   • Smoking status: Never   • Smokeless tobacco: Never   Substance Use Topics   • Alcohol use: No      Family History:  Family History   Problem Relation Age of Onset   • Diabetes Other    • Heart disease Mother    • Diabetes Mother    • Heart disease Father           Allergies:  No Known Allergies  Scheduled Meds:  amLODIPine, 5 mg, Daily  aspirin, 81 mg, Daily  atorvastatin, 80 mg, Nightly  [START ON 5/5/2023] ceFAZolin, 2 g, Once  [START ON 5/4/2023] chlorhexidine, 15 mL, Q12H  Chlorhexidine Gluconate Cloth, 1 application, Q12H  lisinopril, 20 mg, Q24H  metoprolol succinate XL, 25 mg, Daily  [START ON 5/5/2023] metoprolol tartrate, 12.5 mg,  "Once  [START ON 5/4/2023] mupirocin, 1 application, Q12H  ondansetron, 4 mg, Once  oxybutynin, 5 mg, Daily  pantoprazole, 40 mg, Q AM  sodium chloride, 10 mL, Q12H          Review of Systems:   ROS  Review of Systems   Constitution: Negative for chills and fever.   Cardiovascular: Negative for chest pain and palpitations.   Respiratory: Negative for cough and hemoptysis.    Gastrointestinal: Negative for nausea.        Constitutional:  Temp:  [97.4 °F (36.3 °C)-98.1 °F (36.7 °C)] 97.5 °F (36.4 °C)  Heart Rate:  [52-79] 79  Resp:  [12-15] 15  BP: (114-142)/(52-95) 142/95    Physical Exam   /95 (BP Location: Right arm, Patient Position: Sitting)   Pulse 79   Temp 97.5 °F (36.4 °C) (Oral)   Resp 15   Ht 167.6 cm (66\")   Wt 91.4 kg (201 lb 6.4 oz)   SpO2 91%   BMI 32.51 kg/m²   General:  Appears in no acute distress  Eyes: Sclera is anicteric,  conjunctiva is clear   HEENT:  No JVD. Thyroid not visibly enlarged. No mucosal pallor or cyanosis  Respiratory: Respirations regular and unlabored at rest.  Bilaterally good breath sounds, with good air entry in all fields. No crackles, rubs or wheezes auscultated  Cardiovascular: S1,S2 Regular rate and rhythm. No murmur, rub or gallop auscultated.  . No pretibial pitting edema  Gastrointestinal: Abdomen nondistended, soft  Musculoskeletal:  No abnormal movements  Extremities: No digital clubbing or cyanosis  Skin: Color pink. Skin warm and dry to touch. No rashes  No xanthoma  Neuro: Alert and awake, no lateralizing deficits appreciated    INTAKE AND OUTPUT:    Intake/Output Summary (Last 24 hours) at 5/3/2023 1415  Last data filed at 5/3/2023 0600  Gross per 24 hour   Intake 360 ml   Output 800 ml   Net -440 ml       Cardiographics  Telemetry: Sinus    ECG:   ECG 12 Lead   Preliminary Result   HEART RATE= 56  bpm   RR Interval= 1072  ms   [place] Interval= 144  ms   P Horizontal Axis= 5  deg   P Front Axis= 17  deg   QRSD Interval= 93  ms   QT Interval= 477  ms   QRS " Axis= 8  deg   T Wave Axis= -63  deg   - ABNORMAL ECG -   Sinus bradycardia   Nonspecific repol abnormality, diffuse leads   Electronically Signed By:    Date and Time of Study: 2023-05-02 10:51:40      ECG 12 Lead Chest Pain   Preliminary Result   HEART RATE= 57  bpm   RR Interval= 1048  ms   WI Interval= 142  ms   P Horizontal Axis= 16  deg   P Front Axis= 24  deg   QRSD Interval= 87  ms   QT Interval= 459  ms   QRS Axis= 26  deg   T Wave Axis= 198  deg   - OTHERWISE NORMAL ECG -   Slow sinus arrhythmia   Electronically Signed By:    Date and Time of Study: 2023-05-01 05:38:43      ECG 12 Lead Chest Pain   Final Result   HEART RATE= 74  bpm   RR Interval= 816  ms   WI Interval= 140  ms   P Horizontal Axis= -20  deg   P Front Axis= 17  deg   QRSD Interval= 89  ms   QT Interval= 408  ms   QRS Axis= -2  deg   T Wave Axis= 71  deg   - ABNORMAL ECG -   Sinus rhythm   Probable left ventricular hypertrophy   ST depression, consider ischemia, lateral lds   When compared with ECG of 22-Oct-2020 11:28:25,   Significant rate increase   Significant axis, voltage or hypertrophy change   Electronically Signed By: Jomar Mejia (Shekhar) 01-May-2023 09:11:21   Date and Time of Study: 2023-04-30 21:37:55      SCANNED - TELEMETRY     Final Result      SCANNED - TELEMETRY     Final Result        I have personally reviewed EKG    Echocardiogram: Results for orders placed during the hospital encounter of 04/30/23    Adult Transthoracic Echo Complete W/ Cont if Necessary Per Protocol    Interpretation Summary  •  Estimated right ventricular systolic pressure from tricuspid regurgitation is normal (<35 mmHg).      Normal LV size and contractility EF of 60 to 65%  Normal RV size  Normal atrial size  Pulmonic valve is not well visualized.  Aortic valve is trileaflet with mild sclerosis.  Mitral valve, tricuspid valve appears structurally normal, moderate mitral regurgitation seen.  No pericardial effusion seen.  Proximal aorta appears normal  in size.      Lab Review   I have reviewed the labs  Results from last 7 days   Lab Units 05/01/23  0138 04/30/23  2248   HSTROP T ng/L 120* 54*     Results from last 7 days   Lab Units 05/03/23  0330   MAGNESIUM mg/dL 2.1     Results from last 7 days   Lab Units 05/03/23  0330   SODIUM mmol/L 140   POTASSIUM mmol/L 4.0   BUN mg/dL 14   CREATININE mg/dL 0.81   CALCIUM mg/dL 9.1         Results from last 7 days   Lab Units 05/03/23  0520 05/02/23  0551 05/01/23  0138   WBC 10*3/mm3 8.30 8.30 9.70   HEMOGLOBIN g/dL 12.2 12.0 12.3   HEMATOCRIT % 38.2 36.5 38.3   PLATELETS 10*3/mm3 239 219 245     Results from last 7 days   Lab Units 05/03/23  0520 04/30/23  2248   INR  0.99 0.95   APTT seconds 24.6 25.9*       RADIOLOGY:  Imaging Results (Last 24 Hours)     Procedure Component Value Units Date/Time    CT Head Without Contrast [[id number]] Collected: 05/03/23 0819     Updated: 05/03/23 0825    Narrative:      CT HEAD WO CONTRAST    Date of Exam: 5/3/2023 7:54 AM EDT    Indication: hx stroke, preop CABG.    Comparison: CT angiography of the head with contrast 8/8/2019, MRI brain 7/18/2017, CT head without contrast 7/17/2017.    Technique: Axial CT images were obtained of the head without contrast administration.  Sagittal and coronal reconstructions were performed.  Automated exposure control and iterative reconstruction methods were used.      Findings:  Extensive deep white matter hypodensities are nonspecific, but very similar in appearance to the 7/17/2017 examination. Chronic appearing lacunar-type infarcts are suggested within the anterior limb of each internal capsule, similar to prior study.   Benign calcifications are seen within the globus pallidi. No intracranial hemorrhage, mass lesion, mass effect, midline shift. No evidence of acute or evolving cortical infarct. Mild nodular mucosal thickening is present in the bilateral sphenoid   sinuses.. Dense intracranial carotid artery calcifications are present.  "Orbital structures are normal.      Impression:      Impression:    1. No acute intracranial finding.  2. Extensive deep white matter hypodensities are again seen, and are nonspecific. Given the chronicity and stability of findings since 7/17/2017, advanced chronic microvascular disease is favored, although this is greater than expected for the patient's   stated age. As stated before, other etiologies such as demyelinating process or leukoencephalopathy could be included in the differential.      Electronically Signed: Mary Linares    5/3/2023 8:23 AM EDT    Workstation ID: [id number]                )5/3/2023  Kye Alcaraz MD      [facility] Dragon/Transcription:   \"Dictated utilizing Dragon dictation\".   "

## 2023-05-04 LAB
ABO GROUP BLD: NORMAL
ANION GAP SERPL CALCULATED.3IONS-SCNC: 11 MMOL/L (ref 5–15)
BLD GP AB SCN SERPL QL: NEGATIVE
BUN SERPL-MCNC: 14 MG/DL (ref 8–23)
BUN/CREAT SERPL: 18.2 (ref 7–25)
CALCIUM SPEC-SCNC: 9.4 MG/DL (ref 8.6–10.5)
CHLORIDE SERPL-SCNC: 107 MMOL/L (ref 98–107)
CO2 SERPL-SCNC: 26 MMOL/L (ref 22–29)
CREAT SERPL-MCNC: 0.77 MG/DL (ref 0.57–1)
EGFRCR SERPLBLD CKD-EPI 2021: 85.7 ML/MIN/1.73
GLUCOSE SERPL-MCNC: 119 MG/DL (ref 65–99)
MAGNESIUM SERPL-MCNC: 2.1 MG/DL (ref 1.6–2.4)
POTASSIUM SERPL-SCNC: 3.9 MMOL/L (ref 3.5–5.2)
RH BLD: NEGATIVE
SARS-COV-2 RNA RESP QL NAA+PROBE: NOT DETECTED
SODIUM SERPL-SCNC: 144 MMOL/L (ref 136–145)
T&S EXPIRATION DATE: NORMAL
WHOLE BLOOD HOLD SPECIMEN: NORMAL

## 2023-05-04 PROCEDURE — G0378 HOSPITAL OBSERVATION PER HR: HCPCS

## 2023-05-04 PROCEDURE — 86850 RBC ANTIBODY SCREEN: CPT | Performed by: NURSE PRACTITIONER

## 2023-05-04 PROCEDURE — 83735 ASSAY OF MAGNESIUM: CPT | Performed by: INTERNAL MEDICINE

## 2023-05-04 PROCEDURE — 99232 SBSQ HOSP IP/OBS MODERATE 35: CPT | Performed by: INTERNAL MEDICINE

## 2023-05-04 PROCEDURE — 86900 BLOOD TYPING SEROLOGIC ABO: CPT | Performed by: NURSE PRACTITIONER

## 2023-05-04 PROCEDURE — 80048 BASIC METABOLIC PNL TOTAL CA: CPT | Performed by: NURSE PRACTITIONER

## 2023-05-04 PROCEDURE — 86901 BLOOD TYPING SEROLOGIC RH(D): CPT | Performed by: NURSE PRACTITIONER

## 2023-05-04 PROCEDURE — 86923 COMPATIBILITY TEST ELECTRIC: CPT

## 2023-05-04 PROCEDURE — U0003 INFECTIOUS AGENT DETECTION BY NUCLEIC ACID (DNA OR RNA); SEVERE ACUTE RESPIRATORY SYNDROME CORONAVIRUS 2 (SARS-COV-2) (CORONAVIRUS DISEASE [COVID-19]), AMPLIFIED PROBE TECHNIQUE, MAKING USE OF HIGH THROUGHPUT TECHNOLOGIES AS DESCRIBED BY CMS-2020-01-R: HCPCS | Performed by: INTERNAL MEDICINE

## 2023-05-04 PROCEDURE — 36415 COLL VENOUS BLD VENIPUNCTURE: CPT | Performed by: NURSE PRACTITIONER

## 2023-05-04 RX ADMIN — Medication 10 ML: at 21:55

## 2023-05-04 RX ADMIN — METOPROLOL SUCCINATE 25 MG: 25 TABLET, EXTENDED RELEASE ORAL at 08:35

## 2023-05-04 RX ADMIN — LISINOPRIL 20 MG: 20 TABLET ORAL at 08:34

## 2023-05-04 RX ADMIN — CHLORHEXIDINE GLUCONATE 15 ML: 1.2 SOLUTION ORAL at 21:55

## 2023-05-04 RX ADMIN — CHLORHEXIDINE GLUCONATE 1 APPLICATION: 500 CLOTH TOPICAL at 06:07

## 2023-05-04 RX ADMIN — CHLORHEXIDINE GLUCONATE 1 APPLICATION: 500 CLOTH TOPICAL at 21:55

## 2023-05-04 RX ADMIN — ASPIRIN 81 MG CHEWABLE TABLET 81 MG: 81 TABLET CHEWABLE at 08:35

## 2023-05-04 RX ADMIN — OXYBUTYNIN CHLORIDE 5 MG: 5 TABLET ORAL at 08:35

## 2023-05-04 RX ADMIN — AMLODIPINE BESYLATE 5 MG: 5 TABLET ORAL at 08:35

## 2023-05-04 RX ADMIN — ATORVASTATIN CALCIUM 80 MG: 40 TABLET, FILM COATED ORAL at 21:54

## 2023-05-04 RX ADMIN — MUPIROCIN 1 APPLICATION: 20 OINTMENT TOPICAL at 21:55

## 2023-05-04 RX ADMIN — ALPRAZOLAM 2 MG: 1 TABLET ORAL at 21:55

## 2023-05-04 RX ADMIN — PANTOPRAZOLE SODIUM 40 MG: 40 TABLET, DELAYED RELEASE ORAL at 06:07

## 2023-05-04 RX ADMIN — Medication 10 ML: at 08:36

## 2023-05-04 NOTE — PLAN OF CARE
Patient is able to ambulate and perform ADL's independently. Instructed patient on use of incentive spirometer and reinforced education for preop/postop expectations for tonight and tomorrow for the CABG with mitral valve repair/replacement with Dr. Noonan. Patient denies any pain on a 1-10 scale and shows no s/s of distress. Right femoral cath site is soft/nontender with minimal bruising. Will monitor.

## 2023-05-04 NOTE — CASE MANAGEMENT/SOCIAL WORK
[facility]ued Stay Note  [facility]     Patient Name: Andree Deluna  MRN: [id number]  Today's Date: 5/4/2023    Admit Date: 4/30/2023    Plan: DC Plan: Pending Clinical Course and PT/OT evaluations. IP Rehab, HH, OP Rehab, and DME lists provided and choices listed. CABG planned 5/5/23.   Discharge Plan     Row Name 05/04/23 1427       Plan    Plan DC Plan: Pending Clinical Course and PT/OT evaluations. IP Rehab, HH, OP Rehab, and DME lists provided and choices listed. CABG planned 5/5/23.    Provided Post Acute Provider List? N/A    Provided Post Acute Provider Quality & Resource List? N/A    Plan Comments CM received facility choices from the patient: Acute IP: 1) [facility] 2)[facility].  SNF: 1) [facility]  2) [facility].  [place] for DME.   OP PT: 1) [facility].  Patient will wait on HH choices. D/c barriers: CABG 5/5, IV antibiotics, IVF.                Expected Discharge Date and Time     Expected Discharge Date Expected Discharge Time    May 12, 2023         Met with patient in room wearing PPE: mask,      Maintained distance greater than six feet and spent less than 15 minutes in the room.        Lucille Jack RN      [address] [address]  Phone: [phone]  Fax: [phone]

## 2023-05-04 NOTE — PROGRESS NOTES
" LOS: 0 days   Patient Care Team:  Naga Griffith PA-C as PCP - General (Physician Assistant)    Chief Complaint: CAD/mitral regurgitation    Subjective:  Symptoms:  No shortness of breath, cough or chest pain.    Diet:  Adequate intake.  No nausea or vomiting.    Activity level: Normal.    Pain:  She complains of pain that is mild.      C/o left shoulder pain which is chronic for patient    Vital Signs  Temp:  [97.2 °F (36.2 °C)-98.4 °F (36.9 °C)] 97.7 °F (36.5 °C)  Heart Rate:  [60-79] 66  Resp:  [14-17] 14  BP: (129-146)/(61-86) 139/86  Body mass index is 33.38 kg/m².    Intake/Output Summary (Last 24 hours) at 5/4/2023 0847  Last data filed at 5/4/2023 0600  Gross per 24 hour   Intake 840 ml   Output 0 ml   Net 840 ml     No intake/output data recorded.            05/02/23  1218 05/03/23  0500 05/04/23  0600   Weight: 93 kg (205 lb) 91.4 kg (201 lb 6.4 oz) 93.8 kg (206 lb 12.7 oz)         Objective:  General Appearance:  Comfortable and in no acute distress.    Vital signs: (most recent): Blood pressure 139/86, pulse 66, temperature 97.7 °F (36.5 °C), temperature source Oral, resp. rate 14, height 167.6 cm (66\"), weight 93.8 kg (206 lb 12.7 oz), SpO2 91 %, not currently breastfeeding.  Vital signs are normal.  No fever.    Output: Producing urine.    Lungs:  Normal effort and normal respiratory rate.  Breath sounds clear to auscultation.    Heart: Normal rate.  Regular rhythm.  Positive for murmur.    Abdomen: Abdomen is soft.  Bowel sounds are normal.     Extremities: There is dependent edema.    Pulses: Distal pulses are intact.    Neurological: Patient is alert and oriented to person, place and time.    Skin:  Warm and dry.          Results Review:        WBC WBC   Date Value Ref Range Status   05/03/2023 8.30 3.40 - 10.80 10*3/mm3 Final   05/02/2023 8.30 3.40 - 10.80 10*3/mm3 Final      HGB Hemoglobin   Date Value Ref Range Status   05/03/2023 12.2 12.0 - 15.9 g/dL Final   05/02/2023 12.0 12.0 - 15.9 " g/dL Final      HCT Hematocrit   Date Value Ref Range Status   05/03/2023 38.2 34.0 - 46.6 % Final   05/02/2023 36.5 34.0 - 46.6 % Final      Platelets Platelets   Date Value Ref Range Status   05/03/2023 239 140 - 450 10*3/mm3 Final   05/02/2023 219 140 - 450 10*3/mm3 Final        PT/INR:    Protime   Date Value Ref Range Status   05/03/2023 10.6 9.6 - 11.7 Seconds Final   /  INR   Date Value Ref Range Status   05/03/2023 0.99 0.93 - 1.10 Final       Sodium Sodium   Date Value Ref Range Status   05/04/2023 144 136 - 145 mmol/L Final   05/03/2023 140 136 - 145 mmol/L Final   05/02/2023 143 136 - 145 mmol/L Final      Potassium Potassium   Date Value Ref Range Status   05/04/2023 3.9 3.5 - 5.2 mmol/L Final   05/03/2023 4.0 3.5 - 5.2 mmol/L Final     Comment:     Slight hemolysis detected by analyzer. Results may be affected.   05/02/2023 4.1 3.5 - 5.2 mmol/L Final      Chloride Chloride   Date Value Ref Range Status   05/04/2023 107 98 - 107 mmol/L Final   05/03/2023 105 98 - 107 mmol/L Final   05/02/2023 106 98 - 107 mmol/L Final      Bicarbonate CO2   Date Value Ref Range Status   05/04/2023 26.0 22.0 - 29.0 mmol/L Final   05/03/2023 25.0 22.0 - 29.0 mmol/L Final   05/02/2023 25.0 22.0 - 29.0 mmol/L Final      BUN BUN   Date Value Ref Range Status   05/04/2023 14 8 - 23 mg/dL Final   05/03/2023 14 8 - 23 mg/dL Final   05/02/2023 13 8 - 23 mg/dL Final      Creatinine Creatinine   Date Value Ref Range Status   05/04/2023 0.77 0.57 - 1.00 mg/dL Final   05/03/2023 0.81 0.57 - 1.00 mg/dL Final   05/02/2023 0.83 0.57 - 1.00 mg/dL Final      Calcium Calcium   Date Value Ref Range Status   05/04/2023 9.4 8.6 - 10.5 mg/dL Final   05/03/2023 9.1 8.6 - 10.5 mg/dL Final   05/02/2023 9.1 8.6 - 10.5 mg/dL Final      Magnesium Magnesium   Date Value Ref Range Status   05/04/2023 2.1 1.6 - 2.4 mg/dL Final   05/03/2023 2.1 1.6 - 2.4 mg/dL Final   05/02/2023 2.0 1.6 - 2.4 mg/dL Final          amLODIPine, 5 mg, Oral, Daily  aspirin,  81 mg, Oral, Daily  atorvastatin, 80 mg, Oral, Nightly  [START ON 5/5/2023] ceFAZolin, 2 g, Intravenous, Once  chlorhexidine, 15 mL, Mouth/Throat, Q12H  Chlorhexidine Gluconate Cloth, 1 application, Topical, Q12H  lisinopril, 20 mg, Oral, Q24H  metoprolol succinate XL, 25 mg, Oral, Daily  [START ON 5/5/2023] metoprolol tartrate, 12.5 mg, Oral, Once  mupirocin, 1 application, Each Nare, Q12H  ondansetron, 4 mg, Intravenous, Once  oxybutynin, 5 mg, Oral, Daily  pantoprazole, 40 mg, Oral, Q AM  sodium chloride, 10 mL, Intravenous, Q12H      [START ON 5/5/2023] insulin, 0-100 Units/hr  sodium chloride, 100 mL/hr, Last Rate: 100 mL/hr (05/01/23 2054)  sodium chloride, 30 mL/hr            Patient Active Problem List   Diagnosis Code   • Anemia in other chronic diseases classified elsewhere D63.8   • Anxiety F41.9   • B12 deficiency E53.8   • Attention deficit disorder of adult with hyperactivity F90.9   • Mood disorder F39   • Complete rotator cuff tear or rupture of right shoulder, not specified as traumatic M75.121   • Degeneration of intervertebral disc of cervical region M50.30   • Degeneration of intervertebral disc of lumbosacral region M51.37   • Fatigue R53.83   • Hyperlipidemia E78.5   • Hypertension I10   • Insomnia G47.00   • Obesity E66.9   • Osteoarthritis of knee M17.9   • CVA (cerebral vascular accident) I63.9   • Visual field loss left H53.40   • Acute midline low back pain without sciatica M54.50   • GERD with esophagitis K21.00   • Carotid stenosis, bilateral I65.23   • Overactive bladder N32.81   • History of stroke Z86.73   • Acute pain of right shoulder M25.511   • Left cervical radiculopathy M54.12   • Migraine headache G43.909   • Medicare annual wellness visit, subsequent Z00.00   • Moderate episode of recurrent major depressive disorder F33.1   • Piriformis syndrome, right G57.01   • Precordial pain R07.2   • History of Grimaldo's esophagus Z87.19   • Chronic cough R05.3   • PATEL (generalized  anxiety disorder) F41.1   • Chest pain, unspecified type R07.9   • Elevated troponin R77.8   • Non-STEMI (non-ST elevated myocardial infarction) I21.4   • Coronary artery disease involving native coronary artery of native heart with unstable angina pectoris I25.110   • Abnormal findings on diagnostic imaging of heart and coronary circulation R93.1       Assessment & Plan     - MV CAD with NSTEMI presentation, EF 50% (cath)--surgical workup in progress  - Moderate mitral regurgitation--per echo   - HTN--stable  - HLD--statin  - Hyperglycemia--a1c 5.8  - Hx stroke ()--s/p right CEA (Dr. Das, )  - Strong family hx premature CAD--father  of MI age 54/ mother  from MI age 64  - Anxiety--on home Xanax, she is no longer on Viibyrd  - Obesity, stage 1--BMI 33.2    Preoperative work up nearly complete--Vein mapping with adequate conduit in bilateral thighs. Carotid US without significant stenosis. CT head reviewed per Dr. Noonan.   Echo yesterday with moderate mitral regurgitation--discussed with Dr. Noonan. Plan for possible mitral repair/replacement in addition to CABG. Will evaluate further with intraop GALA.  MRSA swab negative. UA negative. Repeat COVID test today.  OR Friday afternoon with Dr. Noonan.     Coco Evans, APRN  23  08:47 EDT

## 2023-05-04 NOTE — PROGRESS NOTES
[facility]     Progress Note    Patient Name: Andree Deluna  : 1955  MRN: [id number]  Primary Care Physician:  Naga Griffith PA-C  Date of admission: 2023  Service date and time: 23 16:03 EDT  Subjective   Subjective     Chief Complaint: chest pain     HPI:  Patient Reports patient is denying any chest pain or any shortness of breath at this time    Objective   Objective     Vitals:   Temp:  [96.7 °F (35.9 °C)-98.4 °F (36.9 °C)] 98 °F (36.7 °C)  Heart Rate:  [54-80] 54  Resp:  [14-17] 14  BP: (101-146)/(61-86) 131/82  Physical Exam    Constitutional: Awake, alert   Eyes: PERRLA, sclerae anicteric, no conjunctival injection   HENT: NCAT, mucous membranes moist   Neck: Supple, no thyromegaly, no lymphadenopathy, trachea midline   Respiratory: Clear to auscultation bilaterally, nonlabored respirations    Cardiovascular: RRR, no murmurs, rubs, or gallops, palpable pedal pulses bilaterally   Gastrointestinal: Positive bowel sounds, soft, nontender, nondistended   Musculoskeletal: No bilateral ankle edema, no clubbing or cyanosis to extremities   Psychiatric: Appropriate affect, cooperative   Neurologic: Oriented x 3, strength symmetric in all extremities, Cranial Nerves grossly intact to confrontation, speech clear   Skin: No rashes     Result Review    Result Review:  I have personally reviewed the results from the time of this admission to 2023 16:03 EDT and agree with these findings:  [x]  Laboratory list / accordion  []  Microbiology  []  Radiology  [x]  EKG/Telemetry   []  Cardiology/Vascular   []  Pathology  []  Old records  []  Other:        Assessment & Plan   Assessment / Plan       Active Hospital Problems:  Active Hospital Problems    Diagnosis    • **Chest pain, unspecified type    • Elevated troponin    • Non-STEMI (non-ST elevated myocardial infarction)    • Coronary artery disease involving native coronary artery of native heart with unstable angina pectoris    •  Abnormal findings on diagnostic imaging of heart and coronary circulation    • Chronic cough    • History of stroke    • Overactive bladder    • GERD with esophagitis    • Mood disorder    • Hyperlipidemia    • Obesity    • Hypertension      Plan:    MV CAD with NSTEMI presentation, EF 50% (cath)--surgical workup in progress  - Moderate mitral regurgitation--per echo   - HTN--stable  - HLD--statin  - Hyperglycemia--a1c 5.8  - Hx stroke ()--s/p right CEA (Dr. Das, )  - Strong family hx premature CAD--father  of MI age 54/ mother  from MI age 64  - Anxiety--on home Xanax, she is no longer on Viibyrd  - Obesity, stage 1--BMI 33.2      Patient is scheduled for CABG and mitral valve repair on , and possible intraoperative GALA.    DVT prophylaxis:  Mechanical DVT prophylaxis orders are present.    CODE STATUS:   Level Of Support Discussed With: Patient  Code Status (Patient has no pulse and is not breathing): CPR (Attempt to Resuscitate)  Medical Interventions (Patient has pulse or is breathing): Full Support  Release to patient: Routine Release    Disposition:  I expect patient to be discharged 5 to 7 days     Jimmie Muñiz MD

## 2023-05-04 NOTE — PROGRESS NOTES
Cardiology Progress Note    Patient Identification:  Name: Andree Deluna  Age: 67 y.o.  Sex: female  :  1955  MRN: [id number]                 Follow Up / Chief Complaint: NSTEMI  Chief Complaint   Patient presents with   • Chest Pain     Pt reports midsternal CP that radiates to back that started 2 hrs PTA.  Pt describes pain as a squeezing pain that is intermittent.  With associated SOA.  Pt reports she took 2 81mg ASA PTA.        Interval History: Patient presented with chest pain. Underwent cardiac cath that showed MV CAD and CV surgery has been consulted.       NP NOTE:  Patient seen and examined;  Doing well denies any chest pain or shortness of breath.  Mildly anxious regarding surgery tomorrow.     Electronically signed by SARAHY Steele, 23, 1:13 PM EDT.    Cardiology attending addendum :    I have personally performed a face-to-face diagnostic evaluation, physical exam and reviewed data on this patient.  I have reviewed documentation done by me and nurse practitioner  and corrected as needed.  And agree with the different components of documentation.Greater than 50% of the time spent in the care of this patient was provided by attending consultant/me.           Subjective: Patient seen and examined.  Chart reviewed.  Labs reviewed.  Discussed with RN taking care of patient.      Objective: HS troponin 54---120  2023: bmp unremarkable, CBC unremarkable   5/3/2023: glucose 116 unremarkable  CMP HLD 62 LDL 69 CBC normal.       History of Present Illness:       Ms.Elisa LOVELY Deluna has PMH of     Hypertension  Diastolic dysfunction-echo 2019 revealing septal asymmetric hypertrophy EF 60%, diastolic dysfunction  Dyslipidemia  CVA  80% right carotid disease  WEN  Carotid endarterectomy, cholecystectomy, tubal ligation, shoulder/knee/finger surgery  Family history of premature CAD father fatal MI at 54 mother fatal MI at 64.     Presented through emergency room 2023 with  midsternal chest pain radiating to the back, squeezing in sensation, radiating to the left arm and neck, stated with shortness of breath and nausea.  Patient has been having symptoms since many months but has been progressively worse.     Work-up here 4/30/2023/5/1/2023 revealed elevated troponin at 54-->120.  Glucose elevated.  Potassium 3.4.  A1c of 5.8.  Chest x-ray stable cardiomegaly.  EKG done 4/30/2023 reviewed/interpreted by me reveals sinus rhythm with a rate of 74 bpm with  ST segment depression in 1 and aVL        Assessment:  :     New onset chest pain at rest consistent with unstable angina  Elevated troponin consistent with acute non-ST elevation MI  CAD cardiac cath 5/1/2023 revealing multivessel coronary artery disease  Hypertension  Dyslipidemia  History of CVA 2019 and carotid disease, right CEA 2020  PAD  Hyperglycemia, prediabetes with A1c of 5.8  Obesity with BMI over 30           Recommendations / Plan:         Telemetry is revealing sinus rhythm.  Patient has new onset prolonged chest pain and has elevated troponin consistent with acute non-ST elevation MI  Patient was started on aspirin, beta-blockers, statins.  We will continue as tolerated.   patient underwent cardiac cath 5/1/2023 which revealed severe triple-vessel disease.  Patient was [facility] to [facility].  CT surgery was consulted.  Patient seen by CT surgeon .  Is scheduled for CABG tomorrow.  Patient is being evaluated for CABG.  Will defer timing to CT service.  Discussed with patient risk benefits alternatives of various options.  Answered all her questions.       Copied text in this portion of the note has been reviewed and is accurate as of 5/4/2023    Past Medical History:  Past Medical History:   Diagnosis Date   • ADHD unknown   • Anemia    • Anxiety    • Carotid artery disease     80% right internal carotid artery   • CVA (cerebral vascular accident)     right parietal right temporal   • DDD (degenerative disc  disease), lumbar    • Depression    • Extremity pain     Ankle pain   • GERD (gastroesophageal reflux disease)    • Hyperlipidemia unknown   • Hypertension    • Insomnia unknown   • Laryngopharyngeal reflux    • Low back pain    • Mood disorder    • Obesity unknown   • Skin cancer of face    • Sleep apnea     Suspected sleep apnea she has refused to be tested   • Stroke (cerebrum)    • Visual field defect     Left     Past Surgical History:  Past Surgical History:   Procedure Laterality Date   • CARDIAC CATHETERIZATION N/A 5/1/2023    Procedure: Left Heart Cath;  Surgeon: Kye Alcaraz MD;  Location: [facility] CATH INVASIVE LOCATION;  Service: Cardiovascular;  Laterality: N/A;   • CAROTID ENDARTERECTOMY Right 11/10/2020    Procedure: CAROTID ENDARTERECTOMY;  Surgeon: Tavo Das MD;  Location: [facility] MAIN OR;  Service: Vascular;  Laterality: Right;   • CHOLECYSTECTOMY     • COLONOSCOPY      2016   • FINGER SURGERY     • KNEE ARTHROPLASTY     • KNEE SURGERY Right     replaced   • LAPAROSCOPIC GASTRIC BANDING     • ROTATOR CUFF REPAIR Right 2019   • SHOULDER SURGERY Right 05/24/2019    scope/ cuff repair   • TUBAL ABDOMINAL LIGATION          Social History:   Social History     Tobacco Use   • Smoking status: Never   • Smokeless tobacco: Never   Substance Use Topics   • Alcohol use: No      Family History:  Family History   Problem Relation Age of Onset   • Diabetes Other    • Heart disease Mother    • Diabetes Mother    • Heart disease Father           Allergies:  No Known Allergies  Scheduled Meds:  amLODIPine, 5 mg, Daily  aspirin, 81 mg, Daily  atorvastatin, 80 mg, Nightly  [START ON 5/5/2023] ceFAZolin, 2 g, Once  chlorhexidine, 15 mL, Q12H  Chlorhexidine Gluconate Cloth, 1 application, Q12H  lisinopril, 20 mg, Q24H  metoprolol succinate XL, 25 mg, Daily  [START ON 5/5/2023] metoprolol tartrate, 12.5 mg, Once  mupirocin, 1 application, Q12H  ondansetron, 4 mg, Once  oxybutynin, 5 mg,  "Daily  pantoprazole, 40 mg, Q AM  sodium chloride, 10 mL, Q12H          Review of Systems:   ROS  Review of Systems   Constitution: Negative for chills and fever.   Cardiovascular: Negative for chest pain and palpitations.   Respiratory: Negative for cough and hemoptysis.    Gastrointestinal: Negative for nausea.        Constitutional:  Temp:  [96.7 °F (35.9 °C)-98.4 °F (36.9 °C)] 96.7 °F (35.9 °C)  Heart Rate:  [54-80] 77  Resp:  [14-17] 14  BP: (101-146)/(61-86) 101/68    Physical Exam   /68   Pulse 77   Temp 96.7 °F (35.9 °C) (Axillary)   Resp 14   Ht 167.6 cm (66\")   Wt 93.8 kg (206 lb 12.7 oz)   SpO2 98%   BMI 33.38 kg/m²   General:  Appears in no acute distress  Eyes: Sclera is anicteric,  conjunctiva is clear   HEENT:  No JVD. Thyroid not visibly enlarged. No mucosal pallor or cyanosis  Respiratory: Respirations regular and unlabored at rest.  Bilaterally good breath sounds, with good air entry in all fields. No crackles, rubs or wheezes auscultated  Cardiovascular: S1,S2 Regular rate and rhythm. No murmur, rub or gallop auscultated.  . No pretibial pitting edema  Gastrointestinal: Abdomen nondistended, soft  Musculoskeletal:  No abnormal movements  Extremities: No digital clubbing or cyanosis  Skin: Color pink. Skin warm and dry to touch. No rashes  No xanthoma  Neuro: Alert and awake, no lateralizing deficits appreciated    INTAKE AND OUTPUT:    Intake/Output Summary (Last 24 hours) at 5/4/2023 1416  Last data filed at 5/4/2023 0800  Gross per 24 hour   Intake 960 ml   Output 0 ml   Net 960 ml       Cardiographics  Telemetry: Sinus    ECG:   ECG 12 Lead   Final Result   HEART RATE= 56  bpm   RR Interval= 1072  ms   FL Interval= 144  ms   P Horizontal Axis= 5  deg   P Front Axis= 17  deg   QRSD Interval= 93  ms   QT Interval= 477  ms   QRS Axis= 8  deg   T Wave Axis= -63  deg   - ABNORMAL ECG -   Sinus bradycardia   Nonspecific repol abnormality, diffuse leads   When compared with ECG of " 30-Apr-2023 21:37:55,   Significant repolarization change   Significant axis, voltage or hypertrophy change   Electronically Signed By: Kye Alcaraz ([facility]) 03-May-2023 17:12:15   Date and Time of Study: 2023-05-02 10:51:40      ECG 12 Lead Chest Pain   Final Result   HEART RATE= 57  bpm   RR Interval= 1048  ms   ME Interval= 142  ms   P Horizontal Axis= 16  deg   P Front Axis= 24  deg   QRSD Interval= 87  ms   QT Interval= 459  ms   QRS Axis= 26  deg   T Wave Axis= 198  deg   - OTHERWISE NORMAL ECG -   Slow sinus arrhythmia   When compared with ECG of 30-Apr-2023 21:37:55,   Significant axis, voltage or hypertrophy change   Electronically Signed By: Kye Alcaraz ([facility]) 03-May-2023 17:12:19   Date and Time of Study: 2023-05-01 05:38:43      ECG 12 Lead Chest Pain   Final Result   HEART RATE= 74  bpm   RR Interval= 816  ms   ME Interval= 140  ms   P Horizontal Axis= -20  deg   P Front Axis= 17  deg   QRSD Interval= 89  ms   QT Interval= 408  ms   QRS Axis= -2  deg   T Wave Axis= 71  deg   - ABNORMAL ECG -   Sinus rhythm   Probable left ventricular hypertrophy   ST depression, consider ischemia, lateral lds   When compared with ECG of 22-Oct-2020 11:28:25,   Significant rate increase   Significant axis, voltage or hypertrophy change   Electronically Signed By: Jomar Mejia ([facility]) 01-May-2023 09:11:21   Date and Time of Study: 2023-04-30 21:37:55      SCANNED - TELEMETRY     Final Result      SCANNED - TELEMETRY     Final Result        I have personally reviewed EKG    Echocardiogram: Results for orders placed during the hospital encounter of 04/30/23    Adult Transthoracic Echo Complete W/ Cont if Necessary Per Protocol    Interpretation Summary  •  Estimated right ventricular systolic pressure from tricuspid regurgitation is normal (<35 mmHg).      Normal LV size and contractility EF of 60 to 65%  Normal RV size  Normal atrial size  Pulmonic valve is not well visualized.  Aortic valve is trileaflet with mild  "sclerosis.  Mitral valve, tricuspid valve appears structurally normal, moderate mitral regurgitation seen.  No pericardial effusion seen.  Proximal aorta appears normal in size.      Lab Review   I have reviewed the labs  Results from last 7 days   Lab Units 05/01/23  0138 04/30/23 2248   HSTROP T ng/L 120* 54*     Results from last 7 days   Lab Units 05/04/23  0327   MAGNESIUM mg/dL 2.1     Results from last 7 days   Lab Units 05/04/23  0327   SODIUM mmol/L 144   POTASSIUM mmol/L 3.9   BUN mg/dL 14   CREATININE mg/dL 0.77   CALCIUM mg/dL 9.4         Results from last 7 days   Lab Units 05/03/23  0520 05/02/23  0551 05/01/23  0138   WBC 10*3/mm3 8.30 8.30 9.70   HEMOGLOBIN g/dL 12.2 12.0 12.3   HEMATOCRIT % 38.2 36.5 38.3   PLATELETS 10*3/mm3 239 219 245     Results from last 7 days   Lab Units 05/03/23  0520 04/30/23  2248   INR  0.99 0.95   APTT seconds 24.6 25.9*       RADIOLOGY:  Imaging Results (Last 24 Hours)     ** No results found for the last 24 hours. **                )5/4/2023  MD CAPO Ivory Dragon/Transcription:   \"Dictated utilizing Dragon dictation\".   "

## 2023-05-04 NOTE — ANESTHESIA PREPROCEDURE EVALUATION
Anesthesia Evaluation     Patient summary reviewed and Nursing notes reviewed   NPO Solid Status: > 8 hours  NPO Liquid Status: > 8 hours           Airway   Mallampati: I  TM distance: >3 FB  Neck ROM: full  No difficulty expected  Dental - normal exam     Pulmonary - normal exam   (+) sleep apnea,   Cardiovascular - normal exam    (+) hypertension, past MI  1-7 days, CAD, angina, hyperlipidemia,  carotid artery disease      Neuro/Psych  (+) CVA, headaches, psychiatric history ADHD,    GI/Hepatic/Renal/Endo    (+) obesity,       Musculoskeletal (-) negative ROS    Abdominal  - normal exam    Bowel sounds: normal.   Substance History - negative use     OB/GYN negative ob/gyn ROS         Other        ROS/Med Hx Other: NSTEMI  MULTIVESSEL CAD SEVERE PRESERVED VENT FUNCTION. MOD MR  SP R CAROTID ENDARTERECTOMY 2020--R CVA 2019  R CAROTID OPEN/LESS THAN 50 PERCENT L                  Anesthesia Plan    ASA 4     general, CVL, PAC and Geetha     intravenous induction   Postoperative Plan: Expected vent after surgery  Anesthetic plan, risks, benefits, and alternatives have been provided, discussed and informed consent has been obtained with: patient.        CODE STATUS:    Level Of Support Discussed With: Patient  Code Status (Patient has no pulse and is not breathing): CPR (Attempt to Resuscitate)  Medical Interventions (Patient has pulse or is breathing): Full Support  Release to patient: Routine Release

## 2023-05-05 ENCOUNTER — ANESTHESIA (OUTPATIENT)
Dept: PERIOP | Facility: HOSPITAL | Age: 68
DRG: 217 | End: 2023-05-05
Payer: MEDICARE

## 2023-05-05 LAB
ALBUMIN SERPL-MCNC: 4 G/DL (ref 3.5–5.2)
ALBUMIN/GLOB SERPL: 1.5 G/DL
ALP SERPL-CCNC: 107 U/L (ref 39–117)
ALT SERPL W P-5'-P-CCNC: 21 U/L (ref 1–33)
ANION GAP SERPL CALCULATED.3IONS-SCNC: 12 MMOL/L (ref 5–15)
APTT PPP: 26.2 SECONDS (ref 24–31)
AST SERPL-CCNC: 25 U/L (ref 1–32)
BASOPHILS # BLD AUTO: 0.1 10*3/MM3 (ref 0–0.2)
BASOPHILS NFR BLD AUTO: 1.2 % (ref 0–1.5)
BILIRUB SERPL-MCNC: 0.4 MG/DL (ref 0–1.2)
BUN SERPL-MCNC: 13 MG/DL (ref 8–23)
BUN/CREAT SERPL: 16.9 (ref 7–25)
CALCIUM SPEC-SCNC: 9.3 MG/DL (ref 8.6–10.5)
CHLORIDE SERPL-SCNC: 106 MMOL/L (ref 98–107)
CO2 SERPL-SCNC: 25 MMOL/L (ref 22–29)
CREAT SERPL-MCNC: 0.77 MG/DL (ref 0.57–1)
DEPRECATED RDW RBC AUTO: 45.1 FL (ref 37–54)
EGFRCR SERPLBLD CKD-EPI 2021: 85.7 ML/MIN/1.73
EOSINOPHIL # BLD AUTO: 0.3 10*3/MM3 (ref 0–0.4)
EOSINOPHIL NFR BLD AUTO: 2.7 % (ref 0.3–6.2)
ERYTHROCYTE [DISTWIDTH] IN BLOOD BY AUTOMATED COUNT: 14.2 % (ref 12.3–15.4)
GLOBULIN UR ELPH-MCNC: 2.7 GM/DL
GLUCOSE SERPL-MCNC: 111 MG/DL (ref 65–99)
HCT VFR BLD AUTO: 39.5 % (ref 34–46.6)
HGB BLD-MCNC: 12.8 G/DL (ref 12–15.9)
INR PPP: 0.93 (ref 0.93–1.1)
LYMPHOCYTES # BLD AUTO: 3 10*3/MM3 (ref 0.7–3.1)
LYMPHOCYTES NFR BLD AUTO: 32.7 % (ref 19.6–45.3)
MAGNESIUM SERPL-MCNC: 2.2 MG/DL (ref 1.6–2.4)
MCH RBC QN AUTO: 29.6 PG (ref 26.6–33)
MCHC RBC AUTO-ENTMCNC: 32.4 G/DL (ref 31.5–35.7)
MCV RBC AUTO: 91.4 FL (ref 79–97)
MONOCYTES # BLD AUTO: 0.5 10*3/MM3 (ref 0.1–0.9)
MONOCYTES NFR BLD AUTO: 5.2 % (ref 5–12)
NEUTROPHILS NFR BLD AUTO: 5.4 10*3/MM3 (ref 1.7–7)
NEUTROPHILS NFR BLD AUTO: 58.2 % (ref 42.7–76)
NRBC BLD AUTO-RTO: 0.1 /100 WBC (ref 0–0.2)
PLATELET # BLD AUTO: 274 10*3/MM3 (ref 140–450)
PMV BLD AUTO: 9.1 FL (ref 6–12)
POTASSIUM SERPL-SCNC: 3.8 MMOL/L (ref 3.5–5.2)
PROT SERPL-MCNC: 6.7 G/DL (ref 6–8.5)
PROTHROMBIN TIME: 10 SECONDS (ref 9.6–11.7)
RBC # BLD AUTO: 4.32 10*6/MM3 (ref 3.77–5.28)
SODIUM SERPL-SCNC: 143 MMOL/L (ref 136–145)
WBC NRBC COR # BLD: 9.2 10*3/MM3 (ref 3.4–10.8)

## 2023-05-05 PROCEDURE — 80053 COMPREHEN METABOLIC PANEL: CPT

## 2023-05-05 PROCEDURE — 83735 ASSAY OF MAGNESIUM: CPT | Performed by: INTERNAL MEDICINE

## 2023-05-05 PROCEDURE — 85025 COMPLETE CBC W/AUTO DIFF WBC: CPT

## 2023-05-05 PROCEDURE — 85730 THROMBOPLASTIN TIME PARTIAL: CPT

## 2023-05-05 PROCEDURE — 99232 SBSQ HOSP IP/OBS MODERATE 35: CPT | Performed by: INTERNAL MEDICINE

## 2023-05-05 PROCEDURE — 85610 PROTHROMBIN TIME: CPT

## 2023-05-05 PROCEDURE — 36415 COLL VENOUS BLD VENIPUNCTURE: CPT

## 2023-05-05 RX ORDER — CHLORHEXIDINE GLUCONATE 0.12 MG/ML
15 RINSE ORAL 2 TIMES DAILY
Status: COMPLETED | OUTPATIENT
Start: 2023-05-05 | End: 2023-05-06

## 2023-05-05 RX ORDER — SODIUM CHLORIDE 9 MG/ML
30 INJECTION, SOLUTION INTRAVENOUS CONTINUOUS PRN
Status: DISCONTINUED | OUTPATIENT
Start: 2023-05-06 | End: 2023-05-06

## 2023-05-05 RX ADMIN — MUPIROCIN 1 APPLICATION: 20 OINTMENT TOPICAL at 20:33

## 2023-05-05 RX ADMIN — ALPRAZOLAM 2 MG: 1 TABLET ORAL at 20:38

## 2023-05-05 RX ADMIN — Medication 10 ML: at 08:28

## 2023-05-05 RX ADMIN — CHLORHEXIDINE GLUCONATE 15 ML: 1.2 SOLUTION ORAL at 08:07

## 2023-05-05 RX ADMIN — LISINOPRIL 20 MG: 20 TABLET ORAL at 09:00

## 2023-05-05 RX ADMIN — CHLORHEXIDINE GLUCONATE 15 ML: 1.2 SOLUTION ORAL at 20:34

## 2023-05-05 RX ADMIN — Medication 10 ML: at 20:34

## 2023-05-05 RX ADMIN — CHLORHEXIDINE GLUCONATE 1 APPLICATION: 500 CLOTH TOPICAL at 18:00

## 2023-05-05 RX ADMIN — MUPIROCIN 1 APPLICATION: 20 OINTMENT TOPICAL at 08:07

## 2023-05-05 RX ADMIN — CHLORHEXIDINE GLUCONATE 1 APPLICATION: 500 CLOTH TOPICAL at 05:40

## 2023-05-05 RX ADMIN — ACETAMINOPHEN 650 MG: 325 TABLET, FILM COATED ORAL at 20:33

## 2023-05-05 RX ADMIN — PANTOPRAZOLE SODIUM 40 MG: 40 TABLET, DELAYED RELEASE ORAL at 05:35

## 2023-05-05 RX ADMIN — OXYBUTYNIN CHLORIDE 5 MG: 5 TABLET ORAL at 08:28

## 2023-05-05 RX ADMIN — ATORVASTATIN CALCIUM 80 MG: 40 TABLET, FILM COATED ORAL at 20:33

## 2023-05-05 RX ADMIN — ASPIRIN 81 MG CHEWABLE TABLET 81 MG: 81 TABLET CHEWABLE at 08:28

## 2023-05-05 RX ADMIN — AMLODIPINE BESYLATE 5 MG: 5 TABLET ORAL at 08:27

## 2023-05-05 NOTE — PROGRESS NOTES
[facility]     Progress Note    Patient Name: Andree Deluna  : 1955  MRN: [id number]  Primary Care Physician:  Naga Griffith PA-C  Date of admission: 2023  Service date and time: 23 09:06 EDT  Subjective   Subjective     Chief Complaint: chest pain     HPI:  Patient Reports no chest pain       Objective   Objective     Vitals:   Temp:  [96.7 °F (35.9 °C)-98 °F (36.7 °C)] 96.7 °F (35.9 °C)  Heart Rate:  [54-80] 62  Resp:  [14-16] 16  BP: (101-155)/(42-82) 153/81  Physical Exam    Constitutional: Awake, alert   Eyes: PERRLA, sclerae anicteric, no conjunctival injection   HENT: NCAT, mucous membranes moist   Neck: Supple, no thyromegaly, no lymphadenopathy, trachea midline   Respiratory: Clear to auscultation bilaterally, nonlabored respirations    Cardiovascular: RRR, no murmurs, rubs, or gallops, palpable pedal pulses bilaterally   Gastrointestinal: Positive bowel sounds, soft, nontender, nondistended   Musculoskeletal: No bilateral ankle edema, no clubbing or cyanosis to extremities   Psychiatric: Appropriate affect, cooperative   Neurologic: Oriented x 3, strength symmetric in all extremities, Cranial Nerves grossly intact to confrontation, speech clear   Skin: No rashes     Result Review    Result Review:  I have personally reviewed the results from the time of this admission to 2023 09:06 EDT and agree with these findings:  [x]  Laboratory list / accordion  []  Microbiology  []  Radiology  []  EKG/Telemetry   []  Cardiology/Vascular   []  Pathology  []  Old records  []  Other:        Assessment & Plan   Assessment / Plan       Active Hospital Problems:  Active Hospital Problems    Diagnosis    • **Chest pain, unspecified type    • Elevated troponin    • Non-STEMI (non-ST elevated myocardial infarction)    • Coronary artery disease involving native coronary artery of native heart with unstable angina pectoris    • Abnormal findings on diagnostic imaging of heart and coronary  circulation    • Chronic cough    • History of stroke    • Overactive bladder    • GERD with esophagitis    • Mood disorder    • Hyperlipidemia    • Obesity    • Hypertension      Plan:  MV CAD with NSTEMI presentation, EF 50% (cath)--surgical workup in progress  - Moderate mitral regurgitation--per echo   - HTN--stable  - HLD--statin  - Hyperglycemia--a1c 5.8  - Hx stroke ()--s/p right CEA (Dr. Das, )  - Strong family hx premature CAD--father  of MI age 54/ mother  from MI age 64  - Anxiety--on home Xanax, she is no longer on Viibyrd  - Obesity, stage 1--BMI 33.      Patient is scheduled for CABG and mitral valve repair today and possible intraoperative GALA        DVT prophylaxis:  Mechanical DVT prophylaxis orders are present.    CODE STATUS:   Level Of Support Discussed With: Patient  Code Status (Patient has no pulse and is not breathing): CPR (Attempt to Resuscitate)  Medical Interventions (Patient has pulse or is breathing): Full Support  Release to patient: Routine Release    Disposition:  I expect patient to be discharged 5 days    Jimmie Muñiz MD

## 2023-05-05 NOTE — PROGRESS NOTES
Cardiology Progress Note    Patient Identification:  Name: Andree Deluna  Age: 67 y.o.  Sex: female  :  1955  MRN: [id number]                 Follow Up / Chief Complaint: NSTEMI  Chief Complaint   Patient presents with   • Chest Pain     Pt reports midsternal CP that radiates to back that started 2 hrs PTA.  Pt describes pain as a squeezing pain that is intermittent.  With associated SOA.  Pt reports she took 2 81mg ASA PTA.        Interval History: Patient presented with chest pain. Underwent cardiac cath that showed MV CAD and CV surgery has been consulted.  Patient was scheduled for CABG 2023.       Subjective: Patient seen and examined.  Chart reviewed.  Labs reviewed.  Discussed with RN taking care of patient.      Objective: HS troponin 54---120  2023: bmp unremarkable, CBC unremarkable   5/3/2023: glucose 116 unremarkable  CMP HLD 62 LDL 69 CBC normal.       History of Present Illness:       Ms.Elisa LOVELY Deluna has PMH of     Hypertension  Diastolic dysfunction-echo 2019 revealing septal asymmetric hypertrophy EF 60%, diastolic dysfunction  Dyslipidemia  CVA  80% right carotid disease  WEN  Carotid endarterectomy, cholecystectomy, tubal ligation, shoulder/knee/finger surgery  Family history of premature CAD father fatal MI at 54 mother fatal MI at 64.     Presented through emergency room 2023 with midsternal chest pain radiating to the back, squeezing in sensation, radiating to the left arm and neck, stated with shortness of breath and nausea.  Patient has been having symptoms since many months but has been progressively worse.     Work-up here 2023/2023 revealed elevated troponin at 54-->120.  Glucose elevated.  Potassium 3.4.  A1c of 5.8.  Chest x-ray stable cardiomegaly.  EKG done 2023 reviewed/interpreted by me reveals sinus rhythm with a rate of 74 bpm with  ST segment depression in 1 and aVL        Assessment:  :     New onset chest pain at rest consistent  with unstable angina  Elevated troponin consistent with acute non-ST elevation MI  CAD cardiac cath 5/1/2023 revealing multivessel coronary artery disease  Hypertension  Dyslipidemia  History of CVA 2019 and carotid disease, right CEA 2020  PAD  Hyperglycemia, prediabetes with A1c of 5.8  Obesity with BMI over 30           Recommendations / Plan:         Telemetry is revealing sinus rhythm.  Patient has new onset prolonged chest pain and has elevated troponin consistent with acute non-ST elevation MI  Patient was started on aspirin, beta-blockers, statins.  We will continue as tolerated.   patient underwent cardiac cath 5/1/2023 which revealed severe triple-vessel disease.  Patient was transferred to [facility].  CT surgery was consulted.  Patient seen by CT surgeon .    Patient is being evaluated for CABG.  Will defer timing to CT service.  Patient was originally scheduled for CABG today.  Was bumped due to scheduling issues.  Will monitor and follow-up.  Discussed with patient risk benefits alternatives of various options.  Answered all her questions.       Copied text in this portion of the note has been reviewed and is accurate as of 5/5/2023    Past Medical History:  Past Medical History:   Diagnosis Date   • ADHD unknown   • Anemia    • Anxiety    • Carotid artery disease     80% right internal carotid artery   • CVA (cerebral vascular accident)     right parietal right temporal   • DDD (degenerative disc disease), lumbar    • Depression    • Extremity pain     Ankle pain   • GERD (gastroesophageal reflux disease)    • Hyperlipidemia unknown   • Hypertension    • Insomnia unknown   • Laryngopharyngeal reflux    • Low back pain    • Mood disorder    • Obesity unknown   • Skin cancer of face    • Sleep apnea     Suspected sleep apnea she has refused to be tested   • Stroke (cerebrum)    • Visual field defect     Left     Past Surgical History:  Past Surgical History:   Procedure Laterality Date   •  "CARDIAC CATHETERIZATION N/A 5/1/2023    Procedure: Left Heart Cath;  Surgeon: Kye Alcaraz MD;  Location: [facility] CATH INVASIVE LOCATION;  Service: Cardiovascular;  Laterality: N/A;   • CAROTID ENDARTERECTOMY Right 11/10/2020    Procedure: CAROTID ENDARTERECTOMY;  Surgeon: Tavo Das MD;  Location: [facility] MAIN OR;  Service: Vascular;  Laterality: Right;   • CHOLECYSTECTOMY     • COLONOSCOPY      2016   • FINGER SURGERY     • KNEE ARTHROPLASTY     • KNEE SURGERY Right     replaced   • LAPAROSCOPIC GASTRIC BANDING     • ROTATOR CUFF REPAIR Right 2019   • SHOULDER SURGERY Right 05/24/2019    scope/ cuff repair   • TUBAL ABDOMINAL LIGATION          Social History:   Social History     Tobacco Use   • Smoking status: Never   • Smokeless tobacco: Never   Substance Use Topics   • Alcohol use: No      Family History:  Family History   Problem Relation Age of Onset   • Diabetes Other    • Heart disease Mother    • Diabetes Mother    • Heart disease Father           Allergies:  No Known Allergies  Scheduled Meds:  amLODIPine, 5 mg, Daily  aspirin, 81 mg, Daily  atorvastatin, 80 mg, Nightly  ceFAZolin, 2 g, Once  Chlorhexidine Gluconate Cloth, 1 application, Q12H  lisinopril, 20 mg, Q24H  metoprolol succinate XL, 25 mg, Daily  metoprolol tartrate, 12.5 mg, Once  ondansetron, 4 mg, Once  oxybutynin, 5 mg, Daily  pantoprazole, 40 mg, Q AM  sodium chloride, 10 mL, Q12H          Review of Systems:   ROS  Review of Systems   Constitution: Negative for chills and fever.   Cardiovascular: Negative for chest pain and palpitations.   Respiratory: Negative for cough and hemoptysis.    Gastrointestinal: Negative for nausea.        Constitutional:  Temp:  [96.7 °F (35.9 °C)-98 °F (36.7 °C)] 96.7 °F (35.9 °C)  Heart Rate:  [54-80] 62  Resp:  [14-16] 16  BP: (101-155)/(42-82) 153/81    Physical Exam   /81 (Patient Position: Lying)   Pulse 62   Temp 96.7 °F (35.9 °C) (Axillary)   Resp 16   Ht 167.6 cm (66\")  "  Wt 91.8 kg (202 lb 6.4 oz)   SpO2 96%   BMI 32.67 kg/m²   General:  Appears in no acute distress  Eyes: Sclera is anicteric,  conjunctiva is clear   HEENT:  No JVD. Thyroid not visibly enlarged. No mucosal pallor or cyanosis  Respiratory: Respirations regular and unlabored at rest.  Bilaterally good breath sounds, with good air entry in all fields. No crackles, rubs or wheezes auscultated  Cardiovascular: S1,S2 Regular rate and rhythm. No murmur, rub or gallop auscultated.  . No pretibial pitting edema  Gastrointestinal: Abdomen nondistended, soft  Musculoskeletal:  No abnormal movements  Extremities: No digital clubbing or cyanosis  Skin: Color pink. Skin warm and dry to touch. No rashes  No xanthoma  Neuro: Alert and awake, no lateralizing deficits appreciated    INTAKE AND OUTPUT:    Intake/Output Summary (Last 24 hours) at 5/5/2023 0859  Last data filed at 5/5/2023 0800  Gross per 24 hour   Intake 660 ml   Output 550 ml   Net 110 ml       Cardiographics  Telemetry: Sinus    ECG:   ECG 12 Lead   Final Result   HEART RATE= 56  bpm   RR Interval= 1072  ms   MO Interval= 144  ms   P Horizontal Axis= 5  deg   P Front Axis= 17  deg   QRSD Interval= 93  ms   QT Interval= 477  ms   QRS Axis= 8  deg   T Wave Axis= -63  deg   - ABNORMAL ECG -   Sinus bradycardia   Nonspecific repol abnormality, diffuse leads   When compared with ECG of 30-Apr-2023 21:37:55,   Significant repolarization change   Significant axis, voltage or hypertrophy change   Electronically Signed By: Kye Alcaraz ([facility]) 03-May-2023 17:12:15   Date and Time of Study: 2023-05-02 10:51:40      ECG 12 Lead Chest Pain   Final Result   HEART RATE= 57  bpm   RR Interval= 1048  ms   MO Interval= 142  ms   P Horizontal Axis= 16  deg   P Front Axis= 24  deg   QRSD Interval= 87  ms   QT Interval= 459  ms   QRS Axis= 26  deg   T Wave Axis= 198  deg   - OTHERWISE NORMAL ECG -   Slow sinus arrhythmia   When compared with ECG of 30-Apr-2023 21:37:55,    Significant axis, voltage or hypertrophy change   Electronically Signed By: Kye Alcaarz ([facility]) 03-May-2023 17:12:19   Date and Time of Study: 2023-05-01 05:38:43      ECG 12 Lead Chest Pain   Final Result   HEART RATE= 74  bpm   RR Interval= 816  ms   PA Interval= 140  ms   P Horizontal Axis= -20  deg   P Front Axis= 17  deg   QRSD Interval= 89  ms   QT Interval= 408  ms   QRS Axis= -2  deg   T Wave Axis= 71  deg   - ABNORMAL ECG -   Sinus rhythm   Probable left ventricular hypertrophy   ST depression, consider ischemia, lateral lds   When compared with ECG of 22-Oct-2020 11:28:25,   Significant rate increase   Significant axis, voltage or hypertrophy change   Electronically Signed By: Jomar Mejia ([facility]) 01-May-2023 09:11:21   Date and Time of Study: 2023-04-30 21:37:55      SCANNED - TELEMETRY     Final Result      SCANNED - TELEMETRY     Final Result        I have personally reviewed EKG    Echocardiogram: Results for orders placed during the hospital encounter of 04/30/23    Adult Transthoracic Echo Complete W/ Cont if Necessary Per Protocol    Interpretation Summary  •  Estimated right ventricular systolic pressure from tricuspid regurgitation is normal (<35 mmHg).      Normal LV size and contractility EF of 60 to 65%  Normal RV size  Normal atrial size  Pulmonic valve is not well visualized.  Aortic valve is trileaflet with mild sclerosis.  Mitral valve, tricuspid valve appears structurally normal, moderate mitral regurgitation seen.  No pericardial effusion seen.  Proximal aorta appears normal in size.      Lab Review   I have reviewed the labs  Results from last 7 days   Lab Units 05/01/23  0138 04/30/23  2248   HSTROP T ng/L 120* 54*     Results from last 7 days   Lab Units 05/05/23  0542   MAGNESIUM mg/dL 2.2     Results from last 7 days   Lab Units 05/05/23  0542   SODIUM mmol/L 143   POTASSIUM mmol/L 3.8   BUN mg/dL 13   CREATININE mg/dL 0.77   CALCIUM mg/dL 9.3         Results from last 7 days  "  Lab Units 05/05/23  0542 05/03/23  0520 05/02/23  0551   WBC 10*3/mm3 9.20 8.30 8.30   HEMOGLOBIN g/dL 12.8 12.2 12.0   HEMATOCRIT % 39.5 38.2 36.5   PLATELETS 10*3/mm3 274 239 219     Results from last 7 days   Lab Units 05/05/23  0542 05/03/23  0520 04/30/23  2248   INR  0.93 0.99 0.95   APTT seconds 26.2 24.6 25.9*       RADIOLOGY:  Imaging Results (Last 24 Hours)     ** No results found for the last 24 hours. **                )5/5/2023  MD CAPO Ivory/Transcription:   \"Dictated utilizing Dragon dictation\".   "

## 2023-05-05 NOTE — CASE MANAGEMENT/SOCIAL WORK
Continued Stay Note  [facility]     Patient Name: Andree Deluna  MRN: [id number]  Today's Date: 5/5/2023    Admit Date: 4/30/2023    Plan: DC Plan: Pending Clinical Course and PT/OT evaluations. IP Rehab, HH, OP Rehab, and DME lists provided and choices listed. CABG planned 5/6/23.   Discharge Plan     Row Name 05/05/23 1229       Plan    Plan DC Plan: Pending Clinical Course and PT/OT evaluations. IP Rehab, HH, OP Rehab, and DME lists provided and choices listed. CABG planned 5/6/23.    Plan Comments CM reviewed the IMM with the patient and left a signed copy. D/C barriers: IV antibiotics, IVF, CABG 5/6/23.                Expected Discharge Date and Time     Expected Discharge Date Expected Discharge Time    May 12, 2023         Met with patient in room wearing PPE: mask,    Maintained distance greater than six feet and spent less than 15 minutes in the room.        Lucille Jack RN     [address] [address]  Phone: [phone]  Fax: [phone]

## 2023-05-05 NOTE — PROGRESS NOTES
" LOS: 0 days   Patient Care Team:  Naga Griffith PA-C as PCP - General (Physician Assistant)    Chief Complaint: CAD/mitral regurgitation    Subjective:  Symptoms:  Stable.  She reports cough and chest pressure.  No chest pain.    Diet:  Adequate intake.  No nausea or vomiting.    Activity level: Normal.    Pain:  She complains of pain that is mild.        Vital Signs  Temp:  [96.7 °F (35.9 °C)-98 °F (36.7 °C)] 96.7 °F (35.9 °C)  Heart Rate:  [54-80] 62  Resp:  [14-16] 16  BP: (101-155)/(42-82) 153/81  Body mass index is 32.67 kg/m².    Intake/Output Summary (Last 24 hours) at 5/5/2023 1058  Last data filed at 5/5/2023 0800  Gross per 24 hour   Intake 660 ml   Output 550 ml   Net 110 ml     No intake/output data recorded.          05/03/23  0500 05/04/23  0600 05/05/23  0500   Weight: 91.4 kg (201 lb 6.4 oz) 93.8 kg (206 lb 12.7 oz) 91.8 kg (202 lb 6.4 oz)       Objective:  General Appearance:  Comfortable, in no acute distress and well-appearing.    Vital signs: (most recent): Blood pressure 153/81, pulse 62, temperature 96.7 °F (35.9 °C), temperature source Axillary, resp. rate 16, height 167.6 cm (66\"), weight 91.8 kg (202 lb 6.4 oz), SpO2 96 %, not currently breastfeeding.  Vital signs are normal.  No fever.    Output: Producing urine.    Lungs:  Normal effort and normal respiratory rate.  Breath sounds clear to auscultation.  She is not in respiratory distress.    Heart: Normal rate.  Regular rhythm.  Positive for murmur.    Abdomen: Abdomen is soft.  Bowel sounds are normal.   There is no abdominal tenderness.     Extremities: There is dependent edema.    Pulses: Distal pulses are intact.    Neurological: Patient is alert and oriented to person, place and time.    Pupils:  Pupils are equal, round, and reactive to light.    Skin:  Warm and dry.          Results Review:        WBC WBC   Date Value Ref Range Status   05/05/2023 9.20 3.40 - 10.80 10*3/mm3 Final   05/03/2023 8.30 3.40 - 10.80 10*3/mm3 Final    "   HGB Hemoglobin   Date Value Ref Range Status   05/05/2023 12.8 12.0 - 15.9 g/dL Final   05/03/2023 12.2 12.0 - 15.9 g/dL Final      HCT Hematocrit   Date Value Ref Range Status   05/05/2023 39.5 34.0 - 46.6 % Final   05/03/2023 38.2 34.0 - 46.6 % Final      Platelets Platelets   Date Value Ref Range Status   05/05/2023 274 140 - 450 10*3/mm3 Final   05/03/2023 239 140 - 450 10*3/mm3 Final        PT/INR:    Protime   Date Value Ref Range Status   05/05/2023 10.0 9.6 - 11.7 Seconds Final   05/03/2023 10.6 9.6 - 11.7 Seconds Final   /  INR   Date Value Ref Range Status   05/05/2023 0.93 0.93 - 1.10 Final   05/03/2023 0.99 0.93 - 1.10 Final       Sodium Sodium   Date Value Ref Range Status   05/05/2023 143 136 - 145 mmol/L Final   05/04/2023 144 136 - 145 mmol/L Final   05/03/2023 140 136 - 145 mmol/L Final      Potassium Potassium   Date Value Ref Range Status   05/05/2023 3.8 3.5 - 5.2 mmol/L Final   05/04/2023 3.9 3.5 - 5.2 mmol/L Final   05/03/2023 4.0 3.5 - 5.2 mmol/L Final     Comment:     Slight hemolysis detected by analyzer. Results may be affected.      Chloride Chloride   Date Value Ref Range Status   05/05/2023 106 98 - 107 mmol/L Final   05/04/2023 107 98 - 107 mmol/L Final   05/03/2023 105 98 - 107 mmol/L Final      Bicarbonate CO2   Date Value Ref Range Status   05/05/2023 25.0 22.0 - 29.0 mmol/L Final   05/04/2023 26.0 22.0 - 29.0 mmol/L Final   05/03/2023 25.0 22.0 - 29.0 mmol/L Final      BUN BUN   Date Value Ref Range Status   05/05/2023 13 8 - 23 mg/dL Final   05/04/2023 14 8 - 23 mg/dL Final   05/03/2023 14 8 - 23 mg/dL Final      Creatinine Creatinine   Date Value Ref Range Status   05/05/2023 0.77 0.57 - 1.00 mg/dL Final   05/04/2023 0.77 0.57 - 1.00 mg/dL Final   05/03/2023 0.81 0.57 - 1.00 mg/dL Final      Calcium Calcium   Date Value Ref Range Status   05/05/2023 9.3 8.6 - 10.5 mg/dL Final   05/04/2023 9.4 8.6 - 10.5 mg/dL Final   05/03/2023 9.1 8.6 - 10.5 mg/dL Final      Magnesium Magnesium    Date Value Ref Range Status   05/05/2023 2.2 1.6 - 2.4 mg/dL Final   05/04/2023 2.1 1.6 - 2.4 mg/dL Final   05/03/2023 2.1 1.6 - 2.4 mg/dL Final          amLODIPine, 5 mg, Oral, Daily  aspirin, 81 mg, Oral, Daily  atorvastatin, 80 mg, Oral, Nightly  ceFAZolin, 2 g, Intravenous, Once  Chlorhexidine Gluconate Cloth, 1 application, Topical, Q12H  lisinopril, 20 mg, Oral, Q24H  metoprolol succinate XL, 25 mg, Oral, Daily  metoprolol tartrate, 12.5 mg, Oral, Once  ondansetron, 4 mg, Intravenous, Once  oxybutynin, 5 mg, Oral, Daily  pantoprazole, 40 mg, Oral, Q AM  sodium chloride, 10 mL, Intravenous, Q12H      insulin, 0-100 Units/hr  sodium chloride, 100 mL/hr, Last Rate: 100 mL/hr (05/01/23 2054)  sodium chloride, 30 mL/hr            Patient Active Problem List   Diagnosis Code   • Anemia in other chronic diseases classified elsewhere D63.8   • Anxiety F41.9   • B12 deficiency E53.8   • Attention deficit disorder of adult with hyperactivity F90.9   • Mood disorder F39   • Complete rotator cuff tear or rupture of right shoulder, not specified as traumatic M75.121   • Degeneration of intervertebral disc of cervical region M50.30   • Degeneration of intervertebral disc of lumbosacral region M51.37   • Fatigue R53.83   • Hyperlipidemia E78.5   • Hypertension I10   • Insomnia G47.00   • Obesity E66.9   • Osteoarthritis of knee M17.9   • CVA (cerebral vascular accident) I63.9   • Visual field loss left H53.40   • Acute midline low back pain without sciatica M54.50   • GERD with esophagitis K21.00   • Carotid stenosis, bilateral I65.23   • Overactive bladder N32.81   • History of stroke Z86.73   • Acute pain of right shoulder M25.511   • Left cervical radiculopathy M54.12   • Migraine headache G43.909   • Medicare annual wellness visit, subsequent Z00.00   • Moderate episode of recurrent major depressive disorder F33.1   • Piriformis syndrome, right G57.01   • Precordial pain R07.2   • History of Grimaldo's esophagus Z87.19    • Chronic cough R05.3   • PATEL (generalized anxiety disorder) F41.1   • Chest pain, unspecified type R07.9   • Elevated troponin R77.8   • Non-STEMI (non-ST elevated myocardial infarction) I21.4   • Coronary artery disease involving native coronary artery of native heart with unstable angina pectoris I25.110   • Abnormal findings on diagnostic imaging of heart and coronary circulation R93.1       Assessment & Plan     - MV CAD with NSTEMI presentation, EF 50% (cath)  - Moderate mitral regurgitation--per echo   - HTN--stable  - HLD--statin  - Hyperglycemia--a1c 5.8  - Hx stroke ()--s/p right CEA (Dr. Das, )  - Strong family hx premature CAD--father  of MI age 54/ mother  from MI age 64  - Anxiety--on home Xanax, she is no longer on Viibyrd  - Obesity, stage 1--BMI 33.2    Preoperative work up complete--Vein mapping with adequate conduit in bilateral thighs. Carotid US without significant stenosis. CT head reviewed per Dr. Noonan.   Echo yesterday with moderate mitral regurgitation--discussed with Dr. Noonan. Plan for possible mitral repair/replacement in addition to CABG. Will evaluate further with intraop GALA.  MRSA, COVID, and UA negative.     Due to an emergent case being added today Mrs. Deluna is now scheduled for OR at 07:30 tomorrow with Dr. Noonan. Questions answered. Patient and family are very understanding.     Coco Evans, SARAHY  23  10:58 EDT

## 2023-05-06 ENCOUNTER — OFFICE VISIT (OUTPATIENT)
Dept: PERIOP | Facility: HOSPITAL | Age: 68
DRG: 217 | End: 2023-05-06
Payer: MEDICARE

## 2023-05-06 ENCOUNTER — APPOINTMENT (OUTPATIENT)
Dept: GENERAL RADIOLOGY | Facility: HOSPITAL | Age: 68
DRG: 217 | End: 2023-05-06
Payer: MEDICARE

## 2023-05-06 LAB
ACT BLD: 131 SECONDS (ref 89–137)
ACT BLD: 131 SECONDS (ref 89–137)
ACT BLD: 420 SECONDS (ref 89–137)
ACT BLD: 450 SECONDS (ref 89–137)
ACT BLD: 516 SECONDS (ref 89–137)
ACT BLD: 534 SECONDS (ref 89–137)
ACT BLD: 570 SECONDS (ref 89–137)
ALBUMIN SERPL-MCNC: 3.5 G/DL (ref 3.5–5.2)
ALBUMIN SERPL-MCNC: 4.3 G/DL (ref 3.5–5.2)
ALBUMIN SERPL-MCNC: 4.3 G/DL (ref 3.5–5.2)
ALBUMIN/GLOB SERPL: 1.2 G/DL
ALBUMIN/GLOB SERPL: 3.1 G/DL
ALP SERPL-CCNC: 71 U/L (ref 39–117)
ALP SERPL-CCNC: 97 U/L (ref 39–117)
ALT SERPL W P-5'-P-CCNC: 17 U/L (ref 1–33)
ALT SERPL W P-5'-P-CCNC: 74 U/L (ref 1–33)
ANION GAP SERPL CALCULATED.3IONS-SCNC: 11 MMOL/L (ref 5–15)
ANION GAP SERPL CALCULATED.3IONS-SCNC: 11 MMOL/L (ref 5–15)
ANION GAP SERPL CALCULATED.3IONS-SCNC: 12 MMOL/L (ref 5–15)
APTT PPP: 23.7 SECONDS (ref 24–31)
APTT PPP: 27.2 SECONDS (ref 24–31)
ARTERIAL PATENCY WRIST A: ABNORMAL
ARTERIAL PATENCY WRIST A: POSITIVE
ARTERIAL PATENCY WRIST A: POSITIVE
AST SERPL-CCNC: 120 U/L (ref 1–32)
AST SERPL-CCNC: 25 U/L (ref 1–32)
ATMOSPHERIC PRESS: ABNORMAL MM[HG]
BASE DEFICIT: ABNORMAL
BASE EXCESS BLDA CALC-SCNC: -0.1 MMOL/L (ref 0–3)
BASE EXCESS BLDA CALC-SCNC: -1 MMOL/L (ref 0–3)
BASE EXCESS BLDA CALC-SCNC: -1.7 MMOL/L (ref 0–3)
BASE EXCESS BLDA CALC-SCNC: -4.8 MMOL/L (ref 0–3)
BASE EXCESS BLDA CALC-SCNC: -5.2 MMOL/L (ref 0–3)
BASE EXCESS BLDA CALC-SCNC: 1 MMOL/L (ref 0–3)
BASE EXCESS BLDA CALC-SCNC: 2 MMOL/L (ref 0–3)
BASE EXCESS BLDA CALC-SCNC: 4 MMOL/L (ref 0–3)
BASE EXCESS BLDA CALC-SCNC: 5 MMOL/L (ref 0–3)
BASE EXCESS BLDV CALC-SCNC: ABNORMAL MMOL/L
BDY SITE: ABNORMAL
BILIRUB SERPL-MCNC: 0.3 MG/DL (ref 0–1.2)
BILIRUB SERPL-MCNC: 1.7 MG/DL (ref 0–1.2)
BUN SERPL-MCNC: 11 MG/DL (ref 8–23)
BUN SERPL-MCNC: 13 MG/DL (ref 8–23)
BUN SERPL-MCNC: 14 MG/DL (ref 8–23)
BUN/CREAT SERPL: 13.3 (ref 7–25)
BUN/CREAT SERPL: 17.1 (ref 7–25)
BUN/CREAT SERPL: 19.4 (ref 7–25)
CA-I BLDA-SCNC: 0.96 MMOL/L (ref 1.12–1.32)
CA-I BLDA-SCNC: 0.99 MMOL/L (ref 1.12–1.32)
CA-I BLDA-SCNC: 1 MMOL/L (ref 1.12–1.32)
CA-I BLDA-SCNC: 1.06 MMOL/L (ref 1.12–1.32)
CA-I BLDA-SCNC: 1.06 MMOL/L (ref 1.12–1.32)
CA-I BLDA-SCNC: 1.1 MMOL/L (ref 1.15–1.33)
CA-I BLDA-SCNC: 1.19 MMOL/L (ref 1.15–1.33)
CA-I BLDA-SCNC: 1.2 MMOL/L (ref 1.12–1.32)
CA-I BLDA-SCNC: 1.2 MMOL/L (ref 1.15–1.33)
CA-I BLDA-SCNC: 1.35 MMOL/L (ref 1.12–1.32)
CA-I SERPL ISE-MCNC: 1.16 MMOL/L (ref 1.2–1.3)
CALCIUM SPEC-SCNC: 8.7 MG/DL (ref 8.6–10.5)
CALCIUM SPEC-SCNC: 8.7 MG/DL (ref 8.6–10.5)
CALCIUM SPEC-SCNC: 9 MG/DL (ref 8.6–10.5)
CHLORIDE SERPL-SCNC: 105 MMOL/L (ref 98–107)
CHLORIDE SERPL-SCNC: 110 MMOL/L (ref 98–107)
CHLORIDE SERPL-SCNC: 112 MMOL/L (ref 98–107)
CO2 BLDA-SCNC: 21.9 MMOL/L (ref 22–29)
CO2 BLDA-SCNC: 22.7 MMOL/L (ref 22–29)
CO2 BLDA-SCNC: 29 MMOL/L (ref 23–27)
CO2 BLDA-SCNC: 29 MMOL/L (ref 23–27)
CO2 BLDA-SCNC: 30 MMOL/L (ref 23–27)
CO2 BLDA-SCNC: 30 MMOL/L (ref 23–27)
CO2 BLDA-SCNC: 31 MMOL/L (ref 23–27)
CO2 BLDA-SCNC: 31 MMOL/L (ref 23–27)
CO2 CONTENT VENOUS: ABNORMAL
CO2 SERPL-SCNC: 22 MMOL/L (ref 22–29)
CO2 SERPL-SCNC: 23 MMOL/L (ref 22–29)
CO2 SERPL-SCNC: 24 MMOL/L (ref 22–29)
CREAT SERPL-MCNC: 0.72 MG/DL (ref 0.57–1)
CREAT SERPL-MCNC: 0.76 MG/DL (ref 0.57–1)
CREAT SERPL-MCNC: 0.83 MG/DL (ref 0.57–1)
DEPRECATED RDW RBC AUTO: 45.1 FL (ref 37–54)
DEPRECATED RDW RBC AUTO: 46.8 FL (ref 37–54)
DEPRECATED RDW RBC AUTO: 48.1 FL (ref 37–54)
EGFRCR SERPLBLD CKD-EPI 2021: 78.3 ML/MIN/1.73
EGFRCR SERPLBLD CKD-EPI 2021: 87.1 ML/MIN/1.73
EGFRCR SERPLBLD CKD-EPI 2021: 92.9 ML/MIN/1.73
ERYTHROCYTE [DISTWIDTH] IN BLOOD BY AUTOMATED COUNT: 14.1 % (ref 12.3–15.4)
ERYTHROCYTE [DISTWIDTH] IN BLOOD BY AUTOMATED COUNT: 14.1 % (ref 12.3–15.4)
ERYTHROCYTE [DISTWIDTH] IN BLOOD BY AUTOMATED COUNT: 14.4 % (ref 12.3–15.4)
FIBRINOGEN PPP-MCNC: 209 MG/DL (ref 210–450)
GLOBULIN UR ELPH-MCNC: 1.4 GM/DL
GLOBULIN UR ELPH-MCNC: 2.9 GM/DL
GLUCOSE BLDC GLUCOMTR-MCNC: 108 MG/DL (ref 74–100)
GLUCOSE BLDC GLUCOMTR-MCNC: 108 MG/DL (ref 74–100)
GLUCOSE BLDC GLUCOMTR-MCNC: 115 MG/DL (ref 70–105)
GLUCOSE BLDC GLUCOMTR-MCNC: 120 MG/DL (ref 70–105)
GLUCOSE BLDC GLUCOMTR-MCNC: 121 MG/DL (ref 70–105)
GLUCOSE BLDC GLUCOMTR-MCNC: 122 MG/DL (ref 70–105)
GLUCOSE BLDC GLUCOMTR-MCNC: 125 MG/DL (ref 70–105)
GLUCOSE BLDC GLUCOMTR-MCNC: 131 MG/DL (ref 70–105)
GLUCOSE BLDC GLUCOMTR-MCNC: 141 MG/DL (ref 70–105)
GLUCOSE BLDC GLUCOMTR-MCNC: 141 MG/DL (ref 70–105)
GLUCOSE BLDC GLUCOMTR-MCNC: 142 MG/DL (ref 70–105)
GLUCOSE BLDC GLUCOMTR-MCNC: 146 MG/DL (ref 70–105)
GLUCOSE BLDC GLUCOMTR-MCNC: 150 MG/DL (ref 70–105)
GLUCOSE BLDC GLUCOMTR-MCNC: 152 MG/DL (ref 70–105)
GLUCOSE BLDC GLUCOMTR-MCNC: 153 MG/DL (ref 70–105)
GLUCOSE BLDC GLUCOMTR-MCNC: 155 MG/DL (ref 70–105)
GLUCOSE BLDC GLUCOMTR-MCNC: 157 MG/DL (ref 70–105)
GLUCOSE BLDC GLUCOMTR-MCNC: 87 MG/DL (ref 74–100)
GLUCOSE BLDC GLUCOMTR-MCNC: 87 MG/DL (ref 74–100)
GLUCOSE SERPL-MCNC: 101 MG/DL (ref 65–99)
GLUCOSE SERPL-MCNC: 116 MG/DL (ref 65–99)
GLUCOSE SERPL-MCNC: 161 MG/DL (ref 65–99)
HCO3 BLDA-SCNC: 20.7 MMOL/L (ref 21–28)
HCO3 BLDA-SCNC: 21.4 MMOL/L (ref 21–28)
HCO3 BLDA-SCNC: 21.7 MMOL/L (ref 21–28)
HCO3 BLDA-SCNC: 22.9 MMOL/L (ref 21–28)
HCO3 BLDA-SCNC: 23.3 MMOL/L (ref 21–28)
HCO3 BLDA-SCNC: 27 MMOL/L (ref 22–26)
HCO3 BLDA-SCNC: 27.6 MMOL/L (ref 22–26)
HCO3 BLDA-SCNC: 28.9 MMOL/L (ref 22–26)
HCO3 BLDA-SCNC: 28.9 MMOL/L (ref 22–26)
HCO3 BLDA-SCNC: 29.2 MMOL/L (ref 22–26)
HCO3 BLDA-SCNC: 29.7 MMOL/L (ref 22–26)
HCO3 BLDV-SCNC: 25.1 MMOL/L (ref 23–28)
HCT VFR BLD AUTO: 29.3 % (ref 34–46.6)
HCT VFR BLD AUTO: 33.3 % (ref 34–46.6)
HCT VFR BLD AUTO: 36.5 % (ref 34–46.6)
HCT VFR BLDA CALC: 21 % (ref 38–51)
HCT VFR BLDA CALC: 23 % (ref 38–51)
HCT VFR BLDA CALC: 23 % (ref 38–51)
HCT VFR BLDA CALC: 25 % (ref 38–51)
HCT VFR BLDA CALC: 26 % (ref 38–51)
HCT VFR BLDA CALC: 26 % (ref 38–51)
HCT VFR BLDA CALC: 32 % (ref 38–51)
HCT VFR BLDA CALC: 34 % (ref 38–51)
HEMODILUTION: NO
HEMODILUTION: NO
HEMODILUTION: YES
HGB BLD-MCNC: 10.9 G/DL (ref 12–15.9)
HGB BLD-MCNC: 11.9 G/DL (ref 12–15.9)
HGB BLD-MCNC: 9.6 G/DL (ref 12–15.9)
HGB BLDA-MCNC: 10.7 G/DL (ref 12–17)
HGB BLDA-MCNC: 10.9 G/DL (ref 12–17)
HGB BLDA-MCNC: 10.9 G/DL (ref 12–17)
HGB BLDA-MCNC: 11.6 G/DL (ref 12–17)
HGB BLDA-MCNC: 7.1 G/DL (ref 12–17)
HGB BLDA-MCNC: 7.8 G/DL (ref 12–17)
HGB BLDA-MCNC: 7.8 G/DL (ref 12–17)
HGB BLDA-MCNC: 8.5 G/DL (ref 12–17)
HGB BLDA-MCNC: 8.8 G/DL (ref 12–17)
HGB BLDA-MCNC: 8.8 G/DL (ref 12–17)
INHALED O2 CONCENTRATION: 36 %
INHALED O2 CONCENTRATION: 40 %
INHALED O2 CONCENTRATION: 70 %
INR PPP: 0.96 (ref 0.93–1.1)
INR PPP: 1.17 (ref 0.93–1.1)
MAGNESIUM SERPL-MCNC: 2.3 MG/DL (ref 1.6–2.4)
MAGNESIUM SERPL-MCNC: 2.8 MG/DL (ref 1.6–2.4)
MCH RBC QN AUTO: 29.5 PG (ref 26.6–33)
MCH RBC QN AUTO: 29.5 PG (ref 26.6–33)
MCH RBC QN AUTO: 30.1 PG (ref 26.6–33)
MCHC RBC AUTO-ENTMCNC: 32.5 G/DL (ref 31.5–35.7)
MCHC RBC AUTO-ENTMCNC: 32.6 G/DL (ref 31.5–35.7)
MCHC RBC AUTO-ENTMCNC: 32.7 G/DL (ref 31.5–35.7)
MCV RBC AUTO: 90.2 FL (ref 79–97)
MCV RBC AUTO: 90.7 FL (ref 79–97)
MCV RBC AUTO: 92.3 FL (ref 79–97)
MODALITY: ABNORMAL
PCO2 BLDA: 31.5 MM HG (ref 35–48)
PCO2 BLDA: 32.8 MM HG (ref 35–48)
PCO2 BLDA: 34.5 MM HG (ref 35–48)
PCO2 BLDA: 41.1 MM HG (ref 35–48)
PCO2 BLDA: 43.1 MM HG (ref 35–45)
PCO2 BLDA: 44 MM HG (ref 35–48)
PCO2 BLDA: 49.1 MM HG (ref 35–45)
PCO2 BLDA: 49.8 MM HG (ref 35–45)
PCO2 BLDA: 51.2 MM HG (ref 35–45)
PCO2 BLDA: 54.7 MM HG (ref 35–45)
PCO2 BLDA: 56.2 MM HG (ref 35–45)
PCO2 BLDV: 53.9 MM HG (ref 41–51)
PEEP RESPIRATORY: 5 CM[H2O]
PEEP RESPIRATORY: 8 CM[H2O]
PH BLDA: 7.29 PH UNITS (ref 7.35–7.45)
PH BLDA: 7.3 PH UNITS (ref 7.35–7.45)
PH BLDA: 7.31 PH UNITS (ref 7.35–7.45)
PH BLDA: 7.33 PH UNITS (ref 7.35–7.45)
PH BLDA: 7.34 PH UNITS (ref 7.35–7.45)
PH BLDA: 7.38 PH UNITS (ref 7.35–7.45)
PH BLDA: 7.38 PH UNITS (ref 7.35–7.45)
PH BLDA: 7.43 PH UNITS (ref 7.35–7.45)
PH BLDA: 7.43 PH UNITS (ref 7.35–7.45)
PH BLDA: 7.45 PH UNITS (ref 7.35–7.45)
PH BLDA: 7.46 PH UNITS (ref 7.35–7.45)
PH BLDV: 7.28 PH UNITS (ref 7.31–7.41)
PHOSPHATE SERPL-MCNC: 1.6 MG/DL (ref 2.5–4.5)
PHOSPHATE SERPL-MCNC: 3.3 MG/DL (ref 2.5–4.5)
PLATELET # BLD AUTO: 117 10*3/MM3 (ref 140–450)
PLATELET # BLD AUTO: 139 10*3/MM3 (ref 140–450)
PLATELET # BLD AUTO: 247 10*3/MM3 (ref 140–450)
PMV BLD AUTO: 8.5 FL (ref 6–12)
PMV BLD AUTO: 9.2 FL (ref 6–12)
PMV BLD AUTO: 9.3 FL (ref 6–12)
PO2 BLDA: 112.9 MM HG (ref 83–108)
PO2 BLDA: 115.1 MM HG (ref 83–108)
PO2 BLDA: 129 MM HG (ref 83–108)
PO2 BLDA: 214.8 MM HG (ref 83–108)
PO2 BLDA: 266 MM HG (ref 80–105)
PO2 BLDA: 427 MM HG (ref 80–105)
PO2 BLDA: 457 MM HG (ref 80–105)
PO2 BLDA: 461 MM HG (ref 80–105)
PO2 BLDA: 464 MM HG (ref 80–105)
PO2 BLDA: 530 MM HG (ref 80–105)
PO2 BLDA: 98.5 MM HG (ref 83–108)
PO2 BLDV: 53 MM HG (ref 35–42)
POTASSIUM BLDA-SCNC: 3.2 MMOL/L (ref 3.5–4.5)
POTASSIUM BLDA-SCNC: 3.4 MMOL/L (ref 3.5–4.5)
POTASSIUM BLDA-SCNC: 3.5 MMOL/L (ref 3.5–4.9)
POTASSIUM BLDA-SCNC: 3.5 MMOL/L (ref 3.5–4.9)
POTASSIUM BLDA-SCNC: 3.6 MMOL/L (ref 3.5–4.5)
POTASSIUM BLDA-SCNC: 3.6 MMOL/L (ref 3.5–4.9)
POTASSIUM BLDA-SCNC: 3.7 MMOL/L (ref 3.5–4.9)
POTASSIUM BLDA-SCNC: 3.8 MMOL/L (ref 3.5–4.9)
POTASSIUM SERPL-SCNC: 3.3 MMOL/L (ref 3.5–5.2)
POTASSIUM SERPL-SCNC: 3.6 MMOL/L (ref 3.5–5.2)
POTASSIUM SERPL-SCNC: 4.3 MMOL/L (ref 3.5–5.2)
PROT SERPL-MCNC: 5.7 G/DL (ref 6–8.5)
PROT SERPL-MCNC: 6.4 G/DL (ref 6–8.5)
PROTHROMBIN TIME: 10.3 SECONDS (ref 9.6–11.7)
PROTHROMBIN TIME: 12.4 SECONDS (ref 9.6–11.7)
PSV: 10 CMH2O
QT INTERVAL: 440 MS
RBC # BLD AUTO: 3.18 10*6/MM3 (ref 3.77–5.28)
RBC # BLD AUTO: 3.7 10*6/MM3 (ref 3.77–5.28)
RBC # BLD AUTO: 4.03 10*6/MM3 (ref 3.77–5.28)
RESPIRATORY RATE: 12
RESPIRATORY RATE: 14
RESPIRATORY RATE: 14
SAO2 % BLDCOA: 100 % (ref 95–98)
SAO2 % BLDCOA: 98 % (ref 94–98)
SAO2 % BLDCOA: 98 % (ref 94–98)
SAO2 % BLDCOA: 98.1 % (ref 94–98)
SAO2 % BLDCOA: 99.1 % (ref 94–98)
SAO2 % BLDCOA: 99.8 % (ref 94–98)
SAO2 % BLDCOV: 27 % (ref 45–75)
SODIUM BLD-SCNC: 137 MMOL/L (ref 138–146)
SODIUM BLD-SCNC: 138 MMOL/L (ref 138–146)
SODIUM BLD-SCNC: 139 MMOL/L (ref 138–146)
SODIUM BLD-SCNC: 140 MMOL/L (ref 138–146)
SODIUM BLD-SCNC: 141 MMOL/L (ref 138–146)
SODIUM BLD-SCNC: 145 MMOL/L (ref 138–146)
SODIUM BLD-SCNC: 145 MMOL/L (ref 138–146)
SODIUM BLD-SCNC: 146 MMOL/L (ref 138–146)
SODIUM SERPL-SCNC: 141 MMOL/L (ref 136–145)
SODIUM SERPL-SCNC: 144 MMOL/L (ref 136–145)
SODIUM SERPL-SCNC: 145 MMOL/L (ref 136–145)
VENTILATOR MODE: ABNORMAL
VT ON VENT VENT: 600 ML
WBC NRBC COR # BLD: 5.4 10*3/MM3 (ref 3.4–10.8)
WBC NRBC COR # BLD: 7.6 10*3/MM3 (ref 3.4–10.8)
WBC NRBC COR # BLD: 8.1 10*3/MM3 (ref 3.4–10.8)

## 2023-05-06 PROCEDURE — P9041 ALBUMIN (HUMAN),5%, 50ML: HCPCS | Performed by: ANESTHESIOLOGY

## 2023-05-06 PROCEDURE — 25010000002 CEFAZOLIN PER 500 MG: Performed by: ANESTHESIOLOGY

## 2023-05-06 PROCEDURE — C1887 CATHETER, GUIDING: HCPCS

## 2023-05-06 PROCEDURE — 85347 COAGULATION TIME ACTIVATED: CPT

## 2023-05-06 PROCEDURE — 33533 CABG ARTERIAL SINGLE: CPT

## 2023-05-06 PROCEDURE — 5A1221Z PERFORMANCE OF CARDIAC OUTPUT, CONTINUOUS: ICD-10-PCS

## 2023-05-06 PROCEDURE — 85027 COMPLETE CBC AUTOMATED: CPT | Performed by: INTERNAL MEDICINE

## 2023-05-06 PROCEDURE — 33426 REPAIR OF MITRAL VALVE: CPT

## 2023-05-06 PROCEDURE — P9016 RBC LEUKOCYTES REDUCED: HCPCS

## 2023-05-06 PROCEDURE — 25010000002 HEPARIN (PORCINE) PER 1000 UNITS: Performed by: ANESTHESIOLOGY

## 2023-05-06 PROCEDURE — 82330 ASSAY OF CALCIUM: CPT

## 2023-05-06 PROCEDURE — 25010000002 MAGNESIUM SULFATE 2 GM/50ML SOLUTION: Performed by: ANESTHESIOLOGY

## 2023-05-06 PROCEDURE — 0211093 BYPASS CORONARY ARTERY, TWO ARTERIES FROM CORONARY ARTERY WITH AUTOLOGOUS VENOUS TISSUE, OPEN APPROACH: ICD-10-PCS

## 2023-05-06 PROCEDURE — C1729 CATH, DRAINAGE: HCPCS

## 2023-05-06 PROCEDURE — 25010000002 ALBUMIN HUMAN 5% PER 50 ML

## 2023-05-06 PROCEDURE — 25010000002 PAPAVERINE PER 60 MG

## 2023-05-06 PROCEDURE — 84100 ASSAY OF PHOSPHORUS: CPT

## 2023-05-06 PROCEDURE — C1751 CATH, INF, PER/CENT/MIDLINE: HCPCS | Performed by: ANESTHESIOLOGY

## 2023-05-06 PROCEDURE — C1713 ANCHOR/SCREW BN/BN,TIS/BN: HCPCS

## 2023-05-06 PROCEDURE — 82947 ASSAY GLUCOSE BLOOD QUANT: CPT

## 2023-05-06 PROCEDURE — P9045 ALBUMIN (HUMAN), 5%, 250 ML: HCPCS | Performed by: THORACIC SURGERY (CARDIOTHORACIC VASCULAR SURGERY)

## 2023-05-06 PROCEDURE — 85384 FIBRINOGEN ACTIVITY: CPT

## 2023-05-06 PROCEDURE — 82803 BLOOD GASES ANY COMBINATION: CPT

## 2023-05-06 PROCEDURE — 99233 SBSQ HOSP IP/OBS HIGH 50: CPT | Performed by: INTERNAL MEDICINE

## 2023-05-06 PROCEDURE — 82948 REAGENT STRIP/BLOOD GLUCOSE: CPT

## 2023-05-06 PROCEDURE — 02UG0JZ SUPPLEMENT MITRAL VALVE WITH SYNTHETIC SUBSTITUTE, OPEN APPROACH: ICD-10-PCS

## 2023-05-06 PROCEDURE — 71045 X-RAY EXAM CHEST 1 VIEW: CPT

## 2023-05-06 PROCEDURE — A4648 IMPLANTABLE TISSUE MARKER: HCPCS

## 2023-05-06 PROCEDURE — 25010000002 ALBUMIN HUMAN 5% PER 50 ML: Performed by: THORACIC SURGERY (CARDIOTHORACIC VASCULAR SURGERY)

## 2023-05-06 PROCEDURE — C1751 CATH, INF, PER/CENT/MIDLINE: HCPCS

## 2023-05-06 PROCEDURE — 33508 ENDOSCOPIC VEIN HARVEST: CPT

## 2023-05-06 PROCEDURE — 85014 HEMATOCRIT: CPT

## 2023-05-06 PROCEDURE — 93318 ECHO TRANSESOPHAGEAL INTRAOP: CPT

## 2023-05-06 PROCEDURE — 25010000002 CALCIUM GLUCONATE-NACL 1-0.675 GM/50ML-% SOLUTION

## 2023-05-06 PROCEDURE — 84132 ASSAY OF SERUM POTASSIUM: CPT

## 2023-05-06 PROCEDURE — 0 POTASSIUM CHLORIDE 10 MEQ/100ML SOLUTION

## 2023-05-06 PROCEDURE — 25010000002 CEFAZOLIN PER 500 MG

## 2023-05-06 PROCEDURE — 33517 CABG ARTERY-VEIN SINGLE: CPT | Performed by: SPECIALIST/TECHNOLOGIST, OTHER

## 2023-05-06 PROCEDURE — 84295 ASSAY OF SERUM SODIUM: CPT

## 2023-05-06 PROCEDURE — 80053 COMPREHEN METABOLIC PANEL: CPT

## 2023-05-06 PROCEDURE — 25010000002 MIDAZOLAM PER 1 MG: Performed by: ANESTHESIOLOGY

## 2023-05-06 PROCEDURE — 36430 TRANSFUSION BLD/BLD COMPNT: CPT

## 2023-05-06 PROCEDURE — 33508 ENDOSCOPIC VEIN HARVEST: CPT | Performed by: SPECIALIST/TECHNOLOGIST, OTHER

## 2023-05-06 PROCEDURE — P9041 ALBUMIN (HUMAN),5%, 50ML: HCPCS

## 2023-05-06 PROCEDURE — 85730 THROMBOPLASTIN TIME PARTIAL: CPT

## 2023-05-06 PROCEDURE — 94002 VENT MGMT INPAT INIT DAY: CPT

## 2023-05-06 PROCEDURE — 33518 CABG ARTERY-VEIN TWO: CPT

## 2023-05-06 PROCEDURE — 25010000002 PROTAMINE SULFATE PER 10 MG: Performed by: ANESTHESIOLOGY

## 2023-05-06 PROCEDURE — 80051 ELECTROLYTE PANEL: CPT

## 2023-05-06 PROCEDURE — 94799 UNLISTED PULMONARY SVC/PX: CPT

## 2023-05-06 PROCEDURE — 85610 PROTHROMBIN TIME: CPT

## 2023-05-06 PROCEDURE — 25010000002 FENTANYL CITRATE (PF) 250 MCG/5ML SOLUTION: Performed by: ANESTHESIOLOGY

## 2023-05-06 PROCEDURE — 25010000002 PHENYLEPHRINE 10 MG/ML SOLUTION: Performed by: ANESTHESIOLOGY

## 2023-05-06 PROCEDURE — 25010000002 MAGNESIUM SULFATE IN D5W 1G/100ML (PREMIX) 1-5 GM/100ML-% SOLUTION

## 2023-05-06 PROCEDURE — B24BZZ4 ULTRASONOGRAPHY OF HEART WITH AORTA, TRANSESOPHAGEAL: ICD-10-PCS

## 2023-05-06 PROCEDURE — 02100Z9 BYPASS CORONARY ARTERY, ONE ARTERY FROM LEFT INTERNAL MAMMARY, OPEN APPROACH: ICD-10-PCS

## 2023-05-06 PROCEDURE — 25010000002 ALBUMIN HUMAN 5% PER 50 ML: Performed by: ANESTHESIOLOGY

## 2023-05-06 PROCEDURE — 86900 BLOOD TYPING SEROLOGIC ABO: CPT

## 2023-05-06 PROCEDURE — 06BP4ZZ EXCISION OF RIGHT SAPHENOUS VEIN, PERCUTANEOUS ENDOSCOPIC APPROACH: ICD-10-PCS

## 2023-05-06 PROCEDURE — 83735 ASSAY OF MAGNESIUM: CPT

## 2023-05-06 PROCEDURE — 93005 ELECTROCARDIOGRAM TRACING: CPT

## 2023-05-06 PROCEDURE — 83735 ASSAY OF MAGNESIUM: CPT | Performed by: INTERNAL MEDICINE

## 2023-05-06 PROCEDURE — 85027 COMPLETE CBC AUTOMATED: CPT

## 2023-05-06 PROCEDURE — 25010000002 CALCIUM GLUCONATE PER 10 ML: Performed by: ANESTHESIOLOGY

## 2023-05-06 PROCEDURE — 25010000002 ONDANSETRON PER 1 MG: Performed by: ANESTHESIOLOGY

## 2023-05-06 PROCEDURE — 25010000002 HEPARIN (PORCINE) PER 1000 UNITS

## 2023-05-06 PROCEDURE — 85018 HEMOGLOBIN: CPT

## 2023-05-06 PROCEDURE — 33426 REPAIR OF MITRAL VALVE: CPT | Performed by: SPECIALIST/TECHNOLOGIST, OTHER

## 2023-05-06 PROCEDURE — 25010000002 MORPHINE PER 10 MG

## 2023-05-06 PROCEDURE — 33533 CABG ARTERIAL SINGLE: CPT | Performed by: SPECIALIST/TECHNOLOGIST, OTHER

## 2023-05-06 DEVICE — COR-KNOT® MIS COMBO KIT
Type: IMPLANTABLE DEVICE | Site: HEART | Status: FUNCTIONAL
Brand: COR-KNOT®

## 2023-05-06 DEVICE — INCISIONLINE PLEDGET TFLN SFT LG: Type: IMPLANTABLE DEVICE | Site: MEDIASTINUM | Status: FUNCTIONAL

## 2023-05-06 DEVICE — CLIP LIGAT VASC HORIZON TI SM/WD RED 24CT: Type: IMPLANTABLE DEVICE | Site: MEDIASTINUM | Status: FUNCTIONAL

## 2023-05-06 DEVICE — SS SUTURE, 6 PER SLEEVE
Type: IMPLANTABLE DEVICE | Site: STERNUM | Status: FUNCTIONAL
Brand: MYO/WIRE II

## 2023-05-06 DEVICE — DEV CONTRL TISS STRATAFIX SPIRAL MNCRYL UD 3/0 PLS 30CM: Type: IMPLANTABLE DEVICE | Site: CHEST | Status: FUNCTIONAL

## 2023-05-06 DEVICE — SS SUTURE, 3 PER SLEEVE
Type: IMPLANTABLE DEVICE | Site: STERNUM | Status: FUNCTIONAL
Brand: MYO/WIRE II

## 2023-05-06 DEVICE — WAX,BONE,NATURAL
Type: IMPLANTABLE DEVICE | Site: STERNUM | Status: FUNCTIONAL
Brand: MEDLINE INDUSTRIES

## 2023-05-06 DEVICE — CARPENTIER-EDWARDS PHYSIO II ANNULOPLASTY RING
Type: IMPLANTABLE DEVICE | Site: HEART | Status: FUNCTIONAL
Brand: CARPENTIER-EDWARDS PHYSIO II ANNULOPLASTY RING

## 2023-05-06 DEVICE — ABSORBABLE HEMOSTAT (OXIDIZED REGENERATED CELLULOSE, U.S.P.)
Type: IMPLANTABLE DEVICE | Site: MEDIASTINUM | Status: FUNCTIONAL
Brand: SURGICEL

## 2023-05-06 RX ORDER — ACETAMINOPHEN 160 MG/5ML
650 SOLUTION ORAL EVERY 4 HOURS
Status: COMPLETED | OUTPATIENT
Start: 2023-05-06 | End: 2023-05-07

## 2023-05-06 RX ORDER — NICOTINE POLACRILEX 4 MG
15 LOZENGE BUCCAL
Status: DISCONTINUED | OUTPATIENT
Start: 2023-05-06 | End: 2023-05-07 | Stop reason: SDUPTHER

## 2023-05-06 RX ORDER — NICARDIPINE HYDROCHLORIDE 2.5 MG/ML
INJECTION INTRAVENOUS CONTINUOUS PRN
Status: DISCONTINUED | OUTPATIENT
Start: 2023-05-06 | End: 2023-05-06 | Stop reason: SURG

## 2023-05-06 RX ORDER — POTASSIUM PHOS IN 0.9 % NACL 30MMOL/250
30 PLASTIC BAG, INJECTION (ML) INTRAVENOUS AS NEEDED
Status: DISCONTINUED | OUTPATIENT
Start: 2023-05-06 | End: 2023-05-08

## 2023-05-06 RX ORDER — HYDROCODONE BITARTRATE AND ACETAMINOPHEN 5; 325 MG/1; MG/1
1 TABLET ORAL EVERY 4 HOURS PRN
Status: DISCONTINUED | OUTPATIENT
Start: 2023-05-06 | End: 2023-05-13 | Stop reason: HOSPADM

## 2023-05-06 RX ORDER — PHENYLEPHRINE HYDROCHLORIDE 10 MG/ML
INJECTION INTRAVENOUS AS NEEDED
Status: DISCONTINUED | OUTPATIENT
Start: 2023-05-06 | End: 2023-05-06 | Stop reason: SURG

## 2023-05-06 RX ORDER — MEPERIDINE HYDROCHLORIDE 25 MG/ML
12.5 INJECTION INTRAMUSCULAR; INTRAVENOUS; SUBCUTANEOUS ONCE AS NEEDED
Status: ACTIVE | OUTPATIENT
Start: 2023-05-06 | End: 2023-05-07

## 2023-05-06 RX ORDER — ONDANSETRON 2 MG/ML
INJECTION INTRAMUSCULAR; INTRAVENOUS AS NEEDED
Status: DISCONTINUED | OUTPATIENT
Start: 2023-05-06 | End: 2023-05-06 | Stop reason: SURG

## 2023-05-06 RX ORDER — ACETAMINOPHEN 10 MG/ML
INJECTION, SOLUTION INTRAVENOUS AS NEEDED
Status: DISCONTINUED | OUTPATIENT
Start: 2023-05-06 | End: 2023-05-06 | Stop reason: SURG

## 2023-05-06 RX ORDER — MAGNESIUM HYDROXIDE 1200 MG/15ML
LIQUID ORAL AS NEEDED
Status: DISCONTINUED | OUTPATIENT
Start: 2023-05-06 | End: 2023-05-06

## 2023-05-06 RX ORDER — ACETAMINOPHEN 650 MG/1
650 SUPPOSITORY RECTAL EVERY 4 HOURS PRN
Status: DISCONTINUED | OUTPATIENT
Start: 2023-05-07 | End: 2023-05-13 | Stop reason: HOSPADM

## 2023-05-06 RX ORDER — POTASSIUM CHLORIDE 7.45 MG/ML
10 INJECTION INTRAVENOUS
Status: DISCONTINUED | OUTPATIENT
Start: 2023-05-06 | End: 2023-05-13 | Stop reason: HOSPADM

## 2023-05-06 RX ORDER — ASPIRIN 325 MG
325 TABLET ORAL ONCE
Status: COMPLETED | OUTPATIENT
Start: 2023-05-06 | End: 2023-05-06

## 2023-05-06 RX ORDER — DEXMEDETOMIDINE HYDROCHLORIDE 4 UG/ML
.2-1.5 INJECTION, SOLUTION INTRAVENOUS
Status: DISCONTINUED | OUTPATIENT
Start: 2023-05-06 | End: 2023-05-08

## 2023-05-06 RX ORDER — HEPARIN SODIUM 1000 [USP'U]/ML
INJECTION, SOLUTION INTRAVENOUS; SUBCUTANEOUS AS NEEDED
Status: DISCONTINUED | OUTPATIENT
Start: 2023-05-06 | End: 2023-05-06 | Stop reason: SURG

## 2023-05-06 RX ORDER — FENTANYL/ROPIVACAINE/NS/PF 2-625MCG/1
15 PLASTIC BAG, INJECTION (ML) EPIDURAL AS NEEDED
Status: DISCONTINUED | OUTPATIENT
Start: 2023-05-06 | End: 2023-05-08

## 2023-05-06 RX ORDER — NALOXONE HCL 0.4 MG/ML
0.4 VIAL (ML) INJECTION
Status: DISCONTINUED | OUTPATIENT
Start: 2023-05-06 | End: 2023-05-13 | Stop reason: HOSPADM

## 2023-05-06 RX ORDER — ENOXAPARIN SODIUM 100 MG/ML
40 INJECTION SUBCUTANEOUS EVERY 24 HOURS
Status: DISCONTINUED | OUTPATIENT
Start: 2023-05-07 | End: 2023-05-13 | Stop reason: HOSPADM

## 2023-05-06 RX ORDER — ONDANSETRON 2 MG/ML
4 INJECTION INTRAMUSCULAR; INTRAVENOUS EVERY 6 HOURS PRN
Status: DISCONTINUED | OUTPATIENT
Start: 2023-05-06 | End: 2023-05-13 | Stop reason: HOSPADM

## 2023-05-06 RX ORDER — ETOMIDATE 2 MG/ML
INJECTION INTRAVENOUS AS NEEDED
Status: DISCONTINUED | OUTPATIENT
Start: 2023-05-06 | End: 2023-05-06 | Stop reason: SURG

## 2023-05-06 RX ORDER — POTASSIUM CHLORIDE 1.5 G/1.77G
40 POWDER, FOR SOLUTION ORAL AS NEEDED
Status: DISCONTINUED | OUTPATIENT
Start: 2023-05-06 | End: 2023-05-13 | Stop reason: HOSPADM

## 2023-05-06 RX ORDER — PANTOPRAZOLE SODIUM 40 MG/1
40 TABLET, DELAYED RELEASE ORAL EVERY MORNING
Status: DISCONTINUED | OUTPATIENT
Start: 2023-05-07 | End: 2023-05-08

## 2023-05-06 RX ORDER — ACETAMINOPHEN 160 MG/5ML
650 SOLUTION ORAL EVERY 4 HOURS PRN
Status: DISCONTINUED | OUTPATIENT
Start: 2023-05-07 | End: 2023-05-13 | Stop reason: HOSPADM

## 2023-05-06 RX ORDER — AMOXICILLIN 250 MG
2 CAPSULE ORAL NIGHTLY
Status: DISCONTINUED | OUTPATIENT
Start: 2023-05-07 | End: 2023-05-13 | Stop reason: HOSPADM

## 2023-05-06 RX ORDER — ACETAMINOPHEN 325 MG/1
650 TABLET ORAL EVERY 4 HOURS
Status: COMPLETED | OUTPATIENT
Start: 2023-05-06 | End: 2023-05-07

## 2023-05-06 RX ORDER — ACETAMINOPHEN 650 MG/1
650 SUPPOSITORY RECTAL EVERY 4 HOURS
Status: COMPLETED | OUTPATIENT
Start: 2023-05-06 | End: 2023-05-07

## 2023-05-06 RX ORDER — MAGNESIUM SULFATE 1 G/100ML
1 INJECTION INTRAVENOUS EVERY 8 HOURS
Status: COMPLETED | OUTPATIENT
Start: 2023-05-06 | End: 2023-05-07

## 2023-05-06 RX ORDER — MORPHINE SULFATE 2 MG/ML
2 INJECTION, SOLUTION INTRAMUSCULAR; INTRAVENOUS
Status: DISCONTINUED | OUTPATIENT
Start: 2023-05-06 | End: 2023-05-08

## 2023-05-06 RX ORDER — ALBUMIN, HUMAN INJ 5% 5 %
SOLUTION INTRAVENOUS CONTINUOUS PRN
Status: DISCONTINUED | OUTPATIENT
Start: 2023-05-06 | End: 2023-05-06 | Stop reason: SURG

## 2023-05-06 RX ORDER — POTASSIUM CHLORIDE 20 MEQ/1
40 TABLET, EXTENDED RELEASE ORAL AS NEEDED
Status: DISCONTINUED | OUTPATIENT
Start: 2023-05-06 | End: 2023-05-13 | Stop reason: HOSPADM

## 2023-05-06 RX ORDER — MIDAZOLAM HYDROCHLORIDE 1 MG/ML
INJECTION INTRAMUSCULAR; INTRAVENOUS AS NEEDED
Status: DISCONTINUED | OUTPATIENT
Start: 2023-05-06 | End: 2023-05-06 | Stop reason: SURG

## 2023-05-06 RX ORDER — VECURONIUM BROMIDE 1 MG/ML
INJECTION, POWDER, LYOPHILIZED, FOR SOLUTION INTRAVENOUS AS NEEDED
Status: DISCONTINUED | OUTPATIENT
Start: 2023-05-06 | End: 2023-05-06 | Stop reason: SURG

## 2023-05-06 RX ORDER — PANTOPRAZOLE SODIUM 40 MG/10ML
40 INJECTION, POWDER, LYOPHILIZED, FOR SOLUTION INTRAVENOUS ONCE
Status: COMPLETED | OUTPATIENT
Start: 2023-05-06 | End: 2023-05-06

## 2023-05-06 RX ORDER — POLYETHYLENE GLYCOL 3350 17 G/17G
17 POWDER, FOR SOLUTION ORAL DAILY PRN
Status: DISCONTINUED | OUTPATIENT
Start: 2023-05-06 | End: 2023-05-08

## 2023-05-06 RX ORDER — DEXTROSE MONOHYDRATE 25 G/50ML
10-50 INJECTION, SOLUTION INTRAVENOUS
Status: DISCONTINUED | OUTPATIENT
Start: 2023-05-06 | End: 2023-05-07 | Stop reason: SDUPTHER

## 2023-05-06 RX ORDER — NOREPINEPHRINE BITARTRATE 0.03 MG/ML
.02-.06 INJECTION, SOLUTION INTRAVENOUS CONTINUOUS PRN
Status: DISCONTINUED | OUTPATIENT
Start: 2023-05-06 | End: 2023-05-08

## 2023-05-06 RX ORDER — AMINOCAPROIC ACID 250 MG/ML
INJECTION, SOLUTION INTRAVENOUS AS NEEDED
Status: DISCONTINUED | OUTPATIENT
Start: 2023-05-06 | End: 2023-05-06 | Stop reason: SURG

## 2023-05-06 RX ORDER — CALCIUM GLUCONATE 20 MG/ML
1 INJECTION, SOLUTION INTRAVENOUS ONCE
Status: COMPLETED | OUTPATIENT
Start: 2023-05-06 | End: 2023-05-06

## 2023-05-06 RX ORDER — NITROGLYCERIN 20 MG/100ML
5-50 INJECTION INTRAVENOUS CONTINUOUS PRN
Status: DISCONTINUED | OUTPATIENT
Start: 2023-05-06 | End: 2023-05-08

## 2023-05-06 RX ORDER — KETAMINE HCL IN NACL, ISO-OSM 100MG/10ML
SYRINGE (ML) INJECTION AS NEEDED
Status: DISCONTINUED | OUTPATIENT
Start: 2023-05-06 | End: 2023-05-06 | Stop reason: SURG

## 2023-05-06 RX ORDER — SODIUM PHOSPHATE IN 0.9 % NACL 15MMOL/100
15 PLASTIC BAG, INJECTION (ML) INTRAVENOUS AS NEEDED
Status: DISCONTINUED | OUTPATIENT
Start: 2023-05-06 | End: 2023-05-08

## 2023-05-06 RX ORDER — ACETAMINOPHEN 325 MG/1
650 TABLET ORAL EVERY 4 HOURS PRN
Status: DISCONTINUED | OUTPATIENT
Start: 2023-05-07 | End: 2023-05-13 | Stop reason: HOSPADM

## 2023-05-06 RX ORDER — ATORVASTATIN CALCIUM 40 MG/1
40 TABLET, FILM COATED ORAL NIGHTLY
Status: DISCONTINUED | OUTPATIENT
Start: 2023-05-07 | End: 2023-05-13 | Stop reason: HOSPADM

## 2023-05-06 RX ORDER — SODIUM CHLORIDE 9 MG/ML
INJECTION, SOLUTION INTRAVENOUS AS NEEDED
Status: DISCONTINUED | OUTPATIENT
Start: 2023-05-06 | End: 2023-05-06

## 2023-05-06 RX ORDER — ALBUMIN, HUMAN INJ 5% 5 %
12.5 SOLUTION INTRAVENOUS ONCE
Status: COMPLETED | OUTPATIENT
Start: 2023-05-06 | End: 2023-05-06

## 2023-05-06 RX ORDER — EPHEDRINE SULFATE 5 MG/ML
INJECTION INTRAVENOUS AS NEEDED
Status: DISCONTINUED | OUTPATIENT
Start: 2023-05-06 | End: 2023-05-06 | Stop reason: SURG

## 2023-05-06 RX ORDER — FENTANYL CITRATE 50 UG/ML
INJECTION, SOLUTION INTRAMUSCULAR; INTRAVENOUS AS NEEDED
Status: DISCONTINUED | OUTPATIENT
Start: 2023-05-06 | End: 2023-05-06 | Stop reason: SURG

## 2023-05-06 RX ORDER — BISACODYL 10 MG
10 SUPPOSITORY, RECTAL RECTAL DAILY PRN
Status: DISCONTINUED | OUTPATIENT
Start: 2023-05-07 | End: 2023-05-13 | Stop reason: HOSPADM

## 2023-05-06 RX ORDER — CEFAZOLIN 2 G/1
INJECTION, POWDER, FOR SOLUTION INTRAMUSCULAR; INTRAVENOUS AS NEEDED
Status: DISCONTINUED | OUTPATIENT
Start: 2023-05-06 | End: 2023-05-06 | Stop reason: SURG

## 2023-05-06 RX ORDER — MAGNESIUM SULFATE HEPTAHYDRATE 40 MG/ML
INJECTION, SOLUTION INTRAVENOUS AS NEEDED
Status: DISCONTINUED | OUTPATIENT
Start: 2023-05-06 | End: 2023-05-06 | Stop reason: SURG

## 2023-05-06 RX ORDER — SODIUM CHLORIDE 9 MG/ML
INJECTION, SOLUTION INTRAVENOUS CONTINUOUS PRN
Status: DISCONTINUED | OUTPATIENT
Start: 2023-05-06 | End: 2023-05-06 | Stop reason: SURG

## 2023-05-06 RX ORDER — ALBUMIN, HUMAN INJ 5% 5 %
1000 SOLUTION INTRAVENOUS AS NEEDED
Status: DISPENSED | OUTPATIENT
Start: 2023-05-06 | End: 2023-05-07

## 2023-05-06 RX ORDER — IBUPROFEN 600 MG/1
1 TABLET ORAL
Status: DISCONTINUED | OUTPATIENT
Start: 2023-05-06 | End: 2023-05-07 | Stop reason: SDUPTHER

## 2023-05-06 RX ORDER — CALCIUM GLUCONATE 94 MG/ML
INJECTION, SOLUTION INTRAVENOUS AS NEEDED
Status: DISCONTINUED | OUTPATIENT
Start: 2023-05-06 | End: 2023-05-06 | Stop reason: SURG

## 2023-05-06 RX ORDER — POTASSIUM CHLORIDE 7.45 MG/ML
INJECTION INTRAVENOUS
Status: COMPLETED
Start: 2023-05-06 | End: 2023-05-06

## 2023-05-06 RX ORDER — CHLORHEXIDINE GLUCONATE 0.12 MG/ML
15 RINSE ORAL EVERY 12 HOURS
Status: DISCONTINUED | OUTPATIENT
Start: 2023-05-06 | End: 2023-05-10

## 2023-05-06 RX ORDER — ASPIRIN 81 MG/1
81 TABLET ORAL DAILY
Status: DISCONTINUED | OUTPATIENT
Start: 2023-05-07 | End: 2023-05-13 | Stop reason: HOSPADM

## 2023-05-06 RX ADMIN — ALBUMIN HUMAN: 0.05 INJECTION, SOLUTION INTRAVENOUS at 11:00

## 2023-05-06 RX ADMIN — ONDANSETRON 4 MG: 2 INJECTION INTRAMUSCULAR; INTRAVENOUS at 11:17

## 2023-05-06 RX ADMIN — MORPHINE SULFATE 2 MG: 2 INJECTION, SOLUTION INTRAMUSCULAR; INTRAVENOUS at 16:36

## 2023-05-06 RX ADMIN — VECURONIUM BROMIDE 5 MG: 10 INJECTION, POWDER, LYOPHILIZED, FOR SOLUTION INTRAVENOUS at 10:19

## 2023-05-06 RX ADMIN — MIDAZOLAM 2 MG: 1 INJECTION INTRAMUSCULAR; INTRAVENOUS at 07:01

## 2023-05-06 RX ADMIN — ALBUMIN (HUMAN) 12.5 G: 2.5 SOLUTION INTRAVENOUS at 21:24

## 2023-05-06 RX ADMIN — PANTOPRAZOLE SODIUM 40 MG: 40 INJECTION, POWDER, LYOPHILIZED, FOR SOLUTION INTRAVENOUS at 17:57

## 2023-05-06 RX ADMIN — ACETAMINOPHEN 650 MG: 650 SOLUTION ORAL at 18:02

## 2023-05-06 RX ADMIN — PROTAMINE SULFATE 300 MG: 10 INJECTION, SOLUTION INTRAVENOUS at 10:31

## 2023-05-06 RX ADMIN — MUPIROCIN 1 APPLICATION: 20 OINTMENT TOPICAL at 21:24

## 2023-05-06 RX ADMIN — ASPIRIN 325 MG ORAL TABLET 325 MG: 325 PILL ORAL at 18:00

## 2023-05-06 RX ADMIN — Medication 30 MG: at 06:50

## 2023-05-06 RX ADMIN — EPHEDRINE SULFATE 5 MG: 5 INJECTION INTRAVENOUS at 08:38

## 2023-05-06 RX ADMIN — MAGNESIUM SULFATE HEPTAHYDRATE 2 G: 2 INJECTION, SOLUTION INTRAVENOUS at 10:27

## 2023-05-06 RX ADMIN — SODIUM CHLORIDE: 0.9 INJECTION, SOLUTION INTRAVENOUS at 11:01

## 2023-05-06 RX ADMIN — POTASSIUM PHOSPHATE, MONOBASIC AND POTASSIUM PHOSPHATE, DIBASIC 30 MMOL: 224; 236 INJECTION, SOLUTION, CONCENTRATE INTRAVENOUS at 21:01

## 2023-05-06 RX ADMIN — INSULIN HUMAN 1.9 UNITS/HR: 1 INJECTION, SOLUTION INTRAVENOUS at 18:19

## 2023-05-06 RX ADMIN — FENTANYL CITRATE 250 MCG: 0.05 INJECTION, SOLUTION INTRAMUSCULAR; INTRAVENOUS at 07:45

## 2023-05-06 RX ADMIN — PHENYLEPHRINE HYDROCHLORIDE 100 MCG: 10 INJECTION INTRAVENOUS at 08:16

## 2023-05-06 RX ADMIN — CEFAZOLIN 2 G: 2 INJECTION, POWDER, FOR SOLUTION INTRAMUSCULAR; INTRAVENOUS at 07:24

## 2023-05-06 RX ADMIN — CHLORHEXIDINE GLUCONATE 1 APPLICATION: 500 CLOTH TOPICAL at 05:59

## 2023-05-06 RX ADMIN — VECURONIUM BROMIDE 10 MG: 10 INJECTION, POWDER, LYOPHILIZED, FOR SOLUTION INTRAVENOUS at 07:01

## 2023-05-06 RX ADMIN — VECURONIUM BROMIDE 5 MG: 10 INJECTION, POWDER, LYOPHILIZED, FOR SOLUTION INTRAVENOUS at 08:00

## 2023-05-06 RX ADMIN — Medication 12.5 MG: at 06:00

## 2023-05-06 RX ADMIN — CALCIUM GLUCONATE 1 G: 20 INJECTION, SOLUTION INTRAVENOUS at 12:56

## 2023-05-06 RX ADMIN — DEXMEDETOMIDINE HYDROCHLORIDE 0.5 MCG/KG/HR: 100 INJECTION, SOLUTION INTRAVENOUS at 07:26

## 2023-05-06 RX ADMIN — MIDAZOLAM 3 MG: 1 INJECTION INTRAMUSCULAR; INTRAVENOUS at 06:50

## 2023-05-06 RX ADMIN — FENTANYL CITRATE 250 MCG: 0.05 INJECTION, SOLUTION INTRAMUSCULAR; INTRAVENOUS at 10:36

## 2023-05-06 RX ADMIN — ALBUMIN (HUMAN) 250 ML: 12.5 INJECTION, SOLUTION INTRAVENOUS at 17:25

## 2023-05-06 RX ADMIN — HEPARIN SODIUM 28000 UNITS: 1000 INJECTION INTRAVENOUS; SUBCUTANEOUS at 08:32

## 2023-05-06 RX ADMIN — MIDAZOLAM 1 MG: 1 INJECTION INTRAMUSCULAR; INTRAVENOUS at 10:36

## 2023-05-06 RX ADMIN — PANTOPRAZOLE SODIUM 40 MG: 40 TABLET, DELAYED RELEASE ORAL at 05:51

## 2023-05-06 RX ADMIN — SODIUM CHLORIDE: 0.9 INJECTION, SOLUTION INTRAVENOUS at 06:48

## 2023-05-06 RX ADMIN — AMINOCAPROIC ACID 10 G: 250 INJECTION, SOLUTION INTRAVENOUS at 11:08

## 2023-05-06 RX ADMIN — MORPHINE SULFATE 2 MG: 2 INJECTION, SOLUTION INTRAMUSCULAR; INTRAVENOUS at 22:56

## 2023-05-06 RX ADMIN — SODIUM BICARBONATE 50 MEQ: 84 INJECTION, SOLUTION INTRAVENOUS at 22:38

## 2023-05-06 RX ADMIN — POTASSIUM CHLORIDE 10 MEQ: 7.46 INJECTION, SOLUTION INTRAVENOUS at 12:43

## 2023-05-06 RX ADMIN — FENTANYL CITRATE 250 MCG: 0.05 INJECTION, SOLUTION INTRAMUSCULAR; INTRAVENOUS at 09:57

## 2023-05-06 RX ADMIN — ALBUMIN (HUMAN) 250 ML: 12.5 INJECTION, SOLUTION INTRAVENOUS at 15:13

## 2023-05-06 RX ADMIN — MUPIROCIN 1 APPLICATION: 20 OINTMENT TOPICAL at 06:00

## 2023-05-06 RX ADMIN — FENTANYL CITRATE 250 MCG: 0.05 INJECTION, SOLUTION INTRAMUSCULAR; INTRAVENOUS at 08:18

## 2023-05-06 RX ADMIN — ALBUMIN (HUMAN) 250 ML: 12.5 INJECTION, SOLUTION INTRAVENOUS at 19:32

## 2023-05-06 RX ADMIN — ALBUMIN (HUMAN) 250 ML: 12.5 INJECTION, SOLUTION INTRAVENOUS at 12:50

## 2023-05-06 RX ADMIN — HYDROCODONE BITARTRATE AND ACETAMINOPHEN 1 TABLET: 5; 325 TABLET ORAL at 15:45

## 2023-05-06 RX ADMIN — DEXMEDETOMIDINE HYDROCHLORIDE 0.5 MCG/KG/HR: 4 INJECTION, SOLUTION INTRAVENOUS at 12:00

## 2023-05-06 RX ADMIN — CALCIUM GLUCONATE 1 G: 98 INJECTION, SOLUTION INTRAVENOUS at 10:31

## 2023-05-06 RX ADMIN — FENTANYL CITRATE 250 MCG: 0.05 INJECTION, SOLUTION INTRAMUSCULAR; INTRAVENOUS at 07:01

## 2023-05-06 RX ADMIN — ACETAMINOPHEN 650 MG: 650 SOLUTION ORAL at 14:41

## 2023-05-06 RX ADMIN — FENTANYL CITRATE 250 MCG: 0.05 INJECTION, SOLUTION INTRAMUSCULAR; INTRAVENOUS at 07:05

## 2023-05-06 RX ADMIN — CHLORHEXIDINE GLUCONATE 15 ML: 1.2 SOLUTION ORAL at 05:50

## 2023-05-06 RX ADMIN — ETOMIDATE 20 MG: 2 INJECTION INTRAVENOUS at 07:01

## 2023-05-06 RX ADMIN — NICARDIPINE HYDROCHLORIDE 2 MG/HR: 25 INJECTION, SOLUTION INTRAVENOUS at 10:49

## 2023-05-06 RX ADMIN — MORPHINE SULFATE 2 MG: 2 INJECTION, SOLUTION INTRAMUSCULAR; INTRAVENOUS at 13:58

## 2023-05-06 RX ADMIN — ACETAMINOPHEN 1000 MG: 10 INJECTION, SOLUTION INTRAVENOUS at 11:16

## 2023-05-06 RX ADMIN — NICARDIPINE HYDROCHLORIDE 5 MG/HR: 25 INJECTION, SOLUTION INTRAVENOUS at 12:45

## 2023-05-06 RX ADMIN — VECURONIUM BROMIDE 5 MG: 10 INJECTION, POWDER, LYOPHILIZED, FOR SOLUTION INTRAVENOUS at 09:01

## 2023-05-06 RX ADMIN — ACETAMINOPHEN 650 MG: 325 TABLET, FILM COATED ORAL at 23:15

## 2023-05-06 RX ADMIN — MIDAZOLAM 2 MG: 1 INJECTION INTRAMUSCULAR; INTRAVENOUS at 10:19

## 2023-05-06 RX ADMIN — POTASSIUM CHLORIDE 10 MEQ: 7.46 INJECTION, SOLUTION INTRAVENOUS at 15:08

## 2023-05-06 RX ADMIN — AMINOCAPROIC ACID 10 G: 250 INJECTION, SOLUTION INTRAVENOUS at 07:30

## 2023-05-06 RX ADMIN — DEXMEDETOMIDINE HYDROCHLORIDE 0.5 MCG/KG/HR: 4 INJECTION, SOLUTION INTRAVENOUS at 14:12

## 2023-05-06 RX ADMIN — MIDAZOLAM 2 MG: 1 INJECTION INTRAMUSCULAR; INTRAVENOUS at 09:01

## 2023-05-06 RX ADMIN — CEFAZOLIN 2 G: 2 INJECTION, POWDER, FOR SOLUTION INTRAMUSCULAR; INTRAVENOUS at 17:57

## 2023-05-06 RX ADMIN — ALBUMIN HUMAN: 0.05 INJECTION, SOLUTION INTRAVENOUS at 11:11

## 2023-05-06 RX ADMIN — MAGNESIUM SULFATE IN DEXTROSE 1 G: 10 INJECTION, SOLUTION INTRAVENOUS at 17:57

## 2023-05-06 RX ADMIN — INSULIN HUMAN 4 UNITS/HR: 1 INJECTION, SOLUTION INTRAVENOUS at 09:00

## 2023-05-06 RX ADMIN — Medication 20 MG: at 07:01

## 2023-05-06 RX ADMIN — CEFAZOLIN 2 G: 2 INJECTION, POWDER, FOR SOLUTION INTRAMUSCULAR; INTRAVENOUS at 11:00

## 2023-05-06 NOTE — ANESTHESIA PROCEDURE NOTES
Central Line      Patient location during procedure: OR  Start time: 5/6/2023 7:05 AM  Stop Time:5/6/2023 7:15 AM  Indications: central pressure monitoring, vascular access and MD/Surgeon request  Staff  Anesthesiologist: Rivas Gilliland MD  Preanesthetic Checklist  Completed: patient identified, IV checked, site marked, risks and benefits discussed, surgical consent, monitors and equipment checked, pre-op evaluation and timeout performed  Central Line Prep  Sterile Tech:gloves, cap, gown, mask and sterile barriers  Prep: chloraprep  Patient monitoring: blood pressure monitoring, continuous pulse oximetry and EKG  Central Line Procedure  Laterality:right  Location:internal jugular  Catheter Type:double lumen and MAC  Catheter Size:9 Fr  Guidance:ultrasound guided  PROCEDURE NOTE/ULTRASOUND INTERPRETATION.  Using ultrasound guidance the potential vascular sites for insertion of the catheter were visualized to determine the patency of the vessel to be used for vascular access.  After selecting the appropriate site for insertion, the needle was visualized under ultrasound being inserted into the internal jugular vein, followed by ultrasound confirmation of wire and catheter placement. There were no abnormalities seen on ultrasound; an image was taken; and the patient tolerated the procedure with no complications. Images: still images obtained, printed/placed on chart  Assessment  Post procedure:biopatch applied, line sutured and occlusive dressing applied  Assessement:blood return through all ports, free fluid flow, chest x-ray ordered and Singh Test  Complications:no  Patient Tolerance:patient tolerated the procedure well with no apparent complications  Additional Notes  R IJ MAC VIA ASEPTIC SELDINGER TECH THRU ANTERIOR APPROACH US GUIDED X1

## 2023-05-06 NOTE — ANESTHESIA POSTPROCEDURE EVALUATION
Patient: Andree Deluna    Procedure Summary     Date: 05/06/23 Room / Location:  [facility] 01 /  [facility]    Anesthesia Start: 0648 Anesthesia Stop: 1144    Procedures:       CORONARY ARTERY BYPASS GRAFTING (Chest)      MITRAL VALVE REPAIR/REPLACEMENT (Chest)      TRANSESOPHAGEAL ECHOCARDIOGRAM WITH ANESTHESIA (Chest) Diagnosis:       Coronary artery disease involving native coronary artery of native heart with unstable angina pectoris      Abnormal findings on diagnostic imaging of heart and coronary circulation      (Coronary artery disease involving native coronary artery of native heart with unstable angina pectoris [I25.110])      (Abnormal findings on diagnostic imaging of heart and coronary circulation [R93.1])    Surgeons: Errol Noonan MD Provider: Rivas Gilliland MD    Anesthesia Type: general, CVL, PAC, [place] ASA Status: 4          Anesthesia Type: general, CVL, PAC, [place]    Vitals  Vitals Value Taken Time   BP     Temp     Pulse 73 05/06/23 1152   Resp     SpO2 100 % 05/06/23 1152   Vitals shown include unvalidated device data.        Post Anesthesia Care and Evaluation    Patient location during evaluation: ICU  Patient participation: complete - patient cannot participate  Level of consciousness: obtunded/minimal responses  Pain scale: See nurse's notes for pain score.  Pain management: adequate    Airway patency: patent  Anesthetic complications: No anesthetic complications  PONV Status: none  Cardiovascular status: acceptable  Respiratory status: acceptable, ventilator and ETT  Hydration status: acceptable    Comments: Patient seen and examined postoperatively; vital signs stable; SpO2 greater than or equal to 90%; cardiopulmonary status stable; nausea/vomiting adequately controlled; pain adequately controlled; no apparent anesthesia complications; patient discharged from anesthesia care when discharge criteria were met. Sedated on vent hemodynamically stable. cardene 5mg/hr

## 2023-05-06 NOTE — PROGRESS NOTES
[facility]     Progress Note    Patient Name: Andree Deluna  : 1955  MRN: [id number]  Primary Care Physician:  Naga Griffith PA-C  Date of admission: 2023  Service date and time: 23 16:23 EDT  Subjective   Subjective     Chief Complaint: chest pain     HPI:  Patient is intubated, got back from CABG     Objective   Objective     Vitals:   Temp:  [93.4 °F (34.1 °C)-98.5 °F (36.9 °C)] 97.3 °F (36.3 °C)  Heart Rate:  [62-89] 89  Resp:  [12-18] 12  BP: (103-131)/(63-88) 131/88  FiO2 (%):  [40 %-70 %] 40 %  Physical Exam    Constitutional: intubated on the vent    Eyes: PERRLA, sclerae anicteric, no conjunctival injection   HENT: NCAT, mucous membranes moist   Neck: Supple, no thyromegaly, no lymphadenopathy, trachea midline   Respiratory: Clear to auscultation bilaterally, nonlabored respirations    Cardiovascular: RRR, no murmurs, rubs, or gallops, palpable pedal pulses bilaterally   Gastrointestinal: Positive bowel sounds, soft, nontender, nondistended   Musculoskeletal: No bilateral ankle edema, no clubbing or cyanosis to extremities   Psychiatric: Appropriate affect, cooperative   Neurologic: Oriented x 3, strength symmetric in all extremities, Cranial Nerves grossly intact to confrontation, speech clear   Skin: No rashes     Result Review    Result Review:  I have personally reviewed the results from the time of this admission to 2023 16:23 EDT and agree with these findings:  [x]  Laboratory list / accordion  []  Microbiology  []  Radiology  []  EKG/Telemetry   []  Cardiology/Vascular   []  Pathology  []  Old records  []  Other:        Assessment & Plan   Assessment / Plan       Active Hospital Problems:  Active Hospital Problems    Diagnosis    • **Chest pain, unspecified type    • Elevated troponin    • Non-STEMI (non-ST elevated myocardial infarction)    • Coronary artery disease involving native coronary artery of native heart with unstable angina pectoris    • Abnormal  findings on diagnostic imaging of heart and coronary circulation    • Chronic cough    • History of stroke    • Overactive bladder    • GERD with esophagitis    • Mood disorder    • Hyperlipidemia    • Obesity    • Hypertension      Plan:    MV CAD with NSTEMI presentation, EF 50% (cath)--surgical workup in progress  - Moderate mitral regurgitation--per echo   - HTN--stable  - HLD--statin  - Hyperglycemia--a1c 5.8  - Hx stroke ()--s/p right CEA (Dr. Das, )  - Strong family hx premature CAD--father  of MI age 54/ mother  from MI age 64  - Anxiety--on home Xanax, she is no longer on Viibyrd  - Obesity, stage 1--BMI     1. CABG x 3 (LIMA to LAD, SVG to OM, SVG to PDA)  2. EVH of the right legs  3. Mitral valve repair with Physio II ring    DVT prophylaxis:  Medical and mechanical DVT prophylaxis orders are present.    CODE STATUS:   Code Status (Patient has no pulse and is not breathing): CPR (Attempt to Resuscitate)  Medical Interventions (Patient has pulse or is breathing): Full    Disposition:  I expect patient to be discharged 5 days     Jimmie Muñiz MD

## 2023-05-06 NOTE — PROGRESS NOTES
Referring Provider: Jimmie Muñiz MD    Reason for follow-up: Multivessel coronary artery disease, status post CABG     Patient Care Team:  Naga Griffith PA-C as PCP - General (Physician Assistant)      SUBJECTIVE    Seen after CABG.  Patient is currently intubated and sedated.  She is being paced     ROS  Unable to obtain due to intubated and sedated status      Personal History:    Past Medical History:   Diagnosis Date   • ADHD unknown   • Anemia    • Anxiety    • Carotid artery disease     80% right internal carotid artery   • CVA (cerebral vascular accident)     right parietal right temporal   • DDD (degenerative disc disease), lumbar    • Depression    • Extremity pain     Ankle pain   • GERD (gastroesophageal reflux disease)    • Hyperlipidemia unknown   • Hypertension    • Insomnia unknown   • Laryngopharyngeal reflux    • Low back pain    • Mood disorder    • Obesity unknown   • Skin cancer of face    • Sleep apnea     Suspected sleep apnea she has refused to be tested   • Stroke (cerebrum)    • Visual field defect     Left       Past Surgical History:   Procedure Laterality Date   • CARDIAC CATHETERIZATION N/A 5/1/2023    Procedure: Left Heart Cath;  Surgeon: Kye Alcaraz MD;  Location: [facility] CATH INVASIVE LOCATION;  Service: Cardiovascular;  Laterality: N/A;   • CAROTID ENDARTERECTOMY Right 11/10/2020    Procedure: CAROTID ENDARTERECTOMY;  Surgeon: Tavo Das MD;  Location: [facility] MAIN OR;  Service: Vascular;  Laterality: Right;   • CHOLECYSTECTOMY     • COLONOSCOPY      2016   • FINGER SURGERY     • KNEE ARTHROPLASTY     • KNEE SURGERY Right     replaced   • LAPAROSCOPIC GASTRIC BANDING     • ROTATOR CUFF REPAIR Right 2019   • SHOULDER SURGERY Right 05/24/2019    scope/ cuff repair   • TUBAL ABDOMINAL LIGATION         Family History   Problem Relation Age of Onset   • Diabetes Other    • Heart disease Mother    • Diabetes Mother    • Heart disease Father        Social  History     Tobacco Use   • Smoking status: Never   • Smokeless tobacco: Never   Vaping Use   • Vaping Use: Never used   Substance Use Topics   • Alcohol use: No   • Drug use: No        Medications Prior to Admission   Medication Sig Dispense Refill Last Dose   • ALPRAZolam (XANAX) 2 MG tablet TAKE 1 TABLET BY MOUTH AT NIGHT AS NEEDED FOR ANXIETY 30 tablet 0    • amLODIPine (NORVASC) 2.5 MG tablet TAKE ONE TABLET BY MOUTH DAILY 90 tablet 0 Past Week   • aspirin 81 MG chewable tablet Chew 1 tablet Daily. 30 tablet 1 4/30/2023   • atorvastatin (LIPITOR) 80 MG tablet TAKE ONE TABLET BY MOUTH ONCE NIGHTLY 30 tablet 11    • lamoTRIgine (LaMICtal) 100 MG tablet TAKE ONE TABLET BY MOUTH TWICE A DAY 60 tablet 3    • lisinopril-hydrochlorothiazide (PRINZIDE,ZESTORETIC) 20-25 MG per tablet TAKE ONE TABLET BY MOUTH DAILY 90 tablet 1    • metoprolol succinate XL (TOPROL-XL) 25 MG 24 hr tablet TAKE ONE TABLET BY MOUTH DAILY 30 tablet 10    • omeprazole (priLOSEC) 40 MG capsule TAKE ONE CAPSULE BY MOUTH DAILY 90 capsule 1    • oxybutynin (DITROPAN) 5 MG tablet Take 1 tablet by mouth Daily. 90 tablet 1 Past Week       Allergies:  Patient has no known allergies.    Scheduled Meds:acetaminophen, 650 mg, Oral, Q4H   Or  acetaminophen, 650 mg, Oral, Q4H   Or  acetaminophen, 650 mg, Rectal, Q4H  [START ON 5/7/2023] aspirin, 81 mg, Oral, Daily  aspirin, 325 mg, Oral, Once  [START ON 5/7/2023] atorvastatin, 40 mg, Oral, Nightly  ceFAZolin, 2 g, Intravenous, Q8H  chlorhexidine, 15 mL, Mouth/Throat, Q12H  [START ON 5/7/2023] enoxaparin, 40 mg, Subcutaneous, Q24H  magnesium sulfate, 1 g, Intravenous, Q8H  mupirocin, 1 application, Each Nare, BID  pantoprazole, 40 mg, Intravenous, Once   Followed by  [START ON 5/7/2023] pantoprazole, 40 mg, Oral, QAM  [START ON 5/7/2023] senna-docusate sodium, 2 tablet, Oral, Nightly      Continuous Infusions:dexmedetomidine, 0.2-1.5 mcg/kg/hr, Last Rate: 0.2 mcg/kg/hr (05/06/23 5797)  insulin, 0-100  "Units/hr, Last Rate: Stopped (05/06/23 1559)  niCARdipine, 5-15 mg/hr, Last Rate: Stopped (05/06/23 1559)  nitroglycerin, 5-50 mcg/min  norepinephrine, 0.02-0.06 mcg/kg/min  propofol, 5-50 mcg/kg/min      PRN Meds:.•  [START ON 5/7/2023] acetaminophen **OR** [START ON 5/7/2023] acetaminophen **OR** [START ON 5/7/2023] acetaminophen  •  albumin human  •  [START ON 5/7/2023] bisacodyl  •  dextrose  •  dextrose  •  glucagon (human recombinant)  •  HYDROcodone-acetaminophen  •  meperidine  •  Morphine **AND** naloxone  •  Morphine  •  niCARdipine  •  nitroglycerin  •  norepinephrine  •  ondansetron  •  polyethylene glycol  •  potassium chloride **OR** potassium chloride **OR** potassium chloride  •  propofol      OBJECTIVE    Vital Signs  Vitals:    05/06/23 1530 05/06/23 1545 05/06/23 1600 05/06/23 1615   BP:       BP Location:       Patient Position:       Pulse: 89 89 89 89   Resp:       Temp: 97.3 °F (36.3 °C) 97.3 °F (36.3 °C) 97.3 °F (36.3 °C) 97.3 °F (36.3 °C)   TempSrc:       SpO2: 100% 100% 100% 100%   Weight:       Height:           Flowsheet Rows    Flowsheet Row First Filed Value   Admission Height 167.6 cm (66\") Documented at 04/30/2023 2132   Admission Weight 92.7 kg (204 lb 5.9 oz) Documented at 04/30/2023 2132            Intake/Output Summary (Last 24 hours) at 5/6/2023 1634  Last data filed at 5/6/2023 1600  Gross per 24 hour   Intake 3863 ml   Output 3385 ml   Net 478 ml          Telemetry: Paced rhythm, underlying sinus rhythm    Physical Exam:  The patient is intubated and sedated  Vital signs as noted above.  Head and neck revealed no carotid bruits or jugular venous distention.  No thyromegaly or lymphadenopathy is present  Lungs clear.  No wheezing.  Breath sounds are normal bilaterally.  Heart normal first and second heart sounds.  No murmur. No precordial rub is present.  No gallop is present.  Chest tubes in place  Abdomen soft and nontender.  No organomegaly is present.  Extremities with good " peripheral pulses without any pedal edema.  Skin warm and dry.  Musculoskeletal system is grossly normal.  CNS grossly normal.       Results Review:  I have personally reviewed the results from the time of this admission to 5/6/2023 16:34 EDT and agree with these findings:  []  Laboratory  []  Microbiology  []  Radiology  []  EKG/Telemetry   []  Cardiology/Vascular   []  Pathology  []  Old records  []  Other:    Most notable findings include:    Lab Results (last 24 hours)     Procedure Component Value Units Date/Time    CBC (No Diff) [[id number]]  (Abnormal) Collected: 05/06/23 1445    Specimen: Blood Updated: 05/06/23 1455     WBC 7.60 10*3/mm3      RBC 3.70 10*6/mm3      Hemoglobin 10.9 g/dL      Hematocrit 33.3 %      MCV 90.2 fL      MCH 29.5 pg      MCHC 32.7 g/dL      RDW 14.1 %      RDW-SD 46.8 fl      MPV 9.3 fL      Platelets 139 10*3/mm3     POC Glucose Once [[id number]]  (Abnormal) Collected: 05/06/23 1449    Specimen: Blood Updated: 05/06/23 1451     Glucose 125 mg/dL      Comment: Serial Number: [id number]:  [id number]       POC Glucose Once [[id number]]  (Abnormal) Collected: 05/06/23 1347    Specimen: Blood Updated: 05/06/23 1352     Glucose 108 mg/dL      Comment: Serial Number: [id number]:  [id number]       POCT Electrolytes +HGB +HCT [[id number]]  (Abnormal) Collected: 05/06/23 1347    Specimen: Blood Updated: 05/06/23 1352     Sodium 146 mmol/L      POC Potassium 3.6 mmol/L      Ionized Calcium 1.20 mmol/L      Comment: Serial Number: [id number]:  [id number]        Glucose 108 mg/dL      Hematocrit 32 %      Hemoglobin 10.9 g/dL     Blood Gas, Arterial - [[id number]]  (Abnormal) Collected: 05/06/23 1347    Specimen: Arterial Blood Updated: 05/06/23 1352     Site Arterial Line     Juan's Test N/A     pH, Arterial 7.459 pH units      pCO2, Arterial 32.8 mm Hg      pO2, Arterial 98.5 mm Hg      HCO3, Arterial 23.3 mmol/L      Base Excess, Arterial -0.1 mmol/L      Comment: Serial Number:  [id number]:  [id number]        O2 Saturation, Arterial 98.1 %      Barometric Pressure for Blood Gas --     Comment: N/A        Modality Adult Vent     FIO2 40 %      Ventilator Mode ;AC     Set Tidal Volume 600     PEEP 5     Hemodilution Yes     Respiratory Rate 14    POC Glucose Once [[id number]]  (Normal) Collected: 05/06/23 1231    Specimen: Blood Updated: 05/06/23 1236     Glucose 87 mg/dL      Comment: Serial Number: [id number]:  [id number]       POCT Electrolytes +HGB +HCT [[id number]]  (Abnormal) Collected: 05/06/23 1231    Specimen: Blood Updated: 05/06/23 1236     Sodium 145 mmol/L      POC Potassium 3.4 mmol/L      Ionized Calcium 1.10 mmol/L      Comment: Serial Number: [id number]:  [id number]        Glucose 87 mg/dL      Hematocrit 32 %      Hemoglobin 10.7 g/dL     Blood Gas, Arterial - [[id number]]  (Abnormal) Collected: 05/06/23 1231    Specimen: Arterial Blood Updated: 05/06/23 1236     Site Arterial Line     Juan's Test N/A     pH, Arterial 7.430 pH units      pCO2, Arterial 34.5 mm Hg      pO2, Arterial 214.8 mm Hg      HCO3, Arterial 22.9 mmol/L      Base Excess, Arterial -1.0 mmol/L      Comment: Serial Number: [id number]:  [id number]        O2 Saturation, Arterial 99.8 %      Barometric Pressure for Blood Gas --     Comment: N/A        Modality Adult Vent     FIO2 70 %      Ventilator Mode ;AC     Set Tidal Volume 600     PEEP 8     Hemodilution Yes     Respiratory Rate 14    CBC (No Diff) [[id number]]  (Abnormal) Collected: 05/06/23 1157    Specimen: Blood Updated: 05/06/23 1220     WBC 5.40 10*3/mm3      RBC 3.18 10*6/mm3      Hemoglobin 9.6 g/dL      Comment: Result checked          Hematocrit 29.3 %      MCV 92.3 fL      MCH 30.1 pg      MCHC 32.6 g/dL      RDW 14.1 %      RDW-SD 45.1 fl      MPV 8.5 fL      Platelets 117 10*3/mm3     Renal Function Panel [[id number]]  (Abnormal) Collected: 05/06/23 1157    Specimen: Blood Updated: 05/06/23 1219     Glucose 101 mg/dL      BUN 13 mg/dL       Creatinine 0.76 mg/dL      Sodium 144 mmol/L      Potassium 3.6 mmol/L      Comment: Slight hemolysis detected by analyzer. Results may be affected.        Chloride 110 mmol/L      CO2 23.0 mmol/L      Calcium 8.7 mg/dL      Albumin 4.3 g/dL      Phosphorus 3.3 mg/dL      Anion Gap 11.0 mmol/L      BUN/Creatinine Ratio 17.1     eGFR 87.1 mL/min/1.73     Narrative:      GFR Normal >60  Chronic Kidney Disease <60  Kidney Failure <15      aPTT [[id number]]  (Normal) Collected: 05/06/23 1157    Specimen: Blood Updated: 05/06/23 1214     PTT 27.2 seconds     Protime-INR [[id number]]  (Abnormal) Collected: 05/06/23 1157    Specimen: Blood Updated: 05/06/23 1214     Protime 12.4 Seconds      INR 1.17    Fibrinogen [[id number]]  (Abnormal) Collected: 05/06/23 1157    Specimen: Blood Updated: 05/06/23 1214     Fibrinogen 209 mg/dL     Calcium, Ionized [[id number]]  (Abnormal) Collected: 05/06/23 1157    Specimen: Blood Updated: 05/06/23 1208     Ionized Calcium 1.16 mmol/L     POC Chem 8, arterial (ISTAT) [[id number]]  (Abnormal) Collected: 05/06/23 1052    Specimen: Arterial Blood Updated: 05/06/23 1123     Ionized Calcium 1.35 mmol/L      pH, Arterial 7.300 pH units      pCO2, Arterial 54.7 mm Hg      pO2, Arterial 266.0 mm Hg      HCO3, Arterial 27.0 mmol/L      Base Excess, Arterial 1.0 mmol/L      Base Deficit --     O2 Saturation, Arterial 100.0 %      Glucose 121 mg/dL      Comment: Serial Number: [id number]:  [id number]        Sodium 141 mmol/L      POC Potassium 3.5 mmol/L      Hematocrit 21 %      Hemoglobin 7.1 g/dL      CO2 Content 29 mmol/L     POC Chem 8, arterial (ISTAT) [[id number]]  (Abnormal) Collected: 05/06/23 1017    Specimen: Arterial Blood Updated: 05/06/23 1123     Ionized Calcium 1.06 mmol/L      pH, Arterial 7.340 pH units      pCO2, Arterial 51.2 mm Hg      pO2, Arterial 427.0 mm Hg      HCO3, Arterial 27.6 mmol/L      Base Excess, Arterial 2.0 mmol/L      Base Deficit --     O2 Saturation, Arterial  100.0 %      Glucose 142 mg/dL      Comment: Serial Number: [id number]:  [id number]        Sodium 141 mmol/L      POC Potassium 3.7 mmol/L      Hematocrit 23 %      Hemoglobin 7.8 g/dL      CO2 Content 29 mmol/L     POC Chem 8, arterial (ISTAT) [[id number]]  (Abnormal) Collected: 05/06/23 1001    Specimen: Arterial Blood Updated: 05/06/23 1123     Ionized Calcium 1.06 mmol/L      pH, Arterial 7.330 pH units      pCO2, Arterial 56.2 mm Hg      pO2, Arterial 464.0 mm Hg      HCO3, Arterial 29.7 mmol/L      Base Excess, Arterial 4.0 mmol/L      Base Deficit --     O2 Saturation, Arterial 100.0 %      Glucose 150 mg/dL      Comment: Serial Number: [id number]:  [id number]        Sodium 139 mmol/L      POC Potassium 3.5 mmol/L      Hematocrit 23 %      Hemoglobin 7.8 g/dL      CO2 Content 31 mmol/L     POC Chem 8, arterial (ISTAT) [[id number]]  (Abnormal) Collected: 05/06/23 0933    Specimen: Arterial Blood Updated: 05/06/23 1123     Ionized Calcium 1.00 mmol/L      pH, Arterial 7.380 pH units      pCO2, Arterial 49.1 mm Hg      pO2, Arterial 457.0 mm Hg      HCO3, Arterial 28.9 mmol/L      Base Excess, Arterial 4.0 mmol/L      Base Deficit --     O2 Saturation, Arterial 100.0 %      Glucose 155 mg/dL      Comment: Serial Number: [id number]:  [id number]        Sodium 140 mmol/L      POC Potassium 3.8 mmol/L      Hematocrit 26 %      Hemoglobin 8.8 g/dL      CO2 Content 30 mmol/L     POC Chem 8, arterial (ISTAT) [[id number]]  (Abnormal) Collected: 05/06/23 0913    Specimen: Arterial Blood Updated: 05/06/23 1123     Ionized Calcium 0.99 mmol/L      pH, Arterial 7.380 pH units      pCO2, Arterial 49.8 mm Hg      pO2, Arterial 461.0 mm Hg      HCO3, Arterial 29.2 mmol/L      Base Excess, Arterial 4.0 mmol/L      Base Deficit --     O2 Saturation, Arterial 100.0 %      Glucose 152 mg/dL      Comment: Serial Number: [id number]:  [id number]        Sodium 138 mmol/L      POC Potassium 3.7 mmol/L      Hematocrit 26 %      Hemoglobin  8.8 g/dL      CO2 Content 31 mmol/L     POC Chem Panel [[id number]]  (Abnormal) Collected: 05/06/23 0904    Specimen: Venous Blood Updated: 05/06/23 1123     Glucose 146 mg/dL      Comment: Serial Number: [id number]:  [id number]        Sodium 137 mmol/L      POC Potassium 3.7 mmol/L      Ionized Calcium 0.96 mmol/L      Hematocrit 25 %      Hemoglobin 8.5 g/dL      pH, Venous 7.280 pH Units      pCO2, Venous 53.9 mm Hg      CO2 CONTENT  --     HCO3, Venous 25.1 mmol/L      Base Excess, Venous --     Base Deficit --     O2 Saturation, Venous 27.0 %      pO2, Venous 53.0 mm Hg     POC Activated Clotting Time [[id number]]  (Abnormal) Collected: 05/06/23 0903    Specimen: Venous Blood Updated: 05/06/23 1122     Activated Clotting Time  420 Seconds      Comment: Serial Number: [id number]:  [id number]       POC Activated Clotting Time [[id number]]  (Abnormal) Collected: 05/06/23 0836    Specimen: Arterial Blood Updated: 05/06/23 1122     Activated Clotting Time  570 Seconds      Comment: Serial Number: [id number]:  [id number]       POC Chem 8, arterial (ISTAT) [[id number]]  (Abnormal) Collected: 05/06/23 0735    Specimen: Arterial Blood Updated: 05/06/23 1122     Ionized Calcium 1.20 mmol/L      pH, Arterial 7.430 pH units      pCO2, Arterial 43.1 mm Hg      pO2, Arterial 530.0 mm Hg      HCO3, Arterial 28.9 mmol/L      Base Excess, Arterial 5.0 mmol/L      Base Deficit --     O2 Saturation, Arterial 100.0 %      Glucose 120 mg/dL      Comment: Serial Number: [id number]:  [id number]        Sodium 141 mmol/L      POC Potassium 3.6 mmol/L      Hematocrit 34 %      Hemoglobin 11.6 g/dL      CO2 Content 30 mmol/L     POC Activated Clotting Time [[id number]]  (Normal) Collected: 05/06/23 0734    Specimen: Arterial Blood Updated: 05/06/23 1122     Activated Clotting Time  131 Seconds      Comment: Serial Number: [id number]:  [id number]       POC Activated Clotting Time [[id number]]  (Normal) Collected: 05/06/23 1051    Specimen:  Arterial Blood Updated: 05/06/23 1121     Activated Clotting Time  131 Seconds      Comment: Serial Number: [id number]:  [id number]       POC Activated Clotting Time [[id number]]  (Abnormal) Collected: 05/06/23 1016    Specimen: Arterial Blood Updated: 05/06/23 1121     Activated Clotting Time  450 Seconds      Comment: Serial Number: [id number]:  [id number]       POC Activated Clotting Time [[id number]]  (Abnormal) Collected: 05/06/23 1000    Specimen: Arterial Blood Updated: 05/06/23 1121     Activated Clotting Time  516 Seconds      Comment: Serial Number: [id number]:  [id number]       POC Activated Clotting Time [[id number]]  (Abnormal) Collected: 05/06/23 0933    Specimen: Arterial Blood Updated: 05/06/23 1121     Activated Clotting Time  534 Seconds      Comment: Serial Number: [id number]:  [id number]       Comprehensive Metabolic Panel [[id number]]  (Abnormal) Collected: 05/06/23 0451    Specimen: Blood Updated: 05/06/23 0551     Glucose 116 mg/dL      BUN 14 mg/dL      Creatinine 0.72 mg/dL      Sodium 141 mmol/L      Potassium 4.3 mmol/L      Comment: Specimen hemolyzed.  Results may be affected.        Chloride 105 mmol/L      CO2 24.0 mmol/L      Calcium 9.0 mg/dL      Total Protein 6.4 g/dL      Albumin 3.5 g/dL      ALT (SGPT) 17 U/L      Comment: Specimen hemolyzed.  Results may be affected.        AST (SGOT) 25 U/L      Comment: Specimen hemolyzed.  Results may be affected.        Alkaline Phosphatase 97 U/L      Total Bilirubin 0.3 mg/dL      Globulin 2.9 gm/dL      A/G Ratio 1.2 g/dL      BUN/Creatinine Ratio 19.4     Anion Gap 12.0 mmol/L      eGFR 92.9 mL/min/1.73     Narrative:      GFR Normal >60  Chronic Kidney Disease <60  Kidney Failure <15      Magnesium [[id number]]  (Normal) Collected: 05/06/23 0451    Specimen: Blood Updated: 05/06/23 0551     Magnesium 2.3 mg/dL     POC Glucose Once [[id number]]  (Abnormal) Collected: 05/06/23 0549    Specimen: Blood Updated: 05/06/23 0551     Glucose 115  mg/dL      Comment: Serial Number: [id number]:  [id number]       aPTT [[id number]]  (Abnormal) Collected: 05/06/23 0451    Specimen: Blood Updated: 05/06/23 0533     PTT 23.7 seconds     Protime-INR [[id number]]  (Normal) Collected: 05/06/23 0451    Specimen: Blood Updated: 05/06/23 0533     Protime 10.3 Seconds      INR 0.96    CBC (No Diff) [[id number]]  (Abnormal) Collected: 05/06/23 0451    Specimen: Blood Updated: 05/06/23 0520     WBC 8.10 10*3/mm3      RBC 4.03 10*6/mm3      Hemoglobin 11.9 g/dL      Hematocrit 36.5 %      MCV 90.7 fL      MCH 29.5 pg      MCHC 32.5 g/dL      RDW 14.4 %      RDW-SD 48.1 fl      MPV 9.2 fL      Platelets 247 10*3/mm3           Imaging Results (Last 24 Hours)     Procedure Component Value Units Date/Time    XR Chest 1 View [[id number]] Collected: 05/06/23 1235     Updated: 05/06/23 1239    Narrative:      XR CHEST 1 VW    Date of Exam: 5/6/2023 12:12 PM EDT    Indication: Post-Op Check Line & Tube Placement    Comparison: 4/30/2023    Findings:  Status post sternotomy and valve replacement. ET tube tip 2.5 cm above the matias. Esophagogastric tube below the diaphragm. Right IJ [place]-Lex catheter tip overlies the main pulmonary outflow. Mediastinal drainage tube in left-sided chest tube noted.   Negative for pneumothorax. No significant pleural effusion. Minimal bibasilar atelectasis.      Impression:      Impression:  1. ET tube above the matias.  2. Esophagogastric tube below the diaphragm.  3. Right IJ [place]-Lex catheter in place.  4. Chest tubes in place. No pneumothorax.      Electronically Signed: Vu Arrieta    5/6/2023 12:36 PM EDT    Workstation ID: [id number]          LAB RESULTS (LAST 7 DAYS)    CBC  Results from last 7 days   Lab Units 05/06/23  1445 05/06/23  1347 05/06/23  1231 05/06/23  1157 05/06/23  1052 05/06/23  1017 05/06/23  1001 05/06/23  0735 05/06/23  0451 05/05/23  0542 05/03/23  0520 05/02/23  0551 05/01/23  0138   WBC 10*3/mm3 7.60  --   --  5.40  --    --   --   --  8.10 9.20 8.30 8.30 9.70   RBC 10*6/mm3 3.70*  --   --  3.18*  --   --   --   --  4.03 4.32 4.16 4.05 4.15   HEMOGLOBIN g/dL 10.9*  --   --  9.6*  --   --   --   --  11.9* 12.8 12.2 12.0 12.3   HEMOGLOBIN, POC g/dL  --  10.9* 10.7*  --  7.1* 7.8* 7.8*   < >  --   --   --   --   --    HEMATOCRIT % 33.3*  --   --  29.3*  --   --   --   --  36.5 39.5 38.2 36.5 38.3   HEMATOCRIT POC %  --  32* 32*  --  21* 23* 23*   < >  --   --   --   --   --    MCV fL 90.2  --   --  92.3  --   --   --   --  90.7 91.4 92.0 90.1 92.3   PLATELETS 10*3/mm3 139*  --   --  117*  --   --   --   --  247 274 239 219 245    < > = values in this interval not displayed.       BMP  Results from last 7 days   Lab Units 05/06/23  1157 05/06/23  0451 05/05/23  0542 05/04/23  0327 05/03/23  0330 05/02/23  0335 05/01/23  0138   SODIUM mmol/L 144 141 143 144 140 143 142   POTASSIUM mmol/L 3.6 4.3 3.8 3.9 4.0 4.1 3.4*   CHLORIDE mmol/L 110* 105 106 107 105 106 105   CO2 mmol/L 23.0 24.0 25.0 26.0 25.0 25.0 27.0   BUN mg/dL 13 14 13 14 14 13 17   CREATININE mg/dL 0.76 0.72 0.77 0.77 0.81 0.83 0.95   GLUCOSE mg/dL 101* 116* 111* 119* 116* 98 115*   MAGNESIUM mg/dL  --  2.3 2.2 2.1 2.1 2.0 2.2   PHOSPHORUS mg/dL 3.3  --   --   --   --   --   --        CMP   Results from last 7 days   Lab Units 05/06/23  1157 05/06/23  0451 05/05/23  0542 05/04/23  0327 05/03/23  0330 05/02/23  0335 05/01/23  0138 04/30/23  2248   SODIUM mmol/L 144 141 143 144 140 143 142 145   POTASSIUM mmol/L 3.6 4.3 3.8 3.9 4.0 4.1 3.4* 3.6   CHLORIDE mmol/L 110* 105 106 107 105 106 105 105   CO2 mmol/L 23.0 24.0 25.0 26.0 25.0 25.0 27.0 27.0   BUN mg/dL 13 14 13 14 14 13 17 16   CREATININE mg/dL 0.76 0.72 0.77 0.77 0.81 0.83 0.95 1.15*   GLUCOSE mg/dL 101* 116* 111* 119* 116* 98 115* 107*   ALBUMIN g/dL 4.3 3.5 4.0  --  3.5  --   --  4.1   BILIRUBIN mg/dL  --  0.3 0.4  --  0.5  --   --  0.2   ALK PHOS U/L  --  97 107  --  91  --   --  103   AST (SGOT) U/L  --  25 25  --  22   --   --  24   ALT (SGPT) U/L  --  17 21  --  15  --   --  19       BNP        TROPONIN  Results from last 7 days   Lab Units 05/01/23  0138   HSTROP T ng/L 120*       CoAg  Results from last 7 days   Lab Units 05/06/23  1157 05/06/23  0451 05/05/23  0542 05/03/23  0520 04/30/23  2248   INR  1.17* 0.96 0.93 0.99 0.95   APTT seconds 27.2 23.7* 26.2 24.6 25.9*       Creatinine Clearance  Estimated Creatinine Clearance: 84.2 mL/min (by C-G formula based on SCr of 0.76 mg/dL).    ABG  Results from last 7 days   Lab Units 05/06/23  1347 05/06/23  1231 05/06/23  1052 05/06/23  1017 05/06/23  1001 05/06/23  0933 05/06/23  0913   PH, ARTERIAL pH units 7.459* 7.430 7.300* 7.340* 7.330* 7.380 7.380   PCO2, ARTERIAL mm Hg 32.8* 34.5* 54.7* 51.2* 56.2* 49.1* 49.8*   PO2 ART mm Hg 98.5 214.8* 266.0* 427.0* 464.0* 457.0* 461.0*   O2 SATURATION ART % 98.1* 99.8* 100.0* 100.0* 100.0* 100.0* 100.0*   BASE EXCESS ART mmol/L -0.1* -1.0* 1.0 2.0 4.0* 4.0* 4.0*       Radiology  XR Chest 1 View    Result Date: 5/6/2023  Impression: 1. ET tube above the matias. 2. Esophagogastric tube below the diaphragm. 3. Right IJ [place]-Lex catheter in place. 4. Chest tubes in place. No pneumothorax. Electronically Signed: Vu Arrieta  5/6/2023 12:36 PM EDT  Workstation ID: [id number]        EKG  I personally viewed and interpreted the patient's EKG/Telemetry data:  ECG 12 Lead   Preliminary Result   HEART RATE= 76  bpm   RR Interval= 784  ms   LA Interval= 170  ms   P Horizontal Axis= 28  deg   P Front Axis= 69  deg   QRSD Interval= 93  ms   QT Interval= 440  ms   QRS Axis= 20  deg   T Wave Axis= -40  deg   - ABNORMAL ECG -   Sinus rhythm   Repol abnrm suggests ischemia, diffuse leads   Electronically Signed By:    Date and Time of Study: 2023-05-06 12:18:09      ECG 12 Lead   Final Result   HEART RATE= 56  bpm   RR Interval= 1072  ms   LA Interval= 144  ms   P Horizontal Axis= 5  deg   P Front Axis= 17  deg   QRSD Interval= 93  ms   QT Interval= 477   ms   QRS Axis= 8  deg   T Wave Axis= -63  deg   - ABNORMAL ECG -   Sinus bradycardia   Nonspecific repol abnormality, diffuse leads   When compared with ECG of 30-Apr-2023 21:37:55,   Significant repolarization change   Significant axis, voltage or hypertrophy change   Electronically Signed By: Kye Alcaraz ([facility]) 03-May-2023 17:12:15   Date and Time of Study: 2023-05-02 10:51:40      ECG 12 Lead Chest Pain   Final Result   HEART RATE= 57  bpm   RR Interval= 1048  ms   VT Interval= 142  ms   P Horizontal Axis= 16  deg   P Front Axis= 24  deg   QRSD Interval= 87  ms   QT Interval= 459  ms   QRS Axis= 26  deg   T Wave Axis= 198  deg   - OTHERWISE NORMAL ECG -   Slow sinus arrhythmia   When compared with ECG of 30-Apr-2023 21:37:55,   Significant axis, voltage or hypertrophy change   Electronically Signed By: Kye Alcaraz ([facility]) 03-May-2023 17:12:19   Date and Time of Study: 2023-05-01 05:38:43      ECG 12 Lead Chest Pain   Final Result   HEART RATE= 74  bpm   RR Interval= 816  ms   VT Interval= 140  ms   P Horizontal Axis= -20  deg   P Front Axis= 17  deg   QRSD Interval= 89  ms   QT Interval= 408  ms   QRS Axis= -2  deg   T Wave Axis= 71  deg   - ABNORMAL ECG -   Sinus rhythm   Probable left ventricular hypertrophy   ST depression, consider ischemia, lateral lds   When compared with ECG of 22-Oct-2020 11:28:25,   Significant rate increase   Significant axis, voltage or hypertrophy change   Electronically Signed By: Jomar Mejia ([facility]) 01-May-2023 09:11:21   Date and Time of Study: 2023-04-30 21:37:55      SCANNED - TELEMETRY     Final Result      SCANNED - TELEMETRY     Final Result            Echocardiogram:    Results for orders placed during the hospital encounter of 04/30/23    Adult Transthoracic Echo Complete W/ Cont if Necessary Per Protocol    Interpretation Summary  •  Estimated right ventricular systolic pressure from tricuspid regurgitation is normal (<35 mmHg).      Normal LV size and  contractility EF of 60 to 65%  Normal RV size  Normal atrial size  Pulmonic valve is not well visualized.  Aortic valve is trileaflet with mild sclerosis.  Mitral valve, tricuspid valve appears structurally normal, moderate mitral regurgitation seen.  No pericardial effusion seen.  Proximal aorta appears normal in size.        Stress Test:         Cardiac Catheterization:  Results for orders placed during the hospital encounter of 23    Cardiac Catheterization/Vascular Study    Narrative  Table formatting from the original result was not included.  May 1, 2023      Heart Cath Report    NAME:              Andree Deluna  :                1955  AGE/SEX:        67 y.o. female  MRN:                [id number]        Procedures Performed    Left heart catheterization  Selective coronary angiography  Left ventriculography  My    :   Kye Alcaraz MD    Vascular Access Site: Femoral    Indication for procedure: Chest pain at rest consistent with unstable angina, acute non-ST elevation MI, positive family history of premature CAD, hypertension, dyslipidemia, history of CVA      Procedure Note    After discussing the risks, benefits, and alternatives of the procedure, informed consent was obtained.  Timeout was done before the procedure.  Moderate conscious sedation was given utilizing IV Versed and fentanyl administered by RN with continuous EKG oximetry and hemodynamic monitoring supervised by me throughout the entire case, conscious sedation time was 30 minutes.  I was present with the patient for the duration of moderate sedation and supervised staff who had no other duties and monitored the patient for the entire procedure patient had Paz 2-3 sedation scale. the vascular access site was prepped and draped in the usual sterile fashion.  2% lidocaine was used for local anesthesia. Appropriate landmarks were assessed.  A 6 Yi short sheath was inserted in the artery using the  modified Seldinger technique.    Selective coronary angiography was performed with JL4 and JR4 diagnostic catheters. Left ventriculogram  was performed with an angled pigtail catheter.  JR4 was used to do LIMA injection..  All exchanges were performed over the wire.  No specimens were removed.  There were no apparent acute or early complications.  The patient tolerated the procedure well and was transferred to the recovery area in stable condition.      Closure device: Mynx device was deployed successfully after right iliofemoral angiogram was performed. Good hemostasis was achieved.    Complications:  None  Blood Loss: minimal    Hemodynamics    Pressures    Ao:    178/88 mmHg  LV:    180/28 mmHg  End-diastolic pressure:  28  mmHg  No significant aortic valvular gradient on pullback    Coronary Angiography    Left Main :  The left main   is calcified and without disease    Left Anterior Descending : Has heavily calcified 90% proximal narrowing apical segment has another 90% narrowing.    Left Circumflex : 30% proximal LAD and gives rise to a distal marginal which has 99% mid narrowing divides into 2 branches.  Distal circumflex is 99% occluded with faint bridging collaterals    Right Coronary Artery :  The right coronary artery   is 100% occluded in the ostium, with bridging collaterals from the left system visualizing distal vessel.    Dominance:  []  Left  [x]  Right  []  Co-Dominant    Lima %: Is patent.      Left Ventriculography:    Estimated Ejection Fraction: 50 %  Wall motion abnormalities: Lower limits of normal contractility  Mitral Regurgitation: 1-2+ MR    Impression:    Severe triple-vessel disease  Lower limits of normal LV contractility with elevated LVEDP  Mild to moderate MR  Patent LIMA    Recommendations:    Surgical consultation for CABG evaluation        I sincerely appreciate the opportunity to participate in your patient's care. Please feel free to contact me anytime if I can be of assistance  in this or any other way.      Pertinent History    Past Medical History:  Diagnosis Date  • ADHD unknown  • Anemia  • Anxiety  • Carotid artery disease  80% right internal carotid artery  • CVA (cerebral vascular accident)  right parietal right temporal  • DDD (degenerative disc disease), lumbar  • Depression  • Extremity pain  Ankle pain  • GERD (gastroesophageal reflux disease)  • Hyperlipidemia unknown  • Hypertension  • Insomnia unknown  • Laryngopharyngeal reflux  • Low back pain  • Mood disorder  • Obesity unknown  • Skin cancer of face  • Sleep apnea  Suspected sleep apnea she has refused to be tested  • Stroke (cerebrum)  • Visual field defect  Left    Past Surgical History:  Procedure Laterality Date  • CAROTID ENDARTERECTOMY Right 11/10/2020  Procedure: CAROTID ENDARTERECTOMY;  Surgeon: Tavo aDs MD;  Location: [facility] MAIN OR;  Service: Vascular;  Laterality: Right;  • CHOLECYSTECTOMY  • COLONOSCOPY  2016  • FINGER SURGERY  • KNEE ARTHROPLASTY  • KNEE SURGERY Right  replaced  • LAPAROSCOPIC GASTRIC BANDING  • ROTATOR CUFF REPAIR Right 2019  • SHOULDER SURGERY Right 05/24/2019  scope/ cuff repair  • TUBAL ABDOMINAL LIGATION    Prior to Admission medications  Medication Sig Start Date End Date Taking? Authorizing Provider  ALPRAZolam (XANAX) 2 MG tablet TAKE 1 TABLET BY MOUTH AT NIGHT AS NEEDED FOR ANXIETY 4/10/23  Yes Naga Griffith PA-C  amLODIPine (NORVASC) 2.5 MG tablet TAKE ONE TABLET BY MOUTH DAILY 9/10/21  Yes Naag Griffith PA-C  aspirin 81 MG chewable tablet Chew 1 tablet Daily. 8/10/19  Yes Cong Harvey Jr., MD  atorvastatin (LIPITOR) 80 MG tablet TAKE ONE TABLET BY MOUTH ONCE NIGHTLY 12/14/22  Yes Naga Griffith PA-C  lamoTRIgine (LaMICtal) 100 MG tablet TAKE ONE TABLET BY MOUTH TWICE A DAY 1/4/23  Yes Naga Griffith PA-C  lisinopril-hydrochlorothiazide (PRINZIDE,ZESTORETIC) 20-25 MG per tablet TAKE ONE TABLET BY MOUTH DAILY 2/13/23  Yes Jelena Pennington  MD  metoprolol succinate XL (TOPROL-XL) 25 MG 24 hr tablet TAKE ONE TABLET BY MOUTH DAILY 10/25/22  Yes Naga Griffith PA-C  omeprazole (priLOSEC) 40 MG capsule TAKE ONE CAPSULE BY MOUTH DAILY 4/20/23  Yes Naga Griffith PA-C  oxybutynin (DITROPAN) 5 MG tablet Take 1 tablet by mouth Daily. 11/28/22  Yes Naga Griffith PA-C  albuterol sulfate  (90 Base) MCG/ACT inhaler Inhale 2 puffs Every 6 (Six) Hours As Needed for Shortness of Air.  Patient not taking: Reported on 3/16/2023 10/19/22   Toribio Randle APRN  albuterol sulfate  (90 Base) MCG/ACT inhaler Inhale 2 puffs Every 4 (Four) Hours As Needed for Wheezing.  Patient not taking: Reported on 3/16/2023 1/19/23   Naga Griffith PA-C  buPROPion XL (WELLBUTRIN XL) 150 MG 24 hr tablet TAKE ONE TABLET BY MOUTH DAILY 7/27/22 5/1/23  Naga Griffith PA-C  promethazine-dextromethorphan (PROMETHAZINE-DM) 6.25-15 MG/5ML syrup Take 5 mL by mouth 4 (Four) Times a Day As Needed for Cough. 2/28/23 5/1/23  Naga Griffith PA-C  vilazodone (Viibryd) 20 MG tablet tablet Take 1 tablet by mouth Daily. 1/19/23 5/1/23  Naga Griffith PA-C      Pre-Procedure Notes  H&P Performed  [x]  Yes []  No       []  N/A    Indications:  []  ACS <= 24 HRS  []  ACS >24 HRS  []  New Onset Angina <= 2 mos  []  Worsening Angina  []  Resuscitated Cardiac Arrest  []  Angina on Exertion:  []  Suspected CAD  []  Valvular Disease  []  Pericardial Disease  []  Cardiac Arrythmia  []  Cardiomyopathy  []  LV Dysfunction  []  Syncope  []  Post Cardiac Transplant  []  Eval. For Exercise Clearance  []  Other  []  Pre-Operative Evaluation  If Pre-Op Eval:  Evaluation for Surgery Type:  []  Cardiac Surgery   []  Non-Cardiac Surgery  Functional Capacity:  []  <4 METS  []  >=4 METS w/o symptoms  []  >= 4 METS with symptoms  []  Unknown  Surgical Risk:  []  Low  []  Intermediate  []  High Risk: Vascular  []  High Risk Non-Vascular    Risks, Benefits, & Complications  Discussed:  [x]  Yes  []  No  []  N/A    Questions Answered:  [x]  Yes  []  No  []  N/A    Consent Obtained:  [x]  Yes  []  No  []  N/A    CHF: []  Yes  [x]  No  If Yes:  Newly Diagnosed?  []  Yes  []  No  If Yes:  HF Type:  []  Diastolic  []  Systolic  []  Unknown      Kye Alcaraz MD  5/1/2023  17:18 EDT  Electronically signed by Kye Alcaraz MD, 05/01/23, 5:18 PM EDT.         Other:         ASSESSMENT & PLAN:    Principal Problem:    Chest pain, unspecified type  Active Problems:    Mood disorder    Hyperlipidemia    Hypertension    Obesity    GERD with esophagitis    Overactive bladder    History of stroke    Chronic cough    Elevated troponin    Non-STEMI (non-ST elevated myocardial infarction)    Coronary artery disease involving native coronary artery of native heart with unstable angina pectoris    Abnormal findings on diagnostic imaging of heart and coronary circulation    Coronary artery disease  Status post CABG with LIMA to LAD, vein graft to OM and vein graft to PDA on 5/6/2023.  Pacer wires and chest tubes in place.  She will be started on aspirin and high intensity statin    HFpEF  Preserved LV function with EF of 55%.  Septal asymmetric hypertrophy    Hypertension  She is currently on a Cardene drip which will be weaned off as tolerated.    Hyperlipidemia  Start high intensity statin after extubation  Goal LDL less than 70    History of stroke/PAD  80% right carotid disease status post carotid endarterectomy  Aspirin and high intensity statin    Prediabetes  A1c 5.8  Insulin sliding scale during inpatient stay         Brad Santos MD  05/06/23  16:34 EDT

## 2023-05-06 NOTE — ANESTHESIA PROCEDURE NOTES
Intra-Op Anesthesia GALA    Procedure Performed: Intra-Op Anesthesia GALA       Start Time:  5/6/2023 7:20 AM       End Time:   5/6/2023 7:30 AM    Preanesthesia Checklist:  Patient identified, IV assessed, risks and benefits discussed, monitors and equipment assessed, procedure being performed at surgeon's request and anesthesia consent obtained.    General Procedure Information  GALA Placed for monitoring purposes only -- This is not a diagnostic GALA  Diagnostic Indications for Echo:  assessment of ascending aorta, assessment of surgical repair, defect repair evaluation and hemodynamic monitoring  Location performed:  OR  Intubated  Bite block placed  Heart visualized  Probe Insertion:  Easy  Probe Type:  Multiplane  Modalities:  Color flow mapping, continuous wave Doppler and pulse wave Doppler    Echocardiographic and Doppler Measurements    Ventricles    Right Ventricle:  Cavity size normal.  Hypertrophy not present.  Thrombus not present.  Global function normal.    Left Ventricle:  Cavity size normal.  Thrombus not present.  Global Function normal.  Ejection Fraction 50%.          Valves    Aortic Valve:  Annulus normal.  Stenosis not present.  Regurgitation absent.  Leaflets normal.  Leaflet motions normal.      Mitral Valve:  Annulus dilated.  Stenosis not present.  Vena Contracta Width: 0.5 cm.  Regurgitation moderate.  Leaflets myxomatous.  Leaflet motions normal.      Tricuspid Valve:  Annulus normal.  Stenosis not present.  Regurgitation trace.  Leaflets normal.  Leaflet motions normal.    Pulmonic Valve:  Annulus normal.  Regurgitation absent.        Aorta    Ascending Aorta:  Size dilated.  Diameter 3.5 cm.  Dissection not present.  Plaque thickness less than 3 mm.  Mobile plaque not present.    Aortic Arch:  Size normal.        Atria    Right Atrium:  Size normal.  Spontaneous echo contrast not present.  Thrombus not present.  Tumor not present.  Device not present.      Left Atrium:  Size dilated.   Spontaneous echo contrast not present.  Thrombus not present.  Tumor not present.  Device not present.    Left atrial appendage normal.      Septa        Ventricular Septum:  Intra-ventricular septum morphology normal.          Other Findings  Pericardium:  normal  Pleural Effusion:  none  Pulmonary Arteries:  normal  Pulmonary Venous Flow:  normal    Anesthesia Information  Performed Personally  Anesthesiologist:  Rivas Gilliland MD      Echocardiogram Comments:       AV: NORMAL  MV: MILD MYXOMATOUS CHANGES. MODERATE MR CENTRAL JET. VC 0.5 . MV ANNULUS 3.4. LA 5.5  TV: TRACE/MILD TI  LVEF 50

## 2023-05-06 NOTE — OP NOTE
Operative Note    Date of Dictation: 05/06/23    Date of Procedure: 05/06/23    Referring Physician: Naga Griffith PA-C    Preoperative diagnosis: NSTEMI and Severe mitral regurgitation    Postoperative diagnosis: Same    Procedure:   1. CABG x 3 (LIMA to LAD, SVG to OM, SVG to PDA)  2. EVH of the right legs  3. Mitral valve repair with Physio II ring    Surgeon: Errol Noonan MD     Assistants: JEISON Cain was responsible for performing the following activities: Cardiac Surgery First assist, Endoscopic Vein [place] for CABG, surgical wound closure and their skilled assistance was necessary for the success of this case.     Anesthesia: General endotracheal anesthesia and GALA    Findings:  The saphenous vein was harvested endoscopically form the right  leg. The vein had a diameter of 3.5 mm and was of good quality. The coronaries had a diameter of 2 mm and were of fair quality.      Estimated Blood Loss:  500mL of Cell Saver returned.    STS Data: The STS Risk and all risks and alternatives were discussed with the patient and family.  Counseling was done regarding abuse of tobacco, alcohol and drugs as needed. They understand and wish to proceed. The antibiotics and b blockers were given in the STS required window.          Description of the procedure:     The patient was placed supine on the operative table. General anesthesia was given and lines placed. The patient was prepped and draped using the usual sterile technique. A median sternotomy was performed with a scalpel and the layers carried down to the sternum using the electrocautery. The sternum was split in the midline using a vertical oscillating saw. Hemostasis was achieved. The LIMA was harvested as a pedicle and prepared with papaverine. It was of good quality. 300 units/kg of IV heparin was given to an ACT over 400. A Favaloro retractor was placed and the mediastinum exposed, the pericardium was opened and the edges tacked to the  wound. Cannulation sutures were placed in the ascending aorta and right atrium. Small cannulas were placed and aorto right atrial cardiopulmonary bypass was started. Cardioplegia cannulas were placed. Cardiopulmonary bypass was then established for 93 minutes drifting to 34°C at appropriate flow rates.  The aorta was crossclamped for 79 minutes and we gave 1000 cc of antegrade cold blood cardioplegia then 200L of retrograde cold blood cardioplegia and then repeated doses every 10 to 15 minutes to good effect. 2 veins were anastomosed to the ascending aorta with 6-0 Prolene and marked with washers.  The first vein was sewn to the posterior descending artery of the right coronary artery with 7-0 Prolene.  The next vein was sewn to obtuse marginal one of the circunflex artery with 7-0 Prolene. The LIMA was sewn to the distal LAD with 7-0 Prolene.  A left atriotomy was performed and the valve exposed. The leaflets were normal with annular dilatation and restricted systolic movility. The annulus was sized to a #28 Physio II ring. 2.0 Ethibond nonpledgeted sutures were placed circumferentially around the annulus and the sutures passed through the sewing ring. The valve was descended and the knots secured with corknot device. The left atriotomy was closed with a double layer of 3.0 Prolene suture and de-aired before closure.  A warm dose of cardioplegia was given and then the aortic clamp removed as well as the LIMA bulldog clamp. All anastomoses were checked and had good flow and morphology. The left pleural space was suctioned and the lungs ventilated. The heart was paced till regular atrial rhythm resumed. I allowed the heart to eject and once hemodynamics were acceptable, then the CPB was discontinued and the venous and cardioplegia cannulas removed. The matching dose of protamine was given and the aortic cannula removed as well. AV temporary wires and pleural and mediastinal chest tubes were placed and the wound  sprayed with platelet rich plasma. The sternum was closed with single and double wires and soft tissue in layers of reabsorbable material. The wounds were covered with sterile dressings.       Specimen removed:  none    CPB time:  93 minutes.    Aortic clamp time:  79 minutes    Complications:  none           Disposition: Cardiovascular recovery room           Condition: Critical but stable.

## 2023-05-06 NOTE — ANESTHESIA PROCEDURE NOTES
Airway  Urgency: elective    Date/Time: 5/6/2023 7:03 AM  End Time:5/6/2023 7:03 AM  Airway not difficult    General Information and Staff    Patient location during procedure: OR  Anesthesiologist: Rivas Gilliland MD    Indications and Patient Condition  Indications for airway management: airway protection    Preoxygenated: yes  MILS maintained throughout  Mask difficulty assessment: 1 - vent by mask    Final Airway Details  Final airway type: endotracheal airway      Successful airway: ETT  Cuffed: yes   Successful intubation technique: direct laryngoscopy  Endotracheal tube insertion site: oral  Blade: Derrek  Blade size: 3  ETT size (mm): 7.5  Cormack-Lehane Classification: grade I - full view of glottis  Placement verified by: chest auscultation and capnometry   Measured from: teeth  ETT/EBT  to teeth (cm): 22  Number of attempts at approach: 1  Assessment: lips, teeth, and gum same as pre-op and atraumatic intubation    Additional Comments  X1 UDVC. ATRAUMATIC.  TEETH IN PREOP CONDITION.  CUFF TO MINIMUM OCCLUSIVE CUFF PRESSURE. POS ETCO2. BS=BS. ENDO BITE BLOCK

## 2023-05-06 NOTE — ANESTHESIA PROCEDURE NOTES
Arterial Line      Patient location during procedure: OR  Start time: 5/6/2023 6:50 AM  Stop Time:5/6/2023 7:00 AM       Line placed for hemodynamic monitoring, ABGs/Labs/ISTAT and MD/Surgeon request.  Performed By   Anesthesiologist: Rivas Gilliland MD   Preanesthetic Checklist  Completed: patient identified, IV checked, site marked, risks and benefits discussed, surgical consent, monitors and equipment checked, pre-op evaluation and timeout performed  Arterial Line Prep    Sterile Tech: cap, gloves and sterile barriers  Prep: ChloraPrep  Patient monitoring: EKG, continuous pulse oximetry and blood pressure monitoring  Arterial Line Procedure   Laterality:left  Location:  radial artery  Catheter size: 20 G   Guidance: ultrasound guided  PROCEDURE NOTE/ULTRASOUND INTERPRETATION.  Using ultrasound guidance the potential vascular sites for insertion of the catheter were visualized to determine the patency of the vessel to be used for vascular access.  After selecting the appropriate site for insertion, the needle was visualized under ultrasound being inserted into the radial artery, followed by ultrasound confirmation of wire and catheter placement. There were no abnormalities seen on ultrasound; an image was taken; and the patient tolerated the procedure with no complications.   Number of attempts: 1  Successful placement: yes Images: still images obtained, printed/placed on the chart  Post Assessment   Dressing Type: wrist guard applied, secured with tape and occlusive dressing applied.   Complications no  Circ/Move/Sens Assessment: normal and unchanged.   Patient Tolerance: patient tolerated the procedure well with no apparent complications  Additional Notes  L RADIAL ART LINE VIA ASEPTIC SELDINGER TECH. X1 US GUIDANCE RNMD ASSIST. JOSEP COKER

## 2023-05-06 NOTE — ANESTHESIA PROCEDURE NOTES
Central Line      Patient location during procedure: OR  Start time: 5/6/2023 7:16 AM  Stop Time:5/6/2023 7:18 AM  Indications: central pressure monitoring, vascular access and MD/Surgeon request  Staff  Anesthesiologist: Rivas Gilliland MD  Preanesthetic Checklist  Completed: patient identified, IV checked, site marked, risks and benefits discussed, surgical consent, monitors and equipment checked, pre-op evaluation and timeout performed  Central Line Prep  Sterile Tech:cap, gloves, gown, mask and sterile barriers  Central Line Procedure  Laterality:right  Location:internal jugular  Catheter Type:[place]  Assessment  Complications:no  Patient Tolerance:patient tolerated the procedure well with no apparent complications  Additional Notes  THROUGH PREVIOUSLY PLACED MAC INTRODUCER. NEW GLOVES/DRAPE. TO PA X1 ATTEMPT 48CM NO WEDGE ATTEMPTED.

## 2023-05-07 ENCOUNTER — APPOINTMENT (OUTPATIENT)
Dept: GENERAL RADIOLOGY | Facility: HOSPITAL | Age: 68
DRG: 217 | End: 2023-05-07
Payer: MEDICARE

## 2023-05-07 LAB
ALBUMIN SERPL-MCNC: 4.4 G/DL (ref 3.5–5.2)
ALBUMIN/GLOB SERPL: 3.1 G/DL
ALP SERPL-CCNC: 75 U/L (ref 39–117)
ALT SERPL W P-5'-P-CCNC: 61 U/L (ref 1–33)
ANION GAP SERPL CALCULATED.3IONS-SCNC: 10 MMOL/L (ref 5–15)
ARTERIAL PATENCY WRIST A: POSITIVE
AST SERPL-CCNC: 86 U/L (ref 1–32)
ATMOSPHERIC PRESS: ABNORMAL MM[HG]
BASE EXCESS BLDA CALC-SCNC: 0.3 MMOL/L (ref 0–3)
BASOPHILS # BLD AUTO: 0.1 10*3/MM3 (ref 0–0.2)
BASOPHILS NFR BLD AUTO: 0.7 % (ref 0–1.5)
BDY SITE: ABNORMAL
BH BB BLOOD EXPIRATION DATE: NORMAL
BH BB BLOOD EXPIRATION DATE: NORMAL
BH BB BLOOD TYPE BARCODE: 600
BH BB BLOOD TYPE BARCODE: 600
BH BB DISPENSE STATUS: NORMAL
BH BB DISPENSE STATUS: NORMAL
BH BB PRODUCT CODE: NORMAL
BH BB PRODUCT CODE: NORMAL
BH BB UNIT NUMBER: NORMAL
BH BB UNIT NUMBER: NORMAL
BILIRUB SERPL-MCNC: 0.8 MG/DL (ref 0–1.2)
BUN SERPL-MCNC: 12 MG/DL (ref 8–23)
BUN/CREAT SERPL: 15.6 (ref 7–25)
CA-I SERPL ISE-MCNC: 1.16 MMOL/L (ref 1.2–1.3)
CALCIUM SPEC-SCNC: 8.3 MG/DL (ref 8.6–10.5)
CHLORIDE SERPL-SCNC: 113 MMOL/L (ref 98–107)
CO2 BLDA-SCNC: 28.5 MMOL/L (ref 22–29)
CO2 SERPL-SCNC: 25 MMOL/L (ref 22–29)
CREAT SERPL-MCNC: 0.77 MG/DL (ref 0.57–1)
CROSSMATCH INTERPRETATION: NORMAL
CROSSMATCH INTERPRETATION: NORMAL
DEPRECATED RDW RBC AUTO: 47.3 FL (ref 37–54)
DEPRECATED RDW RBC AUTO: 48.6 FL (ref 37–54)
EGFRCR SERPLBLD CKD-EPI 2021: 85.7 ML/MIN/1.73
EOSINOPHIL # BLD AUTO: 0.1 10*3/MM3 (ref 0–0.4)
EOSINOPHIL NFR BLD AUTO: 1.4 % (ref 0.3–6.2)
ERYTHROCYTE [DISTWIDTH] IN BLOOD BY AUTOMATED COUNT: 14.5 % (ref 12.3–15.4)
ERYTHROCYTE [DISTWIDTH] IN BLOOD BY AUTOMATED COUNT: 14.7 % (ref 12.3–15.4)
GLOBULIN UR ELPH-MCNC: 1.4 GM/DL
GLUCOSE BLDC GLUCOMTR-MCNC: 108 MG/DL (ref 70–105)
GLUCOSE BLDC GLUCOMTR-MCNC: 109 MG/DL (ref 70–105)
GLUCOSE BLDC GLUCOMTR-MCNC: 116 MG/DL (ref 70–105)
GLUCOSE BLDC GLUCOMTR-MCNC: 118 MG/DL (ref 70–105)
GLUCOSE BLDC GLUCOMTR-MCNC: 121 MG/DL (ref 70–105)
GLUCOSE BLDC GLUCOMTR-MCNC: 121 MG/DL (ref 70–105)
GLUCOSE BLDC GLUCOMTR-MCNC: 123 MG/DL (ref 70–105)
GLUCOSE BLDC GLUCOMTR-MCNC: 126 MG/DL (ref 70–105)
GLUCOSE BLDC GLUCOMTR-MCNC: 127 MG/DL (ref 70–105)
GLUCOSE BLDC GLUCOMTR-MCNC: 128 MG/DL (ref 70–105)
GLUCOSE BLDC GLUCOMTR-MCNC: 132 MG/DL (ref 70–105)
GLUCOSE BLDC GLUCOMTR-MCNC: 142 MG/DL (ref 70–105)
GLUCOSE BLDC GLUCOMTR-MCNC: 143 MG/DL (ref 70–105)
GLUCOSE BLDC GLUCOMTR-MCNC: 157 MG/DL (ref 70–105)
GLUCOSE SERPL-MCNC: 142 MG/DL (ref 65–99)
HCO3 BLDA-SCNC: 26.9 MMOL/L (ref 21–28)
HCT VFR BLD AUTO: 30.4 % (ref 34–46.6)
HCT VFR BLD AUTO: 31.2 % (ref 34–46.6)
HEMODILUTION: NO
HGB BLD-MCNC: 10.2 G/DL (ref 12–15.9)
HGB BLD-MCNC: 9.8 G/DL (ref 12–15.9)
INHALED O2 CONCENTRATION: 36 %
INR PPP: 1.12 (ref 0.93–1.1)
LYMPHOCYTES # BLD AUTO: 0.6 10*3/MM3 (ref 0.7–3.1)
LYMPHOCYTES NFR BLD AUTO: 6.1 % (ref 19.6–45.3)
MCH RBC QN AUTO: 29.6 PG (ref 26.6–33)
MCH RBC QN AUTO: 29.8 PG (ref 26.6–33)
MCHC RBC AUTO-ENTMCNC: 32.1 G/DL (ref 31.5–35.7)
MCHC RBC AUTO-ENTMCNC: 32.7 G/DL (ref 31.5–35.7)
MCV RBC AUTO: 91.2 FL (ref 79–97)
MCV RBC AUTO: 92.3 FL (ref 79–97)
MODALITY: ABNORMAL
MONOCYTES # BLD AUTO: 0.3 10*3/MM3 (ref 0.1–0.9)
MONOCYTES NFR BLD AUTO: 3.5 % (ref 5–12)
NEUTROPHILS NFR BLD AUTO: 8.4 10*3/MM3 (ref 1.7–7)
NEUTROPHILS NFR BLD AUTO: 88.3 % (ref 42.7–76)
NRBC BLD AUTO-RTO: 0 /100 WBC (ref 0–0.2)
PCO2 BLDA: 52.4 MM HG (ref 35–48)
PH BLDA: 7.32 PH UNITS (ref 7.35–7.45)
PHOSPHATE SERPL-MCNC: 4 MG/DL (ref 2.5–4.5)
PLATELET # BLD AUTO: 132 10*3/MM3 (ref 140–450)
PLATELET # BLD AUTO: 141 10*3/MM3 (ref 140–450)
PMV BLD AUTO: 9.3 FL (ref 6–12)
PMV BLD AUTO: 9.3 FL (ref 6–12)
PO2 BLDA: 147.6 MM HG (ref 83–108)
POTASSIUM SERPL-SCNC: 4.4 MMOL/L (ref 3.5–5.2)
PROT SERPL-MCNC: 5.8 G/DL (ref 6–8.5)
PROTHROMBIN TIME: 11.9 SECONDS (ref 9.6–11.7)
QT INTERVAL: 395 MS
QT INTERVAL: 410 MS
RBC # BLD AUTO: 3.29 10*6/MM3 (ref 3.77–5.28)
RBC # BLD AUTO: 3.43 10*6/MM3 (ref 3.77–5.28)
SAO2 % BLDCOA: 99 % (ref 94–98)
SODIUM SERPL-SCNC: 148 MMOL/L (ref 136–145)
UNIT  ABO: NORMAL
UNIT  ABO: NORMAL
UNIT  RH: NORMAL
UNIT  RH: NORMAL
WBC NRBC COR # BLD: 11.5 10*3/MM3 (ref 3.4–10.8)
WBC NRBC COR # BLD: 9.5 10*3/MM3 (ref 3.4–10.8)

## 2023-05-07 PROCEDURE — 85027 COMPLETE CBC AUTOMATED: CPT

## 2023-05-07 PROCEDURE — 25010000002 MORPHINE PER 10 MG

## 2023-05-07 PROCEDURE — 99024 POSTOP FOLLOW-UP VISIT: CPT | Performed by: THORACIC SURGERY (CARDIOTHORACIC VASCULAR SURGERY)

## 2023-05-07 PROCEDURE — 93005 ELECTROCARDIOGRAM TRACING: CPT

## 2023-05-07 PROCEDURE — 82330 ASSAY OF CALCIUM: CPT

## 2023-05-07 PROCEDURE — 97166 OT EVAL MOD COMPLEX 45 MIN: CPT

## 2023-05-07 PROCEDURE — 25010000002 CEFAZOLIN PER 500 MG

## 2023-05-07 PROCEDURE — 99232 SBSQ HOSP IP/OBS MODERATE 35: CPT | Performed by: INTERNAL MEDICINE

## 2023-05-07 PROCEDURE — 97163 PT EVAL HIGH COMPLEX 45 MIN: CPT

## 2023-05-07 PROCEDURE — 84100 ASSAY OF PHOSPHORUS: CPT

## 2023-05-07 PROCEDURE — 25010000002 MAGNESIUM SULFATE IN D5W 1G/100ML (PREMIX) 1-5 GM/100ML-% SOLUTION

## 2023-05-07 PROCEDURE — 82948 REAGENT STRIP/BLOOD GLUCOSE: CPT

## 2023-05-07 PROCEDURE — 25010000002 ENOXAPARIN PER 10 MG

## 2023-05-07 PROCEDURE — 85610 PROTHROMBIN TIME: CPT

## 2023-05-07 PROCEDURE — 80053 COMPREHEN METABOLIC PANEL: CPT

## 2023-05-07 PROCEDURE — 71045 X-RAY EXAM CHEST 1 VIEW: CPT

## 2023-05-07 PROCEDURE — 85025 COMPLETE CBC W/AUTO DIFF WBC: CPT

## 2023-05-07 PROCEDURE — 25010000002 FUROSEMIDE PER 20 MG

## 2023-05-07 PROCEDURE — 82803 BLOOD GASES ANY COMBINATION: CPT

## 2023-05-07 RX ORDER — DEXTROSE MONOHYDRATE 25 G/50ML
25 INJECTION, SOLUTION INTRAVENOUS
Status: DISCONTINUED | OUTPATIENT
Start: 2023-05-07 | End: 2023-05-13 | Stop reason: HOSPADM

## 2023-05-07 RX ORDER — IBUPROFEN 600 MG/1
1 TABLET ORAL
Status: DISCONTINUED | OUTPATIENT
Start: 2023-05-07 | End: 2023-05-13 | Stop reason: HOSPADM

## 2023-05-07 RX ORDER — FUROSEMIDE 10 MG/ML
40 INJECTION INTRAMUSCULAR; INTRAVENOUS ONCE
Status: COMPLETED | OUTPATIENT
Start: 2023-05-07 | End: 2023-05-07

## 2023-05-07 RX ORDER — ALPRAZOLAM 1 MG/1
2 TABLET ORAL NIGHTLY PRN
Status: DISCONTINUED | OUTPATIENT
Start: 2023-05-07 | End: 2023-05-13 | Stop reason: HOSPADM

## 2023-05-07 RX ORDER — INSULIN LISPRO 100 [IU]/ML
2-7 INJECTION, SOLUTION INTRAVENOUS; SUBCUTANEOUS
Status: DISCONTINUED | OUTPATIENT
Start: 2023-05-08 | End: 2023-05-08

## 2023-05-07 RX ORDER — NICOTINE POLACRILEX 4 MG
15 LOZENGE BUCCAL
Status: DISCONTINUED | OUTPATIENT
Start: 2023-05-07 | End: 2023-05-13 | Stop reason: HOSPADM

## 2023-05-07 RX ORDER — LIDOCAINE 50 MG/G
2 PATCH TOPICAL
Status: DISCONTINUED | OUTPATIENT
Start: 2023-05-07 | End: 2023-05-13 | Stop reason: HOSPADM

## 2023-05-07 RX ADMIN — SENNOSIDES AND DOCUSATE SODIUM 2 TABLET: 50; 8.6 TABLET ORAL at 20:41

## 2023-05-07 RX ADMIN — ASPIRIN 81 MG: 81 TABLET, COATED ORAL at 08:42

## 2023-05-07 RX ADMIN — HYDROCODONE BITARTRATE AND ACETAMINOPHEN 1 TABLET: 5; 325 TABLET ORAL at 10:31

## 2023-05-07 RX ADMIN — HYDROCODONE BITARTRATE AND ACETAMINOPHEN 1 TABLET: 5; 325 TABLET ORAL at 00:22

## 2023-05-07 RX ADMIN — PANTOPRAZOLE SODIUM 40 MG: 40 TABLET, DELAYED RELEASE ORAL at 06:23

## 2023-05-07 RX ADMIN — Medication 12.5 MG: at 20:41

## 2023-05-07 RX ADMIN — MORPHINE SULFATE 2 MG: 2 INJECTION, SOLUTION INTRAMUSCULAR; INTRAVENOUS at 18:26

## 2023-05-07 RX ADMIN — ATORVASTATIN CALCIUM 40 MG: 40 TABLET, FILM COATED ORAL at 20:40

## 2023-05-07 RX ADMIN — LIDOCAINE 2 PATCH: 700 PATCH TOPICAL at 13:50

## 2023-05-07 RX ADMIN — CHLORHEXIDINE GLUCONATE 15 ML: 1.2 SOLUTION ORAL at 08:42

## 2023-05-07 RX ADMIN — ACETAMINOPHEN 650 MG: 325 TABLET, FILM COATED ORAL at 03:04

## 2023-05-07 RX ADMIN — CEFAZOLIN 2 G: 2 INJECTION, POWDER, FOR SOLUTION INTRAMUSCULAR; INTRAVENOUS at 09:42

## 2023-05-07 RX ADMIN — MORPHINE SULFATE 2 MG: 2 INJECTION, SOLUTION INTRAMUSCULAR; INTRAVENOUS at 13:39

## 2023-05-07 RX ADMIN — NICARDIPINE HYDROCHLORIDE 5 MG/HR: 25 INJECTION, SOLUTION INTRAVENOUS at 21:25

## 2023-05-07 RX ADMIN — MORPHINE SULFATE 2 MG: 2 INJECTION, SOLUTION INTRAMUSCULAR; INTRAVENOUS at 09:00

## 2023-05-07 RX ADMIN — MUPIROCIN 1 APPLICATION: 20 OINTMENT TOPICAL at 08:42

## 2023-05-07 RX ADMIN — MUPIROCIN 1 APPLICATION: 20 OINTMENT TOPICAL at 20:41

## 2023-05-07 RX ADMIN — Medication 12.5 MG: at 09:42

## 2023-05-07 RX ADMIN — CHLORHEXIDINE GLUCONATE 15 ML: 1.2 SOLUTION ORAL at 20:40

## 2023-05-07 RX ADMIN — MAGNESIUM SULFATE IN DEXTROSE 1 G: 10 INJECTION, SOLUTION INTRAVENOUS at 09:42

## 2023-05-07 RX ADMIN — NICARDIPINE HYDROCHLORIDE 5 MG/HR: 25 INJECTION, SOLUTION INTRAVENOUS at 08:35

## 2023-05-07 RX ADMIN — ENOXAPARIN SODIUM 40 MG: 100 INJECTION SUBCUTANEOUS at 17:31

## 2023-05-07 RX ADMIN — MAGNESIUM SULFATE IN DEXTROSE 1 G: 10 INJECTION, SOLUTION INTRAVENOUS at 02:40

## 2023-05-07 RX ADMIN — FUROSEMIDE 40 MG: 10 INJECTION, SOLUTION INTRAMUSCULAR; INTRAVENOUS at 08:42

## 2023-05-07 RX ADMIN — CEFAZOLIN 2 G: 2 INJECTION, POWDER, FOR SOLUTION INTRAMUSCULAR; INTRAVENOUS at 18:26

## 2023-05-07 RX ADMIN — CEFAZOLIN 2 G: 2 INJECTION, POWDER, FOR SOLUTION INTRAMUSCULAR; INTRAVENOUS at 01:43

## 2023-05-07 NOTE — THERAPY EVALUATION
Patient Name: Andree Deluna  : 1955    MRN: [id number]                              Today's Date: 2023       Admit Date: 2023    Visit Dx:     ICD-10-CM ICD-9-CM   1. Non-STEMI (non-ST elevated myocardial infarction)  I21.4 410.70   2. Chest pain, unspecified type  R07.9 786.50   3. Elevated troponin  R77.8 790.6   4. History of stroke  Z86.73 V12.54   5. Class 1 obesity due to excess calories with serious comorbidity and body mass index (BMI) of 32.0 to 32.9 in adult  E66.09 278.00    Z68.32 V85.32   6. Primary hypertension  I10 401.9   7. Dyslipidemia  E78.5 272.4   8. Coronary artery disease involving native coronary artery of native heart with unstable angina pectoris  I25.110 414.01     411.1   9. Abnormal findings on diagnostic imaging of heart and coronary circulation  R93.1 794.39     Patient Active Problem List   Diagnosis   • Anemia in other chronic diseases classified elsewhere   • Anxiety   • B12 deficiency   • Attention deficit disorder of adult with hyperactivity   • Mood disorder   • Complete rotator cuff tear or rupture of right shoulder, not specified as traumatic   • Degeneration of intervertebral disc of cervical region   • Degeneration of intervertebral disc of lumbosacral region   • Fatigue   • Hyperlipidemia   • Hypertension   • Insomnia   • Obesity   • Osteoarthritis of knee   • CVA (cerebral vascular accident)   • Visual field loss left   • Acute midline low back pain without sciatica   • GERD with esophagitis   • Carotid stenosis, bilateral   • Overactive bladder   • History of stroke   • Acute pain of right shoulder   • Left cervical radiculopathy   • Migraine headache   • Medicare annual wellness visit, subsequent   • Moderate episode of recurrent major depressive disorder   • Piriformis syndrome, right   • Precordial pain   • History of Grimaldo's esophagus   • Chronic cough   • PATEL (generalized anxiety disorder)   • Chest pain, unspecified type   • Elevated troponin    • Non-STEMI (non-ST elevated myocardial infarction)   • Coronary artery disease involving native coronary artery of native heart with unstable angina pectoris   • Abnormal findings on diagnostic imaging of heart and coronary circulation     Past Medical History:   Diagnosis Date   • ADHD unknown   • Anemia    • Anxiety    • Carotid artery disease     80% right internal carotid artery   • CVA (cerebral vascular accident)     right parietal right temporal   • DDD (degenerative disc disease), lumbar    • Depression    • Extremity pain     Ankle pain   • GERD (gastroesophageal reflux disease)    • Hyperlipidemia unknown   • Hypertension    • Insomnia unknown   • Laryngopharyngeal reflux    • Low back pain    • Mood disorder    • Obesity unknown   • Skin cancer of face    • Sleep apnea     Suspected sleep apnea she has refused to be tested   • Stroke (cerebrum)    • Visual field defect     Left     Past Surgical History:   Procedure Laterality Date   • CARDIAC CATHETERIZATION N/A 5/1/2023    Procedure: Left Heart Cath;  Surgeon: Kye Alcaraz MD;  Location: [facility] CATH INVASIVE LOCATION;  Service: Cardiovascular;  Laterality: N/A;   • CAROTID ENDARTERECTOMY Right 11/10/2020    Procedure: CAROTID ENDARTERECTOMY;  Surgeon: Tavo Das MD;  Location: [facility] MAIN OR;  Service: Vascular;  Laterality: Right;   • CHOLECYSTECTOMY     • COLONOSCOPY      2016   • FINGER SURGERY     • KNEE ARTHROPLASTY     • KNEE SURGERY Right     replaced   • LAPAROSCOPIC GASTRIC BANDING     • ROTATOR CUFF REPAIR Right 2019   • SHOULDER SURGERY Right 05/24/2019    scope/ cuff repair   • TUBAL ABDOMINAL LIGATION        General Information     Row Name 05/07/23 1425          OT Time and Intention    Document Type evaluation  -ES     Mode of Treatment occupational therapy  -ES     Row Name 05/07/23 1425          General Information    Prior Level of Function independent:  -ES     Row Name 05/07/23 1425          Occupational  Profile    Reason for Services/Referral (Occupational Profile) Pt is a 66 yo female admitted 4/30/23 with c/o chest pain, found with NSTEMI, now s/p CABG 5/6/23 with Dr. Noonan. Pt eval'ed to day with Chest tube x2, kirby, HM, 4L O2, HM, external pacer.  At baseline, pt resides with spouse and is indepenent. States some difficulty wiht dressing due to RTC injury, but able to complete with increased time.  -ES     Row Name 05/07/23 1425          Living Environment    People in Home spouse  -ES     Row Name 05/07/23 1425          Cognition    Orientation Status (Cognition) oriented x 4  -ES     Row Name 05/07/23 1425          Safety Issues, Functional Mobility    Safety Issues Affecting Function (Mobility) impulsivity;insight into deficits/self-awareness;safety precaution awareness;safety precautions follow-through/compliance  -ES     Impairments Affecting Function (Mobility) endurance/activity tolerance;pain;range of motion (ROM);strength  -ES           User Key  (r) = Recorded By, (t) = Taken By, (c) = Cosigned By    Initials Name Provider Type    ES Betsy Watkins OT Occupational Therapist                 Mobility/ADL's     Row Name 05/07/23 1427          Bed Mobility    Bed Mobility bed mobility (all) activities  -ES     All Activities, [place] (Bed Mobility) maximum assist (25% patient effort)  -ES     Row Name 05/07/23 1427          Transfers    Transfers bed-chair transfer;sit-stand transfer  -ES     Row Name 05/07/23 1427          Bed-Chair Transfer    Bed-Chair [place] (Transfers) minimum assist (75% patient effort);2 person assist  -ES     Assistive Device (Bed-Chair Transfers) walker, front-wheeled  -ES     Row Name 05/07/23 1427          Sit-Stand Transfer    Sit-Stand [place] (Transfers) minimum assist (75% patient effort);2 person assist  -ES     Assistive Device (Sit-Stand Transfers) walker, front-wheeled  -ES     Row Name 05/07/23 1427          Functional Mobility     Functional Mobility- Ind. Level minimum assist (75% patient effort);2 person assist required  -     Functional Mobility- Device walker, front-wheeled  -     Row Name 05/07/23 1427          Activities of Daily Living    BADL Assessment/Intervention lower body dressing;upper body dressing;feeding;toileting  -     Row Name 05/07/23 1427          Mobility    Extremity Weight-bearing Status --  Sternal precautions to BUE  -     Row Name 05/07/23 1427          Lower Body Dressing Assessment/Training    [place] Level (Lower Body Dressing) maximum assist (25% patient effort)  -     Row Name 05/07/23 1427          Upper Body Dressing Assessment/Training    [place] Level (Upper Body Dressing) maximum assist (25% patient effort)  -     Row Name 05/07/23 1427          Self-Feeding Assessment/Training    [place] Level (Feeding) set up  -[facility] Name 05/07/23 1427          Toileting Assessment/Training    [place] Level (Toileting) dependent (less than 25% patient effort)  -           User Key  (r) = Recorded By, (t) = Taken By, (c) = Cosigned By    Initials Name Provider Type     Betsy Watkins OT Occupational Therapist               Obj/Interventions     [facility] Name 05/07/23 1428          Sensory Assessment (Somatosensory)    Sensory Assessment (Somatosensory) sensation intact  -[facility] Name 05/07/23 1428          Vision Assessment/Intervention    Visual Impairment/Limitations WNL  -[facility] Name 05/07/23 1428          Range of Motion Comprehensive    Comment, General Range of Motion BUE WFL within sternal precautions  -[facility] Name 05/07/23 1428          Strength Comprehensive (MMT)    Comment, General Manual Muscle Testing (MMT) Assessment BUE WFL within sternal precautions  -[facility] Name 05/07/23 1428          Balance    Balance Assessment sitting static balance;sitting dynamic balance;standing dynamic balance;standing static balance  -     Static Sitting Balance  supervision  -ES     Dynamic Sitting Balance contact guard  -ES     Static Standing Balance minimal assist  -ES     Dynamic Standing Balance minimal assist;2-person assist  -ES           User Key  (r) = Recorded By, (t) = Taken By, (c) = Cosigned By    Initials Name Provider Type    Betsy Allen OT Occupational Therapist               Goals/Plan     Row Name 05/07/23 1431          Bathing Goal 1 (OT)    Activity/Device (Bathing Goal 1, OT) bathing skills, all  -ES     [place]rett Level/Cues Needed (Bathing Goal 1, OT) supervision required  -ES     Time Frame (Bathing Goal 1, OT) 2 weeks  -ES     Row Name 05/07/23 1431          Dressing Goal 1 (OT)    Activity/Device (Dressing Goal 1, OT) dressing skills, all  -ES     Garrett/Cues Needed (Dressing Goal 1, OT) modified independence  -ES     Time Frame (Dressing Goal 1, OT) 2 weeks  -ES     Row Name 05/07/23 1431          Problem Specific Goal 1 (OT)    Problem Specific Goal 1 (OT) Cardiac Rehab HEP without Assist  -ES     Time Frame (Problem Specific Goal 1, OT) 2 weeks  -ES     Row Name 05/07/23 1431          Therapy Assessment/Plan (OT)    Planned Therapy Interventions (OT) activity tolerance training;BADL retraining;functional balance retraining;IADL retraining;neuromuscular control/coordination retraining;occupation/activity based interventions;patient/caregiver education/training;ROM/therapeutic exercise;strengthening exercise;transfer/mobility retraining  -ES           User Key  (r) = Recorded By, (t) = Taken By, (c) = Cosigned By    Initials Name Provider Type    Betsy Allen OT Occupational Therapist               Clinical Impression     Row Name 05/07/23 1428          Pain Assessment    Pretreatment Pain Rating 8/10  -ES     Posttreatment Pain Rating 9/10  -ES     Pre/Posttreatment Pain Comment L chest, sternum  -ES     Row Name 05/07/23 1428          Plan of Care Review    Plan of Care Reviewed With patient;spouse;son  -ES      Outcome Evaluation Pt is a 68 yo female admitted 4/30/23 with c/o chest pain, found with NSTEMI, now s/p CABG 5/6/23 with Dr. Noonan. Pt eval'ed to day with Chest tube x2, kirby, HM, 4L O2, HM, external pacer.  At baseline, pt resides with spouse and is indepenent. States some difficulty wiht dressing due to RTC injury, but able to complete with increased time. Patient oriented x4 but states no familiarity with sternal precautions. Requires max verbal cuing for ocmpliance with sternal precautions. Sit<>stand with Min Ax2 for line management. Transfer bed<>chair with min a x2 utilizing RW. Pt requires Max A for sit<>supine. Reviewed Cardiac Rehab HEP, but patient demos poor carryover and will reqiure additoinal education to ensure learning. She is Max A for UB and LB ADLs, but anticipate progressing until home with spouse.  -ES     Row Name 05/07/23 1428          Therapy Assessment/Plan (OT)    Rehab Potential (OT) good, to achieve stated therapy goals  -ES     Criteria for Skilled Therapeutic Interventions Met (OT) yes;skilled treatment is necessary  -ES     Therapy Frequency (OT) 5 times/wk  -ES     Predicted Duration of Therapy Intervention (OT) until dc  -ES     Row Name 05/07/23 1428          Therapy Plan Review/Discharge Plan (OT)    Anticipated Discharge Disposition (OT) home with home health  -ES     Row Name 05/07/23 1428          Vital Signs    Pre Systolic BP Rehab 147  -ES     Pre Treatment Diastolic BP 91  -ES     Intra Systolic BP Rehab 156  -ES     Intra Treatment Diastolic   -ES     Pre SpO2 (%) 95  -ES     O2 Delivery Pre Treatment supplemental O2  4L  -ES     Intra SpO2 (%) 97  -ES     O2 Delivery Intra Treatment supplemental O2  -ES     Post SpO2 (%) 96  -ES     O2 Delivery Post Treatment supplemental O2  -ES     Pre Patient Position Sitting  -ES     Intra Patient Position Standing  -ES     Post Patient Position Supine  -ES     Row Name 05/07/23 1428          Positioning and Restraints     Pre-Treatment Position sitting in chair/recliner  -ES     Post Treatment Position bed  -ES     In Bed notified nsg;encouraged to call for assist;call light within reach;exit alarm on  -ES           User Key  (r) = Recorded By, (t) = Taken By, (c) = Cosigned By    Initials Name Provider Type    Betsy Allen OT Occupational Therapist               Outcome Measures     Row Name 05/07/23 1431          How much help from another is currently needed...    Putting on and taking off regular lower body clothing? 2  -ES     Bathing (including washing, rinsing, and drying) 2  -ES     Toileting (which includes using toilet bed pan or urinal) 1  -ES     Putting on and taking off regular upper body clothing 2  -ES     Taking care of personal grooming (such as brushing teeth) 2  -ES     Eating meals 3  -ES     AM-PAC 6 Clicks Score (OT) 12  -ES     Row Name 05/07/23 1431          Functional Assessment    Outcome Measure Options AM-PAC 6 Clicks Daily Activity (OT)  -ES           User Key  (r) = Recorded By, (t) = Taken By, (c) = Cosigned By    Initials Name Provider Type    Betsy Allen OT Occupational Therapist                  OT Recommendation and Plan  Planned Therapy Interventions (OT): activity tolerance training, BADL retraining, functional balance retraining, IADL retraining, neuromuscular control/coordination retraining, occupation/activity based interventions, patient/caregiver education/training, ROM/therapeutic exercise, strengthening exercise, transfer/mobility retraining  Therapy Frequency (OT): 5 times/wk  Plan of Care Review  Plan of Care Reviewed With: patient, spouse, son  Outcome Evaluation: Pt is a 68 yo female admitted 4/30/23 with c/o chest pain, found with NSTEMI, now s/p CABG 5/6/23 with Dr. Noonan. Pt eval'ed to day with Chest tube x2, kirby, HM, 4L O2, HM, external pacer.  At baseline, pt resides with spouse and is indepenent. States some difficulty wiht dressing due to RTC  injury, but able to complete with increased time. Patient oriented x4 but states no familiarity with sternal precautions. Requires max verbal cuing for ocmpliance with sternal precautions. Sit<>stand with Min Ax2 for line management. Transfer bed<>chair with min a x2 utilizing RW. Pt requires Max A for sit<>supine. Reviewed Cardiac Rehab HEP, but patient demos poor carryover and will reqiure additoinal education to ensure learning. She is Max A for UB and LB ADLs, but anticipate progressing until home with spouse.     Time Calculation:    Time Calculation- OT     Row Name 05/07/23 1433 05/07/23 1025          Time Calculation- OT    OT Start Time 1300  -ES --     OT Stop Time 1330  -ES --     OT Time Calculation (min) 30 min  -ES --     Total Timed Code Minutes- OT 0 minute(s)  -ES --     OT Received On 05/07/23  -ES --     OT - Next Appointment 05/08/23  -ES 05/08/23  -ES     OT Goal Re-Cert Due Date 05/21/23  -ES --           User Key  (r) = Recorded By, (t) = Taken By, (c) = Cosigned By    Initials Name Provider Type    ES Betsy Watkins OT Occupational Therapist              Therapy Charges for Today     Code Description Service Date Service Provider Modifiers Qty    [id number]  OT EVAL MOD COMPLEXITY 4 5/7/2023 Betsy Watkins OT GO 1               Betsy Watkins OT  5/7/2023

## 2023-05-07 NOTE — PROGRESS NOTES
Status post CABG/mitral valve repair, postop day #1    CV improving.  Remove [place] catheter.  Remove arterial line.  Start low-dose beta-blocker.  Continue pacing for now, although she has a good underlying rhythm.  Pulmonary toileting.  Keep chest tubes today, still draining.  Oral diet as tolerated.  Responding well to Lasix.  We might need another dose later today.  Keep Wu catheter today.  Afebrile.

## 2023-05-07 NOTE — PROGRESS NOTES
Referring Provider: Jimmie Muñiz MD    Reason for follow-up: Multivessel coronary artery disease, status post CABG     Patient Care Team:  Naga Griffith PA-C as PCP - General (Physician Assistant)      SUBJECTIVE  She got extubated, sitting up on a recliner and complains of typical postoperative chest discomfort.     ROS  Unable to obtain due to intubated and sedated status      Personal History:    Past Medical History:   Diagnosis Date   • ADHD unknown   • Anemia    • Anxiety    • Carotid artery disease     80% right internal carotid artery   • CVA (cerebral vascular accident)     right parietal right temporal   • DDD (degenerative disc disease), lumbar    • Depression    • Extremity pain     Ankle pain   • GERD (gastroesophageal reflux disease)    • Hyperlipidemia unknown   • Hypertension    • Insomnia unknown   • Laryngopharyngeal reflux    • Low back pain    • Mood disorder    • Obesity unknown   • Skin cancer of face    • Sleep apnea     Suspected sleep apnea she has refused to be tested   • Stroke (cerebrum)    • Visual field defect     Left       Past Surgical History:   Procedure Laterality Date   • CARDIAC CATHETERIZATION N/A 5/1/2023    Procedure: Left Heart Cath;  Surgeon: Kye Alcaraz MD;  Location: [facility] CATH INVASIVE LOCATION;  Service: Cardiovascular;  Laterality: N/A;   • CAROTID ENDARTERECTOMY Right 11/10/2020    Procedure: CAROTID ENDARTERECTOMY;  Surgeon: Tavo Das MD;  Location: [facility] MAIN OR;  Service: Vascular;  Laterality: Right;   • CHOLECYSTECTOMY     • COLONOSCOPY      2016   • FINGER SURGERY     • KNEE ARTHROPLASTY     • KNEE SURGERY Right     replaced   • LAPAROSCOPIC GASTRIC BANDING     • ROTATOR CUFF REPAIR Right 2019   • SHOULDER SURGERY Right 05/24/2019    scope/ cuff repair   • TUBAL ABDOMINAL LIGATION         Family History   Problem Relation Age of Onset   • Diabetes Other    • Heart disease Mother    • Diabetes Mother    • Heart disease  Father        Social History     Tobacco Use   • Smoking status: Never   • Smokeless tobacco: Never   Vaping Use   • Vaping Use: Never used   Substance Use Topics   • Alcohol use: No   • Drug use: No        Medications Prior to Admission   Medication Sig Dispense Refill Last Dose   • ALPRAZolam (XANAX) 2 MG tablet TAKE 1 TABLET BY MOUTH AT NIGHT AS NEEDED FOR ANXIETY 30 tablet 0    • amLODIPine (NORVASC) 2.5 MG tablet TAKE ONE TABLET BY MOUTH DAILY 90 tablet 0 Past Week   • aspirin 81 MG chewable tablet Chew 1 tablet Daily. 30 tablet 1 4/30/2023   • atorvastatin (LIPITOR) 80 MG tablet TAKE ONE TABLET BY MOUTH ONCE NIGHTLY 30 tablet 11    • lamoTRIgine (LaMICtal) 100 MG tablet TAKE ONE TABLET BY MOUTH TWICE A DAY 60 tablet 3    • lisinopril-hydrochlorothiazide (PRINZIDE,ZESTORETIC) 20-25 MG per tablet TAKE ONE TABLET BY MOUTH DAILY 90 tablet 1    • metoprolol succinate XL (TOPROL-XL) 25 MG 24 hr tablet TAKE ONE TABLET BY MOUTH DAILY 30 tablet 10    • omeprazole (priLOSEC) 40 MG capsule TAKE ONE CAPSULE BY MOUTH DAILY 90 capsule 1    • oxybutynin (DITROPAN) 5 MG tablet Take 1 tablet by mouth Daily. 90 tablet 1 Past Week       Allergies:  Patient has no known allergies.    Scheduled Meds:aspirin, 81 mg, Oral, Daily  atorvastatin, 40 mg, Oral, Nightly  ceFAZolin, 2 g, Intravenous, Q8H  chlorhexidine, 15 mL, Mouth/Throat, Q12H  enoxaparin, 40 mg, Subcutaneous, Q24H  lidocaine, 2 patch, Transdermal, Q24H  metoprolol tartrate, 12.5 mg, Oral, Q12H  mupirocin, 1 application, Each Nare, BID  pantoprazole, 40 mg, Oral, QAM  senna-docusate sodium, 2 tablet, Oral, Nightly      Continuous Infusions:dexmedetomidine, 0.2-1.5 mcg/kg/hr, Last Rate: Stopped (05/06/23 1640)  insulin, 0-100 Units/hr, Last Rate: 0.5 Units/hr (05/07/23 1122)  niCARdipine, 5-15 mg/hr, Last Rate: Stopped (05/07/23 1024)  nitroglycerin, 5-50 mcg/min  norepinephrine, 0.02-0.06 mcg/kg/min  propofol, 5-50 mcg/kg/min      PRN Meds:.•  acetaminophen **OR**  "acetaminophen **OR** acetaminophen  •  bisacodyl  •  dextrose  •  dextrose  •  glucagon (human recombinant)  •  HYDROcodone-acetaminophen  •  Morphine **AND** naloxone  •  niCARdipine  •  nitroglycerin  •  norepinephrine  •  ondansetron  •  polyethylene glycol  •  potassium chloride **OR** potassium chloride **OR** potassium chloride  •  potassium phosphate infusion greater than 15 mMoles **OR** potassium phosphates **OR** potassium phosphate **OR** sodium phosphate IVPB **OR** sodium phosphate IVPB **OR** Sodium Phosphate  •  propofol      OBJECTIVE    Vital Signs  Vitals:    05/07/23 0520 05/07/23 0600 05/07/23 0700 05/07/23 1116   BP:    127/85   BP Location:    Left arm   Patient Position:    Sitting   Pulse: 89 89  89   Resp:    18   Temp: 98.1 °F (36.7 °C) 98.2 °F (36.8 °C) 97.5 °F (36.4 °C) 97.9 °F (36.6 °C)   TempSrc:   Oral Oral   SpO2: 96%   99%   Weight:       Height:           Flowsheet Rows    Flowsheet Row First Filed Value   Admission Height 167.6 cm (66\") Documented at 04/30/2023 2132   Admission Weight 92.7 kg (204 lb 5.9 oz) Documented at 04/30/2023 2132            Intake/Output Summary (Last 24 hours) at 5/7/2023 1227  Last data filed at 5/7/2023 1200  Gross per 24 hour   Intake 2301 ml   Output 3545 ml   Net -1244 ml          Telemetry: Paced rhythm underlying rhythm is sinus in the 70s    Physical Exam:  The patient is intubated and sedated  Vital signs as noted above.  Head and neck revealed no carotid bruits or jugular venous distention.  No thyromegaly or lymphadenopathy is present  Lungs clear.  No wheezing.  Breath sounds are normal bilaterally.  Heart normal first and second heart sounds.  No murmur. No precordial rub is present.  No gallop is present.  Chest tubes in place  Abdomen soft and nontender.  No organomegaly is present.  Extremities with good peripheral pulses without any pedal edema.  Skin warm and dry.  Musculoskeletal system is grossly normal.  CNS grossly normal. "       Results Review:  I have personally reviewed the results from the time of this admission to 5/7/2023 12:27 EDT and agree with these findings:  []  Laboratory  []  Microbiology  []  Radiology  []  EKG/Telemetry   []  Cardiology/Vascular   []  Pathology  []  Old records  []  Other:    Most notable findings include:    Lab Results (last 24 hours)     Procedure Component Value Units Date/Time    POC Glucose Once [[id number]]  (Abnormal) Collected: 05/07/23 1223    Specimen: Blood Updated: 05/07/23 1225     Glucose 128 mg/dL      Comment: Serial Number: [id number]:  [id number]       POC Glucose Once [[id number]]  (Abnormal) Collected: 05/07/23 1119    Specimen: Blood Updated: 05/07/23 1120     Glucose 157 mg/dL      Comment: Serial Number: [id number]:  [id number]       POC Glucose Once [[id number]]  (Abnormal) Collected: 05/07/23 0941    Specimen: Blood Updated: 05/07/23 0942     Glucose 126 mg/dL      Comment: Serial Number: [id number]:  [id number]       POC Glucose Once [[id number]]  (Abnormal) Collected: 05/07/23 0805    Specimen: Blood Updated: 05/07/23 0806     Glucose 127 mg/dL      Comment: Serial Number: [id number]:  [id number]       CBC (No Diff) [[id number]]  (Abnormal) Collected: 05/07/23 0634    Specimen: Blood Updated: 05/07/23 0649     WBC 11.50 10*3/mm3      RBC 3.43 10*6/mm3      Hemoglobin 10.2 g/dL      Hematocrit 31.2 %      MCV 91.2 fL      MCH 29.8 pg      MCHC 32.7 g/dL      RDW 14.5 %      RDW-SD 48.6 fl      MPV 9.3 fL      Platelets 141 10*3/mm3     POC Glucose Once [[id number]]  (Abnormal) Collected: 05/07/23 0632    Specimen: Blood Updated: 05/07/23 0636     Glucose 121 mg/dL      Comment: Serial Number: [id number]:  [id number]       Blood Gas, Arterial - [[id number]]  (Abnormal) Collected: 05/07/23 0448    Specimen: Arterial Blood Updated: 05/07/23 0451     Site Arterial Line     Juan's Test Positive     pH, Arterial 7.319 pH units      pCO2, Arterial 52.4 mm Hg       pO2, Arterial 147.6 mm Hg      HCO3, Arterial 26.9 mmol/L      Base Excess, Arterial 0.3 mmol/L      Comment: Serial Number: [id number]:  [id number]        O2 Saturation, Arterial 99.0 %      CO2 Content 28.5 mmol/L      Barometric Pressure for Blood Gas --     Comment: N/A        Modality Cannula     FIO2 36 %      Hemodilution No    POC Glucose Once [[id number]]  (Abnormal) Collected: 05/07/23 0411    Specimen: Blood Updated: 05/07/23 0416     Glucose 142 mg/dL      Comment: Serial Number: [id number]:  [id number]       Comprehensive Metabolic Panel [[id number]]  (Abnormal) Collected: 05/07/23 0251    Specimen: Blood Updated: 05/07/23 0324     Glucose 142 mg/dL      BUN 12 mg/dL      Creatinine 0.77 mg/dL      Sodium 148 mmol/L      Potassium 4.4 mmol/L      Comment: Result checked          Chloride 113 mmol/L      CO2 25.0 mmol/L      Calcium 8.3 mg/dL      Total Protein 5.8 g/dL      Albumin 4.4 g/dL      ALT (SGPT) 61 U/L      AST (SGOT) 86 U/L      Alkaline Phosphatase 75 U/L      Total Bilirubin 0.8 mg/dL      Globulin 1.4 gm/dL      A/G Ratio 3.1 g/dL      BUN/Creatinine Ratio 15.6     Anion Gap 10.0 mmol/L      eGFR 85.7 mL/min/1.73     Narrative:      GFR Normal >60  Chronic Kidney Disease <60  Kidney Failure <15      Protime-INR [[id number]]  (Abnormal) Collected: 05/07/23 0251    Specimen: Blood Updated: 05/07/23 0317     Protime 11.9 Seconds      INR 1.12    Calcium, Ionized [[id number]]  (Abnormal) Collected: 05/07/23 0251    Specimen: Blood Updated: 05/07/23 0308     Ionized Calcium 1.16 mmol/L     CBC & Differential [[id number]]  (Abnormal) Collected: 05/07/23 0251    Specimen: Blood Updated: 05/07/23 0303    Narrative:      The following orders were created for panel order CBC & Differential.  Procedure                               Abnormality         Status                     ---------                               -----------         ------                     CBC Auto Differential[[id number]]         Abnormal            Final result                 Please view results for these tests on the individual orders.    CBC Auto Differential [[id number]]  (Abnormal) Collected: 05/07/23 0251    Specimen: Blood Updated: 05/07/23 0303     WBC 9.50 10*3/mm3      RBC 3.29 10*6/mm3      Hemoglobin 9.8 g/dL      Hematocrit 30.4 %      MCV 92.3 fL      MCH 29.6 pg      MCHC 32.1 g/dL      RDW 14.7 %      RDW-SD 47.3 fl      MPV 9.3 fL      Platelets 132 10*3/mm3      Neutrophil % 88.3 %      Lymphocyte % 6.1 %      Monocyte % 3.5 %      Eosinophil % 1.4 %      Basophil % 0.7 %      Neutrophils, Absolute 8.40 10*3/mm3      Lymphocytes, Absolute 0.60 10*3/mm3      Monocytes, Absolute 0.30 10*3/mm3      Eosinophils, Absolute 0.10 10*3/mm3      Basophils, Absolute 0.10 10*3/mm3      nRBC 0.0 /100 WBC     POC Glucose Once [[id number]]  (Abnormal) Collected: 05/07/23 0254    Specimen: Blood Updated: 05/07/23 0256     Glucose 118 mg/dL      Comment: Serial Number: [id number]:  [id number]       POC Glucose Once [[id number]]  (Abnormal) Collected: 05/07/23 0135    Specimen: Blood Updated: 05/07/23 0137     Glucose 143 mg/dL      Comment: Serial Number: [id number]:  [id number]       Blood Gas, Arterial - [[id number]]  (Abnormal) Collected: 05/06/23 2344    Specimen: Arterial Blood Updated: 05/06/23 2347     Site Arterial Line     Juan's Test Positive     pH, Arterial 7.294 pH units      pCO2, Arterial 44.0 mm Hg      pO2, Arterial 115.1 mm Hg      HCO3, Arterial 21.4 mmol/L      Base Excess, Arterial -4.8 mmol/L      Comment: Serial Number: [id number]:  [id number]        O2 Saturation, Arterial 98.0 %      CO2 Content 22.7 mmol/L      Barometric Pressure for Blood Gas --     Comment: N/A        Modality Cannula     FIO2 36 %      Hemodilution No    POC Glucose Once [[id number]]  (Abnormal) Collected: 05/06/23 2316    Specimen: Blood Updated: 05/06/23 2318     Glucose 141 mg/dL      Comment: Serial Number:  [id number]:  [id number]       Blood Gas, Arterial - [[id number]]  (Abnormal) Collected: 05/06/23 2219    Specimen: Arterial Blood Updated: 05/06/23 2223     Site Arterial Line     Juan's Test Positive     pH, Arterial 7.310 pH units      pCO2, Arterial 41.1 mm Hg      pO2, Arterial 112.9 mm Hg      HCO3, Arterial 20.7 mmol/L      Base Excess, Arterial -5.2 mmol/L      Comment: Serial Number: [id number]:  [id number]        O2 Saturation, Arterial 98.0 %      CO2 Content 21.9 mmol/L      Barometric Pressure for Blood Gas --     Comment: N/A        Modality Adult Vent     FIO2 40 %      Ventilator Mode ;CPAP/PS     PEEP 5     PSV 10 cmH2O      Hemodilution No    POC Glucose Once [[id number]]  (Abnormal) Collected: 05/06/23 2151    Specimen: Blood Updated: 05/06/23 2152     Glucose 122 mg/dL      Comment: Serial Number: [id number]:  [id number]       POC Glucose Once [[id number]]  (Abnormal) Collected: 05/06/23 2042    Specimen: Blood Updated: 05/06/23 2148     Glucose 131 mg/dL      Comment: Serial Number: [id number]:  [id number]       Phosphorus [[id number]]  (Abnormal) Collected: 05/06/23 1909    Specimen: Blood Updated: 05/06/23 2003     Phosphorus 1.6 mg/dL     Magnesium [[id number]]  (Abnormal) Collected: 05/06/23 1909    Specimen: Blood Updated: 05/06/23 1958     Magnesium 2.8 mg/dL     Comprehensive Metabolic Panel [[id number]]  (Abnormal) Collected: 05/06/23 1909    Specimen: Blood Updated: 05/06/23 1958     Glucose 161 mg/dL      BUN 11 mg/dL      Creatinine 0.83 mg/dL      Sodium 145 mmol/L      Potassium 3.3 mmol/L      Chloride 112 mmol/L      CO2 22.0 mmol/L      Calcium 8.7 mg/dL      Total Protein 5.7 g/dL      Albumin 4.3 g/dL      ALT (SGPT) 74 U/L      AST (SGOT) 120 U/L      Alkaline Phosphatase 71 U/L      Total Bilirubin 1.7 mg/dL      Globulin 1.4 gm/dL      A/G Ratio 3.1 g/dL      BUN/Creatinine Ratio 13.3     Anion Gap 11.0 mmol/L      eGFR 78.3 mL/min/1.73     Narrative:      GFR  Normal >60  Chronic Kidney Disease <60  Kidney Failure <15      Blood Gas, Arterial - [[id number]]  (Abnormal) Collected: 05/06/23 1701    Specimen: Arterial Blood Updated: 05/06/23 1944     Site Arterial Line     Juan's Test N/A     pH, Arterial 7.446 pH units      pCO2, Arterial 31.5 mm Hg      pO2, Arterial 129.0 mm Hg      HCO3, Arterial 21.7 mmol/L      Base Excess, Arterial -1.7 mmol/L      Comment: Serial Number: [id number]:  [id number]        O2 Saturation, Arterial 99.1 %      Barometric Pressure for Blood Gas --     Comment: N/A        Modality Adult Vent     FIO2 40 %      Ventilator Mode ;AC     Set Tidal Volume 600     PEEP 5     Hemodilution Yes     Respiratory Rate 12    POC Glucose Once [[id number]]  (Abnormal) Collected: 05/06/23 1915    Specimen: Blood Updated: 05/06/23 1917     Glucose 153 mg/dL      Comment: Serial Number: [id number]:  [id number]       POC Glucose Once [[id number]]  (Abnormal) Collected: 05/06/23 1815    Specimen: Blood Updated: 05/06/23 1818     Glucose 157 mg/dL      Comment: Serial Number: [id number]:  [id number]       POCT Electrolytes +HGB +HCT [[id number]]  (Abnormal) Collected: 05/06/23 1701    Specimen: Blood Updated: 05/06/23 1708     Sodium 145 mmol/L      POC Potassium 3.2 mmol/L      Ionized Calcium 1.19 mmol/L      Comment: Serial Number: [id number]:  [id number]        Hematocrit 32 %      Hemoglobin 10.9 g/dL     POC Glucose Once [[id number]]  (Abnormal) Collected: 05/06/23 1653    Specimen: Blood Updated: 05/06/23 1655     Glucose 141 mg/dL      Comment: Serial Number: [id number]:  [id number]       CBC (No Diff) [[id number]]  (Abnormal) Collected: 05/06/23 1445    Specimen: Blood Updated: 05/06/23 1455     WBC 7.60 10*3/mm3      RBC 3.70 10*6/mm3      Hemoglobin 10.9 g/dL      Hematocrit 33.3 %      MCV 90.2 fL      MCH 29.5 pg      MCHC 32.7 g/dL      RDW 14.1 %      RDW-SD 46.8 fl      MPV 9.3 fL      Platelets 139 10*3/mm3     POC Glucose Once  [[id number]]  (Abnormal) Collected: 05/06/23 1449    Specimen: Blood Updated: 05/06/23 1451     Glucose 125 mg/dL      Comment: Serial Number: [id number]:  [id number]       POC Glucose Once [[id number]]  (Abnormal) Collected: 05/06/23 1347    Specimen: Blood Updated: 05/06/23 1352     Glucose 108 mg/dL      Comment: Serial Number: [id number]:  [id number]       POCT Electrolytes +HGB +HCT [[id number]]  (Abnormal) Collected: 05/06/23 1347    Specimen: Blood Updated: 05/06/23 1352     Sodium 146 mmol/L      POC Potassium 3.6 mmol/L      Ionized Calcium 1.20 mmol/L      Comment: Serial Number: [id number]:  [id number]        Glucose 108 mg/dL      Hematocrit 32 %      Hemoglobin 10.9 g/dL     Blood Gas, Arterial - [[id number]]  (Abnormal) Collected: 05/06/23 1347    Specimen: Arterial Blood Updated: 05/06/23 1352     Site Arterial Line     Juan's Test N/A     pH, Arterial 7.459 pH units      pCO2, Arterial 32.8 mm Hg      pO2, Arterial 98.5 mm Hg      HCO3, Arterial 23.3 mmol/L      Base Excess, Arterial -0.1 mmol/L      Comment: Serial Number: [id number]:  [id number]        O2 Saturation, Arterial 98.1 %      Barometric Pressure for Blood Gas --     Comment: N/A        Modality Adult Vent     FIO2 40 %      Ventilator Mode ;AC     Set Tidal Volume 600     PEEP 5     Hemodilution Yes     Respiratory Rate 14    POC Glucose Once [[id number]]  (Normal) Collected: 05/06/23 1231    Specimen: Blood Updated: 05/06/23 1236     Glucose 87 mg/dL      Comment: Serial Number: [id number]:  [id number]       POCT Electrolytes +HGB +HCT [[id number]]  (Abnormal) Collected: 05/06/23 1231    Specimen: Blood Updated: 05/06/23 1236     Sodium 145 mmol/L      POC Potassium 3.4 mmol/L      Ionized Calcium 1.10 mmol/L      Comment: Serial Number: [id number]:  [id number]        Glucose 87 mg/dL      Hematocrit 32 %      Hemoglobin 10.7 g/dL     Blood Gas, Arterial - [[id number]]  (Abnormal) Collected: 05/06/23 1231    Specimen: Arterial Blood  Updated: 05/06/23 1236     Site Arterial Line     Juan's Test N/A     pH, Arterial 7.430 pH units      pCO2, Arterial 34.5 mm Hg      pO2, Arterial 214.8 mm Hg      HCO3, Arterial 22.9 mmol/L      Base Excess, Arterial -1.0 mmol/L      Comment: Serial Number: [id number]:  [id number]        O2 Saturation, Arterial 99.8 %      Barometric Pressure for Blood Gas --     Comment: N/A        Modality Adult Vent     FIO2 70 %      Ventilator Mode ;AC     Set Tidal Volume 600     PEEP 8     Hemodilution Yes     Respiratory Rate 14          Imaging Results (Last 24 Hours)     Procedure Component Value Units Date/Time    XR Chest 1 View [[id number]] Collected: 05/07/23 0743     Updated: 05/07/23 0746    Narrative:      XR CHEST 1 VW    Date of Exam: 5/7/2023 3:58 AM EDT    Indication: Post-Op Heart Surgery    Comparison: 5/6/2023.    Findings:  Interval extubation. Gastric suction tube has been removed. Cress[place]-Lex catheter remains in place with tip in the main pulmonary artery. There is evidence of median sternotomy and heart valve replacement. Chest tubes appear unchanged. Lung volumes remain   low with bibasilar atelectasis. No new lung opacity. No pneumothorax or definite pleural effusion. The cardiac silhouette is unchanged.      Impression:      Impression:  Interval extubation and removal of gastric suction tube with otherwise stable appearance of the postoperative chest.      Electronically Signed: Robin Gregorio    5/7/2023 7:44 AM EDT    Workstation ID: [id number]    XR Chest 1 View [[id number]] Collected: 05/06/23 1235     Updated: 05/06/23 1239    Narrative:      XR CHEST 1 VW    Date of Exam: 5/6/2023 12:12 PM EDT    Indication: Post-Op Check Line & Tube Placement    Comparison: 4/30/2023    Findings:  Status post sternotomy and valve replacement. ET tube tip 2.5 cm above the matias. Esophagogastric tube below the diaphragm. Right IJ [place]-Lex catheter tip overlies the main pulmonary outflow. Mediastinal drainage tube in  left-sided chest tube noted.   Negative for pneumothorax. No significant pleural effusion. Minimal bibasilar atelectasis.      Impression:      Impression:  1. ET tube above the matias.  2. Esophagogastric tube below the diaphragm.  3. Right IJ [place]-Lex catheter in place.  4. Chest tubes in place. No pneumothorax.      Electronically Signed: Vu Arrieta    5/6/2023 12:36 PM EDT    Workstation ID: [id number]          LAB RESULTS (LAST 7 DAYS)    CBC  Results from last 7 days   Lab Units 05/07/23  0634 05/07/23  0251 05/06/23  1701 05/06/23  1445 05/06/23  1347 05/06/23  1231 05/06/23  1157 05/06/23  0735 05/06/23  0451 05/05/23  0542 05/03/23  0520   WBC 10*3/mm3 11.50* 9.50  --  7.60  --   --  5.40  --  8.10 9.20 8.30   RBC 10*6/mm3 3.43* 3.29*  --  3.70*  --   --  3.18*  --  4.03 4.32 4.16   HEMOGLOBIN g/dL 10.2* 9.8*  --  10.9*  --   --  9.6*  --  11.9* 12.8 12.2   HEMOGLOBIN, POC g/dL  --   --  10.9*  --  10.9* 10.7*  --    < >  --   --   --    HEMATOCRIT % 31.2* 30.4*  --  33.3*  --   --  29.3*  --  36.5 39.5 38.2   HEMATOCRIT POC %  --   --  32*  --  32* 32*  --    < >  --   --   --    MCV fL 91.2 92.3  --  90.2  --   --  92.3  --  90.7 91.4 92.0   PLATELETS 10*3/mm3 141 132*  --  139*  --   --  117*  --  247 274 239    < > = values in this interval not displayed.       BMP  Results from last 7 days   Lab Units 05/07/23  0251 05/06/23  1909 05/06/23  1157 05/06/23  0451 05/05/23  0542 05/04/23  0327 05/03/23  0330 05/02/23  0335 05/01/23  0138   SODIUM mmol/L 148* 145 144 141 143 144 140 143 142   POTASSIUM mmol/L 4.4 3.3* 3.6 4.3 3.8 3.9 4.0 4.1 3.4*   CHLORIDE mmol/L 113* 112* 110* 105 106 107 105 106 105   CO2 mmol/L 25.0 22.0 23.0 24.0 25.0 26.0 25.0 25.0 27.0   BUN mg/dL 12 11 13 14 13 14 14 13 17   CREATININE mg/dL 0.77 0.83 0.76 0.72 0.77 0.77 0.81 0.83 0.95   GLUCOSE mg/dL 142* 161* 101* 116* 111* 119* 116* 98 115*   MAGNESIUM mg/dL  --  2.8*  --  2.3 2.2 2.1 2.1 2.0 2.2   PHOSPHORUS mg/dL  --  1.6* 3.3   --   --   --   --   --   --        CMP   Results from last 7 days   Lab Units 05/07/23  0251 05/06/23  1909 05/06/23  1157 05/06/23  0451 05/05/23  0542 05/04/23  0327 05/03/23  0330 05/01/23  0138 04/30/23  2248   SODIUM mmol/L 148* 145 144 141 143 144 140   < > 145   POTASSIUM mmol/L 4.4 3.3* 3.6 4.3 3.8 3.9 4.0   < > 3.6   CHLORIDE mmol/L 113* 112* 110* 105 106 107 105   < > 105   CO2 mmol/L 25.0 22.0 23.0 24.0 25.0 26.0 25.0   < > 27.0   BUN mg/dL 12 11 13 14 13 14 14   < > 16   CREATININE mg/dL 0.77 0.83 0.76 0.72 0.77 0.77 0.81   < > 1.15*   GLUCOSE mg/dL 142* 161* 101* 116* 111* 119* 116*   < > 107*   ALBUMIN g/dL 4.4 4.3 4.3 3.5 4.0  --  3.5  --  4.1   BILIRUBIN mg/dL 0.8 1.7*  --  0.3 0.4  --  0.5  --  0.2   ALK PHOS U/L 75 71  --  97 107  --  91  --  103   AST (SGOT) U/L 86* 120*  --  25 25  --  22  --  24   ALT (SGPT) U/L 61* 74*  --  17 21  --  15  --  19    < > = values in this interval not displayed.       BNP        TROPONIN  Results from last 7 days   Lab Units 05/01/23  0138   HSTROP T ng/L 120*       CoAg  Results from last 7 days   Lab Units 05/07/23  0251 05/06/23  1157 05/06/23  0451 05/05/23  0542 05/03/23  0520 04/30/23  2248   INR  1.12* 1.17* 0.96 0.93 0.99 0.95   APTT seconds  --  27.2 23.7* 26.2 24.6 25.9*       Creatinine Clearance  Estimated Creatinine Clearance: 85.2 mL/min (by C-G formula based on SCr of 0.77 mg/dL).    ABG  Results from last 7 days   Lab Units 05/07/23  0448 05/06/23  2344 05/06/23  2219 05/06/23  1701 05/06/23  1347 05/06/23  1231 05/06/23  1052   PH, ARTERIAL pH units 7.319* 7.294* 7.310* 7.446 7.459* 7.430 7.300*   PCO2, ARTERIAL mm Hg 52.4* 44.0 41.1 31.5* 32.8* 34.5* 54.7*   PO2 ART mm Hg 147.6* 115.1* 112.9* 129.0* 98.5 214.8* 266.0*   O2 SATURATION ART % 99.0* 98.0 98.0 99.1* 98.1* 99.8* 100.0*   BASE EXCESS ART mmol/L 0.3 -4.8* -5.2* -1.7* -0.1* -1.0* 1.0       Radiology  XR Chest 1 View    Result Date: 5/7/2023  Impression: Interval extubation and removal of  gastric suction tube with otherwise stable appearance of the postoperative chest. Electronically Signed: Robin Gregorio  5/7/2023 7:44 AM EDT  Workstation ID: [id number]    XR Chest 1 View    Result Date: 5/6/2023  Impression: 1. ET tube above the matias. 2. Esophagogastric tube below the diaphragm. 3. Right IJ [place] catheter in place. 4. Chest tubes in place. No pneumothorax. Electronically Signed: Vu Arrieta  5/6/2023 12:36 PM EDT  Workstation ID: [id number]        EKG  I personally viewed and interpreted the patient's EKG/Telemetry data:  ECG 12 Lead   Preliminary Result   HEART RATE= 59  bpm   RR Interval= 1008  ms   IL Interval= 171  ms   P Horizontal Axis= 78  deg   P Front Axis= 17  deg   QRSD Interval= 85  ms   QT Interval= 395  ms   QRS Axis= 2  deg   T Wave Axis= 5  deg   - OTHERWISE NORMAL ECG -   Sinus bradycardia   Low voltage, precordial leads   Electronically Signed By:    Date and Time of Study: 2023-05-07 05:30:01      ECG 12 Lead Chest Pain   Preliminary Result   HEART RATE= 56  bpm   RR Interval= 1088  ms   IL Interval= 160  ms   P Horizontal Axis= 30  deg   P Front Axis= 44  deg   QRSD Interval= 89  ms   QT Interval= 410  ms   QRS Axis= 3  deg   T Wave Axis= -47  deg   - BORDERLINE ECG -   Sinus bradycardia   Atrial premature complex   Low voltage, precordial leads   Electronically Signed By:    Date and Time of Study: 2023-05-07 00:20:13      ECG 12 Lead   Preliminary Result   HEART RATE= 76  bpm   RR Interval= 784  ms   IL Interval= 170  ms   P Horizontal Axis= 28  deg   P Front Axis= 69  deg   QRSD Interval= 93  ms   QT Interval= 440  ms   QRS Axis= 20  deg   T Wave Axis= -40  deg   - ABNORMAL ECG -   Sinus rhythm   Repol abnrm suggests ischemia, diffuse leads   Electronically Signed By:    Date and Time of Study: 2023-05-06 12:18:09      ECG 12 Lead   Final Result   HEART RATE= 56  bpm   RR Interval= 1072  ms   IL Interval= 144  ms   P Horizontal Axis= 5  deg   P Front Axis= 17  deg   QRSD  Interval= 93  ms   QT Interval= 477  ms   QRS Axis= 8  deg   T Wave Axis= -63  deg   - ABNORMAL ECG -   Sinus bradycardia   Nonspecific repol abnormality, diffuse leads   When compared with ECG of 30-Apr-2023 21:37:55,   Significant repolarization change   Significant axis, voltage or hypertrophy change   Electronically Signed By: Kye Alcaraz ([facility]) 03-May-2023 17:12:15   Date and Time of Study: 2023-05-02 10:51:40      ECG 12 Lead Chest Pain   Final Result   HEART RATE= 57  bpm   RR Interval= 1048  ms   IN Interval= 142  ms   P Horizontal Axis= 16  deg   P Front Axis= 24  deg   QRSD Interval= 87  ms   QT Interval= 459  ms   QRS Axis= 26  deg   T Wave Axis= 198  deg   - OTHERWISE NORMAL ECG -   Slow sinus arrhythmia   When compared with ECG of 30-Apr-2023 21:37:55,   Significant axis, voltage or hypertrophy change   Electronically Signed By: Kye Alcaraz ([facility]) 03-May-2023 17:12:19   Date and Time of Study: 2023-05-01 05:38:43      ECG 12 Lead Chest Pain   Final Result   HEART RATE= 74  bpm   RR Interval= 816  ms   IN Interval= 140  ms   P Horizontal Axis= -20  deg   P Front Axis= 17  deg   QRSD Interval= 89  ms   QT Interval= 408  ms   QRS Axis= -2  deg   T Wave Axis= 71  deg   - ABNORMAL ECG -   Sinus rhythm   Probable left ventricular hypertrophy   ST depression, consider ischemia, lateral lds   When compared with ECG of 22-Oct-2020 11:28:25,   Significant rate increase   Significant axis, voltage or hypertrophy change   Electronically Signed By: Jomar Mejia ([facility]) 01-May-2023 09:11:21   Date and Time of Study: 2023-04-30 21:37:55      SCANNED - TELEMETRY     Final Result      SCANNED - TELEMETRY     Final Result            Echocardiogram:    Results for orders placed during the hospital encounter of 04/30/23    Adult Transthoracic Echo Complete W/ Cont if Necessary Per Protocol    Interpretation Summary  •  Estimated right ventricular systolic pressure from tricuspid regurgitation is normal (<35  mmHg).      Normal LV size and contractility EF of 60 to 65%  Normal RV size  Normal atrial size  Pulmonic valve is not well visualized.  Aortic valve is trileaflet with mild sclerosis.  Mitral valve, tricuspid valve appears structurally normal, moderate mitral regurgitation seen.  No pericardial effusion seen.  Proximal aorta appears normal in size.        Stress Test:         Cardiac Catheterization:  Results for orders placed during the hospital encounter of 23    Cardiac Catheterization/Vascular Study    Narrative  Table formatting from the original result was not included.  May 1, 2023      Heart Cath Report    NAME:              Andree Deluna  :                1955  AGE/SEX:        67 y.o. female  MRN:                [id number]        Procedures Performed    Left heart catheterization  Selective coronary angiography  Left ventriculography  Mynx    :   Kye Alcaraz MD    Vascular Access Site: Femoral    Indication for procedure: Chest pain at rest consistent with unstable angina, acute non-ST elevation MI, positive family history of premature CAD, hypertension, dyslipidemia, history of CVA      Procedure Note    After discussing the risks, benefits, and alternatives of the procedure, informed consent was obtained.  Timeout was done before the procedure.  Moderate conscious sedation was given utilizing IV Versed and fentanyl administered by RN with continuous EKG oximetry and hemodynamic monitoring supervised by me throughout the entire case, conscious sedation time was 30 minutes.  I was present with the patient for the duration of moderate sedation and supervised staff who had no other duties and monitored the patient for the entire procedure patient had Paz 2-3 sedation scale. the vascular access site was prepped and draped in the usual sterile fashion.  2% lidocaine was used for local anesthesia. Appropriate landmarks were assessed.  A 6 [place]ian short sheath was  inserted in the artery using the modified Seldinger technique.    Selective coronary angiography was performed with JL4 and JR4 diagnostic catheters. Left ventriculogram  was performed with an angled pigtail catheter.  JR4 was used to do LIMA injection..  All exchanges were performed over the wire.  No specimens were removed.  There were no apparent acute or early complications.  The patient tolerated the procedure well and was transferred to the recovery area in stable condition.      Closure device: Mynx device was deployed successfully after right iliofemoral angiogram was performed. Good hemostasis was achieved.    Complications:  None  Blood Loss: minimal    Hemodynamics    Pressures    Ao:    178/88 mmHg  LV:    180/28 mmHg  End-diastolic pressure:  28  mmHg  No significant aortic valvular gradient on pullback    Coronary Angiography    Left Main :  The left main   is calcified and without disease    Left Anterior Descending : Has heavily calcified 90% proximal narrowing apical segment has another 90% narrowing.    Left Circumflex : 30% proximal LAD and gives rise to a distal marginal which has 99% mid narrowing divides into 2 branches.  Distal circumflex is 99% occluded with faint bridging collaterals    Right Coronary Artery :  The right coronary artery   is 100% occluded in the ostium, with bridging collaterals from the left system visualizing distal vessel.    Dominance:  []  Left  [x]  Right  []  Co-Dominant    Lima %: Is patent.      Left Ventriculography:    Estimated Ejection Fraction: 50 %  Wall motion abnormalities: Lower limits of normal contractility  Mitral Regurgitation: 1-2+ MR    Impression:    Severe triple-vessel disease  Lower limits of normal LV contractility with elevated LVEDP  Mild to moderate MR  Patent LIMA    Recommendations:    Surgical consultation for CABG evaluation        I sincerely appreciate the opportunity to participate in your patient's care. Please feel free to contact me  anytime if I can be of assistance in this or any other way.      Pertinent History    Past Medical History:  Diagnosis Date  • ADHD unknown  • Anemia  • Anxiety  • Carotid artery disease  80% right internal carotid artery  • CVA (cerebral vascular accident)  right parietal right temporal  • DDD (degenerative disc disease), lumbar  • Depression  • Extremity pain  Ankle pain  • GERD (gastroesophageal reflux disease)  • Hyperlipidemia unknown  • Hypertension  • Insomnia unknown  • Laryngopharyngeal reflux  • Low back pain  • Mood disorder  • Obesity unknown  • Skin cancer of face  • Sleep apnea  Suspected sleep apnea she has refused to be tested  • Stroke (cerebrum)  • Visual field defect  Left    Past Surgical History:  Procedure Laterality Date  • CAROTID ENDARTERECTOMY Right 11/10/2020  Procedure: CAROTID ENDARTERECTOMY;  Surgeon: Tavo Das MD;  Location: [facility] MAIN OR;  Service: Vascular;  Laterality: Right;  • CHOLECYSTECTOMY  • COLONOSCOPY  2016  • FINGER SURGERY  • KNEE ARTHROPLASTY  • KNEE SURGERY Right  replaced  • LAPAROSCOPIC GASTRIC BANDING  • ROTATOR CUFF REPAIR Right 2019  • SHOULDER SURGERY Right 05/24/2019  scope/ cuff repair  • TUBAL ABDOMINAL LIGATION    Prior to Admission medications  Medication Sig Start Date End Date Taking? Authorizing Provider  ALPRAZolam (XANAX) 2 MG tablet TAKE 1 TABLET BY MOUTH AT NIGHT AS NEEDED FOR ANXIETY 4/10/23  Yes Naga Griffith PA-C  amLODIPine (NORVASC) 2.5 MG tablet TAKE ONE TABLET BY MOUTH DAILY 9/10/21  Yes Naga Griffith PA-C  aspirin 81 MG chewable tablet Chew 1 tablet Daily. 8/10/19  Yes Cong Harvey Jr., MD  atorvastatin (LIPITOR) 80 MG tablet TAKE ONE TABLET BY MOUTH ONCE NIGHTLY 12/14/22  Yes Naga Griffith PA-C  lamoTRIgine (LaMICtal) 100 MG tablet TAKE ONE TABLET BY MOUTH TWICE A DAY 1/4/23  Yes Naga Griffith PA-C  lisinopril-hydrochlorothiazide (PRINZIDE,ZESTORETIC) 20-25 MG per tablet TAKE ONE TABLET BY MOUTH DAILY  2/13/23  Yes Jelena Pennington MD  metoprolol succinate XL (TOPROL-XL) 25 MG 24 hr tablet TAKE ONE TABLET BY MOUTH DAILY 10/25/22  Yes Naga Griffith PA-C  omeprazole (priLOSEC) 40 MG capsule TAKE ONE CAPSULE BY MOUTH DAILY 4/20/23  Yes Naga Griffith PA-C  oxybutynin (DITROPAN) 5 MG tablet Take 1 tablet by mouth Daily. 11/28/22  Yes Naga Griffith PA-C  albuterol sulfate  (90 Base) MCG/ACT inhaler Inhale 2 puffs Every 6 (Six) Hours As Needed for Shortness of Air.  Patient not taking: Reported on 3/16/2023 10/19/22   Toribio Randle APRN  albuterol sulfate  (90 Base) MCG/ACT inhaler Inhale 2 puffs Every 4 (Four) Hours As Needed for Wheezing.  Patient not taking: Reported on 3/16/2023 1/19/23   Naga Griffith PA-C  buPROPion XL (WELLBUTRIN XL) 150 MG 24 hr tablet TAKE ONE TABLET BY MOUTH DAILY 7/27/22 5/1/23  Naga Griffith PA-C  promethazine-dextromethorphan (PROMETHAZINE-DM) 6.25-15 MG/5ML syrup Take 5 mL by mouth 4 (Four) Times a Day As Needed for Cough. 2/28/23 5/1/23  Naga Griffith PA-C  vilazodone (Viibryd) 20 MG tablet tablet Take 1 tablet by mouth Daily. 1/19/23 5/1/23  Naga Griffith PA-C      Pre-Procedure Notes  H&P Performed  [x]  Yes []  No       []  N/A    Indications:  []  ACS <= 24 HRS  []  ACS >24 HRS  []  New Onset Angina <= 2 mos  []  Worsening Angina  []  Resuscitated Cardiac Arrest  []  Angina on Exertion:  []  Suspected CAD  []  Valvular Disease  []  Pericardial Disease  []  Cardiac Arrythmia  []  Cardiomyopathy  []  LV Dysfunction  []  Syncope  []  Post Cardiac Transplant  []  Eval. For Exercise Clearance  []  Other  []  Pre-Operative Evaluation  If Pre-Op Eval:  Evaluation for Surgery Type:  []  Cardiac Surgery   []  Non-Cardiac Surgery  Functional Capacity:  []  <4 METS  []  >=4 METS w/o symptoms  []  >= 4 METS with symptoms  []  Unknown  Surgical Risk:  []  Low  []  Intermediate  []  High Risk: Vascular  []  High Risk  Non-Vascular    Risks, Benefits, & Complications Discussed:  [x]  Yes  []  No  []  N/A    Questions Answered:  [x]  Yes  []  No  []  N/A    Consent Obtained:  [x]  Yes  []  No  []  N/A    CHF: []  Yes  [x]  No  If Yes:  Newly Diagnosed?  []  Yes  []  No  If Yes:  HF Type:  []  Diastolic  []  Systolic  []  Unknown      Kye Alcaraz MD  5/1/2023  17:18 EDT  Electronically signed by Kye Alcaraz MD, 05/01/23, 5:18 PM EDT.         Other:         ASSESSMENT & PLAN:    Principal Problem:    Chest pain, unspecified type  Active Problems:    Mood disorder    Hyperlipidemia    Hypertension    Obesity    GERD with esophagitis    Overactive bladder    History of stroke    Chronic cough    Elevated troponin    Non-STEMI (non-ST elevated myocardial infarction)    Coronary artery disease involving native coronary artery of native heart with unstable angina pectoris    Abnormal findings on diagnostic imaging of heart and coronary circulation    Coronary artery disease  Status post CABG with LIMA to LAD, vein graft to OM and vein graft to PDA on 5/6/2023.  [place] and A-line removal today  Aspirin and high intensity statin will be started  Metoprolol will be added today  ?  Pacemaker wire removal will be attempted for tomorrow  ?  Wu catheter removal  Encouraged the use of incentive spirometry.    HFpEF  Preserved LV function with EF of 55%.  Septal asymmetric hypertrophy  We will provide Lasix as needed.  Currently euvolemic.  We will benefit from Jardiance.    Hypertension  Blood pressure is well controlled and she is off Cardene drip    Hyperlipidemia  Goal LDL less than 70  Continue high intensity statin    History of stroke/PAD  80% right carotid disease status post carotid endarterectomy  Continue aspirin and high intensity statin    Prediabetes  A1c 5.8  Insulin sliding scale during inpatient stay    Overall she is recuperating well.         Brad Santos MD  05/07/23  12:27 EDT

## 2023-05-07 NOTE — SIGNIFICANT NOTE
05/07/23 1025   OTHER   Discipline occupational therapist   Rehab Time/Intention   Session Not Performed other (see comments)  (Pt with Orient Lex catheter in place. Will follow up once removed, as able.)   Recommendation   OT - Next Appointment 05/08/23

## 2023-05-07 NOTE — PROGRESS NOTES
[facility]     Progress Note    Patient Name: Andree Deluna  : 1955  MRN: [id number]  Primary Care Physician:  Naga Griffith PA-C  Date of admission: 2023  Service date and time: 23 15:07 EDT  Subjective   Subjective     Chief Complaint: Chest pain    HPI:  Patient Reports patient is complaining of bilateral shoulder pain    Objective   Objective     Vitals:   Temp:  [97 °F (36.1 °C)-98.6 °F (37 °C)] 97.9 °F (36.6 °C)  Heart Rate:  [80-94] 89  Resp:  [12-18] 18  BP: ()/() 133/89  Flow (L/min):  [4] 4  FiO2 (%):  [40 %] 40 %  Physical Exam    Constitutional: Awake, alert   Eyes: PERRLA, sclerae anicteric, no conjunctival injection   HENT: NCAT, mucous membranes moist   Neck: Supple, no thyromegaly, no lymphadenopathy, trachea midline   Respiratory: Clear to auscultation bilaterally, nonlabored respirations    Chest tubes are in place.   Cardiovascular: RRR, no murmurs, rubs, or gallops, palpable pedal pulses bilaterally   Gastrointestinal: Positive bowel sounds, soft, nontender, nondistended   Musculoskeletal: No bilateral ankle edema, no clubbing or cyanosis to extremities   Psychiatric: Appropriate affect, cooperative   Neurologic: Oriented x 3, strength symmetric in all extremities, Cranial Nerves grossly intact to confrontation, speech clear   Skin: No rashes     Result Review    Result Review:  I have personally reviewed the results from the time of this admission to 2023 15:07 EDT and agree with these findings:  [x]  Laboratory list / accordion  [x]  Microbiology  []  Radiology  []  EKG/Telemetry   []  Cardiology/Vascular   []  Pathology  []  Old records  []  Other:        Assessment & Plan   Assessment / Plan       Active Hospital Problems:  Active Hospital Problems    Diagnosis    • **Chest pain, unspecified type    • Elevated troponin    • Non-STEMI (non-ST elevated myocardial infarction)    • Coronary artery disease involving native coronary artery of native  heart with unstable angina pectoris    • Abnormal findings on diagnostic imaging of heart and coronary circulation    • Chronic cough    • History of stroke    • Overactive bladder    • GERD with esophagitis    • Mood disorder    • Hyperlipidemia    • Obesity    • Hypertension      Plan:     MV CAD with NSTEMI presentation, EF 50% (cath)--surgical workup in progress  - Moderate mitral regurgitation--per echo   - HTN--stable  - HLD--statin  - Hyperglycemia--a1c 5.8  - Hx stroke ()--s/p right CEA (Dr. Das, )  - Strong family hx premature CAD--father  of MI age 54/ mother  from MI age 64  - Anxiety--on home Xanax, she is no longer on Viibyrd  - Obesity, stage 1--BMI   -Bilateral shoulder pain.     S/P   1. CABG x 3 (LIMA to LAD, SVG to OM, SVG to PDA)  2. EVH of the right legs  3. Mitral valve repair with Physio II ring    Encourage use of incentive spirometer, PT and OT evaluation, add Lidoderm patch.    DVT prophylaxis:  Medical and mechanical DVT prophylaxis orders are present.    CODE STATUS:   Code Status (Patient has no pulse and is not breathing): CPR (Attempt to Resuscitate)  Medical Interventions (Patient has pulse or is breathing): Full    Disposition:  I expect patient to be discharged 5 days .    Jimmie Muñiz MD

## 2023-05-07 NOTE — NURSING NOTE
Called Dr. Marroquin, oncall CV surgery, informed him of patient's Cardiac Index of 1.6. Dr. Marroquin ordered 500 mL of Albumin over 2 hours. There was one albumin of the four that are prn ordered for open heart patients. Infused that albumin and ordered one more to complete the 500 mL order.

## 2023-05-07 NOTE — PLAN OF CARE
Goal Outcome Evaluation:  Plan of Care Reviewed With: patient, spouse, son           Outcome Evaluation: Pt is a 68 yo female admitted 4/30/23 with c/o chest pain, found with NSTEMI, now s/p CABG 5/6/23 with Dr. Noonan. Pt eval'ed to day with Chest tube x2, kirby, HM, 4L O2, HM, external pacer.  At baseline, pt resides with spouse and is indepenent. States some difficulty wiht dressing due to RTC injury, but able to complete with increased time. Patient oriented x4 but states no familiarity with sternal precautions. Requires max verbal cuing for ocmpliance with sternal precautions. Sit<>stand with Min Ax2 for line management. Transfer bed<>chair with min a x2 utilizing RW. Pt requires Max A for sit<>supine. Reviewed Cardiac Rehab HEP, but patient demos poor carryover and will reqiure additoinal education to ensure learning. She is Max A for UB and LB ADLs, but anticipate progressing until home with spouse.

## 2023-05-07 NOTE — PLAN OF CARE
Goal Outcome Evaluation:  Plan of Care Reviewed With: patient, family           Outcome Evaluation: Pt is a 68 y/o female who presented with chest pain. She underwent cardiac cath that showed MV CAD. She is now s/p CABG x3. At baseline, pt lives with her spouse in a single level home with 3 NNAMDI and L HR. She is typically independent without AD. Currently, pt appears to be functioning below baseline with c/o dizziness and surgical site pain. VSS throughout. She demonstrates good strength and balance. She is transferring and ambulating short distances with CGA and use of RW. She is requiring max Ax2 for sit to supine transition. Pt educated on sternal precautions and required verbal cueing throughout session for compliance. Anticipate that pt will make good progress toward therapy goals during current hospitalization and will likely be able to d/c home with home health PT at d/c. Will continue to follow and progress as tolerated.

## 2023-05-07 NOTE — THERAPY EVALUATION
Patient Name: Andree Deluna  : 1955    MRN: [id number]                              Today's Date: 2023       Admit Date: 2023    Visit Dx:     ICD-10-CM ICD-9-CM   1. Non-STEMI (non-ST elevated myocardial infarction)  I21.4 410.70   2. Chest pain, unspecified type  R07.9 786.50   3. Elevated troponin  R77.8 790.6   4. History of stroke  Z86.73 V12.54   5. Class 1 obesity due to excess calories with serious comorbidity and body mass index (BMI) of 32.0 to 32.9 in adult  E66.09 278.00    Z68.32 V85.32   6. Primary hypertension  I10 401.9   7. Dyslipidemia  E78.5 272.4   8. Coronary artery disease involving native coronary artery of native heart with unstable angina pectoris  I25.110 414.01     411.1   9. Abnormal findings on diagnostic imaging of heart and coronary circulation  R93.1 794.39     Patient Active Problem List   Diagnosis   • Anemia in other chronic diseases classified elsewhere   • Anxiety   • B12 deficiency   • Attention deficit disorder of adult with hyperactivity   • Mood disorder   • Complete rotator cuff tear or rupture of right shoulder, not specified as traumatic   • Degeneration of intervertebral disc of cervical region   • Degeneration of intervertebral disc of lumbosacral region   • Fatigue   • Hyperlipidemia   • Hypertension   • Insomnia   • Obesity   • Osteoarthritis of knee   • CVA (cerebral vascular accident)   • Visual field loss left   • Acute midline low back pain without sciatica   • GERD with esophagitis   • Carotid stenosis, bilateral   • Overactive bladder   • History of stroke   • Acute pain of right shoulder   • Left cervical radiculopathy   • Migraine headache   • Medicare annual wellness visit, subsequent   • Moderate episode of recurrent major depressive disorder   • Piriformis syndrome, right   • Precordial pain   • History of Grimaldo's esophagus   • Chronic cough   • PATEL (generalized anxiety disorder)   • Chest pain, unspecified type   • Elevated troponin    • Non-STEMI (non-ST elevated myocardial infarction)   • Coronary artery disease involving native coronary artery of native heart with unstable angina pectoris   • Abnormal findings on diagnostic imaging of heart and coronary circulation     Past Medical History:   Diagnosis Date   • ADHD unknown   • Anemia    • Anxiety    • Carotid artery disease     80% right internal carotid artery   • CVA (cerebral vascular accident)     right parietal right temporal   • DDD (degenerative disc disease), lumbar    • Depression    • Extremity pain     Ankle pain   • GERD (gastroesophageal reflux disease)    • Hyperlipidemia unknown   • Hypertension    • Insomnia unknown   • Laryngopharyngeal reflux    • Low back pain    • Mood disorder    • Obesity unknown   • Skin cancer of face    • Sleep apnea     Suspected sleep apnea she has refused to be tested   • Stroke (cerebrum)    • Visual field defect     Left     Past Surgical History:   Procedure Laterality Date   • CARDIAC CATHETERIZATION N/A 5/1/2023    Procedure: Left Heart Cath;  Surgeon: Kye Alcaraz MD;  Location: [facility] CATH INVASIVE LOCATION;  Service: Cardiovascular;  Laterality: N/A;   • CAROTID ENDARTERECTOMY Right 11/10/2020    Procedure: CAROTID ENDARTERECTOMY;  Surgeon: Tavo Das MD;  Location: [facility] MAIN OR;  Service: Vascular;  Laterality: Right;   • CHOLECYSTECTOMY     • COLONOSCOPY      2016   • FINGER SURGERY     • KNEE ARTHROPLASTY     • KNEE SURGERY Right     replaced   • LAPAROSCOPIC GASTRIC BANDING     • ROTATOR CUFF REPAIR Right 2019   • SHOULDER SURGERY Right 05/24/2019    scope/ cuff repair   • TUBAL ABDOMINAL LIGATION        General Information     Row Name 05/07/23 1649          Physical Therapy Time and Intention    Document Type evaluation  -NS     Mode of Treatment individual therapy;physical therapy  -NS     Row Name 05/07/23 1649          General Information    Patient Profile Reviewed yes  -NS     Prior Level of Function  independent:  -NS     Existing Precautions/Restrictions fall;sternal;oxygen therapy device and L/min  -NS     Barriers to Rehab medically complex  -NS     Row Name 05/07/23 1649          Living Environment    People in Home spouse  -NS     Row Name 05/07/23 1649          Home Main Entrance    Number of Stairs, Main Entrance three  -NS     Stair Railings, Main Entrance railing on left side (ascending)  -NS     Row Name 05/07/23 1649          Stairs Within Home, Primary    Number of Stairs, Within Home, Primary none  -NS     Row Name 05/07/23 1649          Cognition    Orientation Status (Cognition) oriented x 4  -NS     Row Name 05/07/23 1649          Safety Issues, Functional Mobility    Safety Issues Affecting Function (Mobility) impulsivity;insight into deficits/self-awareness;safety precaution awareness;safety precautions follow-through/compliance  -NS     Impairments Affecting Function (Mobility) endurance/activity tolerance;pain;range of motion (ROM);strength  -NS           User Key  (r) = Recorded By, (t) = Taken By, (c) = Cosigned By    Initials Name Provider Type    Giuliana Rojas, PT Physical Therapist               Mobility     Row Name 05/07/23 1650          Bed Mobility    Bed Mobility sit-supine  -NS     Sit-Supine Garrett (Bed Mobility) maximum assist (25% patient effort);2 person assist  -NS     [facility] Name 05/07/23 1650          Sit-Stand Transfer    Sit-Stand Garrett (Transfers) contact guard;verbal cues  -NS     Assistive Device (Sit-Stand Transfers) walker, front-wheeled  -NS     [facility] Name 05/07/23 1650          Gait/Stairs (Locomotion)    Garrett Level (Gait) contact guard;verbal cues  -NS     Assistive Device (Gait) walker, front-wheeled  -NS     Distance in Feet (Gait) 5  -NS     Deviations/Abnormal Patterns (Gait) ney decreased;gait speed decreased  -NS           User Key  (r) = Recorded By, (t) = Taken By, (c) = Cosigned By    Initials Name Provider Type    MADIE Day  Giuliana, PT Physical Therapist               Obj/Interventions     Row Name 05/07/23 1651          Range of Motion Comprehensive    Comment, General Range of Motion BLE WFL  -NS     Row Name 05/07/23 1651          Strength Comprehensive (MMT)    Comment, General Manual Muscle Testing (MMT) Assessment BLE WFL  -NS     Row Name 05/07/23 1651          Balance    Balance Assessment sitting static balance;sitting dynamic balance;sit to stand dynamic balance;standing static balance;standing dynamic balance  -NS     Static Sitting Balance supervision  -NS     Dynamic Sitting Balance contact guard  -NS     Position, Sitting Balance unsupported;sitting in chair  -NS     Sit to Stand Dynamic Balance contact guard  -NS     Static Standing Balance contact guard  -NS     Dynamic Standing Balance contact guard  -NS     Position/Device Used, Standing Balance supported;walker, rolling  -NS           User Key  (r) = Recorded By, (t) = Taken By, (c) = Cosigned By    Initials Name Provider Type    NS Giuliana Day, PT Physical Therapist               Goals/Plan     Row Name 05/07/23 1658          Bed Mobility Goal 1 (PT)    Activity/Assistive Device (Bed Mobility Goal 1, PT) bed mobility activities, all  -NS     Willard Level/Cues Needed (Bed Mobility Goal 1, PT) modified independence  -NS     Time Frame (Bed Mobility Goal 1, PT) long term goal (LTG);2 weeks  -NS     Row Name 05/07/23 1658          Transfer Goal 1 (PT)    Activity/Assistive Device (Transfer Goal 1, PT) transfers, all  -NS     Willard Level/Cues Needed (Transfer Goal 1, PT) independent  -NS     Time Frame (Transfer Goal 1, PT) long term goal (LTG);2 weeks  -NS     Row Name 05/07/23 1658          Gait Training Goal 1 (PT)    Activity/Assistive Device (Gait Training Goal 1, PT) gait (walking locomotion)  -NS     Willard Level (Gait Training Goal 1, PT) independent  -NS     Distance (Gait Training Goal 1, PT) 50  -NS     Time Frame (Gait Training Goal  1, PT) long term goal (LTG);2 weeks  -NS     Row Name 05/07/23 1654          Stairs Goal 1 (PT)    Activity/Assistive Device (Stairs Goal 1, PT) stairs, all skills  -NS     Candler Level/Cues Needed (Stairs Goal 1, PT) independent  -NS     Number of Stairs (Stairs Goal 1, PT) 2  -NS     Time Frame (Stairs Goal 1, PT) long term goal (LTG);2 weeks  -NS     Row Name 05/07/23 1656          Therapy Assessment/Plan (PT)    Planned Therapy Interventions (PT) balance training;bed mobility training;gait training;home exercise program;patient/family education;stair training;strengthening;transfer training  -NS           User Key  (r) = Recorded By, (t) = Taken By, (c) = Cosigned By    Initials Name Provider Type    Giuliana Rojas, PT Physical Therapist               Clinical Impression     Row Name 05/07/23 0968          Pain    Pretreatment Pain Rating 8/10  -NS     Posttreatment Pain Rating 9/10  -NS     Pre/Posttreatment Pain Comment L chest, sternum  -NS     Pain Intervention(s) Repositioned;Ambulation/increased activity  -NS     Row Name 05/07/23 1658          Plan of Care Review    Plan of Care Reviewed With patient;family  -NS     Outcome Evaluation Pt is a 66 y/o female who presented with chest pain. She underwent cardiac cath that showed MV CAD. She is now s/p CABG x3. At baseline, pt lives with her spouse in a single level home with 3 NNAMDI and L HR. She is typically independent without AD. Currently, pt appears to be functioning below baseline with c/o dizziness and surgical site pain. VSS throughout. She demonstrates good strength and balance. She is transferring and ambulating short distances with CGA and use of RW. She is requiring max Ax2 for sit to supine transition. Pt educated on sternal precautions and required verbal cueing throughout session for compliance. Anticipate that pt will make good progress toward therapy goals during current hospitalization and will likely be able to d/c home with home  health PT at d/c. Will continue to follow and progress as tolerated.  -NS     Row Name 05/07/23 1652          Therapy Assessment/Plan (PT)    Criteria for Skilled Interventions Met (PT) yes;meets criteria;skilled treatment is necessary  -NS     Therapy Frequency (PT) 5 times/wk  -NS     Predicted Duration of Therapy Intervention (PT) until d/c  -NS     Row Name 05/07/23 1652          Vital Signs    Pre Systolic BP Rehab 147  -NS     Pre Treatment Diastolic BP 91  -NS     Intra Systolic BP Rehab 156  -NS     Intra Treatment Diastolic   -NS     Pre SpO2 (%) 95  -NS     O2 Delivery Pre Treatment supplemental O2  -NS     Intra SpO2 (%) 97  -NS     O2 Delivery Intra Treatment supplemental O2  -NS     Post SpO2 (%) 96  -NS     O2 Delivery Post Treatment supplemental O2  -NS     Pre Patient Position Sitting  -NS     Intra Patient Position Standing  -NS     Post Patient Position Supine  -NS     Row Name 05/07/23 1652          Positioning and Restraints    Pre-Treatment Position sitting in chair/recliner  -NS     Post Treatment Position bed  -NS     In Bed notified nsg;supine;with family/caregiver;call light within reach;encouraged to call for assist;side rails up x3  -NS           User Key  (r) = Recorded By, (t) = Taken By, (c) = Cosigned By    Initials Name Provider Type    Giuliana Rojas, PT Physical Therapist               Outcome Measures     Row Name 05/07/23 1659          How much help from another person do you currently need...    Turning from your back to your side while in flat bed without using bedrails? 2  -NS     Moving from lying on back to sitting on the side of a flat bed without bedrails? 2  -NS     Moving to and from a bed to a chair (including a wheelchair)? 3  -NS     Standing up from a chair using your arms (e.g., wheelchair, bedside chair)? 3  -NS     Climbing 3-5 steps with a railing? 3  -NS     To walk in hospital room? 3  -NS     AM-PAC 6 Clicks Score (PT) 16  -NS     Highest level of  mobility 5 --> Static standing  -NS     Row Name 05/07/23 1659 05/07/23 1431       Functional Assessment    Outcome Measure Options AM-PAC 6 Clicks Basic Mobility (PT)  -NS AM-PAC 6 Clicks Daily Activity (OT)  -ES          User Key  (r) = Recorded By, (t) = Taken By, (c) = Cosigned By    Initials Name Provider Type    ES Betsy Watkins OT Occupational Therapist    Giuliana Rojas PT Physical Therapist                             Physical Therapy Education     Title: PT OT SLP Therapies (Done)     Topic: Physical Therapy (Done)     Point: Mobility training (Done)     Learning Progress Summary           Patient Acceptance, E, VU by NS at 5/7/2023 1700   Family Acceptance, E, VU by NS at 5/7/2023 1700                   Point: Home exercise program (Done)     Learning Progress Summary           Patient Acceptance, E, VU by NS at 5/7/2023 1700   Family Acceptance, E, VU by NS at 5/7/2023 1700                   Point: Body mechanics (Done)     Learning Progress Summary           Patient Acceptance, E, VU by NS at 5/7/2023 1700   Family Acceptance, E, VU by NS at 5/7/2023 1700                   Point: Precautions (Done)     Learning Progress Summary           Patient Acceptance, E, VU by NS at 5/7/2023 1700   Family Acceptance, E, VU by NS at 5/7/2023 1700                               User Key     Initials Effective Dates Name Provider Type Discipline    NS 06/30/22 -  Giuliana Day, NAVA Physical Therapist PT              PT Recommendation and Plan  Planned Therapy Interventions (PT): balance training, bed mobility training, gait training, home exercise program, patient/family education, stair training, strengthening, transfer training  Plan of Care Reviewed With: patient, family  Outcome Evaluation: Pt is a 68 y/o female who presented with chest pain. She underwent cardiac cath that showed MV CAD. She is now s/p CABG x3. At baseline, pt lives with her spouse in a single level home with 3 NNAMDI and L HR. She  is typically independent without AD. Currently, pt appears to be functioning below baseline with c/o dizziness and surgical site pain. VSS throughout. She demonstrates good strength and balance. She is transferring and ambulating short distances with CGA and use of RW. She is requiring max Ax2 for sit to supine transition. Pt educated on sternal precautions and required verbal cueing throughout session for compliance. Anticipate that pt will make good progress toward therapy goals during current hospitalization and will likely be able to d/c home with home health PT at d/c. Will continue to follow and progress as tolerated.     Time Calculation:    PT Charges     Row Name 05/07/23 1700             Time Calculation    Start Time 1315  -NS      Stop Time 1337  -NS      Time Calculation (min) 22 min  -NS      PT Received On 05/07/23  -NS      PT - Next Appointment 05/08/23  -NS      PT Goal Re-Cert Due Date 05/21/23  -NS         Time Calculation- PT    Total Timed Code Minutes- PT 0 minute(s)  -NS            User Key  (r) = Recorded By, (t) = Taken By, (c) = Cosigned By    Initials Name Provider Type    Giuliana Rojas PT Physical Therapist              Therapy Charges for Today     Code Description Service Date Service Provider Modifiers Qty    21980255703 HC PT EVAL HIGH COMPLEXITY 3 5/7/2023 Giuliana Day, PT GP 1          PT G-Codes  Outcome Measure Options: AM-PAC 6 Clicks Basic Mobility (PT)  AM-PAC 6 Clicks Score (PT): 16  AM-PAC 6 Clicks Score (OT): 12  PT Discharge Summary  Anticipated Discharge Disposition (PT): home with assist, home with home health    Giuliana Day PT  5/7/2023

## 2023-05-08 ENCOUNTER — APPOINTMENT (OUTPATIENT)
Dept: GENERAL RADIOLOGY | Facility: HOSPITAL | Age: 68
DRG: 217 | End: 2023-05-08
Payer: MEDICARE

## 2023-05-08 LAB
ANION GAP SERPL CALCULATED.3IONS-SCNC: 11 MMOL/L (ref 5–15)
BUN SERPL-MCNC: 10 MG/DL (ref 8–23)
BUN/CREAT SERPL: 13.3 (ref 7–25)
CALCIUM SPEC-SCNC: 8.5 MG/DL (ref 8.6–10.5)
CHLORIDE SERPL-SCNC: 108 MMOL/L (ref 98–107)
CO2 SERPL-SCNC: 27 MMOL/L (ref 22–29)
CREAT SERPL-MCNC: 0.75 MG/DL (ref 0.57–1)
DEPRECATED RDW RBC AUTO: 45.9 FL (ref 37–54)
EGFRCR SERPLBLD CKD-EPI 2021: 88.5 ML/MIN/1.73
ERYTHROCYTE [DISTWIDTH] IN BLOOD BY AUTOMATED COUNT: 14.4 % (ref 12.3–15.4)
GLUCOSE BLDC GLUCOMTR-MCNC: 113 MG/DL (ref 70–105)
GLUCOSE BLDC GLUCOMTR-MCNC: 132 MG/DL (ref 70–105)
GLUCOSE BLDC GLUCOMTR-MCNC: 96 MG/DL (ref 70–105)
GLUCOSE SERPL-MCNC: 105 MG/DL (ref 65–99)
HCT VFR BLD AUTO: 30.8 % (ref 34–46.6)
HGB BLD-MCNC: 9.8 G/DL (ref 12–15.9)
MCH RBC QN AUTO: 29.4 PG (ref 26.6–33)
MCHC RBC AUTO-ENTMCNC: 31.9 G/DL (ref 31.5–35.7)
MCV RBC AUTO: 92.3 FL (ref 79–97)
PLATELET # BLD AUTO: 141 10*3/MM3 (ref 140–450)
PMV BLD AUTO: 9.3 FL (ref 6–12)
POTASSIUM SERPL-SCNC: 3.3 MMOL/L (ref 3.5–5.2)
QT INTERVAL: 337 MS
RBC # BLD AUTO: 3.33 10*6/MM3 (ref 3.77–5.28)
SODIUM SERPL-SCNC: 146 MMOL/L (ref 136–145)
WBC NRBC COR # BLD: 11.9 10*3/MM3 (ref 3.4–10.8)

## 2023-05-08 PROCEDURE — 0 POTASSIUM CHLORIDE 10 MEQ/100ML SOLUTION

## 2023-05-08 PROCEDURE — 25010000002 ONDANSETRON PER 1 MG

## 2023-05-08 PROCEDURE — 25010000002 ENOXAPARIN PER 10 MG

## 2023-05-08 PROCEDURE — 80048 BASIC METABOLIC PNL TOTAL CA: CPT

## 2023-05-08 PROCEDURE — 82948 REAGENT STRIP/BLOOD GLUCOSE: CPT

## 2023-05-08 PROCEDURE — 71045 X-RAY EXAM CHEST 1 VIEW: CPT

## 2023-05-08 PROCEDURE — 93005 ELECTROCARDIOGRAM TRACING: CPT

## 2023-05-08 PROCEDURE — 85027 COMPLETE CBC AUTOMATED: CPT

## 2023-05-08 PROCEDURE — 25010000002 CEFAZOLIN PER 500 MG

## 2023-05-08 PROCEDURE — 99232 SBSQ HOSP IP/OBS MODERATE 35: CPT | Performed by: INTERNAL MEDICINE

## 2023-05-08 PROCEDURE — 25010000002 FUROSEMIDE PER 20 MG: Performed by: NURSE PRACTITIONER

## 2023-05-08 RX ORDER — FUROSEMIDE 10 MG/ML
20 INJECTION INTRAMUSCULAR; INTRAVENOUS ONCE
Status: COMPLETED | OUTPATIENT
Start: 2023-05-08 | End: 2023-05-08

## 2023-05-08 RX ORDER — LAMOTRIGINE 100 MG/1
TABLET ORAL
Qty: 60 TABLET | Refills: 3 | OUTPATIENT
Start: 2023-05-08 | End: 2023-05-13 | Stop reason: HOSPADM

## 2023-05-08 RX ORDER — PANTOPRAZOLE SODIUM 40 MG/1
40 TABLET, DELAYED RELEASE ORAL
Status: DISCONTINUED | OUTPATIENT
Start: 2023-05-09 | End: 2023-05-13 | Stop reason: HOSPADM

## 2023-05-08 RX ORDER — POLYETHYLENE GLYCOL 3350 17 G/17G
17 POWDER, FOR SOLUTION ORAL 2 TIMES DAILY
Status: DISCONTINUED | OUTPATIENT
Start: 2023-05-08 | End: 2023-05-13 | Stop reason: HOSPADM

## 2023-05-08 RX ORDER — INSULIN LISPRO 100 [IU]/ML
2-7 INJECTION, SOLUTION INTRAVENOUS; SUBCUTANEOUS
Status: DISCONTINUED | OUTPATIENT
Start: 2023-05-08 | End: 2023-05-13 | Stop reason: HOSPADM

## 2023-05-08 RX ADMIN — MUPIROCIN 1 APPLICATION: 20 OINTMENT TOPICAL at 21:50

## 2023-05-08 RX ADMIN — ATORVASTATIN CALCIUM 40 MG: 40 TABLET, FILM COATED ORAL at 21:50

## 2023-05-08 RX ADMIN — CHLORHEXIDINE GLUCONATE 15 ML: 1.2 SOLUTION ORAL at 21:50

## 2023-05-08 RX ADMIN — ASPIRIN 81 MG: 81 TABLET, COATED ORAL at 09:48

## 2023-05-08 RX ADMIN — ALPRAZOLAM 2 MG: 1 TABLET ORAL at 21:50

## 2023-05-08 RX ADMIN — Medication 12.5 MG: at 21:50

## 2023-05-08 RX ADMIN — CEFAZOLIN 2 G: 2 INJECTION, POWDER, FOR SOLUTION INTRAMUSCULAR; INTRAVENOUS at 02:53

## 2023-05-08 RX ADMIN — MUPIROCIN 1 APPLICATION: 20 OINTMENT TOPICAL at 08:24

## 2023-05-08 RX ADMIN — Medication 12.5 MG: at 09:48

## 2023-05-08 RX ADMIN — POTASSIUM CHLORIDE 10 MEQ: 7.46 INJECTION, SOLUTION INTRAVENOUS at 07:42

## 2023-05-08 RX ADMIN — CHLORHEXIDINE GLUCONATE 15 ML: 1.2 SOLUTION ORAL at 09:48

## 2023-05-08 RX ADMIN — ENOXAPARIN SODIUM 40 MG: 100 INJECTION SUBCUTANEOUS at 17:43

## 2023-05-08 RX ADMIN — POLYETHYLENE GLYCOL 3350 17 G: 17 POWDER, FOR SOLUTION ORAL at 09:53

## 2023-05-08 RX ADMIN — FUROSEMIDE 20 MG: 10 INJECTION, SOLUTION INTRAMUSCULAR; INTRAVENOUS at 09:53

## 2023-05-08 RX ADMIN — POTASSIUM CHLORIDE 40 MEQ: 1500 TABLET, EXTENDED RELEASE ORAL at 09:49

## 2023-05-08 RX ADMIN — HYDROCODONE BITARTRATE AND ACETAMINOPHEN 1 TABLET: 5; 325 TABLET ORAL at 10:12

## 2023-05-08 RX ADMIN — ALPRAZOLAM 2 MG: 1 TABLET ORAL at 00:50

## 2023-05-08 RX ADMIN — ONDANSETRON 4 MG: 2 INJECTION INTRAMUSCULAR; INTRAVENOUS at 09:53

## 2023-05-08 NOTE — NURSING NOTE
Throughout the night patient has refused majority of care by myself and other staff members in the [facility]. Patient is alert and oriented. Starting at 2030 began refusing blood sugar checks, blood pressure readings, taking her oxygen off and getting CVP readings.   Some nursing tasks were able to be completed such as 12 lead EKG and Labs . Will continue to monitor.

## 2023-05-08 NOTE — CASE MANAGEMENT/SOCIAL WORK
Continued Stay Note  REECE Kam     Patient Name: Andree Deluna  MRN: [id number]  Today's Date: 5/8/2023    Admit Date: 4/30/2023    Plan: DC Plan:Home with home health services pending patient choices. List at bedside. CABG 5/6/23.   Discharge Plan     Row Name 05/08/23 1822       Plan    Plan DC Plan:Home with home health services pending patient choices. List at bedside. CABG 5/6/23.    Provided Post Acute Provider List? N/A    Provided Post Acute Provider Quality & Resource List? N/A    Plan Comments CM reached out to patient via telephone to discuss discharge planning. Patient is not sure of choices at this time and requests for CM to follow up on 5/9/23. DC Barriers: POD 2 OHS, O2@2L nc, central line, pacer wires, chest tubes x3, and cardiac monitoring.           Expected Discharge Date and Time     Expected Discharge Date Expected Discharge Time    May 12, 2023             Hattie Alicea RN     Office Phone: [phone]  Office Cell:     [phone]

## 2023-05-08 NOTE — PROGRESS NOTES
S/P POD# 2 CABG x3 with LIMA/ MV repair--[place]  EF 50% (cath)    Subjective:  Still refusing some care, reports pain    Confusion overnight, sitter at bedside  CVP 7-8  Wt is up 6 kgs from preop  CXR:  vasc congestion      Intake/Output Summary (Last 24 hours) at 5/8/2023 0939  Last data filed at 5/8/2023 0554  Gross per 24 hour   Intake 2780 ml   Output 2915 ml   Net -135 ml     Temp:  [97.8 °F (36.6 °C)-99.4 °F (37.4 °C)] 98.8 °F (37.1 °C)  Heart Rate:  [79-94] 79  Resp:  [18-22] 18  BP: (116-169)/() 135/84  LPCT 10/8  MCT 80/8    Results from last 7 days   Lab Units 05/08/23  0311 05/07/23  0634 05/07/23  0251 05/06/23  1231 05/06/23  1157 05/06/23  0735 05/06/23  0451   WBC 10*3/mm3 11.90* 11.50* 9.50   < > 5.40  --  8.10   HEMOGLOBIN g/dL 9.8* 10.2* 9.8*   < > 9.6*  --  11.9*   HEMOGLOBIN, POC   --   --   --    < >  --    < >  --    HEMATOCRIT % 30.8* 31.2* 30.4*   < > 29.3*  --  36.5   HEMATOCRIT POC   --   --   --    < >  --    < >  --    PLATELETS 10*3/mm3 141 141 132*   < > 117*  --  247   INR   --   --  1.12*  --  1.17*  --  0.96    < > = values in this interval not displayed.     Results from last 7 days   Lab Units 05/08/23  0311 05/07/23  1302 05/07/23  0251 05/06/23  1909   CREATININE mg/dL 0.75  --    < > 0.83   POTASSIUM mmol/L 3.3*  --    < > 3.3*   SODIUM mmol/L 146*  --    < > 145   MAGNESIUM mg/dL  --   --   --  2.8*   PHOSPHORUS mg/dL  --  4.0  --  1.6*    < > = values in this interval not displayed.         Physical Exam:  Neuro intact, nad, up in chair, sister at chair side, sitter in room  Tele:  SR 80s  Diminished bases, 99% 2L  dsg c/d/i, no drainage  Benign abd, no BM  No edema    Assessment/Plan:  Principal Problem:    Chest pain, unspecified type  Active Problems:    Mood disorder    Hyperlipidemia    Hypertension    Obesity    GERD with esophagitis    Overactive bladder    History of stroke    Chronic cough    Elevated troponin    Non-STEMI (non-ST elevated myocardial  infarction)    Coronary artery disease involving native coronary artery of native heart with unstable angina pectoris    Abnormal findings on diagnostic imaging of heart and coronary circulation    - MV CAD with NSTEMI presentation, severe mitral regurgitation, EF 50% (cath)--s/p CABG x3/ MV repair ([facility])  - HTN--stable  - HLD--statin  - Hyperglycemia--a1c 5.8, SSI  - Hx stroke ()--s/p right CEA (Dr. Das, )  - Strong family hx premature CAD--father  of MI age 54/ mother  from MI age 64  - Anxiety--on home Xanax, she is no longer on Viibyrd  - Obesity, stage 1--BMI 33.2  - Postop confusion--slightly improved  - Postop leukocytosis likely r/t atel--pulm toileting  - Postop ABLA, expected--watch closely    POD# 2.  Doing well except for confusion.  DC central line, kirby, chest tubes.  Gentle diuresis.  D/w family at length at bedside.  Mobilize.  On asa/statin, low dose bb.    Routine care--as above  De-escal  D/w pt/nsg/family, .Dr. Noonan  Anticipate home at discharge    Tina Yen-Pollo, APRN  2023  09:39 EDT

## 2023-05-08 NOTE — PROGRESS NOTES
[facility]     Progress Note    Patient Name: Andree Deluna  : 1955  MRN: [id number]  Primary Care Physician:  Naga Griffith PA-C  Date of admission: 2023  Service date and time: 23 11:28 EDT  Subjective   Subjective     Chief Complaint: Chest pain     HPI:  Patient Reports felling tired this morning       Objective   Objective     Vitals:   Temp:  [97.8 °F (36.6 °C)-99.4 °F (37.4 °C)] 98.8 °F (37.1 °C)  Heart Rate:  [79-94] 85  Resp:  [18-22] 18  BP: (116-169)/() 156/92  Flow (L/min):  [1-4] 2  Physical Exam    Constitutional: Awake, alert   Eyes: PERRLA, sclerae anicteric, no conjunctival injection   HENT: NCAT, mucous membranes moist   Neck: Supple, no thyromegaly, no lymphadenopathy, trachea midline   Respiratory: Clear to auscultation bilaterally, nonlabored respirations    Cardiovascular: RRR, no murmurs, rubs, or gallops, palpable pedal pulses bilaterally   Gastrointestinal: Positive bowel sounds, soft, nontender, nondistended   Musculoskeletal: No bilateral ankle edema, no clubbing or cyanosis to extremities   Psychiatric: Appropriate affect, cooperative   Neurologic: Oriented x 3, strength symmetric in all extremities, Cranial Nerves grossly intact to confrontation, speech clear   Skin: No rashes     Result Review    Result Review:  I have personally reviewed the results from the time of this admission to 2023 11:28 EDT and agree with these findings:  [x]  Laboratory list / accordion  []  Microbiology  []  Radiology  []  EKG/Telemetry   []  Cardiology/Vascular   []  Pathology  []  Old records  []  Other:  Most notable findings include: WBC of 11.9      Assessment & Plan   Assessment / Plan       Active Hospital Problems:  Active Hospital Problems    Diagnosis    • **Chest pain, unspecified type    • Elevated troponin    • Non-STEMI (non-ST elevated myocardial infarction)    • Coronary artery disease involving native coronary artery of native heart with unstable  angina pectoris    • Abnormal findings on diagnostic imaging of heart and coronary circulation    • Chronic cough    • History of stroke    • Overactive bladder    • GERD with esophagitis    • Mood disorder    • Hyperlipidemia    • Obesity    • Hypertension      MV CAD with NSTEMI presentation, EF 50% (cath)--surgical workup in progress  - Moderate mitral regurgitation--per echo   - HTN--stable  - HLD--statin  - Hyperglycemia--a1c 5.8  - Hx stroke ()--s/p right CEA (Dr. Das, )  - Strong family hx premature CAD--father  of MI age 54/ mother  from MI age 64  - Anxiety--on home Xanax, she is no longer on Viibyrd  - Obesity, stage 1--BMI   -Bilateral shoulder pain.  -Leukocytosis    S/P   1. CABG x 3 (LIMA to LAD, SVG to OM, SVG to PDA)  2. EVH of the right legs  3. Mitral valve repair with Physio II ring    Will check urinalysis , encourage use of spirometer    DVT prophylaxis:  Medical and mechanical DVT prophylaxis orders are present.    CODE STATUS:   Code Status (Patient has no pulse and is not breathing): CPR (Attempt to Resuscitate)  Medical Interventions (Patient has pulse or is breathing): Full    Disposition:  I expect patient to be discharged  5 days     Jimmie Muñiz MD

## 2023-05-08 NOTE — PROGRESS NOTES
Cardiology Progress Note    Patient Identification:  Name: Andree Deluna  Age: 67 y.o.  Sex: female  :  1955  MRN: [id number]                 Follow Up / Chief Complaint: NSTEMI  Chief Complaint   Patient presents with   • Chest Pain     Pt reports midsternal CP that radiates to back that started 2 hrs PTA.  Pt describes pain as a squeezing pain that is intermittent.  With associated SOA.  Pt reports she took 2 81mg ASA PTA.        Interval History: Patient presented with chest pain. Underwent cardiac cath that showed MV CAD and CV surgery has been consulted.  Patient was scheduled for CABG 2023    NP NOTE:  Patient sitting in chair going back to bed.  RN reports patient with confusion this morning. Refusing some care..       CT head 5/3/2023  Extensive deep white matter hypodensities are again seen, and are nonspecific. Given the chronicity and stability of findings since 2017, advanced chronic microvascular disease is favored, although this is greater than expected for the patient's   stated age. As stated before, other etiologies such as demyelinating process or leukoencephalopathy could be included in the differential.    Electronically signed by SARAHY Steele, 23, 12:16 PM EDT.     Cardiology attending addendum :    I have personally performed a face-to-face diagnostic evaluation, physical exam and reviewed data on this patient.  I have reviewed documentation done by me and nurse practitioner  and corrected as needed.  And agree with the different components of documentation.Greater than 50% of the time spent in the care of this patient was provided by attending consultant/me.             Subjective: Patient seen and examined.  Chart reviewed.  Labs reviewed.  Discussed with RN taking care of patient.  RN reports some confusion.  Patient appears to be lucid currently.    Objective: HS troponin 54---120  2023: bmp unremarkable, CBC unremarkable   5/3/2023: glucose 116  unremarkable  CMP HLD 62 LDL 69 CBC normal.   5/8/2023: glucose 105, potassium 3.3  WBC 11.9 hgb 9.8       History of Present Illness:       Ms.Elisa LOVELY Deluna has PMH of     Hypertension  Diastolic dysfunction-echo 8/7/2019 revealing septal asymmetric hypertrophy EF 60%, diastolic dysfunction  Dyslipidemia  CVA  80% right carotid disease  WEN  Carotid endarterectomy, cholecystectomy, tubal ligation, shoulder/knee/finger surgery  Family history of premature CAD father fatal MI at 54 mother fatal MI at 64.     Presented through emergency room 4/30/2023 with midsternal chest pain radiating to the back, squeezing in sensation, radiating to the left arm and neck, stated with shortness of breath and nausea.  Patient has been having symptoms since many months but has been progressively worse.     Work-up here 4/30/2023/5/1/2023 revealed elevated troponin at 54-->120.  Glucose elevated.  Potassium 3.4.  A1c of 5.8.  Chest x-ray stable cardiomegaly.  EKG done 4/30/2023 reviewed/interpreted by me reveals sinus rhythm with a rate of 74 bpm with  ST segment depression in 1 and aVL        Assessment:  :     New onset chest pain at rest concerning for unstable angina  Elevated troponin  CAD cardiac cath 5/1/2023 revealing multivessel coronary artery disease  CABG with LIMA to LAD, SVG to OM1 and SVG to PDA and mitral valve repair with physio #2 ring 5/6/2023  Hypertension  Dyslipidemia  History of CVA 2019 and carotid disease, right CEA 2020  PAD  Hyperglycemia, prediabetes with A1c of 5.8  Obesity with BMI over 30           Recommendations / Plan:         Telemetry is revealing sinus rhythm.  Patient has new onset prolonged chest pain and has elevated troponin consistent with acute non-ST elevation MI  Patient was started on aspirin, beta-blockers, statins.  We will continue as tolerated.   patient underwent cardiac cath 5/1/2023 which revealed severe triple-vessel disease.  Patient underwent three-vessel CABG and mitral valve  repair 5/6/2023  Routine postsurgery care  Monitor rhythm  Monitor for expected postsurgery blood loss anemia.  Discussed with patient and family by bedside.  Patient has some confusion we will continue to monitor mental status closely.  Discussed with RN taking care of patient to coordinate care.       Copied text in this portion of the note has been reviewed and is accurate as of 5/8/2023    Past Medical History:  Past Medical History:   Diagnosis Date   • ADHD unknown   • Anemia    • Anxiety    • Carotid artery disease     80% right internal carotid artery   • CVA (cerebral vascular accident)     right parietal right temporal   • DDD (degenerative disc disease), lumbar    • Depression    • Extremity pain     Ankle pain   • GERD (gastroesophageal reflux disease)    • Hyperlipidemia unknown   • Hypertension    • Insomnia unknown   • Laryngopharyngeal reflux    • Low back pain    • Mood disorder    • Obesity unknown   • Skin cancer of face    • Sleep apnea     Suspected sleep apnea she has refused to be tested   • Stroke (cerebrum)    • Visual field defect     Left     Past Surgical History:  Past Surgical History:   Procedure Laterality Date   • CARDIAC CATHETERIZATION N/A 5/1/2023    Procedure: Left Heart Cath;  Surgeon: Kye Alcaraz MD;  Location: [facility] CATH INVASIVE LOCATION;  Service: Cardiovascular;  Laterality: N/A;   • CAROTID ENDARTERECTOMY Right 11/10/2020    Procedure: CAROTID ENDARTERECTOMY;  Surgeon: Tavo Das MD;  Location: [facility] MAIN OR;  Service: Vascular;  Laterality: Right;   • CHOLECYSTECTOMY     • COLONOSCOPY      2016   • FINGER SURGERY     • KNEE ARTHROPLASTY     • KNEE SURGERY Right     replaced   • LAPAROSCOPIC GASTRIC BANDING     • ROTATOR CUFF REPAIR Right 2019   • SHOULDER SURGERY Right 05/24/2019    scope/ cuff repair   • TUBAL ABDOMINAL LIGATION          Social History:   Social History     Tobacco Use   • Smoking status: Never   • Smokeless tobacco: Never  "  Substance Use Topics   • Alcohol use: No      Family History:  Family History   Problem Relation Age of Onset   • Diabetes Other    • Heart disease Mother    • Diabetes Mother    • Heart disease Father           Allergies:  No Known Allergies  Scheduled Meds:  aspirin, 81 mg, Daily  atorvastatin, 40 mg, Nightly  chlorhexidine, 15 mL, Q12H  enoxaparin, 40 mg, Q24H  insulin lispro, 2-7 Units, 4x Daily With Meals & Nightly  lidocaine, 2 patch, Q24H  metoprolol tartrate, 12.5 mg, Q12H  mupirocin, 1 application, BID  [START ON 5/9/2023] pantoprazole, 40 mg, Q AM  polyethylene glycol, 17 g, BID  senna-docusate sodium, 2 tablet, Nightly          Review of Systems:   ROS  Review of Systems   Constitution: Negative for chills and fever.   Cardiovascular: Negative for chest pain and palpitations.   Respiratory: Negative for cough and hemoptysis.    Gastrointestinal: Negative for nausea.        Constitutional:  Temp:  [98.8 °F (37.1 °C)-99.4 °F (37.4 °C)] 99.1 °F (37.3 °C)  Heart Rate:  [78-90] 83  Resp:  [18-20] 20  BP: (116-156)/(69-92) 130/84    Physical Exam   /84   Pulse 83   Temp 99.1 °F (37.3 °C) (Oral)   Resp 20   Ht 167.6 cm (66\")   Wt 98.9 kg (218 lb 1.9 oz)   SpO2 91%   BMI 35.21 kg/m²   General:  Appears in no acute distress  Eyes: Sclera is anicteric,  conjunctiva is clear   HEENT:  No JVD. Thyroid not visibly enlarged. No mucosal pallor or cyanosis  Respiratory: Respirations regular and unlabored at rest.  Bilaterally good breath sounds, with good air entry in all fields. No crackles, rubs or wheezes auscultated  Cardiovascular: S1,S2 Regular rate and rhythm. No murmur, rub or gallop auscultated.  . No pretibial pitting edema  Gastrointestinal: Abdomen nondistended, soft  Musculoskeletal:  No abnormal movements  Extremities: No digital clubbing or cyanosis  Skin: Color pink. Skin warm and dry to touch. No rashes  No xanthoma  Neuro: Alert and awake, no lateralizing deficits appreciated    INTAKE " AND OUTPUT:    Intake/Output Summary (Last 24 hours) at 5/8/2023 1547  Last data filed at 5/8/2023 1400  Gross per 24 hour   Intake 1790 ml   Output 1800 ml   Net -10 ml       Cardiographics  Telemetry: Sinus rhythm     ECG:   ECG 12 Lead   Preliminary Result   HEART RATE= 80  bpm   RR Interval= 748  ms   PA Interval= 168  ms   P Horizontal Axis= 10  deg   P Front Axis= 46  deg   QRSD Interval= 72  ms   QT Interval= 337  ms   QRS Axis= 5  deg   T Wave Axis= -13  deg   - OTHERWISE NORMAL ECG -   Sinus rhythm   Low voltage, precordial leads   Electronically Signed By:    Date and Time of Study: 2023-05-08 03:01:52      ECG 12 Lead   Preliminary Result   HEART RATE= 59  bpm   RR Interval= 1008  ms   PA Interval= 171  ms   P Horizontal Axis= 78  deg   P Front Axis= 17  deg   QRSD Interval= 85  ms   QT Interval= 395  ms   QRS Axis= 2  deg   T Wave Axis= 5  deg   - OTHERWISE NORMAL ECG -   Sinus bradycardia   Low voltage, precordial leads   Electronically Signed By:    Date and Time of Study: 2023-05-07 05:30:01      ECG 12 Lead Chest Pain   Preliminary Result   HEART RATE= 56  bpm   RR Interval= 1088  ms   PA Interval= 160  ms   P Horizontal Axis= 30  deg   P Front Axis= 44  deg   QRSD Interval= 89  ms   QT Interval= 410  ms   QRS Axis= 3  deg   T Wave Axis= -47  deg   - BORDERLINE ECG -   Sinus bradycardia   Atrial premature complex   Low voltage, precordial leads   Electronically Signed By:    Date and Time of Study: 2023-05-07 00:20:13      ECG 12 Lead   Preliminary Result   HEART RATE= 76  bpm   RR Interval= 784  ms   PA Interval= 170  ms   P Horizontal Axis= 28  deg   P Front Axis= 69  deg   QRSD Interval= 93  ms   QT Interval= 440  ms   QRS Axis= 20  deg   T Wave Axis= -40  deg   - ABNORMAL ECG -   Sinus rhythm   Repol abnrm suggests ischemia, diffuse leads   Electronically Signed By:    Date and Time of Study: 2023-05-06 12:18:09      ECG 12 Lead   Final Result   HEART RATE= 56  bpm   RR Interval= 1072  ms   PA  Interval= 144  ms   P Horizontal Axis= 5  deg   P Front Axis= 17  deg   QRSD Interval= 93  ms   QT Interval= 477  ms   QRS Axis= 8  deg   T Wave Axis= -63  deg   - ABNORMAL ECG -   Sinus bradycardia   Nonspecific repol abnormality, diffuse leads   When compared with ECG of 30-Apr-2023 21:37:55,   Significant repolarization change   Significant axis, voltage or hypertrophy change   Electronically Signed By: Kye Alcaraz ([facility]) 03-May-2023 17:12:15   Date and Time of Study: 2023-05-02 10:51:40      ECG 12 Lead Chest Pain   Final Result   HEART RATE= 57  bpm   RR Interval= 1048  ms   IL Interval= 142  ms   P Horizontal Axis= 16  deg   P Front Axis= 24  deg   QRSD Interval= 87  ms   QT Interval= 459  ms   QRS Axis= 26  deg   T Wave Axis= 198  deg   - OTHERWISE NORMAL ECG -   Slow sinus arrhythmia   When compared with ECG of 30-Apr-2023 21:37:55,   Significant axis, voltage or hypertrophy change   Electronically Signed By: Kye Alcaraz ([facility]) 03-May-2023 17:12:19   Date and Time of Study: 2023-05-01 05:38:43      ECG 12 Lead Chest Pain   Final Result   HEART RATE= 74  bpm   RR Interval= 816  ms   IL Interval= 140  ms   P Horizontal Axis= -20  deg   P Front Axis= 17  deg   QRSD Interval= 89  ms   QT Interval= 408  ms   QRS Axis= -2  deg   T Wave Axis= 71  deg   - ABNORMAL ECG -   Sinus rhythm   Probable left ventricular hypertrophy   ST depression, consider ischemia, lateral lds   When compared with ECG of 22-Oct-2020 11:28:25,   Significant rate increase   Significant axis, voltage or hypertrophy change   Electronically Signed By: Jomar Mejia ([facility]) 01-May-2023 09:11:21   Date and Time of Study: 2023-04-30 21:37:55      SCANNED - TELEMETRY     Final Result      SCANNED - TELEMETRY     Final Result        I have personally reviewed EKG    Echocardiogram: Results for orders placed during the hospital encounter of 04/30/23    Adult Transthoracic Echo Complete W/ Cont if Necessary Per Protocol    Interpretation  Summary  •  Estimated right ventricular systolic pressure from tricuspid regurgitation is normal (<35 mmHg).      Normal LV size and contractility EF of 60 to 65%  Normal RV size  Normal atrial size  Pulmonic valve is not well visualized.  Aortic valve is trileaflet with mild sclerosis.  Mitral valve, tricuspid valve appears structurally normal, moderate mitral regurgitation seen.  No pericardial effusion seen.  Proximal aorta appears normal in size.      Lab Review   I have reviewed the labs      Results from last 7 days   Lab Units 05/06/23  1909   MAGNESIUM mg/dL 2.8*     Results from last 7 days   Lab Units 05/08/23  0311   SODIUM mmol/L 146*   POTASSIUM mmol/L 3.3*   BUN mg/dL 10   CREATININE mg/dL 0.75   CALCIUM mg/dL 8.5*         Results from last 7 days   Lab Units 05/08/23  0311 05/07/23  0634 05/07/23  0251   WBC 10*3/mm3 11.90* 11.50* 9.50   HEMOGLOBIN g/dL 9.8* 10.2* 9.8*   HEMATOCRIT % 30.8* 31.2* 30.4*   PLATELETS 10*3/mm3 141 141 132*     Results from last 7 days   Lab Units 05/07/23  0251 05/06/23  1157 05/06/23  0451 05/05/23  0542   INR  1.12* 1.17* 0.96 0.93   APTT seconds  --  27.2 23.7* 26.2       RADIOLOGY:  Imaging Results (Last 24 Hours)     Procedure Component Value Units Date/Time    XR Chest 1 View [[id number]] Collected: 05/08/23 1538     Updated: 05/08/23 1544    Narrative:      XR CHEST 1 VW    Date of Exam: 5/8/2023 3:20 PM EDT    Indication: post chest tube removal    Comparison: AP portable chest 5/8/2023.    Findings:  Right IJ introducer sheath and mediastinal drain and left basilar chest tube have been removed in the interval. No right or left pneumothorax is identified. Small bilateral pleural effusions are present with bibasilar airspace disease, atelectasis or   pneumonia. Right basilar opacity has increased in the interval. There is stable cardiomegaly with median sternotomy, CABG, and valve replacement.      Impression:      Impression:    1. Interval removal of mediastinal  "drain, chest tube, right IJ introducer sheath.  2. No visible pneumothorax.  3. Bibasilar airspace disease, favored to represent atelectasis, increased on the right.  4. Probable small bilateral pleural effusions.      Electronically Signed: Maryhardeep Linares    5/8/2023 3:42 PM EDT    Workstation ID: [id number]    XR Chest 1 View [[id number]] Collected: 05/08/23 0811     Updated: 05/08/23 0849    Narrative:      XR CHEST 1 VW    Date of Exam: 5/8/2023 4:08 AM EDT    Indication: Post-Op Heart Surgery    Comparison: 5/7/2023 and prior    Findings:  Study is limited by overlying support and monitoring apparatus.    Patient is status post median sternotomy. Prosthetic valve project over the base of the heart.    Right IJ sheath noted. [place]-Lex catheter has been removed. Epicardial pacers are in place. Chest tubes and mediastinal drains appear to be grossly stable. Heart size is enlarged. Pulmonary vascularity is minimally congested. No definite pneumothorax   identified. Pulm parenchymal changes at the lung bases left greater than right without significant change in the comparison. Osseous structures are stable.        Impression:      Impression:    1. Interval removal of [place]-Lex catheter. Support apparatus otherwise appears unchanged    2. Stable cardiomegaly and mild pulmonary vascular congestion without significant overt pulmonary edema    3. Pleural-parenchymal changes at the lung bases appear grossly stable      Electronically Signed: Matthew Chavez    5/8/2023 8:47 AM EDT    Workstation ID: [id number]                )5/8/2023  Kye Alcaraz MD      EMR Dragon/Transcription:   \"Dictated utilizing Dragon dictation\".   "

## 2023-05-09 LAB
ANION GAP SERPL CALCULATED.3IONS-SCNC: 9 MMOL/L (ref 5–15)
BASOPHILS # BLD AUTO: 0.1 10*3/MM3 (ref 0–0.2)
BASOPHILS NFR BLD AUTO: 0.6 % (ref 0–1.5)
BUN SERPL-MCNC: 17 MG/DL (ref 8–23)
BUN/CREAT SERPL: 33.3 (ref 7–25)
CALCIUM SPEC-SCNC: 9.1 MG/DL (ref 8.6–10.5)
CHLORIDE SERPL-SCNC: 103 MMOL/L (ref 98–107)
CO2 SERPL-SCNC: 30 MMOL/L (ref 22–29)
CREAT SERPL-MCNC: 0.51 MG/DL (ref 0.57–1)
DEPRECATED RDW RBC AUTO: 46.4 FL (ref 37–54)
EGFRCR SERPLBLD CKD-EPI 2021: 103.7 ML/MIN/1.73
EOSINOPHIL # BLD AUTO: 0.4 10*3/MM3 (ref 0–0.4)
EOSINOPHIL NFR BLD AUTO: 3.3 % (ref 0.3–6.2)
ERYTHROCYTE [DISTWIDTH] IN BLOOD BY AUTOMATED COUNT: 14.3 % (ref 12.3–15.4)
GLUCOSE BLDC GLUCOMTR-MCNC: 108 MG/DL (ref 70–105)
GLUCOSE BLDC GLUCOMTR-MCNC: 108 MG/DL (ref 70–105)
GLUCOSE BLDC GLUCOMTR-MCNC: 145 MG/DL (ref 70–105)
GLUCOSE SERPL-MCNC: 100 MG/DL (ref 65–99)
HCT VFR BLD AUTO: 32.7 % (ref 34–46.6)
HGB BLD-MCNC: 10.4 G/DL (ref 12–15.9)
LYMPHOCYTES # BLD AUTO: 1.4 10*3/MM3 (ref 0.7–3.1)
LYMPHOCYTES NFR BLD AUTO: 12.1 % (ref 19.6–45.3)
MAGNESIUM SERPL-MCNC: 2.1 MG/DL (ref 1.6–2.4)
MCH RBC QN AUTO: 29.5 PG (ref 26.6–33)
MCHC RBC AUTO-ENTMCNC: 31.9 G/DL (ref 31.5–35.7)
MCV RBC AUTO: 92.6 FL (ref 79–97)
MONOCYTES # BLD AUTO: 0.7 10*3/MM3 (ref 0.1–0.9)
MONOCYTES NFR BLD AUTO: 6.1 % (ref 5–12)
NEUTROPHILS NFR BLD AUTO: 77.9 % (ref 42.7–76)
NEUTROPHILS NFR BLD AUTO: 8.7 10*3/MM3 (ref 1.7–7)
NRBC BLD AUTO-RTO: 0.1 /100 WBC (ref 0–0.2)
PLATELET # BLD AUTO: 175 10*3/MM3 (ref 140–450)
PMV BLD AUTO: 9.3 FL (ref 6–12)
POTASSIUM SERPL-SCNC: 3.9 MMOL/L (ref 3.5–5.2)
RBC # BLD AUTO: 3.53 10*6/MM3 (ref 3.77–5.28)
SODIUM SERPL-SCNC: 142 MMOL/L (ref 136–145)
WBC NRBC COR # BLD: 11.2 10*3/MM3 (ref 3.4–10.8)

## 2023-05-09 PROCEDURE — 97112 NEUROMUSCULAR REEDUCATION: CPT

## 2023-05-09 PROCEDURE — 97530 THERAPEUTIC ACTIVITIES: CPT

## 2023-05-09 PROCEDURE — 99232 SBSQ HOSP IP/OBS MODERATE 35: CPT | Performed by: INTERNAL MEDICINE

## 2023-05-09 PROCEDURE — 25010000002 FUROSEMIDE PER 20 MG: Performed by: NURSE PRACTITIONER

## 2023-05-09 PROCEDURE — 80048 BASIC METABOLIC PNL TOTAL CA: CPT

## 2023-05-09 PROCEDURE — 25010000002 ENOXAPARIN PER 10 MG

## 2023-05-09 PROCEDURE — 82948 REAGENT STRIP/BLOOD GLUCOSE: CPT

## 2023-05-09 PROCEDURE — 83735 ASSAY OF MAGNESIUM: CPT

## 2023-05-09 PROCEDURE — 85025 COMPLETE CBC W/AUTO DIFF WBC: CPT

## 2023-05-09 PROCEDURE — 97116 GAIT TRAINING THERAPY: CPT

## 2023-05-09 RX ORDER — POTASSIUM CHLORIDE 20 MEQ/1
20 TABLET, EXTENDED RELEASE ORAL 2 TIMES DAILY WITH MEALS
Status: DISCONTINUED | OUTPATIENT
Start: 2023-05-09 | End: 2023-05-10

## 2023-05-09 RX ORDER — FUROSEMIDE 10 MG/ML
40 INJECTION INTRAMUSCULAR; INTRAVENOUS EVERY 12 HOURS
Status: DISCONTINUED | OUTPATIENT
Start: 2023-05-09 | End: 2023-05-10

## 2023-05-09 RX ADMIN — CHLORHEXIDINE GLUCONATE 15 ML: 1.2 SOLUTION ORAL at 08:02

## 2023-05-09 RX ADMIN — ASPIRIN 81 MG: 81 TABLET, COATED ORAL at 08:02

## 2023-05-09 RX ADMIN — ATORVASTATIN CALCIUM 40 MG: 40 TABLET, FILM COATED ORAL at 20:04

## 2023-05-09 RX ADMIN — PANTOPRAZOLE SODIUM 40 MG: 40 TABLET, DELAYED RELEASE ORAL at 08:02

## 2023-05-09 RX ADMIN — MUPIROCIN 1 APPLICATION: 20 OINTMENT TOPICAL at 20:03

## 2023-05-09 RX ADMIN — ENOXAPARIN SODIUM 40 MG: 100 INJECTION SUBCUTANEOUS at 16:44

## 2023-05-09 RX ADMIN — FUROSEMIDE 40 MG: 10 INJECTION, SOLUTION INTRAMUSCULAR; INTRAVENOUS at 13:20

## 2023-05-09 RX ADMIN — POLYETHYLENE GLYCOL 3350 17 G: 17 POWDER, FOR SOLUTION ORAL at 08:02

## 2023-05-09 RX ADMIN — METOPROLOL TARTRATE 25 MG: 25 TABLET, FILM COATED ORAL at 20:04

## 2023-05-09 RX ADMIN — Medication 12.5 MG: at 08:02

## 2023-05-09 RX ADMIN — POTASSIUM CHLORIDE 20 MEQ: 1500 TABLET, EXTENDED RELEASE ORAL at 17:15

## 2023-05-09 RX ADMIN — LIDOCAINE 2 PATCH: 700 PATCH TOPICAL at 08:02

## 2023-05-09 RX ADMIN — ACETAMINOPHEN 650 MG: 325 TABLET, FILM COATED ORAL at 17:15

## 2023-05-09 RX ADMIN — MUPIROCIN 1 APPLICATION: 20 OINTMENT TOPICAL at 08:02

## 2023-05-09 RX ADMIN — CHLORHEXIDINE GLUCONATE 15 ML: 1.2 SOLUTION ORAL at 20:03

## 2023-05-09 NOTE — PROGRESS NOTES
Cardiology Progress Note    Patient Identification:  Name: Andree Deluna  Age: 67 y.o.  Sex: female  :  1955  MRN: [id number]                 Follow Up / Chief Complaint: NSTEMI  Chief Complaint   Patient presents with   • Chest Pain     Pt reports midsternal CP that radiates to back that started 2 hrs PTA.  Pt describes pain as a squeezing pain that is intermittent.  With associated SOA.  Pt reports she took 2 81mg ASA PTA.        Interval History: Patient presented with chest pain. Underwent cardiac cath that showed MV CAD and CV surgery has been consulted.  Patient was scheduled for CABG 2023    NP NOTE:  Patient seen and examined; doing much better today.  She had Chest tube removed yesterday and pacer wires removed today.  She has minimal post op pain. Wants to go home.    Confusion improved today    Electronically signed by SARAHY Steele, 23, 2:22 PM EDT.    Cardiology attending addendum :    I have personally performed a face-to-face diagnostic evaluation, physical exam and reviewed data on this patient.  I have reviewed documentation done by me and nurse practitioner  and corrected as needed.  And agree with the different components of documentation.Greater than 50% of the time spent in the care of this patient was provided by attending consultant/me.       Subjective: Patient seen and examined.  Chart reviewed.  Labs reviewed.  Discussed with RN taking care of patient.  RN reports some confusion.  Patient reportedly has been having intermittent confusion.    Objective: HS troponin 54---120  2023: bmp unremarkable, CBC unremarkable   5/3/2023: glucose 116 unremarkable  CMP HLD 62 LDL 69 CBC normal.   2023: glucose 105, potassium 3.3  WBC 11.9 hgb 9.8   2023: glucose 100, CO2 30, WBC 11.2, hgb 10.4       History of Present Illness:       Ms.Elisa LOVELY Deluna has PMH of     Hypertension  Diastolic dysfunction-echo 2019 revealing septal asymmetric hypertrophy EF 60%,  diastolic dysfunction  Dyslipidemia  CVA  80% right carotid disease  WEN  Carotid endarterectomy, cholecystectomy, tubal ligation, shoulder/knee/finger surgery  Family history of premature CAD father fatal MI at 54 mother fatal MI at 64.     Presented through emergency room 4/30/2023 with midsternal chest pain radiating to the back, squeezing in sensation, radiating to the left arm and neck, stated with shortness of breath and nausea.  Patient has been having symptoms since many months but has been progressively worse.     Work-up here 4/30/2023/5/1/2023 revealed elevated troponin at 54-->120.  Glucose elevated.  Potassium 3.4.  A1c of 5.8.  Chest x-ray stable cardiomegaly.  EKG done 4/30/2023 reviewed/interpreted by me reveals sinus rhythm with a rate of 74 bpm with  ST segment depression in 1 and aVL        Assessment:  :     New onset chest pain at rest concerning for unstable angina  Elevated troponin  CAD cardiac cath 5/1/2023 revealing multivessel coronary artery disease  CABG with LIMA to LAD, SVG to OM1 and SVG to PDA and mitral valve repair with physio #2 ring 5/6/2023  Hypertension  Dyslipidemia  History of CVA 2019 and carotid disease, right CEA 2020  PAD  Hyperglycemia, prediabetes with A1c of 5.8  Obesity with BMI over 30           Recommendations / Plan:         Telemetry is revealing sinus rhythm.  Patient has new onset prolonged chest pain and has elevated troponin consistent with acute non-ST elevation MI  Patient was started on aspirin, beta-blockers, statins.  We will continue as tolerated.   patient underwent cardiac cath 5/1/2023 which revealed severe triple-vessel disease.  Patient underwent three-vessel CABG and mitral valve repair 5/6/2023  Routine postsurgery care  Monitor rhythm  Monitor expected postsurgery blood loss anemia  Increase activity as tolerated  Patient has some confusion we will continue to monitor mental status closely.  Discussed with RN taking care of patient to coordinate  care.       Copied text in this portion of the note has been reviewed and is accurate as of 5/9/2023    Past Medical History:  Past Medical History:   Diagnosis Date   • ADHD unknown   • Anemia    • Anxiety    • Carotid artery disease     80% right internal carotid artery   • CVA (cerebral vascular accident)     right parietal right temporal   • DDD (degenerative disc disease), lumbar    • Depression    • Extremity pain     Ankle pain   • GERD (gastroesophageal reflux disease)    • Hyperlipidemia unknown   • Hypertension    • Insomnia unknown   • Laryngopharyngeal reflux    • Low back pain    • Mood disorder    • Obesity unknown   • Skin cancer of face    • Sleep apnea     Suspected sleep apnea she has refused to be tested   • Stroke (cerebrum)    • Visual field defect     Left     Past Surgical History:  Past Surgical History:   Procedure Laterality Date   • CARDIAC CATHETERIZATION N/A 5/1/2023    Procedure: Left Heart Cath;  Surgeon: Kye Alcaraz MD;  Location: [facility] CATH INVASIVE LOCATION;  Service: Cardiovascular;  Laterality: N/A;   • CAROTID ENDARTERECTOMY Right 11/10/2020    Procedure: CAROTID ENDARTERECTOMY;  Surgeon: Tavo Das MD;  Location: [facility] MAIN OR;  Service: Vascular;  Laterality: Right;   • CHOLECYSTECTOMY     • COLONOSCOPY      2016   • CORONARY ARTERY BYPASS GRAFT N/A 5/6/2023    Procedure: CORONARY ARTERY BYPASS GRAFTING;  Surgeon: Errol Noonan MD;  Location: [facility];  Service: Cardiothoracic;  Laterality: N/A;  CABG X  3 using LIMA and right endospahenous vein;  2 coronary markers implanted.    • FINGER SURGERY     • KNEE ARTHROPLASTY     • KNEE SURGERY Right     replaced   • LAPAROSCOPIC GASTRIC BANDING     • MITRAL VALVE REPAIR/REPLACEMENT N/A 5/6/2023    Procedure: MITRAL VALVE REPAIR/REPLACEMENT;  Surgeon: Errol Noonan MD;  Location: [facility];  Service: Cardiothoracic;  Laterality: N/A;  Mitral valve repaired using 28 mm Jorge  "Physio II Annuloplasty Ring.    • ROTATOR CUFF REPAIR Right 2019   • SHOULDER SURGERY Right 05/24/2019    scope/ cuff repair   • TRANSESOPHAGEAL ECHOCARDIOGRAM (GALA) N/A 5/6/2023    Procedure: TRANSESOPHAGEAL ECHOCARDIOGRAM WITH ANESTHESIA;  Surgeon: Errol Noonan MD;  Location: [facility];  Service: Cardiothoracic;  Laterality: N/A;   • TUBAL ABDOMINAL LIGATION          Social History:   Social History     Tobacco Use   • Smoking status: Never   • Smokeless tobacco: Never   Substance Use Topics   • Alcohol use: No      Family History:  Family History   Problem Relation Age of Onset   • Diabetes Other    • Heart disease Mother    • Diabetes Mother    • Heart disease Father           Allergies:  No Known Allergies  Scheduled Meds:  aspirin, 81 mg, Daily  atorvastatin, 40 mg, Nightly  chlorhexidine, 15 mL, Q12H  enoxaparin, 40 mg, Q24H  furosemide, 40 mg, Q12H  insulin lispro, 2-7 Units, 4x Daily With Meals & Nightly  lidocaine, 2 patch, Q24H  metoprolol tartrate, 25 mg, Q12H  mupirocin, 1 application, BID  pantoprazole, 40 mg, Q AM  polyethylene glycol, 17 g, BID  potassium chloride, 20 mEq, BID With Meals  senna-docusate sodium, 2 tablet, Nightly          Review of Systems:   ROS  Review of Systems   Constitution: Negative for chills and fever.   Cardiovascular: Negative for chest pain and palpitations.   Respiratory: Negative for cough and hemoptysis.    Gastrointestinal: Negative for nausea.        Constitutional:  Temp:  [97.4 °F (36.3 °C)-99.3 °F (37.4 °C)] 97.4 °F (36.3 °C)  Heart Rate:  [75-88] 82  Resp:  [12-25] 20  BP: (110-160)/() 126/76    Physical Exam   /76   Pulse 82   Temp 97.4 °F (36.3 °C) (Oral)   Resp 20   Ht 167.6 cm (66\")   Wt 98.7 kg (217 lb 9.5 oz)   SpO2 90%   BMI 35.12 kg/m²   General:  Appears in no acute distress  Eyes: Sclera is anicteric,  conjunctiva is clear   HEENT:  No JVD. Thyroid not visibly enlarged. No mucosal pallor or cyanosis  Respiratory: " Respirations regular and unlabored at rest.  Bilaterally good breath sounds, with good air entry in all fields. No crackles, rubs or wheezes auscultated  Cardiovascular: S1,S2 Regular rate and rhythm. No murmur, rub or gallop auscultated.  . No pretibial pitting edema  Gastrointestinal: Abdomen nondistended, soft  Musculoskeletal:  No abnormal movements  Extremities: No digital clubbing or cyanosis  Skin: Color pink. Skin warm and dry to touch. No rashes  No xanthoma  Neuro: Alert and awake, no lateralizing deficits appreciated    INTAKE AND OUTPUT:    Intake/Output Summary (Last 24 hours) at 5/9/2023 1549  Last data filed at 5/9/2023 1442  Gross per 24 hour   Intake 450 ml   Output 1450 ml   Net -1000 ml       Cardiographics  Telemetry: Sinus rhythm     ECG:   ECG 12 Lead   Preliminary Result   HEART RATE= 80  bpm   RR Interval= 748  ms   SD Interval= 168  ms   P Horizontal Axis= 10  deg   P Front Axis= 46  deg   QRSD Interval= 72  ms   QT Interval= 337  ms   QRS Axis= 5  deg   T Wave Axis= -13  deg   - OTHERWISE NORMAL ECG -   Sinus rhythm   Low voltage, precordial leads   Electronically Signed By:    Date and Time of Study: 2023-05-08 03:01:52      ECG 12 Lead   Preliminary Result   HEART RATE= 59  bpm   RR Interval= 1008  ms   SD Interval= 171  ms   P Horizontal Axis= 78  deg   P Front Axis= 17  deg   QRSD Interval= 85  ms   QT Interval= 395  ms   QRS Axis= 2  deg   T Wave Axis= 5  deg   - OTHERWISE NORMAL ECG -   Sinus bradycardia   Low voltage, precordial leads   Electronically Signed By:    Date and Time of Study: 2023-05-07 05:30:01      ECG 12 Lead Chest Pain   Preliminary Result   HEART RATE= 56  bpm   RR Interval= 1088  ms   SD Interval= 160  ms   P Horizontal Axis= 30  deg   P Front Axis= 44  deg   QRSD Interval= 89  ms   QT Interval= 410  ms   QRS Axis= 3  deg   T Wave Axis= -47  deg   - BORDERLINE ECG -   Sinus bradycardia   Atrial premature complex   Low voltage, precordial leads   Electronically  Signed By:    Date and Time of Study: 2023-05-07 00:20:13      ECG 12 Lead   Preliminary Result   HEART RATE= 76  bpm   RR Interval= 784  ms   MS Interval= 170  ms   P Horizontal Axis= 28  deg   P Front Axis= 69  deg   QRSD Interval= 93  ms   QT Interval= 440  ms   QRS Axis= 20  deg   T Wave Axis= -40  deg   - ABNORMAL ECG -   Sinus rhythm   Repol abnrm suggests ischemia, diffuse leads   Electronically Signed By:    Date and Time of Study: 2023-05-06 12:18:09      ECG 12 Lead   Final Result   HEART RATE= 56  bpm   RR Interval= 1072  ms   MS Interval= 144  ms   P Horizontal Axis= 5  deg   P Front Axis= 17  deg   QRSD Interval= 93  ms   QT Interval= 477  ms   QRS Axis= 8  deg   T Wave Axis= -63  deg   - ABNORMAL ECG -   Sinus bradycardia   Nonspecific repol abnormality, diffuse leads   When compared with ECG of 30-Apr-2023 21:37:55,   Significant repolarization change   Significant axis, voltage or hypertrophy change   Electronically Signed By: Kye Alcaraz (OLGA) 03-May-2023 17:12:15   Date and Time of Study: 2023-05-02 10:51:40      ECG 12 Lead Chest Pain   Final Result   HEART RATE= 57  bpm   RR Interval= 1048  ms   MS Interval= 142  ms   P Horizontal Axis= 16  deg   P Front Axis= 24  deg   QRSD Interval= 87  ms   QT Interval= 459  ms   QRS Axis= 26  deg   T Wave Axis= 198  deg   - OTHERWISE NORMAL ECG -   Slow sinus arrhythmia   When compared with ECG of 30-Apr-2023 21:37:55,   Significant axis, voltage or hypertrophy change   Electronically Signed By: Kye Alcaraz (OLGA) 03-May-2023 17:12:19   Date and Time of Study: 2023-05-01 05:38:43      ECG 12 Lead Chest Pain   Final Result   HEART RATE= 74  bpm   RR Interval= 816  ms   MS Interval= 140  ms   P Horizontal Axis= -20  deg   P Front Axis= 17  deg   QRSD Interval= 89  ms   QT Interval= 408  ms   QRS Axis= -2  deg   T Wave Axis= 71  deg   - ABNORMAL ECG -   Sinus rhythm   Probable left ventricular hypertrophy   ST depression, consider ischemia, lateral  "lds   When compared with ECG of 22-Oct-2020 11:28:25,   Significant rate increase   Significant axis, voltage or hypertrophy change   Electronically Signed By: Jomar Mejia (Shekhar) 01-May-2023 09:11:21   Date and Time of Study: 2023-04-30 21:37:55      SCANNED - TELEMETRY     Final Result      SCANNED - TELEMETRY     Final Result        I have personally reviewed EKG    Echocardiogram: Results for orders placed during the hospital encounter of 04/30/23    Adult Transthoracic Echo Complete W/ Cont if Necessary Per Protocol    Interpretation Summary  •  Estimated right ventricular systolic pressure from tricuspid regurgitation is normal (<35 mmHg).      Normal LV size and contractility EF of 60 to 65%  Normal RV size  Normal atrial size  Pulmonic valve is not well visualized.  Aortic valve is trileaflet with mild sclerosis.  Mitral valve, tricuspid valve appears structurally normal, moderate mitral regurgitation seen.  No pericardial effusion seen.  Proximal aorta appears normal in size.      Lab Review   I have reviewed the labs      Results from last 7 days   Lab Units 05/09/23  0809   MAGNESIUM mg/dL 2.1     Results from last 7 days   Lab Units 05/09/23  0809   SODIUM mmol/L 142   POTASSIUM mmol/L 3.9   BUN mg/dL 17   CREATININE mg/dL 0.51*   CALCIUM mg/dL 9.1         Results from last 7 days   Lab Units 05/09/23  0809 05/08/23  0311 05/07/23  0634   WBC 10*3/mm3 11.20* 11.90* 11.50*   HEMOGLOBIN g/dL 10.4* 9.8* 10.2*   HEMATOCRIT % 32.7* 30.8* 31.2*   PLATELETS 10*3/mm3 175 141 141     Results from last 7 days   Lab Units 05/07/23  0251 05/06/23  1157 05/06/23  0451 05/05/23  0542   INR  1.12* 1.17* 0.96 0.93   APTT seconds  --  27.2 23.7* 26.2       RADIOLOGY:  Imaging Results (Last 24 Hours)     ** No results found for the last 24 hours. **                )5/9/2023  Kye Alcaraz MD      EMR Dragon/Transcription:   \"Dictated utilizing Dragon dictation\".   "

## 2023-05-09 NOTE — PROGRESS NOTES
S/P POD# 3 CABG x3 with LIMA/ MV repair--[place]  EF 50% (cath)    Subjective:  Confusion much improved, she is asking when she can go home    Sitter has been removed  Wt is up 6 kgs from preop  CXR:  vasc congestion, atel, bilat effusion      Intake/Output Summary (Last 24 hours) at 5/9/2023 0805  Last data filed at 5/9/2023 0600  Gross per 24 hour   Intake 120 ml   Output 925 ml   Net -805 ml     Temp:  [97.5 °F (36.4 °C)-99.3 °F (37.4 °C)] 98.2 °F (36.8 °C)  Heart Rate:  [78-88] 82  Resp:  [12-20] 18  BP: (119-160)/() 135/79      Results from last 7 days   Lab Units 05/08/23  0311 05/07/23  0634 05/07/23  0251 05/06/23  1231 05/06/23  1157 05/06/23  0735 05/06/23  0451   WBC 10*3/mm3 11.90* 11.50* 9.50   < > 5.40  --  8.10   HEMOGLOBIN g/dL 9.8* 10.2* 9.8*   < > 9.6*  --  11.9*   HEMOGLOBIN, POC   --   --   --    < >  --    < >  --    HEMATOCRIT % 30.8* 31.2* 30.4*   < > 29.3*  --  36.5   HEMATOCRIT POC   --   --   --    < >  --    < >  --    PLATELETS 10*3/mm3 141 141 132*   < > 117*  --  247   INR   --   --  1.12*  --  1.17*  --  0.96    < > = values in this interval not displayed.     Results from last 7 days   Lab Units 05/08/23  0311 05/07/23  1302 05/07/23  0251 05/06/23  1909   CREATININE mg/dL 0.75  --    < > 0.83   POTASSIUM mmol/L 3.3*  --    < > 3.3*   SODIUM mmol/L 146*  --    < > 145   MAGNESIUM mg/dL  --   --   --  2.8*   PHOSPHORUS mg/dL  --  4.0  --  1.6*    < > = values in this interval not displayed.         Physical Exam:  Neuro intact, nad, up in chair,  at bedside  Tele:  SR 80s  Diminished bases, 97% 2L  Sternotomy/ SVHS healing well  Benign abd, + BM  No edema    Assessment/Plan:  Principal Problem:    Chest pain, unspecified type  Active Problems:    Mood disorder    Hyperlipidemia    Hypertension    Obesity    GERD with esophagitis    Overactive bladder    History of stroke    Chronic cough    Elevated troponin    Non-STEMI (non-ST elevated myocardial infarction)     Coronary artery disease involving native coronary artery of native heart with unstable angina pectoris    Abnormal findings on diagnostic imaging of heart and coronary circulation    - MV CAD with NSTEMI presentation, severe mitral regurgitation, EF 50% (cath)--s/p CABG x3/ MV repair ([place])  - HTN--stable  - HLD--statin  - Hyperglycemia--a1c 5.8, SSI  - Chronic HFpEF with volume overload--work towards GDMT  - Hx stroke ()--s/p right CEA (Dr. Das, )  - Strong family hx premature CAD--father  of MI age 54/ mother  from MI age 64  - Anxiety--on home Xanax, she is no longer on Viibyrd  - Obesity, stage 1--BMI 33.2  - Postop confusion--slightly improved  - Postop leukocytosis likely r/t atel--pulm toileting  - Postop ABLA, expected--watch closely    POD# 3.  Doing well, confusion improved.  DC wires.  Diurese wth IV Lasix.  D/w family at length at bedside.  Mobilize.  On asa/statin, increase bb.  Plans for HHC--nsg/PT/OT at discharge.    Routine care--as above  D/w pt/nsg/family, .Dr. Noonan  Anticipate home at discharge    Tina Yen-Pollo, APRN  2023  08:05 EDT

## 2023-05-09 NOTE — DISCHARGE PLACEMENT REQUEST
"Andree Sales (67 y.o. Female)     Date of Birth   1955    Social Security Number       Address   [address] [place] [address]    Home Phone   [phone]    MRN   [id number]       Jew   Episcopalian    Marital Status                               Admission Date   23    Admission Type   Emergency    Admitting Provider   Krista See DO    Attending Provider   Errol Noonan MD    Department, Room/Bed   [facility] CARDIOVASCULAR CARE UNIT,        Discharge Date       Discharge Disposition       Discharge Destination                               Attending Provider: Errol Noonan MD    Allergies: No Known Allergies    Isolation: None   Infection: None   Code Status: CPR    Ht: 167.6 cm (66\")   Wt: 98.7 kg (217 lb 9.5 oz)    Admission Cmt: None   Principal Problem: Chest pain, unspecified type [R07.9]                 Active Insurance as of 2023     Primary Coverage     Payor Plan Insurance Group Employer/Plan Group    HUMANA MEDICARE REPLACEMENT HUMANA MEDICARE REPLACEMENT [id number]     Payor Plan Address Payor Plan Phone Number Payor Plan Fax Number Effective Dates    [address] [address] [phone]  2023 - None Entered    [facility] [id number]       Subscriber Name Subscriber Birth Date Member ID       ANDREE SALES 1955 [id number]                 Emergency Contacts      (Rel.) Home Phone Work Phone Mobile Phone    AUSTIN SALES (Spouse) [phone] -- [phone]               History & Physical      AlsoGemma dexter APRN at 23 2342     Attestation signed by Krista See DO at 23 0508    Documentation reviewed, agree with plan of NP supervised by physician.  There were no concerns on patient care and management made known by the NP to supervising physician.                      [facility] Medicine Services  History & Physical    Patient Name: Andree Sales  : " 1955  MRN: [id number]  Primary Care Physician:  Naga Griffith PA-C  Date of admission: 4/30/2023  Date and Time of Service: 5/1/2023 at 0008    Subjective       Chief Complaint:     History of Present Illness: Anrdee Deluna is a 67 y.o. female with past medical history of stroke, hypertension, hyperlipidemia, anxiety who presented to [facility] on 4/30/2023 complaining of intermittent midsternal chest pressure that has been ongoing for several months, but was progressively worse on 4/30/2023 after eating dinner.  Chest pain was alleviated after sublingual nitro given in the ED.  Aggravated by exertion and better with rest.  Patient complains of radiation between her shoulder blades.  Associated symptoms of lightheadedness, shortness of air, nausea, vomiting.  She denies any recent medication changes.  She has a history of left shoulder arthritis, and complains of left shoulder and arm pain.  She denies any cardiac history, however states both her mother and father had heart attacks at a young age.    In the ED, chest x-ray pending.  EKG showed ST depression.  Initial high speed troponin 54.  All other labs unremarkable.  All vital signs unremarkable except /96.  Patient admitted to hospitalist service for further evaluation and treatment.    Review of Systems   Constitutional: Negative.   HENT: Negative.    Eyes: Negative.    Cardiovascular: Positive for chest pain and dyspnea on exertion. Negative for syncope.   Respiratory: Negative.    Endocrine: Negative.    Skin: Negative.    Musculoskeletal: Negative.    Gastrointestinal: Negative.    Genitourinary: Negative.    Neurological: Negative.    Psychiatric/Behavioral: Negative.    Allergic/Immunologic: Negative.         Personal History     Past Medical History:   Diagnosis Date   • ADHD unknown   • Anemia    • Anxiety    • Carotid artery disease     80% right internal carotid artery   • CVA (cerebral vascular accident)     right  parietal right temporal   • DDD (degenerative disc disease), lumbar    • Depression    • Extremity pain     Ankle pain   • GERD (gastroesophageal reflux disease)    • Hyperlipidemia unknown   • Hypertension    • Insomnia unknown   • Laryngopharyngeal reflux    • Low back pain    • Mood disorder    • Obesity unknown   • Skin cancer of face    • Sleep apnea     Suspected sleep apnea she has refused to be tested   • Stroke (cerebrum)    • Visual field defect     Left       Past Surgical History:   Procedure Laterality Date   • CAROTID ENDARTERECTOMY Right 11/10/2020    Procedure: CAROTID ENDARTERECTOMY;  Surgeon: Tavo Das MD;  Location: [facility] MAIN OR;  Service: Vascular;  Laterality: Right;   • CHOLECYSTECTOMY     • COLONOSCOPY      2016   • FINGER SURGERY     • KNEE ARTHROPLASTY     • KNEE SURGERY Right     replaced   • LAPAROSCOPIC GASTRIC BANDING     • ROTATOR CUFF REPAIR Right 2019   • SHOULDER SURGERY Right 05/24/2019    scope/ cuff repair   • TUBAL ABDOMINAL LIGATION         Family History: family history includes Diabetes in her mother and another family member; Heart disease in her father and mother. Otherwise pertinent FHx was reviewed and not pertinent to current issue.    Social History:  reports that she has never smoked. She has never used smokeless tobacco. She reports that she does not drink alcohol and does not use drugs.    Home Medications:  Prior to Admission Medications     Prescriptions Last Dose Informant Patient Reported? Taking?    albuterol sulfate  (90 Base) MCG/ACT inhaler   No No    Inhale 2 puffs Every 6 (Six) Hours As Needed for Shortness of Air.    Patient not taking:  Reported on 3/16/2023    albuterol sulfate  (90 Base) MCG/ACT inhaler   No No    Inhale 2 puffs Every 4 (Four) Hours As Needed for Wheezing.    Patient not taking:  Reported on 3/16/2023    ALPRAZolam (XANAX) 2 MG tablet   No No    TAKE 1 TABLET BY MOUTH AT NIGHT AS NEEDED FOR ANXIETY     amLODIPine (NORVASC) 2.5 MG tablet   No No    TAKE ONE TABLET BY MOUTH DAILY    aspirin 81 MG chewable tablet   No No    Chew 1 tablet Daily.    atorvastatin (LIPITOR) 80 MG tablet   No No    TAKE ONE TABLET BY MOUTH ONCE NIGHTLY    buPROPion XL (WELLBUTRIN XL) 150 MG 24 hr tablet   No No    TAKE ONE TABLET BY MOUTH DAILY    lamoTRIgine (LaMICtal) 100 MG tablet   No No    TAKE ONE TABLET BY MOUTH TWICE A DAY    lisinopril-hydrochlorothiazide (PRINZIDE,ZESTORETIC) 20-25 MG per tablet   No No    TAKE ONE TABLET BY MOUTH DAILY    metoprolol succinate XL (TOPROL-XL) 25 MG 24 hr tablet   No No    TAKE ONE TABLET BY MOUTH DAILY    omeprazole (priLOSEC) 40 MG capsule   No No    TAKE ONE CAPSULE BY MOUTH DAILY    oxybutynin (DITROPAN) 5 MG tablet   No No    Take 1 tablet by mouth Daily.    promethazine-dextromethorphan (PROMETHAZINE-DM) 6.25-15 MG/5ML syrup   No No    Take 5 mL by mouth 4 (Four) Times a Day As Needed for Cough.    vilazodone (Viibryd) 20 MG tablet tablet   No No    Take 1 tablet by mouth Daily.            Allergies:  No Known Allergies    Objective       Vitals:   Temp:  [97.8 °F (36.6 °C)] 97.8 °F (36.6 °C)  Heart Rate:  [61-85] 67  Resp:  [16] 16  BP: (128-176)/(79-96) 128/82    Physical Exam  Vitals and nursing note reviewed.   Constitutional:       Appearance: Normal appearance. She is obese.   HENT:      Head: Normocephalic.      Right Ear: External ear normal.      Left Ear: External ear normal.      Nose: Nose normal.      Mouth/Throat:      Pharynx: Oropharynx is clear.   Eyes:      Extraocular Movements: Extraocular movements intact.   Cardiovascular:      Rate and Rhythm: Normal rate and regular rhythm.      Pulses: Normal pulses.      Heart sounds: Normal heart sounds.   Pulmonary:      Effort: Pulmonary effort is normal.      Breath sounds: Normal breath sounds.   Abdominal:      General: Bowel sounds are normal.      Palpations: Abdomen is soft.   Musculoskeletal:         General: Normal range  of motion.      Cervical back: Normal range of motion.   Skin:     General: Skin is warm and dry.   Neurological:      Mental Status: She is alert and oriented to person, place, and time.   Psychiatric:         Mood and Affect: Mood normal.         Behavior: Behavior normal.        Result Review    Result Review:  I have personally reviewed the results from the time of this admission to 5/1/2023 00:34 EDT and agree with these findings:  [x]  Laboratory  []  Microbiology  [x]  Radiology  [x]  EKG/Telemetry   []  Cardiology/Vascular   []  Pathology  []  Old records  []  Other:  Most notable findings include: as above      Assessment & Plan        Active Hospital Problems:  Active Hospital Problems    Diagnosis    • **Chest pain, unspecified type    • Chronic cough    • History of stroke    • Overactive bladder    • GERD with esophagitis    • Mood disorder    • Hyperlipidemia    • Obesity    • Anxiety    • Hypertension      Plan:     Chest pain, unspecified type  Hyperlipidemia  Hypertension  -Chest x-ray pending  -EKG showed ST depression  -Initial high-sensitivity troponin 54  -Serial troponins ordered  -Lipid panel unremarkable on 8/18/2022  -N.p.o. after midnight  -Continue home beta-blocker, lisinopril-hydrochlorothiazide, aspirin, Norvasc, statin  -Cardiology consult      GERD with esophagitis  -Continue home Prilosec    Overactive bladder    History of stroke  -No deficits    Chronic cough     Mood disorder  -Stable  -Continue home vilazodone, Xanax (INSPECT verified)    Obesity  -BMI 32.99  -Encourage lifestyle and dietary modifications    DVT prophylaxis:  Mechanical DVT prophylaxis orders are present.    CODE STATUS:    Code Status (Patient has no pulse and is not breathing): CPR (Attempt to Resuscitate)  Medical Interventions (Patient has pulse or is breathing): Full Support    Admission Status:  I believe this patient meets observation status.    I discussed the patient's findings and my recommendations  with patient and family.    This patient has been examined wearing appropriate Personal Protective Equipment. 05/01/23      Signature: Electronically signed by SARAHY Jose, 05/01/23, 00:34 EDT.  [facility]    Electronically signed by Krista See DO at 05/01/23 4512

## 2023-05-09 NOTE — PLAN OF CARE
"Andree Deluna presents with functional mobility impairments which indicate the need for skilled intervention. Tolerating session today without incident.  Pt continues to exhibit confusion and not able to state year.  Pt exhibits issues with word finding and verbalizes sentences that do not make sense at times.  RN and NP aware of confusion.  Pt requiring verbal cues for sternal precautions and safety with ambulation due to impaired cognition.  However pt exhibiting progress toward mobility goals.  Continue to anticipate home with assist of  with improved cognition.  Will continue to follow and progress as tolerated.     Plan/Recommendations:   Low Intensity Therapy recommended post-acute care - This is recommended as therapy feels this patient would require 2-3 visits per week. OP or HH would be the best option depending on patient's home bound status. Consider, if the patient has other  \"skilled\" needs such as wounds, IV antibiotics, etc. Combined with \"low intensity\" could also equate to a SNF. If patient is medically complex, consider LTAC.. Pt requires no DME at discharge.     Pt desires Home with family assist and and Home Health at discharge. Pt cooperative; agreeable to therapeutic recommendations and plan of care.   "

## 2023-05-09 NOTE — THERAPY TREATMENT NOTE
"Subjective: Pt agreeable to therapeutic plan of care.    Objective:     Bed mobility - Min-A  Transfers - CGA and Min-A  Ambulation - 250 ft CGA, Min-A and with rolling walker     Cardiac level IV    Vitals: WNL    Pain: 3 VAS   Location: chest discomfort from coughing  Intervention for pain: Repositioned and Increased Activity    Education: Provided education on the importance of mobility in the acute care setting, Transfer Training, Gait Training and Post-Op Precautions    Assessment: Andree Deluna presents with functional mobility impairments which indicate the need for skilled intervention. Tolerating session today without incident.  Pt continues to exhibit confusion and not able to state year.  Pt exhibits issues with word finding and verbalizes sentences that do not make sense at times.  RN and NP aware of confusion.  Pt requiring verbal cues for sternal precautions and safety with ambulation due to impaired cognition.  However pt exhibiting progress toward mobility goals.  Continue to anticipate home with assist of  with improved cognition.  Will continue to follow and progress as tolerated.     Plan/Recommendations:   Low Intensity Therapy recommended post-acute care - This is recommended as therapy feels this patient would require 2-3 visits per week. OP or HH would be the best option depending on patient's home bound status. Consider, if the patient has other  \"skilled\" needs such as wounds, IV antibiotics, etc. Combined with \"low intensity\" could also equate to a SNF. If patient is medically complex, consider LTAC.. Pt requires no DME at discharge.     Pt desires Home with family assist and and Home Health at discharge. Pt cooperative; agreeable to therapeutic recommendations and plan of care.     Basic Mobility 6-click:  Rollin = Total, A lot = 2, A little = 3; 4 = None  Supine>Sit:   1 = Total, A lot = 2, A little = 3; 4 = None   Sit>Stand with arms:  1 = Total, A lot = 2, A little " = 3; 4 = None  Bed>Chair:   1 = Total, A lot = 2, A little = 3; 4 = None  Ambulate in room:  1 = Total, A lot = 2, A little = 3; 4 = None  3-5 Steps with railin = Total, A lot = 2, A little = 3; 4 = None  Score: 18    Modified [place]: 4 = Moderately severe disability (Unable to attend to own bodily needs without assistance, and unable to walk unassisted)     Post-Tx Position: Supine with HOB Elevated, Alarms activated and Call light and personal items within reach  PPE: gloves, surgical mask and gown

## 2023-05-09 NOTE — PROGRESS NOTES
Enter Query Response Below      Query Response: chronic HFpEF with volume overload    Electronically signed by SARAHY Nayak, 05/09/23, 4:35 PM EDT.              If applicable, please update the problem list.

## 2023-05-09 NOTE — CASE MANAGEMENT/SOCIAL WORK
Continued Stay Note  [facility]     Patient Name: Andree Deluna  MRN: [id number]  Today's Date: 5/9/2023    Admit Date: 4/30/2023    Plan: Dc Plan: Home with [facility] accepted and following. CABG 5/6/23.   Discharge Plan     Row Name 05/09/23 1641       Plan    Plan Dc Plan: Home with [facility] accepted and following. CABG 5/6/23.    Provided Post Acute Provider List? N/A    Provided Post Acute Provider Quality & Resource List? N/A    Plan Comments CM spoke with patient’s nurse and [facility] NP Tina Hall to obtain clinical updates. PT/OT recommending home with home health services. CM obtained DC choices from patient room and the following selections were made. 1) [facility], 2)[facility], and 3) [facility]. CM placed referral in basket for [facility] and liajose miguel Nath informed. Pham confirmed acceptance this afternoon. CM will continue to follow for any further needs and adjust discharge plan accordingly. DC Barriers: POD 3 OHS, pacer wires, and cardiac monitoring.           Expected Discharge Date and Time     Expected Discharge Date Expected Discharge Time    May 12, 2023             Hattie Alicea RN     Office Phone: [phone]  Office Cell:     [phone]

## 2023-05-10 LAB
ANION GAP SERPL CALCULATED.3IONS-SCNC: 10 MMOL/L (ref 5–15)
BUN SERPL-MCNC: 14 MG/DL (ref 8–23)
BUN/CREAT SERPL: 26.4 (ref 7–25)
CALCIUM SPEC-SCNC: 8.8 MG/DL (ref 8.6–10.5)
CHLORIDE SERPL-SCNC: 100 MMOL/L (ref 98–107)
CO2 SERPL-SCNC: 30 MMOL/L (ref 22–29)
CREAT SERPL-MCNC: 0.53 MG/DL (ref 0.57–1)
DEPRECATED RDW RBC AUTO: 45.1 FL (ref 37–54)
EGFRCR SERPLBLD CKD-EPI 2021: 102.8 ML/MIN/1.73
ERYTHROCYTE [DISTWIDTH] IN BLOOD BY AUTOMATED COUNT: 14.2 % (ref 12.3–15.4)
GLUCOSE BLDC GLUCOMTR-MCNC: 105 MG/DL (ref 70–105)
GLUCOSE BLDC GLUCOMTR-MCNC: 113 MG/DL (ref 70–105)
GLUCOSE BLDC GLUCOMTR-MCNC: 126 MG/DL (ref 70–105)
GLUCOSE BLDC GLUCOMTR-MCNC: 134 MG/DL (ref 70–105)
GLUCOSE SERPL-MCNC: 124 MG/DL (ref 65–99)
HCT VFR BLD AUTO: 34 % (ref 34–46.6)
HGB BLD-MCNC: 10.9 G/DL (ref 12–15.9)
MCH RBC QN AUTO: 29.3 PG (ref 26.6–33)
MCHC RBC AUTO-ENTMCNC: 32.1 G/DL (ref 31.5–35.7)
MCV RBC AUTO: 91.2 FL (ref 79–97)
PLATELET # BLD AUTO: 239 10*3/MM3 (ref 140–450)
PMV BLD AUTO: 8.9 FL (ref 6–12)
POTASSIUM SERPL-SCNC: 3.1 MMOL/L (ref 3.5–5.2)
POTASSIUM SERPL-SCNC: 4 MMOL/L (ref 3.5–5.2)
RBC # BLD AUTO: 3.72 10*6/MM3 (ref 3.77–5.28)
SODIUM SERPL-SCNC: 140 MMOL/L (ref 136–145)
WBC NRBC COR # BLD: 10 10*3/MM3 (ref 3.4–10.8)

## 2023-05-10 PROCEDURE — 25010000002 ENOXAPARIN PER 10 MG

## 2023-05-10 PROCEDURE — 85027 COMPLETE CBC AUTOMATED: CPT

## 2023-05-10 PROCEDURE — 94799 UNLISTED PULMONARY SVC/PX: CPT

## 2023-05-10 PROCEDURE — 99232 SBSQ HOSP IP/OBS MODERATE 35: CPT | Performed by: INTERNAL MEDICINE

## 2023-05-10 PROCEDURE — 82948 REAGENT STRIP/BLOOD GLUCOSE: CPT

## 2023-05-10 PROCEDURE — 80048 BASIC METABOLIC PNL TOTAL CA: CPT

## 2023-05-10 PROCEDURE — 84132 ASSAY OF SERUM POTASSIUM: CPT

## 2023-05-10 PROCEDURE — 25010000002 FUROSEMIDE PER 20 MG: Performed by: NURSE PRACTITIONER

## 2023-05-10 RX ORDER — FUROSEMIDE 40 MG/1
40 TABLET ORAL DAILY
Status: DISCONTINUED | OUTPATIENT
Start: 2023-05-11 | End: 2023-05-13 | Stop reason: HOSPADM

## 2023-05-10 RX ORDER — POTASSIUM CHLORIDE 20 MEQ/1
40 TABLET, EXTENDED RELEASE ORAL 2 TIMES DAILY WITH MEALS
Status: DISCONTINUED | OUTPATIENT
Start: 2023-05-10 | End: 2023-05-10

## 2023-05-10 RX ORDER — POTASSIUM CHLORIDE 20 MEQ/1
40 TABLET, EXTENDED RELEASE ORAL DAILY
Status: DISCONTINUED | OUTPATIENT
Start: 2023-05-11 | End: 2023-05-13 | Stop reason: HOSPADM

## 2023-05-10 RX ADMIN — METOPROLOL TARTRATE 25 MG: 25 TABLET, FILM COATED ORAL at 21:13

## 2023-05-10 RX ADMIN — SENNOSIDES AND DOCUSATE SODIUM 2 TABLET: 50; 8.6 TABLET ORAL at 21:12

## 2023-05-10 RX ADMIN — METOPROLOL TARTRATE 25 MG: 25 TABLET, FILM COATED ORAL at 08:04

## 2023-05-10 RX ADMIN — MUPIROCIN 1 APPLICATION: 20 OINTMENT TOPICAL at 08:05

## 2023-05-10 RX ADMIN — LIDOCAINE 2 PATCH: 700 PATCH TOPICAL at 08:05

## 2023-05-10 RX ADMIN — FUROSEMIDE 40 MG: 10 INJECTION, SOLUTION INTRAMUSCULAR; INTRAVENOUS at 02:07

## 2023-05-10 RX ADMIN — ACETAMINOPHEN 650 MG: 325 TABLET, FILM COATED ORAL at 08:05

## 2023-05-10 RX ADMIN — POTASSIUM CHLORIDE 40 MEQ: 1500 TABLET, EXTENDED RELEASE ORAL at 05:53

## 2023-05-10 RX ADMIN — POTASSIUM CHLORIDE 20 MEQ: 1500 TABLET, EXTENDED RELEASE ORAL at 08:09

## 2023-05-10 RX ADMIN — ASPIRIN 81 MG: 81 TABLET, COATED ORAL at 08:04

## 2023-05-10 RX ADMIN — MUPIROCIN 1 APPLICATION: 20 OINTMENT TOPICAL at 21:13

## 2023-05-10 RX ADMIN — POTASSIUM CHLORIDE 40 MEQ: 1500 TABLET, EXTENDED RELEASE ORAL at 15:38

## 2023-05-10 RX ADMIN — ALPRAZOLAM 2 MG: 1 TABLET ORAL at 21:12

## 2023-05-10 RX ADMIN — PANTOPRAZOLE SODIUM 40 MG: 40 TABLET, DELAYED RELEASE ORAL at 05:55

## 2023-05-10 RX ADMIN — POTASSIUM CHLORIDE 40 MEQ: 1500 TABLET, EXTENDED RELEASE ORAL at 10:33

## 2023-05-10 RX ADMIN — ATORVASTATIN CALCIUM 40 MG: 40 TABLET, FILM COATED ORAL at 21:13

## 2023-05-10 RX ADMIN — ENOXAPARIN SODIUM 40 MG: 100 INJECTION SUBCUTANEOUS at 15:39

## 2023-05-10 RX ADMIN — CHLORHEXIDINE GLUCONATE 15 ML: 1.2 SOLUTION ORAL at 08:04

## 2023-05-10 NOTE — PLAN OF CARE
Goal Outcome Evaluation:  Plan of Care Reviewed With: patient, family        Progress: improving  Outcome Evaluation: Patient remains on no continuous drips and room air this shift. Patient remains confused at times but answers orientation questions appropriately. VSS. Plans to hopefully discharge tomorrow.       Discussed confusion with YURIY Wilder. Confusion is not new per NP

## 2023-05-10 NOTE — PROGRESS NOTES
S/P POD# 4 CABG x3 with LIMA/ MV repair--[place]  EF 50% (cath)    Subjective:  Confusion resolved, she is asking when she can go home.  She did report a pounding headache upon awakening this morning that resolved with Tylenol.    No events overnight  Wt is even from preop        Intake/Output Summary (Last 24 hours) at 5/10/2023 0804  Last data filed at 5/10/2023 0600  Gross per 24 hour   Intake 690 ml   Output 1850 ml   Net -1160 ml     Temp:  [97.4 °F (36.3 °C)-98.3 °F (36.8 °C)] 98.3 °F (36.8 °C)  Heart Rate:  [72-89] 80  Resp:  [20-24] 24  BP: (110-149)/(64-96) 129/78      Results from last 7 days   Lab Units 05/10/23  0408 05/09/23  0809 05/07/23  0634 05/07/23  0251 05/06/23  1231 05/06/23  1157 05/06/23  0735 05/06/23  0451   WBC 10*3/mm3 10.00 11.20*   < > 9.50   < > 5.40  --  8.10   HEMOGLOBIN g/dL 10.9* 10.4*   < > 9.8*   < > 9.6*  --  11.9*   HEMOGLOBIN, POC   --   --   --   --    < >  --    < >  --    HEMATOCRIT % 34.0 32.7*   < > 30.4*   < > 29.3*  --  36.5   HEMATOCRIT POC   --   --   --   --    < >  --    < >  --    PLATELETS 10*3/mm3 239 175   < > 132*   < > 117*  --  247   INR   --   --   --  1.12*  --  1.17*  --  0.96    < > = values in this interval not displayed.     Results from last 7 days   Lab Units 05/10/23  0408 05/09/23  0809 05/08/23  0311 05/07/23  1302   CREATININE mg/dL 0.53* 0.51*   < >  --    POTASSIUM mmol/L 3.1* 3.9   < >  --    SODIUM mmol/L 140 142   < >  --    MAGNESIUM mg/dL  --  2.1  --   --    PHOSPHORUS mg/dL  --   --   --  4.0    < > = values in this interval not displayed.         Physical Exam:  Neuro intact, nad, up in chair, /sister at bedside  Tele:  SR 80s  Diminished bases, 97% RA  Sternotomy/ SVHS healing well  Benign abd, + BM  No edema    Assessment/Plan:  Principal Problem:    Chest pain, unspecified type  Active Problems:    Mood disorder    Hyperlipidemia    Hypertension    Obesity    GERD with esophagitis    Overactive bladder    History of  stroke    Chronic cough    Elevated troponin    Non-STEMI (non-ST elevated myocardial infarction)    Coronary artery disease involving native coronary artery of native heart with unstable angina pectoris    Abnormal findings on diagnostic imaging of heart and coronary circulation    - MV CAD with NSTEMI presentation, severe mitral regurgitation, EF 50% (cath)--s/p CABG x3/ MV repair ([facility])  - HTN--stable  - HLD--statin  - Hyperglycemia--a1c 5.8, SSI  - Chronic HFpEF with volume overload--work towards GDMT  - Hx stroke ()--s/p right CEA (Dr. Das, )  - Strong family hx premature CAD--father  of MI age 54/ mother  from MI age 64  - Anxiety--on home Xanax, she is no longer on Viibyrd  - Obesity, stage 1--BMI 33.2  - Postop confusion, multifactorial--resolved  - Postop leukocytosis likely r/t atel--pulm toileting  - Postop ABLA, expected--watch closely    POD# 4.  Doing well.  Transition diuretics to oral.  D/w family/patient at length at bedside.  Mobilize.  On asa/statin/bb.  Replete e-.  Plans for [facility]--nsg/PT/OT at discharge.  Patient should ambulate numerous times today and shower.  Anticipate discharge tomorrow.    Routine care--as above  D/w pt/nsg/family, .Dr. Noonan  Anticipate home at discharge    Tina Chao, SARAHY  5/10/2023  08:04 EDT

## 2023-05-10 NOTE — CASE MANAGEMENT/SOCIAL WORK
.Continued Stay Note  [facility]     Patient Name: Andree Deluna  MRN: [id number]  Today's Date: 5/10/2023    Admit Date: 4/30/2023    Plan: Dc Plan: Home with [facility] accepted and following. CABG 5/6/23.   Discharge Plan     Row Name 05/10/23 1338       Plan    Plan Dc Plan: Home with [facility] accepted and following. CABG 5/6/23.    Provided Post Acute Provider List? N/A    Provided Post Acute Provider Quality & Resource List? N/A    Plan Comments CM spoke with patient’s nurse and [facility] NP Tina Hall to obtain clinical updates. Home Health order placed. NP anticipates patient will DC home tomorrow 5/11/23. CM will continue to follow for any further needs and adjust discharge plan accordingly. DC Barriers: POD 4 OHS, IV Lasix, and cardiac monitoring.           Expected Discharge Date and Time     Expected Discharge Date Expected Discharge Time    May 11, 2023             Hattie Alicea RN     Office Phone: [phone]  Office Cell:     [phone]'

## 2023-05-10 NOTE — CONSULTS
Nutrition Services    Patient Name: Andree Deluna  YOB: 1955  MRN: [id number]  Admission date: 4/30/2023    Comment:  -- Add Boost Plus BID (Provides 720 kcals, 28 g protein if consumed)       PPE Documentation        PPE Worn By Provider mask, gloves and eye protection   PPE Worn By Patient  None      CLINICAL NUTRITION ASSESSMENT      Reason for Assessment 5/10: LOS x 10 days      H&P      Past Medical History:   Diagnosis Date   • ADHD unknown   • Anemia    • Anxiety    • Carotid artery disease     80% right internal carotid artery   • CVA (cerebral vascular accident)     right parietal right temporal   • DDD (degenerative disc disease), lumbar    • Depression    • Extremity pain     Ankle pain   • GERD (gastroesophageal reflux disease)    • Hyperlipidemia unknown   • Hypertension    • Insomnia unknown   • Laryngopharyngeal reflux    • Low back pain    • Mood disorder    • Obesity unknown   • Skin cancer of face    • Sleep apnea     Suspected sleep apnea she has refused to be tested   • Stroke (cerebrum)    • Visual field defect     Left       Past Surgical History:   Procedure Laterality Date   • CARDIAC CATHETERIZATION N/A 5/1/2023    Procedure: Left Heart Cath;  Surgeon: Kye Alcaraz MD;  Location: [facility] CATH INVASIVE LOCATION;  Service: Cardiovascular;  Laterality: N/A;   • CAROTID ENDARTERECTOMY Right 11/10/2020    Procedure: CAROTID ENDARTERECTOMY;  Surgeon: Tavo Das MD;  Location: [facility] MAIN OR;  Service: Vascular;  Laterality: Right;   • CHOLECYSTECTOMY     • COLONOSCOPY      2016   • CORONARY ARTERY BYPASS GRAFT N/A 5/6/2023    Procedure: CORONARY ARTERY BYPASS GRAFTING;  Surgeon: Errol Noonan MD;  Location: [facility] CVOR;  Service: Cardiothoracic;  Laterality: N/A;  CABG X  3 using LIMA and right endospahenous vein;  2 coronary markers implanted.    • FINGER SURGERY     • KNEE ARTHROPLASTY     • KNEE SURGERY Right     replaced   • LAPAROSCOPIC  "GASTRIC BANDING     • MITRAL VALVE REPAIR/REPLACEMENT N/A 5/6/2023    Procedure: MITRAL VALVE REPAIR/REPLACEMENT;  Surgeon: Errol Noonan MD;  Location: [facility];  Service: Cardiothoracic;  Laterality: N/A;  Mitral valve repaired using 28 mm Jorge Physio II Annuloplasty Ring.    • ROTATOR CUFF REPAIR Right 2019   • SHOULDER SURGERY Right 05/24/2019    scope/ cuff repair   • TRANSESOPHAGEAL ECHOCARDIOGRAM (GALA) N/A 5/6/2023    Procedure: TRANSESOPHAGEAL ECHOCARDIOGRAM WITH ANESTHESIA;  Surgeon: Errol Noonan MD;  Location: [facility];  Service: Cardiothoracic;  Laterality: N/A;   • TUBAL ABDOMINAL LIGATION          Current Problems   Chest pain  Hyperlipidemia  Hypertension  -Cardiology following   -S/P CABG x 3 on 5/6   -working with PT/OT    GERD with esophagitis     Overactive bladder     History of stroke     Chronic cough      Mood disorder       Encounter Information        Trending Narrative     5/10: Admitted for chest pressure/pain.  S/P CABG 5/6.  RD visited patient at bedside.  Sitter visiting.  Patient reports decreased appetite, not a fan of the hospital food.  Admits she also has no appetite for outside food her  brings in as well.  Patient reports no N/V, no chewing/swallowing issues noted, reports recent weight loss of 5# since admission but able to report her weight PTA.  NFPE completed and not consistent with nutrition diagnosis of malnutrition at this time using AND/ASPEN criteria.         Anthropometrics        Current Height, Weight Height: 167.6 cm (66\")  Weight: 93.4 kg (206 lb) (05/10/23 0600)       Ideal Body Weight (IBW) 130#   Usual Body Weight (UBW) Patient unsure        Trending Weight Hx     This admission: 5/10: 204-218# range since admission              PTA: No weight loss per chart review     Wt Readings from Last 30 Encounters:   05/10/23 0600 93.4 kg (206 lb)   05/09/23 0436 98.7 kg (217 lb 9.5 oz)   05/08/23 0500 98.9 kg (218 lb 1.9 oz)   05/07/23 " 0516 96.2 kg (212 lb 1.6 oz)   05/06/23 0600 91.7 kg (202 lb 2.6 oz)   05/05/23 0500 91.8 kg (202 lb 6.4 oz)   05/04/23 0600 93.8 kg (206 lb 12.7 oz)   05/03/23 0500 91.4 kg (201 lb 6.4 oz)   05/02/23 1218 93 kg (205 lb)   05/02/23 0500 93.4 kg (205 lb 14.6 oz)   05/01/23 0052 92.7 kg (204 lb 5.9 oz)   04/30/23 2132 92.7 kg (204 lb 5.9 oz)   03/16/23 1355 95.3 kg (210 lb)   01/19/23 1410 94.3 kg (208 lb)   11/07/22 1538 97.3 kg (214 lb 9.6 oz)   10/19/22 1449 97.1 kg (214 lb)   08/18/22 1318 96.2 kg (212 lb)   06/30/22 1326 94.8 kg (209 lb)   06/14/22 1322 94.8 kg (209 lb)   05/18/22 1021 94.8 kg (209 lb)   05/16/22 1405 94.8 kg (209 lb)   03/07/22 1554 94.8 kg (209 lb)   02/22/22 1459 95.2 kg (209 lb 12.8 oz)   02/18/22 1304 99.8 kg (220 lb)   02/14/22 1027 99.8 kg (220 lb)   01/14/22 1341 93.4 kg (206 lb)   10/25/21 1441 96.2 kg (212 lb)   09/28/21 1357 98 kg (216 lb)   06/22/21 1400 96.8 kg (213 lb 6 oz)   01/29/21 1303 95.7 kg (211 lb)   12/11/20 1308 94.5 kg (208 lb 6.4 oz)   11/10/20 1512 98.8 kg (217 lb 13 oz)   11/10/20 1050 95.1 kg (209 lb 9.6 oz)   10/21/20 1419 90.7 kg (200 lb)   10/09/20 1310 96.5 kg (212 lb 12.8 oz)   09/10/20 1101 99.8 kg (220 lb)   12/20/19 1152 90.7 kg (200 lb)   12/04/19 1754 86.2 kg (190 lb)   11/15/19 1434 87.9 kg (193 lb 12.8 oz)   10/23/19 1046 83 kg (183 lb)   09/20/19 1309 87.5 kg (192 lb 12.8 oz)   08/21/19 0831 86.2 kg (190 lb)   08/09/19 0620 83 kg (182 lb 15.7 oz)   08/08/19 0600 82.7 kg (182 lb 5.1 oz)   08/07/19 0636 82.5 kg (181 lb 14.1 oz)   08/06/19 1111 81.8 kg (180 lb 6.4 oz)      BMI kg/m2 Body mass index is 33.25 kg/m².       Labs        Pertinent Labs    Results from last 7 days   Lab Units 05/10/23  0408 05/09/23  0809 05/08/23  0311 05/07/23  0251 05/06/23  1909 05/06/23  1157 05/06/23  0451   SODIUM mmol/L 140 142 146* 148* 145   < > 141   POTASSIUM mmol/L 3.1* 3.9 3.3* 4.4 3.3*   < > 4.3   CHLORIDE mmol/L 100 103 108* 113* 112*   < > 105   CO2 mmol/L 30.0*  30.0* 27.0 25.0 22.0   < > 24.0   BUN mg/dL 14 17 10 12 11   < > 14   CREATININE mg/dL 0.53* 0.51* 0.75 0.77 0.83   < > 0.72   CALCIUM mg/dL 8.8 9.1 8.5* 8.3* 8.7   < > 9.0   BILIRUBIN mg/dL  --   --   --  0.8 1.7*  --  0.3   ALK PHOS U/L  --   --   --  75 71  --  97   ALT (SGPT) U/L  --   --   --  61* 74*  --  17   AST (SGOT) U/L  --   --   --  86* 120*  --  25   GLUCOSE mg/dL 124* 100* 105* 142* 161*   < > 116*    < > = values in this interval not displayed.     Results from last 7 days   Lab Units 05/10/23  0408 05/09/23  0809 05/08/23  0311 05/07/23  1302 05/07/23  0251 05/06/23  1909 05/06/23  0735 05/06/23  0451   MAGNESIUM mg/dL  --  2.1  --   --   --  2.8*  --  2.3   PHOSPHORUS mg/dL  --   --   --  4.0  --  1.6*   < >  --    HEMOGLOBIN g/dL 10.9* 10.4*   < >  --    < >  --    < > 11.9*   HEMOGLOBIN, POC   --   --   --   --   --   --    < >  --    HEMATOCRIT % 34.0 32.7*   < >  --    < >  --    < > 36.5   HEMATOCRIT POC   --   --   --   --   --   --    < >  --     < > = values in this interval not displayed.     COVID19   Date Value Ref Range Status   05/04/2023 Not Detected Not Detected - Ref. Range Final     Lab Results   Component Value Date    HGBA1C 5.80 (H) 04/30/2023        Medications    Scheduled Medications aspirin, 81 mg, Oral, Daily  atorvastatin, 40 mg, Oral, Nightly  chlorhexidine, 15 mL, Mouth/Throat, Q12H  enoxaparin, 40 mg, Subcutaneous, Q24H  [START ON 5/11/2023] furosemide, 40 mg, Oral, Daily  insulin lispro, 2-7 Units, Subcutaneous, 4x Daily With Meals & Nightly  lidocaine, 2 patch, Transdermal, Q24H  metoprolol tartrate, 25 mg, Oral, Q12H  mupirocin, 1 application, Each Nare, BID  pantoprazole, 40 mg, Oral, Q AM  polyethylene glycol, 17 g, Oral, BID  [START ON 5/11/2023] potassium chloride, 40 mEq, Oral, Daily  senna-docusate sodium, 2 tablet, Oral, Nightly        Infusions      PRN Medications •  acetaminophen **OR** acetaminophen **OR** acetaminophen  •  ALPRAZolam  •  bisacodyl  •   dextrose  •  dextrose  •  glucagon (human recombinant)  •  HYDROcodone-acetaminophen  •  [DISCONTINUED] Morphine **AND** naloxone  •  ondansetron  •  potassium chloride **OR** potassium chloride **OR** potassium chloride     Physical Findings        Trending Physical   Appearance, NFPE 5/10: NFPE completed and not consistent with nutrition diagnosis of malnutrition at this time using AND/ASPEN criteria      --  Edema  No edema documented      Bowel Function Last BM 5/9 (yesterday)     Tubes No feeding tube      Chewing/Swallowing Patient denies issues      Skin Incisions      --  Current Nutrition Orders & Evaluation of Intake       Oral Nutrition     Food Allergies NKFA   Current PO Diet Diet: Cardiac Diets, Diabetic Diets; Healthy Heart (2-3 Na+); Consistent Carbohydrate; Texture: Regular Texture (IDDSI 7); Fluid Consistency: Thin (IDDSI 0)   Supplement None ordered    PO Evaluation     Trending % PO Intake 5/10: 0-25% since CABG, % prior to surgery    --  Nutritional Risk Screening        NRS-2002 Score          Nutrition Diagnosis         Nutrition Dx Problem 1 Inadequate energy intake related to intake less than needs as evidenced by PO intakes less than 25% since CABG.        Nutrition Dx Problem 2        Intervention Goal         Intervention Goal(s) Accept ONS  PO intakes at least 50%  Stable weight      Nutrition Intervention        RD Action Add ONS     Nutrition Prescription          Diet Prescription Consistent CHO, Heart Healthy    Supplement Prescription Boost Glucose Control BID   --  Monitor/Evaluation        Monitor Per protocol, I&O, PO intake, Supplement intake, Pertinent labs, Weight, Skin status, GI status, Symptoms, POC/GOC       Electronically signed by:  Anita Goddard RD  05/10/23 12:35 EDT

## 2023-05-10 NOTE — PROGRESS NOTES
Enter Query Response Below      Query Response: Acute kidney injury.             If applicable, please update the problem list.   Patient: Andree Deluna        : 1955  Account: [id number]           Admit Date:         How to Respond to this query:       a. Click New Note     b. Answer query within the yellow box.                c. Update the Problem List, if applicable.      If you have any questions about this query contact me at: [phone].    :    Patient with no history of CKD. No baseline creatinine noted in current encounter.  Labs being monitored this admission.   Creatinine 1.15 (Highest this admission).   Creatinine 0.95.  5/3 Creatinine 0.81.   Creatinine 0.72, 0.76 and 0.83.   Creatinine 0.77.   Creatinine 0.51 (Lowest this admission).  5/10 Creatinine 0.53.  Patient received continuous IV fluids on  and IV Albumin on .    After study, can the patient's condition be further specified as;    - Acute kidney injury.  - Labs of no clinical significance.  - Other__________.  - Unable to determine.    KDIGO criteria is defined as any of the following:  - Increase in creatinine > or equal to 1.5x baseline (historical or measure), which is known or presumed to have occurred within the prior 7 days; or  - Increase in creatinine > or equal to 0.3 mg/dl from a measured baseline within 48 hours or less;  - Urine output <0.5ml/kg/hr for 6 hrs.  - When baseline creatinine is unknown, KDIGO advises: the lowest SCr obtained during a hospitalization is usually equal to or greater than the baseline. SCr should be used to diagnose (and stage) MCKENNA.    By submitting this query, we are merely seeking further clarification of documentation to accurately reflect all conditions that you are monitoring, evaluating, treating or that extend the hospitalization or utilize additional resources of care. Please utilize your independent clinical judgment when addressing the question(s)  above.     This query and your response, once completed, will be entered into the legal medical record.    Sincerely,  Rosanna Hernandes RN  Clinical Documentation Integrity Program

## 2023-05-10 NOTE — PROGRESS NOTES
Cardiology Progress Note    Patient Identification:  Name: Andree Deluna  Age: 67 y.o.  Sex: female  :  1955  MRN: [id number]                 Follow Up / Chief Complaint: NSTEMI  Chief Complaint   Patient presents with   • Chest Pain     Pt reports midsternal CP that radiates to back that started 2 hrs PTA.  Pt describes pain as a squeezing pain that is intermittent.  With associated SOA.  Pt reports she took 2 81mg ASA PTA.        Interval History: Patient presented with chest pain. Underwent cardiac cath that showed MV CAD and CV surgery has been consulted.  Patient was scheduled for CABG 2023    NP NOTE:  Patient seen and examined; sitting in chair, no complaints other than wanting to go home. Hopefully home tomorrow.     Electronically signed by SARAHY Steele, 05/10/23, 11:22 AM EDT.    Cardiology attending addendum :    I have personally performed a face-to-face diagnostic evaluation, physical exam and reviewed data on this patient.  I have reviewed documentation done by me and nurse practitioner  and corrected as needed.  And agree with the different components of documentation.Greater than 50% of the time spent in the care of this patient was provided by attending consultant/me.           Subjective: Patient seen and examined.  Chart reviewed.  Labs reviewed.  Discussed with RN taking care of patient.  Patient has some intermittent confusion which is clearing up.  Denies any chest pain or shortness of breath.    Objective: HS troponin 54---120  2023: bmp unremarkable, CBC unremarkable   5/3/2023: glucose 116 unremarkable  CMP HLD 62 LDL 69 CBC normal.   2023: glucose 105, potassium 3.3  WBC 11.9 hgb 9.8   2023: glucose 100, CO2 30, WBC 11.2, hgb 10.4   5/10/2023: Glucose 124 potassium 3.1 hemoglobin 10.9      History of Present Illness:       Ms.Elisa LOVELY Deluna has PMH of     Hypertension  Diastolic dysfunction-echo 2019 revealing septal asymmetric hypertrophy EF 60%,  diastolic dysfunction  Dyslipidemia  CVA  80% right carotid disease  WEN  Carotid endarterectomy, cholecystectomy, tubal ligation, shoulder/knee/finger surgery  Family history of premature CAD father fatal MI at 54 mother fatal MI at 64.     Presented through emergency room 4/30/2023 with midsternal chest pain radiating to the back, squeezing in sensation, radiating to the left arm and neck, stated with shortness of breath and nausea.  Patient has been having symptoms since many months but has been progressively worse.     Work-up here 4/30/2023/5/1/2023 revealed elevated troponin at 54-->120.  Glucose elevated.  Potassium 3.4.  A1c of 5.8.  Chest x-ray stable cardiomegaly.  EKG done 4/30/2023 reviewed/interpreted by me reveals sinus rhythm with a rate of 74 bpm with  ST segment depression in 1 and aVL        Assessment:  :     New onset chest pain at rest concerning for unstable angina  Elevated troponin  CAD cardiac cath 5/1/2023 revealing multivessel coronary artery disease  CABG with LIMA to LAD, SVG to OM1 and SVG to PDA and mitral valve repair with physio #2 ring 5/6/2023  Hypertension  Dyslipidemia  History of CVA 2019 and carotid disease, right CEA 2020  PAD  Hyperglycemia, prediabetes with A1c of 5.8  Obesity with BMI over 30           Recommendations / Plan:         Telemetry is revealing sinus rhythm.  Patient has new onset prolonged chest pain and has elevated troponin consistent with acute non-ST elevation MI  Patient was started on aspirin, beta-blockers, statins.  We will continue as tolerated.   patient underwent cardiac cath 5/1/2023 which revealed severe triple-vessel disease.  Patient underwent three-vessel CABG and mitral valve repair 5/6/2023  Routine postsurgery care  Monitor rhythm  Monitor expected postsurgery blood loss anemia  Patient has some confusion we will continue to monitor mental status closely.  Monitor electrolytes and replete as needed.  Increase activity  Hopefully discharge  soon.  Discussed with RN taking care of patient to coordinate care.       Copied text in this portion of the note has been reviewed and is accurate as of 5/10/2023    Past Medical History:  Past Medical History:   Diagnosis Date   • ADHD unknown   • Anemia    • Anxiety    • Carotid artery disease     80% right internal carotid artery   • CVA (cerebral vascular accident)     right parietal right temporal   • DDD (degenerative disc disease), lumbar    • Depression    • Extremity pain     Ankle pain   • GERD (gastroesophageal reflux disease)    • Hyperlipidemia unknown   • Hypertension    • Insomnia unknown   • Laryngopharyngeal reflux    • Low back pain    • Mood disorder    • Obesity unknown   • Skin cancer of face    • Sleep apnea     Suspected sleep apnea she has refused to be tested   • Stroke (cerebrum)    • Visual field defect     Left     Past Surgical History:  Past Surgical History:   Procedure Laterality Date   • CARDIAC CATHETERIZATION N/A 5/1/2023    Procedure: Left Heart Cath;  Surgeon: Kye Alcaraz MD;  Location: [facility] CATH INVASIVE LOCATION;  Service: Cardiovascular;  Laterality: N/A;   • CAROTID ENDARTERECTOMY Right 11/10/2020    Procedure: CAROTID ENDARTERECTOMY;  Surgeon: Tavo Das MD;  Location: [facility] MAIN OR;  Service: Vascular;  Laterality: Right;   • CHOLECYSTECTOMY     • COLONOSCOPY      2016   • CORONARY ARTERY BYPASS GRAFT N/A 5/6/2023    Procedure: CORONARY ARTERY BYPASS GRAFTING;  Surgeon: Errol Noonan MD;  Location: [facility];  Service: Cardiothoracic;  Laterality: N/A;  CABG X  3 using LIMA and right endospahenous vein;  2 coronary markers implanted.    • FINGER SURGERY     • KNEE ARTHROPLASTY     • KNEE SURGERY Right     replaced   • LAPAROSCOPIC GASTRIC BANDING     • MITRAL VALVE REPAIR/REPLACEMENT N/A 5/6/2023    Procedure: MITRAL VALVE REPAIR/REPLACEMENT;  Surgeon: Errol Noonan MD;  Location: [facility];  Service: Cardiothoracic;   "Laterality: N/A;  Mitral valve repaired using 28 mm Jorge Physio II Annuloplasty Ring.    • ROTATOR CUFF REPAIR Right 2019   • SHOULDER SURGERY Right 05/24/2019    scope/ cuff repair   • TRANSESOPHAGEAL ECHOCARDIOGRAM (GALA) N/A 5/6/2023    Procedure: TRANSESOPHAGEAL ECHOCARDIOGRAM WITH ANESTHESIA;  Surgeon: Errol Noonan MD;  Location: [facility];  Service: Cardiothoracic;  Laterality: N/A;   • TUBAL ABDOMINAL LIGATION          Social History:   Social History     Tobacco Use   • Smoking status: Never   • Smokeless tobacco: Never   Substance Use Topics   • Alcohol use: No      Family History:  Family History   Problem Relation Age of Onset   • Diabetes Other    • Heart disease Mother    • Diabetes Mother    • Heart disease Father           Allergies:  No Known Allergies  Scheduled Meds:  aspirin, 81 mg, Daily  atorvastatin, 40 mg, Nightly  chlorhexidine, 15 mL, Q12H  enoxaparin, 40 mg, Q24H  furosemide, 40 mg, Q12H  insulin lispro, 2-7 Units, 4x Daily With Meals & Nightly  lidocaine, 2 patch, Q24H  metoprolol tartrate, 25 mg, Q12H  mupirocin, 1 application, BID  pantoprazole, 40 mg, Q AM  polyethylene glycol, 17 g, BID  potassium chloride, 40 mEq, BID With Meals  senna-docusate sodium, 2 tablet, Nightly          Review of Systems:   ROS  Review of Systems   Constitution: Negative for chills and fever.   Cardiovascular: Negative for chest pain and palpitations.   Respiratory: Negative for cough and hemoptysis.    Gastrointestinal: Negative for nausea.        Constitutional:  Temp:  [97.4 °F (36.3 °C)-98.3 °F (36.8 °C)] 98.1 °F (36.7 °C)  Heart Rate:  [72-89] 85  Resp:  [20-24] 24  BP: (110-149)/(64-96) 149/88    Physical Exam   /88   Pulse 85   Temp 98.1 °F (36.7 °C) (Oral)   Resp 24   Ht 167.6 cm (66\")   Wt 93.4 kg (206 lb)   SpO2 90%   BMI 33.25 kg/m²   General:  Appears in no acute distress  Eyes: Sclera is anicteric,  conjunctiva is clear   HEENT:  No JVD. Thyroid not visibly enlarged. " No mucosal pallor or cyanosis  Respiratory: Respirations regular and unlabored at rest.  Bilaterally good breath sounds, with good air entry in all fields. No crackles, rubs or wheezes auscultated  Cardiovascular: S1,S2 Regular rate and rhythm. No murmur, rub or gallop auscultated.  . No pretibial pitting edema  Gastrointestinal: Abdomen nondistended, soft  Musculoskeletal:  No abnormal movements  Extremities: No digital clubbing or cyanosis  Skin: Color pink. Skin warm and dry to touch. No rashes  No xanthoma  Neuro: Alert and awake, no lateralizing deficits appreciated    INTAKE AND OUTPUT:    Intake/Output Summary (Last 24 hours) at 5/10/2023 0919  Last data filed at 5/10/2023 0900  Gross per 24 hour   Intake 690 ml   Output 2250 ml   Net -1560 ml       Cardiographics  Telemetry: Sinus rhythm     ECG:   ECG 12 Lead   Preliminary Result   HEART RATE= 80  bpm   RR Interval= 748  ms   KS Interval= 168  ms   P Horizontal Axis= 10  deg   P Front Axis= 46  deg   QRSD Interval= 72  ms   QT Interval= 337  ms   QRS Axis= 5  deg   T Wave Axis= -13  deg   - OTHERWISE NORMAL ECG -   Sinus rhythm   Low voltage, precordial leads   Electronically Signed By:    Date and Time of Study: 2023-05-08 03:01:52      ECG 12 Lead   Preliminary Result   HEART RATE= 59  bpm   RR Interval= 1008  ms   KS Interval= 171  ms   P Horizontal Axis= 78  deg   P Front Axis= 17  deg   QRSD Interval= 85  ms   QT Interval= 395  ms   QRS Axis= 2  deg   T Wave Axis= 5  deg   - OTHERWISE NORMAL ECG -   Sinus bradycardia   Low voltage, precordial leads   Electronically Signed By:    Date and Time of Study: 2023-05-07 05:30:01      ECG 12 Lead Chest Pain   Preliminary Result   HEART RATE= 56  bpm   RR Interval= 1088  ms   KS Interval= 160  ms   P Horizontal Axis= 30  deg   P Front Axis= 44  deg   QRSD Interval= 89  ms   QT Interval= 410  ms   QRS Axis= 3  deg   T Wave Axis= -47  deg   - BORDERLINE ECG -   Sinus bradycardia   Atrial premature complex   Low  voltage, precordial leads   Electronically Signed By:    Date and Time of Study: 2023-05-07 00:20:13      ECG 12 Lead   Preliminary Result   HEART RATE= 76  bpm   RR Interval= 784  ms   PA Interval= 170  ms   P Horizontal Axis= 28  deg   P Front Axis= 69  deg   QRSD Interval= 93  ms   QT Interval= 440  ms   QRS Axis= 20  deg   T Wave Axis= -40  deg   - ABNORMAL ECG -   Sinus rhythm   Repol abnrm suggests ischemia, diffuse leads   Electronically Signed By:    Date and Time of Study: 2023-05-06 12:18:09      ECG 12 Lead   Final Result   HEART RATE= 56  bpm   RR Interval= 1072  ms   PA Interval= 144  ms   P Horizontal Axis= 5  deg   P Front Axis= 17  deg   QRSD Interval= 93  ms   QT Interval= 477  ms   QRS Axis= 8  deg   T Wave Axis= -63  deg   - ABNORMAL ECG -   Sinus bradycardia   Nonspecific repol abnormality, diffuse leads   When compared with ECG of 30-Apr-2023 21:37:55,   Significant repolarization change   Significant axis, voltage or hypertrophy change   Electronically Signed By: Kye Alcaraz (OLGA) 03-May-2023 17:12:15   Date and Time of Study: 2023-05-02 10:51:40      ECG 12 Lead Chest Pain   Final Result   HEART RATE= 57  bpm   RR Interval= 1048  ms   PA Interval= 142  ms   P Horizontal Axis= 16  deg   P Front Axis= 24  deg   QRSD Interval= 87  ms   QT Interval= 459  ms   QRS Axis= 26  deg   T Wave Axis= 198  deg   - OTHERWISE NORMAL ECG -   Slow sinus arrhythmia   When compared with ECG of 30-Apr-2023 21:37:55,   Significant axis, voltage or hypertrophy change   Electronically Signed By: Kye Alcaraz (OLGA) 03-May-2023 17:12:19   Date and Time of Study: 2023-05-01 05:38:43      ECG 12 Lead Chest Pain   Final Result   HEART RATE= 74  bpm   RR Interval= 816  ms   PA Interval= 140  ms   P Horizontal Axis= -20  deg   P Front Axis= 17  deg   QRSD Interval= 89  ms   QT Interval= 408  ms   QRS Axis= -2  deg   T Wave Axis= 71  deg   - ABNORMAL ECG -   Sinus rhythm   Probable left ventricular hypertrophy  "  ST depression, consider ischemia, lateral lds   When compared with ECG of 22-Oct-2020 11:28:25,   Significant rate increase   Significant axis, voltage or hypertrophy change   Electronically Signed By: Jomar Mejia (Shekhar) 01-May-2023 09:11:21   Date and Time of Study: 2023-04-30 21:37:55      SCANNED - TELEMETRY     Final Result      SCANNED - TELEMETRY     Final Result        I have personally reviewed EKG    Echocardiogram: Results for orders placed during the hospital encounter of 04/30/23    Adult Transthoracic Echo Complete W/ Cont if Necessary Per Protocol    Interpretation Summary  •  Estimated right ventricular systolic pressure from tricuspid regurgitation is normal (<35 mmHg).      Normal LV size and contractility EF of 60 to 65%  Normal RV size  Normal atrial size  Pulmonic valve is not well visualized.  Aortic valve is trileaflet with mild sclerosis.  Mitral valve, tricuspid valve appears structurally normal, moderate mitral regurgitation seen.  No pericardial effusion seen.  Proximal aorta appears normal in size.      Lab Review   I have reviewed the labs      Results from last 7 days   Lab Units 05/09/23  0809   MAGNESIUM mg/dL 2.1     Results from last 7 days   Lab Units 05/10/23  0408   SODIUM mmol/L 140   POTASSIUM mmol/L 3.1*   BUN mg/dL 14   CREATININE mg/dL 0.53*   CALCIUM mg/dL 8.8         Results from last 7 days   Lab Units 05/10/23  0408 05/09/23  0809 05/08/23  0311   WBC 10*3/mm3 10.00 11.20* 11.90*   HEMOGLOBIN g/dL 10.9* 10.4* 9.8*   HEMATOCRIT % 34.0 32.7* 30.8*   PLATELETS 10*3/mm3 239 175 141     Results from last 7 days   Lab Units 05/07/23  0251 05/06/23  1157 05/06/23  0451 05/05/23  0542   INR  1.12* 1.17* 0.96 0.93   APTT seconds  --  27.2 23.7* 26.2       RADIOLOGY:  Imaging Results (Last 24 Hours)     ** No results found for the last 24 hours. **                )5/10/2023  Kye Alcaraz MD      EMR Dragon/Transcription:   \"Dictated utilizing Dragon dictation\".   "

## 2023-05-11 LAB
ANION GAP SERPL CALCULATED.3IONS-SCNC: 12 MMOL/L (ref 5–15)
BASOPHILS # BLD AUTO: 0.1 10*3/MM3 (ref 0–0.2)
BASOPHILS NFR BLD AUTO: 1.2 % (ref 0–1.5)
BILIRUB UR QL STRIP: NEGATIVE
BUN SERPL-MCNC: 10 MG/DL (ref 8–23)
BUN/CREAT SERPL: 16.7 (ref 7–25)
CALCIUM SPEC-SCNC: 9.5 MG/DL (ref 8.6–10.5)
CHLORIDE SERPL-SCNC: 102 MMOL/L (ref 98–107)
CLARITY UR: CLEAR
CO2 SERPL-SCNC: 26 MMOL/L (ref 22–29)
COLOR UR: YELLOW
CREAT SERPL-MCNC: 0.6 MG/DL (ref 0.57–1)
DEPRECATED RDW RBC AUTO: 44.6 FL (ref 37–54)
EGFRCR SERPLBLD CKD-EPI 2021: 99.8 ML/MIN/1.73
EOSINOPHIL # BLD AUTO: 0.5 10*3/MM3 (ref 0–0.4)
EOSINOPHIL NFR BLD AUTO: 3.9 % (ref 0.3–6.2)
ERYTHROCYTE [DISTWIDTH] IN BLOOD BY AUTOMATED COUNT: 14 % (ref 12.3–15.4)
GLUCOSE BLDC GLUCOMTR-MCNC: 108 MG/DL (ref 70–105)
GLUCOSE BLDC GLUCOMTR-MCNC: 117 MG/DL (ref 70–105)
GLUCOSE BLDC GLUCOMTR-MCNC: 124 MG/DL (ref 70–105)
GLUCOSE SERPL-MCNC: 120 MG/DL (ref 65–99)
GLUCOSE UR STRIP-MCNC: NEGATIVE MG/DL
HCT VFR BLD AUTO: 35.6 % (ref 34–46.6)
HGB BLD-MCNC: 11.5 G/DL (ref 12–15.9)
HGB UR QL STRIP.AUTO: NEGATIVE
KETONES UR QL STRIP: NEGATIVE
LEUKOCYTE ESTERASE UR QL STRIP.AUTO: NEGATIVE
LYMPHOCYTES # BLD AUTO: 2.7 10*3/MM3 (ref 0.7–3.1)
LYMPHOCYTES NFR BLD AUTO: 22.9 % (ref 19.6–45.3)
MCH RBC QN AUTO: 29.7 PG (ref 26.6–33)
MCHC RBC AUTO-ENTMCNC: 32.2 G/DL (ref 31.5–35.7)
MCV RBC AUTO: 92.2 FL (ref 79–97)
MONOCYTES # BLD AUTO: 0.9 10*3/MM3 (ref 0.1–0.9)
MONOCYTES NFR BLD AUTO: 8 % (ref 5–12)
NEUTROPHILS NFR BLD AUTO: 64 % (ref 42.7–76)
NEUTROPHILS NFR BLD AUTO: 7.5 10*3/MM3 (ref 1.7–7)
NITRITE UR QL STRIP: NEGATIVE
NRBC BLD AUTO-RTO: 0.1 /100 WBC (ref 0–0.2)
PH UR STRIP.AUTO: 6 [PH] (ref 5–8)
PLATELET # BLD AUTO: 358 10*3/MM3 (ref 140–450)
PMV BLD AUTO: 8.1 FL (ref 6–12)
POTASSIUM SERPL-SCNC: 4 MMOL/L (ref 3.5–5.2)
PROT UR QL STRIP: NEGATIVE
QT INTERVAL: 411 MS
RBC # BLD AUTO: 3.86 10*6/MM3 (ref 3.77–5.28)
SODIUM SERPL-SCNC: 140 MMOL/L (ref 136–145)
SP GR UR STRIP: 1.01 (ref 1–1.03)
UROBILINOGEN UR QL STRIP: NORMAL
WBC NRBC COR # BLD: 11.7 10*3/MM3 (ref 3.4–10.8)

## 2023-05-11 PROCEDURE — 93005 ELECTROCARDIOGRAM TRACING: CPT

## 2023-05-11 PROCEDURE — 25010000002 ENOXAPARIN PER 10 MG

## 2023-05-11 PROCEDURE — 94660 CPAP INITIATION&MGMT: CPT

## 2023-05-11 PROCEDURE — 94799 UNLISTED PULMONARY SVC/PX: CPT

## 2023-05-11 PROCEDURE — 80048 BASIC METABOLIC PNL TOTAL CA: CPT

## 2023-05-11 PROCEDURE — 93010 ELECTROCARDIOGRAM REPORT: CPT | Performed by: INTERNAL MEDICINE

## 2023-05-11 PROCEDURE — 81003 URINALYSIS AUTO W/O SCOPE: CPT | Performed by: NURSE PRACTITIONER

## 2023-05-11 PROCEDURE — 82948 REAGENT STRIP/BLOOD GLUCOSE: CPT

## 2023-05-11 PROCEDURE — 25010000002 AMIODARONE IN DEXTROSE 5% 360-4.14 MG/200ML-% SOLUTION

## 2023-05-11 PROCEDURE — 25010000002 MAGNESIUM SULFATE 2 GM/50ML SOLUTION: Performed by: NURSE PRACTITIONER

## 2023-05-11 PROCEDURE — 85025 COMPLETE CBC W/AUTO DIFF WBC: CPT

## 2023-05-11 PROCEDURE — 25010000002 AMIODARONE IN DEXTROSE 5% 150-4.21 MG/100ML-% SOLUTION

## 2023-05-11 PROCEDURE — 99233 SBSQ HOSP IP/OBS HIGH 50: CPT | Performed by: INTERNAL MEDICINE

## 2023-05-11 RX ORDER — LAMOTRIGINE 25 MG/1
25 TABLET ORAL DAILY
Status: DISCONTINUED | OUTPATIENT
Start: 2023-05-11 | End: 2023-05-13 | Stop reason: HOSPADM

## 2023-05-11 RX ORDER — LAMOTRIGINE 25 MG/1
25 TABLET ORAL EVERY 12 HOURS SCHEDULED
Status: DISCONTINUED | OUTPATIENT
Start: 2023-05-25 | End: 2023-05-13 | Stop reason: HOSPADM

## 2023-05-11 RX ORDER — LAMOTRIGINE 25 MG/1
100 TABLET ORAL 2 TIMES DAILY
Status: DISCONTINUED | OUTPATIENT
Start: 2023-05-11 | End: 2023-05-11

## 2023-05-11 RX ORDER — MAGNESIUM SULFATE HEPTAHYDRATE 40 MG/ML
2 INJECTION, SOLUTION INTRAVENOUS ONCE
Status: COMPLETED | OUTPATIENT
Start: 2023-05-11 | End: 2023-05-11

## 2023-05-11 RX ORDER — LAMOTRIGINE 25 MG/1
50 TABLET ORAL EVERY 12 HOURS SCHEDULED
Status: DISCONTINUED | OUTPATIENT
Start: 2023-06-08 | End: 2023-05-13 | Stop reason: HOSPADM

## 2023-05-11 RX ORDER — LAMOTRIGINE 100 MG/1
100 TABLET ORAL EVERY 12 HOURS SCHEDULED
Status: DISCONTINUED | OUTPATIENT
Start: 2023-06-15 | End: 2023-05-13 | Stop reason: HOSPADM

## 2023-05-11 RX ORDER — AMIODARONE HYDROCHLORIDE 200 MG/1
200 TABLET ORAL 2 TIMES DAILY WITH MEALS
Status: DISCONTINUED | OUTPATIENT
Start: 2023-05-11 | End: 2023-05-13 | Stop reason: HOSPADM

## 2023-05-11 RX ADMIN — ALPRAZOLAM 2 MG: 1 TABLET ORAL at 20:37

## 2023-05-11 RX ADMIN — MAGNESIUM SULFATE HEPTAHYDRATE 2 G: 2 INJECTION, SOLUTION INTRAVENOUS at 15:33

## 2023-05-11 RX ADMIN — HYDROCODONE BITARTRATE AND ACETAMINOPHEN 1 TABLET: 5; 325 TABLET ORAL at 14:18

## 2023-05-11 RX ADMIN — AMIODARONE HYDROCHLORIDE 200 MG: 200 TABLET ORAL at 15:33

## 2023-05-11 RX ADMIN — AMIODARONE HYDROCHLORIDE 1 MG/MIN: 1.8 INJECTION, SOLUTION INTRAVENOUS at 05:44

## 2023-05-11 RX ADMIN — ATORVASTATIN CALCIUM 40 MG: 40 TABLET, FILM COATED ORAL at 20:37

## 2023-05-11 RX ADMIN — ACETAMINOPHEN 650 MG: 325 TABLET, FILM COATED ORAL at 11:32

## 2023-05-11 RX ADMIN — MUPIROCIN 1 APPLICATION: 20 OINTMENT TOPICAL at 10:51

## 2023-05-11 RX ADMIN — PANTOPRAZOLE SODIUM 40 MG: 40 TABLET, DELAYED RELEASE ORAL at 06:07

## 2023-05-11 RX ADMIN — AMIODARONE HYDROCHLORIDE 0.5 MG/MIN: 1.8 INJECTION, SOLUTION INTRAVENOUS at 11:38

## 2023-05-11 RX ADMIN — AMIODARONE HYDROCHLORIDE 150 MG: 1.5 INJECTION, SOLUTION INTRAVENOUS at 05:18

## 2023-05-11 RX ADMIN — METOPROLOL TARTRATE 25 MG: 25 TABLET, FILM COATED ORAL at 20:37

## 2023-05-11 RX ADMIN — SENNOSIDES AND DOCUSATE SODIUM 2 TABLET: 50; 8.6 TABLET ORAL at 20:37

## 2023-05-11 RX ADMIN — POTASSIUM CHLORIDE 40 MEQ: 1500 TABLET, EXTENDED RELEASE ORAL at 08:17

## 2023-05-11 RX ADMIN — ENOXAPARIN SODIUM 40 MG: 100 INJECTION SUBCUTANEOUS at 15:33

## 2023-05-11 RX ADMIN — POLYETHYLENE GLYCOL 3350 17 G: 17 POWDER, FOR SOLUTION ORAL at 08:17

## 2023-05-11 RX ADMIN — LAMOTRIGINE 25 MG: 25 TABLET ORAL at 10:52

## 2023-05-11 RX ADMIN — METOPROLOL TARTRATE 25 MG: 25 TABLET, FILM COATED ORAL at 05:26

## 2023-05-11 RX ADMIN — AMIODARONE HYDROCHLORIDE 1 MG/MIN: 1.8 INJECTION, SOLUTION INTRAVENOUS at 09:17

## 2023-05-11 RX ADMIN — ASPIRIN 81 MG: 81 TABLET, COATED ORAL at 08:17

## 2023-05-11 RX ADMIN — FUROSEMIDE 40 MG: 40 TABLET ORAL at 08:17

## 2023-05-11 NOTE — CASE MANAGEMENT/SOCIAL WORK
Continued Stay Note  [facility]     Patient Name: Andree Deluna  MRN: [id number]  Today's Date: 5/11/2023    Admit Date: 4/30/2023    Plan: Dc Plan: Home with [facility] accepted and following. CABG 5/6/23.   Discharge Plan     Row Name 05/11/23 1521       Plan    Plan Dc Plan: Home with [facility] accepted and following. CABG 5/6/23.    Provided Post Acute Provider List? N/A    Provided Post Acute Provider Quality & Resource List? N/A    Plan Comments CM spoke with patient’s nurse and [facility] NP Tina Hall to obtain clinical updates. Patient went into Atrial Fibrillation overnight and now on Amiodarone drip. DC delayed for now. CM will continue to follow for any further needs and adjust discharge plan accordingly. DC Barriers: O2@2L nc, Amiodarone gtt, and cardiac monitoring.           Expected Discharge Date and Time     Expected Discharge Date Expected Discharge Time    May 13, 2023             Hattie Alicea RN     Office Phone: [phone]  Office Cell:     [phone]

## 2023-05-11 NOTE — NURSING NOTE
Patient and spouse came out of room. Upon speaking to patient she was very confused and wanting to leave. Spoke to spouse and per spouse the patient has woken confused, had taken off all monitoring devices and was walking to the door,stating she was leaving. Patient ambulated the unit, stating that we were making her stay against her will and we were hurting her. Patient ambulated to POD 2, speaking to JAN Valentino, and asking Chelsy for help. Chelsy escorted the patient around the unit, giving her clear definition of where she was allowed to ambulate. RADHA Ramos, escorted the patient back to assigned room. Where the primary nurse and spouse where waiting. The patient refused to put her heart hugger back on and refused to lay down in bed.    The spouse attempted to reorient patient and told her that she needed to place the heart hugger back on and rest/sleep. Patient was argumentative and combative with spouse. After using the bathroom the patient came out and again was combative and argumentative with spouse. Spouse spoke to patient calmly and patient did return to bed and laid down. Patient allowed heart monitor to be reconnected but refused BP cuff and O2 sensor. Will continue to monitor and hopefully patient will let monitoring equipment to be placed.

## 2023-05-11 NOTE — PLAN OF CARE
Problem: Adult Inpatient Plan of Care  Goal: Plan of Care Review  Outcome: Ongoing, Progressing  Flowsheets (Taken 5/11/2023 1705)  Progress: improving  Plan of Care Reviewed With: patient  Goal: Patient-Specific Goal (Individualized)  Outcome: Ongoing, Progressing  Goal: Absence of Hospital-Acquired Illness or Injury  Outcome: Ongoing, Progressing  Intervention: Identify and Manage Fall Risk  Recent Flowsheet Documentation  Taken 5/11/2023 1500 by Celina Kowalski RN  Safety Promotion/Fall Prevention: fall prevention program maintained  Taken 5/11/2023 1400 by Celina Kowalski RN  Safety Promotion/Fall Prevention:   safety round/check completed   fall prevention program maintained  Taken 5/11/2023 1300 by Celina Kowalski RN  Safety Promotion/Fall Prevention:   safety round/check completed   fall prevention program maintained  Taken 5/11/2023 1232 by Celina Kowalski RN  Safety Promotion/Fall Prevention: safety round/check completed  Taken 5/11/2023 1000 by Celina Kowalski RN  Safety Promotion/Fall Prevention: fall prevention program maintained  Intervention: Prevent Skin Injury  Recent Flowsheet Documentation  Taken 5/11/2023 1232 by Celina Kowalski RN  Body Position: position changed independently  Skin Protection: adhesive use limited  Intervention: Prevent and Manage VTE (Venous Thromboembolism) Risk  Recent Flowsheet Documentation  Taken 5/11/2023 1232 by Celina Kowalski RN  Activity Management:   ambulated to bathroom   back to bed  Range of Motion: active ROM (range of motion) encouraged  Intervention: Prevent Infection  Recent Flowsheet Documentation  Taken 5/11/2023 1232 by Celina Kowalski RN  Infection Prevention:   hand hygiene promoted   personal protective equipment utilized   rest/sleep promoted  Goal: Optimal Comfort and Wellbeing  Outcome: Ongoing, Progressing  Intervention: Monitor Pain and Promote Comfort  Recent Flowsheet Documentation  Taken 5/11/2023 1418 by Celina Kowalski RN  Pain Management  Interventions:   see MAR   position adjusted   relaxation techniques promoted  Taken 5/11/2023 1132 by Celina Kowalski RN  Pain Management Interventions:   see MAR   quiet environment facilitated   relaxation techniques promoted  Intervention: Provide Person-Centered Care  Recent Flowsheet Documentation  Taken 5/11/2023 1232 by Celina Kowalski RN  Trust Relationship/Rapport:   care explained   questions answered   thoughts/feelings acknowledged  Goal: Readiness for Transition of Care  Outcome: Ongoing, Progressing     Problem: Hypertension Comorbidity  Goal: Blood Pressure in Desired Range  Outcome: Ongoing, Progressing  Intervention: Maintain Blood Pressure Management  Recent Flowsheet Documentation  Taken 5/11/2023 1232 by Celina Kowalski RN  Medication Review/Management: medications reviewed     Problem: Skin Injury Risk Increased  Goal: Skin Health and Integrity  Outcome: Ongoing, Progressing  Intervention: Optimize Skin Protection  Recent Flowsheet Documentation  Taken 5/11/2023 1232 by Celina Kowalski RN  Pressure Reduction Techniques: frequent weight shift encouraged  Head of Bed (HOB) Positioning: HOB elevated  Pressure Reduction Devices: pressure-redistributing mattress utilized  Skin Protection: adhesive use limited     Problem: Fall Injury Risk  Goal: Absence of Fall and Fall-Related Injury  Outcome: Ongoing, Progressing  Intervention: Identify and Manage Contributors  Recent Flowsheet Documentation  Taken 5/11/2023 1232 by Celina Kowalski RN  Medication Review/Management: medications reviewed  Self-Care Promotion: independence encouraged  Intervention: Promote Injury-Free Environment  Recent Flowsheet Documentation  Taken 5/11/2023 1500 by Celina Kowalski RN  Safety Promotion/Fall Prevention: fall prevention program maintained  Taken 5/11/2023 1400 by Celina Kowalski RN  Safety Promotion/Fall Prevention:   safety round/check completed   fall prevention program maintained  Taken 5/11/2023 1300 by Celina Kowalski  RN  Safety Promotion/Fall Prevention:   safety round/check completed   fall prevention program maintained  Taken 5/11/2023 1232 by Celina Kowalski RN  Safety Promotion/Fall Prevention: safety round/check completed  Taken 5/11/2023 1000 by Celina Kowalski RN  Safety Promotion/Fall Prevention: fall prevention program maintained   Goal Outcome Evaluation:  Plan of Care Reviewed With: patient        Progress: improving     Patient in bed resting. Sister at bedside helping with patient. Patient has calmed down for staff. Was still saying she would go home and come back tomorrow. Nurse educated patient on being on amio drip and staying at hospital. She was restarted on her Lamictal this morning, to gradually increase back up to home dose. Patient at times having hallucination of people in room. Patient being more compliant with staff care.

## 2023-05-11 NOTE — PROGRESS NOTES
S/P POD# 5 CABG x3 with LIMA/ MV repair--[place]  EF 50% (cath)    Subjective: Patient converted into atrial fib yesterday.  Patient appears confused again today but quickly catches herself.    Currently sinus rhythm on IV Amio  No labs drawn this morning--unclear reason  Wt is down 1 kg from preop        Intake/Output Summary (Last 24 hours) at 5/11/2023 1126  Last data filed at 5/11/2023 0438  Gross per 24 hour   Intake 240 ml   Output 300 ml   Net -60 ml     Temp:  [97.5 °F (36.4 °C)-99.4 °F (37.4 °C)] 98.5 °F (36.9 °C)  Heart Rate:  [] 88  Resp:  [16-30] 25  BP: (119-162)/() 119/96      Results from last 7 days   Lab Units 05/10/23  0408 05/09/23  0809 05/07/23  0634 05/07/23  0251 05/06/23  1231 05/06/23  1157 05/06/23  0735 05/06/23  0451   WBC 10*3/mm3 10.00 11.20*   < > 9.50   < > 5.40  --  8.10   HEMOGLOBIN g/dL 10.9* 10.4*   < > 9.8*   < > 9.6*  --  11.9*   HEMOGLOBIN, POC   --   --   --   --    < >  --    < >  --    HEMATOCRIT % 34.0 32.7*   < > 30.4*   < > 29.3*  --  36.5   HEMATOCRIT POC   --   --   --   --    < >  --    < >  --    PLATELETS 10*3/mm3 239 175   < > 132*   < > 117*  --  247   INR   --   --   --  1.12*  --  1.17*  --  0.96    < > = values in this interval not displayed.     Results from last 7 days   Lab Units 05/10/23  1948 05/10/23  0408 05/09/23  0809 05/08/23  0311 05/07/23  1302   CREATININE mg/dL  --  0.53* 0.51*   < >  --    POTASSIUM mmol/L 4.0 3.1* 3.9   < >  --    SODIUM mmol/L  --  140 142   < >  --    MAGNESIUM mg/dL  --   --  2.1  --   --    PHOSPHORUS mg/dL  --   --   --   --  4.0    < > = values in this interval not displayed.         Physical Exam:  Neuro intact, nad, up in chair, sister at bedside  Tele:  SR 80s  Diminished bases, 97% RA  Sternotomy/ SVHS healing well  Benign abd, + BM  No edema    Assessment/Plan:  Principal Problem:    Chest pain, unspecified type  Active Problems:    Mood disorder    Hyperlipidemia    Hypertension    Obesity    GERD  with esophagitis    Overactive bladder    History of stroke    Chronic cough    Elevated troponin    Non-STEMI (non-ST elevated myocardial infarction)    Coronary artery disease involving native coronary artery of native heart with unstable angina pectoris    Abnormal findings on diagnostic imaging of heart and coronary circulation    - MV CAD with NSTEMI presentation, severe mitral regurgitation, EF 50% (cath)--s/p CABG x3/ MV repair ([facility])  - HTN--stable  - HLD--statin  - Hyperglycemia--a1c 5.8, SSI  - Chronic HFpEF with volume overload--work towards GDMT  - Hx stroke (2019)--s/p right CEA (Dr. Das, )  - Strong family hx premature CAD--father  of MI age 54/ mother  from MI age 64  - Anxiety--on home Xanax, she is no longer on Viibyrd  - Obesity, stage 1--BMI 33.2  - Postop confusion, multifactorial--resolved  - Postop leukocytosis likely r/t atel--pulm toileting  - Postop ABLA, expected--watch closely  - Postop atrial fib--IV amio/bb    POD# 5.  Doing well.  Transition to oral amiodarone.  D/w family/patient at length at bedside.  Mobilize.  On asa/statin/bb.  Replete e-.  Plans for C--nsg/PT/OT at discharge.  Patient should ambulate numerous times today and shower.  Anticipate discharge tomorrow.    1346: Labs still pending.  Mag 2 gm x1.    Routine care--as above  D/w pt/nsg/family, .Dr. Noonan  Anticipate home at discharge    Tina Yen-Pollo, SARAHY  2023  11:26 EDT

## 2023-05-11 NOTE — PROGRESS NOTES
Cardiology Progress Note    Patient Identification:  Name: Andree Deluna  Age: 67 y.o.  Sex: female  :  1955  MRN: [id number]                 Follow Up / Chief Complaint: NSTEMI  Chief Complaint   Patient presents with   • Chest Pain     Pt reports midsternal CP that radiates to back that started 2 hrs PTA.  Pt describes pain as a squeezing pain that is intermittent.  With associated SOA.  Pt reports she took 2 81mg ASA PTA.        Interval History: Patient presented with chest pain. Underwent cardiac cath that showed MV CAD and CV surgery has been consulted.  Patient was scheduled for CABG 2023    NP NOTE:  Patient seen and examined; RN and  report patient has been confused overnight.  This morning patient went into A-fib with RVR she was started on amiodarone drip and is now normal sinus rhythm.  Patient reports she just wants to go home.    Electronically signed by SARAHY Steele, 23, 9:38 AM EDT.    Cardiology attending addendum :    I have personally performed a face-to-face diagnostic evaluation, physical exam and reviewed data on this patient.  I have reviewed documentation done by me and nurse practitioner  and corrected as needed.  And agree with the different components of documentation.Greater than 50% of the time spent in the care of this patient was provided by attending consultant/me.        Subjective: Patient seen and examined.  Chart reviewed.  Labs reviewed.  Discussed with RN taking care of patient.  Nursing staff reports patient was confused yesterday night and her  by bedside is reporting patient's confusion.  Went into A-fib with RVR requiring on IV amiodarone.    Objective: HS troponin 54---120  2023: bmp unremarkable, CBC unremarkable   5/3/2023: glucose 116 unremarkable  CMP HLD 62 LDL 69 CBC normal.   2023: glucose 105, potassium 3.3  WBC 11.9 hgb 9.8   2023: glucose 100, CO2 30, WBC 11.2, hgb 10.4   5/10/2023: Glucose 124 potassium 3.1  hemoglobin 10.9  5/11/2023: Labs pending    History of Present Illness:       Ms.Elisa LOVELY Deluna has PMH of     Hypertension  Diastolic dysfunction-echo 8/7/2019 revealing septal asymmetric hypertrophy EF 60%, diastolic dysfunction  Dyslipidemia  CVA  80% right carotid disease  WEN  Carotid endarterectomy, cholecystectomy, tubal ligation, shoulder/knee/finger surgery  Family history of premature CAD father fatal MI at 54 mother fatal MI at 64.     Presented through emergency room 4/30/2023 with midsternal chest pain radiating to the back, squeezing in sensation, radiating to the left arm and neck, stated with shortness of breath and nausea.  Patient has been having symptoms since many months but has been progressively worse.     Work-up here 4/30/2023/5/1/2023 revealed elevated troponin at 54-->120.  Glucose elevated.  Potassium 3.4.  A1c of 5.8.  Chest x-ray stable cardiomegaly.  EKG done 4/30/2023 reviewed/interpreted by me reveals sinus rhythm with a rate of 74 bpm with  ST segment depression in 1 and aVL        Assessment:  :     New onset chest pain at rest concerning for unstable angina  Elevated troponin  CAD cardiac cath 5/1/2023 revealing multivessel coronary artery disease  CABG with LIMA to LAD, SVG to OM1 and SVG to PDA and mitral valve repair with physio #2 ring 5/6/2023  Hypertension  Dyslipidemia  History of CVA 2019 and carotid disease, right CEA 2020  PAD  Hyperglycemia, prediabetes with A1c of 5.8  Obesity with BMI over 30           Recommendations / Plan:         Patient went into A-fib with RVR.  IV amiodarone was given.  Patient is in sinus rhythm.  We will continue to monitor for toxic side effects from amiodarone.  Patient has new onset prolonged chest pain and has elevated troponin consistent with acute non-ST elevation MI  Patient was started on aspirin, beta-blockers, statins.  We will continue as tolerated.   patient underwent cardiac cath 5/1/2023 which revealed severe triple-vessel  disease.  Patient underwent three-vessel CABG and mitral valve repair 5/6/2023  Routine postsurgery care  Monitor rhythm  Monitor expected postsurgery blood loss anemia  Patient continues to have intermittent confusion we will defer to CT surgery.  Monitor electrolytes and replete as needed.  Increase activity     Discussed with RN taking care of patient to coordinate care.       Copied text in this portion of the note has been reviewed and is accurate as of 5/11/2023    Past Medical History:  Past Medical History:   Diagnosis Date   • ADHD unknown   • Anemia    • Anxiety    • Carotid artery disease     80% right internal carotid artery   • CVA (cerebral vascular accident)     right parietal right temporal   • DDD (degenerative disc disease), lumbar    • Depression    • Extremity pain     Ankle pain   • GERD (gastroesophageal reflux disease)    • Hyperlipidemia unknown   • Hypertension    • Insomnia unknown   • Laryngopharyngeal reflux    • Low back pain    • Mood disorder    • Obesity unknown   • Skin cancer of face    • Sleep apnea     Suspected sleep apnea she has refused to be tested   • Stroke (cerebrum)    • Visual field defect     Left     Past Surgical History:  Past Surgical History:   Procedure Laterality Date   • CARDIAC CATHETERIZATION N/A 5/1/2023    Procedure: Left Heart Cath;  Surgeon: Kye Alcaraz MD;  Location: [facility] CATH INVASIVE LOCATION;  Service: Cardiovascular;  Laterality: N/A;   • CAROTID ENDARTERECTOMY Right 11/10/2020    Procedure: CAROTID ENDARTERECTOMY;  Surgeon: Tavo Das MD;  Location: [facility] MAIN OR;  Service: Vascular;  Laterality: Right;   • CHOLECYSTECTOMY     • COLONOSCOPY      2016   • CORONARY ARTERY BYPASS GRAFT N/A 5/6/2023    Procedure: CORONARY ARTERY BYPASS GRAFTING;  Surgeon: Errol Noonan MD;  Location: [facility] CVOR;  Service: Cardiothoracic;  Laterality: N/A;  CABG X  3 using LIMA and right endospahenous vein;  2 coronary markers  implanted.    • FINGER SURGERY     • KNEE ARTHROPLASTY     • KNEE SURGERY Right     replaced   • LAPAROSCOPIC GASTRIC BANDING     • MITRAL VALVE REPAIR/REPLACEMENT N/A 5/6/2023    Procedure: MITRAL VALVE REPAIR/REPLACEMENT;  Surgeon: Errol Noonan MD;  Location: [facility];  Service: Cardiothoracic;  Laterality: N/A;  Mitral valve repaired using 28 mm Jorge Physio II Annuloplasty Ring.    • ROTATOR CUFF REPAIR Right 2019   • SHOULDER SURGERY Right 05/24/2019    scope/ cuff repair   • TRANSESOPHAGEAL ECHOCARDIOGRAM (GALA) N/A 5/6/2023    Procedure: TRANSESOPHAGEAL ECHOCARDIOGRAM WITH ANESTHESIA;  Surgeon: Errol Noonan MD;  Location: [facility];  Service: Cardiothoracic;  Laterality: N/A;   • TUBAL ABDOMINAL LIGATION          Social History:   Social History     Tobacco Use   • Smoking status: Never   • Smokeless tobacco: Never   Substance Use Topics   • Alcohol use: No      Family History:  Family History   Problem Relation Age of Onset   • Diabetes Other    • Heart disease Mother    • Diabetes Mother    • Heart disease Father           Allergies:  No Known Allergies  Scheduled Meds:  aspirin, 81 mg, Daily  atorvastatin, 40 mg, Nightly  enoxaparin, 40 mg, Q24H  furosemide, 40 mg, Daily  insulin lispro, 2-7 Units, 4x Daily With Meals & Nightly  lidocaine, 2 patch, Q24H  metoprolol tartrate, 25 mg, Q12H  mupirocin, 1 application, BID  pantoprazole, 40 mg, Q AM  polyethylene glycol, 17 g, BID  potassium chloride, 40 mEq, Daily  senna-docusate sodium, 2 tablet, Nightly          Review of Systems:   ROS  Review of Systems   Constitution: Negative for chills and fever.   Cardiovascular: Negative for chest pain and palpitations.   Respiratory: Negative for cough and hemoptysis.    Gastrointestinal: Negative for nausea.        Constitutional:  Temp:  [97.5 °F (36.4 °C)-99.4 °F (37.4 °C)] 97.5 °F (36.4 °C)  Heart Rate:  [] 88  Resp:  [16-30] 25  BP: (118-162)/() 119/96    Physical Exam   BP  "119/96 (BP Location: Right arm, Patient Position: Lying)   Pulse 88   Temp 97.5 °F (36.4 °C) (Oral)   Resp 25   Ht 167.6 cm (66\")   Wt 91.5 kg (201 lb 12.8 oz)   SpO2 95%   BMI 32.57 kg/m²   General:  Appears in no acute distress  Eyes: Sclera is anicteric,  conjunctiva is clear   HEENT:  No JVD. Thyroid not visibly enlarged. No mucosal pallor or cyanosis  Respiratory: Respirations regular and unlabored at rest.  Bilaterally good breath sounds, with good air entry in all fields. No crackles, rubs or wheezes auscultated  Cardiovascular: S1,S2 Regular rate and rhythm. No murmur, rub or gallop auscultated.  . No pretibial pitting edema  Gastrointestinal: Abdomen nondistended, soft  Musculoskeletal:  No abnormal movements  Extremities: No digital clubbing or cyanosis  Skin: Color pink. Skin warm and dry to touch. No rashes  No xanthoma  Neuro: Alert and awake, no lateralizing deficits appreciated    INTAKE AND OUTPUT:    Intake/Output Summary (Last 24 hours) at 5/11/2023 0822  Last data filed at 5/11/2023 0438  Gross per 24 hour   Intake 480 ml   Output 700 ml   Net -220 ml       Cardiographics  Telemetry: Sinus rhythm.  Intermittent A-fib with RVR    ECG:   ECG 12 Lead   Preliminary Result   HEART RATE= 80  bpm   RR Interval= 748  ms   NM Interval= 168  ms   P Horizontal Axis= 10  deg   P Front Axis= 46  deg   QRSD Interval= 72  ms   QT Interval= 337  ms   QRS Axis= 5  deg   T Wave Axis= -13  deg   - OTHERWISE NORMAL ECG -   Sinus rhythm   Low voltage, precordial leads   Electronically Signed By:    Date and Time of Study: 2023-05-08 03:01:52      ECG 12 Lead   Preliminary Result   HEART RATE= 59  bpm   RR Interval= 1008  ms   NM Interval= 171  ms   P Horizontal Axis= 78  deg   P Front Axis= 17  deg   QRSD Interval= 85  ms   QT Interval= 395  ms   QRS Axis= 2  deg   T Wave Axis= 5  deg   - OTHERWISE NORMAL ECG -   Sinus bradycardia   Low voltage, precordial leads   Electronically Signed By:    Date and Time of " Study: 2023-05-07 05:30:01      ECG 12 Lead Chest Pain   Preliminary Result   HEART RATE= 56  bpm   RR Interval= 1088  ms   NH Interval= 160  ms   P Horizontal Axis= 30  deg   P Front Axis= 44  deg   QRSD Interval= 89  ms   QT Interval= 410  ms   QRS Axis= 3  deg   T Wave Axis= -47  deg   - BORDERLINE ECG -   Sinus bradycardia   Atrial premature complex   Low voltage, precordial leads   Electronically Signed By:    Date and Time of Study: 2023-05-07 00:20:13      ECG 12 Lead   Preliminary Result   HEART RATE= 76  bpm   RR Interval= 784  ms   NH Interval= 170  ms   P Horizontal Axis= 28  deg   P Front Axis= 69  deg   QRSD Interval= 93  ms   QT Interval= 440  ms   QRS Axis= 20  deg   T Wave Axis= -40  deg   - ABNORMAL ECG -   Sinus rhythm   Repol abnrm suggests ischemia, diffuse leads   Electronically Signed By:    Date and Time of Study: 2023-05-06 12:18:09      ECG 12 Lead   Final Result   HEART RATE= 56  bpm   RR Interval= 1072  ms   NH Interval= 144  ms   P Horizontal Axis= 5  deg   P Front Axis= 17  deg   QRSD Interval= 93  ms   QT Interval= 477  ms   QRS Axis= 8  deg   T Wave Axis= -63  deg   - ABNORMAL ECG -   Sinus bradycardia   Nonspecific repol abnormality, diffuse leads   When compared with ECG of 30-Apr-2023 21:37:55,   Significant repolarization change   Significant axis, voltage or hypertrophy change   Electronically Signed By: Kye Alcaraz (OLGA) 03-May-2023 17:12:15   Date and Time of Study: 2023-05-02 10:51:40      ECG 12 Lead Chest Pain   Final Result   HEART RATE= 57  bpm   RR Interval= 1048  ms   NH Interval= 142  ms   P Horizontal Axis= 16  deg   P Front Axis= 24  deg   QRSD Interval= 87  ms   QT Interval= 459  ms   QRS Axis= 26  deg   T Wave Axis= 198  deg   - OTHERWISE NORMAL ECG -   Slow sinus arrhythmia   When compared with ECG of 30-Apr-2023 21:37:55,   Significant axis, voltage or hypertrophy change   Electronically Signed By: Kye Alcaraz (OLGA) 03-May-2023 17:12:19   Date and  Time of Study: 2023-05-01 05:38:43      ECG 12 Lead Chest Pain   Final Result   HEART RATE= 74  bpm   RR Interval= 816  ms   MT Interval= 140  ms   P Horizontal Axis= -20  deg   P Front Axis= 17  deg   QRSD Interval= 89  ms   QT Interval= 408  ms   QRS Axis= -2  deg   T Wave Axis= 71  deg   - ABNORMAL ECG -   Sinus rhythm   Probable left ventricular hypertrophy   ST depression, consider ischemia, lateral lds   When compared with ECG of 22-Oct-2020 11:28:25,   Significant rate increase   Significant axis, voltage or hypertrophy change   Electronically Signed By: Jomar Mejia (Shekhar) 01-May-2023 09:11:21   Date and Time of Study: 2023-04-30 21:37:55      SCANNED - TELEMETRY     Final Result      SCANNED - TELEMETRY     Final Result      ECG 12 Lead Drug Monitoring; Amiodarone    (Results Pending)     I have personally reviewed EKG    Echocardiogram: Results for orders placed during the hospital encounter of 04/30/23    Adult Transthoracic Echo Complete W/ Cont if Necessary Per Protocol    Interpretation Summary  •  Estimated right ventricular systolic pressure from tricuspid regurgitation is normal (<35 mmHg).      Normal LV size and contractility EF of 60 to 65%  Normal RV size  Normal atrial size  Pulmonic valve is not well visualized.  Aortic valve is trileaflet with mild sclerosis.  Mitral valve, tricuspid valve appears structurally normal, moderate mitral regurgitation seen.  No pericardial effusion seen.  Proximal aorta appears normal in size.      Lab Review   I have reviewed the labs      Results from last 7 days   Lab Units 05/09/23  0809   MAGNESIUM mg/dL 2.1     Results from last 7 days   Lab Units 05/10/23  1948 05/10/23  0408   SODIUM mmol/L  --  140   POTASSIUM mmol/L 4.0 3.1*   BUN mg/dL  --  14   CREATININE mg/dL  --  0.53*   CALCIUM mg/dL  --  8.8         Results from last 7 days   Lab Units 05/10/23  0408 05/09/23  0809 05/08/23  0311   WBC 10*3/mm3 10.00 11.20* 11.90*   HEMOGLOBIN g/dL 10.9* 10.4*  "9.8*   HEMATOCRIT % 34.0 32.7* 30.8*   PLATELETS 10*3/mm3 239 175 141     Results from last 7 days   Lab Units 05/07/23  0251 05/06/23  1157 05/06/23  0451 05/05/23  0542   INR  1.12* 1.17* 0.96 0.93   APTT seconds  --  27.2 23.7* 26.2       RADIOLOGY:  Imaging Results (Last 24 Hours)     ** No results found for the last 24 hours. **                )5/11/2023  Kye Alcaraz MD      [facility] Dragon/Transcription:   \"Dictated utilizing Dragon dictation\".   "

## 2023-05-11 NOTE — SIGNIFICANT NOTE
05/11/23 1133   OTHER   Discipline physical therapist   Rehab Time/Intention   Session Not Performed patient/family declined treatment   Therapy Assessment/Plan (PT)   Criteria for Skilled Interventions Met (PT) other (see comments)  (Pt remains confused/agitated this date, refused PT will f/u as able.)   Recommendation   PT - Next Appointment 05/12/23

## 2023-05-11 NOTE — SIGNIFICANT NOTE
05/11/23 1614   Rehab Time/Intention   Session Not Performed   (Pt confused and agitated.  Pt refused to work with therapy.)   Recommendation   OT - Next Appointment 05/12/23

## 2023-05-12 PROBLEM — Z98.890 S/P MVR (MITRAL VALVE REPAIR): Status: ACTIVE | Noted: 2023-05-12

## 2023-05-12 PROBLEM — I48.91 POSTOPERATIVE ATRIAL FIBRILLATION: Status: ACTIVE | Noted: 2023-05-12

## 2023-05-12 PROBLEM — I97.89 POSTOPERATIVE ATRIAL FIBRILLATION: Status: ACTIVE | Noted: 2023-05-12

## 2023-05-12 PROBLEM — Z95.1 S/P CABG X 3: Status: ACTIVE | Noted: 2023-05-12

## 2023-05-12 LAB
ANION GAP SERPL CALCULATED.3IONS-SCNC: 13 MMOL/L (ref 5–15)
BUN SERPL-MCNC: 12 MG/DL (ref 8–23)
BUN/CREAT SERPL: 20.3 (ref 7–25)
CALCIUM SPEC-SCNC: 9.6 MG/DL (ref 8.6–10.5)
CHLORIDE SERPL-SCNC: 104 MMOL/L (ref 98–107)
CO2 SERPL-SCNC: 25 MMOL/L (ref 22–29)
CREAT SERPL-MCNC: 0.59 MG/DL (ref 0.57–1)
DEPRECATED RDW RBC AUTO: 46.4 FL (ref 37–54)
EGFRCR SERPLBLD CKD-EPI 2021: 100.2 ML/MIN/1.73
ERYTHROCYTE [DISTWIDTH] IN BLOOD BY AUTOMATED COUNT: 14.2 % (ref 12.3–15.4)
GLUCOSE BLDC GLUCOMTR-MCNC: 112 MG/DL (ref 70–105)
GLUCOSE BLDC GLUCOMTR-MCNC: 117 MG/DL (ref 70–105)
GLUCOSE BLDC GLUCOMTR-MCNC: 128 MG/DL (ref 70–105)
GLUCOSE SERPL-MCNC: 114 MG/DL (ref 65–99)
HCT VFR BLD AUTO: 35.2 % (ref 34–46.6)
HGB BLD-MCNC: 11.9 G/DL (ref 12–15.9)
MAGNESIUM SERPL-MCNC: 2.4 MG/DL (ref 1.6–2.4)
MCH RBC QN AUTO: 30.3 PG (ref 26.6–33)
MCHC RBC AUTO-ENTMCNC: 33.9 G/DL (ref 31.5–35.7)
MCV RBC AUTO: 89.4 FL (ref 79–97)
PLATELET # BLD AUTO: 363 10*3/MM3 (ref 140–450)
PMV BLD AUTO: 8.2 FL (ref 6–12)
POTASSIUM SERPL-SCNC: 4 MMOL/L (ref 3.5–5.2)
QT INTERVAL: 289 MS
QT INTERVAL: 406 MS
RBC # BLD AUTO: 3.94 10*6/MM3 (ref 3.77–5.28)
SODIUM SERPL-SCNC: 142 MMOL/L (ref 136–145)
WBC NRBC COR # BLD: 11 10*3/MM3 (ref 3.4–10.8)

## 2023-05-12 PROCEDURE — 25010000002 AMIODARONE IN DEXTROSE 5% 150-4.21 MG/100ML-% SOLUTION

## 2023-05-12 PROCEDURE — 99024 POSTOP FOLLOW-UP VISIT: CPT | Performed by: THORACIC SURGERY (CARDIOTHORACIC VASCULAR SURGERY)

## 2023-05-12 PROCEDURE — 99233 SBSQ HOSP IP/OBS HIGH 50: CPT | Performed by: INTERNAL MEDICINE

## 2023-05-12 PROCEDURE — 93010 ELECTROCARDIOGRAM REPORT: CPT | Performed by: INTERNAL MEDICINE

## 2023-05-12 PROCEDURE — 97530 THERAPEUTIC ACTIVITIES: CPT

## 2023-05-12 PROCEDURE — 97116 GAIT TRAINING THERAPY: CPT

## 2023-05-12 PROCEDURE — 93005 ELECTROCARDIOGRAM TRACING: CPT | Performed by: INTERNAL MEDICINE

## 2023-05-12 PROCEDURE — 82948 REAGENT STRIP/BLOOD GLUCOSE: CPT

## 2023-05-12 PROCEDURE — 83735 ASSAY OF MAGNESIUM: CPT

## 2023-05-12 PROCEDURE — 97535 SELF CARE MNGMENT TRAINING: CPT

## 2023-05-12 PROCEDURE — 25010000002 DIGOXIN PER 500 MCG: Performed by: INTERNAL MEDICINE

## 2023-05-12 PROCEDURE — 80048 BASIC METABOLIC PNL TOTAL CA: CPT

## 2023-05-12 PROCEDURE — 25010000002 AMIODARONE IN DEXTROSE 5% 360-4.14 MG/200ML-% SOLUTION: Performed by: INTERNAL MEDICINE

## 2023-05-12 PROCEDURE — 85027 COMPLETE CBC AUTOMATED: CPT

## 2023-05-12 PROCEDURE — 93005 ELECTROCARDIOGRAM TRACING: CPT

## 2023-05-12 PROCEDURE — 25010000002 ENOXAPARIN PER 10 MG

## 2023-05-12 RX ORDER — METOPROLOL TARTRATE 5 MG/5ML
5 INJECTION INTRAVENOUS ONCE
Status: COMPLETED | OUTPATIENT
Start: 2023-05-12 | End: 2023-05-12

## 2023-05-12 RX ORDER — LISINOPRIL 5 MG/1
10 TABLET ORAL
Status: DISCONTINUED | OUTPATIENT
Start: 2023-05-12 | End: 2023-05-13 | Stop reason: HOSPADM

## 2023-05-12 RX ORDER — METOPROLOL TARTRATE 5 MG/5ML
INJECTION INTRAVENOUS
Status: COMPLETED
Start: 2023-05-12 | End: 2023-05-12

## 2023-05-12 RX ORDER — DIGOXIN 0.25 MG/ML
500 INJECTION INTRAMUSCULAR; INTRAVENOUS ONCE
Status: COMPLETED | OUTPATIENT
Start: 2023-05-12 | End: 2023-05-12

## 2023-05-12 RX ORDER — CLOPIDOGREL BISULFATE 75 MG/1
75 TABLET ORAL DAILY
Status: DISCONTINUED | OUTPATIENT
Start: 2023-05-12 | End: 2023-05-13 | Stop reason: HOSPADM

## 2023-05-12 RX ADMIN — HYDROCODONE BITARTRATE AND ACETAMINOPHEN 1 TABLET: 5; 325 TABLET ORAL at 23:02

## 2023-05-12 RX ADMIN — AMIODARONE HYDROCHLORIDE 150 MG: 1.5 INJECTION, SOLUTION INTRAVENOUS at 14:20

## 2023-05-12 RX ADMIN — POTASSIUM CHLORIDE 40 MEQ: 1500 TABLET, EXTENDED RELEASE ORAL at 08:33

## 2023-05-12 RX ADMIN — METOPROLOL TARTRATE 25 MG: 25 TABLET, FILM COATED ORAL at 20:25

## 2023-05-12 RX ADMIN — AMIODARONE HYDROCHLORIDE 0.5 MG/MIN: 1.8 INJECTION, SOLUTION INTRAVENOUS at 23:33

## 2023-05-12 RX ADMIN — LAMOTRIGINE 25 MG: 25 TABLET ORAL at 08:33

## 2023-05-12 RX ADMIN — AMIODARONE HYDROCHLORIDE 200 MG: 200 TABLET ORAL at 08:34

## 2023-05-12 RX ADMIN — AMIODARONE HYDROCHLORIDE 1 MG/MIN: 1.8 INJECTION, SOLUTION INTRAVENOUS at 14:14

## 2023-05-12 RX ADMIN — FUROSEMIDE 40 MG: 40 TABLET ORAL at 08:33

## 2023-05-12 RX ADMIN — ASPIRIN 81 MG: 81 TABLET, COATED ORAL at 08:34

## 2023-05-12 RX ADMIN — METOPROLOL TARTRATE 5 MG: 5 INJECTION INTRAVENOUS at 17:00

## 2023-05-12 RX ADMIN — METOPROLOL TARTRATE 5 MG: 1 INJECTION, SOLUTION INTRAVENOUS at 17:00

## 2023-05-12 RX ADMIN — ALPRAZOLAM 2 MG: 1 TABLET ORAL at 23:02

## 2023-05-12 RX ADMIN — METOPROLOL TARTRATE 25 MG: 25 TABLET, FILM COATED ORAL at 08:34

## 2023-05-12 RX ADMIN — CLOPIDOGREL BISULFATE 75 MG: 75 TABLET ORAL at 12:59

## 2023-05-12 RX ADMIN — ENOXAPARIN SODIUM 40 MG: 100 INJECTION SUBCUTANEOUS at 17:43

## 2023-05-12 RX ADMIN — LISINOPRIL 10 MG: 5 TABLET ORAL at 12:16

## 2023-05-12 RX ADMIN — LIDOCAINE 2 PATCH: 700 PATCH TOPICAL at 08:34

## 2023-05-12 RX ADMIN — DIGOXIN 500 MCG: 0.25 INJECTION INTRAMUSCULAR; INTRAVENOUS at 14:56

## 2023-05-12 RX ADMIN — ATORVASTATIN CALCIUM 40 MG: 40 TABLET, FILM COATED ORAL at 20:25

## 2023-05-12 RX ADMIN — AMIODARONE HYDROCHLORIDE 1 MG/MIN: 1.8 INJECTION, SOLUTION INTRAVENOUS at 17:45

## 2023-05-12 NOTE — PROGRESS NOTES
Cardiology Progress Note    Patient Identification:  Name: Andree Deluna  Age: 67 y.o.  Sex: female  :  1955  MRN: [id number]                 Follow Up / Chief Complaint: NSTEMI  Chief Complaint   Patient presents with   • Chest Pain     Pt reports midsternal CP that radiates to back that started 2 hrs PTA.  Pt describes pain as a squeezing pain that is intermittent.  With associated SOA.  Pt reports she took 2 81mg ASA PTA.        Interval History: Patient presented with chest pain. Underwent cardiac cath that showed MV CAD and CV surgery has been consulted.  Patient was scheduled for CABG 2023    NP NOTE:  Patient seen with Dr. Alcaraz.  Patient up in chair, just showered and walked unit.  Confusion some better per .  OT noted some hallucinations while patient was showering. Patient in normal sinus rhythm today.     Electronically signed by SARAHY Steele, 23, 11:31 AM EDT.        Cardiology attending addendum :    I have personally performed a face-to-face diagnostic evaluation, physical exam and reviewed data on this patient.  I have reviewed documentation done by me and nurse practitioner  and corrected as needed.  And agree with the different components of documentation.Greater than 50% of the time spent in the care of this patient was provided by attending consultant/me.        Subjective: Patient seen and examined.  Chart reviewed.  Labs reviewed.  Discussed with RN taking care of patient.  Patient has been having intermittent confusion is better today.  Had gone into A-fib with RVR 2023 and was given IV amiodarone and is in sinus rhythm.  2023: Patient went into A-fib with RVR.    Objective: HS troponin 54---120  2023: bmp unremarkable, CBC unremarkable   5/3/2023: glucose 116 unremarkable  CMP HLD 62 LDL 69 CBC normal.   2023: glucose 105, potassium 3.3  WBC 11.9 hgb 9.8   2023: glucose 100, CO2 30, WBC 11.2, hgb 10.4   5/10/2023: Glucose 124  potassium 3.1 hemoglobin 10.9  5/11/2023: glucose 120  Wbc 11.7, hgb 11.5   5/12/2023: wbc 11. hgb 11.9     History of Present Illness:       Ms.Elisa LOVELY Deluna has PMH of     Hypertension  Diastolic dysfunction-echo 8/7/2019 revealing septal asymmetric hypertrophy EF 60%, diastolic dysfunction  Dyslipidemia  CVA  80% right carotid disease  WEN  Carotid endarterectomy, cholecystectomy, tubal ligation, shoulder/knee/finger surgery  Family history of premature CAD father fatal MI at 54 mother fatal MI at 64.     Presented through emergency room 4/30/2023 with midsternal chest pain radiating to the back, squeezing in sensation, radiating to the left arm and neck, stated with shortness of breath and nausea.  Patient has been having symptoms since many months but has been progressively worse.     Work-up here 4/30/2023/5/1/2023 revealed elevated troponin at 54-->120.  Glucose elevated.  Potassium 3.4.  A1c of 5.8.  Chest x-ray stable cardiomegaly.  EKG done 4/30/2023 reviewed/interpreted by me reveals sinus rhythm with a rate of 74 bpm with  ST segment depression in 1 and aVL        Assessment:  :     New onset chest pain at rest concerning for unstable angina  Elevated troponin  CAD cardiac cath 5/1/2023 revealing multivessel coronary artery disease  CABG with LIMA to LAD, SVG to OM1 and SVG to PDA and mitral valve repair with physio #2 ring 5/6/2023  Hypertension  Dyslipidemia  History of CVA 2019 and carotid disease, right CEA 2020  PAD  Hyperglycemia, prediabetes with A1c of 5.8  Obesity with BMI over 30           Recommendations / Plan:         Patient went into A-fib with RVR  We will start on IV amiodarone and monitor for toxic side effects closely.    Patient has new onset prolonged chest pain and has elevated troponin consistent with acute non-ST elevation MI  Patient was started on aspirin, beta-blockers, statins.  We will continue as tolerated.   patient underwent cardiac cath 5/1/2023 which revealed severe  triple-vessel disease.  Patient underwent three-vessel CABG and mitral valve repair 5/6/2023  Routine postsurgery care  Monitor rhythm  Monitor expected postsurgery blood loss anemia  Monitor intermittent confusion.  Increase activity as tolerated.  Hopefully discharge home soon.     Discussed with RN taking care of patient to coordinate care.       Copied text in this portion of the note has been reviewed and is accurate as of 5/12/2023    Past Medical History:  Past Medical History:   Diagnosis Date   • ADHD unknown   • Anemia    • Anxiety    • Carotid artery disease     80% right internal carotid artery   • CVA (cerebral vascular accident)     right parietal right temporal   • DDD (degenerative disc disease), lumbar    • Depression    • Extremity pain     Ankle pain   • GERD (gastroesophageal reflux disease)    • Hyperlipidemia unknown   • Hypertension    • Insomnia unknown   • Laryngopharyngeal reflux    • Low back pain    • Mood disorder    • Obesity unknown   • Skin cancer of face    • Sleep apnea     Suspected sleep apnea she has refused to be tested   • Stroke (cerebrum)    • Visual field defect     Left     Past Surgical History:  Past Surgical History:   Procedure Laterality Date   • CARDIAC CATHETERIZATION N/A 5/1/2023    Procedure: Left Heart Cath;  Surgeon: Kye Alcaraz MD;  Location: [facility] CATH INVASIVE LOCATION;  Service: Cardiovascular;  Laterality: N/A;   • CAROTID ENDARTERECTOMY Right 11/10/2020    Procedure: CAROTID ENDARTERECTOMY;  Surgeon: Tavo Das MD;  Location: [facility] MAIN OR;  Service: Vascular;  Laterality: Right;   • CHOLECYSTECTOMY     • COLONOSCOPY      2016   • CORONARY ARTERY BYPASS GRAFT N/A 5/6/2023    Procedure: CORONARY ARTERY BYPASS GRAFTING;  Surgeon: Errol Noonan MD;  Location: [facility] CVOR;  Service: Cardiothoracic;  Laterality: N/A;  CABG X  3 using LIMA and right endospahenous vein;  2 coronary markers implanted.    • FINGER SURGERY     •  KNEE ARTHROPLASTY     • KNEE SURGERY Right     replaced   • LAPAROSCOPIC GASTRIC BANDING     • MITRAL VALVE REPAIR/REPLACEMENT N/A 5/6/2023    Procedure: MITRAL VALVE REPAIR/REPLACEMENT;  Surgeon: Errol Noonan MD;  Location: [facility];  Service: Cardiothoracic;  Laterality: N/A;  Mitral valve repaired using 28 mm Jorge Physio II Annuloplasty Ring.    • ROTATOR CUFF REPAIR Right 2019   • SHOULDER SURGERY Right 05/24/2019    scope/ cuff repair   • TRANSESOPHAGEAL ECHOCARDIOGRAM (GALA) N/A 5/6/2023    Procedure: TRANSESOPHAGEAL ECHOCARDIOGRAM WITH ANESTHESIA;  Surgeon: Errol Noonan MD;  Location: [facility];  Service: Cardiothoracic;  Laterality: N/A;   • TUBAL ABDOMINAL LIGATION          Social History:   Social History     Tobacco Use   • Smoking status: Never   • Smokeless tobacco: Never   Substance Use Topics   • Alcohol use: No      Family History:  Family History   Problem Relation Age of Onset   • Diabetes Other    • Heart disease Mother    • Diabetes Mother    • Heart disease Father           Allergies:  No Known Allergies  Scheduled Meds:  amiodarone, 200 mg, BID With Meals  amiodarone in dextrose 5%, ,   amiodarone, 150 mg, Once  aspirin, 81 mg, Daily  atorvastatin, 40 mg, Nightly  clopidogrel, 75 mg, Daily  enoxaparin, 40 mg, Q24H  furosemide, 40 mg, Daily  insulin lispro, 2-7 Units, 4x Daily With Meals & Nightly  lamoTRIgine, 25 mg, Daily   Followed by  [START ON 5/25/2023] lamoTRIgine, 25 mg, Q12H   Followed by  [START ON 6/8/2023] lamoTRIgine, 50 mg, Q12H   Followed by  [START ON 6/15/2023] lamoTRIgine, 100 mg, Q12H  lidocaine, 2 patch, Q24H  lisinopril, 10 mg, Q24H  metoprolol tartrate, 25 mg, Q12H  pantoprazole, 40 mg, Q AM  polyethylene glycol, 17 g, BID  potassium chloride, 40 mEq, Daily  senna-docusate sodium, 2 tablet, Nightly          Review of Systems:   ROS  Review of Systems   Constitution: Negative for chills and fever.   Cardiovascular: Negative for chest pain and  "palpitations.   Respiratory: Negative for cough and hemoptysis.    Gastrointestinal: Negative for nausea.        Constitutional:  Temp:  [97.9 °F (36.6 °C)-98.7 °F (37.1 °C)] 97.9 °F (36.6 °C)  Heart Rate:  [78-96] 87  Resp:  [14-21] 21  BP: (110-151)/(75-97) 133/83    Physical Exam   /83   Pulse 87   Temp 97.9 °F (36.6 °C) (Oral)   Resp 21   Ht 167.6 cm (66\")   Wt 91.5 kg (201 lb 12.8 oz)   SpO2 96%   BMI 32.57 kg/m²   General:  Appears in no acute distress  Eyes: Sclera is anicteric,  conjunctiva is clear   HEENT:  No JVD. Thyroid not visibly enlarged. No mucosal pallor or cyanosis  Respiratory: Respirations regular and unlabored at rest.  Bilaterally good breath sounds, with good air entry in all fields. No crackles, rubs or wheezes auscultated  Cardiovascular: S1,S2 Regular rate and rhythm. No murmur, rub or gallop auscultated.  . No pretibial pitting edema  Gastrointestinal: Abdomen nondistended, soft  Musculoskeletal:  No abnormal movements  Extremities: No digital clubbing or cyanosis  Skin: Color pink. Skin warm and dry to touch. No rashes  No xanthoma  Neuro: Alert and awake, no lateralizing deficits appreciated    INTAKE AND OUTPUT:    Intake/Output Summary (Last 24 hours) at 5/12/2023 1408  Last data filed at 5/12/2023 0800  Gross per 24 hour   Intake 585 ml   Output 350 ml   Net 235 ml       Cardiographics  Telemetry: Sinus rhythm    ECG:   ECG 12 Lead Drug Monitoring; Amiodarone   Preliminary Result   HEART RATE= 90  bpm   RR Interval= 668  ms   IN Interval= 146  ms   P Horizontal Axis= 23  deg   P Front Axis= 39  deg   QRSD Interval= 87  ms   QT Interval= 381  ms   QRS Axis= 2  deg   T Wave Axis= 102  deg   - ABNORMAL ECG -   Sinus rhythm   Nonspecific T abnormalities, lateral leads   When compared with ECG of 11-May-2023 8:38:55,   No significant change   Electronically Signed By:    Date and Time of Study: 2023-05-12 07:53:08      ECG 12 Lead Rhythm Change   Final Result   HEART RATE= 75 "  bpm   RR Interval= 804  ms   IA Interval= 157  ms   P Horizontal Axis= 24  deg   P Front Axis= 49  deg   QRSD Interval= 88  ms   QT Interval= 411  ms   QRS Axis= 26  deg   T Wave Axis= 79  deg   - NORMAL ECG -   Sinus rhythm   When compared with ECG of 08-May-2023 3:01:52,   Significant axis, voltage or hypertrophy change   Electronically Signed By: Brad Santos ([facility]) 11-May-2023 23:41:58   Date and Time of Study: 2023-05-11 08:38:55      ECG 12 Lead Drug Monitoring; Amiodarone   Preliminary Result   HEART RATE= 135  bpm   RR Interval= 443  ms   IA Interval=   ms   P Horizontal Axis=   deg   P Front Axis=   deg   QRSD Interval= 90  ms   QT Interval= 301  ms   QRS Axis= 2  deg   T Wave Axis= 163  deg   - ABNORMAL ECG -   Atrial fibrillation   Repol abnrm suggests ischemia, lateral leads   Electronically Signed By:    Date and Time of Study: 2023-05-11 06:37:00      ECG 12 Lead   Preliminary Result   HEART RATE= 80  bpm   RR Interval= 748  ms   IA Interval= 168  ms   P Horizontal Axis= 10  deg   P Front Axis= 46  deg   QRSD Interval= 72  ms   QT Interval= 337  ms   QRS Axis= 5  deg   T Wave Axis= -13  deg   - OTHERWISE NORMAL ECG -   Sinus rhythm   Low voltage, precordial leads   Electronically Signed By:    Date and Time of Study: 2023-05-08 03:01:52      ECG 12 Lead   Preliminary Result   HEART RATE= 59  bpm   RR Interval= 1008  ms   IA Interval= 171  ms   P Horizontal Axis= 78  deg   P Front Axis= 17  deg   QRSD Interval= 85  ms   QT Interval= 395  ms   QRS Axis= 2  deg   T Wave Axis= 5  deg   - OTHERWISE NORMAL ECG -   Sinus bradycardia   Low voltage, precordial leads   Electronically Signed By:    Date and Time of Study: 2023-05-07 05:30:01      ECG 12 Lead Chest Pain   Preliminary Result   HEART RATE= 56  bpm   RR Interval= 1088  ms   IA Interval= 160  ms   P Horizontal Axis= 30  deg   P Front Axis= 44  deg   QRSD Interval= 89  ms   QT Interval= 410  ms   QRS Axis= 3  deg   T Wave Axis= -47  deg   - BORDERLINE  ECG -   Sinus bradycardia   Atrial premature complex   Low voltage, precordial leads   Electronically Signed By:    Date and Time of Study: 2023-05-07 00:20:13      ECG 12 Lead   Preliminary Result   HEART RATE= 76  bpm   RR Interval= 784  ms   OR Interval= 170  ms   P Horizontal Axis= 28  deg   P Front Axis= 69  deg   QRSD Interval= 93  ms   QT Interval= 440  ms   QRS Axis= 20  deg   T Wave Axis= -40  deg   - ABNORMAL ECG -   Sinus rhythm   Repol abnrm suggests ischemia, diffuse leads   Electronically Signed By:    Date and Time of Study: 2023-05-06 12:18:09      ECG 12 Lead   Final Result   HEART RATE= 56  bpm   RR Interval= 1072  ms   OR Interval= 144  ms   P Horizontal Axis= 5  deg   P Front Axis= 17  deg   QRSD Interval= 93  ms   QT Interval= 477  ms   QRS Axis= 8  deg   T Wave Axis= -63  deg   - ABNORMAL ECG -   Sinus bradycardia   Nonspecific repol abnormality, diffuse leads   When compared with ECG of 30-Apr-2023 21:37:55,   Significant repolarization change   Significant axis, voltage or hypertrophy change   Electronically Signed By: Kye Alcaraz ([facility]) 03-May-2023 17:12:15   Date and Time of Study: 2023-05-02 10:51:40      ECG 12 Lead Chest Pain   Final Result   HEART RATE= 57  bpm   RR Interval= 1048  ms   OR Interval= 142  ms   P Horizontal Axis= 16  deg   P Front Axis= 24  deg   QRSD Interval= 87  ms   QT Interval= 459  ms   QRS Axis= 26  deg   T Wave Axis= 198  deg   - OTHERWISE NORMAL ECG -   Slow sinus arrhythmia   When compared with ECG of 30-Apr-2023 21:37:55,   Significant axis, voltage or hypertrophy change   Electronically Signed By: Kye Alcaraz ([facility]) 03-May-2023 17:12:19   Date and Time of Study: 2023-05-01 05:38:43      ECG 12 Lead Chest Pain   Final Result   HEART RATE= 74  bpm   RR Interval= 816  ms   OR Interval= 140  ms   P Horizontal Axis= -20  deg   P Front Axis= 17  deg   QRSD Interval= 89  ms   QT Interval= 408  ms   QRS Axis= -2  deg   T Wave Axis= 71  deg   - ABNORMAL ECG  -   Sinus rhythm   Probable left ventricular hypertrophy   ST depression, consider ischemia, lateral lds   When compared with ECG of 22-Oct-2020 11:28:25,   Significant rate increase   Significant axis, voltage or hypertrophy change   Electronically Signed By: Jomar Mejia ([facility]) 01-May-2023 09:11:21   Date and Time of Study: 2023-04-30 21:37:55      SCANNED - TELEMETRY     Final Result      SCANNED - TELEMETRY     Final Result      ECG 12 Lead Rhythm Change    (Results Pending)   ECG 12 Lead Drug Monitoring; Amiodarone    (Results Pending)     I have personally reviewed EKG    Echocardiogram: Results for orders placed during the hospital encounter of 04/30/23    Adult Transthoracic Echo Complete W/ Cont if Necessary Per Protocol    Interpretation Summary  •  Estimated right ventricular systolic pressure from tricuspid regurgitation is normal (<35 mmHg).      Normal LV size and contractility EF of 60 to 65%  Normal RV size  Normal atrial size  Pulmonic valve is not well visualized.  Aortic valve is trileaflet with mild sclerosis.  Mitral valve, tricuspid valve appears structurally normal, moderate mitral regurgitation seen.  No pericardial effusion seen.  Proximal aorta appears normal in size.      Lab Review   I have reviewed the labs      Results from last 7 days   Lab Units 05/09/23  0809   MAGNESIUM mg/dL 2.1     Results from last 7 days   Lab Units 05/12/23  0823   SODIUM mmol/L 142   POTASSIUM mmol/L 4.0   BUN mg/dL 12   CREATININE mg/dL 0.59   CALCIUM mg/dL 9.6         Results from last 7 days   Lab Units 05/12/23  0823 05/11/23  1411 05/10/23  0408   WBC 10*3/mm3 11.00* 11.70* 10.00   HEMOGLOBIN g/dL 11.9* 11.5* 10.9*   HEMATOCRIT % 35.2 35.6 34.0   PLATELETS 10*3/mm3 363 358 239     Results from last 7 days   Lab Units 05/07/23  0251 05/06/23  1157 05/06/23  0451   INR  1.12* 1.17* 0.96   APTT seconds  --  27.2 23.7*       RADIOLOGY:  Imaging Results (Last 24 Hours)     ** No results found for the last 24  "hours. **                )5/12/2023  MD CAPO Ivory/Transcription:   \"Dictated utilizing Dragon dictation\".   "

## 2023-05-12 NOTE — PROGRESS NOTES
S/P POD# 6 CABG x3 with LIMA/ MV repair--[place]  EF 50% (cath)    Subjective: Pt wants to go home.  Intermittently confused but quickly reorients.    Maintaining SR  Wt is down 1 kg from preop        Intake/Output Summary (Last 24 hours) at 5/12/2023 0843  Last data filed at 5/12/2023 0600  Gross per 24 hour   Intake 1030 ml   Output 550 ml   Net 480 ml     Temp:  [98.1 °F (36.7 °C)-98.7 °F (37.1 °C)] 98.7 °F (37.1 °C)  Heart Rate:  [76-95] 95  Resp:  [14] 14  BP: (110-154)/(75-97) 142/97      Results from last 7 days   Lab Units 05/11/23  1411 05/10/23  0408 05/07/23  0634 05/07/23  0251 05/06/23  1231 05/06/23  1157 05/06/23  0735 05/06/23  0451   WBC 10*3/mm3 11.70* 10.00   < > 9.50   < > 5.40  --  8.10   HEMOGLOBIN g/dL 11.5* 10.9*   < > 9.8*   < > 9.6*  --  11.9*   HEMOGLOBIN, POC   --   --   --   --    < >  --    < >  --    HEMATOCRIT % 35.6 34.0   < > 30.4*   < > 29.3*  --  36.5   HEMATOCRIT POC   --   --   --   --    < >  --    < >  --    PLATELETS 10*3/mm3 358 239   < > 132*   < > 117*  --  247   INR   --   --   --  1.12*  --  1.17*  --  0.96    < > = values in this interval not displayed.     Results from last 7 days   Lab Units 05/11/23  1411 05/10/23  0408 05/09/23  0809 05/08/23  0311 05/07/23  1302   CREATININE mg/dL 0.60   < > 0.51*   < >  --    POTASSIUM mmol/L 4.0   < > 3.9   < >  --    SODIUM mmol/L 140   < > 142   < >  --    MAGNESIUM mg/dL  --   --  2.1  --   --    PHOSPHORUS mg/dL  --   --   --   --  4.0    < > = values in this interval not displayed.         Physical Exam:  Neuro intact, nad, up in chair, showered this morning,  at bedside  Tele:  SR 80s  Diminished bases, 97% RA  Sternotomy/ SVHS healing well  Benign abd, + BM  No edema    Assessment/Plan:  Principal Problem:    Chest pain, unspecified type  Active Problems:    Mood disorder    Hyperlipidemia    Hypertension    Obesity    GERD with esophagitis    Overactive bladder    History of stroke    Chronic cough     Elevated troponin    Non-STEMI (non-ST elevated myocardial infarction)    Coronary artery disease involving native coronary artery of native heart with unstable angina pectoris    Abnormal findings on diagnostic imaging of heart and coronary circulation    - MV CAD with NSTEMI presentation, severe mitral regurgitation, EF 50% (cath)--s/p CABG x3/ MV repair ([facility])  - HTN--stable  - HLD--statin  - Hyperglycemia--a1c 5.8, SSI  - Chronic HFpEF with volume overload--work towards GDMT  - Hx stroke (2019)--s/p right CEA (Dr. Das, )  - Strong family hx premature CAD--father  of MI age 54/ mother  from MI age 64  - Anxiety--on home Xanax, she is no longer on Viibyrd  - Obesity, stage 1--BMI 33.2  - Postop confusion, multifactorial--resolved  - Postop leukocytosis likely r/t atel--pulm toileting  - Postop ABLA, expected--watch closely  - Postop atrial fib--IV amio/bb    POD# 6.  Doing well.  Maintaining sinus rhythm.  D/w family/patient at length at bedside.   states that she gets intermittently confused with each hospitalization and that it will clear at home.  He states that he has a lot of help from extended family and is really wanting to take her home today.  Mobilize.  On asa/statin/bb.  Plavix for NSTEMI.   Plans for HHC--nsg/PT/OT at discharge.  D/w Dr. Silva--ok to dc home.  We will hold on further anticoagulation given brevity of atrial fib.    Addendum:  Patient converted to rapid atrial fibs 140s 170s.  Cardiology managing.  We will need to readdress anticoagulation status closer to discharge.    [id number] Score for Atrial Fibrillation Stroke Risk 2023  7 points  Stroke risk was 11.2% per year in >90,000 patients (the [place] Atrial Fibrillation Cohort Study) and 15.7% risk of stroke/TIA/systemic  embolism.    Routine care--as above  D/w pt/nsg/family, .Dr. Noonan  Home with home health care/therapies    SARAHY Nayak  2023  08:43 EDT

## 2023-05-12 NOTE — PLAN OF CARE
"Goal Outcome Evaluation:                 Assessment: Andree Deluna presents with ADL impairments affecting function including balance, cognition, endurance / activity tolerance, range of motion (ROM) and strength.  Pt continues to demo limited safety awareness, hallucinations, and confusion.  Demonstrated functioning below baseline abilities indicate the need for continued skilled intervention while inpatient. Tolerating session today without incident. Will continue to follow and progress as tolerated.     Plan/Recommendations:   Moderate Intensity Therapy recommended post-acute care. This is recommended as therapy feels the patient would require 3-4 days per week and wouldn't tolerate \"3 hour daily\" rehab intensity. SNF would be the preferred choice. If the patient does not agree to SNF, arrange HH or OP depending on home bound status. If patient is medically complex, consider LTACH.. Pt requires no DME at discharge.        "

## 2023-05-12 NOTE — THERAPY TREATMENT NOTE
"Subjective: Pt agreeable to therapeutic plan of care.  Cognition: arousal/alertness: Alert    Objective:     Bed Mobility: N/A or Not attempted.   Functional Transfers: Supervision     Balance: unsupported and standing Supervision  Functional Ambulation: Supervision    Grooming: Min-A  ADL Position: unsupported sitting and unsupported standing  ADL Comments: Cues for safety and to prevent washing incisions with wash cloth after washing kacie areas.      Lower Body Dressing: Mod-A  ADL Position: unsupported sitting  ADL Comments: Donning socks     Vitals: WNL    Pain: 0 VAS  Location: NA  Interventions for pain: N/A  Education: Provided education on the importance of mobility in the acute care setting      Assessment: Andree Deluna presents with ADL impairments affecting function including balance, cognition, endurance / activity tolerance, range of motion (ROM) and strength.  Pt continues to demo limited safety awareness, hallucinations, and confusion.  Demonstrated functioning below baseline abilities indicate the need for continued skilled intervention while inpatient. Tolerating session today without incident. Will continue to follow and progress as tolerated.     Plan/Recommendations:   Moderate Intensity Therapy recommended post-acute care. This is recommended as therapy feels the patient would require 3-4 days per week and wouldn't tolerate \"3 hour daily\" rehab intensity. SNF would be the preferred choice. If the patient does not agree to SNF, arrange HH or OP depending on home bound status. If patient is medically complex, consider LTACH.. Pt requires no DME at discharge.     Pt desires Home with family assist and and Home Health at discharge. Pt cooperative; agreeable to therapeutic recommendations and plan of care.     Modified [place]: N/A = No pre-op stroke/TIA    Post-Tx Position: Up in Chair, Alarms activated and Call light and personal items within reach  PPE: gloves    "

## 2023-05-12 NOTE — DISCHARGE SUMMARY
Date of Admission: 2023  Date of Discharge: 2023    Discharge Diagnosis:   - MV CAD with NSTEMI presentation, severe mitral regurgitation, EF 50% (cath)--s/p CABG x3/ MV repair ([facility])  - HTN--stable  - HLD--statin  - Hyperglycemia--a1c 5.8, SSI  - Chronic HFpEF with volume overload--work towards GDMT  - Hx stroke ()--s/p right CEA (Dr. Das, )  - Strong family hx premature CAD--father  of MI age 54/ mother  from MI age 64  - Anxiety--on home Xanax, she is no longer on Viibyrd  - Obesity, stage 1--BMI 33.2  - Postop confusion, multifactorial--intermittently remains  - Postop leukocytosis likely r/t atel--pulm toileting  - Postop ABLA, expected--watch closely  - Postop atrial fib--IV amio/bb    Presenting Problem/History of Present Illness  Elevated troponin [R77.8]  Chest pain, unspecified type [R07.9]     Hospital Course  Patient is a 67 y.o. female who presented to [facility] with c/o CP and shortness of breath.  She ruled in for NSTEMI.  Cardiology was consulted.  Cardiac cath revealed MV CAD and she was found to have severe mitral regurgitation.  Cardiac surgery was consulted for surgical revascularization.  On 2023, she underwent CABG x3 with LIMA and mitral valve repair per Dr. Noonan.  Please see op note for full details.  She was extubated in a timely manner.  She was noted to have postop confusion and at times had a sitter at the bedside.  This waxed and waned in her postop course.  Her family reports that she does this every time she is in the hospital.  She had no neurological deficits.  She participated with therapy and recommendations were given for home nursing/PT/OT.  The services were ordered.  On postop day 4 she was noted to have rate controlled atrial fibrillation.  IV amiodarone was started and patient converted to sinus rhythm.  On postop day 6, she had been maintaining sinus rhythm for greater than 24 hours and plans were made for going home but she  developed rapid atrial fib again in 140-170s.  IV amiodarone restarted.  She converted back to sinus rhythm and Dr. Silva deemed ready for dc on POD#7.  She does remain intermittently confused.  Her  believes that this will clear once she gets home.  She has showered with therapy and has been up ambulating.  Plavix was added d/t NSTEMI presentation.  Tylenol as needed for pain.  Dr. Silva did not want pt anticoagulated for atrial fib.    **this was completed for medical records.  I was not present for her discharge.    Procedures Performed  Per Dr. Noonan 5/6/2023  1. CABG x 3 (LIMA to LAD, SVG to OM, SVG to PDA)  2. EVH of the right legs  3. Mitral valve repair with Physio II ring       Consults:   Consults     Date and Time Order Name Status Description    5/1/2023  6:00 PM Inpatient Cardiothoracic Surgery Consult Completed     5/1/2023 12:45 AM Inpatient Cardiology Consult Completed           Pertinent Test Results:    Lab Results   Component Value Date    WBC 12.20 (H) 05/13/2023    HGB 11.7 (L) 05/13/2023    HCT 36.5 05/13/2023    MCV 92.0 05/13/2023     05/13/2023      Lab Results   Component Value Date    GLUCOSE 103 (H) 05/13/2023    CALCIUM 9.3 05/13/2023     05/13/2023    K 3.8 05/13/2023    CO2 26.0 05/13/2023     05/13/2023    BUN 11 05/13/2023    CREATININE 0.64 05/13/2023    EGFRIFAFRI 91 03/12/2016    EGFRIFNONA 55 (L) 06/22/2021    BCR 17.2 05/13/2023    ANIONGAP 14.0 05/13/2023     Lab Results   Component Value Date    INR 1.12 (H) 05/07/2023    PROTIME 11.9 (H) 05/07/2023         Condition on Discharge: Stable for discharge to home with family assistance, home health nsg/PT/OT.    Vital Signs        Physical Exam:  Neuro intact, nad, up in chair, showered this morning,  at bedside  Tele:  SR 80s  Diminished bases, 97% RA  Sternotomy/ SVHS healing well  Benign abd, + BM  No edema      Discharge Disposition      Discharge Medications     Discharge Medications       New Medications      Instructions Start Date   acetaminophen 325 MG tablet  Commonly known as: TYLENOL   650 mg, Oral, Every 4 Hours PRN      amiodarone 200 MG tablet  Commonly known as: PACERONE   Take 1 tablet by mouth 2 (Two) Times a Day With Meals for 7 days, THEN 1 tablet Daily for 21 days.   Start Date: May 13, 2023     clopidogrel 75 MG tablet  Commonly known as: PLAVIX   75 mg, Oral, Daily      furosemide 40 MG tablet  Commonly known as: LASIX   40 mg, Oral, Daily      HYDROcodone-acetaminophen 5-325 MG per tablet  Commonly known as: NORCO   1 tablet, Oral, Every 6 Hours PRN      lidocaine 5 %  Commonly known as: LIDODERM   2 patches, Transdermal, Every 24 Hours Scheduled, Remove & Discard patch within 12 hours to bilateral shoulders      lisinopril 10 MG tablet  Commonly known as: PRINIVIL,ZESTRIL   10 mg, Oral, Every 24 Hours Scheduled      metoprolol tartrate 25 MG tablet  Commonly known as: LOPRESSOR   25 mg, Oral, Every 12 Hours Scheduled      potassium chloride 20 MEQ CR tablet  Commonly known as: K-DUR,KLOR-CON   40 mEq, Oral, Daily         Changes to Medications      Instructions Start Date   lamoTRIgine 25 MG tablet  Commonly known as: LaMICtal  What changed:   · medication strength  · how much to take  · when to take this   25 mg, Oral, Daily      lamoTRIgine 25 MG tablet  Commonly known as: LaMICtal  What changed: You were already taking a medication with the same name, and this prescription was added. Make sure you understand how and when to take each.   25 mg, Oral, Every 12 Hours Scheduled   Start Date: May 25, 2023     lamoTRIgine 25 MG tablet  Commonly known as: LaMICtal  What changed: You were already taking a medication with the same name, and this prescription was added. Make sure you understand how and when to take each.   50 mg, Oral, Every 12 Hours Scheduled   Start Date: June 8, 2023     lamoTRIgine 100 MG tablet  Commonly known as: LaMICtal  What changed: You were already taking  a medication with the same name, and this prescription was added. Make sure you understand how and when to take each.   100 mg, Oral, Every 12 Hours Scheduled   Start Date: Gabriella 15, 2023        Continue These Medications      Instructions Start Date   ALPRAZolam 2 MG tablet  Commonly known as: XANAX   TAKE 1 TABLET BY MOUTH AT NIGHT AS NEEDED FOR ANXIETY      aspirin 81 MG chewable tablet   81 mg, Oral, Daily      atorvastatin 80 MG tablet  Commonly known as: LIPITOR   TAKE ONE TABLET BY MOUTH ONCE NIGHTLY      omeprazole 40 MG capsule  Commonly known as: priLOSEC   TAKE ONE CAPSULE BY MOUTH DAILY      oxybutynin 5 MG tablet  Commonly known as: DITROPAN   5 mg, Oral, Daily         Stop These Medications    amLODIPine 2.5 MG tablet  Commonly known as: NORVASC     lisinopril-hydrochlorothiazide 20-25 MG per tablet  Commonly known as: PRINZIDE,ZESTORETIC     metoprolol succinate XL 25 MG 24 hr tablet  Commonly known as: TOPROL-XL            Discharge Diet:   Healthy Heart Diet    Activity at Discharge:   As tolerated, no lifting > 10 pounds x 6 weeks    Follow-up Appointments  Future Appointments   Date Time Provider Department Center   5/16/2023 To Be Determined Araseli De La Rosa OT [facility]   5/23/2023 To Be Determined Fatmata Dumont RN [facility]   5/25/2023  9:30 AM Tina Chao APRN [facility]   5/26/2023 To Be Determined Fatmata Dumont RN [facility]   5/31/2023 To Be Determined Toshia Osborne RN [facility]   6/7/2023 To Be Determined Toshia Osborne RN [facility]   6/14/2023 To Be Determined Toshia Osborne RN [facility]   6/19/2023  1:15 PM Jesse Arguello PA-C [facility]   6/21/2023 To Be Determined Toshia Osborne RN [facility]   6/28/2023 To Be Determined Toshia Osborne RN [facility]   7/5/2023 To Be Determined Toshia Osborne RN [facility]   7/12/2023 To Be Determined Toshia Osborne RN [facility]     Additional Instructions for the  Follow-ups that You Need to Schedule     Ambulatory Referral to Home Health ([facility])   As directed      Face to Face Visit Date: 5/12/2023    Follow-up provider for Plan of Care?: I will be treating the patient on an ongoing basis.  Please send me the Plan of Care for signature.    Follow-up provider: NARCISA LEBLANC [[id number]]    Reason/Clinical Findings: s/p CABG/ MV repair    Describe mobility limitations that make leaving home difficult: s/p CABG/ MV repair    Nursing/Therapeutic Services Requested: Skilled Nursing (Eval and treat) Physical Therapy Occupational Therapy    Skilled nursing orders: Post CABG care (Eval and treat)    PT orders: Strengthening (Eval and treat eval and treat)    Occupational orders: Activities of daily living Strengthening    Frequency: 1 Week 1         Discharge Follow-up with PCP   As directed       Currently Documented PCP:    Naga Griffith PA-C    PCP Phone Number:    [phone]     Follow Up Details: in one month         Discharge Follow-up with Specialty: Cardiology; 2 Weeks   As directed      Specialty: Cardiology    Follow Up: 2 Weeks    Follow Up Details: Call for an appointment with Dr. Alcaraz for 2-week follow-up         Discharge Follow-up with Specified Provider: Cardiac Surgery; 2 Weeks   As directed      To: Cardiac Surgery    Follow Up: 2 Weeks    Follow Up Details: NP postop follow-up clinic--5/25/2023 at 9:30 AM         Referral to Cardiac Rehab   As directed      Call MD With Problems / Concerns    Jun 12, 2023 (Approximate)      Instructions: Call for temp >101 or any surgical wound drainage    Order Comments: Instructions: Call for temp >101 or any surgical wound drainage                Test Results Pending at Discharge    STS information:  Pt discharged on asa/bb/statin.  Tapered amiodarone d/t atrial fib.  Plavix for NSTEMI presentation.  No additional anticoagulation d/t brevity of atrial fib per Dr. Silva.       Tina Chao  APRN  05/15/23  13:29 EDT

## 2023-05-12 NOTE — THERAPY TREATMENT NOTE
"Subjective: Pt agreeable to therapeutic plan of care.    Objective:     Bed mobility - N/A or Not attempted.  Transfers - CGA VC for sternal precautions throughout treatment, but did fair standing and sitting without requiring much cueing  Ambulation - 225 feet SBA standing rest x2 during gait training    Vitals: WNL    Pain: 3 VAS   Location: incisional  Intervention for pain: Increased Activity    Education: Provided education on the importance of mobility in the acute care setting, Verbal/Tactile Cues, Transfer Training, Gait Training and Post-Op Precautions    Assessment: Andree Deluna presents with functional mobility impairments which indicate the need for skilled intervention. Tolerating session today without incident. Pt tolerates treatment well, ambulating increased distance without AD. Pt conversational and pleasant with PT. Pt does have difficulty following sternal precautions, leaning onto hands during standing rest break requiring cueing for correction. Pt changing frequency this date to 3x/week. Will continue to follow and progress as tolerated.     Plan/Recommendations:   Low Intensity Therapy recommended post-acute care - This is recommended as therapy feels this patient would require 2-3 visits per week. OP or HH would be the best option depending on patient's home bound status. Consider, if the patient has other  \"skilled\" needs such as wounds, IV antibiotics, etc. Combined with \"low intensity\" could also equate to a SNF. If patient is medically complex, consider LTAC.. Pt requires no DME at discharge.     Pt desires Home with Home Health and Home with family assist at discharge. Pt with significant confusion, concern with home safety, but physically is progressing well.   Pt cooperative; agreeable to therapeutic recommendations and plan of care.         Basic Mobility 6-click:  Rollin = Total, A lot = 2, A little = 3; 4 = None  Supine>Sit:   1 = Total, A lot = 2, A little = 3; 4 = " None   Sit>Stand with arms:  1 = Total, A lot = 2, A little = 3; 4 = None  Bed>Chair:   1 = Total, A lot = 2, A little = 3; 4 = None  Ambulate in room:  1 = Total, A lot = 2, A little = 3; 4 = None  3-5 Steps with railin = Total, A lot = 2, A little = 3; 4 = None  Score: 22    Modified Wells: 3 = Moderate disability (Requires some help, but able to walk unassisted)    Post-Tx Position: In bathroom and Call light and personal items within reach  PPE: gloves

## 2023-05-12 NOTE — PLAN OF CARE
Assessment: Andree Deluna presents with functional mobility impairments which indicate the need for skilled intervention. Tolerating session today without incident. Pt tolerates treatment well, ambulating increased distance without AD. Pt conversational and pleasant with PT. Pt does have difficulty following sternal precautions, leaning onto hands during standing rest break requiring cueing for correction. Pt changing frequency this date to 3x/week. Will continue to follow and progress as tolerated.

## 2023-05-12 NOTE — PLAN OF CARE
Problem: Adult Inpatient Plan of Care  Goal: Plan of Care Review  Outcome: Ongoing, Not Progressing  Flowsheets (Taken 5/12/2023 1840)  Progress: no change  Plan of Care Reviewed With: patient  Goal: Patient-Specific Goal (Individualized)  Outcome: Ongoing, Not Progressing  Goal: Absence of Hospital-Acquired Illness or Injury  Outcome: Ongoing, Not Progressing  Intervention: Identify and Manage Fall Risk  Recent Flowsheet Documentation  Taken 5/12/2023 1900 by Celina Kowalski RN  Safety Promotion/Fall Prevention:   safety round/check completed   fall prevention program maintained  Taken 5/12/2023 1800 by Celina Kowalski RN  Safety Promotion/Fall Prevention:   safety round/check completed   fall prevention program maintained  Taken 5/12/2023 1700 by Celina Kowalski RN  Safety Promotion/Fall Prevention:   safety round/check completed   fall prevention program maintained  Taken 5/12/2023 1600 by Celina Kowalski RN  Safety Promotion/Fall Prevention: safety round/check completed  Taken 5/12/2023 1500 by Celina Kowalski RN  Safety Promotion/Fall Prevention:   safety round/check completed   fall prevention program maintained  Taken 5/12/2023 1400 by Celina Kowalski RN  Safety Promotion/Fall Prevention:   safety round/check completed   fall prevention program maintained  Taken 5/12/2023 1300 by Celina Kowalski RN  Safety Promotion/Fall Prevention:   safety round/check completed   fall prevention program maintained  Taken 5/12/2023 1230 by Celina Kowalski RN  Safety Promotion/Fall Prevention:   safety round/check completed   fall prevention program maintained   assistive device/personal items within reach  Taken 5/12/2023 1100 by Celina Kowalski RN  Safety Promotion/Fall Prevention:   safety round/check completed   fall prevention program maintained  Taken 5/12/2023 1000 by Celina Kowalski RN  Safety Promotion/Fall Prevention:   safety round/check completed   fall prevention program maintained  Taken 5/12/2023 0900 by Celina Kowalski  RN  Safety Promotion/Fall Prevention:   safety round/check completed   fall prevention program maintained  Taken 5/12/2023 0800 by Celina Kowalski RN  Safety Promotion/Fall Prevention:   safety round/check completed   fall prevention program maintained   assistive device/personal items within reach  Taken 5/12/2023 0700 by Celina Kowalski RN  Safety Promotion/Fall Prevention:   safety round/check completed   fall prevention program maintained  Intervention: Prevent Skin Injury  Recent Flowsheet Documentation  Taken 5/12/2023 1600 by Celina Kowalski RN  Body Position: position changed independently  Skin Protection: adhesive use limited  Taken 5/12/2023 1400 by Celina Kowalski RN  Body Position: position changed independently  Taken 5/12/2023 1230 by Celina Kowalski RN  Body Position: position changed independently  Skin Protection: adhesive use limited  Taken 5/12/2023 1015 by Celina Kowalski RN  Body Position: position changed independently  Taken 5/12/2023 0800 by Celina Kowalski RN  Body Position: position changed independently  Skin Protection: adhesive use limited  Intervention: Prevent and Manage VTE (Venous Thromboembolism) Risk  Recent Flowsheet Documentation  Taken 5/12/2023 1230 by Celina Kowalski RN  Activity Management: ambulated outside room  Range of Motion: active ROM (range of motion) encouraged  Taken 5/12/2023 0800 by Celina Kowalski RN  Activity Management:   ambulated in room   ambulated outside room  VTE Prevention/Management: (lovenox) patient refused intervention  Range of Motion: active ROM (range of motion) encouraged  Intervention: Prevent Infection  Recent Flowsheet Documentation  Taken 5/12/2023 1600 by Celina Kowalski RN  Infection Prevention:   hand hygiene promoted   rest/sleep promoted   single patient room provided  Taken 5/12/2023 1230 by Celina Kowalski RN  Infection Prevention:   hand hygiene promoted   rest/sleep promoted   single patient room provided  Taken 5/12/2023 0800 by Dex  RADHA Patrick  Infection Prevention:   hand hygiene promoted   rest/sleep promoted   single patient room provided  Goal: Optimal Comfort and Wellbeing  Outcome: Ongoing, Not Progressing  Intervention: Provide Person-Centered Care  Recent Flowsheet Documentation  Taken 5/12/2023 1600 by Celina Kowalski RN  Trust Relationship/Rapport:   care explained   questions answered   thoughts/feelings acknowledged  Taken 5/12/2023 1230 by Celina Kowalski RN  Trust Relationship/Rapport:   care explained   questions answered   thoughts/feelings acknowledged  Taken 5/12/2023 0800 by Celina Kowalski RN  Trust Relationship/Rapport:   care explained   questions answered   thoughts/feelings acknowledged  Goal: Readiness for Transition of Care  Outcome: Ongoing, Not Progressing     Problem: Hypertension Comorbidity  Goal: Blood Pressure in Desired Range  Outcome: Ongoing, Not Progressing  Intervention: Maintain Blood Pressure Management  Recent Flowsheet Documentation  Taken 5/12/2023 1600 by Celina Kowalski RN  Medication Review/Management: medications reviewed  Taken 5/12/2023 1230 by Celina Kwoalski RN  Medication Review/Management: medications reviewed  Taken 5/12/2023 0800 by Celina Kowalski RN  Medication Review/Management: medications reviewed     Problem: Skin Injury Risk Increased  Goal: Skin Health and Integrity  Outcome: Ongoing, Not Progressing  Intervention: Optimize Skin Protection  Recent Flowsheet Documentation  Taken 5/12/2023 1600 by Celina Kowalski RN  Pressure Reduction Techniques: frequent weight shift encouraged  Head of Bed (HOB) Positioning: HOB elevated  Pressure Reduction Devices: pressure-redistributing mattress utilized  Skin Protection: adhesive use limited  Taken 5/12/2023 1230 by Celina Kowalski RN  Pressure Reduction Techniques: frequent weight shift encouraged  Head of Bed (HOB) Positioning: HOB elevated  Pressure Reduction Devices: pressure-redistributing mattress utilized  Skin Protection: adhesive use  limited  Taken 5/12/2023 0800 by Celina Kowalski RN  Pressure Reduction Techniques: frequent weight shift encouraged  Head of Bed (HOB) Positioning: HOB elevated  Pressure Reduction Devices: pressure-redistributing mattress utilized  Skin Protection: adhesive use limited     Problem: Fall Injury Risk  Goal: Absence of Fall and Fall-Related Injury  Outcome: Ongoing, Not Progressing  Intervention: Identify and Manage Contributors  Recent Flowsheet Documentation  Taken 5/12/2023 1600 by Celina Kowalski RN  Medication Review/Management: medications reviewed  Taken 5/12/2023 1230 by Celina Kowalski RN  Medication Review/Management: medications reviewed  Taken 5/12/2023 0800 by Celina Kowalski RN  Medication Review/Management: medications reviewed  Intervention: Promote Injury-Free Environment  Recent Flowsheet Documentation  Taken 5/12/2023 1900 by Celina Kowalski RN  Safety Promotion/Fall Prevention:   safety round/check completed   fall prevention program maintained  Taken 5/12/2023 1800 by Celina Kowalski RN  Safety Promotion/Fall Prevention:   safety round/check completed   fall prevention program maintained  Taken 5/12/2023 1700 by Celina Kowalski RN  Safety Promotion/Fall Prevention:   safety round/check completed   fall prevention program maintained  Taken 5/12/2023 1600 by Celina Kowalski RN  Safety Promotion/Fall Prevention: safety round/check completed  Taken 5/12/2023 1500 by Celina Kowalski RN  Safety Promotion/Fall Prevention:   safety round/check completed   fall prevention program maintained  Taken 5/12/2023 1400 by Celina Kowalski RN  Safety Promotion/Fall Prevention:   safety round/check completed   fall prevention program maintained  Taken 5/12/2023 1300 by Celina Kowalski RN  Safety Promotion/Fall Prevention:   safety round/check completed   fall prevention program maintained  Taken 5/12/2023 1230 by Celina Kowalski RN  Safety Promotion/Fall Prevention:   safety round/check completed   fall prevention program  maintained   assistive device/personal items within reach  Taken 5/12/2023 1100 by Celina Kowalski RN  Safety Promotion/Fall Prevention:   safety round/check completed   fall prevention program maintained  Taken 5/12/2023 1000 by Celina Kowalski RN  Safety Promotion/Fall Prevention:   safety round/check completed   fall prevention program maintained  Taken 5/12/2023 0900 by Celina Kowalski RN  Safety Promotion/Fall Prevention:   safety round/check completed   fall prevention program maintained  Taken 5/12/2023 0800 by Celina Kowalski RN  Safety Promotion/Fall Prevention:   safety round/check completed   fall prevention program maintained   assistive device/personal items within reach  Taken 5/12/2023 0700 by Celina Kowalski RN  Safety Promotion/Fall Prevention:   safety round/check completed   fall prevention program maintained   Goal Outcome Evaluation:  Plan of Care Reviewed With: patient        Progress: no change     Patient was resting in bed this afternoon. Patient heart rate went into afib RVR. Dr notified new orders obtained.  at bedside

## 2023-05-12 NOTE — PLAN OF CARE
Patient has been increasingly agitated, uncooperative, combative, and confused despite the continuous efforts of staff and spouse who is at bedside. Patient is not oriented to time, place, or situation. Patient has not slept in 24 hours. Patient has been verbally and physically abusive to staff and verbally abusive to spouse. Patient has refused many aspects of care including BP, O2 monitoring, etc., and has not responded to redirection, reorientation, and de-escalation tactics. Will continue to attempt redirection and de-escalation.         Problem: Adult Inpatient Plan of Care  Goal: Plan of Care Review  Outcome: Ongoing, Progressing  Flowsheets (Taken 5/11/2023 1705 by Celina Kowalski RN)  Plan of Care Reviewed With: patient  Goal: Patient-Specific Goal (Individualized)  Outcome: Ongoing, Progressing  Goal: Absence of Hospital-Acquired Illness or Injury  Outcome: Ongoing, Progressing  Intervention: Identify and Manage Fall Risk  Recent Flowsheet Documentation  Taken 5/12/2023 0200 by Suma Magana RN  Safety Promotion/Fall Prevention:   safety round/check completed   clutter free environment maintained  Taken 5/12/2023 0000 by Suma Magana RN  Safety Promotion/Fall Prevention: safety round/check completed  Taken 5/11/2023 2200 by Suma Magana RN  Safety Promotion/Fall Prevention:   safety round/check completed   clutter free environment maintained   nonskid shoes/slippers when out of bed   room organization consistent   activity supervised  Taken 5/11/2023 2000 by Suma Magana RN  Safety Promotion/Fall Prevention:   safety round/check completed   clutter free environment maintained  Intervention: Prevent Skin Injury  Recent Flowsheet Documentation  Taken 5/12/2023 0200 by Suma Magana RN  Body Position: position changed independently  Taken 5/12/2023 0000 by Suma Magana RN  Body Position:   position changed independently   sitting up in bed  Skin Protection: adhesive use  limited  Taken 5/11/2023 2000 by Suma Magana RN  Body Position: position changed independently  Skin Protection:   adhesive use limited   tubing/devices free from skin contact   transparent dressing maintained  Intervention: Prevent and Manage VTE (Venous Thromboembolism) Risk  Recent Flowsheet Documentation  Taken 5/12/2023 0000 by Suma Magana RN  Activity Management: (patient refusing to get back in bed; combative)   sitting, edge of bed   back to bed  VTE Prevention/Management: patient refused intervention  Range of Motion: ROM (range of motion) performed  Taken 5/11/2023 2200 by Suma Magana RN  Activity Management:   ambulated in room   back to bed  Taken 5/11/2023 2000 by Suma Magana RN  Activity Management:   ambulated to bathroom   back to bed  VTE Prevention/Management: patient refused intervention  Range of Motion: active ROM (range of motion) encouraged  Intervention: Prevent Infection  Recent Flowsheet Documentation  Taken 5/12/2023 0200 by Suma Magana RN  Infection Prevention:   environmental surveillance performed   equipment surfaces disinfected   hand hygiene promoted   personal protective equipment utilized   rest/sleep promoted   single patient room provided  Taken 5/12/2023 0000 by Suma Magana RN  Infection Prevention:   environmental surveillance performed   equipment surfaces disinfected   hand hygiene promoted   personal protective equipment utilized   rest/sleep promoted   single patient room provided  Taken 5/11/2023 2200 by Suma Magana RN  Infection Prevention:   environmental surveillance performed   equipment surfaces disinfected   personal protective equipment utilized   hand hygiene promoted   rest/sleep promoted   single patient room provided  Taken 5/11/2023 2000 by Suma Magana RN  Infection Prevention:   environmental surveillance performed   equipment surfaces disinfected   hand hygiene promoted   personal protective equipment  utilized   rest/sleep promoted   single patient room provided  Goal: Optimal Comfort and Wellbeing  Outcome: Ongoing, Progressing  Intervention: Monitor Pain and Promote Comfort  Recent Flowsheet Documentation  Taken 5/12/2023 0000 by Suma Magana RN  Pain Management Interventions:   care clustered   breathing exercises   medication offered but refused   pillow support provided   quiet environment facilitated   relaxation techniques promoted   therapeutic touch utilized  Taken 5/11/2023 2000 by Suma Magana RN  Pain Management Interventions:   breathing exercises   care clustered   diversional activity provided   pillow support provided   quiet environment facilitated   relaxation techniques promoted   see MAR  Intervention: Provide Person-Centered Care  Recent Flowsheet Documentation  Taken 5/12/2023 0000 by Suma Magana RN  Trust Relationship/Rapport:   care explained   choices provided   emotional support provided   empathic listening provided   questions answered   questions encouraged   reassurance provided   thoughts/feelings acknowledged  Taken 5/11/2023 2000 by Suma Magana RN  Trust Relationship/Rapport:   care explained   choices provided   emotional support provided   empathic listening provided   questions answered   questions encouraged   reassurance provided  Goal: Readiness for Transition of Care  Outcome: Ongoing, Progressing     Problem: Hypertension Comorbidity  Goal: Blood Pressure in Desired Range  Outcome: Ongoing, Progressing  Intervention: Maintain Blood Pressure Management  Recent Flowsheet Documentation  Taken 5/12/2023 0200 by Suma Magana, RN  Medication Review/Management: medications reviewed  Taken 5/12/2023 0000 by Suma Magana, RN  Medication Review/Management: medications reviewed  Taken 5/11/2023 2000 by Suma Magana, RN  Medication Review/Management: medications reviewed     Problem: Skin Injury Risk Increased  Goal: Skin Health and  Integrity  Outcome: Ongoing, Progressing  Intervention: Promote and Optimize Oral Intake  Recent Flowsheet Documentation  Taken 5/11/2023 2000 by Suma Magana RN  Oral Nutrition Promotion: rest periods promoted  Intervention: Optimize Skin Protection  Recent Flowsheet Documentation  Taken 5/12/2023 0200 by Suma Magana RN  Head of Bed (HOB) Positioning: HOB elevated  Taken 5/12/2023 0000 by Suma Magana RN  Pressure Reduction Techniques:   frequent weight shift encouraged   weight shift assistance provided  Head of Bed (HOB) Positioning: HOB elevated  Pressure Reduction Devices: pressure-redistributing mattress utilized  Skin Protection: adhesive use limited  Taken 5/11/2023 2000 by Suma Magana RN  Pressure Reduction Techniques:   frequent weight shift encouraged   weight shift assistance provided  Head of Bed (HOB) Positioning: HOB elevated  Pressure Reduction Devices: pressure-redistributing mattress utilized  Skin Protection:   adhesive use limited   tubing/devices free from skin contact   transparent dressing maintained     Problem: Fall Injury Risk  Goal: Absence of Fall and Fall-Related Injury  Outcome: Ongoing, Progressing  Intervention: Identify and Manage Contributors  Recent Flowsheet Documentation  Taken 5/12/2023 0200 by Suma Magana RN  Medication Review/Management: medications reviewed  Taken 5/12/2023 0000 by Suma Magana RN  Medication Review/Management: medications reviewed  Taken 5/11/2023 2000 by Suma Magana RN  Medication Review/Management: medications reviewed  Self-Care Promotion:   independence encouraged   BADL personal objects within reach  Intervention: Promote Injury-Free Environment  Recent Flowsheet Documentation  Taken 5/12/2023 0200 by Suma Magana RN  Safety Promotion/Fall Prevention:   safety round/check completed   clutter free environment maintained  Taken 5/12/2023 0000 by Suma Magana RN  Safety Promotion/Fall Prevention:  safety round/check completed  Taken 5/11/2023 2200 by Suma Magana, RN  Safety Promotion/Fall Prevention:   safety round/check completed   clutter free environment maintained   nonskid shoes/slippers when out of bed   room organization consistent   activity supervised  Taken 5/11/2023 2000 by Suma Magana, RN  Safety Promotion/Fall Prevention:   safety round/check completed   clutter free environment maintained

## 2023-05-12 NOTE — PROGRESS NOTES
Nutrition Services    Patient Name: Andree Deluna  YOB: 1955  MRN: [id number]  Admission date: 4/30/2023    PPE Documentation        PPE Worn By Provider Did not enter room for this encounter   PPE Worn By Patient  N/A     PROGRESS NOTE      Encounter Information: Check on for PO intakes.  Continues to recover from surgery.         PO Diet: Diet: Cardiac Diets, Diabetic Diets; Healthy Heart (2-3 Na+); Consistent Carbohydrate; Texture: Regular Texture (IDDSI 7); Fluid Consistency: Thin (IDDSI 0)   PO Supplements: Boost Plus BID    PO Intake:  25-50% range since last RD review        Current nutrition support:    Nutrition support review:        Labs (reviewed below): Reviewed, management per attending         GI Function:  Last BM 5/11 (yesterday)       Nutrition Intervention Updates: Continue current diet and encourage good PO intakes.        Results from last 7 days   Lab Units 05/11/23  1411 05/10/23  1948 05/10/23  0408 05/09/23  0809 05/08/23  0311 05/07/23  0251 05/06/23  1909 05/06/23  1157 05/06/23  0451   SODIUM mmol/L 140  --  140 142   < > 148* 145   < > 141   POTASSIUM mmol/L 4.0 4.0 3.1* 3.9   < > 4.4 3.3*   < > 4.3   CHLORIDE mmol/L 102  --  100 103   < > 113* 112*   < > 105   CO2 mmol/L 26.0  --  30.0* 30.0*   < > 25.0 22.0   < > 24.0   BUN mg/dL 10  --  14 17   < > 12 11   < > 14   CREATININE mg/dL 0.60  --  0.53* 0.51*   < > 0.77 0.83   < > 0.72   CALCIUM mg/dL 9.5  --  8.8 9.1   < > 8.3* 8.7   < > 9.0   BILIRUBIN mg/dL  --   --   --   --   --  0.8 1.7*  --  0.3   ALK PHOS U/L  --   --   --   --   --  75 71  --  97   ALT (SGPT) U/L  --   --   --   --   --  61* 74*  --  17   AST (SGOT) U/L  --   --   --   --   --  86* 120*  --  25   GLUCOSE mg/dL 120*  --  124* 100*   < > 142* 161*   < > 116*    < > = values in this interval not displayed.     Results from last 7 days   Lab Units 05/11/23  1411 05/10/23  0408 05/09/23  0809 05/08/23  0311 05/07/23  1302 05/07/23  0251 05/06/23  1909  05/06/23  0735 05/06/23  0451   MAGNESIUM mg/dL  --   --  2.1  --   --   --  2.8*  --  2.3   PHOSPHORUS mg/dL  --   --   --   --  4.0  --  1.6*   < >  --    HEMOGLOBIN g/dL 11.5*   < > 10.4*   < >  --    < >  --    < > 11.9*   HEMOGLOBIN, POC   --   --   --   --   --   --   --    < >  --    HEMATOCRIT % 35.6   < > 32.7*   < >  --    < >  --    < > 36.5   HEMATOCRIT POC   --   --   --   --   --   --   --    < >  --     < > = values in this interval not displayed.     COVID19   Date Value Ref Range Status   05/04/2023 Not Detected Not Detected - Ref. Range Final     Lab Results   Component Value Date    HGBA1C 5.80 (H) 04/30/2023     RD to follow up per protocol.    Electronically signed by:  Anita Goddard RD  05/12/23 08:23 EDT

## 2023-05-12 NOTE — CASE MANAGEMENT/SOCIAL WORK
Continued Stay Note  [facility]     Patient Name: Andree Deluna  MRN: [id number]  Today's Date: 5/12/2023    Admit Date: 4/30/2023    Plan: Dc Plan: Home with [facility] accepted and following. CABG 5/6/23.   Discharge Plan     Row Name 05/12/23 1343       Plan    Plan Dc Plan: Home with [facility] accepted and following. CABG 5/6/23.    Provided Post Acute Provider List? N/A    Provided Post Acute Provider Quality & Resource List? N/A    Plan Comments CM spoke with patient’s nurse and [facility] NP Tina Hall to obtain clinical updates. NP anticipates patient will discharge today, but waiting for Dr. Silva to round to finalize decision. CM informed liaison with [facility] of potential DC. Patient will travel home with spouse via private vehicle. No barriers.           Expected Discharge Date and Time     Expected Discharge Date Expected Discharge Time    May 13, 2023             Hattie Alicea RN     Office Phone: [phone]  Office Cell:     [phone]

## 2023-05-12 NOTE — PROGRESS NOTES
Spoke to patient spouse   He is agreeable to [facility] services   No other agencies in home   Demographics verified   Reports they have other family that also assists with needs in home       Dr Noonan  Nursing PT OT recommended

## 2023-05-13 ENCOUNTER — READMISSION MANAGEMENT (OUTPATIENT)
Dept: CALL CENTER | Facility: HOSPITAL | Age: 68
End: 2023-05-13
Payer: MEDICARE

## 2023-05-13 ENCOUNTER — HOME HEALTH ADMISSION (OUTPATIENT)
Dept: HOME HEALTH SERVICES | Facility: HOME HEALTHCARE | Age: 68
End: 2023-05-13
Payer: MEDICARE

## 2023-05-13 VITALS
OXYGEN SATURATION: 96 % | HEIGHT: 66 IN | SYSTOLIC BLOOD PRESSURE: 141 MMHG | TEMPERATURE: 97.4 F | HEART RATE: 86 BPM | BODY MASS INDEX: 32.43 KG/M2 | DIASTOLIC BLOOD PRESSURE: 75 MMHG | RESPIRATION RATE: 23 BRPM | WEIGHT: 201.8 LBS

## 2023-05-13 DIAGNOSIS — F41.9 ANXIETY: ICD-10-CM

## 2023-05-13 LAB
ANION GAP SERPL CALCULATED.3IONS-SCNC: 14 MMOL/L (ref 5–15)
BUN SERPL-MCNC: 11 MG/DL (ref 8–23)
BUN/CREAT SERPL: 17.2 (ref 7–25)
CALCIUM SPEC-SCNC: 9.3 MG/DL (ref 8.6–10.5)
CHLORIDE SERPL-SCNC: 103 MMOL/L (ref 98–107)
CO2 SERPL-SCNC: 26 MMOL/L (ref 22–29)
CREAT SERPL-MCNC: 0.64 MG/DL (ref 0.57–1)
DEPRECATED RDW RBC AUTO: 45.1 FL (ref 37–54)
EGFRCR SERPLBLD CKD-EPI 2021: 98.2 ML/MIN/1.73
ERYTHROCYTE [DISTWIDTH] IN BLOOD BY AUTOMATED COUNT: 14.2 % (ref 12.3–15.4)
GLUCOSE BLDC GLUCOMTR-MCNC: 103 MG/DL (ref 70–105)
GLUCOSE BLDC GLUCOMTR-MCNC: 135 MG/DL (ref 70–105)
GLUCOSE SERPL-MCNC: 103 MG/DL (ref 65–99)
HCT VFR BLD AUTO: 36.5 % (ref 34–46.6)
HGB BLD-MCNC: 11.7 G/DL (ref 12–15.9)
MCH RBC QN AUTO: 29.5 PG (ref 26.6–33)
MCHC RBC AUTO-ENTMCNC: 32.1 G/DL (ref 31.5–35.7)
MCV RBC AUTO: 92 FL (ref 79–97)
PLATELET # BLD AUTO: 412 10*3/MM3 (ref 140–450)
PMV BLD AUTO: 7.9 FL (ref 6–12)
POTASSIUM SERPL-SCNC: 3.8 MMOL/L (ref 3.5–5.2)
QT INTERVAL: 407 MS
RBC # BLD AUTO: 3.97 10*6/MM3 (ref 3.77–5.28)
SODIUM SERPL-SCNC: 143 MMOL/L (ref 136–145)
WBC NRBC COR # BLD: 12.2 10*3/MM3 (ref 3.4–10.8)

## 2023-05-13 PROCEDURE — 82948 REAGENT STRIP/BLOOD GLUCOSE: CPT

## 2023-05-13 PROCEDURE — 93005 ELECTROCARDIOGRAM TRACING: CPT

## 2023-05-13 PROCEDURE — 25010000002 ENOXAPARIN PER 10 MG

## 2023-05-13 PROCEDURE — 80048 BASIC METABOLIC PNL TOTAL CA: CPT

## 2023-05-13 PROCEDURE — 99024 POSTOP FOLLOW-UP VISIT: CPT | Performed by: THORACIC SURGERY (CARDIOTHORACIC VASCULAR SURGERY)

## 2023-05-13 PROCEDURE — 99233 SBSQ HOSP IP/OBS HIGH 50: CPT | Performed by: INTERNAL MEDICINE

## 2023-05-13 PROCEDURE — 85027 COMPLETE CBC AUTOMATED: CPT

## 2023-05-13 RX ORDER — AMIODARONE HYDROCHLORIDE 200 MG/1
TABLET ORAL
Qty: 105 TABLET | Refills: 0 | Status: SHIPPED | OUTPATIENT
Start: 2023-05-13 | End: 2023-05-13 | Stop reason: SDUPTHER

## 2023-05-13 RX ORDER — LAMOTRIGINE 25 MG/1
50 TABLET ORAL EVERY 12 HOURS SCHEDULED
Qty: 28 TABLET | Refills: 0 | Status: SHIPPED | OUTPATIENT
Start: 2023-06-08 | End: 2023-06-15

## 2023-05-13 RX ORDER — LAMOTRIGINE 25 MG/1
25 TABLET ORAL EVERY 12 HOURS SCHEDULED
Qty: 28 TABLET | Refills: 0 | Status: SHIPPED | OUTPATIENT
Start: 2023-05-25 | End: 2023-06-08

## 2023-05-13 RX ORDER — POTASSIUM CHLORIDE 20 MEQ/1
40 TABLET, EXTENDED RELEASE ORAL DAILY
Qty: 90 TABLET | Refills: 1 | Status: SHIPPED | OUTPATIENT
Start: 2023-05-13 | End: 2023-05-13 | Stop reason: SDUPTHER

## 2023-05-13 RX ORDER — POTASSIUM CHLORIDE 20 MEQ/1
40 TABLET, EXTENDED RELEASE ORAL DAILY
Qty: 90 TABLET | Refills: 1 | Status: SHIPPED | OUTPATIENT
Start: 2023-05-13

## 2023-05-13 RX ORDER — FUROSEMIDE 40 MG/1
40 TABLET ORAL DAILY
Qty: 30 TABLET | Refills: 1 | Status: SHIPPED | OUTPATIENT
Start: 2023-05-13

## 2023-05-13 RX ORDER — ACETAMINOPHEN 325 MG/1
650 TABLET ORAL EVERY 4 HOURS PRN
Start: 2023-05-13

## 2023-05-13 RX ORDER — AMIODARONE HYDROCHLORIDE 200 MG/1
TABLET ORAL
Qty: 105 TABLET | Refills: 0 | Status: SHIPPED | OUTPATIENT
Start: 2023-05-13 | End: 2023-06-10

## 2023-05-13 RX ORDER — LAMOTRIGINE 25 MG/1
25 TABLET ORAL DAILY
Qty: 12 TABLET | Refills: 0 | Status: SHIPPED | OUTPATIENT
Start: 2023-05-13 | End: 2023-05-25

## 2023-05-13 RX ORDER — LISINOPRIL 10 MG/1
10 TABLET ORAL
Qty: 30 TABLET | Refills: 3 | Status: SHIPPED | OUTPATIENT
Start: 2023-05-13

## 2023-05-13 RX ORDER — HYDROCODONE BITARTRATE AND ACETAMINOPHEN 5; 325 MG/1; MG/1
1 TABLET ORAL EVERY 6 HOURS PRN
Qty: 20 TABLET | Refills: 0 | Status: SHIPPED | OUTPATIENT
Start: 2023-05-13

## 2023-05-13 RX ORDER — CLOPIDOGREL BISULFATE 75 MG/1
75 TABLET ORAL DAILY
Qty: 30 TABLET | Refills: 3 | Status: SHIPPED | OUTPATIENT
Start: 2023-05-13 | End: 2023-05-18 | Stop reason: HOSPADM

## 2023-05-13 RX ORDER — LIDOCAINE 50 MG/G
2 PATCH TOPICAL
Qty: 14 PATCH | Refills: 0 | Status: SHIPPED | OUTPATIENT
Start: 2023-05-13

## 2023-05-13 RX ORDER — LAMOTRIGINE 100 MG/1
100 TABLET ORAL EVERY 12 HOURS SCHEDULED
Qty: 30 TABLET | Refills: 1 | Status: ON HOLD | OUTPATIENT
Start: 2023-06-15 | End: 2023-05-18

## 2023-05-13 RX ADMIN — POTASSIUM CHLORIDE 40 MEQ: 1500 TABLET, EXTENDED RELEASE ORAL at 08:05

## 2023-05-13 RX ADMIN — AMIODARONE HYDROCHLORIDE 200 MG: 200 TABLET ORAL at 08:06

## 2023-05-13 RX ADMIN — ENOXAPARIN SODIUM 40 MG: 100 INJECTION SUBCUTANEOUS at 15:53

## 2023-05-13 RX ADMIN — PANTOPRAZOLE SODIUM 40 MG: 40 TABLET, DELAYED RELEASE ORAL at 07:15

## 2023-05-13 RX ADMIN — CLOPIDOGREL BISULFATE 75 MG: 75 TABLET ORAL at 08:05

## 2023-05-13 RX ADMIN — FUROSEMIDE 40 MG: 40 TABLET ORAL at 08:06

## 2023-05-13 RX ADMIN — AMIODARONE HYDROCHLORIDE 200 MG: 200 TABLET ORAL at 17:18

## 2023-05-13 RX ADMIN — LISINOPRIL 10 MG: 5 TABLET ORAL at 08:05

## 2023-05-13 RX ADMIN — LAMOTRIGINE 25 MG: 25 TABLET ORAL at 08:06

## 2023-05-13 RX ADMIN — LIDOCAINE 2 PATCH: 700 PATCH TOPICAL at 08:05

## 2023-05-13 RX ADMIN — ASPIRIN 81 MG: 81 TABLET, COATED ORAL at 08:06

## 2023-05-13 RX ADMIN — POLYETHYLENE GLYCOL 3350 17 G: 17 POWDER, FOR SOLUTION ORAL at 08:05

## 2023-05-13 RX ADMIN — METOPROLOL TARTRATE 25 MG: 25 TABLET, FILM COATED ORAL at 08:05

## 2023-05-13 NOTE — PROGRESS NOTES
POD #8 CABG mitral valve repair  Vitals are stable, sinus rhythm in the 80s  Labs noted  Lungs are clear, cor is regular, incisions are clean  Overall doing better, will DC amiodarone, I will check on patient again at noon and she may be ready for discharge if not confused

## 2023-05-13 NOTE — CASE MANAGEMENT/SOCIAL WORK
Continued Stay Note  [facility]     Patient Name: Andree Deluna  MRN: [id number]  Today's Date: 5/13/2023    Admit Date: 4/30/2023    Plan: Home with [facility]; accepted and order in place. CABG 5/6/23   Discharge Plan     Row Name 05/13/23 1656       Plan    Plan Home with [facility] [place]; accepted and order in place. CABG 5/6/23

## 2023-05-13 NOTE — OUTREACH NOTE
Prep Survey    Flowsheet Row Responses   [facility] patient discharged from? [facility]   Is LACE score < 7 ? No   Eligibility [facility]   Date of Admission 04/30/23   Date of Discharge 05/13/23   Discharge Disposition Home or Self Care   Discharge diagnosis Chest pain, unspecified type  CABG    Does the patient have one of the following disease processes/diagnoses(primary or secondary)? Cardiothoracic surgery   Does the patient have Home health ordered? Yes   What is the Home health agency?  [facility]    Is there a DME ordered? No   Prep survey completed? Yes          CARLOTA CASAS - Registered Nurse

## 2023-05-13 NOTE — PROGRESS NOTES
Cardiology Progress Note    Patient Identification:  Name: Andree Deluna  Age: 67 y.o.  Sex: female  :  1955  MRN: [id number]                 Follow Up / Chief Complaint: NSTEMI  Chief Complaint   Patient presents with   • Chest Pain     Pt reports midsternal CP that radiates to back that started 2 hrs PTA.  Pt describes pain as a squeezing pain that is intermittent.  With associated SOA.  Pt reports she took 2 81mg ASA PTA.        Interval History: Patient presented with chest pain. Underwent cardiac cath that showed MV CAD and CV surgery has been consulted.  Patient underwent  CABG 2023.  Went into postop A-fib with RVR was given IV amiodarone 5/10/2023 converted to sinus rhythm.  Again went into A-fib with RVR 2023.  Was given IV amiodarone.  Currently is in sinus rhythm.  Had intermittent confusion is better.        Subjective: Patient seen and examined.  Chart reviewed.  Labs reviewed.  Discussed with RN taking care of patient.  Patient's confusion is better today.  Has been having intermittent A-fib with RVR.    Objective: HS troponin 54---120  2023: bmp unremarkable, CBC unremarkable   5/3/2023: glucose 116 unremarkable  CMP HLD 62 LDL 69 CBC normal.   2023: glucose 105, potassium 3.3  WBC 11.9 hgb 9.8   2023: glucose 100, CO2 30, WBC 11.2, hgb 10.4   5/10/2023: Glucose 124 potassium 3.1 hemoglobin 10.9  2023: glucose 120  Wbc 11.7, hgb 11.5   2023: wbc 11. hgb 11.9   2023: Glucose elevated.  White count 12.2, hemoglobin 11.7    History of Present Illness:       Ms.Elisa LOVELY Deluna has PMH of     Hypertension  Diastolic dysfunction-echo 2019 revealing septal asymmetric hypertrophy EF 60%, diastolic dysfunction  Dyslipidemia  CVA  80% right carotid disease  WEN  Carotid endarterectomy, cholecystectomy, tubal ligation, shoulder/knee/finger surgery  Family history of premature CAD father fatal MI at 54 mother fatal MI at 64.     Presented through emergency  room 4/30/2023 with midsternal chest pain radiating to the back, squeezing in sensation, radiating to the left arm and neck, stated with shortness of breath and nausea.  Patient has been having symptoms since many months but has been progressively worse.     Work-up here 4/30/2023/5/1/2023 revealed elevated troponin at 54-->120.  Glucose elevated.  Potassium 3.4.  A1c of 5.8.  Chest x-ray stable cardiomegaly.  EKG done 4/30/2023 reviewed/interpreted by me reveals sinus rhythm with a rate of 74 bpm with  ST segment depression in 1 and aVL        Assessment:  :     New onset chest pain at rest concerning for unstable angina  Elevated troponin  CAD cardiac cath 5/1/2023 revealing multivessel coronary artery disease  CABG with LIMA to LAD, SVG to OM1 and SVG to PDA and mitral valve repair with physio #2 ring 5/6/2023  Hypertension  Dyslipidemia  History of CVA 2019 and carotid disease, right CEA 2020  PAD  Hyperglycemia, prediabetes with A1c of 5.8  Obesity with BMI over 30           Recommendations / Plan:         Patient went into A-fib with RVR  Patient is on IV amiodarone.  Will monitor for toxic side effects.  Increase beta-blockers as tolerated.    Patient presented with new onset prolonged chest pain and has elevated troponin consistent with acute non-ST elevation MI  Patient was started on aspirin, beta-blockers, statins.  We will continue as tolerated.   patient underwent cardiac cath 5/1/2023 which revealed severe triple-vessel disease.  Patient underwent three-vessel CABG and mitral valve repair 5/6/2023  Routine postsurgery care  Monitor rhythm  Monitor expected postsurgery blood loss anemia  Monitor intermittent confusion.  Increase activity as tolerated.  Discussed with patient's  by bedside.  Discussed with RN taking care of patient to coordinate care.       Copied text in this portion of the note has been reviewed and is accurate as of 5/13/2023    Past Medical History:  Past Medical History:    Diagnosis Date   • ADHD unknown   • Anemia    • Anxiety    • Carotid artery disease     80% right internal carotid artery   • CVA (cerebral vascular accident)     right parietal right temporal   • DDD (degenerative disc disease), lumbar    • Depression    • Extremity pain     Ankle pain   • GERD (gastroesophageal reflux disease)    • Hyperlipidemia unknown   • Hypertension    • Insomnia unknown   • Laryngopharyngeal reflux    • Low back pain    • Mood disorder    • Obesity unknown   • Skin cancer of face    • Sleep apnea     Suspected sleep apnea she has refused to be tested   • Stroke (cerebrum)    • Visual field defect     Left     Past Surgical History:  Past Surgical History:   Procedure Laterality Date   • CARDIAC CATHETERIZATION N/A 5/1/2023    Procedure: Left Heart Cath;  Surgeon: Kye Alcaraz MD;  Location: [facility] CATH INVASIVE LOCATION;  Service: Cardiovascular;  Laterality: N/A;   • CAROTID ENDARTERECTOMY Right 11/10/2020    Procedure: CAROTID ENDARTERECTOMY;  Surgeon: Tavo Das MD;  Location: [facility] MAIN OR;  Service: Vascular;  Laterality: Right;   • CHOLECYSTECTOMY     • COLONOSCOPY      2016   • CORONARY ARTERY BYPASS GRAFT N/A 5/6/2023    Procedure: CORONARY ARTERY BYPASS GRAFTING;  Surgeon: Errol Noonan MD;  Location: [facility];  Service: Cardiothoracic;  Laterality: N/A;  CABG X  3 using LIMA and right endospahenous vein;  2 coronary markers implanted.    • FINGER SURGERY     • KNEE ARTHROPLASTY     • KNEE SURGERY Right     replaced   • LAPAROSCOPIC GASTRIC BANDING     • MITRAL VALVE REPAIR/REPLACEMENT N/A 5/6/2023    Procedure: MITRAL VALVE REPAIR/REPLACEMENT;  Surgeon: Errol Noonan MD;  Location: [facility];  Service: Cardiothoracic;  Laterality: N/A;  Mitral valve repaired using 28 mm Jorge Physio II Annuloplasty Ring.    • ROTATOR CUFF REPAIR Right 2019   • SHOULDER SURGERY Right 05/24/2019    scope/ cuff repair   • TRANSESOPHAGEAL  "ECHOCARDIOGRAM (GALA) N/A 5/6/2023    Procedure: TRANSESOPHAGEAL ECHOCARDIOGRAM WITH ANESTHESIA;  Surgeon: Errol Noonan MD;  Location: [facility];  Service: Cardiothoracic;  Laterality: N/A;   • TUBAL ABDOMINAL LIGATION          Social History:   Social History     Tobacco Use   • Smoking status: Never   • Smokeless tobacco: Never   Substance Use Topics   • Alcohol use: No      Family History:  Family History   Problem Relation Age of Onset   • Diabetes Other    • Heart disease Mother    • Diabetes Mother    • Heart disease Father           Allergies:  No Known Allergies  Scheduled Meds:  amiodarone, 200 mg, BID With Meals  amiodarone, 150 mg, Once  aspirin, 81 mg, Daily  atorvastatin, 40 mg, Nightly  clopidogrel, 75 mg, Daily  enoxaparin, 40 mg, Q24H  furosemide, 40 mg, Daily  insulin lispro, 2-7 Units, 4x Daily With Meals & Nightly  lamoTRIgine, 25 mg, Daily   Followed by  [START ON 5/25/2023] lamoTRIgine, 25 mg, Q12H   Followed by  [START ON 6/8/2023] lamoTRIgine, 50 mg, Q12H   Followed by  [START ON 6/15/2023] lamoTRIgine, 100 mg, Q12H  lidocaine, 2 patch, Q24H  lisinopril, 10 mg, Q24H  metoprolol tartrate, 25 mg, Q12H  pantoprazole, 40 mg, Q AM  polyethylene glycol, 17 g, BID  potassium chloride, 40 mEq, Daily  senna-docusate sodium, 2 tablet, Nightly          Review of Systems:   ROS  Review of Systems   Constitution: Negative for chills and fever.   Cardiovascular: Negative for chest pain and palpitations.   Respiratory: Negative for cough and hemoptysis.    Gastrointestinal: Negative for nausea.        Constitutional:  Temp:  [97.9 °F (36.6 °C)-98.9 °F (37.2 °C)] 98.4 °F (36.9 °C)  Heart Rate:  [] 82  Resp:  [14-25] 25  BP: ()/() 135/85    Physical Exam   /85 (BP Location: Right arm, Patient Position: Lying)   Pulse 82   Temp 98.4 °F (36.9 °C) (Oral)   Resp 25   Ht 167.6 cm (66\")   Wt 91.5 kg (201 lb 12.8 oz)   SpO2 93%   BMI 32.57 kg/m²   General:  Appears in no " acute distress  Eyes: Sclera is anicteric,  conjunctiva is clear   HEENT:  No JVD. Thyroid not visibly enlarged. No mucosal pallor or cyanosis  Respiratory: Respirations regular and unlabored at rest.  Bilaterally good breath sounds, with good air entry in all fields. No crackles, rubs or wheezes auscultated  Cardiovascular: S1,S2 irregular rate and rhythm. No murmur, rub or gallop auscultated.  . No pretibial pitting edema  Gastrointestinal: Abdomen nondistended, soft  Musculoskeletal:  No abnormal movements  Extremities: No digital clubbing or cyanosis  Skin: Color pink. Skin warm and dry to touch. No rashes  No xanthoma  Neuro: Alert and awake, no lateralizing deficits appreciated    INTAKE AND OUTPUT:    Intake/Output Summary (Last 24 hours) at 5/13/2023 0819  Last data filed at 5/13/2023 0600  Gross per 24 hour   Intake 1020 ml   Output 280 ml   Net 740 ml       Cardiographics  Telemetry: Intermittent A-fib with RVR    ECG:   ECG 12 Lead Drug Monitoring; Amiodarone   Preliminary Result   HEART RATE= 80  bpm   RR Interval= 748  ms   WA Interval= 152  ms   P Horizontal Axis= -17  deg   P Front Axis= 47  deg   QRSD Interval= 94  ms   QT Interval= 407  ms   QRS Axis= 6  deg   T Wave Axis= 125  deg   - ABNORMAL ECG -   Sinus rhythm   Nonspecific T abnrm, anterolateral leads   When compared with ECG of 12-May-2023 14:17:14,   Significant change in rhythm: previously atrial fibrillation   Significant repolarization change   Electronically Signed By:    Date and Time of Study: 2023-05-13 06:01:55      ECG 12 Lead Drug Monitoring; Amiodarone   Preliminary Result   HEART RATE= 153  bpm   RR Interval= 391  ms   WA Interval=   ms   P Horizontal Axis=   deg   P Front Axis=   deg   QRSD Interval= 90  ms   QT Interval= 289  ms   QRS Axis= 18  deg   T Wave Axis= 181  deg   - ABNORMAL ECG -   Atrial fibrillation   Ventricular premature complex   Repolarization abnormality, prob rate related   Electronically Signed By:    Date  and Time of Study: 2023-05-12 14:17:14      ECG 12 Lead Drug Monitoring; Amiodarone   Preliminary Result   HEART RATE= 90  bpm   RR Interval= 668  ms   AR Interval= 146  ms   P Horizontal Axis= 23  deg   P Front Axis= 39  deg   QRSD Interval= 87  ms   QT Interval= 381  ms   QRS Axis= 2  deg   T Wave Axis= 102  deg   - ABNORMAL ECG -   Sinus rhythm   Nonspecific T abnormalities, lateral leads   When compared with ECG of 11-May-2023 8:38:55,   No significant change   Electronically Signed By:    Date and Time of Study: 2023-05-12 07:53:08      ECG 12 Lead Rhythm Change   Final Result   HEART RATE= 75  bpm   RR Interval= 804  ms   AR Interval= 157  ms   P Horizontal Axis= 24  deg   P Front Axis= 49  deg   QRSD Interval= 88  ms   QT Interval= 411  ms   QRS Axis= 26  deg   T Wave Axis= 79  deg   - NORMAL ECG -   Sinus rhythm   When compared with ECG of 08-May-2023 3:01:52,   Significant axis, voltage or hypertrophy change   Electronically Signed By: Brad Santos ([facility]) 11-May-2023 23:41:58   Date and Time of Study: 2023-05-11 08:38:55      ECG 12 Lead Drug Monitoring; Amiodarone   Preliminary Result   HEART RATE= 135  bpm   RR Interval= 443  ms   AR Interval=   ms   P Horizontal Axis=   deg   P Front Axis=   deg   QRSD Interval= 90  ms   QT Interval= 301  ms   QRS Axis= 2  deg   T Wave Axis= 163  deg   - ABNORMAL ECG -   Atrial fibrillation   Repol abnrm suggests ischemia, lateral leads   Electronically Signed By:    Date and Time of Study: 2023-05-11 06:37:00      ECG 12 Lead   Preliminary Result   HEART RATE= 80  bpm   RR Interval= 748  ms   AR Interval= 168  ms   P Horizontal Axis= 10  deg   P Front Axis= 46  deg   QRSD Interval= 72  ms   QT Interval= 337  ms   QRS Axis= 5  deg   T Wave Axis= -13  deg   - OTHERWISE NORMAL ECG -   Sinus rhythm   Low voltage, precordial leads   Electronically Signed By:    Date and Time of Study: 2023-05-08 03:01:52      ECG 12 Lead   Preliminary Result   HEART RATE= 59  bpm   RR  Interval= 1008  ms   VA Interval= 171  ms   P Horizontal Axis= 78  deg   P Front Axis= 17  deg   QRSD Interval= 85  ms   QT Interval= 395  ms   QRS Axis= 2  deg   T Wave Axis= 5  deg   - OTHERWISE NORMAL ECG -   Sinus bradycardia   Low voltage, precordial leads   Electronically Signed By:    Date and Time of Study: 2023-05-07 05:30:01      ECG 12 Lead Chest Pain   Preliminary Result   HEART RATE= 56  bpm   RR Interval= 1088  ms   VA Interval= 160  ms   P Horizontal Axis= 30  deg   P Front Axis= 44  deg   QRSD Interval= 89  ms   QT Interval= 410  ms   QRS Axis= 3  deg   T Wave Axis= -47  deg   - BORDERLINE ECG -   Sinus bradycardia   Atrial premature complex   Low voltage, precordial leads   Electronically Signed By:    Date and Time of Study: 2023-05-07 00:20:13      ECG 12 Lead   Preliminary Result   HEART RATE= 76  bpm   RR Interval= 784  ms   VA Interval= 170  ms   P Horizontal Axis= 28  deg   P Front Axis= 69  deg   QRSD Interval= 93  ms   QT Interval= 440  ms   QRS Axis= 20  deg   T Wave Axis= -40  deg   - ABNORMAL ECG -   Sinus rhythm   Repol abnrm suggests ischemia, diffuse leads   Electronically Signed By:    Date and Time of Study: 2023-05-06 12:18:09      ECG 12 Lead   Final Result   HEART RATE= 56  bpm   RR Interval= 1072  ms   VA Interval= 144  ms   P Horizontal Axis= 5  deg   P Front Axis= 17  deg   QRSD Interval= 93  ms   QT Interval= 477  ms   QRS Axis= 8  deg   T Wave Axis= -63  deg   - ABNORMAL ECG -   Sinus bradycardia   Nonspecific repol abnormality, diffuse leads   When compared with ECG of 30-Apr-2023 21:37:55,   Significant repolarization change   Significant axis, voltage or hypertrophy change   Electronically Signed By: Kye Alcaraz ([facility]) 03-May-2023 17:12:15   Date and Time of Study: 2023-05-02 10:51:40      ECG 12 Lead Chest Pain   Final Result   HEART RATE= 57  bpm   RR Interval= 1048  ms   VA Interval= 142  ms   P Horizontal Axis= 16  deg   P Front Axis= 24  deg   QRSD Interval= 87   ms   QT Interval= 459  ms   QRS Axis= 26  deg   T Wave Axis= 198  deg   - OTHERWISE NORMAL ECG -   Slow sinus arrhythmia   When compared with ECG of 30-Apr-2023 21:37:55,   Significant axis, voltage or hypertrophy change   Electronically Signed By: Kye Alcaraz ([facility]) 03-May-2023 17:12:19   Date and Time of Study: 2023-05-01 05:38:43      ECG 12 Lead Chest Pain   Final Result   HEART RATE= 74  bpm   RR Interval= 816  ms   IN Interval= 140  ms   P Horizontal Axis= -20  deg   P Front Axis= 17  deg   QRSD Interval= 89  ms   QT Interval= 408  ms   QRS Axis= -2  deg   T Wave Axis= 71  deg   - ABNORMAL ECG -   Sinus rhythm   Probable left ventricular hypertrophy   ST depression, consider ischemia, lateral lds   When compared with ECG of 22-Oct-2020 11:28:25,   Significant rate increase   Significant axis, voltage or hypertrophy change   Electronically Signed By: Jomar Mejia ([facility]) 01-May-2023 09:11:21   Date and Time of Study: 2023-04-30 21:37:55      SCANNED - TELEMETRY     Final Result      SCANNED - TELEMETRY     Final Result      ECG 12 Lead Rhythm Change    (Results Pending)   ECG 12 Lead Drug Monitoring; Amiodarone    (Results Pending)     I have personally reviewed EKG    Echocardiogram: Results for orders placed during the hospital encounter of 04/30/23    Adult Transthoracic Echo Complete W/ Cont if Necessary Per Protocol    Interpretation Summary  •  Estimated right ventricular systolic pressure from tricuspid regurgitation is normal (<35 mmHg).      Normal LV size and contractility EF of 60 to 65%  Normal RV size  Normal atrial size  Pulmonic valve is not well visualized.  Aortic valve is trileaflet with mild sclerosis.  Mitral valve, tricuspid valve appears structurally normal, moderate mitral regurgitation seen.  No pericardial effusion seen.  Proximal aorta appears normal in size.      Lab Review   I have reviewed the labs      Results from last 7 days   Lab Units 05/12/23  1715   MAGNESIUM mg/dL 2.4  "    Results from last 7 days   Lab Units 05/13/23  0533   SODIUM mmol/L 143   POTASSIUM mmol/L 3.8   BUN mg/dL 11   CREATININE mg/dL 0.64   CALCIUM mg/dL 9.3         Results from last 7 days   Lab Units 05/13/23  0533 05/12/23  0823 05/11/23  1411   WBC 10*3/mm3 12.20* 11.00* 11.70*   HEMOGLOBIN g/dL 11.7* 11.9* 11.5*   HEMATOCRIT % 36.5 35.2 35.6   PLATELETS 10*3/mm3 412 363 358     Results from last 7 days   Lab Units 05/07/23  0251 05/06/23  1157   INR  1.12* 1.17*   APTT seconds  --  27.2       RADIOLOGY:  Imaging Results (Last 24 Hours)     ** No results found for the last 24 hours. **                )5/13/2023  MD CAPO Ivory/Transcription:   \"Dictated utilizing Dragon dictation\".   "

## 2023-05-13 NOTE — CASE MANAGEMENT/SOCIAL WORK
Case Management Discharge Note      Final Note: Home with Jose Kam     Provided Post Acute Provider List?: N/A  Provided Post Acute Provider Quality & Resource List?: N/A    Selected Continued Care - Admitted Since 4/30/2023         Home Medical Care     Service Provider Selected Services Address Phone Fax Patient Preferred    [facility] [address] [facility] [place] [address] [phone] --               Transportation Services  Private: Car    Final Discharge Disposition Code: 06 - home with home health care

## 2023-05-13 NOTE — PLAN OF CARE
Goal Outcome Evaluation:  Plan of Care Reviewed With: patient        Progress: improving  Outcome Evaluation: discharge as order

## 2023-05-14 ENCOUNTER — HOME CARE VISIT (OUTPATIENT)
Dept: HOME HEALTH SERVICES | Facility: HOME HEALTHCARE | Age: 68
End: 2023-05-14
Payer: MEDICARE

## 2023-05-14 VITALS
WEIGHT: 201 LBS | OXYGEN SATURATION: 98 % | RESPIRATION RATE: 18 BRPM | TEMPERATURE: 98 F | SYSTOLIC BLOOD PRESSURE: 120 MMHG | HEART RATE: 74 BPM | BODY MASS INDEX: 32.3 KG/M2 | HEIGHT: 66 IN | DIASTOLIC BLOOD PRESSURE: 60 MMHG

## 2023-05-14 LAB
QT INTERVAL: 301 MS
QT INTERVAL: 381 MS

## 2023-05-14 PROCEDURE — G0299 HHS/HOSPICE OF RN EA 15 MIN: HCPCS

## 2023-05-14 NOTE — HOME HEALTH
SOC Note: RODRIGUEZ SEEN 5/14/23 FOR SKILLED NURSE START OF CARE. PATIENT WAS RECENTLY HOSPIALIZED FOR CABG WITH MITRAL VALVE REPAIR. PATIENTS WOUND IS NEARLY HEALED BUT STILL VISIBLE. PATIENTS FREQUENCY WILL BE 2WK2 AND 1WK7.    Home Health ordered for: disciplines SN/PT/OT    Reason for Hosp/Primary Dx/Co-morbidities: ENCOUNTER FOR SURGICAL AFTERCARE FOLLOWING SURGERY ON THE CIRCULATORY SYSTEM    Focus of Care:  ENCOUNTER FOR SURGICAL AFTERCARE FOLLOWING SURGERY ON THE CIRCULATORY SYSTEM- STATUS POST CABG WITH MITRAL VALVE REPAIR    Skilled Need: DISEASE PROCESS TEACHING, MEDICATION TEACHING, DAILY WEIGHT TEACHING, WOUND CARE, WOUND OBSERVATION    Current Functional status/mobility/DME: INSENTIVE SPIROMETER    HB status/Living Arrangements: PATIENT LIVES WITH SPOUSE    Skin Integrity/wound status: MIDSTERNAL INCISION    Code Status: FULL CODE    Fall Risk: HIGH    POC confirmed with PATIENT AND SPOUSE ON 5/14/23    Plan for next visit:WOUND ASSESSMENT, CARDIOPULMONARY ASSESSMENT, GASTROINTESTINAL ASSESSMENTT, SKIN ASSESSMENT, SAFETY ASSESSMENT, PAIN ASSESSMENT, MEDICATION ASSESSMENT, WEIGHT CHECK

## 2023-05-15 ENCOUNTER — TRANSITIONAL CARE MANAGEMENT TELEPHONE ENCOUNTER (OUTPATIENT)
Dept: CALL CENTER | Facility: HOSPITAL | Age: 68
End: 2023-05-15
Payer: MEDICARE

## 2023-05-15 ENCOUNTER — HOME CARE VISIT (OUTPATIENT)
Dept: HOME HEALTH SERVICES | Facility: HOME HEALTHCARE | Age: 68
End: 2023-05-15
Payer: MEDICARE

## 2023-05-15 VITALS
RESPIRATION RATE: 18 BRPM | SYSTOLIC BLOOD PRESSURE: 118 MMHG | OXYGEN SATURATION: 96 % | HEART RATE: 81 BPM | TEMPERATURE: 97.4 F | DIASTOLIC BLOOD PRESSURE: 60 MMHG

## 2023-05-15 PROCEDURE — G0151 HHCP-SERV OF PT,EA 15 MIN: HCPCS

## 2023-05-15 RX ORDER — ALPRAZOLAM 2 MG/1
TABLET ORAL
Qty: 30 TABLET | Refills: 0 | Status: SHIPPED | OUTPATIENT
Start: 2023-05-15

## 2023-05-15 NOTE — CASE COMMUNICATION
// For Informational Purposes Only - No Response Required //  PT Evaluation Summary (EVAL ONLY): The patient is a 67 year old female admitted to home health services by SN on 5/14/2023 recent hospitalization for CABG with mitral valve repair on 5/6/2023.    Past Medical History includes: Non-STEMI (non-ST elevated myocardial infarction), Coronary artery disease involving native coronary artery of native heart with unstable angina pector is, S/P CABG x 3 with LIMA and mitral valve repair by Dr. Noonan 5/6/2023, Postoperative atrial fibrillation, Hyperlipidemia, Hypertension, Obesity, History of stroke, Mood disorder, GERD with esophagitis, Overactive bladder, Chronic cough, Chest pain.    Prior Functional Level: Independent with all ADL and walking but was short of breath and very fatigued.    Social History: Lives with spouse, Mike Deluna.    PT Assessment  this day (5/15/2023) reveals the patient to be performing at a relatively high functional level post-cardiac surgery. The patient is limited to 2 minutes of ambulation but with good SpO2 and HR response. Patient has requested only this one-time visit for home exercise program (HEP) set-up. This seems reasonable as patient has supportive spouse and they verbalize intention to perform exercise/walking as directed.    No further PT planned . HEP goal met this visit.

## 2023-05-15 NOTE — HOME HEALTH
PT Evaluation Summary (Eval Only per patient request): The patient is a 67 year old female admitted to home health services by SN on 5/14/2023 recent hospitalization for CABG with mitral valve repair on 5/6/2023.    Past Medical History includes: Non-STEMI (non-ST elevated myocardial infarction), Coronary artery disease involving native coronary artery of native heart with unstable angina pectoris, S/P CABG x 3 with LIMA and mitral valve repair by Dr. Noonan 5/6/2023, Postoperative atrial fibrillation, Hyperlipidemia, Hypertension, Obesity, History of stroke, Mood disorder, GERD with esophagitis, Overactive bladder, Chronic cough, Chest pain.    Prior Functional Level: Independent with all ADL and walking but was short of breath and very fatigued.    Social History: Lives with spouse, Mike Deluna.    PT Assessment this day (5/15/2023) reveals the patient to be performing at a relatively high functional level post-cardiac surgery. The patient is limited to 2 minutes of ambulation but with good SpO2 and HR response. Patient has requested only this one-time visit for home exercise program (HEP) set-up. This seems reasonable as patient has supportive spouse and they verbalize intention to perform exercise/walking as directed.    No further PT planned. HEP goal met this visit.    Session Notes: Patient and spouse indicate that they feel that regular PT not necessary. Spouse asserts that he will encourage exercise/walking. Patient consents to this one visit for assessment and home exercise program (HEP) set up.

## 2023-05-15 NOTE — OUTREACH NOTE
Call Center TCM Note    Flowsheet Row Responses   [facility] patient discharged from? [place]   Does the patient have one of the following disease processes/diagnoses(primary or secondary)? Cardiothoracic surgery   TCM attempt successful? Yes   Call start time 1542   Call end time 1559   Discharge diagnosis Chest pain, unspecified type  CABG    Person spoke with today (if not patient) and relationship spouse   Meds reviewed with patient/caregiver? Yes   Is the patient having any side effects they believe may be caused by any medication additions or changes? No   Does the patient have all medications related to this admission filled (includes all antibiotics, pain medications, cardiac medications, etc.) Yes   Is the patient taking all medications as directed (includes completed medication regime)? Yes   Comments post-op 5/25/23   Does the patient have an appointment with their PCP within 7 days of discharge? Yes  [5/25/2023  1:00 PM]   What is the Home health agency?  [facility]    Has home health visited the patient within 72 hours of discharge? Yes   Psychosocial issues? No   Did the patient receive a copy of their discharge instructions? Yes   Nursing interventions Reviewed instructions with patient   What is the patient's perception of their health status since discharge? Improving   Nursing interventions Nurse provided patient education   Is the patient/caregiver able to teach back normal signs of recovery? Nausea and lack of appetite, Constipation, Depression or irritability, Pain or discomfort at incisional site   Nursing interventions Reassured on normal signs of recovery   Is the patient /caregiver able to teach back basic post-op care? No tub bath, swimming, or hot tub until instructed by MD, Use a clean wash cloth and antibacterial bar or liquid soap to clean incisions, Lifting as instructed by MD in discharge instructions, Shower daily   Is the patient/caregiver able to teach back signs and  symptoms of incisional infection? Increased redness, swelling or pain at the incisonal site, Increased drainage or bleeding, Incisional warmth, Pus or odor from incision, Fever   Is the patient/caregiver able to teach back steps to recovery at home? Rest and rebuild strength, gradually increase activity, Eat a well-balance diet   If the patient is a current smoker, are they able to teach back resources for cessation? Not a smoker   Is the patient/caregiver able to teach back the hierarchy of who to call/visit for symptoms/problems? PCP, Specialist, Home health nurse, Urgent Care, ED, 911 Yes   TCM call completed? Yes   Wrap up additional comments Spouse states pt is doing good. Pt has PT, OT, HH visits. Reviewed AVS/medications with spouse. Spouse verified PCP hospital fu appt on 5/25/23, and post-op on 5/25/23.   Call end time [id number]   Would this patient benefit from a Referral to [facility] Social Work? No   Is the patient interested in additional calls from an ambulatory ?  NOTE:  applies to high risk patients requiring additional follow-up. No          Umu Myers RN    5/15/2023, 16:09 EDT

## 2023-05-16 ENCOUNTER — HOME CARE VISIT (OUTPATIENT)
Dept: HOME HEALTH SERVICES | Facility: HOME HEALTHCARE | Age: 68
End: 2023-05-16
Payer: MEDICARE

## 2023-05-16 VITALS
TEMPERATURE: 97.8 F | OXYGEN SATURATION: 95 % | HEART RATE: 78 BPM | SYSTOLIC BLOOD PRESSURE: 120 MMHG | DIASTOLIC BLOOD PRESSURE: 60 MMHG

## 2023-05-16 PROCEDURE — G0152 HHCP-SERV OF OT,EA 15 MIN: HCPCS

## 2023-05-17 ENCOUNTER — HOSPITAL ENCOUNTER (OUTPATIENT)
Facility: HOSPITAL | Age: 68
Setting detail: OBSERVATION
LOS: 1 days | Discharge: HOME | End: 2023-05-18
Attending: EMERGENCY MEDICINE | Admitting: STUDENT IN AN ORGANIZED HEALTH CARE EDUCATION/TRAINING PROGRAM
Payer: MEDICARE

## 2023-05-17 DIAGNOSIS — R07.9 CHEST PAIN, UNSPECIFIED TYPE: ICD-10-CM

## 2023-05-17 DIAGNOSIS — J90 PLEURAL EFFUSION ON LEFT: ICD-10-CM

## 2023-05-17 DIAGNOSIS — R06.00 DYSPNEA, UNSPECIFIED TYPE: ICD-10-CM

## 2023-05-17 DIAGNOSIS — I48.91 ATRIAL FIBRILLATION WITH RAPID VENTRICULAR RESPONSE: Primary | ICD-10-CM

## 2023-05-17 PROCEDURE — 84484 ASSAY OF TROPONIN QUANT: CPT | Performed by: EMERGENCY MEDICINE

## 2023-05-17 PROCEDURE — 85610 PROTHROMBIN TIME: CPT | Performed by: EMERGENCY MEDICINE

## 2023-05-17 PROCEDURE — 83735 ASSAY OF MAGNESIUM: CPT | Performed by: EMERGENCY MEDICINE

## 2023-05-17 PROCEDURE — 80053 COMPREHEN METABOLIC PANEL: CPT | Performed by: EMERGENCY MEDICINE

## 2023-05-17 PROCEDURE — 85025 COMPLETE CBC W/AUTO DIFF WBC: CPT | Performed by: EMERGENCY MEDICINE

## 2023-05-17 PROCEDURE — 93005 ELECTROCARDIOGRAM TRACING: CPT

## 2023-05-17 PROCEDURE — 83880 ASSAY OF NATRIURETIC PEPTIDE: CPT | Performed by: EMERGENCY MEDICINE

## 2023-05-17 PROCEDURE — 85730 THROMBOPLASTIN TIME PARTIAL: CPT | Performed by: EMERGENCY MEDICINE

## 2023-05-17 PROCEDURE — 99285 EMERGENCY DEPT VISIT HI MDM: CPT

## 2023-05-17 RX ORDER — SODIUM CHLORIDE 0.9 % (FLUSH) 0.9 %
10 SYRINGE (ML) INJECTION AS NEEDED
Status: DISCONTINUED | OUTPATIENT
Start: 2023-05-17 | End: 2023-05-18 | Stop reason: HOSPADM

## 2023-05-18 ENCOUNTER — READMISSION MANAGEMENT (OUTPATIENT)
Dept: CALL CENTER | Facility: HOSPITAL | Age: 68
End: 2023-05-18
Payer: MEDICARE

## 2023-05-18 ENCOUNTER — APPOINTMENT (OUTPATIENT)
Dept: GENERAL RADIOLOGY | Facility: HOSPITAL | Age: 68
End: 2023-05-18
Payer: MEDICARE

## 2023-05-18 VITALS
HEART RATE: 76 BPM | WEIGHT: 198.4 LBS | TEMPERATURE: 98.6 F | SYSTOLIC BLOOD PRESSURE: 121 MMHG | HEIGHT: 66 IN | RESPIRATION RATE: 18 BRPM | BODY MASS INDEX: 31.88 KG/M2 | OXYGEN SATURATION: 94 % | DIASTOLIC BLOOD PRESSURE: 83 MMHG

## 2023-05-18 PROBLEM — R06.00 DYSPNEA: Status: ACTIVE | Noted: 2023-05-18

## 2023-05-18 PROBLEM — J90 PLEURAL EFFUSION ON LEFT: Status: ACTIVE | Noted: 2023-05-18

## 2023-05-18 PROBLEM — I48.91 ATRIAL FIBRILLATION WITH RAPID VENTRICULAR RESPONSE: Status: ACTIVE | Noted: 2023-05-18

## 2023-05-18 LAB
ALBUMIN SERPL-MCNC: 3.9 G/DL (ref 3.5–5.2)
ALBUMIN/GLOB SERPL: 1.4 G/DL
ALP SERPL-CCNC: 114 U/L (ref 39–117)
ALT SERPL W P-5'-P-CCNC: 23 U/L (ref 1–33)
ANION GAP SERPL CALCULATED.3IONS-SCNC: 8 MMOL/L (ref 5–15)
ANION GAP SERPL CALCULATED.3IONS-SCNC: 9 MMOL/L (ref 5–15)
APTT PPP: 22.6 SECONDS (ref 22.7–35.4)
AST SERPL-CCNC: 22 U/L (ref 1–32)
BASOPHILS # BLD AUTO: 0.13 10*3/MM3 (ref 0–0.2)
BASOPHILS # BLD AUTO: 0.15 10*3/MM3 (ref 0–0.2)
BASOPHILS NFR BLD AUTO: 1.2 % (ref 0–1.5)
BASOPHILS NFR BLD AUTO: 1.3 % (ref 0–1.5)
BILIRUB SERPL-MCNC: 0.5 MG/DL (ref 0–1.2)
BUN SERPL-MCNC: 10 MG/DL (ref 8–23)
BUN SERPL-MCNC: 10 MG/DL (ref 8–23)
BUN/CREAT SERPL: 12.8 (ref 7–25)
BUN/CREAT SERPL: 15.6 (ref 7–25)
CALCIUM SPEC-SCNC: 8.7 MG/DL (ref 8.6–10.5)
CALCIUM SPEC-SCNC: 8.9 MG/DL (ref 8.6–10.5)
CHLORIDE SERPL-SCNC: 104 MMOL/L (ref 98–107)
CHLORIDE SERPL-SCNC: 107 MMOL/L (ref 98–107)
CO2 SERPL-SCNC: 25 MMOL/L (ref 22–29)
CO2 SERPL-SCNC: 27 MMOL/L (ref 22–29)
CREAT SERPL-MCNC: 0.64 MG/DL (ref 0.57–1)
CREAT SERPL-MCNC: 0.78 MG/DL (ref 0.57–1)
DEPRECATED RDW RBC AUTO: 42.1 FL (ref 37–54)
DEPRECATED RDW RBC AUTO: 44.5 FL (ref 37–54)
EGFRCR SERPLBLD CKD-EPI 2021: 84.4 ML/MIN/1.73
EGFRCR SERPLBLD CKD-EPI 2021: 98.2 ML/MIN/1.73
EOSINOPHIL # BLD AUTO: 0.4 10*3/MM3 (ref 0–0.4)
EOSINOPHIL # BLD AUTO: 0.41 10*3/MM3 (ref 0–0.4)
EOSINOPHIL NFR BLD AUTO: 3.4 % (ref 0.3–6.2)
EOSINOPHIL NFR BLD AUTO: 3.7 % (ref 0.3–6.2)
ERYTHROCYTE [DISTWIDTH] IN BLOOD BY AUTOMATED COUNT: 13 % (ref 12.3–15.4)
ERYTHROCYTE [DISTWIDTH] IN BLOOD BY AUTOMATED COUNT: 13.3 % (ref 12.3–15.4)
GEN 5 2HR TROPONIN T REFLEX: 111 NG/L
GLOBULIN UR ELPH-MCNC: 2.7 GM/DL
GLUCOSE SERPL-MCNC: 102 MG/DL (ref 65–99)
GLUCOSE SERPL-MCNC: 108 MG/DL (ref 65–99)
HCT VFR BLD AUTO: 32.6 % (ref 34–46.6)
HCT VFR BLD AUTO: 33.9 % (ref 34–46.6)
HGB BLD-MCNC: 10.8 G/DL (ref 12–15.9)
HGB BLD-MCNC: 11 G/DL (ref 12–15.9)
IMM GRANULOCYTES # BLD AUTO: 0.06 10*3/MM3 (ref 0–0.05)
IMM GRANULOCYTES # BLD AUTO: 0.06 10*3/MM3 (ref 0–0.05)
IMM GRANULOCYTES NFR BLD AUTO: 0.5 % (ref 0–0.5)
IMM GRANULOCYTES NFR BLD AUTO: 0.6 % (ref 0–0.5)
INR PPP: 1.12 (ref 0.9–1.1)
INR PPP: 1.15 (ref 0.9–1.1)
LYMPHOCYTES # BLD AUTO: 2.37 10*3/MM3 (ref 0.7–3.1)
LYMPHOCYTES # BLD AUTO: 2.39 10*3/MM3 (ref 0.7–3.1)
LYMPHOCYTES NFR BLD AUTO: 19.8 % (ref 19.6–45.3)
LYMPHOCYTES NFR BLD AUTO: 22.3 % (ref 19.6–45.3)
MAGNESIUM SERPL-MCNC: 2 MG/DL (ref 1.6–2.4)
MAGNESIUM SERPL-MCNC: 2.2 MG/DL (ref 1.6–2.4)
MCH RBC QN AUTO: 29.3 PG (ref 26.6–33)
MCH RBC QN AUTO: 30.3 PG (ref 26.6–33)
MCHC RBC AUTO-ENTMCNC: 32.4 G/DL (ref 31.5–35.7)
MCHC RBC AUTO-ENTMCNC: 33.1 G/DL (ref 31.5–35.7)
MCV RBC AUTO: 90.2 FL (ref 79–97)
MCV RBC AUTO: 91.6 FL (ref 79–97)
MONOCYTES # BLD AUTO: 0.7 10*3/MM3 (ref 0.1–0.9)
MONOCYTES # BLD AUTO: 0.79 10*3/MM3 (ref 0.1–0.9)
MONOCYTES NFR BLD AUTO: 5.8 % (ref 5–12)
MONOCYTES NFR BLD AUTO: 7.4 % (ref 5–12)
NEUTROPHILS NFR BLD AUTO: 6.96 10*3/MM3 (ref 1.7–7)
NEUTROPHILS NFR BLD AUTO: 64.8 % (ref 42.7–76)
NEUTROPHILS NFR BLD AUTO: 69.2 % (ref 42.7–76)
NEUTROPHILS NFR BLD AUTO: 8.29 10*3/MM3 (ref 1.7–7)
NRBC BLD AUTO-RTO: 0 /100 WBC (ref 0–0.2)
NRBC BLD AUTO-RTO: 0 /100 WBC (ref 0–0.2)
NT-PROBNP SERPL-MCNC: 585 PG/ML (ref 0–900)
PLATELET # BLD AUTO: 398 10*3/MM3 (ref 140–450)
PLATELET # BLD AUTO: 436 10*3/MM3 (ref 140–450)
PMV BLD AUTO: 9.6 FL (ref 6–12)
PMV BLD AUTO: 9.6 FL (ref 6–12)
POTASSIUM SERPL-SCNC: 3.8 MMOL/L (ref 3.5–5.2)
POTASSIUM SERPL-SCNC: 4 MMOL/L (ref 3.5–5.2)
PROT SERPL-MCNC: 6.6 G/DL (ref 6–8.5)
PROTHROMBIN TIME: 14.5 SECONDS (ref 11.7–14.2)
PROTHROMBIN TIME: 14.9 SECONDS (ref 11.7–14.2)
QT INTERVAL: 318 MS
RBC # BLD AUTO: 3.56 10*6/MM3 (ref 3.77–5.28)
RBC # BLD AUTO: 3.76 10*6/MM3 (ref 3.77–5.28)
SODIUM SERPL-SCNC: 140 MMOL/L (ref 136–145)
SODIUM SERPL-SCNC: 140 MMOL/L (ref 136–145)
TROPONIN T DELTA: 14 NG/L
TROPONIN T SERPL HS-MCNC: 97 NG/L
WBC NRBC COR # BLD: 10.73 10*3/MM3 (ref 3.4–10.8)
WBC NRBC COR # BLD: 11.98 10*3/MM3 (ref 3.4–10.8)

## 2023-05-18 PROCEDURE — 63710000001 APIXABAN 5 MG TABLET: Performed by: NURSE PRACTITIONER

## 2023-05-18 PROCEDURE — A9270 NON-COVERED ITEM OR SERVICE: HCPCS | Performed by: HOSPITALIST

## 2023-05-18 PROCEDURE — G0378 HOSPITAL OBSERVATION PER HR: HCPCS

## 2023-05-18 PROCEDURE — 63710000001 OXYBUTYNIN 5 MG TABLET: Performed by: HOSPITALIST

## 2023-05-18 PROCEDURE — 36415 COLL VENOUS BLD VENIPUNCTURE: CPT

## 2023-05-18 PROCEDURE — 83735 ASSAY OF MAGNESIUM: CPT | Performed by: NURSE PRACTITIONER

## 2023-05-18 PROCEDURE — 63710000001 POTASSIUM CHLORIDE 10 MEQ TABLET CONTROLLED-RELEASE: Performed by: HOSPITALIST

## 2023-05-18 PROCEDURE — 85610 PROTHROMBIN TIME: CPT | Performed by: NURSE PRACTITIONER

## 2023-05-18 PROCEDURE — 71045 X-RAY EXAM CHEST 1 VIEW: CPT

## 2023-05-18 PROCEDURE — 99214 OFFICE O/P EST MOD 30 MIN: CPT | Performed by: INTERNAL MEDICINE

## 2023-05-18 PROCEDURE — 63710000001 LAMOTRIGINE 25 MG TABLET: Performed by: HOSPITALIST

## 2023-05-18 PROCEDURE — 84484 ASSAY OF TROPONIN QUANT: CPT | Performed by: EMERGENCY MEDICINE

## 2023-05-18 PROCEDURE — A9270 NON-COVERED ITEM OR SERVICE: HCPCS | Performed by: NURSE PRACTITIONER

## 2023-05-18 PROCEDURE — 85025 COMPLETE CBC W/AUTO DIFF WBC: CPT | Performed by: NURSE PRACTITIONER

## 2023-05-18 PROCEDURE — 63710000001 FUROSEMIDE 40 MG TABLET: Performed by: HOSPITALIST

## 2023-05-18 PROCEDURE — 80048 BASIC METABOLIC PNL TOTAL CA: CPT | Performed by: NURSE PRACTITIONER

## 2023-05-18 PROCEDURE — 63710000001 LISINOPRIL 10 MG TABLET: Performed by: HOSPITALIST

## 2023-05-18 PROCEDURE — 63710000001 METOPROLOL TARTRATE 25 MG TABLET: Performed by: HOSPITALIST

## 2023-05-18 RX ORDER — OXYBUTYNIN CHLORIDE 5 MG/1
5 TABLET ORAL DAILY
Status: DISCONTINUED | OUTPATIENT
Start: 2023-05-18 | End: 2023-05-18 | Stop reason: HOSPADM

## 2023-05-18 RX ORDER — HYDROCODONE BITARTRATE AND ACETAMINOPHEN 5; 325 MG/1; MG/1
1 TABLET ORAL EVERY 6 HOURS PRN
Status: DISCONTINUED | OUTPATIENT
Start: 2023-05-18 | End: 2023-05-18 | Stop reason: HOSPADM

## 2023-05-18 RX ORDER — ACETAMINOPHEN 650 MG/1
650 SUPPOSITORY RECTAL EVERY 4 HOURS PRN
Status: DISCONTINUED | OUTPATIENT
Start: 2023-05-18 | End: 2023-05-18 | Stop reason: HOSPADM

## 2023-05-18 RX ORDER — PANTOPRAZOLE SODIUM 40 MG/1
40 TABLET, DELAYED RELEASE ORAL
Status: DISCONTINUED | OUTPATIENT
Start: 2023-05-19 | End: 2023-05-18 | Stop reason: HOSPADM

## 2023-05-18 RX ORDER — ATORVASTATIN CALCIUM 80 MG/1
80 TABLET, FILM COATED ORAL NIGHTLY
Status: DISCONTINUED | OUTPATIENT
Start: 2023-05-18 | End: 2023-05-18 | Stop reason: HOSPADM

## 2023-05-18 RX ORDER — ONDANSETRON 2 MG/ML
4 INJECTION INTRAMUSCULAR; INTRAVENOUS EVERY 6 HOURS PRN
Status: DISCONTINUED | OUTPATIENT
Start: 2023-05-18 | End: 2023-05-18 | Stop reason: HOSPADM

## 2023-05-18 RX ORDER — POLYETHYLENE GLYCOL 3350 17 G/17G
17 POWDER, FOR SOLUTION ORAL DAILY PRN
Status: DISCONTINUED | OUTPATIENT
Start: 2023-05-18 | End: 2023-05-18 | Stop reason: HOSPADM

## 2023-05-18 RX ORDER — ACETAMINOPHEN 325 MG/1
650 TABLET ORAL EVERY 4 HOURS PRN
Status: DISCONTINUED | OUTPATIENT
Start: 2023-05-18 | End: 2023-05-18 | Stop reason: HOSPADM

## 2023-05-18 RX ORDER — CLOPIDOGREL BISULFATE 75 MG/1
75 TABLET ORAL DAILY
Status: DISCONTINUED | OUTPATIENT
Start: 2023-05-18 | End: 2023-05-18 | Stop reason: HOSPADM

## 2023-05-18 RX ORDER — ASPIRIN 81 MG/1
81 TABLET, CHEWABLE ORAL DAILY
Status: DISCONTINUED | OUTPATIENT
Start: 2023-05-18 | End: 2023-05-18

## 2023-05-18 RX ORDER — AMOXICILLIN 250 MG
2 CAPSULE ORAL 2 TIMES DAILY
Status: DISCONTINUED | OUTPATIENT
Start: 2023-05-18 | End: 2023-05-18 | Stop reason: HOSPADM

## 2023-05-18 RX ORDER — ACETAMINOPHEN 160 MG/5ML
650 SOLUTION ORAL EVERY 4 HOURS PRN
Status: DISCONTINUED | OUTPATIENT
Start: 2023-05-18 | End: 2023-05-18 | Stop reason: HOSPADM

## 2023-05-18 RX ORDER — SODIUM CHLORIDE 0.9 % (FLUSH) 0.9 %
10 SYRINGE (ML) INJECTION EVERY 12 HOURS SCHEDULED
Status: DISCONTINUED | OUTPATIENT
Start: 2023-05-18 | End: 2023-05-18 | Stop reason: HOSPADM

## 2023-05-18 RX ORDER — LISINOPRIL 10 MG/1
10 TABLET ORAL
Status: DISCONTINUED | OUTPATIENT
Start: 2023-05-18 | End: 2023-05-18 | Stop reason: HOSPADM

## 2023-05-18 RX ORDER — ACETAMINOPHEN 325 MG/1
650 TABLET ORAL EVERY 4 HOURS PRN
Status: DISCONTINUED | OUTPATIENT
Start: 2023-05-18 | End: 2023-05-18 | Stop reason: SDUPTHER

## 2023-05-18 RX ORDER — POTASSIUM CHLORIDE 750 MG/1
40 TABLET, FILM COATED, EXTENDED RELEASE ORAL DAILY
Status: DISCONTINUED | OUTPATIENT
Start: 2023-05-18 | End: 2023-05-18 | Stop reason: HOSPADM

## 2023-05-18 RX ORDER — NITROGLYCERIN 0.4 MG/1
0.4 TABLET SUBLINGUAL
Status: DISCONTINUED | OUTPATIENT
Start: 2023-05-18 | End: 2023-05-18 | Stop reason: HOSPADM

## 2023-05-18 RX ORDER — ALPRAZOLAM 0.25 MG/1
2 TABLET ORAL ONCE
Status: COMPLETED | OUTPATIENT
Start: 2023-05-18 | End: 2023-05-18

## 2023-05-18 RX ORDER — BISACODYL 10 MG
10 SUPPOSITORY, RECTAL RECTAL DAILY PRN
Status: DISCONTINUED | OUTPATIENT
Start: 2023-05-18 | End: 2023-05-18 | Stop reason: HOSPADM

## 2023-05-18 RX ORDER — BISACODYL 5 MG/1
5 TABLET, DELAYED RELEASE ORAL DAILY PRN
Status: DISCONTINUED | OUTPATIENT
Start: 2023-05-18 | End: 2023-05-18 | Stop reason: HOSPADM

## 2023-05-18 RX ORDER — ALPRAZOLAM 1 MG/1
2 TABLET ORAL NIGHTLY PRN
Status: DISCONTINUED | OUTPATIENT
Start: 2023-05-18 | End: 2023-05-18 | Stop reason: HOSPADM

## 2023-05-18 RX ORDER — ACETAMINOPHEN 500 MG
1000 TABLET ORAL ONCE
Status: COMPLETED | OUTPATIENT
Start: 2023-05-18 | End: 2023-05-18

## 2023-05-18 RX ORDER — SODIUM CHLORIDE 0.9 % (FLUSH) 0.9 %
10 SYRINGE (ML) INJECTION AS NEEDED
Status: DISCONTINUED | OUTPATIENT
Start: 2023-05-18 | End: 2023-05-18 | Stop reason: HOSPADM

## 2023-05-18 RX ORDER — SODIUM CHLORIDE 9 MG/ML
40 INJECTION, SOLUTION INTRAVENOUS AS NEEDED
Status: DISCONTINUED | OUTPATIENT
Start: 2023-05-18 | End: 2023-05-18 | Stop reason: HOSPADM

## 2023-05-18 RX ORDER — LAMOTRIGINE 25 MG/1
25 TABLET ORAL DAILY
Status: DISCONTINUED | OUTPATIENT
Start: 2023-05-18 | End: 2023-05-18 | Stop reason: HOSPADM

## 2023-05-18 RX ORDER — FUROSEMIDE 40 MG/1
40 TABLET ORAL DAILY
Status: DISCONTINUED | OUTPATIENT
Start: 2023-05-18 | End: 2023-05-18 | Stop reason: HOSPADM

## 2023-05-18 RX ADMIN — FUROSEMIDE 40 MG: 40 TABLET ORAL at 12:38

## 2023-05-18 RX ADMIN — METOPROLOL TARTRATE 25 MG: 25 TABLET, FILM COATED ORAL at 12:38

## 2023-05-18 RX ADMIN — ACETAMINOPHEN 1000 MG: 500 TABLET ORAL at 02:12

## 2023-05-18 RX ADMIN — OXYBUTYNIN CHLORIDE 5 MG: 5 TABLET ORAL at 12:36

## 2023-05-18 RX ADMIN — POTASSIUM CHLORIDE 40 MEQ: 750 TABLET, EXTENDED RELEASE ORAL at 12:37

## 2023-05-18 RX ADMIN — LAMOTRIGINE 25 MG: 25 TABLET ORAL at 12:37

## 2023-05-18 RX ADMIN — APIXABAN 5 MG: 5 TABLET, FILM COATED ORAL at 12:36

## 2023-05-18 RX ADMIN — LISINOPRIL 10 MG: 10 TABLET ORAL at 12:38

## 2023-05-18 RX ADMIN — ALPRAZOLAM 2 MG: 0.25 TABLET ORAL at 02:19

## 2023-05-18 NOTE — PLAN OF CARE
Goal Outcome Evaluation:  Plan of Care Reviewed With: patient        Progress: no change  Outcome Evaluation: Pt admitted from ER. Admission completed. Consult called. VSS, pt has been in NSR since transfer to floor. will continue to monitor.

## 2023-05-18 NOTE — CONSULTS
Date of Consultation: 23    Referral Provider: Semaj Sage MD    Reason for Consultation: Atrial Fibrillation     Encounter Provider: Pancho Langford MD    Group of Service: [facility]     Patient Name: Andree Deluna    :1955    Chief complaint: Chest Pain      History of Present Illness: Andree Deluna is a 67 year old with a past medical history of ADHD, HLD, HTN, CVA (left visual field loss), GRD, bilateral carotid stenosis, NSTEMI (23), and is s/p CABG x3 with LIMA and mitral valve repair with postoperative atrial fibrillation per Dr. Noonan on 23.  She presented to the ED on  with  chest pain x1 day. She described the pain as centralized dull pain with shortness of breath and palpitations.  She was also noted to be in atrial fibrillation with RVR.  She was flown to [facility].  She was given IV diltiazem, and converted back to sinus rhythm.  Her symptoms completely resolved afterwards.  She is having no chest discomfort this morning, and feels well.  She is in sinus rhythm.  She did have some atrial fibrillation after her recent heart surgery.    Anesthesia GALA 23  AV: NORMAL  MV: MILD MYXOMATOUS CHANGES. MODERATE MR CENTRAL JET. VC 0.5 . MV ANNULUS 3.4. LA 5.5  TV: TRACE/MILD TI  LVEF 50    ECHO 23  •  Estimated right ventricular systolic pressure from tricuspid regurgitation is normal (<35 mmHg). Normal LV size and contractility EF of 60 to 65%. Normal RV size. Normal atrial size. Pulmonic valve is not well visualized. Aortic valve is trileaflet with mild sclerosis. Mitral valve, tricuspid valve appears structurally normal, moderate mitral regurgitation seen. No pericardial effusion seen. Proximal aorta appears normal in size.      Cardiac Catheterization 23  1. Severe triple-vessel disease   2. Lower limits of normal LV contractility with elevated LVEDP  3. Mild to moderate MR  4. Patent LIMA    Past Medical History:   Diagnosis Date    • ADHD unknown   • Anemia    • Anxiety    • Carotid artery disease     80% right internal carotid artery   • CVA (cerebral vascular accident)     right parietal right temporal   • DDD (degenerative disc disease), lumbar    • Depression    • Extremity pain     Ankle pain   • GERD (gastroesophageal reflux disease)    • Hyperlipidemia unknown   • Hypertension    • Insomnia unknown   • Laryngopharyngeal reflux    • Low back pain    • Mood disorder    • Obesity unknown   • Skin cancer of face    • Sleep apnea     Suspected sleep apnea she has refused to be tested   • Stroke (cerebrum)    • Visual field defect     Left         Past Surgical History:   Procedure Laterality Date   • CARDIAC CATHETERIZATION N/A 5/1/2023    Procedure: Left Heart Cath;  Surgeon: Kye Alcaraz MD;  Location: [facility] CATH INVASIVE LOCATION;  Service: Cardiovascular;  Laterality: N/A;   • CAROTID ENDARTERECTOMY Right 11/10/2020    Procedure: CAROTID ENDARTERECTOMY;  Surgeon: Tavo Das MD;  Location: [facility] MAIN OR;  Service: Vascular;  Laterality: Right;   • CHOLECYSTECTOMY     • COLONOSCOPY      2016   • CORONARY ARTERY BYPASS GRAFT N/A 5/6/2023    Procedure: CORONARY ARTERY BYPASS GRAFTING;  Surgeon: Errol Noonan MD;  Location: [facility];  Service: Cardiothoracic;  Laterality: N/A;  CABG X  3 using LIMA and right endospahenous vein;  2 coronary markers implanted.    • FINGER SURGERY     • KNEE ARTHROPLASTY     • KNEE SURGERY Right     replaced   • LAPAROSCOPIC GASTRIC BANDING     • MITRAL VALVE REPAIR/REPLACEMENT N/A 5/6/2023    Procedure: MITRAL VALVE REPAIR/REPLACEMENT;  Surgeon: Errol Noonan MD;  Location: [facility];  Service: Cardiothoracic;  Laterality: N/A;  Mitral valve repaired using 28 mm Jorge Physio II Annuloplasty Ring.    • ROTATOR CUFF REPAIR Right 2019   • SHOULDER SURGERY Right 05/24/2019    scope/ cuff repair   • TRANSESOPHAGEAL ECHOCARDIOGRAM (GALA) N/A 5/6/2023     Procedure: TRANSESOPHAGEAL ECHOCARDIOGRAM WITH ANESTHESIA;  Surgeon: Errol Noonan MD;  Location: [facility];  Service: Cardiothoracic;  Laterality: N/A;   • TUBAL ABDOMINAL LIGATION           No Known Allergies      No current facility-administered medications on file prior to encounter.     Current Outpatient Medications on File Prior to Encounter   Medication Sig Dispense Refill   • acetaminophen (TYLENOL) 325 MG tablet Take 2 tablets by mouth Every 4 (Four) Hours As Needed for Mild Pain.     • ALPRAZolam (XANAX) 2 MG tablet TAKE 1 TABLET BY MOUTH AT NIGHT AS NEEDED FOR ANXIETY 30 tablet 0   • amiodarone (PACERONE) 200 MG tablet Take 1 tablet by mouth 2 (Two) Times a Day With Meals for 7 days, THEN 1 tablet Daily for 21 days. 105 tablet 0   • aspirin 81 MG chewable tablet Chew 1 tablet Daily. 30 tablet 1   • atorvastatin (LIPITOR) 80 MG tablet TAKE ONE TABLET BY MOUTH ONCE NIGHTLY 30 tablet 11   • furosemide (LASIX) 40 MG tablet Take 1 tablet by mouth Daily. 30 tablet 1   • HYDROcodone-acetaminophen (NORCO) 5-325 MG per tablet Take 1 tablet by mouth Every 6 (Six) Hours As Needed for Moderate Pain. 20 tablet 0   • lamoTRIgine (LaMICtal) 25 MG tablet Take 1 tablet by mouth Daily for 12 doses. 12 tablet 0   • lidocaine (LIDODERM) 5 % Place 2 patches on the skin as directed by provider Daily. Remove & Discard patch within 12 hours to bilateral shoulders 14 patch 0   • lisinopril (PRINIVIL,ZESTRIL) 10 MG tablet Take 1 tablet by mouth Daily. 30 tablet 3   • metoprolol tartrate (LOPRESSOR) 25 MG tablet Take 1 tablet by mouth Every 12 (Twelve) Hours. 60 tablet 3   • omeprazole (priLOSEC) 40 MG capsule TAKE ONE CAPSULE BY MOUTH DAILY 90 capsule 1   • oxybutynin (DITROPAN) 5 MG tablet Take 1 tablet by mouth Daily. 90 tablet 1   • potassium chloride (K-DUR,KLOR-CON) 20 MEQ CR tablet Take 2 tablets by mouth Daily. 90 tablet 1   • [DISCONTINUED] clopidogrel (PLAVIX) 75 MG tablet Take 1 tablet by mouth Daily. 30  "tablet 3   • [START ON 5/25/2023] lamoTRIgine (LaMICtal) 25 MG tablet Take 1 tablet by mouth Every 12 (Twelve) Hours for 28 doses. 28 tablet 0   • [START ON 6/8/2023] lamoTRIgine (LaMICtal) 25 MG tablet Take 2 tablets by mouth Every 12 (Twelve) Hours for 14 doses. 28 tablet 0   • [DISCONTINUED] lamoTRIgine (LaMICtal) 100 MG tablet Take 1 tablet by mouth Every 12 (Twelve) Hours. 30 tablet 1       Social History     Socioeconomic History   • Marital status:    Tobacco Use   • Smoking status: Never   • Smokeless tobacco: Never   Vaping Use   • Vaping Use: Never used   Substance and Sexual Activity   • Alcohol use: No   • Drug use: No   • Sexual activity: Defer       Family History   Problem Relation Age of Onset   • Diabetes Other    • Heart disease Mother    • Diabetes Mother    • Heart disease Father        REVIEW OF SYSTEMS:   Pertinent positives are noted in the HPI above.  Otherwise, all other systems were reviewed, and are negative.     Objective:     Vitals:    05/18/23 0059 05/18/23 0326 05/18/23 0710 05/18/23 1238   BP: 124/89 102/54 108/74 121/83   BP Location:  Right arm Right arm    Patient Position:  Lying Lying    Pulse: 80 72 77 76   Resp: 18 18 18    Temp:  98.6 °F (37 °C)     TempSrc:  Oral     SpO2: 93% 93% 94%    Weight:  90 kg (198 lb 6.4 oz)     Height:  167.6 cm (66\")       Body mass index is 32.02 kg/m².  Flowsheet Rows    Flowsheet Row First Filed Value   Admission Height 172.7 cm (68\") Documented at 05/17/2023 2352   Admission Weight 89.4 kg (197 lb) Documented at 05/17/2023 2352           General:    No acute distress, alert and oriented x4, pleasant                   Head:    Normocephalic, atraumatic.   Eyes:          Conjunctivae and sclerae normal, no icterus.   Throat:   No oral lesions, no thrush, oral mucosa moist.    Neck:   Supple, trachea midline.   Lungs:     Clear to auscultation bilaterally     Heart:    Regular rhythm and normal rate.  No murmurs, gallops, or rubs noted. "   Abdomen:     Soft, non-tender, non-distended, positive bowel sounds.    Extremities:   No clubbing, cyanosis, or edema.     Pulses:   Pulses palpable and equal bilaterally.    Skin:   No bleeding or rash.   Neuro:   Non-focal.  Moves all extremities well.    Psychiatric:   Normal mood and affect.     Lab Review:                Results from last 7 days   Lab Units 05/18/23  0658   SODIUM mmol/L 140   POTASSIUM mmol/L 3.8   CHLORIDE mmol/L 107   CO2 mmol/L 25.0   BUN mg/dL 10   CREATININE mg/dL 0.64   GLUCOSE mg/dL 102*   CALCIUM mg/dL 8.7     Results from last 7 days   Lab Units 05/18/23  0244 05/17/23  2359   HSTROP T ng/L 111* 97*     Results from last 7 days   Lab Units 05/18/23  0658   WBC 10*3/mm3 10.73   HEMOGLOBIN g/dL 10.8*   HEMATOCRIT % 32.6*   PLATELETS 10*3/mm3 398     Results from last 7 days   Lab Units 05/18/23  0658 05/17/23  2359   INR  1.15* 1.12*   APTT seconds  --  22.6*         Results from last 7 days   Lab Units 05/18/23  0658   MAGNESIUM mg/dL 2.2           EKG (reviewed by me personally):                  Assessment:   1.  Status post three-vessel CABG and mitral valve repair on 5/6/2023 by Dr. Noonan  2.  Paroxysmal atrial fibrillation with RVR  3.  Elevated troponin, likely secondary to #1 and #2  4.  History of CVA  5.  Carotid artery disease  6.  Hypertension    Plan:       Discussed with Dr. Sage and Dr. Noonan earlier today.  The troponin elevation is likely from the atrial fibrillation with rapid response in conjunction with recent surgery.  Dr. Noonan and I do not feel this is a true ischemic cardiac event or a type I NSTEMI.  There is no need for recurrent ischemic evaluation at this point.    She has already converted back to sinus rhythm and is stable for discharge.  She will continue on metoprolol as an outpatient.  She is going to need anticoagulation at this point.  I would start her on Eliquis.  I would recommend continuing aspirin for her recent CABG  and stopping the Plavix in order to minimize bleeding risk.  She already has follow-up scheduled.    Thank you very much for this consult.    Vitaliy Langford MD

## 2023-05-18 NOTE — DISCHARGE PLACEMENT REQUEST
"Andree Sales (67 y.o. Female)     Date of Birth   1955    Social Security Number       Address   [address] [place] [address]    Home Phone   [phone]    MRN   [id number]       Nondenominational   Jainism    Marital Status                               Admission Date   5/17/23    Admission Type   Emergency    Admitting Provider   Yariel Parikh MD    Attending Provider   Semaj Sage MD    Department, Room/Bed   [address], [address]/1       Discharge Date       Discharge Disposition   Home or Self Care    Discharge Destination                               Attending Provider: Semaj Sage MD    Allergies: No Known Allergies    Isolation: None   Infection: None   Code Status: CPR    Ht: 167.6 cm (66\")   Wt: 90 kg (198 lb 6.4 oz)    Admission Cmt: None   Principal Problem: Atrial fibrillation with rapid ventricular response [I48.91]                 Active Insurance as of 5/17/2023     Primary Coverage     Payor Plan Insurance Group Employer/Plan Group    HUMANA MEDICARE REPLACEMENT HU[employer]NA MEDICARE REPLACEMENT [id number]     Payor Plan Address Payor Plan Phone Number Payor Plan Fax Number Effective Dates    [address] [address] [phone]  4/1/2023 - None Entered    [facility] [id number]       Subscriber Name Subscriber Birth Date Member ID       ANDREE SALES 1955 [id number]                 Emergency Contacts      (Rel.) Home Phone Work Phone Mobile Phone    AUSTIN SALES (Spouse) [phone] -- [phone]    Eulalio Sales (Son) -- -- [phone]    Gold Sales (Son) -- -- [phone]              "

## 2023-05-18 NOTE — H&P
HISTORY AND PHYSICAL   [facility]        Patient Identification:  Name: Andree Deluna  Age: 67 y.o.  Sex: female  :  1955  MRN: [id number]                     Primary Care Physician: Naga Griffith PA-C    Chief Complaint:  Chest pain    History of Present Illness:       The patient  is a 67 y.o. female who presents to the hospital complaining of chest pain that has been gradual in onset throughout the entirety of the day today.  She describes it as a centralized dull pain with associated shortness of breath as well as high heart palpitations.  Currently, she reports that her symptoms have resolved and she feels back to her baseline.  Paramedics who flew the patient to the ED at [facility]  reports noticing that she was in A-fib with RVR.  She was given a IV diltiazem bolus in route.  The patient does have a very recent history of a cardiac bypass performed at [facility].  She denies nausea/vomiting, extremity pain, diaphoresis, or back pain.  The patient was admitted to the hospital with further evaluation treatment of paroxysmal atrial fib with rapid ventricular response.      Past Medical History:  Past Medical History:   Diagnosis Date   • ADHD unknown   • Anemia    • Anxiety    • Carotid artery disease     80% right internal carotid artery   • CVA (cerebral vascular accident)     right parietal right temporal   • DDD (degenerative disc disease), lumbar    • Depression    • Extremity pain     Ankle pain   • GERD (gastroesophageal reflux disease)    • Hyperlipidemia unknown   • Hypertension    • Insomnia unknown   • Laryngopharyngeal reflux    • Low back pain    • Mood disorder    • Obesity unknown   • Skin cancer of face    • Sleep apnea     Suspected sleep apnea she has refused to be tested   • Stroke (cerebrum)    • Visual field defect     Left     Past Surgical History:  Past Surgical History:   Procedure Laterality Date   • CARDIAC CATHETERIZATION N/A  5/1/2023    Procedure: Left Heart Cath;  Surgeon: Kye Alcaraz MD;  Location: [facility] CATH INVASIVE LOCATION;  Service: Cardiovascular;  Laterality: N/A;   • CAROTID ENDARTERECTOMY Right 11/10/2020    Procedure: CAROTID ENDARTERECTOMY;  Surgeon: Tavo Das MD;  Location: [facility] MAIN OR;  Service: Vascular;  Laterality: Right;   • CHOLECYSTECTOMY     • COLONOSCOPY      2016   • CORONARY ARTERY BYPASS GRAFT N/A 5/6/2023    Procedure: CORONARY ARTERY BYPASS GRAFTING;  Surgeon: Errol Noonan MD;  Location: [facility];  Service: Cardiothoracic;  Laterality: N/A;  CABG X  3 using LIMA and right endospahenous vein;  2 coronary markers implanted.    • FINGER SURGERY     • KNEE ARTHROPLASTY     • KNEE SURGERY Right     replaced   • LAPAROSCOPIC GASTRIC BANDING     • MITRAL VALVE REPAIR/REPLACEMENT N/A 5/6/2023    Procedure: MITRAL VALVE REPAIR/REPLACEMENT;  Surgeon: Errol Noonan MD;  Location: [facility];  Service: Cardiothoracic;  Laterality: N/A;  Mitral valve repaired using 28 mm Jorge Physio II Annuloplasty Ring.    • ROTATOR CUFF REPAIR Right 2019   • SHOULDER SURGERY Right 05/24/2019    scope/ cuff repair   • TRANSESOPHAGEAL ECHOCARDIOGRAM (GALA) N/A 5/6/2023    Procedure: TRANSESOPHAGEAL ECHOCARDIOGRAM WITH ANESTHESIA;  Surgeon: Errol Noonan MD;  Location: [facility];  Service: Cardiothoracic;  Laterality: N/A;   • TUBAL ABDOMINAL LIGATION        Home Meds:  Medications Prior to Admission   Medication Sig Dispense Refill Last Dose   • acetaminophen (TYLENOL) 325 MG tablet Take 2 tablets by mouth Every 4 (Four) Hours As Needed for Mild Pain.   5/17/2023   • ALPRAZolam (XANAX) 2 MG tablet TAKE 1 TABLET BY MOUTH AT NIGHT AS NEEDED FOR ANXIETY 30 tablet 0 5/18/2023   • amiodarone (PACERONE) 200 MG tablet Take 1 tablet by mouth 2 (Two) Times a Day With Meals for 7 days, THEN 1 tablet Daily for 21 days. 105 tablet 0 5/17/2023   • aspirin 81 MG chewable tablet Chew  1 tablet Daily. 30 tablet 1 5/17/2023   • atorvastatin (LIPITOR) 80 MG tablet TAKE ONE TABLET BY MOUTH ONCE NIGHTLY 30 tablet 11 5/17/2023   • clopidogrel (PLAVIX) 75 MG tablet Take 1 tablet by mouth Daily. 30 tablet 3 5/17/2023   • furosemide (LASIX) 40 MG tablet Take 1 tablet by mouth Daily. 30 tablet 1 5/17/2023   • HYDROcodone-acetaminophen (NORCO) 5-325 MG per tablet Take 1 tablet by mouth Every 6 (Six) Hours As Needed for Moderate Pain. 20 tablet 0 Past Week   • lamoTRIgine (LaMICtal) 25 MG tablet Take 1 tablet by mouth Daily for 12 doses. 12 tablet 0 5/17/2023   • lidocaine (LIDODERM) 5 % Place 2 patches on the skin as directed by provider Daily. Remove & Discard patch within 12 hours to bilateral shoulders 14 patch 0 Past Week   • lisinopril (PRINIVIL,ZESTRIL) 10 MG tablet Take 1 tablet by mouth Daily. 30 tablet 3 5/17/2023   • metoprolol tartrate (LOPRESSOR) 25 MG tablet Take 1 tablet by mouth Every 12 (Twelve) Hours. 60 tablet 3 5/17/2023   • omeprazole (priLOSEC) 40 MG capsule TAKE ONE CAPSULE BY MOUTH DAILY 90 capsule 1 5/17/2023   • oxybutynin (DITROPAN) 5 MG tablet Take 1 tablet by mouth Daily. 90 tablet 1 5/17/2023   • potassium chloride (K-DUR,KLOR-CON) 20 MEQ CR tablet Take 2 tablets by mouth Daily. 90 tablet 1 5/17/2023   • [START ON 5/25/2023] lamoTRIgine (LaMICtal) 25 MG tablet Take 1 tablet by mouth Every 12 (Twelve) Hours for 28 doses. 28 tablet 0    • [START ON 6/8/2023] lamoTRIgine (LaMICtal) 25 MG tablet Take 2 tablets by mouth Every 12 (Twelve) Hours for 14 doses. 28 tablet 0      Current meds    Current Facility-Administered Medications:   •  [DISCONTINUED] acetaminophen (TYLENOL) tablet 650 mg, 650 mg, Oral, Q4H PRN **OR** acetaminophen (TYLENOL) 160 MG/5ML solution 650 mg, 650 mg, Oral, Q4H PRN **OR** acetaminophen (TYLENOL) suppository 650 mg, 650 mg, Rectal, Q4H PRN, Farnaz García APRN  •  acetaminophen (TYLENOL) tablet 650 mg, 650 mg, Oral, Q4H PRN, Semaj Sage MD  •   ALPRAZolam (XANAX) tablet 2 mg, 2 mg, Oral, Nightly PRN, Semaj Sage MD  •  aspirin chewable tablet 81 mg, 81 mg, Oral, Daily, Semaj Sage MD  •  atorvastatin (LIPITOR) tablet 80 mg, 80 mg, Oral, Nightly, Semaj Sage MD  •  sennosides-docusate (PERICOLACE) 8.6-50 MG per tablet 2 tablet, 2 tablet, Oral, BID **AND** polyethylene glycol (MIRALAX) packet 17 g, 17 g, Oral, Daily PRN **AND** bisacodyl (DULCOLAX) EC tablet 5 mg, 5 mg, Oral, Daily PRN **AND** bisacodyl (DULCOLAX) suppository 10 mg, 10 mg, Rectal, Daily PRN, Farnaz García, APRDEDRICK  •  clopidogrel (PLAVIX) tablet 75 mg, 75 mg, Oral, Daily, Semaj Sage MD  •  furosemide (LASIX) tablet 40 mg, 40 mg, Oral, Daily, Semaj Sage MD  •  HYDROcodone-acetaminophen (NORCO) 5-325 MG per tablet 1 tablet, 1 tablet, Oral, Q6H PRN, Semaj Sage MD  •  lamoTRIgine (LaMICtal) tablet 25 mg, 25 mg, Oral, Daily, Semaj Sage MD  •  lisinopril (PRINIVIL,ZESTRIL) tablet 10 mg, 10 mg, Oral, Q24H, Semaj Sage MD  •  metoprolol tartrate (LOPRESSOR) injection 5 mg, 5 mg, Intravenous, Once, Leander Gilliland MD  •  metoprolol tartrate (LOPRESSOR) tablet 25 mg, 25 mg, Oral, Q12H, Semaj Sage MD  •  nitroglycerin (NITROSTAT) SL tablet 0.4 mg, 0.4 mg, Sublingual, Q5 Min PRN, Farnaz García, APRDEDRICK  •  ondansetron (ZOFRAN) injection 4 mg, 4 mg, Intravenous, Q6H PRN, Farnaz García, APRDEDRICK  •  oxybutynin (DITROPAN) tablet 5 mg, 5 mg, Oral, Daily, Semaj Sage MD  •  [START ON 5/19/2023] pantoprazole (PROTONIX) EC tablet 40 mg, 40 mg, Oral, Q AM, Semaj Sage MD  •  potassium chloride (K-DUR,KLOR-CON) ER tablet 40 mEq, 40 mEq, Oral, Daily, Semaj Sage MD  •  [COMPLETED] Insert Peripheral IV, , , Once **AND** sodium chloride 0.9 % flush 10 mL, 10 mL, Intravenous, PRN, Leander Gilliland MD  •  sodium chloride 0.9 % flush 10 mL, 10 mL, Intravenous, Q12H, Farnaz García, APRN  •  sodium chloride 0.9 % flush 10 mL, 10 mL, Intravenous, PRN,  Farnaz García, SARAHY  •  sodium chloride 0.9 % infusion 40 mL, 40 mL, Intravenous, PRN, Farnaz García, APRN  Allergies:  No Known Allergies  Immunizations:  Immunization History   Administered Date(s) Administered   • COVID-19 (MODERNA) 1st,2nd,3rd Dose Monovalent 2021, 2021   • FluLaval/Fluzone >6mos 10/09/2020     Social History:   Social History     Social History Narrative   • Not on file     Social History     Socioeconomic History   • Marital status:    Tobacco Use   • Smoking status: Never   • Smokeless tobacco: Never   Vaping Use   • Vaping Use: Never used   Substance and Sexual Activity   • Alcohol use: No   • Drug use: No   • Sexual activity: Defer       Family History:  Family History   Problem Relation Age of Onset   • Diabetes Other    • Heart disease Mother    • Diabetes Mother    • Heart disease Father         Review of Systems  See history of present illness and past medical history.  Patient denies headache, dizziness, syncope, falls, trauma, change in vision, change in hearing, change in taste, changes in weight, changes in appetite, focal weakness, numbness, or paresthesia.  Patient denies chest pain, palpitations, dyspnea, orthopnea, PND, cough, sinus pressure, rhinorrhea, epistaxis, hemoptysis, nausea, vomiting, hematemesis, diarrhea, constipation or hematochezia. Denies cold or heat intolerance, polydipsia, polyuria, polyphagia. Denies hematuria, pyuria, dysuria, hesitancy, frequency or urgency. Denies consumption of raw and under cooked meats foods or change in water source.  Denies fever, chills, sweats, night sweats.  Denies missing any routine medications. Remainder of ROS is negative.    Objective:  tMax 24 hrs: Temp (24hrs), Av.4 °F (36.9 °C), Min:98.2 °F (36.8 °C), Max:98.6 °F (37 °C)    Vitals Ranges:   Temp:  [98.2 °F (36.8 °C)-98.6 °F (37 °C)] 98.6 °F (37 °C)  Heart Rate:  [] 77  Resp:  [18-22] 18  BP: (102-124)/(54-89) 108/74      Exam:  BP  "108/74 (BP Location: Right arm, Patient Position: Lying)   Pulse 77   Temp 98.6 °F (37 °C) (Oral)   Resp 18   Ht 167.6 cm (66\")   Wt 90 kg (198 lb 6.4 oz)   SpO2 94%   BMI 32.02 kg/m²     General Appearance:    Alert, cooperative, no distress, appears stated age   Head:    Normocephalic, without obvious abnormality, atraumatic   Eyes:    PERRL, conjunctivae/corneas clear, EOM's intact, both eyes   Ears:    Normal external ear canals, both ears   Nose:   Nares normal, septum midline, mucosa normal, no drainage    or sinus tenderness   Throat:   Lips, mucosa, and tongue normal   Neck:   Supple, symmetrical, trachea midline, no adenopathy;     thyroid:  no enlargement/tenderness/nodules; no carotid    bruit or JVD   Back:     Symmetric, no curvature, ROM normal, no CVA tenderness   Lungs:     Clear to auscultation bilaterally, respirations unlabored   Chest Wall:    No tenderness or deformity    Heart:    Regular rate and rhythm, S1 and S2 normal, no murmur, rub   or gallop   Abdomen:     Soft, nontender, bowel sounds active all four quadrants,     no masses, no hepatomegaly, no splenomegaly   Extremities:   Extremities normal, atraumatic, no cyanosis or edema   Pulses:   2+ and symmetric all extremities   Skin:   Skin color, texture, turgor normal, no rashes or lesions   Lymph nodes:   Cervical, supraclavicular, and axillary nodes normal   Neurologic:   CNII-XII intact, normal strength, sensation intact throughout      .    Data Review:  Lab Results (last 72 hours)     Procedure Component Value Units Date/Time    Basic Metabolic Panel [[id number]]  (Abnormal) Collected: 05/18/23 0658    Specimen: Blood Updated: 05/18/23 0732     Glucose 102 mg/dL      BUN 10 mg/dL      Creatinine 0.64 mg/dL      Sodium 140 mmol/L      Potassium 3.8 mmol/L      Chloride 107 mmol/L      CO2 25.0 mmol/L      Calcium 8.7 mg/dL      BUN/Creatinine Ratio 15.6     Anion Gap 8.0 mmol/L      eGFR 98.2 mL/min/1.73     Narrative:      GFR " Normal >60  Chronic Kidney Disease <60  Kidney Failure <15      Magnesium [[id number]]  (Normal) Collected: 05/18/23 0658    Specimen: Blood Updated: 05/18/23 0732     Magnesium 2.2 mg/dL     Protime-INR [[id number]]  (Abnormal) Collected: 05/18/23 0658    Specimen: Blood Updated: 05/18/23 0723     Protime 14.9 Seconds      INR 1.15    CBC Auto Differential [[id number]]  (Abnormal) Collected: 05/18/23 0658    Specimen: Blood Updated: 05/18/23 0714     WBC 10.73 10*3/mm3      RBC 3.56 10*6/mm3      Hemoglobin 10.8 g/dL      Hematocrit 32.6 %      MCV 91.6 fL      MCH 30.3 pg      MCHC 33.1 g/dL      RDW 13.3 %      RDW-SD 44.5 fl      MPV 9.6 fL      Platelets 398 10*3/mm3      Neutrophil % 64.8 %      Lymphocyte % 22.3 %      Monocyte % 7.4 %      Eosinophil % 3.7 %      Basophil % 1.2 %      Immature Grans % 0.6 %      Neutrophils, Absolute 6.96 10*3/mm3      Lymphocytes, Absolute 2.39 10*3/mm3      Monocytes, Absolute 0.79 10*3/mm3      Eosinophils, Absolute 0.40 10*3/mm3      Basophils, Absolute 0.13 10*3/mm3      Immature Grans, Absolute 0.06 10*3/mm3      nRBC 0.0 /100 WBC     High Sensitivity Troponin T 2Hr [[id number]]  (Abnormal) Collected: 05/18/23 0244    Specimen: Blood Updated: 05/18/23 0314     HS Troponin T 111 ng/L      Troponin T Delta 14 ng/L     Narrative:      High Sensitive Troponin T Reference Range:  <10.0 ng/L- Negative Female for AMI  <15.0 ng/L- Negative Male for AMI  >=10 - Abnormal Female indicating possible myocardial injury.  >=15 - Abnormal Male indicating possible myocardial injury.   Clinicians would have to utilize clinical acumen, EKG, Troponin, and serial changes to determine if it is an Acute Myocardial Infarction or myocardial injury due to an underlying chronic condition.         Magnesium [[id number]]  (Normal) Collected: 05/17/23 2176    Specimen: Blood Updated: 05/18/23 0033     Magnesium 2.0 mg/dL     Comprehensive Metabolic Panel [[id number]]  (Abnormal) Collected: 05/17/23 3214     Specimen: Blood Updated: 05/18/23 0033     Glucose 108 mg/dL      BUN 10 mg/dL      Creatinine 0.78 mg/dL      Sodium 140 mmol/L      Potassium 4.0 mmol/L      Chloride 104 mmol/L      CO2 27.0 mmol/L      Calcium 8.9 mg/dL      Total Protein 6.6 g/dL      Albumin 3.9 g/dL      ALT (SGPT) 23 U/L      AST (SGOT) 22 U/L      Alkaline Phosphatase 114 U/L      Total Bilirubin 0.5 mg/dL      Globulin 2.7 gm/dL      A/G Ratio 1.4 g/dL      BUN/Creatinine Ratio 12.8     Anion Gap 9.0 mmol/L      eGFR 84.4 mL/min/1.73     Narrative:      GFR Normal >60  Chronic Kidney Disease <60  Kidney Failure <15      High Sensitivity Troponin T [[id number]]  (Abnormal) Collected: 05/17/23 2359    Specimen: Blood Updated: 05/18/23 0033     HS Troponin T 97 ng/L     Narrative:      High Sensitive Troponin T Reference Range:  <10.0 ng/L- Negative Female for AMI  <15.0 ng/L- Negative Male for AMI  >=10 - Abnormal Female indicating possible myocardial injury.  >=15 - Abnormal Male indicating possible myocardial injury.   Clinicians would have to utilize clinical acumen, EKG, Troponin, and serial changes to determine if it is an Acute Myocardial Infarction or myocardial injury due to an underlying chronic condition.         BNP [[id number]]  (Normal) Collected: 05/17/23 2359    Specimen: Blood Updated: 05/18/23 0031     proBNP 585.0 pg/mL     Narrative:      Among patients with dyspnea, NT-proBNP is highly sensitive for the detection of acute congestive heart failure. In addition NT-proBNP of <300 pg/ml effectively rules out acute congestive heart failure with 99% negative predictive value.    Results may be falsely decreased if patient taking Biotin.      Protime-INR [[id number]]  (Abnormal) Collected: 05/17/23 2359    Specimen: Blood Updated: 05/18/23 0021     Protime 14.5 Seconds      INR 1.12    aPTT [[id number]]  (Abnormal) Collected: 05/17/23 2359    Specimen: Blood Updated: 05/18/23 0021     PTT 22.6 seconds     CBC & Differential  [[id number]]  (Abnormal) Collected: 05/17/23 2359    Specimen: Blood Updated: 05/18/23 0016    Narrative:      The following orders were created for panel order CBC & Differential.  Procedure                               Abnormality         Status                     ---------                               -----------         ------                     CBC Auto Differential[[id number]]        Abnormal            Final result                 Please view results for these tests on the individual orders.    CBC Auto Differential [[id number]]  (Abnormal) Collected: 05/17/23 2359    Specimen: Blood Updated: 05/18/23 0016     WBC 11.98 10*3/mm3      RBC 3.76 10*6/mm3      Hemoglobin 11.0 g/dL      Hematocrit 33.9 %      MCV 90.2 fL      MCH 29.3 pg      MCHC 32.4 g/dL      RDW 13.0 %      RDW-SD 42.1 fl      MPV 9.6 fL      Platelets 436 10*3/mm3      Neutrophil % 69.2 %      Lymphocyte % 19.8 %      Monocyte % 5.8 %      Eosinophil % 3.4 %      Basophil % 1.3 %      Immature Grans % 0.5 %      Neutrophils, Absolute 8.29 10*3/mm3      Lymphocytes, Absolute 2.37 10*3/mm3      Monocytes, Absolute 0.70 10*3/mm3      Eosinophils, Absolute 0.41 10*3/mm3      Basophils, Absolute 0.15 10*3/mm3      Immature Grans, Absolute 0.06 10*3/mm3      nRBC 0.0 /100 WBC                    Imaging Results (All)     Procedure Component Value Units Date/Time    XR Chest 1 View [[id number]] Collected: 05/18/23 0031     Updated: 05/18/23 0034    Narrative:      SINGLE VIEW OF THE CHEST     HISTORY: Chest pain     COMPARISON: 05/08/2023     FINDINGS:  The patient is status post median sternotomy with coronary artery bypass  grafting and valve replacement. There is persistent left basilar  consolidation and left pleural effusion. Right lung is clear. No  pneumothorax is seen.       Impression:         1. Persistent left basilar consolidation and left pleural effusion.     This report was finalized on 5/18/2023 12:31 AM by Dr. Rubio  SAMSON Yen           Past Medical History:   Diagnosis Date   • ADHD unknown   • Anemia    • Anxiety    • Carotid artery disease     80% right internal carotid artery   • CVA (cerebral vascular accident)     right parietal right temporal   • DDD (degenerative disc disease), lumbar    • Depression    • Extremity pain     Ankle pain   • GERD (gastroesophageal reflux disease)    • Hyperlipidemia unknown   • Hypertension    • Insomnia unknown   • Laryngopharyngeal reflux    • Low back pain    • Mood disorder    • Obesity unknown   • Skin cancer of face    • Sleep apnea     Suspected sleep apnea she has refused to be tested   • Stroke (cerebrum)    • Visual field defect     Left       Assessment:  Active Hospital Problems    Diagnosis  POA   • **Atrial fibrillation with rapid ventricular response [I48.91]  Yes   • Pleural effusion on left [J90]  Unknown   • Dyspnea [R06.00]  Unknown   • S/P CABG x 3 with LIMA per Dr. Noonan 5/6/2023 [Z95.1]  Not Applicable   • S/P mitral valve repair per Dr. Noonan 5/6/2023 [Z98.890]  Not Applicable   • Postoperative atrial fibrillation [I97.89, I48.91]  Yes   • Chest pain, unspecified type [R07.9]  Yes   • Hyperlipidemia [E78.5]  Yes   • Anxiety [F41.9]  Yes   • Hypertension [I10]  Yes      Resolved Hospital Problems   No resolved problems to display.       Plan:  The patient admitted to hospital and cardiology is consulted for further evaluation treatment of A-fib with ventricular response.    Semaj Sage MD  5/18/2023  11:22 EDT

## 2023-05-18 NOTE — DISCHARGE SUMMARY
PHYSICIAN DISCHARGE SUMMARY                                                                        [facility]    Patient Identification:  Name: Andree Deluna  Age: 67 y.o.  Sex: female  :  1955  MRN: [id number]  Primary Care Physician: Naga Griffith PA-C    Admit date: 2023  Discharge date and time:2023  Discharged Condition: good    Discharge Diagnoses:  Active Hospital Problems    Diagnosis  POA   • **Atrial fibrillation with rapid ventricular response [I48.91]  Yes   • Pleural effusion on left [J90]  Unknown   • Dyspnea [R06.00]  Unknown   • S/P CABG x 3 with LIMA per Dr. Noonan 2023 [Z95.1]  Not Applicable   • S/P mitral valve repair per Dr. Noonan 2023 [Z98.890]  Not Applicable   • Postoperative atrial fibrillation [I97.89, I48.91]  Yes   • Chest pain, unspecified type [R07.9]  Yes   • Hyperlipidemia [E78.5]  Yes   • Anxiety [F41.9]  Yes   • Hypertension [I10]  Yes      Resolved Hospital Problems   No resolved problems to display.          PMHX:   Past Medical History:   Diagnosis Date   • ADHD unknown   • Anemia    • Anxiety    • Carotid artery disease     80% right internal carotid artery   • CVA (cerebral vascular accident)     right parietal right temporal   • DDD (degenerative disc disease), lumbar    • Depression    • Extremity pain     Ankle pain   • GERD (gastroesophageal reflux disease)    • Hyperlipidemia unknown   • Hypertension    • Insomnia unknown   • Laryngopharyngeal reflux    • Low back pain    • Mood disorder    • Obesity unknown   • Skin cancer of face    • Sleep apnea     Suspected sleep apnea she has refused to be tested   • Stroke (cerebrum)    • Visual field defect     Left     PSHX:   Past Surgical History:   Procedure Laterality Date   • CARDIAC CATHETERIZATION N/A 2023    Procedure: Left Heart Cath;  Surgeon: Kye Alcaraz MD;  Location:   [facility] CATH INVASIVE LOCATION;  Service: Cardiovascular;  Laterality: N/A;   • CAROTID ENDARTERECTOMY Right 11/10/2020    Procedure: CAROTID ENDARTERECTOMY;  Surgeon: Tavo Das MD;  Location: [facility] MAIN OR;  Service: Vascular;  Laterality: Right;   • CHOLECYSTECTOMY     • COLONOSCOPY      2016   • CORONARY ARTERY BYPASS GRAFT N/A 5/6/2023    Procedure: CORONARY ARTERY BYPASS GRAFTING;  Surgeon: Errol Noonan MD;  Location: [facility];  Service: Cardiothoracic;  Laterality: N/A;  CABG X  3 using LIMA and right endospahenous vein;  2 coronary markers implanted.    • FINGER SURGERY     • KNEE ARTHROPLASTY     • KNEE SURGERY Right     replaced   • LAPAROSCOPIC GASTRIC BANDING     • MITRAL VALVE REPAIR/REPLACEMENT N/A 5/6/2023    Procedure: MITRAL VALVE REPAIR/REPLACEMENT;  Surgeon: Errol Noonan MD;  Location: [facility];  Service: Cardiothoracic;  Laterality: N/A;  Mitral valve repaired using 28 mm Jorge Physio II Annuloplasty Ring.    • ROTATOR CUFF REPAIR Right 2019   • SHOULDER SURGERY Right 05/24/2019    scope/ cuff repair   • TRANSESOPHAGEAL ECHOCARDIOGRAM (GALA) N/A 5/6/2023    Procedure: TRANSESOPHAGEAL ECHOCARDIOGRAM WITH ANESTHESIA;  Surgeon: Errol Noonan MD;  Location: [facility];  Service: Cardiothoracic;  Laterality: N/A;   • TUBAL ABDOMINAL LIGATION         Hospital Course: Andree Deluna   is a 67 y.o. female who presents to the hospital complaining of chest pain that has been gradual in onset throughout the entirety of the day today.  She describes it as a centralized dull pain with associated shortness of breath as well as high heart palpitations.  Currently, she reports that her symptoms have resolved and she feels back to her baseline.  Paramedics who flew the patient to the ED at [facility]  reports noticing that she was in A-fib with RVR.  She was given a IV diltiazem bolus in route.  The patient does have a very recent history of a  cardiac bypass performed at [facility].  She denies nausea/vomiting, extremity pain, diaphoresis, or back pain.  The patient was admitted to the hospital with further evaluation treatment of paroxysmal atrial fib with rapid ventricular response.  The patient admitted to the hospital and the patient converted back to sinus rhythm.  Cardiology was consulted and they felt the patient looks stable enough to go home just adding Eliquis and stopping the Plavix and she will continue with aspirin 81 mg.  She will follow-up with her primary care in 1 week for ongoing care and also follow-up with her cardiologist and cardiothoracic surgeon.      Consults:     Consults     Date and Time Order Name Status Description    5/18/2023  3:27 AM Inpatient Cardiothoracic Surgery Consult      5/18/2023  3:27 AM Inpatient Cardiology Consult      5/18/2023  1:38 AM LHMELISSA (on-call MD unless specified) Details      5/1/2023  6:00 PM Inpatient Cardiothoracic Surgery Consult Completed     5/1/2023 12:45 AM Inpatient Cardiology Consult Completed         Results from last 7 days   Lab Units 05/18/23  0658   WBC 10*3/mm3 10.73   HEMOGLOBIN g/dL 10.8*   HEMATOCRIT % 32.6*   PLATELETS 10*3/mm3 398     Results from last 7 days   Lab Units 05/18/23  0658   SODIUM mmol/L 140   POTASSIUM mmol/L 3.8   CHLORIDE mmol/L 107   CO2 mmol/L 25.0   BUN mg/dL 10   CREATININE mg/dL 0.64   GLUCOSE mg/dL 102*   CALCIUM mg/dL 8.7     Significant Diagnostic Studies:   WBC   Date Value Ref Range Status   05/18/2023 10.73 3.40 - 10.80 10*3/mm3 Final     Hemoglobin   Date Value Ref Range Status   05/18/2023 10.8 (L) 12.0 - 15.9 g/dL Final     Hematocrit   Date Value Ref Range Status   05/18/2023 32.6 (L) 34.0 - 46.6 % Final     Platelets   Date Value Ref Range Status   05/18/2023 398 140 - 450 10*3/mm3 Final     Sodium   Date Value Ref Range Status   05/18/2023 140 136 - 145 mmol/L Final     Potassium   Date Value Ref Range Status   05/18/2023 3.8 3.5 - 5.2  mmol/L Final     Chloride   Date Value Ref Range Status   05/18/2023 107 98 - 107 mmol/L Final     CO2   Date Value Ref Range Status   05/18/2023 25.0 22.0 - 29.0 mmol/L Final     BUN   Date Value Ref Range Status   05/18/2023 10 8 - 23 mg/dL Final     Creatinine   Date Value Ref Range Status   05/18/2023 0.64 0.57 - 1.00 mg/dL Final     Glucose   Date Value Ref Range Status   05/18/2023 102 (H) 65 - 99 mg/dL Final     Calcium   Date Value Ref Range Status   05/18/2023 8.7 8.6 - 10.5 mg/dL Final     Magnesium   Date Value Ref Range Status   05/18/2023 2.2 1.6 - 2.4 mg/dL Final     AST (SGOT)   Date Value Ref Range Status   05/17/2023 22 1 - 32 U/L Final     ALT (SGPT)   Date Value Ref Range Status   05/17/2023 23 1 - 33 U/L Final     Alkaline Phosphatase   Date Value Ref Range Status   05/17/2023 114 39 - 117 U/L Final     INR   Date Value Ref Range Status   05/18/2023 1.15 (H) 0.90 - 1.10 Final     No results found for: COLORU, CLARITYU, SPECGRAV, PHUR, PROTEINUR, GLUCOSEU, KETONESU, BLOODU, NITRITE, LEUKOCYTESUR, BILIRUBINUR, UROBILINOGEN, RBCUA, WBCUA, BACTERIA, UACOMMENT  HS Troponin T   Date Value Ref Range Status   05/18/2023 111 (C) <10 ng/L Final   05/17/2023 97 (C) <10 ng/L Final     No components found for: HGBA1C;2  No components found for: TSH;2  Imaging Results (All)     Procedure Component Value Units Date/Time    XR Chest 1 View [[id number]] Collected: 05/18/23 0031     Updated: 05/18/23 0034    Narrative:      SINGLE VIEW OF THE CHEST     HISTORY: Chest pain     COMPARISON: 05/08/2023     FINDINGS:  The patient is status post median sternotomy with coronary artery bypass  grafting and valve replacement. There is persistent left basilar  consolidation and left pleural effusion. Right lung is clear. No  pneumothorax is seen.       Impression:         1. Persistent left basilar consolidation and left pleural effusion.     This report was finalized on 5/18/2023 12:31 AM by Dr. Joanne Yen M.D.            Lab Results (last 7 days)     Procedure Component Value Units Date/Time    Basic Metabolic Panel [[id number]]  (Abnormal) Collected: 05/18/23 0658    Specimen: Blood Updated: 05/18/23 0732     Glucose 102 mg/dL      BUN 10 mg/dL      Creatinine 0.64 mg/dL      Sodium 140 mmol/L      Potassium 3.8 mmol/L      Chloride 107 mmol/L      CO2 25.0 mmol/L      Calcium 8.7 mg/dL      BUN/Creatinine Ratio 15.6     Anion Gap 8.0 mmol/L      eGFR 98.2 mL/min/1.73     Narrative:      GFR Normal >60  Chronic Kidney Disease <60  Kidney Failure <15      Magnesium [[id number]]  (Normal) Collected: 05/18/23 0658    Specimen: Blood Updated: 05/18/23 0732     Magnesium 2.2 mg/dL     Protime-INR [[id number]]  (Abnormal) Collected: 05/18/23 0658    Specimen: Blood Updated: 05/18/23 0723     Protime 14.9 Seconds      INR 1.15    CBC Auto Differential [[id number]]  (Abnormal) Collected: 05/18/23 0658    Specimen: Blood Updated: 05/18/23 0714     WBC 10.73 10*3/mm3      RBC 3.56 10*6/mm3      Hemoglobin 10.8 g/dL      Hematocrit 32.6 %      MCV 91.6 fL      MCH 30.3 pg      MCHC 33.1 g/dL      RDW 13.3 %      RDW-SD 44.5 fl      MPV 9.6 fL      Platelets 398 10*3/mm3      Neutrophil % 64.8 %      Lymphocyte % 22.3 %      Monocyte % 7.4 %      Eosinophil % 3.7 %      Basophil % 1.2 %      Immature Grans % 0.6 %      Neutrophils, Absolute 6.96 10*3/mm3      Lymphocytes, Absolute 2.39 10*3/mm3      Monocytes, Absolute 0.79 10*3/mm3      Eosinophils, Absolute 0.40 10*3/mm3      Basophils, Absolute 0.13 10*3/mm3      Immature Grans, Absolute 0.06 10*3/mm3      nRBC 0.0 /100 WBC     High Sensitivity Troponin T 2Hr [[id number]]  (Abnormal) Collected: 05/18/23 0244    Specimen: Blood Updated: 05/18/23 0314     HS Troponin T 111 ng/L      Troponin T Delta 14 ng/L     Narrative:      High Sensitive Troponin T Reference Range:  <10.0 ng/L- Negative Female for AMI  <15.0 ng/L- Negative Male for AMI  >=10 - Abnormal Female indicating possible  myocardial injury.  >=15 - Abnormal Male indicating possible myocardial injury.   Clinicians would have to utilize clinical acumen, EKG, Troponin, and serial changes to determine if it is an Acute Myocardial Infarction or myocardial injury due to an underlying chronic condition.         Magnesium [[id number]]  (Normal) Collected: 05/17/23 2359    Specimen: Blood Updated: 05/18/23 0033     Magnesium 2.0 mg/dL     Comprehensive Metabolic Panel [[id number]]  (Abnormal) Collected: 05/17/23 2359    Specimen: Blood Updated: 05/18/23 0033     Glucose 108 mg/dL      BUN 10 mg/dL      Creatinine 0.78 mg/dL      Sodium 140 mmol/L      Potassium 4.0 mmol/L      Chloride 104 mmol/L      CO2 27.0 mmol/L      Calcium 8.9 mg/dL      Total Protein 6.6 g/dL      Albumin 3.9 g/dL      ALT (SGPT) 23 U/L      AST (SGOT) 22 U/L      Alkaline Phosphatase 114 U/L      Total Bilirubin 0.5 mg/dL      Globulin 2.7 gm/dL      A/G Ratio 1.4 g/dL      BUN/Creatinine Ratio 12.8     Anion Gap 9.0 mmol/L      eGFR 84.4 mL/min/1.73     Narrative:      GFR Normal >60  Chronic Kidney Disease <60  Kidney Failure <15      High Sensitivity Troponin T [[id number]]  (Abnormal) Collected: 05/17/23 2359    Specimen: Blood Updated: 05/18/23 0033     HS Troponin T 97 ng/L     Narrative:      High Sensitive Troponin T Reference Range:  <10.0 ng/L- Negative Female for AMI  <15.0 ng/L- Negative Male for AMI  >=10 - Abnormal Female indicating possible myocardial injury.  >=15 - Abnormal Male indicating possible myocardial injury.   Clinicians would have to utilize clinical acumen, EKG, Troponin, and serial changes to determine if it is an Acute Myocardial Infarction or myocardial injury due to an underlying chronic condition.         BNP [[id number]]  (Normal) Collected: 05/17/23 2359    Specimen: Blood Updated: 05/18/23 0031     proBNP 585.0 pg/mL     Narrative:      Among patients with dyspnea, NT-proBNP is highly sensitive for the detection of acute congestive  "heart failure. In addition NT-proBNP of <300 pg/ml effectively rules out acute congestive heart failure with 99% negative predictive value.    Results may be falsely decreased if patient taking Biotin.      Protime-INR [[id number]]  (Abnormal) Collected: 05/17/23 2359    Specimen: Blood Updated: 05/18/23 0021     Protime 14.5 Seconds      INR 1.12    aPTT [[id number]]  (Abnormal) Collected: 05/17/23 2359    Specimen: Blood Updated: 05/18/23 0021     PTT 22.6 seconds     CBC & Differential [[id number]]  (Abnormal) Collected: 05/17/23 2359    Specimen: Blood Updated: 05/18/23 0016    Narrative:      The following orders were created for panel order CBC & Differential.  Procedure                               Abnormality         Status                     ---------                               -----------         ------                     CBC Auto Differential[[id number]]        Abnormal            Final result                 Please view results for these tests on the individual orders.    CBC Auto Differential [[id number]]  (Abnormal) Collected: 05/17/23 2359    Specimen: Blood Updated: 05/18/23 0016     WBC 11.98 10*3/mm3      RBC 3.76 10*6/mm3      Hemoglobin 11.0 g/dL      Hematocrit 33.9 %      MCV 90.2 fL      MCH 29.3 pg      MCHC 32.4 g/dL      RDW 13.0 %      RDW-SD 42.1 fl      MPV 9.6 fL      Platelets 436 10*3/mm3      Neutrophil % 69.2 %      Lymphocyte % 19.8 %      Monocyte % 5.8 %      Eosinophil % 3.4 %      Basophil % 1.3 %      Immature Grans % 0.5 %      Neutrophils, Absolute 8.29 10*3/mm3      Lymphocytes, Absolute 2.37 10*3/mm3      Monocytes, Absolute 0.70 10*3/mm3      Eosinophils, Absolute 0.41 10*3/mm3      Basophils, Absolute 0.15 10*3/mm3      Immature Grans, Absolute 0.06 10*3/mm3      nRBC 0.0 /100 WBC         /74 (BP Location: Right arm, Patient Position: Lying)   Pulse 77   Temp 98.6 °F (37 °C) (Oral)   Resp 18   Ht 167.6 cm (66\")   Wt 90 kg (198 lb 6.4 oz)   SpO2 94%   BMI " 32.02 kg/m²     Discharge Exam:  General Appearance:    Alert, cooperative, no distress                          Head:    Normocephalic, without obvious abnormality, atraumatic                          Eyes:                            Throat:   Lips, tongue, gums normal                          Neck:   Supple, symmetrical, trachea midline, no JVD                        Lungs:     Clear to auscultation bilaterally, respirations unlabored                Chest Wall:    No tenderness or deformity                        Heart:    Regular rate and rhythm, S1 and S2 normal, no murmur,no  Rub  or gallop                  Abdomen:     Soft, non-tender, bowel sounds active, no masses, no organomegaly                  Extremities:   Extremities normal, atraumatic, no cyanosis or edema                             Skin:   Skin is warm and dry,  no rashes or palpable lesions                  Neurologic:   no focal deficits noted     Disposition:  Home    Activity as tolerated    Diet as tolerated  Diet Order   Procedures   • Diet: Cardiac Diets; Healthy Heart (2-3 Na+); Texture: Regular Texture (IDDSI 7); Fluid Consistency: Thin (IDDSI 0)       Patient Instructions:      Discharge Medications      New Medications      Instructions Start Date   apixaban 5 MG tablet tablet  Commonly known as: ELIQUIS   5 mg, Oral, Every 12 Hours Scheduled         Continue These Medications      Instructions Start Date   acetaminophen 325 MG tablet  Commonly known as: TYLENOL   650 mg, Oral, Every 4 Hours PRN      ALPRAZolam 2 MG tablet  Commonly known as: XANAX   TAKE 1 TABLET BY MOUTH AT NIGHT AS NEEDED FOR ANXIETY      amiodarone 200 MG tablet  Commonly known as: PACERONE   Take 1 tablet by mouth 2 (Two) Times a Day With Meals for 7 days, THEN 1 tablet Daily for 21 days.   Start Date: May 13, 2023     aspirin 81 MG chewable tablet   81 mg, Oral, Daily      atorvastatin 80 MG tablet  Commonly known as: LIPITOR   TAKE ONE TABLET BY MOUTH ONCE  NIGHTLY      furosemide 40 MG tablet  Commonly known as: LASIX   40 mg, Oral, Daily      HYDROcodone-acetaminophen 5-325 MG per tablet  Commonly known as: NORCO   1 tablet, Oral, Every 6 Hours PRN      lamoTRIgine 25 MG tablet  Commonly known as: LaMICtal   25 mg, Oral, Daily      lamoTRIgine 25 MG tablet  Commonly known as: LaMICtal   25 mg, Oral, Every 12 Hours Scheduled   Start Date: May 25, 2023     lamoTRIgine 25 MG tablet  Commonly known as: LaMICtal   50 mg, Oral, Every 12 Hours Scheduled   Start Date: June 8, 2023     lidocaine 5 %  Commonly known as: LIDODERM   2 patches, Transdermal, Every 24 Hours Scheduled, Remove & Discard patch within 12 hours to bilateral shoulders      lisinopril 10 MG tablet  Commonly known as: PRINIVIL,ZESTRIL   10 mg, Oral, Every 24 Hours Scheduled      metoprolol tartrate 25 MG tablet  Commonly known as: LOPRESSOR   25 mg, Oral, Every 12 Hours Scheduled      omeprazole 40 MG capsule  Commonly known as: priLOSEC   TAKE ONE CAPSULE BY MOUTH DAILY      oxybutynin 5 MG tablet  Commonly known as: DITROPAN   5 mg, Oral, Daily      potassium chloride 20 MEQ CR tablet  Commonly known as: K-DUR,KLOR-CON   40 mEq, Oral, Daily         Stop These Medications    clopidogrel 75 MG tablet  Commonly known as: PLAVIX          Future Appointments   Date Time Provider Department Center   5/19/2023  9:30 PM Fatmata Dumont RN [facility]   5/23/2023  2:00 PM Fatmata Dumont RN [facility]   5/25/2023  9:30 AM Tina Chao APRN [facility]   5/25/2023  1:00 PM Toshia Nevarez APRN [facility]   5/26/2023 To Be Determined Fatmata Dumont RN [facility]   5/31/2023 To Be Determined Toshia Osborne RN [facility]   6/7/2023 To Be Determined Toshia Osborne RN [facility]   6/12/2023  1:30 PM Kye Alcaraz MD [facility]   6/14/2023 To Be Determined Toshia Osborne RN [facility]   6/19/2023  1:15 PM Jesse Arguello PA-C [facility]   6/21/2023 To Be Determined Toshia Osborne, RN [facility]   6/28/2023 To Be Determined Toshia Osborne, RN [facility]   7/5/2023 To Be Determined Toshia Osborne, RN [facility]   7/12/2023 To Be Determined Toshia Osborne, RN [facility]      Follow-up Information     Naga Griffith PA-C Follow up in 1 week(s).    Specialties: Physician Assistant, Internal Medicine  Contact information:  [address]  [address]  [place] [address]  [phone]                       Discharge Order (From admission, onward)     Start     Ordered    05/18/23 1136  Discharge patient  Once        Expected Discharge Date: 05/18/23    Discharge Disposition: Home or Self Care    Physician of Record for Attribution - Please select from Treatment Team: ROSIE SAGE [[id number]]    Review needed by CMO to determine Physician of Record: No       Question Answer Comment   Physician of Record for Attribution - Please select from Treatment Team ROSIE SAGE    Review needed by CMO to determine Physician of Record No        05/18/23 1136                Total time spent discharging patient including evaluation,post hospitalization follow up,  medication and post hospitalization instructions and education total time exceeds 30 minutes.    Signed:  Rosie Sage MD  5/18/2023  11:36 EDT

## 2023-05-18 NOTE — OUTREACH NOTE
Prep Survey    Flowsheet Row Responses   [facility] patient discharged from? [place]   Is LACE score < 7 ? No   Eligibility [facility]   Date of Admission 05/17/23   Date of Discharge 05/18/23   Discharge Disposition Home or Self Care   Discharge diagnosis Atrial fibrillation with rapid ventricular response   Does the patient have one of the following disease processes/diagnoses(primary or secondary)? Other   Does the patient have Home health ordered? Yes   What is the Home health agency?  [facility],   Is there a DME ordered? No   Prep survey completed? Yes          SYLVIA GAYLE - Registered Nurse

## 2023-05-18 NOTE — ED TRIAGE NOTES
Pt to ER via stat flight.   EMS called  for dizziness and chest pain. Ems reports HR around 150bpm. Pt given 60mg of Cardizem. Pt reports having CABG 2 weeks ago.

## 2023-05-18 NOTE — ED PROVIDER NOTES
EMERGENCY DEPARTMENT ENCOUNTER    Room Number:  12/12  Date seen:  5/18/2023  PCP: Naga Griffith PA-C  Historian: Patient/air medics report      HPI:  Chief Complaint: Chest pain  A complete HPI/ROS/PMH/PSH/SH/FH are unobtainable due to: None  Context: Andree Deluna is a 67 y.o. female who presents to the ED c/o chest pain that has been gradual in onset throughout the entirety of the day today.  She describes it as a centralized dull pain with associated shortness of breath as well as high heart palpitations.  Currently, she reports that her symptoms have resolved and she feels back to her baseline.  Paramedics who flew the patient to the ED here reports noticing that she was in A-fib with RVR.  She was given a IV diltiazem bolus in route.  The patient does have a very recent history of a cardiac bypass performed at [facility].  She denies nausea/vomiting, extremity pain, diaphoresis, or back pain.            PAST MEDICAL HISTORY  Active Ambulatory Problems     Diagnosis Date Noted   • Anemia in other chronic diseases classified elsewhere 08/08/2012   • Anxiety 10/25/2011   • B12 deficiency 08/18/2017   • Attention deficit disorder of adult with hyperactivity 03/11/2016   • Mood disorder 08/21/2018   • Complete rotator cuff tear or rupture of right shoulder, not specified as traumatic 05/08/2019   • Degeneration of intervertebral disc of cervical region 02/13/2012   • Degeneration of intervertebral disc of lumbosacral region 02/13/2012   • Fatigue 09/07/2012   • Hyperlipidemia 03/11/2016   • Hypertension 10/25/2011   • Insomnia 08/08/2012   • Obesity 02/13/2012   • Osteoarthritis of knee 02/13/2012   • CVA (cerebral vascular accident) 08/06/2019   • Visual field loss left 08/06/2019   • Acute midline low back pain without sciatica 08/06/2019   • GERD with esophagitis 11/17/2019   • Carotid stenosis, bilateral 09/10/2020   • Overactive bladder 09/10/2020   • History of stroke 11/10/2020   • Acute  pain of right shoulder 02/01/2021   • Left cervical radiculopathy 02/01/2021   • Migraine headache 06/22/2021   • Medicare annual wellness visit, subsequent 06/22/2021   • Moderate episode of recurrent major depressive disorder 06/22/2021   • Piriformis syndrome, right 09/28/2021   • Precordial pain 11/07/2022   • History of Grimaldo's esophagus 11/07/2022   • Chronic cough 01/19/2023   • PATEL (generalized anxiety disorder) 01/19/2023   • Chest pain, unspecified type 04/30/2023   • Elevated troponin 04/30/2023   • Non-STEMI (non-ST elevated myocardial infarction) 04/30/2023   • Coronary artery disease involving native coronary artery of native heart with unstable angina pectoris 04/30/2023   • Abnormal findings on diagnostic imaging of heart and coronary circulation 04/30/2023   • S/P CABG x 3 with LIMA per Dr. Noonan 5/6/2023 05/12/2023   • S/P mitral valve repair per Dr. Noonan 5/6/2023 05/12/2023   • Postoperative atrial fibrillation 05/12/2023     Resolved Ambulatory Problems     Diagnosis Date Noted   • Hay fever 08/08/2012   • Hyperglycemia 08/11/2014   • Laryngopharyngeal reflux 02/16/2018   • Confusion 08/06/2019   • Acute confusion 08/06/2019   • Esophagitis 11/17/2019   • Vertigo 09/10/2020   • Carotid stenosis, asymptomatic, bilateral 11/10/2020   • Magnetic resonance imaging of brain abnormal 06/22/2021     Past Medical History:   Diagnosis Date   • ADHD unknown   • Anemia    • Carotid artery disease    • DDD (degenerative disc disease), lumbar    • Depression    • Extremity pain    • GERD (gastroesophageal reflux disease)    • Low back pain    • Skin cancer of face    • Sleep apnea    • Stroke (cerebrum)    • Visual field defect          PAST SURGICAL HISTORY  Past Surgical History:   Procedure Laterality Date   • CARDIAC CATHETERIZATION N/A 5/1/2023    Procedure: Left Heart Cath;  Surgeon: Kye Alcaraz MD;  Location: [facility] CATH INVASIVE LOCATION;  Service: Cardiovascular;   Laterality: N/A;   • CAROTID ENDARTERECTOMY Right 11/10/2020    Procedure: CAROTID ENDARTERECTOMY;  Surgeon: Tavo Das MD;  Location: [facility];  Service: Vascular;  Laterality: Right;   • CHOLECYSTECTOMY     • COLONOSCOPY      2016   • CORONARY ARTERY BYPASS GRAFT N/A 5/6/2023    Procedure: CORONARY ARTERY BYPASS GRAFTING;  Surgeon: Errol Noonan MD;  Location: [facility];  Service: Cardiothoracic;  Laterality: N/A;  CABG X  3 using LIMA and right endospahenous vein;  2 coronary markers implanted.    • FINGER SURGERY     • KNEE ARTHROPLASTY     • KNEE SURGERY Right     replaced   • LAPAROSCOPIC GASTRIC BANDING     • MITRAL VALVE REPAIR/REPLACEMENT N/A 5/6/2023    Procedure: MITRAL VALVE REPAIR/REPLACEMENT;  Surgeon: Errol Noonan MD;  Location: [facility];  Service: Cardiothoracic;  Laterality: N/A;  Mitral valve repaired using 28 mm Jorge Physio II Annuloplasty Ring.    • ROTATOR CUFF REPAIR Right 2019   • SHOULDER SURGERY Right 05/24/2019    scope/ cuff repair   • TRANSESOPHAGEAL ECHOCARDIOGRAM (GALA) N/A 5/6/2023    Procedure: TRANSESOPHAGEAL ECHOCARDIOGRAM WITH ANESTHESIA;  Surgeon: Errol Noonan MD;  Location: [facility];  Service: Cardiothoracic;  Laterality: N/A;   • TUBAL ABDOMINAL LIGATION           FAMILY HISTORY  Family History   Problem Relation Age of Onset   • Diabetes Other    • Heart disease Mother    • Diabetes Mother    • Heart disease Father          SOCIAL HISTORY  Social History     Socioeconomic History   • Marital status:    Tobacco Use   • Smoking status: Never   • Smokeless tobacco: Never   Vaping Use   • Vaping Use: Never used   Substance and Sexual Activity   • Alcohol use: No   • Drug use: No   • Sexual activity: Defer         ALLERGIES  Patient has no known allergies.        REVIEW OF SYSTEMS  Review of Systems   Constitutional: Negative for fever.   HENT: Negative for sore throat.    Eyes: Negative.    Respiratory: Positive for  shortness of breath. Negative for cough.    Cardiovascular: Positive for chest pain and palpitations.   Gastrointestinal: Negative for abdominal pain, diarrhea and vomiting.   Genitourinary: Negative for dysuria.   Musculoskeletal: Negative for neck pain.   Skin: Negative for rash.   Allergic/Immunologic: Negative.    Neurological: Negative for weakness, numbness and headaches.   Hematological: Negative.    Psychiatric/Behavioral: Negative.    All other systems reviewed and are negative.         PHYSICAL EXAM  ED Triage Vitals [05/17/23 2347]   Temp Heart Rate Resp BP SpO2   -- (!) 126 22 114/84 92 %      Temp src Heart Rate Source Patient Position BP Location FiO2 (%)   -- -- -- -- --       Physical Exam  Vitals and nursing note reviewed.   Constitutional:       General: She is not in acute distress.  HENT:      Head: Normocephalic and atraumatic.   Eyes:      Pupils: Pupils are equal, round, and reactive to light.   Cardiovascular:      Rate and Rhythm: Normal rate and regular rhythm.      Heart sounds: Normal heart sounds.   Pulmonary:      Effort: Pulmonary effort is normal. No respiratory distress.      Breath sounds: Normal breath sounds.   Abdominal:      Palpations: Abdomen is soft.      Tenderness: There is no abdominal tenderness. There is no guarding or rebound.   Musculoskeletal:         General: Normal range of motion.      Cervical back: Normal range of motion and neck supple.   Skin:     General: Skin is warm and dry.      Findings: No rash.   Neurological:      Mental Status: She is alert and oriented to person, place, and time.      Sensory: Sensation is intact.   Psychiatric:         Mood and Affect: Mood and affect normal.         Vital signs and nursing notes reviewed.          LAB RESULTS  Recent Results (from the past 24 hour(s))   ECG 12 Lead Chest Pain    Collection Time: 05/17/23 11:48 PM   Result Value Ref Range    QT Interval 318 ms   Protime-INR    Collection Time: 05/17/23 11:59 PM     Specimen: Blood   Result Value Ref Range    Protime 14.5 (H) 11.7 - 14.2 Seconds    INR 1.12 (H) 0.90 - 1.10   aPTT    Collection Time: 05/17/23 11:59 PM    Specimen: Blood   Result Value Ref Range    PTT 22.6 (L) 22.7 - 35.4 seconds   Comprehensive Metabolic Panel    Collection Time: 05/17/23 11:59 PM    Specimen: Blood   Result Value Ref Range    Glucose 108 (H) 65 - 99 mg/dL    BUN 10 8 - 23 mg/dL    Creatinine 0.78 0.57 - 1.00 mg/dL    Sodium 140 136 - 145 mmol/L    Potassium 4.0 3.5 - 5.2 mmol/L    Chloride 104 98 - 107 mmol/L    CO2 27.0 22.0 - 29.0 mmol/L    Calcium 8.9 8.6 - 10.5 mg/dL    Total Protein 6.6 6.0 - 8.5 g/dL    Albumin 3.9 3.5 - 5.2 g/dL    ALT (SGPT) 23 1 - 33 U/L    AST (SGOT) 22 1 - 32 U/L    Alkaline Phosphatase 114 39 - 117 U/L    Total Bilirubin 0.5 0.0 - 1.2 mg/dL    Globulin 2.7 gm/dL    A/G Ratio 1.4 g/dL    BUN/Creatinine Ratio 12.8 7.0 - 25.0    Anion Gap 9.0 5.0 - 15.0 mmol/L    eGFR 84.4 >60.0 mL/min/1.73   High Sensitivity Troponin T    Collection Time: 05/17/23 11:59 PM    Specimen: Blood   Result Value Ref Range    HS Troponin T 97 (C) <10 ng/L   BNP    Collection Time: 05/17/23 11:59 PM    Specimen: Blood   Result Value Ref Range    proBNP 585.0 0.0 - 900.0 pg/mL   Magnesium    Collection Time: 05/17/23 11:59 PM    Specimen: Blood   Result Value Ref Range    Magnesium 2.0 1.6 - 2.4 mg/dL   CBC Auto Differential    Collection Time: 05/17/23 11:59 PM    Specimen: Blood   Result Value Ref Range    WBC 11.98 (H) 3.40 - 10.80 10*3/mm3    RBC 3.76 (L) 3.77 - 5.28 10*6/mm3    Hemoglobin 11.0 (L) 12.0 - 15.9 g/dL    Hematocrit 33.9 (L) 34.0 - 46.6 %    MCV 90.2 79.0 - 97.0 fL    MCH 29.3 26.6 - 33.0 pg    MCHC 32.4 31.5 - 35.7 g/dL    RDW 13.0 12.3 - 15.4 %    RDW-SD 42.1 37.0 - 54.0 fl    MPV 9.6 6.0 - 12.0 fL    Platelets 436 140 - 450 10*3/mm3    Neutrophil % 69.2 42.7 - 76.0 %    Lymphocyte % 19.8 19.6 - 45.3 %    Monocyte % 5.8 5.0 - 12.0 %    Eosinophil % 3.4 0.3 - 6.2 %     Basophil % 1.3 0.0 - 1.5 %    Immature Grans % 0.5 0.0 - 0.5 %    Neutrophils, Absolute 8.29 (H) 1.70 - 7.00 10*3/mm3    Lymphocytes, Absolute 2.37 0.70 - 3.10 10*3/mm3    Monocytes, Absolute 0.70 0.10 - 0.90 10*3/mm3    Eosinophils, Absolute 0.41 (H) 0.00 - 0.40 10*3/mm3    Basophils, Absolute 0.15 0.00 - 0.20 10*3/mm3    Immature Grans, Absolute 0.06 (H) 0.00 - 0.05 10*3/mm3    nRBC 0.0 0.0 - 0.2 /100 WBC       Ordered the above labs and reviewed the results.        RADIOLOGY  XR Chest 1 View    Result Date: 5/18/2023  SINGLE VIEW OF THE CHEST  HISTORY: Chest pain  COMPARISON: 05/08/2023  FINDINGS: The patient is status post median sternotomy with coronary artery bypass grafting and valve replacement. There is persistent left basilar consolidation and left pleural effusion. Right lung is clear. No pneumothorax is seen.       1. Persistent left basilar consolidation and left pleural effusion.  This report was finalized on 5/18/2023 12:31 AM by Dr. Joanne Yen M.D.        Ordered the above noted radiological studies. Reviewed by me in PACS.            PROCEDURES  Procedures    EKG independently interpreted by myself as follows:    EKG          EKG time: 2348  Rhythm/Rate: Atrial fibrillation with RVR, 126  P waves and RI: absent  QRS, axis: nml, nml   ST and T waves: nml     Interpreted Contemporaneously by me, independently viewed  changed compared to prior 5/13/23            MEDICATIONS GIVEN IN ER  Medications   sodium chloride 0.9 % flush 10 mL (has no administration in time range)   metoprolol tartrate (LOPRESSOR) injection 5 mg (5 mg Intravenous Not Given 5/18/23 0044)   ALPRAZolam (XANAX) tablet 2 mg (has no administration in time range)   acetaminophen (TYLENOL) tablet 1,000 mg (has no administration in time range)                   MEDICAL DECISION MAKING, PROGRESS, and CONSULTS    All labs have been independently reviewed by me.  All radiology studies have been reviewed by me and I have also reviewed  the radiology report.   EKG's independently viewed and interpreted by me.  Discussion below represents my analysis of pertinent findings related to patient's condition, differential diagnosis, treatment plan and final disposition.      Additional sources:  - Discussed/ obtained information from independent historians: History obtained from the patient as well as the paramedics report    - External (non-ED) record review: Upon medical records review, the patient was admitted to [facility] on 4/30/2023 through 5/13/2023 for a non-ST elevation MI where she ended up undergoing cardiac bypass surgery.    - Chronic or social conditions impacting care: Coronary artery disease, recent cardiac bypass    - Shared decision making: Admission decision based on shared conversations have between myself as well as the patient at bedside.    Case was discussed with SARAHY Bentley for [facility], who will admit the patient to the hospital today for Dr. Parikh.      Orders placed during this visit:  Orders Placed This Encounter   Procedures   • XR Chest 1 View   • Protime-INR   • aPTT   • Comprehensive Metabolic Panel   • High Sensitivity Troponin T   • BNP   • Magnesium   • CBC Auto Differential   • High Sensitivity Troponin T 2Hr   • [facility] (on-call MD unless specified) Details   • ECG 12 Lead Chest Pain   • Insert Peripheral IV   • Inpatient Admission   • CBC & Differential             Differential diagnosis includes but is not limited to:    Differential diagnosis includes but is not limited to acute coronary syndrome, cardiac rhythm disturbance, atrial tachydysrhythmia, ventricular tachydysrhythmia, acute anemia, or severe electrolyte disturbance.      Independent interpretation of labs, radiology studies, and discussions with consultants:    Chest x-ray independently interpreted by myself with my  interpretation as no sign of cardiomegaly or edema however there is a left basilar pleural effusion present.      ED Course as  of 05/18/23 0207   Wed May 17, 2023   9675 Given the patient's high heart rate as atrial fibrillation with RVR, we will give a small dose of IV Lopressor given her borderline blood pressure right now at 114/84.  We will continue to monitor closely. [BM]   Thu May 18, 2023   0136 On reevaluation, the patient reports that she is feeling comfortable and without acute complaint.  She has no current complaints of chest pain or shortness of breath.  Did inform her that earlier she was in atrial fibrillation that has since converted prior to the Lopressor administration.  She does not report a history of atrial fibrillation.  We will admit her to the hospital today for further evaluation and treatment.  All questions have been answered. [BM]   0206 The patient's presentation, work-up, as well as diagnosis and treatment plan were discussed at length with SARAHY Bentley for [facility].  She agrees to admit the patient to the hospital today for Dr. Parikh. [BM]      ED Course User Index  [BM] Leander Gilliland MD             DIAGNOSIS  Final diagnoses:   Atrial fibrillation with rapid ventricular response   Chest pain, unspecified type   Dyspnea, unspecified type   Pleural effusion on left         DISPOSITION  ADMISSION    Discussed treatment plan and reason for admission with pt/family and admitting physician.  Pt/family voiced understanding of the plan for admission for further testing/treatment as needed.                 Latest Documented Vital Signs:  As of 02:07 EDT  BP- 124/89 HR- 80 Temp- 98.2 °F (36.8 °C) (Oral) O2 sat- 93%              --    Please note that portions of this were completed with a voice recognition program.       Note Disclaimer: At [facility], we believe that sharing information builds trust and better relationships. You are receiving this note because you are receiving care at [facility] or recently visited. It is possible you will see health information before a provider has talked with  you about it. This kind of information can be easy to misunderstand. To help you fully understand what it means for your health, we urge you to discuss this note with your provider.             Leander Gilliladn MD  05/18/23 0208

## 2023-05-18 NOTE — PROGRESS NOTES
LOS: 0 days   Patient Care Team:  Naga Griffith PA-C as PCP - General (Physician Assistant)    Chief Complaint: post op    Subjective      Vital Signs  Temp:  [98.2 °F (36.8 °C)-98.6 °F (37 °C)] 98.6 °F (37 °C)  Heart Rate:  [] 77  Resp:  [18-22] 18  BP: (102-124)/(54-89) 108/74  Body mass index is 32.02 kg/m².  No intake or output data in the 24 hours ending 05/18/23 1111  No intake/output data recorded.            05/17/23  2352 05/18/23  0326   Weight: 89.4 kg (197 lb) 90 kg (198 lb 6.4 oz)         Objective    Results Review:        WBC WBC   Date Value Ref Range Status   05/18/2023 10.73 3.40 - 10.80 10*3/mm3 Final   05/17/2023 11.98 (H) 3.40 - 10.80 10*3/mm3 Final      HGB Hemoglobin   Date Value Ref Range Status   05/18/2023 10.8 (L) 12.0 - 15.9 g/dL Final   05/17/2023 11.0 (L) 12.0 - 15.9 g/dL Final      HCT Hematocrit   Date Value Ref Range Status   05/18/2023 32.6 (L) 34.0 - 46.6 % Final   05/17/2023 33.9 (L) 34.0 - 46.6 % Final      Platelets Platelets   Date Value Ref Range Status   05/18/2023 398 140 - 450 10*3/mm3 Final   05/17/2023 436 140 - 450 10*3/mm3 Final        PT/INR:    Protime   Date Value Ref Range Status   05/18/2023 14.9 (H) 11.7 - 14.2 Seconds Final   05/17/2023 14.5 (H) 11.7 - 14.2 Seconds Final   /  INR   Date Value Ref Range Status   05/18/2023 1.15 (H) 0.90 - 1.10 Final   05/17/2023 1.12 (H) 0.90 - 1.10 Final       Sodium Sodium   Date Value Ref Range Status   05/18/2023 140 136 - 145 mmol/L Final   05/17/2023 140 136 - 145 mmol/L Final      Potassium Potassium   Date Value Ref Range Status   05/18/2023 3.8 3.5 - 5.2 mmol/L Final   05/17/2023 4.0 3.5 - 5.2 mmol/L Final      Chloride Chloride   Date Value Ref Range Status   05/18/2023 107 98 - 107 mmol/L Final   05/17/2023 104 98 - 107 mmol/L Final      Bicarbonate CO2   Date Value Ref Range Status   05/18/2023 25.0 22.0 - 29.0 mmol/L Final   05/17/2023 27.0 22.0 - 29.0 mmol/L Final      BUN BUN   Date Value Ref Range  Status   05/18/2023 10 8 - 23 mg/dL Final   05/17/2023 10 8 - 23 mg/dL Final      Creatinine Creatinine   Date Value Ref Range Status   05/18/2023 0.64 0.57 - 1.00 mg/dL Final   05/17/2023 0.78 0.57 - 1.00 mg/dL Final      Calcium Calcium   Date Value Ref Range Status   05/18/2023 8.7 8.6 - 10.5 mg/dL Final   05/17/2023 8.9 8.6 - 10.5 mg/dL Final      Magnesium Magnesium   Date Value Ref Range Status   05/18/2023 2.2 1.6 - 2.4 mg/dL Final   05/17/2023 2.0 1.6 - 2.4 mg/dL Final          metoprolol tartrate, 5 mg, Intravenous, Once  senna-docusate sodium, 2 tablet, Oral, BID  sodium chloride, 10 mL, Intravenous, Q12H               Patient Active Problem List   Diagnosis Code   • Anemia in other chronic diseases classified elsewhere D63.8   • Anxiety F41.9   • B12 deficiency E53.8   • Attention deficit disorder of adult with hyperactivity F90.9   • Mood disorder F39   • Complete rotator cuff tear or rupture of right shoulder, not specified as traumatic M75.121   • Degeneration of intervertebral disc of cervical region M50.30   • Degeneration of intervertebral disc of lumbosacral region M51.37   • Fatigue R53.83   • Hyperlipidemia E78.5   • Hypertension I10   • Insomnia G47.00   • Obesity E66.9   • Osteoarthritis of knee M17.9   • CVA (cerebral vascular accident) I63.9   • Visual field loss left H53.40   • Acute midline low back pain without sciatica M54.50   • GERD with esophagitis K21.00   • Carotid stenosis, bilateral I65.23   • Overactive bladder N32.81   • History of stroke Z86.73   • Acute pain of right shoulder M25.511   • Left cervical radiculopathy M54.12   • Migraine headache G43.909   • Medicare annual wellness visit, subsequent Z00.00   • Moderate episode of recurrent major depressive disorder F33.1   • Piriformis syndrome, right G57.01   • Precordial pain R07.2   • History of Grimaldo's esophagus Z87.19   • Chronic cough R05.3   • PATEL (generalized anxiety disorder) F41.1   • Chest pain, unspecified type  R07.9   • Elevated troponin R77.8   • Non-STEMI (non-ST elevated myocardial infarction) I21.4   • Coronary artery disease involving native coronary artery of native heart with unstable angina pectoris I25.110   • Abnormal findings on diagnostic imaging of heart and coronary circulation R93.1   • S/P CABG x 3 with LIMA per Dr. Noonan 2023 Z95.1   • S/P mitral valve repair per Dr. Noonan 2023 Z98.890   • Postoperative atrial fibrillation I97.89, I48.91   • Atrial fibrillation with rapid ventricular response I48.91       Assessment & Plan    - MV CAD with NSTEMI presentation, severe mitral regurgitation, EF 50% (cath)--s/p CABG x3/ MV repair ([facility]) 2023  - HTN--stable  - HLD--statin  - Hyperglycemia--a1c 5.8, SSI  - Chronic HFpEF with volume overload--work towards GDMT  - Hx stroke ()--s/p right CEA (Dr. Das, )  - Strong family hx premature CAD--father  of MI age 54/ mother  from MI age 64  - Anxiety--on home Xanax, she is no longer on Viibyrd  - Obesity, stage 1--BMI 33.2  - Postop confusion, multifactorial--intermittently remains  - Postop leukocytosis likely r/t atel--pulm toileting  - Postop ABLA, expected--watch closely  - Postop atrial fib--IV amio/bb    Readmitted with atrial fibrillation  Currently in sinus rhythm  Will start eliquis for anticoagulation will stop baby ASA and keep her on plavix with her NSTEMI presentation   Okay for discharge from our standpoint    Carleen Arcos, SARAHY  23  11:11 EDT

## 2023-05-18 NOTE — HOME HEALTH
Pt is a 66 yo female who lives in a 1 story home with spouse.   Pt recently hospitalized secondary to CABG and mitral valve repair.        PLOF pt independent with all ADLs.n Pt reports spouse prepares all the meals.      Currently pt independent with all self care.  Pt reports that spouse cooks all the meals and maintaining the home.        Pt and therapist in agreement no further OT intervention is needed at this time as pt is independent with all self care and light B UE HEP      OT evaluation only    Pt denies any falls or med changes

## 2023-05-18 NOTE — ED NOTES
"Nursing report ED to floor  Andree Deluna  67 y.o.  female    HPI :   Chief Complaint   Patient presents with    Chest Pain    Rapid Heart Rate       Admitting doctor:   Yariel Parikh MD    Admitting diagnosis:   The primary encounter diagnosis was Atrial fibrillation with rapid ventricular response. Diagnoses of Chest pain, unspecified type, Dyspnea, unspecified type, and Pleural effusion on left were also pertinent to this visit.    Code status:   Current Code Status       Date Active Code Status Order ID Comments User Context       Prior            Allergies:   Patient has no known allergies.    Isolation:   No active isolations    Intake and Output  No intake or output data in the 24 hours ending 05/18/23 0230    Weight:       05/17/23 2352   Weight: 89.4 kg (197 lb)       Most recent vitals:   Vitals:    05/17/23 2352 05/17/23 2355 05/17/23 2357 05/18/23 0059   BP:    124/89   Pulse:  77  80   Resp:    18   Temp:   98.2 °F (36.8 °C)    TempSrc:   Oral    SpO2:    93%   Weight: 89.4 kg (197 lb)      Height: 172.7 cm (68\")          Active LDAs/IV Access:   Lines, Drains & Airways       Active LDAs       Name Placement date Placement time Site Days    Peripheral IV 05/17/23 2343 Left Antecubital 05/17/23 2343  Antecubital  less than 1    External Urinary Catheter 05/17/23 2351  --  less than 1                    Labs (abnormal labs have a star):   Labs Reviewed   PROTIME-INR - Abnormal; Notable for the following components:       Result Value    Protime 14.5 (*)     INR 1.12 (*)     All other components within normal limits   APTT - Abnormal; Notable for the following components:    PTT 22.6 (*)     All other components within normal limits   COMPREHENSIVE METABOLIC PANEL - Abnormal; Notable for the following components:    Glucose 108 (*)     All other components within normal limits    Narrative:     GFR Normal >60  Chronic Kidney Disease <60  Kidney Failure <15     TROPONIN - Abnormal; Notable for the " following components:    HS Troponin T 97 (*)     All other components within normal limits    Narrative:     High Sensitive Troponin T Reference Range:  <10.0 ng/L- Negative Female for AMI  <15.0 ng/L- Negative Male for AMI  >=10 - Abnormal Female indicating possible myocardial injury.  >=15 - Abnormal Male indicating possible myocardial injury.   Clinicians would have to utilize clinical acumen, EKG, Troponin, and serial changes to determine if it is an Acute Myocardial Infarction or myocardial injury due to an underlying chronic condition.        CBC WITH AUTO DIFFERENTIAL - Abnormal; Notable for the following components:    WBC 11.98 (*)     RBC 3.76 (*)     Hemoglobin 11.0 (*)     Hematocrit 33.9 (*)     Neutrophils, Absolute 8.29 (*)     Eosinophils, Absolute 0.41 (*)     Immature Grans, Absolute 0.06 (*)     All other components within normal limits   BNP (IN-HOUSE) - Normal    Narrative:     Among patients with dyspnea, NT-proBNP is highly sensitive for the detection of acute congestive heart failure. In addition NT-proBNP of <300 pg/ml effectively rules out acute congestive heart failure with 99% negative predictive value.    Results may be falsely decreased if patient taking Biotin.     MAGNESIUM - Normal   HIGH SENSITIVITIY TROPONIN T 2HR   CBC AND DIFFERENTIAL    Narrative:     The following orders were created for panel order CBC & Differential.  Procedure                               Abnormality         Status                     ---------                               -----------         ------                     CBC Auto Differential[[id number]]        Abnormal            Final result                 Please view results for these tests on the individual orders.       EKG:   ECG 12 Lead Chest Pain   Preliminary Result   HEART RATE= 126  bpm   RR Interval= 476  ms   ND Interval=   ms   P Horizontal Axis=   deg   P Front Axis=   deg   QRSD Interval= 93  ms   QT Interval= 318  ms   QRS Axis= 37  deg   T  Wave Axis= 172  deg   - ABNORMAL ECG -   Atrial fibrillation   Borderline repolarization abnormality   Electronically Signed By:    Date and Time of Study: 2023-05-17 23:48:08          Meds given in ED:   Medications   sodium chloride 0.9 % flush 10 mL (has no administration in time range)   metoprolol tartrate (LOPRESSOR) injection 5 mg (5 mg Intravenous Not Given 5/18/23 0044)   ALPRAZolam (XANAX) tablet 2 mg (2 mg Oral Given 5/18/23 0219)   acetaminophen (TYLENOL) tablet 1,000 mg (1,000 mg Oral Given 5/18/23 0212)       Imaging results:  XR Chest 1 View    Result Date: 5/18/2023   1. Persistent left basilar consolidation and left pleural effusion.  This report was finalized on 5/18/2023 12:31 AM by Dr. Joanne Yen M.D.       Ambulatory status:   - bedrest     Social issues:   Social History     Socioeconomic History    Marital status:    Tobacco Use    Smoking status: Never    Smokeless tobacco: Never   Vaping Use    Vaping Use: Never used   Substance and Sexual Activity    Alcohol use: No    Drug use: No    Sexual activity: Defer       NIH Stroke Scale:         Danni Ernst RN  05/18/23 02:30 EDT

## 2023-05-18 NOTE — OUTREACH NOTE
CT Surgery Week 2 Survey    Flowsheet Row Responses   [facility] patient discharged from? [facility]   Does the patient have one of the following disease processes/diagnoses(primary or secondary)? Cardiothoracic surgery   Week 2 attempt successful? No   Unsuccessful attempts Attempt 1   Revoke Readmitted          Mariela DIANE - Registered Nurse

## 2023-05-18 NOTE — CASE MANAGEMENT/SOCIAL WORK
Case Management Discharge Note      Final Note: Home via [facility], no additional CCP needs. Tremaine RN/CCP         Selected Continued Care - Admitted Since 5/17/2023     Destination    No services have been selected for the patient.              Durable Medical Equipment    No services have been selected for the patient.              Dialysis/Infusion    No services have been selected for the patient.              Home Medical Care     Service Provider Selected Services Address Phone Fax Patient Preferred    [facility] ,  [facility] [address] [facility] [place] [address] [phone] --          Therapy    No services have been selected for the patient.              Community Resources    No services have been selected for the patient.              Community & DME    No services have been selected for the patient.                Selected Continued Care - Prior Encounters Includes continued care and service providers with selected services from prior encounters from 2/16/2023 to 5/18/2023    Discharged on 5/13/2023 Admission date: 4/30/2023 - Discharge disposition: Home or Self Care    Home Medical Care     Service Provider Selected Services Address Phone Fax Patient Preferred    [facility] [address] [facility] [place] [address] [phone] --                         Final Discharge Disposition Code: 06 - home with home health care

## 2023-05-19 ENCOUNTER — HOME CARE VISIT (OUTPATIENT)
Dept: HOME HEALTH SERVICES | Facility: HOME HEALTHCARE | Age: 68
End: 2023-05-19
Payer: MEDICARE

## 2023-05-19 ENCOUNTER — TRANSITIONAL CARE MANAGEMENT TELEPHONE ENCOUNTER (OUTPATIENT)
Dept: CALL CENTER | Facility: HOSPITAL | Age: 68
End: 2023-05-19
Payer: MEDICARE

## 2023-05-19 PROCEDURE — G0299 HHS/HOSPICE OF RN EA 15 MIN: HCPCS

## 2023-05-19 NOTE — Clinical Note
Drug-Drug  <jscript:void(0)>  Drug-Drug: aspirin and apixaban   Use of apixaban with aspirin may increase the risk of bleeding.   Details Major   aspirin 81 MG chewable tablet     apixaban (ELIQUIS) 5 MG tablet tablet   Drug-Drug  <jscript:void(0)>  Drug-Drug: metoprolol tartrate and amiodarone   Administration of amiodarone and metoprolol tartrate may result in severe bradycardia, hypotension and cardiac arrest.   Details Major   metoprolol tartrate (LOPRESSOR) 25 MG tablet   Long-term.     amiodarone (PACERONE) 200 MG tablet     metoprolol tartrate (LOPRESSOR) 5 MG/5ML injection - ADS Override Pull   Ended. Long-term.     metoprolol tartrate (LOPRESSOR) half tablet 12.5 mg   Ended. Long-term.     metoprolol tartrate (LOPRESSOR) injection 5 mg   Discontinued. Long-term.   Drug-Drug  <jscript:void(0)>  Drug-Drug: amiodarone and atorvastatin   Plasma concentrations and pharmacologic effects of atorvastatin may be increased by amiodarone.   Details Major   amiodarone (PACERONE) 200 MG tablet     atorvastatin (LIPITOR) 80 MG tablet

## 2023-05-19 NOTE — OUTREACH NOTE
Call Center TCM Note    Flowsheet Row Responses   [facility] patient discharged from? [facility]   Does the patient have one of the following disease processes/diagnoses(primary or secondary)? Other   TCM attempt successful? Yes   Call start time 1316   Call end time 1317   Discharge diagnosis Atrial fibrillation with rapid ventricular response   Person spoke with today (if not patient) and relationship spouse   Meds reviewed with patient/caregiver? Yes   Is the patient having any side effects they believe may be caused by any medication additions or changes? No   Does the patient have all medications ordered at discharge? Yes   Is the patient taking all medications as directed (includes completed medication regime)? Yes   Comments post-op 5/25/23 at 1:00 PM   Does the patient have an appointment with their PCP within 7 days of discharge? Yes  [ 5/25/2023  1:00 PM ]   What is the Home health agency?  [facility],   Has home health visited the patient within 72 hours of discharge? Yes   Psychosocial issues? No   Nursing interventions Reviewed instructions with patient   What is the patient's perception of their health status since discharge? Improving   Is the patient/caregiver able to teach back signs and symptoms related to disease process for when to call PCP? Yes   Is the patient/caregiver able to teach back signs and symptoms related to disease process for when to call 911? Yes   Is the patient/caregiver able to teach back the hierarchy of who to call/visit for symptoms/problems? PCP, Specialist, Home health nurse, Urgent Care, ED, 911 Yes   If the patient is a current smoker, are they able to teach back resources for cessation? Not a smoker   TCM call completed? Yes   Wrap up additional comments Spouse states pt is doing better, and denies irregular heartbeats, or chest pain. Reviewed AVS/medications with spouse. HH visit today. Spouse verified PCP/Post-op on 5/25/23.   Call end time 1317   Would  this patient benefit from a Referral to [facility] Social Work? No   Is the patient interested in additional calls from an ambulatory ?  NOTE:  applies to high risk patients requiring additional follow-up. No          Umu Myers RN    5/19/2023, 13:19 EDT

## 2023-05-20 VITALS
OXYGEN SATURATION: 97 % | HEART RATE: 84 BPM | DIASTOLIC BLOOD PRESSURE: 64 MMHG | RESPIRATION RATE: 18 BRPM | SYSTOLIC BLOOD PRESSURE: 108 MMHG | TEMPERATURE: 97 F

## 2023-05-20 NOTE — HOME HEALTH
Routine Visit Note:  pt. with discharge from [facility], for observation on 5/18/23, pt. states she was having SOB on 5/17/23 and her  called 911, states she was in atrial fibrillation, Eliquis was added. Patient educated to get up more often and ambulate as  states she is not.    Skill/education provided: CP assess, pain assess, falls assess, wound care and assess, medication assess    Patient/caregiver response: tolerated well    Plan for next visit: assess appetite, CP assess, pain assess, falls assess, wound care and assess, reiterate chest guard wearing    Other pertinent info: n/a

## 2023-05-23 ENCOUNTER — HOME CARE VISIT (OUTPATIENT)
Dept: HOME HEALTH SERVICES | Facility: HOME HEALTHCARE | Age: 68
End: 2023-05-23
Payer: MEDICARE

## 2023-05-23 VITALS
HEART RATE: 76 BPM | OXYGEN SATURATION: 97 % | SYSTOLIC BLOOD PRESSURE: 132 MMHG | DIASTOLIC BLOOD PRESSURE: 74 MMHG | TEMPERATURE: 98 F | RESPIRATION RATE: 19 BRPM

## 2023-05-23 PROCEDURE — G0299 HHS/HOSPICE OF RN EA 15 MIN: HCPCS

## 2023-05-23 NOTE — HOME HEALTH
"Routine Visit Note: Patient states she is doing \"okay\". Having difficulty sleeping at night, taking xanax before bed and states she has tried everything including melatonin and Benadryl but nothing really helps. Discussed speaking to MD about this at her follow up appts on Thursday. Encouraged her to resist napping during the day as that can effect sleep at night. Patient verbalized understanding. Encouraged daily weights to monitor fluid/edema, states she has not been doing daily weights yet.     Skill/education provided: Cardiopulmonary assessment, incision assess, disease mgmt education, medication education, pain/comfort relief edu, safety edu    Patient/caregiver response: verbalized understanding/tolerated well    Plan for next visit: Cardiopulmonary assessment, incision assess, disease mgmt edu, f/u on MD appts, f/u on difficulty sleeping at night, assess pain/safety/falls, post op edu    Other pertinent info: follow up appts with surgeon on Thursday"

## 2023-05-25 ENCOUNTER — OFFICE VISIT (OUTPATIENT)
Dept: CARDIAC SURGERY | Facility: CLINIC | Age: 68
End: 2023-05-25
Payer: MEDICARE

## 2023-05-25 ENCOUNTER — OFFICE VISIT (OUTPATIENT)
Dept: FAMILY MEDICINE CLINIC | Facility: CLINIC | Age: 68
End: 2023-05-25
Payer: MEDICARE

## 2023-05-25 VITALS
BODY MASS INDEX: 30.86 KG/M2 | DIASTOLIC BLOOD PRESSURE: 72 MMHG | TEMPERATURE: 98 F | WEIGHT: 192 LBS | HEIGHT: 66 IN | OXYGEN SATURATION: 98 % | HEART RATE: 81 BPM | RESPIRATION RATE: 15 BRPM | SYSTOLIC BLOOD PRESSURE: 109 MMHG

## 2023-05-25 VITALS
HEIGHT: 66 IN | HEART RATE: 79 BPM | RESPIRATION RATE: 20 BRPM | OXYGEN SATURATION: 94 % | WEIGHT: 192 LBS | BODY MASS INDEX: 30.86 KG/M2 | TEMPERATURE: 97.5 F | DIASTOLIC BLOOD PRESSURE: 80 MMHG | SYSTOLIC BLOOD PRESSURE: 103 MMHG

## 2023-05-25 DIAGNOSIS — Z09 HOSPITAL DISCHARGE FOLLOW-UP: Primary | ICD-10-CM

## 2023-05-25 DIAGNOSIS — Z95.1 S/P CABG X 3: Primary | ICD-10-CM

## 2023-05-25 DIAGNOSIS — I48.91 ATRIAL FIBRILLATION WITH RVR: ICD-10-CM

## 2023-05-25 DIAGNOSIS — Z98.890 S/P MVR (MITRAL VALVE REPAIR): ICD-10-CM

## 2023-05-25 PROCEDURE — 1159F MED LIST DOCD IN RCRD: CPT

## 2023-05-25 PROCEDURE — 3079F DIAST BP 80-89 MM HG: CPT

## 2023-05-25 PROCEDURE — 3074F SYST BP LT 130 MM HG: CPT

## 2023-05-25 PROCEDURE — 1160F RVW MEDS BY RX/DR IN RCRD: CPT

## 2023-05-25 PROCEDURE — 99024 POSTOP FOLLOW-UP VISIT: CPT

## 2023-05-25 NOTE — PROGRESS NOTES
"CARDIOVASCULAR SURGERY FOLLOW-UP PROGRESS NOTE      Chief Complaint: Postop    HPI:   Dear Naga Lozada PA-C and colleagues:    It was nice to see Andree Deluna in follow up 3 weeks after surgery.  As you know, she is a 67 y.o. female with a history of hypertension, hyperlipidemia, hyperglycemia, chronic HFpEF, stroke s/p right CEA, anxiety, obesity, and atrial fibrillation.  On 5/6 she underwent MV repair and CABG x3 with Dr. Noonan. She did well postoperatively and continues to do well. She comes in today complaining of nothing.  Her activity level has been fair.  From a surgical standpoint, the sternal incision is well approximated without erythema, edema, or drainage. Her chest tube sites are a little moist but not draining.  The sternum is stable to palpation, and the patient denies any popping or clicking with deep inspiration or coughing.      Physical Exam:         /80 (BP Location: Left arm, Patient Position: Sitting, Cuff Size: Adult)   Pulse 79   Temp 97.5 °F (36.4 °C)   Resp 20   Ht 167.6 cm (66\")   Wt 87.1 kg (192 lb)   SpO2 94%   BMI 30.99 kg/m²   Heart:  regular rate and rhythm, S1, S2 normal, no murmur, click, rub or gallop  Lungs:  clear to auscultation bilaterally  Extremities:  no edema  Incision(s):  mid chest healing well, no significant drainage, no dehiscence, no significant erythema, leg healing well, no significant drainage, no dehiscence, no significant erythema    Assessment/Plan:     S/P CABG and MVR. Overall, she is doing well.  Chest tube sites are little moist but not erythematous.  I have instructed her to throw away her chlorhexidine that she was discharged home with and switch to Betadine twice daily after showering with antibacterial soap and water.  She is following up with her PCP today and has an appointment with her cardiologist, Dr. Alcaraz, on June 12.    Postop atrial fibrillation--readmitted 5/18 with AF RVR; on eliquis     No heavy lifting " > 10 pounds for 2 more weeks  Keep incisions clean and dry  OK to drive if not taking narcotic pain medicine  OK to begin cardiac rehab  Follow-up as scheduled with cardiology  Follow-up as scheduled with PCP  Follow-up with CT surgery prn    Continue lifting restriction of 10 lbs until 6 weeks and 50 lbs until 12 weeks from the date of surgery, no excessive jarring motions or twisting motions until 12 weeks from the date of surgery    Return to clinic if any signs or symptoms of infection or sternal instability develop     Thank you for allowing me to participate in the care of your patient.      Coco Evans, APRN

## 2023-05-25 NOTE — PROGRESS NOTES
Transitional Care Follow Up Visit  Subjective     Andree Deluna is a 67 y.o. female who presents for a transitional care management visit.    Within 48 business hours after discharge our office contacted her via telephone to coordinate her care and needs.      I reviewed and discussed the details of that call along with the discharge summary, hospital problems, inpatient lab results, inpatient diagnostic studies, and consultation reports with Andree.     Current outpatient and discharge medications have been reconciled for the patient.  Reviewed by: SARAHY Bueno          5/18/2023     6:57 PM   Date of TCM Phone Call   [facility]   Date of Admission 5/17/2023   Date of Discharge 5/18/2023   Discharge Disposition Home or Self Care     Risk for Readmission (LACE) Score: 9 (5/18/2023  6:01 AM)      History of Present Illness   Course During Hospital Stay:  Pt comes in today for follow up from recent overnight hospital stay. Went in on 5/17 with CP and SOA with elevated HR. Was found to be in Afib w RVR. She is 3 weeks postop from CABG x3.   She was given IV cardizem in hospital.  Discharged next day with eliquis. plavix was stopped.   She did have follow up today with cardiovascular surgery and all looked ok from their standpoint   Feeling ok, but just tired.      The following portions of the patient's history were reviewed and updated as appropriate: allergies, current medications, past family history, past medical history, past social history, past surgical history and problem list.    Review of Systems    Objective   Physical Exam  Constitutional:       Appearance: She is well-developed.   Eyes:      Pupils: Pupils are equal, round, and reactive to light.   Cardiovascular:      Rate and Rhythm: Normal rate and regular rhythm.   Pulmonary:      Effort: Pulmonary effort is normal.      Breath sounds: Normal breath sounds.   Neurological:      Mental Status: She is alert and oriented to person,  place, and time.         Assessment & Plan   Diagnoses and all orders for this visit:    1. Hospital discharge follow-up (Primary)    2. Atrial fibrillation with RVR    cont meds as prescribed  Follow up with cardiology as scheduled  During this office visit, we discussed the pertinent aspects of the visit and treatment recommendations. Pt verbalizes understanding. Follow up was discussed. Patient was given the opportunity to ask questions and discuss other concerns.

## 2023-05-26 ENCOUNTER — HOME CARE VISIT (OUTPATIENT)
Dept: HOME HEALTH SERVICES | Facility: HOME HEALTHCARE | Age: 68
End: 2023-05-26
Payer: MEDICARE

## 2023-05-26 LAB
QT INTERVAL: 289 MS
QT INTERVAL: 406 MS
QT INTERVAL: 407 MS

## 2023-05-26 PROCEDURE — G0299 HHS/HOSPICE OF RN EA 15 MIN: HCPCS

## 2023-05-26 NOTE — HOME HEALTH
"Routine Visit Note: Patient sitting on couch at time of SN arrival. Spouse present at visit and let SN in the home. Patient states that she is feeling \"blah\" today. States that she is just ready to feel better. Patient states that she had 2 MD appointments yesterday and she was really worn out when they got home. Patient states that she took a long nap. SN explained to patient that this is to be expected after a major surgery. Patient verbalizes understanding. SN advised patient to not schedule 2 appointments in 1 day again since this seemed to be too much for patient to manage in 1 day. Patient agrees. Patient denies recent falls. Denies elimination issues. States that she is not sleeping well. SN advised patient to take small naps during the day, but to try not to sleep for hours, so she can get to sleep at HS. Patient agrees. Incisions are C/D/I. No s/s of infection. Patient states that she got a good report at her MD appointments. States that her appetite is fair. SN encouraged increased protein and ARIEL diet. Patient agrees.    Skill/education provided: CP assess, incision care, infection prevention teaching, safety/falls prevention, med education, ARIEL diet teaching    Patient/caregiver response: verbalized understanding    Plan for next visit: CP assess, falls/safety assess, med teaching/assess, incision care, assess energy level    Other pertinent info:"

## 2023-05-27 VITALS
RESPIRATION RATE: 17 BRPM | SYSTOLIC BLOOD PRESSURE: 120 MMHG | HEART RATE: 67 BPM | DIASTOLIC BLOOD PRESSURE: 70 MMHG | OXYGEN SATURATION: 98 % | TEMPERATURE: 97.8 F

## 2023-05-27 LAB
QT INTERVAL: 337 MS
QT INTERVAL: 395 MS
QT INTERVAL: 410 MS
QT INTERVAL: 440 MS

## 2023-05-30 ENCOUNTER — READMISSION MANAGEMENT (OUTPATIENT)
Dept: CALL CENTER | Facility: HOSPITAL | Age: 68
End: 2023-05-30

## 2023-05-30 NOTE — OUTREACH NOTE
Medical Week 2 Survey    Flowsheet Row Responses   [facility] patient discharged from? [place]   Does the patient have one of the following disease processes/diagnoses(primary or secondary)? Other   Week 2 attempt successful? No   Unsuccessful attempts Attempt 1  [All numbers were attempted-no answer]          Annelise H - Registered Nurse

## 2023-05-31 ENCOUNTER — HOME CARE VISIT (OUTPATIENT)
Dept: HOME HEALTH SERVICES | Facility: HOME HEALTHCARE | Age: 68
End: 2023-05-31
Payer: MEDICARE

## 2023-05-31 PROCEDURE — G0299 HHS/HOSPICE OF RN EA 15 MIN: HCPCS

## 2023-06-01 NOTE — HOME HEALTH
Routine Visit Note: patient ambulating around kitchen today. Patient states she does not need a walker or cane to ambulate but just takes her time ambulating. Patient complains of significant incisional pain especially when coughing. Incisions are healing well. No drainage. Scabs present. Patient complains of back and headache pain as well. Denies falls. Appetite good.    Skill/education provided: VS, cardiopulmonary assess, medication assess/education, skin assess, safety assess/education, pain assess, wound care/assess/teaching    Patient/caregiver response: verbalizes understanding    Plan for next visit:   - VS  - cardiopulmonary assess  - medication assess/teaching  - skin assess/education  - safety assess/education  - MD order review  - wound care to surgical incision on chest

## 2023-06-02 ENCOUNTER — READMISSION MANAGEMENT (OUTPATIENT)
Dept: CALL CENTER | Facility: HOSPITAL | Age: 68
End: 2023-06-02

## 2023-06-02 NOTE — OUTREACH NOTE
Medical Week 2 Survey      Flowsheet Row Responses   [facility] patient discharged from? [place]   Does the patient have one of the following disease processes/diagnoses(primary or secondary)? Other   Week 2 attempt successful? No   Unsuccessful attempts Attempt 2            Chelsi HOOVER - Registered Nurse

## 2023-06-07 ENCOUNTER — HOME CARE VISIT (OUTPATIENT)
Dept: HOME HEALTH SERVICES | Facility: HOME HEALTHCARE | Age: 68
End: 2023-06-07
Payer: MEDICARE

## 2023-06-07 VITALS
HEART RATE: 78 BPM | OXYGEN SATURATION: 96 % | TEMPERATURE: 98 F | SYSTOLIC BLOOD PRESSURE: 119 MMHG | RESPIRATION RATE: 18 BRPM | DIASTOLIC BLOOD PRESSURE: 70 MMHG

## 2023-06-07 PROCEDURE — G0299 HHS/HOSPICE OF RN EA 15 MIN: HCPCS

## 2023-06-08 NOTE — HOME HEALTH
Routine Visit Note: Patient reports ongoing aching pain in chest incision. Patient states pain is affecting her sleep, behavior, mood, ADLs, and interactions with family. Denies falls, denies elimination issues. Appetite improving. Ambulation good but difficult due to pain. Patient states it waxes and wanes frequently. Patient to see surgeon and PCP tomorrow and will consult with them about pain issues.    Skill/education provided: VS, cardiopulmonary assess, medication assess/education, skin assess, safety assess/education, pain assess, neuro checks, wound education/teaching and wound care     Patient/caregiver response: verbalizes understanding    Plan for next visit:   - VS  - cardiopulmonary assess  - medication assess/teaching  - skin assess/education  - safety assess/education  - neuro checks  - MD order review  - wound education/teaching and care to surgical chest and abdominal incisions    Other pertinent info:

## 2023-06-12 ENCOUNTER — OFFICE VISIT (OUTPATIENT)
Dept: CARDIOLOGY | Facility: CLINIC | Age: 68
End: 2023-06-12
Payer: MEDICARE

## 2023-06-12 VITALS
WEIGHT: 198 LBS | HEART RATE: 77 BPM | BODY MASS INDEX: 31.82 KG/M2 | SYSTOLIC BLOOD PRESSURE: 105 MMHG | OXYGEN SATURATION: 95 % | DIASTOLIC BLOOD PRESSURE: 67 MMHG | HEIGHT: 66 IN

## 2023-06-12 DIAGNOSIS — I48.0 PAROXYSMAL ATRIAL FIBRILLATION: ICD-10-CM

## 2023-06-12 DIAGNOSIS — F41.9 ANXIETY: ICD-10-CM

## 2023-06-12 DIAGNOSIS — E78.5 DYSLIPIDEMIA: ICD-10-CM

## 2023-06-12 DIAGNOSIS — I25.810 CORONARY ARTERY DISEASE INVOLVING CORONARY BYPASS GRAFT OF NATIVE HEART WITHOUT ANGINA PECTORIS: Primary | ICD-10-CM

## 2023-06-12 DIAGNOSIS — I10 ESSENTIAL HYPERTENSION: ICD-10-CM

## 2023-06-12 DIAGNOSIS — Z79.01 LONG TERM (CURRENT) USE OF ANTICOAGULANTS: ICD-10-CM

## 2023-06-12 DIAGNOSIS — Z98.890 S/P MVR (MITRAL VALVE REPAIR): ICD-10-CM

## 2023-06-12 DIAGNOSIS — Z86.73 HISTORY OF CVA (CEREBROVASCULAR ACCIDENT): ICD-10-CM

## 2023-06-12 RX ORDER — LISINOPRIL 10 MG/1
10 TABLET ORAL
Qty: 90 TABLET | Refills: 3 | Status: SHIPPED | OUTPATIENT
Start: 2023-06-12

## 2023-06-12 RX ORDER — FUROSEMIDE 40 MG/1
40 TABLET ORAL DAILY
Qty: 30 TABLET | Refills: 3 | Status: SHIPPED | OUTPATIENT
Start: 2023-06-12

## 2023-06-12 RX ORDER — ATORVASTATIN CALCIUM 80 MG/1
80 TABLET, FILM COATED ORAL NIGHTLY
Qty: 90 TABLET | Refills: 3 | Status: SHIPPED | OUTPATIENT
Start: 2023-06-12

## 2023-06-12 RX ORDER — ASPIRIN 81 MG/1
81 TABLET, CHEWABLE ORAL DAILY
Qty: 90 TABLET | Refills: 3 | Status: SHIPPED | OUTPATIENT
Start: 2023-06-12

## 2023-06-12 NOTE — PROGRESS NOTES
Subjective:     Encounter Date:06/12/2023      Patient ID: Andree Deluna is a 67 y.o. female.    Chief Complaint and history of present illness:     MsGer Deluna has PMH of    CAD cardiac cath 5/1/2023 revealing multivessel coronary artery disease  CABG with LIMA to LAD, SVG to OM1 and SVG to PDA and mitral valve repair with physio #2 ring 5/6/202   Postop A-fib  Hypertension  Diastolic dysfunction-echo 8/7/2019 revealing septal asymmetric hypertrophy EF 60%, diastolic dysfunction  Dyslipidemia  CVA  80% right carotid disease  WEN  Carotid endarterectomy, cholecystectomy, tubal ligation, shoulder/knee/finger surgery  Family history of premature CAD father fatal MI at 54 mother fatal MI at 64.    Here for hospital follow-up.  Patient is having some chest wall pain.  Denies any exertional chest pain or shortness of breath.    Patient's arterial blood pressure is 105/67, heart rate 77, O2 sat of 95% on room air.     Presented through emergency room 4/30/2023 with midsternal chest pain radiating to the back, squeezing in sensation, radiating to the left arm and neck, stated with shortness of breath and nausea.  Patient has been having symptoms since many months but has been progressively worse.     Work-up here 4/30/2023/5/1/2023 revealed elevated troponin at 54-->120.  Glucose elevated.  Potassium 3.4.  A1c of 5.8.  Chest x-ray stable cardiomegaly.  EKG done 4/30/2023 reviewed/interpreted by me reveals sinus rhythm with a rate of 74 bpm with  ST segment depression in 1 and aVL      Labs from 5/3/2023 reveal lipid profile with cholesterol 150, triglycerides 104, HDL 62, LDL 64.  A1c of 5.8.  CMP with a glucose of 108.  proBNP normal at 585 on 5/17/2023.        Assessment:  :     NSTEMI  CAD cardiac cath 5/1/2023 revealing multivessel coronary artery disease  CABG with LIMA to LAD, SVG to OM1 and SVG to PDA and mitral valve repair with physio #2 ring 5/6/2023  Postop  A-fib  Hypertension  Dyslipidemia  History of CVA 2019 and carotid disease, right CEA 2020  PAD  Hyperglycemia, prediabetes with A1c of 5.8  Obesity with BMI over 30           Recommendations / Plan:         Patient presented with anginal underwent cath which revealed multivessel disease underwent CABG and mitral valve repair.     Patient presented with new onset prolonged chest pain and has elevated troponin consistent with acute non-ST elevation MI  Patient was started on aspirin, beta-blockers, statins.  We will continue as tolerated.   patient underwent cardiac cath 5/1/2023 which revealed severe triple-vessel disease.  Patient underwent three-vessel CABG and mitral valve repair 5/6/2023  Routine postsurgery care  Monitor rhythm  We will send her to cardiac rehab.    We will follow-up in 3 months and check labs before visit.    Procedures    Copied text in this portion of the note has been reviewed and is accurate as of 6/12/2023  The following portions of the patient's history were reviewed and updated as appropriate: allergies, current medications, past family history, past medical history, past social history, past surgical history and problem list.    Assessment:         MDM       Diagnosis Plan   1. Coronary artery disease involving coronary bypass graft of native heart without angina pectoris  Comprehensive Metabolic Panel    Lipid Panel    Cardiac Rehab Phase II      2. Paroxysmal atrial fibrillation  Comprehensive Metabolic Panel    Lipid Panel    Cardiac Rehab Phase II      3. Long term (current) use of anticoagulants  Comprehensive Metabolic Panel    Lipid Panel    Cardiac Rehab Phase II      4. Essential hypertension  Comprehensive Metabolic Panel    Lipid Panel    Cardiac Rehab Phase II      5. Dyslipidemia  Comprehensive Metabolic Panel    Lipid Panel    Cardiac Rehab Phase II      6. S/P mitral valve repair per Dr. Noonan 5/6/2023  Comprehensive Metabolic Panel    Lipid Panel    Cardiac  Rehab Phase II      7. History of CVA (cerebrovascular accident)  Comprehensive Metabolic Panel    Lipid Panel    Cardiac Rehab Phase II             Plan:               Past Medical History:  Past Medical History:   Diagnosis Date   • ADHD unknown   • Anemia    • Anxiety    • Carotid artery disease     80% right internal carotid artery   • CVA (cerebral vascular accident)     right parietal right temporal   • DDD (degenerative disc disease), lumbar    • Depression    • Extremity pain     Ankle pain   • GERD (gastroesophageal reflux disease)    • Hyperlipidemia unknown   • Hypertension    • Insomnia unknown   • Laryngopharyngeal reflux    • Low back pain    • Mood disorder    • Obesity unknown   • Skin cancer of face    • Sleep apnea     Suspected sleep apnea she has refused to be tested   • Stroke (cerebrum)    • Visual field defect     Left     Past Surgical History:  Past Surgical History:   Procedure Laterality Date   • CARDIAC CATHETERIZATION N/A 5/1/2023    Procedure: Left Heart Cath;  Surgeon: Kye Alcaraz MD;  Location: [facility] CATH INVASIVE LOCATION;  Service: Cardiovascular;  Laterality: N/A;   • CAROTID ENDARTERECTOMY Right 11/10/2020    Procedure: CAROTID ENDARTERECTOMY;  Surgeon: Tavo Das MD;  Location: [facility] MAIN OR;  Service: Vascular;  Laterality: Right;   • CHOLECYSTECTOMY     • COLONOSCOPY      2016   • CORONARY ARTERY BYPASS GRAFT N/A 5/6/2023    Procedure: CORONARY ARTERY BYPASS GRAFTING;  Surgeon: Errol Noonan MD;  Location: [facility];  Service: Cardiothoracic;  Laterality: N/A;  CABG X  3 using LIMA and right endospahenous vein;  2 coronary markers implanted.    • FINGER SURGERY     • KNEE ARTHROPLASTY     • KNEE SURGERY Right     replaced   • LAPAROSCOPIC GASTRIC BANDING     • MITRAL VALVE REPAIR/REPLACEMENT N/A 5/6/2023    Procedure: MITRAL VALVE REPAIR/REPLACEMENT;  Surgeon: Errol Noonan MD;  Location: [facility];  Service: Cardiothoracic;   Laterality: N/A;  Mitral valve repaired using 28 mm Jorge Physio II Annuloplasty Ring.    • ROTATOR CUFF REPAIR Right 2019   • SHOULDER SURGERY Right 05/24/2019    scope/ cuff repair   • TRANSESOPHAGEAL ECHOCARDIOGRAM (GALA) N/A 5/6/2023    Procedure: TRANSESOPHAGEAL ECHOCARDIOGRAM WITH ANESTHESIA;  Surgeon: Errol Noonan MD;  [facility];  Service: Cardiothoracic;  Laterality: N/A;   • TUBAL ABDOMINAL LIGATION        Allergies:  No Known Allergies  Home Meds:  Current Meds:     Current Outpatient Medications:   •  acetaminophen (TYLENOL) 325 MG tablet, Take 2 tablets by mouth Every 4 (Four) Hours As Needed for Mild Pain., Disp: , Rfl:   •  ALPRAZolam (XANAX) 2 MG tablet, TAKE 1 TABLET BY MOUTH AT NIGHT AS NEEDED FOR ANXIETY, Disp: 30 tablet, Rfl: 0  •  apixaban (ELIQUIS) 5 MG tablet tablet, Take 1 tablet by mouth Every 12 (Twelve) Hours. Indications: Atrial Fibrillation, Disp: 180 tablet, Rfl: 3  •  aspirin 81 MG chewable tablet, Chew 1 tablet Daily. Indications: antiplatelet, Disp: 90 tablet, Rfl: 3  •  atorvastatin (LIPITOR) 80 MG tablet, Take 1 tablet by mouth Every Night. Indications: High Amount of Fats in the Blood, Disp: 90 tablet, Rfl: 3  •  furosemide (LASIX) 40 MG tablet, Take 1 tablet by mouth Daily. Indications: Edema, Disp: 30 tablet, Rfl: 3  •  lamoTRIgine (LaMICtal) 25 MG tablet, Take 2 tablets by mouth Every 12 (Twelve) Hours for 14 doses., Disp: 28 tablet, Rfl: 0  •  lisinopril (PRINIVIL,ZESTRIL) 10 MG tablet, Take 1 tablet by mouth Daily. Indications: High Blood Pressure Disorder, Disp: 90 tablet, Rfl: 3  •  metoprolol tartrate (LOPRESSOR) 25 MG tablet, Take 1 tablet by mouth Every 12 (Twelve) Hours. Indications: High Blood Pressure Disorder, Disp: 180 tablet, Rfl: 3  •  omeprazole (priLOSEC) 40 MG capsule, TAKE ONE CAPSULE BY MOUTH DAILY, Disp: 90 capsule, Rfl: 1  •  oxybutynin (DITROPAN) 5 MG tablet, Take 1 tablet by mouth Daily., Disp: 90 tablet, Rfl: 1  •  potassium  "chloride (K-DUR,KLOR-CON) 20 MEQ CR tablet, Take 2 tablets by mouth Daily., Disp: 90 tablet, Rfl: 1  •  lamoTRIgine (LaMICtal) 25 MG tablet, Take 1 tablet by mouth Every 12 (Twelve) Hours for 28 doses., Disp: 28 tablet, Rfl: 0  Social History:   Social History     Tobacco Use   • Smoking status: Never   • Smokeless tobacco: Never   Substance Use Topics   • Alcohol use: No      Family History:  Family History   Problem Relation Age of Onset   • Diabetes Other    • Heart disease Mother    • Diabetes Mother    • Heart disease Father               Review of Systems   Constitutional: Positive for malaise/fatigue.   Cardiovascular:  Negative for chest pain, dyspnea on exertion, leg swelling and palpitations.   Respiratory:  Negative for shortness of breath.    Gastrointestinal:  Negative for nausea and vomiting.   Neurological:  Negative for dizziness, focal weakness, headaches, light-headedness and numbness.   All other systems reviewed and are negative.  All other systems are negative         Objective:     Physical Exam  /67   Pulse 77   Ht 167.6 cm (66\")   Wt 89.8 kg (198 lb)   SpO2 95%   BMI 31.96 kg/m²   General:  Appears in no acute distress  Eyes: Sclera is anicteric,  conjunctiva is clear   HEENT:  No JVD.  No carotid bruits  Respiratory: Respirations regular and unlabored at rest.  Clear to auscultation  Cardiovascular: S1,S2 Regular rate and rhythm. No murmur, rub or gallop auscultated.   Extremities: No digital clubbing or cyanosis, no edema  Skin: Color pink. Skin warm and dry to touch. No rashes  No xanthoma  Neuro: Alert and awake.    Lab Reviewed:         Kye Alcaraz MD  6/12/2023 14:00 EDT      EMR Dragon/Transcription:   \"Dictated utilizing Dragon dictation\".        "

## 2023-06-12 NOTE — TELEPHONE ENCOUNTER
Incoming Refill Request      Medication requested (name and dose):   ALPRAZolam (XANAX) 2 MG tablet   0 ordered         Pharmacy where request should be sent: [facility] [id number] - [place], [place] - [address] [place] [address] - [phone] PH - [phone] FX  [phone]    Additional details provided by patient: PATIENT ONLY HAS A COUPLE DAYS LEFT, SHE IS WANTING TO SEE IF SHE CAN GET 2 REFILLS ON THE MEDICATION SO THEY DONT HAVE TO CALL IN EACH MONTH    Best call back number: [phone]    Does the patient have less than a 3 day supply:  [x] Yes  [] No    Tanisha Castro Rep  06/12/23, 14:37 EDT

## 2023-06-13 RX ORDER — ALPRAZOLAM 2 MG/1
2 TABLET ORAL NIGHTLY PRN
Qty: 30 TABLET | Refills: 1 | Status: SHIPPED | OUTPATIENT
Start: 2023-06-13

## 2023-06-14 ENCOUNTER — HOME CARE VISIT (OUTPATIENT)
Dept: HOME HEALTH SERVICES | Facility: HOME HEALTHCARE | Age: 68
End: 2023-06-14
Payer: MEDICARE

## 2023-06-14 VITALS
DIASTOLIC BLOOD PRESSURE: 72 MMHG | HEART RATE: 62 BPM | OXYGEN SATURATION: 96 % | TEMPERATURE: 98.9 F | SYSTOLIC BLOOD PRESSURE: 120 MMHG | RESPIRATION RATE: 16 BRPM

## 2023-06-14 PROCEDURE — G0162 HHC RN E&M PLAN SVS, 15 MIN: HCPCS

## 2023-06-14 NOTE — HOME HEALTH
Routine Visit Note:  Pt met SN on porch.  Ambulating without difficulty.  Denies SOB.  Admits to tenderness at top of sternal incision--no s/s infection-healing well.  No distress noted.      Skill/education provided: CP assess, Pain assess, Skin assess, Incision assess, Med education and management    Patient/caregiver response: Tolerated without difficulty    Plan for next visit:   CP assess  Incision assess  Pain assess  Med education and management

## 2023-07-13 ENCOUNTER — TELEPHONE (OUTPATIENT)
Dept: CARDIOLOGY | Facility: CLINIC | Age: 68
End: 2023-07-13
Payer: MEDICARE

## 2023-07-13 NOTE — TELEPHONE ENCOUNTER
RX was sent in June , 30 pills with 3 refills   so pt should have refills     Called Pharm ,  I was told pt never picked up the new RX , she is still using old bottle that's why it says no refill       Called pt NA, no VM

## 2023-07-13 NOTE — TELEPHONE ENCOUNTER
Incoming Refill Request      Medication requested (name and dose): FUROSEMIDE 40 MG    Pharmacy where request should be sent: [facility]    Additional details provided by patient: NO REFILLS ON BOTTLE. DOES SHE NEED TO CONTINUE THE MEDICATION?     Best call back number: [phone]    Does the patient have less than a 3 day supply:  [x] Yes  [] No    Tanisha Orellana Rep  07/13/23, 08:48 EDT

## 2023-07-24 ENCOUNTER — TELEPHONE (OUTPATIENT)
Dept: CARDIOLOGY | Facility: CLINIC | Age: 68
End: 2023-07-24
Payer: MEDICARE

## 2023-07-24 NOTE — TELEPHONE ENCOUNTER
Hub staff attempted to follow warm transfer process and was unsuccessful     Caller: RODRIGUEZ    Relationship to patient: SELF    Best call back number: [phone]     Patient is needing: PT IS HAVING SEVERAL FALLS AND NOT SURE IF SHE NEEDS TO COME IN SOONER OR IF HER MEDICATION COULD BE CAUSING HER TO FALL. SHE ALSO ADVISED SHE IS TAKING ASPIRIN AND A BLOOD THINNER SHOULD SHE BE DOING THAT.     PLEASE CALL TO ADVISE.     THANK YOU.

## 2023-07-24 NOTE — TELEPHONE ENCOUNTER
Returned call,  answered and advised me to use alternative number. I called the alternative number, no answer or VM.

## 2023-07-25 NOTE — TELEPHONE ENCOUNTER
Pt called, she said she fell a couple wks ago, went to the hosp and everything was fine    She was camping on Sunday and tripped in a hole, now her right side hurts   Denies any dizzines , bruising, redness, edema , hitting head  Pt concerned, I advised pt to call her PCP or go to ER to be evaluated   I offered a sooner appt to pt     Yes pt should be on both ASA and Eliquis     Pt said okay, she needs to think about things and will let me know

## 2023-08-01 ENCOUNTER — TELEPHONE (OUTPATIENT)
Dept: CARDIOLOGY | Facility: CLINIC | Age: 68
End: 2023-08-01
Payer: MEDICARE

## 2023-08-02 ENCOUNTER — OFFICE VISIT (OUTPATIENT)
Dept: CARDIOLOGY | Facility: CLINIC | Age: 68
End: 2023-08-02
Payer: MEDICARE

## 2023-08-02 VITALS
OXYGEN SATURATION: 98 % | SYSTOLIC BLOOD PRESSURE: 129 MMHG | DIASTOLIC BLOOD PRESSURE: 72 MMHG | BODY MASS INDEX: 32.14 KG/M2 | HEART RATE: 68 BPM | HEIGHT: 66 IN | WEIGHT: 200 LBS

## 2023-08-02 DIAGNOSIS — E78.5 DYSLIPIDEMIA: ICD-10-CM

## 2023-08-02 DIAGNOSIS — Z79.01 LONG TERM (CURRENT) USE OF ANTICOAGULANTS: ICD-10-CM

## 2023-08-02 DIAGNOSIS — I48.0 PAROXYSMAL ATRIAL FIBRILLATION: ICD-10-CM

## 2023-08-02 DIAGNOSIS — Z98.890 S/P MVR (MITRAL VALVE REPAIR): ICD-10-CM

## 2023-08-02 DIAGNOSIS — I25.810 CORONARY ARTERY DISEASE INVOLVING CORONARY BYPASS GRAFT OF NATIVE HEART WITHOUT ANGINA PECTORIS: Primary | ICD-10-CM

## 2023-08-02 RX ORDER — LISINOPRIL 20 MG/1
20 TABLET ORAL
Qty: 90 TABLET | Refills: 1 | Status: SHIPPED | OUTPATIENT
Start: 2023-08-02

## 2023-08-02 NOTE — PROGRESS NOTES
Subjective:     Encounter Date:08/02/2023      Patient ID: Andree Deluna is a 67 y.o. female.    Chief Complaint and history of present illness:     Here for follow-up for CAD, CABG, hypertension, dyslipidemia    History of present illness:     Ms.Elisa LOVELY Deluna has PMH of    CAD cardiac cath 5/1/2023 revealing multivessel coronary artery disease  CABG with LIMA to LAD, SVG to OM1 and SVG to PDA and mitral valve repair with physio #2 ring 5/6/202   Postop A-fib  Hypertension  Diastolic dysfunction-echo 8/7/2019 revealing septal asymmetric hypertrophy EF 60%, diastolic dysfunction  Dyslipidemia  CVA  80% right carotid disease  WEN  Carotid endarterectomy, cholecystectomy, tubal ligation, shoulder/knee/finger surgery  Family history of premature CAD father fatal MI at 54 mother fatal MI at 64.    Here for hospital follow-up.  Denies any chest pain or shortness of breath.  Patient was in the emergency room 6/25/2023 after a chemical fall.  Denies any loss of consciousness.    Patient's arterial blood pressure is 129/72, heart rate 68, O2 sat of 98% on room air.  BMI is over 30.     Presented through emergency room 4/30/2023 with midsternal chest pain radiating to the back, squeezing in sensation, radiating to the left arm and neck, stated with shortness of breath and nausea.  Patient has been having symptoms since many months but has been progressively worse.     Work-up here 4/30/2023/5/1/2023 revealed elevated troponin at 54-->120.  Glucose elevated.  Potassium 3.4.  A1c of 5.8.  Chest x-ray stable cardiomegaly.  EKG done 4/30/2023 reviewed/interpreted by me reveals sinus rhythm with a rate of 74 bpm with  ST segment depression in 1 and aVL      Labs from 5/3/2023 reveal lipid profile with cholesterol 150, triglycerides 104, HDL 62, LDL 64.  A1c of 5.8.  CMP with a glucose of 108.  proBNP normal at 585 on 5/17/2023.  Labs from 5/18/2023 reveal BMP with a glucose of 102        Assessment:  :     S/p fall  CAD  cardiac cath 5/1/2023 revealing multivessel coronary artery disease  CABG with LIMA to LAD, SVG to OM1 and SVG to PDA and mitral valve repair with physio #2 ring 5/6/2023  Postop A-fib  Hypertension  Dyslipidemia  History of CVA 2019 and carotid disease, right CEA 2020  PAD  Hyperglycemia, prediabetes with A1c of 5.8  Obesity with BMI over 30           Recommendations / Plan:          Reviewed records.  Patient did not have loss of consciousness.  Patient presented with anginal underwent cath which revealed multivessel disease underwent CABG and mitral valve repair.  We will discontinue amiodarone.  Continue aspirin, Eliquis, metoprolol, lisinopril, furosemide, KCl as tolerated.     Patient presented with new onset prolonged chest pain and has elevated troponin consistent with acute non-ST elevation MI  Patient was started on aspirin, beta-blockers, statins.  We will continue as tolerated.   patient underwent cardiac cath 5/1/2023 which revealed severe triple-vessel disease.  Patient underwent three-vessel CABG and mitral valve repair 5/6/2023  Routine postsurgery care  Monitor rhythm  We will send her to cardiac rehab.    We will follow-up  and check labs before visit.      Procedures    Copied text in this portion of the note has been reviewed and is accurate as of 8/17/2023  The following portions of the patient's history were reviewed and updated as appropriate: allergies, current medications, past family history, past medical history, past social history, past surgical history and problem list.    Assessment:         [facility]       Diagnosis Plan   1. Coronary artery disease involving coronary bypass graft of native heart without angina pectoris  Comprehensive Metabolic Panel    Lipid Panel      2. Paroxysmal atrial fibrillation  Comprehensive Metabolic Panel    Lipid Panel      3. Long term (current) use of anticoagulants  Comprehensive Metabolic Panel    Lipid Panel      4. Dyslipidemia  Comprehensive Metabolic  Panel    Lipid Panel      5. S/P mitral valve repair per Dr. Noonan 5/6/2023  Comprehensive Metabolic Panel    Lipid Panel             Plan:               Past Medical History:  Past Medical History:   Diagnosis Date    ADHD unknown    Anemia     Anxiety     Carotid artery disease     80% right internal carotid artery    CVA (cerebral vascular accident)     right parietal right temporal    DDD (degenerative disc disease), lumbar     Depression     Extremity pain     Ankle pain    GERD (gastroesophageal reflux disease)     Hyperlipidemia unknown    Hypertension     Insomnia unknown    Laryngopharyngeal reflux     Low back pain     Mood disorder     Obesity unknown    Skin cancer of face     Sleep apnea     Suspected sleep apnea she has refused to be tested    Stroke (cerebrum)     Visual field defect     Left     Past Surgical History:  Past Surgical History:   Procedure Laterality Date    CARDIAC CATHETERIZATION N/A 5/1/2023    Procedure: Left Heart Cath;  Surgeon: Kye Alcaraz MD;  Location: [facility] CATH INVASIVE LOCATION;  Service: Cardiovascular;  Laterality: N/A;    CAROTID ENDARTERECTOMY Right 11/10/2020    Procedure: CAROTID ENDARTERECTOMY;  Surgeon: Tavo Das MD;  Location: [facility] MAIN OR;  Service: Vascular;  Laterality: Right;    CHOLECYSTECTOMY      COLONOSCOPY      2016    CORONARY ARTERY BYPASS GRAFT N/A 5/6/2023    Procedure: CORONARY ARTERY BYPASS GRAFTING;  Surgeon: Errol Noonan MD;  Location: [facility];  Service: Cardiothoracic;  Laterality: N/A;  CABG X  3 using LIMA and right endospahenous vein;  2 coronary markers implanted.     FINGER SURGERY      KNEE ARTHROPLASTY      KNEE SURGERY Right     replaced    LAPAROSCOPIC GASTRIC BANDING      MITRAL VALVE REPAIR/REPLACEMENT N/A 5/6/2023    Procedure: MITRAL VALVE REPAIR/REPLACEMENT;  Surgeon: Errol Noonan MD;  Location: [facility];  Service: Cardiothoracic;  Laterality: N/A;  Mitral valve  repaired using 28 mm Jorge Physio II Annuloplasty Ring.     ROTATOR CUFF REPAIR Right 2019    SHOULDER SURGERY Right 05/24/2019    scope/ cuff repair    TRANSESOPHAGEAL ECHOCARDIOGRAM (GALA) N/A 5/6/2023    Procedure: TRANSESOPHAGEAL ECHOCARDIOGRAM WITH ANESTHESIA;  Surgeon: Errol Noonan MD;  Location: [facility];  Service: Cardiothoracic;  Laterality: N/A;    TUBAL ABDOMINAL LIGATION        Allergies:  No Known Allergies  Home Meds:  Current Meds:     Current Outpatient Medications:     acetaminophen (TYLENOL) 325 MG tablet, Take 2 tablets by mouth Every 4 (Four) Hours As Needed for Mild Pain., Disp: , Rfl:     ALPRAZolam (XANAX) 2 MG tablet, Take 1 tablet by mouth At Night As Needed for Anxiety. Indications: Feeling Anxious, Disp: 30 tablet, Rfl: 1    apixaban (ELIQUIS) 5 MG tablet tablet, Take 1 tablet by mouth Every 12 (Twelve) Hours. Indications: Atrial Fibrillation, Disp: 180 tablet, Rfl: 3    aspirin 81 MG chewable tablet, Chew 1 tablet Daily. Indications: antiplatelet, Disp: 90 tablet, Rfl: 3    atorvastatin (LIPITOR) 80 MG tablet, Take 1 tablet by mouth Every Night. Indications: High Amount of Fats in the Blood, Disp: 90 tablet, Rfl: 3    furosemide (LASIX) 40 MG tablet, Take 1 tablet by mouth Daily. Indications: Edema, Disp: 30 tablet, Rfl: 3    lamoTRIgine (LaMICtal) 100 MG tablet, Take 1 tablet by mouth 2 (Two) Times a Day. Indications: Manic-Depression, Disp: , Rfl:     lisinopril (PRINIVIL,ZESTRIL) 20 MG tablet, Take 1 tablet by mouth Daily. Indications: High Blood Pressure Disorder, Disp: 90 tablet, Rfl: 1    metoprolol tartrate (LOPRESSOR) 25 MG tablet, Take 1 tablet by mouth Every 12 (Twelve) Hours. Indications: High Blood Pressure Disorder, Disp: 180 tablet, Rfl: 3    omeprazole (priLOSEC) 40 MG capsule, TAKE ONE CAPSULE BY MOUTH DAILY, Disp: 90 capsule, Rfl: 1    oxybutynin (DITROPAN) 5 MG tablet, Take 1 tablet by mouth Daily., Disp: 90 tablet, Rfl: 1    potassium chloride  "(K-DUR,KLOR-CON) 20 MEQ CR tablet, Take 2 tablets by mouth Daily. Indications: Low Amount of Potassium in the Blood, Disp: 180 tablet, Rfl: 1    lisinopril-hydrochlorothiazide (PRINZIDE,ZESTORETIC) 20-25 MG per tablet, TAKE ONE TABLET BY MOUTH DAILY, Disp: 90 tablet, Rfl: 0  Social History:   Social History     Tobacco Use    Smoking status: Never    Smokeless tobacco: Never   Substance Use Topics    Alcohol use: No      Family History:  Family History   Problem Relation Age of Onset    Diabetes Other     Heart disease Mother     Diabetes Mother     Heart disease Father               Review of Systems   Constitutional: Positive for malaise/fatigue.   Cardiovascular:  Negative for chest pain, leg swelling and palpitations.   Respiratory:  Negative for shortness of breath.    Skin:  Negative for rash.   Neurological:  Negative for dizziness, light-headedness and numbness.   All other systems are negative         Objective:     Physical Exam  /72   Pulse 68   Ht 167.6 cm (66\")   Wt 90.7 kg (200 lb)   SpO2 98%   BMI 32.28 kg/mý   General:  Appears in no acute distress  Eyes: Sclera is anicteric,  conjunctiva is clear   HEENT:  No JVD.  No carotid bruits  Respiratory: Respirations regular and unlabored at rest.  Clear to auscultation  Cardiovascular: S1,S2 Regular rate and rhythm. No murmur, rub or gallop auscultated.   Extremities: No digital clubbing or cyanosis, no edema  Skin: Color pink. Skin warm and dry to touch. No rashes  No xanthoma  Neuro: Alert and awake.    Lab Reviewed:         Kye Alcaraz MD  8/17/2023 12:24 EDT      EMR Dragon/Transcription:   \"Dictated utilizing Dragon dictation\".        "

## 2023-08-14 RX ORDER — LISINOPRIL 10 MG/1
TABLET ORAL
Qty: 90 TABLET | Refills: 0 | OUTPATIENT
Start: 2023-08-14

## 2023-08-14 RX ORDER — LISINOPRIL AND HYDROCHLOROTHIAZIDE 25; 20 MG/1; MG/1
TABLET ORAL
Qty: 90 TABLET | Refills: 0 | Status: SHIPPED | OUTPATIENT
Start: 2023-08-14

## 2023-08-14 RX ORDER — CLOPIDOGREL BISULFATE 75 MG/1
TABLET ORAL
Qty: 90 TABLET | Refills: 0 | OUTPATIENT
Start: 2023-08-14

## 2023-08-21 RX ORDER — AMIODARONE HYDROCHLORIDE 200 MG/1
TABLET ORAL
Qty: 105 TABLET | Refills: 0 | OUTPATIENT
Start: 2023-08-21

## 2023-08-25 ENCOUNTER — TELEPHONE (OUTPATIENT)
Dept: CARDIOLOGY | Facility: CLINIC | Age: 68
End: 2023-08-25
Payer: MEDICARE

## 2023-08-25 ENCOUNTER — TELEPHONE (OUTPATIENT)
Dept: ORTHOPEDIC SURGERY | Facility: CLINIC | Age: 68
End: 2023-08-25
Payer: MEDICARE

## 2023-08-25 NOTE — TELEPHONE ENCOUNTER
Caller: nAdree Deluna    Relationship to patient: Self    Best call back number: [phone]    Patient is needing:   PATIENTS CARDIOLOGIST DR. TESFAYE ([address] [facility]# [phone]) WOULD LIKE TO KNOW THE DETAILS OF GETTING A LEFT SHOULDER INJECTION. THEY ARE WORRIED BECAUSE SHE IS ON TWO BLOOD THINNERS AND JUST HAD HEART SX. PATIENT WOULD LIKE A CALL BACK WHEN THIS IS DONE SO THAT SHE CAN SCHEDULE INJECTION. REQUESTS ONLY DR. OCONNOR.

## 2023-08-25 NOTE — TELEPHONE ENCOUNTER
Called pt back.   Bad connection.  She said she is wanting to get an injection for pain in her right shoulder area   Pt on Eliquis   I told her we need the requesting Dr office to fax us a cardiac clearance   She understood

## 2023-08-25 NOTE — TELEPHONE ENCOUNTER
Pt is requesting a call back at [phone] to see if she is able to have an injection in her left arm since she had bypass surgery.

## 2023-09-07 ENCOUNTER — OFFICE VISIT (OUTPATIENT)
Dept: ORTHOPEDIC SURGERY | Facility: CLINIC | Age: 68
End: 2023-09-07
Payer: MEDICARE

## 2023-09-07 ENCOUNTER — PREP FOR SURGERY (OUTPATIENT)
Dept: OTHER | Facility: HOSPITAL | Age: 68
End: 2023-09-07
Payer: MEDICARE

## 2023-09-07 ENCOUNTER — TELEPHONE (OUTPATIENT)
Dept: ORTHOPEDIC SURGERY | Facility: CLINIC | Age: 68
End: 2023-09-07

## 2023-09-07 VITALS — HEART RATE: 58 BPM | BODY MASS INDEX: 32.14 KG/M2 | HEIGHT: 66 IN | WEIGHT: 200 LBS

## 2023-09-07 DIAGNOSIS — M25.512 ACUTE PAIN OF LEFT SHOULDER: Primary | ICD-10-CM

## 2023-09-07 DIAGNOSIS — R94.5 ABNORMAL RESULTS OF LIVER FUNCTION STUDIES: ICD-10-CM

## 2023-09-07 DIAGNOSIS — R79.1 ABNORMAL COAGULATION PROFILE: ICD-10-CM

## 2023-09-07 DIAGNOSIS — R94.02 ABNORMAL BRAIN SCAN: ICD-10-CM

## 2023-09-07 DIAGNOSIS — M19.012 OSTEOARTHRITIS OF LEFT GLENOHUMERAL JOINT: ICD-10-CM

## 2023-09-07 DIAGNOSIS — R78.71 ABNORMAL LEAD LEVEL IN BLOOD: ICD-10-CM

## 2023-09-07 DIAGNOSIS — M19.012 OSTEOARTHRITIS OF LEFT GLENOHUMERAL JOINT: Primary | ICD-10-CM

## 2023-09-07 RX ORDER — MELOXICAM 15 MG/1
15 TABLET ORAL ONCE
OUTPATIENT
Start: 2023-09-07 | End: 2023-09-07

## 2023-09-07 RX ORDER — PREGABALIN 75 MG/1
150 CAPSULE ORAL ONCE
OUTPATIENT
Start: 2023-09-07 | End: 2023-09-07

## 2023-09-07 RX ORDER — OXYCODONE HCL 10 MG/1
10 TABLET, FILM COATED, EXTENDED RELEASE ORAL ONCE
OUTPATIENT
Start: 2023-09-07 | End: 2023-09-07

## 2023-09-07 NOTE — PROGRESS NOTES
"     Patient ID: Andree Deluna is a 68 y.o. female.  Left shoulder pain  Follows up with left shoulder pain secondary to degenerative joint disease and a cuff tear had a steroid injection in June of last year with me and Fred in March of this year  Injection in March did not really help much has noted significant increasing pain with loss of motion  Review of Systems:        Objective:    Pulse 58   Ht 167.6 cm (66\")   Wt 90.7 kg (200 lb)   BMI 32.28 kg/m²     Physical Examination:  Left shoulder skin intact moderate pain over the bicep groove passive elevation 110 external rotation 10 internal rotation the left hip with pain and weakness on Speed, Pingree, supraspinatus testing.  Belly press noted for 4/5.  Sensory and motor exam are intact all distributions. Radial pulse is palpable and capillary refill is less than two seconds to all digits.       Imaging:   left Shoulder X-Ray  Indication: Shoulder pain denies trauma  AP Y and Lateral views  Findings: Progression of arthritis to now more moderate to severe arthritis with enlarging humeral osteophyte decreased joint spaces with mild posterior subluxation  no bony lesion  Soft tissues normal  decreased joint spaces  Hardware appropriately positioned not applicable      yes prior studies available for comparison.  Prior MRI demonstrated moderate degenerative joint disease with a high-grade bursal sided cuff tear  Assessment:    Left shoulder degenerative disease with cuff tearing    Plan:   Options discussed she would like to get left reverse total shoulder arthroplasty.  Postop sling dispensed, arrange cardiology clearance  Discussed the risks including bleeding, scar, infection, stiffness, nerve, artery, vein and tendon damage, instability, fracture, DVT, loss of life or limb. Issues of scapular notching and component revision were discussed. Questions were answered and addressed.  Greater than 15 minutes was spent demonstrating proper fit and use of the " device and signs to monitor for complications       Procedures          Disclaimer: Part of this note may be an electronic transcription/translation of spoken language to printed text using the Dragon Dictation System

## 2023-09-07 NOTE — TELEPHONE ENCOUNTER
Caller:  RODRIGUEZ     Relationship: SELF     Best call back number: [phone]    Who are you requesting to speak with (clinical staff, provider,  specific staff member): UNSURE    Do you know the name of the person who called: UNSURE    What was the call regarding: UNSURE    PT RECEIVED CALL AT 10:40 , DO NOT SEE MSG IN CHART REGARDING ANY CALL. WONDERING IF OFFICE MAY HAVE CALLED REGARDING SOMETHING , PLEASE ADVISE

## 2023-09-12 ENCOUNTER — TELEPHONE (OUTPATIENT)
Dept: CARDIOLOGY | Facility: CLINIC | Age: 68
End: 2023-09-12
Payer: MEDICARE

## 2023-09-14 ENCOUNTER — LAB (OUTPATIENT)
Dept: LAB | Facility: HOSPITAL | Age: 68
End: 2023-09-14
Payer: MEDICARE

## 2023-09-14 ENCOUNTER — OFFICE VISIT (OUTPATIENT)
Dept: CARDIOLOGY | Facility: CLINIC | Age: 68
End: 2023-09-14
Payer: MEDICARE

## 2023-09-14 VITALS
WEIGHT: 201 LBS | SYSTOLIC BLOOD PRESSURE: 139 MMHG | DIASTOLIC BLOOD PRESSURE: 85 MMHG | BODY MASS INDEX: 32.3 KG/M2 | HEIGHT: 66 IN | OXYGEN SATURATION: 94 % | HEART RATE: 60 BPM

## 2023-09-14 DIAGNOSIS — E78.5 DYSLIPIDEMIA: ICD-10-CM

## 2023-09-14 DIAGNOSIS — I48.0 PAROXYSMAL ATRIAL FIBRILLATION: ICD-10-CM

## 2023-09-14 DIAGNOSIS — Z79.01 LONG TERM (CURRENT) USE OF ANTICOAGULANTS: ICD-10-CM

## 2023-09-14 DIAGNOSIS — Z86.73 HISTORY OF CVA (CEREBROVASCULAR ACCIDENT): ICD-10-CM

## 2023-09-14 DIAGNOSIS — Z98.890 S/P MVR (MITRAL VALVE REPAIR): ICD-10-CM

## 2023-09-14 DIAGNOSIS — I25.810 CORONARY ARTERY DISEASE INVOLVING CORONARY BYPASS GRAFT OF NATIVE HEART WITHOUT ANGINA PECTORIS: ICD-10-CM

## 2023-09-14 DIAGNOSIS — I25.810 CORONARY ARTERY DISEASE INVOLVING CORONARY BYPASS GRAFT OF NATIVE HEART WITHOUT ANGINA PECTORIS: Primary | ICD-10-CM

## 2023-09-14 DIAGNOSIS — I10 ESSENTIAL HYPERTENSION: ICD-10-CM

## 2023-09-14 LAB
ALBUMIN SERPL-MCNC: 4.4 G/DL (ref 3.5–5.2)
ALBUMIN/GLOB SERPL: 1.5 G/DL
ALP SERPL-CCNC: 87 U/L (ref 39–117)
ALT SERPL W P-5'-P-CCNC: 10 U/L (ref 1–33)
ANION GAP SERPL CALCULATED.3IONS-SCNC: 7 MMOL/L (ref 5–15)
AST SERPL-CCNC: 14 U/L (ref 1–32)
BILIRUB SERPL-MCNC: 0.4 MG/DL (ref 0–1.2)
BUN SERPL-MCNC: 13 MG/DL (ref 8–23)
BUN/CREAT SERPL: 12.1 (ref 7–25)
CALCIUM SPEC-SCNC: 9.4 MG/DL (ref 8.6–10.5)
CHLORIDE SERPL-SCNC: 102 MMOL/L (ref 98–107)
CHOLEST SERPL-MCNC: 164 MG/DL (ref 0–200)
CO2 SERPL-SCNC: 31 MMOL/L (ref 22–29)
CREAT SERPL-MCNC: 1.07 MG/DL (ref 0.57–1)
EGFRCR SERPLBLD CKD-EPI 2021: 57.4 ML/MIN/1.73
GLOBULIN UR ELPH-MCNC: 2.9 GM/DL
GLUCOSE SERPL-MCNC: 103 MG/DL (ref 65–99)
HDLC SERPL-MCNC: 82 MG/DL (ref 40–60)
LDLC SERPL CALC-MCNC: 63 MG/DL (ref 0–100)
LDLC/HDLC SERPL: 0.73 {RATIO}
POTASSIUM SERPL-SCNC: 4.2 MMOL/L (ref 3.5–5.2)
PROT SERPL-MCNC: 7.3 G/DL (ref 6–8.5)
SODIUM SERPL-SCNC: 140 MMOL/L (ref 136–145)
TRIGL SERPL-MCNC: 110 MG/DL (ref 0–150)
VLDLC SERPL-MCNC: 19 MG/DL (ref 5–40)

## 2023-09-14 PROCEDURE — 80061 LIPID PANEL: CPT

## 2023-09-14 PROCEDURE — 80053 COMPREHEN METABOLIC PANEL: CPT

## 2023-09-14 PROCEDURE — 36415 COLL VENOUS BLD VENIPUNCTURE: CPT

## 2023-09-14 RX ORDER — BISOPROLOL FUMARATE 10 MG/1
10 TABLET, FILM COATED ORAL DAILY
Qty: 90 TABLET | Refills: 3 | Status: SHIPPED | OUTPATIENT
Start: 2023-09-14

## 2023-09-14 RX ORDER — FUROSEMIDE 40 MG/1
20 TABLET ORAL DAILY
Qty: 30 TABLET | Refills: 3 | Status: SHIPPED | OUTPATIENT
Start: 2023-09-14

## 2023-09-14 NOTE — PROGRESS NOTES
Subjective:     Encounter Date:09/14/2023      Patient ID: Andree Deluna is a 68 y.o. female.    Chief Complaint and history of present illness:     Follow-up for CAD, CABG, hypertension, dyslipidemia     History of present illness:     Ms.Elisa LOVELY Deluna has PMH of     CAD cardiac cath 5/1/2023 revealing multivessel coronary artery disease  CABG with LIMA to LAD, SVG to OM1 and SVG to PDA and mitral valve repair with physio #2 ring 5/6/202   Postop A-fib  Hypertension  Diastolic dysfunction-echo 8/7/2019 revealing septal asymmetric hypertrophy EF 60%, diastolic dysfunction  Dyslipidemia  CVA  80% right carotid disease  WEN  Carotid endarterectomy, cholecystectomy, tubal ligation, shoulder/knee/finger surgery  Family history of premature CAD father fatal MI at 54 mother fatal MI at 64.     Here for follow-up.  Denies any chest pain or shortness of breath.  Might need a shoulder surgery     Patient's arterial blood pressure is 139/85, heart rate 60, O2 sat of 94% on room air..  BMI is over 30.     Presented through emergency room 4/30/2023 with midsternal chest pain .   Work-up here 4/30/2023/5/1/2023 revealed elevated troponin at 54-->120.  Glucose elevated.  Potassium 3.4.  A1c of 5.8.  Chest x-ray stable cardiomegaly.  EKG done 4/30/2023 reviewed/interpreted by me reveals sinus rhythm with a rate of 74 bpm with  ST segment depression in 1 and aVL        Labs from 5/3/2023 reveal lipid profile with cholesterol 150, triglycerides 104, HDL 62, LDL 64.  A1c of 5.8.  CMP with a glucose of 108.  proBNP normal at 585 on 5/17/2023.  Labs from 5/18/2023 reveal BMP with a glucose of 102.  Labs from 9/14/2023 revealed normal CMP except glucose of 103, creatinine 1.07, EGFR 57..  Lipid profile with cholesterol 164, triglycerides 110, HDL 82, LDL 63.        Assessment:  :       CAD cardiac cath 5/1/2023 revealing multivessel coronary artery disease  CABG with LIMA to LAD, SVG to OM1 and SVG to PDA and mitral valve  repair with physio #2 ring 5/6/2023  Postop A-fib  Hypertension  Dyslipidemia  History of CVA 2019 and carotid disease, right CEA 2020  PAD  Hyperglycemia, prediabetes with A1c of 5.8  Obesity with BMI over 30           Recommendations / Plan:            Reviewed labs with patient.  Will decrease furosemide in half and change metoprolol to bisoprolol  Continue aspirin, Eliquis, bisoprolol lisinopril, furosemide, KCl as tolerated.  Recheck labs before visit.  Reviewed BMI over 30 counseled on weight loss diet and exercise.  We will wait at least 6 months after open heart surgery for shoulder surgery.      Cardiac cath 5/1/2023 which revealed severe triple-vessel disease.  Patient underwent three-vessel CABG and mitral valve repair 5/6/2023    Procedures    Copied text in this portion of the note has been reviewed and is accurate as of 9/14/2023  The following portions of the patient's history were reviewed and updated as appropriate: allergies, current medications, past family history, past medical history, past social history, past surgical history and problem list.    Assessment:         MDM       Diagnosis Plan   1. Coronary artery disease involving coronary bypass graft of native heart without angina pectoris  Comprehensive Metabolic Panel    Lipid Panel      2. Paroxysmal atrial fibrillation  Comprehensive Metabolic Panel    Lipid Panel      3. Long term (current) use of anticoagulants  Comprehensive Metabolic Panel    Lipid Panel      4. Dyslipidemia  Comprehensive Metabolic Panel    Lipid Panel      5. S/P mitral valve repair per Dr. Noonan 5/6/2023  Comprehensive Metabolic Panel    Lipid Panel      6. Essential hypertension  Comprehensive Metabolic Panel    Lipid Panel      7. History of CVA (cerebrovascular accident)  Comprehensive Metabolic Panel    Lipid Panel             Plan:               Past Medical History:  Past Medical History:   Diagnosis Date    ADHD unknown    Anemia     Anxiety      Carotid artery disease     80% right internal carotid artery    CVA (cerebral vascular accident)     right parietal right temporal    DDD (degenerative disc disease), lumbar     Depression     Extremity pain     Ankle pain    GERD (gastroesophageal reflux disease)     Hyperlipidemia unknown    Hypertension     Insomnia unknown    Laryngopharyngeal reflux     Low back pain     Mood disorder     Obesity unknown    Skin cancer of face     Sleep apnea     Suspected sleep apnea she has refused to be tested    Stroke (cerebrum)     Visual field defect     Left     Past Surgical History:  Past Surgical History:   Procedure Laterality Date    CARDIAC CATHETERIZATION N/A 5/1/2023    Procedure: Left Heart Cath;  Surgeon: Kye Alcaraz MD;  Location: [facility] CATH INVASIVE LOCATION;  Service: Cardiovascular;  Laterality: N/A;    CAROTID ENDARTERECTOMY Right 11/10/2020    Procedure: CAROTID ENDARTERECTOMY;  Surgeon: Tavo Das MD;  Location: [facility] MAIN OR;  Service: Vascular;  Laterality: Right;    CHOLECYSTECTOMY      COLONOSCOPY      2016    CORONARY ARTERY BYPASS GRAFT N/A 5/6/2023    Procedure: CORONARY ARTERY BYPASS GRAFTING;  Surgeon: Errol Noonan MD;  Location: [facility];  Service: Cardiothoracic;  Laterality: N/A;  CABG X  3 using LIMA and right endospahenous vein;  2 coronary markers implanted.     FINGER SURGERY      KNEE ARTHROPLASTY      KNEE SURGERY Right     replaced    LAPAROSCOPIC GASTRIC BANDING      MITRAL VALVE REPAIR/REPLACEMENT N/A 5/6/2023    Procedure: MITRAL VALVE REPAIR/REPLACEMENT;  Surgeon: Errol Noonan MD;  Location: [facility];  Service: Cardiothoracic;  Laterality: N/A;  Mitral valve repaired using 28 mm Jorge Physio II Annuloplasty Ring.     ROTATOR CUFF REPAIR Right 2019    SHOULDER SURGERY Right 05/24/2019    scope/ cuff repair    TRANSESOPHAGEAL ECHOCARDIOGRAM (GALA) N/A 5/6/2023    Procedure: TRANSESOPHAGEAL ECHOCARDIOGRAM WITH ANESTHESIA;   Surgeon: Errol Noonan MD;  Location: [facility];  Service: Cardiothoracic;  Laterality: N/A;    TUBAL ABDOMINAL LIGATION        Allergies:  No Known Allergies  Home Meds:  Current Meds:     Current Outpatient Medications:     acetaminophen (TYLENOL) 325 MG tablet, Take 2 tablets by mouth Every 4 (Four) Hours As Needed for Mild Pain., Disp: , Rfl:     ALPRAZolam (XANAX) 2 MG tablet, Take 1 tablet by mouth At Night As Needed for Anxiety. Indications: Feeling Anxious, Disp: 30 tablet, Rfl: 1    apixaban (ELIQUIS) 5 MG tablet tablet, Take 1 tablet by mouth Every 12 (Twelve) Hours. Indications: Atrial Fibrillation, Disp: 180 tablet, Rfl: 3    aspirin 81 MG chewable tablet, Chew 1 tablet Daily. Indications: antiplatelet, Disp: 90 tablet, Rfl: 3    atorvastatin (LIPITOR) 80 MG tablet, Take 1 tablet by mouth Every Night. Indications: High Amount of Fats in the Blood, Disp: 90 tablet, Rfl: 3    furosemide (LASIX) 40 MG tablet, Take 0.5 tablets by mouth Daily. Indications: Edema, Disp: 30 tablet, Rfl: 3    lamoTRIgine (LaMICtal) 100 MG tablet, Take 1 tablet by mouth 2 (Two) Times a Day. Indications: Manic-Depression, Disp: , Rfl:     lisinopril (PRINIVIL,ZESTRIL) 20 MG tablet, Take 1 tablet by mouth Daily. Indications: High Blood Pressure Disorder, Disp: 90 tablet, Rfl: 1    omeprazole (priLOSEC) 40 MG capsule, TAKE ONE CAPSULE BY MOUTH DAILY, Disp: 90 capsule, Rfl: 1    oxybutynin (DITROPAN) 5 MG tablet, Take 1 tablet by mouth Daily., Disp: 90 tablet, Rfl: 1    potassium chloride (K-DUR,KLOR-CON) 20 MEQ CR tablet, Take 2 tablets by mouth Daily. Indications: Low Amount of Potassium in the Blood, Disp: 180 tablet, Rfl: 1    bisoprolol (ZEBeta) 10 MG tablet, Take 1 tablet by mouth Daily., Disp: 90 tablet, Rfl: 3  Social History:   Social History     Tobacco Use    Smoking status: Never    Smokeless tobacco: Never   Substance Use Topics    Alcohol use: No      Family History:  Family History   Problem Relation Age of  "Onset    Diabetes Other     Heart disease Mother     Diabetes Mother     Heart disease Father               Review of Systems   Cardiovascular:  Negative for chest pain, leg swelling and palpitations.   Respiratory:  Negative for shortness of breath.    Neurological:  Negative for dizziness and numbness.   All other systems are negative         Objective:     Physical Exam  /85 (BP Location: Left arm, Patient Position: Sitting, Cuff Size: Large Adult)   Pulse 60   Ht 167.6 cm (66\")   Wt 91.2 kg (201 lb)   SpO2 94%   BMI 32.44 kg/m²   General:  Appears in no acute distress  Eyes: Sclera is anicteric,  conjunctiva is clear   HEENT:  No JVD.  No carotid bruits  Respiratory: Respirations regular and unlabored at rest.  Clear to auscultation  Cardiovascular: S1,S2 Regular rate and rhythm. No murmur, rub or gallop auscultated.   Extremities: No digital clubbing or cyanosis, no edema  Skin: Color pink. Skin warm and dry to touch. No rashes  No xanthoma  Neuro: Alert and awake.    Lab Reviewed:         Kye Alcaraz MD  9/14/2023 14:39 EDT      EMR Dragon/Transcription:   \"Dictated utilizing Dragon dictation\".        "

## 2023-09-20 RX ORDER — AMIODARONE HYDROCHLORIDE 200 MG/1
TABLET ORAL
Qty: 97 TABLET | OUTPATIENT
Start: 2023-09-20

## 2023-10-04 RX ORDER — LAMOTRIGINE 100 MG/1
TABLET ORAL
Qty: 60 TABLET | Refills: 2 | Status: SHIPPED | OUTPATIENT
Start: 2023-10-04

## 2023-10-08 DIAGNOSIS — F41.9 ANXIETY: ICD-10-CM

## 2023-10-09 ENCOUNTER — TELEPHONE (OUTPATIENT)
Dept: CARDIOLOGY | Facility: CLINIC | Age: 68
End: 2023-10-09
Payer: MEDICARE

## 2023-10-09 NOTE — TELEPHONE ENCOUNTER
FACILITY: [facility]  DR: GABRIELA OCONNOR  PHONE: [phone]  FAX: [phone]  PROCEDURE: LEFT REVERSE TOTAL SHOULDER  SCHEDULED: 11/8/23  MEDS TO HOLD: ELIQUIS & ASPIRIN    PLACED ON 'S DESK FOR REVIEW.

## 2023-10-10 RX ORDER — ALPRAZOLAM 2 MG/1
2 TABLET ORAL NIGHTLY PRN
Qty: 30 TABLET | Refills: 5 | Status: SHIPPED | OUTPATIENT
Start: 2023-10-10

## 2023-10-11 ENCOUNTER — TELEPHONE (OUTPATIENT)
Dept: CARDIOLOGY | Facility: CLINIC | Age: 68
End: 2023-10-11
Payer: MEDICARE

## 2023-10-12 ENCOUNTER — TELEPHONE (OUTPATIENT)
Dept: ORTHOPEDIC SURGERY | Facility: CLINIC | Age: 68
End: 2023-10-12
Payer: MEDICARE

## 2023-10-12 NOTE — TELEPHONE ENCOUNTER
Hub staff attempted to follow warm transfer process and was unsuccessful     Caller: Andree Deluna    Relationship to patient: Self    Best call back number: [phone]    Patient is needing: PATIENT HAS SOME UPCOMING PROCEDURES SHE WOULD LIKE TO RESCHEDULED DUE TO [employer] INSURANCE OUT OF NETWORK. WOULD ALSO LIKE TO DISCUSS IN OFFICE INJECTION OPTIONS. PLEASE CALL TO DISCUSS. DO NOT LEAVE VOICEMAIL.

## 2023-10-12 NOTE — TELEPHONE ENCOUNTER
I called and spoke to patient and she plans to make appointment after her new insurance goes into effect 11/2/2023.

## 2023-10-20 PROBLEM — M19.012 OSTEOARTHRITIS OF LEFT GLENOHUMERAL JOINT: Status: ACTIVE | Noted: 2023-09-07

## 2023-10-24 RX ORDER — OMEPRAZOLE 40 MG/1
CAPSULE, DELAYED RELEASE ORAL
Qty: 90 CAPSULE | Refills: 1 | Status: SHIPPED | OUTPATIENT
Start: 2023-10-24

## 2023-11-01 ENCOUNTER — LAB (OUTPATIENT)
Dept: LAB | Facility: HOSPITAL | Age: 68
End: 2023-11-01
Payer: MEDICARE

## 2023-11-01 ENCOUNTER — HOSPITAL ENCOUNTER (OUTPATIENT)
Dept: CARDIOLOGY | Facility: HOSPITAL | Age: 68
Discharge: HOME | End: 2023-11-01
Payer: MEDICARE

## 2023-11-01 ENCOUNTER — PRE-ADMISSION TESTING (OUTPATIENT)
Dept: PREADMISSION TESTING | Facility: HOSPITAL | Age: 68
End: 2023-11-01
Payer: MEDICARE

## 2023-11-01 VITALS
HEART RATE: 54 BPM | DIASTOLIC BLOOD PRESSURE: 83 MMHG | RESPIRATION RATE: 24 BRPM | WEIGHT: 204.4 LBS | TEMPERATURE: 98.2 F | SYSTOLIC BLOOD PRESSURE: 152 MMHG | OXYGEN SATURATION: 96 % | BODY MASS INDEX: 32.85 KG/M2 | HEIGHT: 66 IN

## 2023-11-01 DIAGNOSIS — R79.1 ABNORMAL COAGULATION PROFILE: ICD-10-CM

## 2023-11-01 DIAGNOSIS — R78.71 ABNORMAL LEAD LEVEL IN BLOOD: ICD-10-CM

## 2023-11-01 DIAGNOSIS — R94.02 ABNORMAL BRAIN SCAN: ICD-10-CM

## 2023-11-01 DIAGNOSIS — R94.5 ABNORMAL RESULTS OF LIVER FUNCTION STUDIES: ICD-10-CM

## 2023-11-01 DIAGNOSIS — M19.012 OSTEOARTHRITIS OF LEFT GLENOHUMERAL JOINT: ICD-10-CM

## 2023-11-01 LAB
ABO GROUP BLD: NORMAL
ANION GAP SERPL CALCULATED.3IONS-SCNC: 10.9 MMOL/L (ref 5–15)
APTT PPP: 30.5 SECONDS (ref 24–31)
BASOPHILS # BLD AUTO: 0.06 10*3/MM3 (ref 0–0.2)
BASOPHILS NFR BLD AUTO: 1 % (ref 0–1.5)
BLD GP AB SCN SERPL QL: NEGATIVE
BUN SERPL-MCNC: 18 MG/DL (ref 8–23)
BUN/CREAT SERPL: 14.5 (ref 7–25)
CALCIUM SPEC-SCNC: 9.4 MG/DL (ref 8.6–10.5)
CHLORIDE SERPL-SCNC: 102 MMOL/L (ref 98–107)
CO2 SERPL-SCNC: 29.1 MMOL/L (ref 22–29)
CREAT SERPL-MCNC: 1.24 MG/DL (ref 0.57–1)
DEPRECATED RDW RBC AUTO: 41.5 FL (ref 37–54)
EGFRCR SERPLBLD CKD-EPI 2021: 48.1 ML/MIN/1.73
EOSINOPHIL # BLD AUTO: 0.19 10*3/MM3 (ref 0–0.4)
EOSINOPHIL NFR BLD AUTO: 3.2 % (ref 0.3–6.2)
ERYTHROCYTE [DISTWIDTH] IN BLOOD BY AUTOMATED COUNT: 12.8 % (ref 12.3–15.4)
GLUCOSE SERPL-MCNC: 101 MG/DL (ref 65–99)
HBA1C MFR BLD: 5.6 % (ref 4.8–5.6)
HCT VFR BLD AUTO: 37.3 % (ref 34–46.6)
HGB BLD-MCNC: 11.7 G/DL (ref 12–15.9)
IMM GRANULOCYTES # BLD AUTO: 0.01 10*3/MM3 (ref 0–0.05)
IMM GRANULOCYTES NFR BLD AUTO: 0.2 % (ref 0–0.5)
INR PPP: 1.1 (ref 0.93–1.1)
LYMPHOCYTES # BLD AUTO: 1.47 10*3/MM3 (ref 0.7–3.1)
LYMPHOCYTES NFR BLD AUTO: 25 % (ref 19.6–45.3)
MCH RBC QN AUTO: 27.9 PG (ref 26.6–33)
MCHC RBC AUTO-ENTMCNC: 31.4 G/DL (ref 31.5–35.7)
MCV RBC AUTO: 88.8 FL (ref 79–97)
MONOCYTES # BLD AUTO: 0.35 10*3/MM3 (ref 0.1–0.9)
MONOCYTES NFR BLD AUTO: 6 % (ref 5–12)
MRSA DNA SPEC QL NAA+PROBE: NORMAL
NEUTROPHILS NFR BLD AUTO: 3.8 10*3/MM3 (ref 1.7–7)
NEUTROPHILS NFR BLD AUTO: 64.6 % (ref 42.7–76)
NRBC BLD AUTO-RTO: 0 /100 WBC (ref 0–0.2)
PLATELET # BLD AUTO: 247 10*3/MM3 (ref 140–450)
PMV BLD AUTO: 10.9 FL (ref 6–12)
POTASSIUM SERPL-SCNC: 4.4 MMOL/L (ref 3.5–5.2)
PROTHROMBIN TIME: 11.9 SECONDS (ref 9.6–11.7)
QT INTERVAL: 512 MS
QTC INTERVAL: 493 MS
RBC # BLD AUTO: 4.2 10*6/MM3 (ref 3.77–5.28)
RH BLD: NEGATIVE
SODIUM SERPL-SCNC: 142 MMOL/L (ref 136–145)
T&S EXPIRATION DATE: NORMAL
WBC NRBC COR # BLD: 5.88 10*3/MM3 (ref 3.4–10.8)

## 2023-11-01 PROCEDURE — 85730 THROMBOPLASTIN TIME PARTIAL: CPT

## 2023-11-01 PROCEDURE — 86901 BLOOD TYPING SEROLOGIC RH(D): CPT

## 2023-11-01 PROCEDURE — 83036 HEMOGLOBIN GLYCOSYLATED A1C: CPT

## 2023-11-01 PROCEDURE — 86850 RBC ANTIBODY SCREEN: CPT

## 2023-11-01 PROCEDURE — 86900 BLOOD TYPING SEROLOGIC ABO: CPT

## 2023-11-01 PROCEDURE — 80048 BASIC METABOLIC PNL TOTAL CA: CPT

## 2023-11-01 PROCEDURE — 87641 MR-STAPH DNA AMP PROBE: CPT

## 2023-11-01 PROCEDURE — 36415 COLL VENOUS BLD VENIPUNCTURE: CPT

## 2023-11-01 PROCEDURE — 85025 COMPLETE CBC W/AUTO DIFF WBC: CPT

## 2023-11-01 PROCEDURE — 97165 OT EVAL LOW COMPLEX 30 MIN: CPT

## 2023-11-01 PROCEDURE — 93005 ELECTROCARDIOGRAM TRACING: CPT | Performed by: ORTHOPAEDIC SURGERY

## 2023-11-01 PROCEDURE — 85610 PROTHROMBIN TIME: CPT

## 2023-11-01 RX ORDER — LANOLIN ALCOHOL/MO/W.PET/CERES
1000 CREAM (GRAM) TOPICAL DAILY
COMMUNITY

## 2023-11-01 NOTE — THERAPY EVALUATION
Patient Name: Andree Deluna  : 1955    MRN: [id number]                              Today's Date: 2023       Admit Date: (Not on file)    Visit Dx: No diagnosis found.  Patient Active Problem List   Diagnosis    Anemia in other chronic diseases classified elsewhere    Anxiety    B12 deficiency    Attention deficit disorder of adult with hyperactivity    Mood disorder    Complete rotator cuff tear or rupture of right shoulder, not specified as traumatic    Degeneration of intervertebral disc of cervical region    Degeneration of intervertebral disc of lumbosacral region    Fatigue    Hyperlipidemia    Hypertension    Insomnia    Obesity    Osteoarthritis of knee    CVA (cerebral vascular accident)    Visual field loss left    Acute midline low back pain without sciatica    GERD with esophagitis    Carotid stenosis, bilateral    Overactive bladder    History of stroke    Acute pain of right shoulder    Left cervical radiculopathy    Migraine headache    Medicare annual wellness visit, subsequent    Moderate episode of recurrent major depressive disorder    Piriformis syndrome, right    Precordial pain    History of Grimaldo's esophagus    Chronic cough    PATEL (generalized anxiety disorder)    Chest pain, unspecified type    Elevated troponin    Non-STEMI (non-ST elevated myocardial infarction)    Coronary artery disease involving native coronary artery of native heart with unstable angina pectoris    Abnormal findings on diagnostic imaging of heart and coronary circulation    S/P CABG x 3 with LIMA per Dr. Noonan 2023    S/P mitral valve repair per Dr. Noonan 2023    Postoperative atrial fibrillation    Atrial fibrillation with rapid ventricular response    Pleural effusion on left    Dyspnea    DJD of left shoulder    Osteoarthritis of left glenohumeral joint     Past Medical History:   Diagnosis Date    ADHD unknown    Anemia     Anesthesia complication     difficult to wake up     Anxiety     Atrial fibrillation     Carotid artery disease     80% right internal carotid artery    Coronary artery disease     CVA (cerebral vascular accident)     right parietal right temporal    DDD (degenerative disc disease), lumbar     Depression     Extremity pain     Ankle pain    GERD (gastroesophageal reflux disease)     Hyperlipidemia unknown    Hypertension     Insomnia unknown    Laryngopharyngeal reflux     Low back pain     Mood disorder     Obesity unknown    Short of breath on exertion     Skin cancer of face     Sleep apnea     Suspected sleep apnea she has refused to be tested    Stroke (cerebrum)     Visual field defect     Left     Past Surgical History:   Procedure Laterality Date    CARDIAC CATHETERIZATION N/A 5/1/2023    Procedure: Left Heart Cath;  Surgeon: Kye Alcaraz MD;  Location: [facility] CATH INVASIVE LOCATION;  Service: Cardiovascular;  Laterality: N/A;    CAROTID ENDARTERECTOMY Right 11/10/2020    Procedure: CAROTID ENDARTERECTOMY;  Surgeon: Tavo Das MD;  Location: [facility] MAIN OR;  Service: Vascular;  Laterality: Right;    CHOLECYSTECTOMY      COLONOSCOPY      2016    CORONARY ARTERY BYPASS GRAFT N/A 5/6/2023    Procedure: CORONARY ARTERY BYPASS GRAFTING;  Surgeon: Errol Noonan MD;  Location: [facility];  Service: Cardiothoracic;  Laterality: N/A;  CABG X  3 using LIMA and right endospahenous vein;  2 coronary markers implanted.     FINGER SURGERY      KNEE ARTHROPLASTY      KNEE SURGERY Right     replaced    LAPAROSCOPIC GASTRIC BANDING      MITRAL VALVE REPAIR/REPLACEMENT N/A 5/6/2023    Procedure: MITRAL VALVE REPAIR/REPLACEMENT;  Surgeon: Errol Noonan MD;  Location: [facility];  Service: Cardiothoracic;  Laterality: N/A;  Mitral valve repaired using 28 mm Jorge Physio II Annuloplasty Ring.     ROTATOR CUFF REPAIR Right 2019    SHOULDER SURGERY Right 05/24/2019    scope/ cuff repair    TRANSESOPHAGEAL ECHOCARDIOGRAM (GALA) N/A  5/6/2023    Procedure: TRANSESOPHAGEAL ECHOCARDIOGRAM WITH ANESTHESIA;  Surgeon: Errol Noonan MD;  Location: [facility];  Service: Cardiothoracic;  Laterality: N/A;    TUBAL ABDOMINAL LIGATION        General Information       Row Name 11/01/23 1714 11/01/23 1500       OT Time and Intention    Document Type evaluation  -MP evaluation  -MP    Mode of Treatment individual therapy  -MP individual therapy  -MP      Row Name 11/01/23 1714 11/01/23 1500       General Information    Patient Profile Reviewed yes  -MP yes  -MP    Prior Level of Function independent:;ADL's  -MP --      Row Name 11/01/23 1714          Living Environment    People in Home spouse  -MP       Row Name 11/01/23 1714          Cognition    Orientation Status (Cognition) oriented x 3  -MP       Row Name 11/01/23 1714          Safety Issues, Functional Mobility    Impairments Affecting Function (Mobility) cognition;range of motion (ROM);pain  -MP               User Key  (r) = Recorded By, (t) = Taken By, (c) = Cosigned By      Initials Name Provider Type    Evin Rivera OT Occupational Therapist                     Mobility/ADL's    No documentation.                  Obj/Interventions       Row Name 11/01/23 1714          Range of Motion Comprehensive    Comment, General Range of Motion RUE WFL, L shoulder limited 90* flexion approx  -MP       Row Name 11/01/23 1714          Strength Comprehensive (MMT)    Comment, General Manual Muscle Testing (MMT) Assessment RUE WFL  -MP               User Key  (r) = Recorded By, (t) = Taken By, (c) = Cosigned By      Initials Name Provider Type    Evin Rivera OT Occupational Therapist                   Goals/Plan    No documentation.                  Clinical Impression       Row Name 11/01/23 1715          Pain Assessment    Pretreatment Pain Rating 4/10  -MP     Posttreatment Pain Rating 4/10  -MP     Pain Location - Side/Orientation Left  -MP     Pain Location - shoulder  -MP        Row Name 11/01/23 1715          Plan of Care Review    Plan of Care Reviewed With patient  -MP     Progress no change  -MP     Outcome Evaluation Pt. is 67 y/o female seen preoperatively for LUE rTSA planned 11/8. Pt. lives at home w/ spouse, maintains I/ADL independence at baseline. Pt. w/ h/o of CVA, reported memory deficits. Pt. and spouse educated on shoulder precautions, HEP, and hemidressing technique donning ice/sling w/ demonstration provided. Pt. anticipated d/c home w/ spouse support/assist and OP therapy to maximize post op benefits. Will reevaluate POD # 0.  -MP       Row Name 11/01/23 1715          Therapy Assessment/Plan (OT)    Rehab Potential (OT) good, to achieve stated therapy goals  -MP     Criteria for Skilled Therapeutic Interventions Met (OT) no;no problems identified which require skilled intervention;does not meet criteria for skilled intervention  -MP     Therapy Frequency (OT) 3 times/wk  -       Row Name 11/01/23 1715          Therapy Plan Review/Discharge Plan (OT)    Anticipated Discharge Disposition (OT) home with outpatient therapy services;home with assist  -MP               User Key  (r) = Recorded By, (t) = Taken By, (c) = Cosigned By      Initials Name Provider Type    Evin Rivera OT Occupational Therapist                   Outcome Measures    No documentation.                     OT Recommendation and Plan  Therapy Frequency (OT): 3 times/wk  Plan of Care Review  Plan of Care Reviewed With: patient  Progress: no change  Outcome Evaluation: Pt. is 67 y/o female seen preoperatively for LUE rTSA planned 11/8. Pt. lives at home w/ spouse, maintains I/ADL independence at baseline. Pt. w/ h/o of CVA, reported memory deficits. Pt. and spouse educated on shoulder precautions, HEP, and hemidressing technique donning ice/sling w/ demonstration provided. Pt. anticipated d/c home w/ spouse support/assist and OP therapy to maximize post op benefits. Will reevaluate POD #  0.     Time Calculation:         Time Calculation- OT       Row Name 11/01/23 1721             Time Calculation- OT    OT Start Time 1457  -MP      OT Stop Time 1525  -MP      OT Time Calculation (min) 28 min  -      Total Timed Code Minutes- OT 0 minute(s)  -MP      OT Received On 11/01/23  -      OT - Next Appointment 11/08/23  -                User Key  (r) = Recorded By, (t) = Taken By, (c) = Cosigned By      Initials Name Provider Type    Evin Rivera OT Occupational Therapist                  Therapy Charges for Today       Code Description Service Date Service Provider Modifiers Qty    71167094005 HC OT EVAL LOW COMPLEXITY 4 11/1/2023 Evin Hook OT GO 1                 Evin Hook OT  11/1/2023

## 2023-11-01 NOTE — PLAN OF CARE
Goal Outcome Evaluation:  Plan of Care Reviewed With: patient        Progress: no change  Outcome Evaluation: Pt. is 69 y/o female seen preoperatively for LUE rTSA planned 11/8. Pt. lives at home w/ spouse, maintains I/ADL independence at baseline. Pt. w/ h/o of CVA, reported memory deficits. Pt. and spouse educated on shoulder precautions, HEP, and hemidressing technique donning ice/sling w/ demonstration provided. Pt. anticipated d/c home w/ spouse support/assist and OP therapy to maximize post op benefits. Will reevaluate POD # 0.      Anticipated Discharge Disposition (OT): home with outpatient therapy services, home with assist

## 2023-11-02 RX ORDER — METOPROLOL SUCCINATE 25 MG/1
25 TABLET, EXTENDED RELEASE ORAL DAILY
Qty: 90 TABLET | Refills: 1 | Status: SHIPPED | OUTPATIENT
Start: 2023-11-02

## 2023-11-02 NOTE — PROGRESS NOTES
Morning El Andree is scheduled for upcoming shoulder replacement wanted to make u aware of her continued bump in creatinine in case anything needed to be done.  Thanks    Ciaran

## 2023-11-06 NOTE — H&P
[facility]   HISTORY AND PHYSICAL    Patient Name: Andree Deluna  : 1955  MRN: [id number]  Primary Care Physician:  Naga Griffith PA-C  Date of admission: (Not on file)    Subjective   Subjective     Chief Complaint: Left shoulder pain    This is a 68-year-old female presenting for shoulder arthroplasty denies any other acute complaints        Review of Systems   Cardiovascular:  Negative for chest pain.   Musculoskeletal:  Positive for arthralgias.        Personal History     Past Medical History:   Diagnosis Date    ADHD unknown    Anemia     Anesthesia complication     difficult to wake up    Anxiety     Atrial fibrillation     Carotid artery disease     80% right internal carotid artery    Coronary artery disease     CVA (cerebral vascular accident)     right parietal right temporal    DDD (degenerative disc disease), lumbar     Depression     Extremity pain     Ankle pain    GERD (gastroesophageal reflux disease)     Hyperlipidemia unknown    Hypertension     Insomnia unknown    Laryngopharyngeal reflux     Low back pain     Mood disorder     Obesity unknown    Short of breath on exertion     Skin cancer of face     Sleep apnea     Suspected sleep apnea she has refused to be tested    Stroke (cerebrum)     Visual field defect     Left       Past Surgical History:   Procedure Laterality Date    CARDIAC CATHETERIZATION N/A 2023    Procedure: Left Heart Cath;  Surgeon: Kye Alcaraz MD;  Location: [facility] CATH INVASIVE LOCATION;  Service: Cardiovascular;  Laterality: N/A;    CAROTID ENDARTERECTOMY Right 11/10/2020    Procedure: CAROTID ENDARTERECTOMY;  Surgeon: Tavo Das MD;  Location: [facility] MAIN OR;  Service: Vascular;  Laterality: Right;    CHOLECYSTECTOMY      COLONOSCOPY      2016    CORONARY ARTERY BYPASS GRAFT N/A 2023    Procedure: CORONARY ARTERY BYPASS GRAFTING;  Surgeon: Errol Noonan MD;  Location: [facility] CVOR;  Service: Cardiothoracic;   Laterality: N/A;  CABG X  3 using LIMA and right endospahenous vein;  2 coronary markers implanted.     FINGER SURGERY      KNEE ARTHROPLASTY      KNEE SURGERY Right     replaced    LAPAROSCOPIC GASTRIC BANDING      MITRAL VALVE REPAIR/REPLACEMENT N/A 5/6/2023    Procedure: MITRAL VALVE REPAIR/REPLACEMENT;  Surgeon: Errol Noonan MD;  [place] [facility]  Service: Cardiothoracic;  Laterality: N/A;  Mitral valve repaired using 28 mm Jorge Physio II Annuloplasty Ring.     ROTATOR CUFF REPAIR Right 2019    SHOULDER SURGERY Right 05/24/2019    scope/ cuff repair    TRANSESOPHAGEAL ECHOCARDIOGRAM (GALA) N/A 5/6/2023    Procedure: TRANSESOPHAGEAL ECHOCARDIOGRAM WITH ANESTHESIA;  Surgeon: Errol Noonan MD;  [place] [facility]  Service: Cardiothoracic;  Laterality: N/A;    TUBAL ABDOMINAL LIGATION         Family History: family history includes Diabetes in her mother and another family member; Heart disease in her father and mother. Otherwise pertinent FHx was reviewed and not pertinent to current issue.    Social History:  reports that she has never smoked. She has never used smokeless tobacco. She reports that she does not drink alcohol and does not use drugs.    Home Medications:  ALPRAZolam, acetaminophen, apixaban, aspirin, atorvastatin, bisoprolol, furosemide, lamoTRIgine, lisinopril, metoprolol succinate XL, omeprazole, oxybutynin, potassium chloride, and vitamin B-12    Allergies:  No Known Allergies    Objective    Left shoulder skin intact moderate pain over the bicep groove passive elevation 110 external rotation 10 internal rotation the left hip with pain and weakness on Speed, Twiggs, supraspinatus testing.  Belly press noted for 4/5.  Sensory and motor exam are intact all distributions. Radial pulse is palpable and capillary refill is less than two seconds to all digits.        Imaging:   left Shoulder X-Ray  Indication: Shoulder pain denies trauma  AP Y and Lateral  views  Findings: Progression of arthritis to now more moderate to severe arthritis with enlarging humeral osteophyte decreased joint spaces with mild posterior subluxation  no bony lesion  Soft tissues normal  decreased joint spaces  Hardware appropriately positioned not applicable        yes prior studies available for comparison.  Prior MRI demonstrated moderate degenerative joint disease with a high-grade bursal sided cuff tear  Assessment:    Left shoulder degenerative disease with cuff tearing     Plan:   Options discussed she would like to get left reverse total shoulder arthroplasty.  Postop sling dispensed, arrange cardiology clearance  Discussed the risks including bleeding, scar, infection, stiffness, nerve, artery, vein and tendon damage, instability, fracture, DVT, loss of life or limb. Issues of scapular notching and component revision were discussed. Questions were answered and addressed.  Greater than 15 minutes was spent demonstrating proper fit and use of the device and signs to monitor for complications     Shubham Samaniego MD

## 2023-11-07 ENCOUNTER — TELEPHONE (OUTPATIENT)
Dept: ORTHOPEDIC SURGERY | Facility: CLINIC | Age: 68
End: 2023-11-07
Payer: MEDICARE

## 2023-11-07 ENCOUNTER — ANESTHESIA EVENT (OUTPATIENT)
Dept: PERIOP | Facility: HOSPITAL | Age: 68
End: 2023-11-07
Payer: MEDICARE

## 2023-11-07 NOTE — TELEPHONE ENCOUNTER
Patient called in stating she has a head cold with symptoms of a running nose, head congestion and fatigue. I asked if she was running a fever, she denies having a fever.   She was concerned if she is still able to continue with surgery tomorrow with Dr Samaniego. I spoke with my  and she instructed me to tell the patient to proceed to hospital tomorrow and inform them at the time and let them decide if they would like to proceed with the surgery, if she is not running a fever. The patient voiced understanding and states she does not believe she has Covid but she wanted to make our office aware.

## 2023-11-08 ENCOUNTER — APPOINTMENT (OUTPATIENT)
Dept: GENERAL RADIOLOGY | Facility: HOSPITAL | Age: 68
End: 2023-11-08
Payer: MEDICARE

## 2023-11-08 ENCOUNTER — HOSPITAL ENCOUNTER (OUTPATIENT)
Facility: HOSPITAL | Age: 68
Discharge: HOME | End: 2023-11-09
Attending: ORTHOPAEDIC SURGERY | Admitting: ORTHOPAEDIC SURGERY
Payer: MEDICARE

## 2023-11-08 ENCOUNTER — ANESTHESIA (OUTPATIENT)
Dept: PERIOP | Facility: HOSPITAL | Age: 68
End: 2023-11-08
Payer: MEDICARE

## 2023-11-08 DIAGNOSIS — M19.012 OSTEOARTHRITIS OF LEFT GLENOHUMERAL JOINT: ICD-10-CM

## 2023-11-08 DIAGNOSIS — F41.9 ANXIETY: ICD-10-CM

## 2023-11-08 DIAGNOSIS — M19.012 PRIMARY OSTEOARTHRITIS OF LEFT SHOULDER: Primary | ICD-10-CM

## 2023-11-08 LAB
BASOPHILS # BLD AUTO: 0 10*3/MM3 (ref 0–0.2)
BASOPHILS NFR BLD AUTO: 0.2 % (ref 0–1.5)
DEPRECATED RDW RBC AUTO: 47.3 FL (ref 37–54)
EOSINOPHIL # BLD AUTO: 0 10*3/MM3 (ref 0–0.4)
EOSINOPHIL NFR BLD AUTO: 0 % (ref 0.3–6.2)
ERYTHROCYTE [DISTWIDTH] IN BLOOD BY AUTOMATED COUNT: 14.8 % (ref 12.3–15.4)
HCT VFR BLD AUTO: 35.3 % (ref 34–46.6)
HGB BLD-MCNC: 11.4 G/DL (ref 12–15.9)
LYMPHOCYTES # BLD AUTO: 0.5 10*3/MM3 (ref 0.7–3.1)
LYMPHOCYTES NFR BLD AUTO: 6.1 % (ref 19.6–45.3)
MCH RBC QN AUTO: 29.3 PG (ref 26.6–33)
MCHC RBC AUTO-ENTMCNC: 32.3 G/DL (ref 31.5–35.7)
MCV RBC AUTO: 90.8 FL (ref 79–97)
MONOCYTES # BLD AUTO: 0.5 10*3/MM3 (ref 0.1–0.9)
MONOCYTES NFR BLD AUTO: 5.4 % (ref 5–12)
NEUTROPHILS NFR BLD AUTO: 7.5 10*3/MM3 (ref 1.7–7)
NEUTROPHILS NFR BLD AUTO: 88.3 % (ref 42.7–76)
NRBC BLD AUTO-RTO: 0.1 /100 WBC (ref 0–0.2)
PLATELET # BLD AUTO: 207 10*3/MM3 (ref 140–450)
PMV BLD AUTO: 9.4 FL (ref 6–12)
QT INTERVAL: 512 MS
QTC INTERVAL: 493 MS
RBC # BLD AUTO: 3.88 10*6/MM3 (ref 3.77–5.28)
WBC NRBC COR # BLD: 8.5 10*3/MM3 (ref 3.4–10.8)

## 2023-11-08 PROCEDURE — 25010000002 ONDANSETRON PER 1 MG: Performed by: NURSE ANESTHETIST, CERTIFIED REGISTERED

## 2023-11-08 PROCEDURE — C1776 JOINT DEVICE (IMPLANTABLE): HCPCS | Performed by: ORTHOPAEDIC SURGERY

## 2023-11-08 PROCEDURE — 25010000002 ONDANSETRON PER 1 MG: Performed by: ORTHOPAEDIC SURGERY

## 2023-11-08 PROCEDURE — 25010000002 DEXAMETHASONE PER 1 MG: Performed by: NURSE ANESTHETIST, CERTIFIED REGISTERED

## 2023-11-08 PROCEDURE — 25810000003 SODIUM CHLORIDE 0.9 % SOLUTION: Performed by: ORTHOPAEDIC SURGERY

## 2023-11-08 PROCEDURE — 63710000001 OXYCODONE 10 MG TABLET EXTENDED-RELEASE 12 HOUR: Performed by: ORTHOPAEDIC SURGERY

## 2023-11-08 PROCEDURE — 63710000001 ATORVASTATIN 40 MG TABLET: Performed by: ORTHOPAEDIC SURGERY

## 2023-11-08 PROCEDURE — 25810000003 LACTATED RINGERS PER 1000 ML: Performed by: ANESTHESIOLOGY

## 2023-11-08 PROCEDURE — 25010000002 CEFAZOLIN PER 500 MG: Performed by: ORTHOPAEDIC SURGERY

## 2023-11-08 PROCEDURE — 63710000001 LISINOPRIL 20 MG TABLET: Performed by: ORTHOPAEDIC SURGERY

## 2023-11-08 PROCEDURE — A9270 NON-COVERED ITEM OR SERVICE: HCPCS | Performed by: ORTHOPAEDIC SURGERY

## 2023-11-08 PROCEDURE — 73030 X-RAY EXAM OF SHOULDER: CPT

## 2023-11-08 PROCEDURE — 63710000001 OXYCODONE 5 MG TABLET: Performed by: NURSE ANESTHETIST, CERTIFIED REGISTERED

## 2023-11-08 PROCEDURE — 85025 COMPLETE CBC W/AUTO DIFF WBC: CPT | Performed by: INTERNAL MEDICINE

## 2023-11-08 PROCEDURE — 25010000002 FENTANYL CITRATE (PF) 50 MCG/ML SOLUTION: Performed by: NURSE ANESTHETIST, CERTIFIED REGISTERED

## 2023-11-08 PROCEDURE — 25010000002 BUPIVACAINE (PF) 0.25 % SOLUTION 30 ML VIAL: Performed by: ORTHOPAEDIC SURGERY

## 2023-11-08 PROCEDURE — 0 BUPIVACAINE LIPOSOME 1.3 % SUSPENSION: Performed by: ANESTHESIOLOGY

## 2023-11-08 PROCEDURE — 25010000002 SUGAMMADEX 200 MG/2ML SOLUTION: Performed by: NURSE ANESTHETIST, CERTIFIED REGISTERED

## 2023-11-08 PROCEDURE — 25810000003 SODIUM CHLORIDE 0.9 % SOLUTION 250 ML FLEX CONT: Performed by: NURSE ANESTHETIST, CERTIFIED REGISTERED

## 2023-11-08 PROCEDURE — 25810000003 SODIUM CHLORIDE PER 500 ML: Performed by: ORTHOPAEDIC SURGERY

## 2023-11-08 PROCEDURE — A9270 NON-COVERED ITEM OR SERVICE: HCPCS | Performed by: NURSE ANESTHETIST, CERTIFIED REGISTERED

## 2023-11-08 PROCEDURE — 25010000002 LIDOCAINE 1 % SOLUTION 10 ML VIAL: Performed by: ORTHOPAEDIC SURGERY

## 2023-11-08 PROCEDURE — 63710000001 MELOXICAM 15 MG TABLET: Performed by: ORTHOPAEDIC SURGERY

## 2023-11-08 PROCEDURE — 23472 RECONSTRUCT SHOULDER JOINT: CPT | Performed by: ORTHOPAEDIC SURGERY

## 2023-11-08 PROCEDURE — 63710000001 LAMOTRIGINE 100 MG TABLET: Performed by: ORTHOPAEDIC SURGERY

## 2023-11-08 PROCEDURE — 97168 OT RE-EVAL EST PLAN CARE: CPT

## 2023-11-08 PROCEDURE — C1713 ANCHOR/SCREW BN/BN,TIS/BN: HCPCS | Performed by: ORTHOPAEDIC SURGERY

## 2023-11-08 PROCEDURE — 25010000002 BUPIVACAINE (PF) 0.5 % SOLUTION: Performed by: ANESTHESIOLOGY

## 2023-11-08 PROCEDURE — 25010000002 PHENYLEPHRINE 10 MG/ML SOLUTION 5 ML VIAL: Performed by: NURSE ANESTHETIST, CERTIFIED REGISTERED

## 2023-11-08 PROCEDURE — 25010000002 VANCOMYCIN 1 G RECONSTITUTED SOLUTION 1 EACH VIAL: Performed by: ORTHOPAEDIC SURGERY

## 2023-11-08 PROCEDURE — 63710000001 FUROSEMIDE 40 MG TABLET: Performed by: ORTHOPAEDIC SURGERY

## 2023-11-08 PROCEDURE — 25010000002 PROPOFOL 1000 MG/100ML EMULSION: Performed by: NURSE ANESTHETIST, CERTIFIED REGISTERED

## 2023-11-08 PROCEDURE — C9290 INJ, BUPIVACAINE LIPOSOME: HCPCS | Performed by: ANESTHESIOLOGY

## 2023-11-08 PROCEDURE — 25010000002 FENTANYL CITRATE (PF) 50 MCG/ML SOLUTION: Performed by: ANESTHESIOLOGY

## 2023-11-08 PROCEDURE — 23472 RECONSTRUCT SHOULDER JOINT: CPT | Performed by: PHYSICIAN ASSISTANT

## 2023-11-08 PROCEDURE — 63710000001: Performed by: ORTHOPAEDIC SURGERY

## 2023-11-08 PROCEDURE — 63710000001 PREGABALIN 75 MG CAPSULE: Performed by: ORTHOPAEDIC SURGERY

## 2023-11-08 PROCEDURE — 80048 BASIC METABOLIC PNL TOTAL CA: CPT | Performed by: ORTHOPAEDIC SURGERY

## 2023-11-08 PROCEDURE — 25010000002 CEFOXITIN PER 1 G: Performed by: ORTHOPAEDIC SURGERY

## 2023-11-08 PROCEDURE — 63710000001 ALPRAZOLAM 1 MG TABLET: Performed by: ORTHOPAEDIC SURGERY

## 2023-11-08 PROCEDURE — 25010000002 BUPIVACAINE (PF) 0.25 % SOLUTION: Performed by: ANESTHESIOLOGY

## 2023-11-08 DEVICE — TOTL SHLDER REV: Type: IMPLANTABLE DEVICE | Site: SHOULDER | Status: FUNCTIONAL

## 2023-11-08 DEVICE — STEM HUM NONPOR REV 11X130MM: Type: IMPLANTABLE DEVICE | Site: SHOULDER | Status: FUNCTIONAL

## 2023-11-08 DEVICE — BASEPLT GLEN TRABECULARMETAL REV 15MM: Type: IMPLANTABLE DEVICE | Site: SHOULDER | Status: FUNCTIONAL

## 2023-11-08 DEVICE — LINER HUM/SHLDR TRABECULARMETAL REV POLY 60DEG 36MM PLS3: Type: IMPLANTABLE DEVICE | Site: SHOULDER | Status: FUNCTIONAL

## 2023-11-08 DEVICE — SUT NONABS MAXBRAID/PE NMBR2 HC5 38IN BLU 900334: Type: IMPLANTABLE DEVICE | Site: SHOULDER | Status: FUNCTIONAL

## 2023-11-08 DEVICE — INVERSE/REVERSE SCREW SYSTEM, 4.5-30
Type: IMPLANTABLE DEVICE | Site: SHOULDER | Status: FUNCTIONAL
Brand: INVERSE/REVERSE

## 2023-11-08 DEVICE — GLENSPHR TRABECULARMETAL REV 36MM: Type: IMPLANTABLE DEVICE | Site: SHOULDER | Status: FUNCTIONAL

## 2023-11-08 RX ORDER — SODIUM CHLORIDE 0.9 % (FLUSH) 0.9 %
10 SYRINGE (ML) INJECTION AS NEEDED
Status: DISCONTINUED | OUTPATIENT
Start: 2023-11-08 | End: 2023-11-08 | Stop reason: HOSPADM

## 2023-11-08 RX ORDER — EPHEDRINE SULFATE 5 MG/ML
5 INJECTION INTRAVENOUS ONCE AS NEEDED
Status: DISCONTINUED | OUTPATIENT
Start: 2023-11-08 | End: 2023-11-08 | Stop reason: HOSPADM

## 2023-11-08 RX ORDER — LABETALOL HYDROCHLORIDE 5 MG/ML
5 INJECTION, SOLUTION INTRAVENOUS
Status: DISCONTINUED | OUTPATIENT
Start: 2023-11-08 | End: 2023-11-08 | Stop reason: HOSPADM

## 2023-11-08 RX ORDER — TRAMADOL HYDROCHLORIDE 50 MG/1
50 TABLET ORAL EVERY 6 HOURS PRN
Status: DISCONTINUED | OUTPATIENT
Start: 2023-11-08 | End: 2023-11-09 | Stop reason: HOSPADM

## 2023-11-08 RX ORDER — ONDANSETRON 2 MG/ML
4 INJECTION INTRAMUSCULAR; INTRAVENOUS ONCE AS NEEDED
Status: DISCONTINUED | OUTPATIENT
Start: 2023-11-08 | End: 2023-11-08 | Stop reason: HOSPADM

## 2023-11-08 RX ORDER — ACETAMINOPHEN 325 MG/1
650 TABLET ORAL EVERY 4 HOURS PRN
Status: DISCONTINUED | OUTPATIENT
Start: 2023-11-08 | End: 2023-11-09 | Stop reason: HOSPADM

## 2023-11-08 RX ORDER — FENTANYL CITRATE 50 UG/ML
INJECTION, SOLUTION INTRAMUSCULAR; INTRAVENOUS
Status: COMPLETED | OUTPATIENT
Start: 2023-11-08 | End: 2023-11-08

## 2023-11-08 RX ORDER — MORPHINE SULFATE 2 MG/ML
2 INJECTION, SOLUTION INTRAMUSCULAR; INTRAVENOUS EVERY 4 HOURS PRN
Status: DISCONTINUED | OUTPATIENT
Start: 2023-11-08 | End: 2023-11-09 | Stop reason: HOSPADM

## 2023-11-08 RX ORDER — DEXAMETHASONE SODIUM PHOSPHATE 4 MG/ML
INJECTION, SOLUTION INTRA-ARTICULAR; INTRALESIONAL; INTRAMUSCULAR; INTRAVENOUS; SOFT TISSUE AS NEEDED
Status: DISCONTINUED | OUTPATIENT
Start: 2023-11-08 | End: 2023-11-08 | Stop reason: SURG

## 2023-11-08 RX ORDER — BUPIVACAINE HYDROCHLORIDE 2.5 MG/ML
INJECTION, SOLUTION EPIDURAL; INFILTRATION; INTRACAUDAL
Status: DISCONTINUED | OUTPATIENT
Start: 2023-11-08 | End: 2023-11-08

## 2023-11-08 RX ORDER — FUROSEMIDE 40 MG/1
40 TABLET ORAL DAILY
Status: DISCONTINUED | OUTPATIENT
Start: 2023-11-08 | End: 2023-11-09 | Stop reason: HOSPADM

## 2023-11-08 RX ORDER — OXYCODONE HCL 10 MG/1
10 TABLET, FILM COATED, EXTENDED RELEASE ORAL EVERY 12 HOURS SCHEDULED
Status: DISCONTINUED | OUTPATIENT
Start: 2023-11-08 | End: 2023-11-09 | Stop reason: HOSPADM

## 2023-11-08 RX ORDER — BISACODYL 10 MG
10 SUPPOSITORY, RECTAL RECTAL DAILY PRN
Status: DISCONTINUED | OUTPATIENT
Start: 2023-11-08 | End: 2023-11-09 | Stop reason: HOSPADM

## 2023-11-08 RX ORDER — ACETAMINOPHEN 325 MG/1
650 TABLET ORAL ONCE AS NEEDED
Status: DISCONTINUED | OUTPATIENT
Start: 2023-11-08 | End: 2023-11-08 | Stop reason: HOSPADM

## 2023-11-08 RX ORDER — BUPIVACAINE HYDROCHLORIDE 5 MG/ML
INJECTION, SOLUTION EPIDURAL; INTRACAUDAL
Status: DISCONTINUED | OUTPATIENT
Start: 2023-11-08 | End: 2023-11-08 | Stop reason: SURG

## 2023-11-08 RX ORDER — LISINOPRIL 20 MG/1
20 TABLET ORAL
Status: DISCONTINUED | OUTPATIENT
Start: 2023-11-08 | End: 2023-11-09 | Stop reason: HOSPADM

## 2023-11-08 RX ORDER — DROPERIDOL 2.5 MG/ML
0.62 INJECTION, SOLUTION INTRAMUSCULAR; INTRAVENOUS ONCE AS NEEDED
Status: DISCONTINUED | OUTPATIENT
Start: 2023-11-08 | End: 2023-11-08 | Stop reason: HOSPADM

## 2023-11-08 RX ORDER — ACETAMINOPHEN 650 MG
TABLET, EXTENDED RELEASE ORAL AS NEEDED
Status: DISCONTINUED | OUTPATIENT
Start: 2023-11-08 | End: 2023-11-08 | Stop reason: HOSPADM

## 2023-11-08 RX ORDER — SODIUM CHLORIDE 9 MG/ML
INJECTION, SOLUTION INTRAVENOUS AS NEEDED
Status: DISCONTINUED | OUTPATIENT
Start: 2023-11-08 | End: 2023-11-08 | Stop reason: HOSPADM

## 2023-11-08 RX ORDER — HYDRALAZINE HYDROCHLORIDE 20 MG/ML
5 INJECTION INTRAMUSCULAR; INTRAVENOUS
Status: DISCONTINUED | OUTPATIENT
Start: 2023-11-08 | End: 2023-11-08 | Stop reason: HOSPADM

## 2023-11-08 RX ORDER — ALPRAZOLAM 1 MG/1
2 TABLET ORAL NIGHTLY PRN
Status: DISCONTINUED | OUTPATIENT
Start: 2023-11-08 | End: 2023-11-09 | Stop reason: HOSPADM

## 2023-11-08 RX ORDER — OXYCODONE HYDROCHLORIDE 5 MG/1
10 TABLET ORAL EVERY 4 HOURS PRN
Status: DISCONTINUED | OUTPATIENT
Start: 2023-11-08 | End: 2023-11-09 | Stop reason: HOSPADM

## 2023-11-08 RX ORDER — METOPROLOL SUCCINATE 25 MG/1
25 TABLET, EXTENDED RELEASE ORAL DAILY
Status: DISCONTINUED | OUTPATIENT
Start: 2023-11-09 | End: 2023-11-08

## 2023-11-08 RX ORDER — IPRATROPIUM BROMIDE AND ALBUTEROL SULFATE 2.5; .5 MG/3ML; MG/3ML
3 SOLUTION RESPIRATORY (INHALATION) ONCE AS NEEDED
Status: DISCONTINUED | OUTPATIENT
Start: 2023-11-08 | End: 2023-11-08 | Stop reason: HOSPADM

## 2023-11-08 RX ORDER — MELOXICAM 15 MG/1
15 TABLET ORAL ONCE
Status: COMPLETED | OUTPATIENT
Start: 2023-11-08 | End: 2023-11-08

## 2023-11-08 RX ORDER — FLUMAZENIL 0.1 MG/ML
0.2 INJECTION INTRAVENOUS AS NEEDED
Status: DISCONTINUED | OUTPATIENT
Start: 2023-11-08 | End: 2023-11-08 | Stop reason: HOSPADM

## 2023-11-08 RX ORDER — NALOXONE HCL 0.4 MG/ML
0.4 VIAL (ML) INJECTION
Status: DISCONTINUED | OUTPATIENT
Start: 2023-11-08 | End: 2023-11-09 | Stop reason: HOSPADM

## 2023-11-08 RX ORDER — DIPHENHYDRAMINE HCL 25 MG
25 CAPSULE ORAL EVERY 6 HOURS PRN
Status: DISCONTINUED | OUTPATIENT
Start: 2023-11-08 | End: 2023-11-09 | Stop reason: HOSPADM

## 2023-11-08 RX ORDER — DIPHENHYDRAMINE HCL 25 MG
25 CAPSULE ORAL NIGHTLY PRN
Status: DISCONTINUED | OUTPATIENT
Start: 2023-11-08 | End: 2023-11-09 | Stop reason: HOSPADM

## 2023-11-08 RX ORDER — PANTOPRAZOLE SODIUM 40 MG/1
40 TABLET, DELAYED RELEASE ORAL DAILY
Status: DISCONTINUED | OUTPATIENT
Start: 2023-11-09 | End: 2023-11-09 | Stop reason: HOSPADM

## 2023-11-08 RX ORDER — SODIUM CHLORIDE 9 MG/ML
75 INJECTION, SOLUTION INTRAVENOUS CONTINUOUS
Status: DISCONTINUED | OUTPATIENT
Start: 2023-11-08 | End: 2023-11-09

## 2023-11-08 RX ORDER — PROPOFOL 10 MG/ML
INJECTION, EMULSION INTRAVENOUS AS NEEDED
Status: DISCONTINUED | OUTPATIENT
Start: 2023-11-08 | End: 2023-11-08 | Stop reason: SURG

## 2023-11-08 RX ORDER — OXYBUTYNIN CHLORIDE 5 MG/1
5 TABLET ORAL DAILY
Status: DISCONTINUED | OUTPATIENT
Start: 2023-11-09 | End: 2023-11-09 | Stop reason: HOSPADM

## 2023-11-08 RX ORDER — OXYCODONE HYDROCHLORIDE 5 MG/1
5 TABLET ORAL ONCE AS NEEDED
Status: COMPLETED | OUTPATIENT
Start: 2023-11-08 | End: 2023-11-08

## 2023-11-08 RX ORDER — OXYCODONE HCL 10 MG/1
10 TABLET, FILM COATED, EXTENDED RELEASE ORAL ONCE
Status: COMPLETED | OUTPATIENT
Start: 2023-11-08 | End: 2023-11-08

## 2023-11-08 RX ORDER — BISOPROLOL FUMARATE 5 MG/1
10 TABLET, FILM COATED ORAL DAILY
Status: DISCONTINUED | OUTPATIENT
Start: 2023-11-09 | End: 2023-11-09 | Stop reason: HOSPADM

## 2023-11-08 RX ORDER — DIPHENHYDRAMINE HYDROCHLORIDE 50 MG/ML
25 INJECTION INTRAMUSCULAR; INTRAVENOUS NIGHTLY PRN
Status: DISCONTINUED | OUTPATIENT
Start: 2023-11-08 | End: 2023-11-09 | Stop reason: HOSPADM

## 2023-11-08 RX ORDER — ROCURONIUM BROMIDE 10 MG/ML
INJECTION, SOLUTION INTRAVENOUS AS NEEDED
Status: DISCONTINUED | OUTPATIENT
Start: 2023-11-08 | End: 2023-11-08 | Stop reason: SURG

## 2023-11-08 RX ORDER — ACETAMINOPHEN 650 MG/1
650 SUPPOSITORY RECTAL EVERY 4 HOURS PRN
Status: DISCONTINUED | OUTPATIENT
Start: 2023-11-08 | End: 2023-11-09 | Stop reason: HOSPADM

## 2023-11-08 RX ORDER — SODIUM CHLORIDE, SODIUM LACTATE, POTASSIUM CHLORIDE, CALCIUM CHLORIDE 600; 310; 30; 20 MG/100ML; MG/100ML; MG/100ML; MG/100ML
1000 INJECTION, SOLUTION INTRAVENOUS CONTINUOUS
Status: DISCONTINUED | OUTPATIENT
Start: 2023-11-08 | End: 2023-11-08

## 2023-11-08 RX ORDER — POTASSIUM CHLORIDE 20 MEQ/1
40 TABLET, EXTENDED RELEASE ORAL DAILY
Status: DISCONTINUED | OUTPATIENT
Start: 2023-11-09 | End: 2023-11-09 | Stop reason: HOSPADM

## 2023-11-08 RX ORDER — LIDOCAINE HYDROCHLORIDE 10 MG/ML
0.5 INJECTION, SOLUTION INFILTRATION; PERINEURAL ONCE AS NEEDED
Status: DISCONTINUED | OUTPATIENT
Start: 2023-11-08 | End: 2023-11-08 | Stop reason: HOSPADM

## 2023-11-08 RX ORDER — EPHEDRINE SULFATE 5 MG/ML
INJECTION INTRAVENOUS AS NEEDED
Status: DISCONTINUED | OUTPATIENT
Start: 2023-11-08 | End: 2023-11-08 | Stop reason: SURG

## 2023-11-08 RX ORDER — NALOXONE HCL 0.4 MG/ML
0.4 VIAL (ML) INJECTION AS NEEDED
Status: DISCONTINUED | OUTPATIENT
Start: 2023-11-08 | End: 2023-11-08 | Stop reason: HOSPADM

## 2023-11-08 RX ORDER — FENTANYL CITRATE 50 UG/ML
50 INJECTION, SOLUTION INTRAMUSCULAR; INTRAVENOUS
Status: DISCONTINUED | OUTPATIENT
Start: 2023-11-08 | End: 2023-11-08 | Stop reason: HOSPADM

## 2023-11-08 RX ORDER — ONDANSETRON 2 MG/ML
4 INJECTION INTRAMUSCULAR; INTRAVENOUS EVERY 6 HOURS PRN
Status: DISCONTINUED | OUTPATIENT
Start: 2023-11-08 | End: 2023-11-09 | Stop reason: HOSPADM

## 2023-11-08 RX ORDER — ONDANSETRON 2 MG/ML
INJECTION INTRAMUSCULAR; INTRAVENOUS AS NEEDED
Status: DISCONTINUED | OUTPATIENT
Start: 2023-11-08 | End: 2023-11-08 | Stop reason: SURG

## 2023-11-08 RX ORDER — PREGABALIN 75 MG/1
150 CAPSULE ORAL ONCE
Status: COMPLETED | OUTPATIENT
Start: 2023-11-08 | End: 2023-11-08

## 2023-11-08 RX ORDER — ATORVASTATIN CALCIUM 40 MG/1
80 TABLET, FILM COATED ORAL NIGHTLY
Status: DISCONTINUED | OUTPATIENT
Start: 2023-11-08 | End: 2023-11-09 | Stop reason: HOSPADM

## 2023-11-08 RX ORDER — ONDANSETRON 4 MG/1
4 TABLET, FILM COATED ORAL EVERY 6 HOURS PRN
Status: DISCONTINUED | OUTPATIENT
Start: 2023-11-08 | End: 2023-11-09 | Stop reason: HOSPADM

## 2023-11-08 RX ORDER — LAMOTRIGINE 100 MG/1
100 TABLET ORAL 2 TIMES DAILY
Status: DISCONTINUED | OUTPATIENT
Start: 2023-11-08 | End: 2023-11-09 | Stop reason: HOSPADM

## 2023-11-08 RX ADMIN — OXYCODONE 5 MG: 5 TABLET ORAL at 13:27

## 2023-11-08 RX ADMIN — ATORVASTATIN CALCIUM 80 MG: 40 TABLET, FILM COATED ORAL at 22:03

## 2023-11-08 RX ADMIN — ONDANSETRON 4 MG: 2 INJECTION INTRAMUSCULAR; INTRAVENOUS at 17:51

## 2023-11-08 RX ADMIN — FENTANYL CITRATE 50 MCG: 50 INJECTION, SOLUTION INTRAMUSCULAR; INTRAVENOUS at 13:27

## 2023-11-08 RX ADMIN — ONDANSETRON 4 MG: 2 INJECTION INTRAMUSCULAR; INTRAVENOUS at 11:47

## 2023-11-08 RX ADMIN — ROCURONIUM BROMIDE 50 MG: 10 INJECTION, SOLUTION INTRAVENOUS at 10:35

## 2023-11-08 RX ADMIN — EPHEDRINE SULFATE 10 MG: 5 INJECTION INTRAVENOUS at 11:41

## 2023-11-08 RX ADMIN — PHENYLEPHRINE HYDROCHLORIDE 0.5 MCG/KG/MIN: 10 INJECTION INTRAVENOUS at 11:04

## 2023-11-08 RX ADMIN — OXYCODONE HYDROCHLORIDE 10 MG: 10 TABLET, FILM COATED, EXTENDED RELEASE ORAL at 22:03

## 2023-11-08 RX ADMIN — BENZOYL PEROXIDE: 50 EMULSION TOPICAL at 08:32

## 2023-11-08 RX ADMIN — FUROSEMIDE 40 MG: 40 TABLET ORAL at 15:40

## 2023-11-08 RX ADMIN — LIDOCAINE HYDROCHLORIDE 100 MG: 20 INJECTION, SOLUTION EPIDURAL; INFILTRATION; INTRACAUDAL; PERINEURAL at 10:35

## 2023-11-08 RX ADMIN — BUPIVACAINE HYDROCHLORIDE 10 ML: 5 INJECTION, SOLUTION EPIDURAL; INTRACAUDAL; PERINEURAL at 10:00

## 2023-11-08 RX ADMIN — LAMOTRIGINE 100 MG: 100 TABLET ORAL at 22:03

## 2023-11-08 RX ADMIN — PREGABALIN 150 MG: 75 CAPSULE ORAL at 08:53

## 2023-11-08 RX ADMIN — FENTANYL CITRATE 100 MCG: 50 INJECTION, SOLUTION INTRAMUSCULAR; INTRAVENOUS at 10:00

## 2023-11-08 RX ADMIN — SODIUM CHLORIDE, POTASSIUM CHLORIDE, SODIUM LACTATE AND CALCIUM CHLORIDE 1000 ML: 600; 310; 30; 20 INJECTION, SOLUTION INTRAVENOUS at 08:32

## 2023-11-08 RX ADMIN — DEXAMETHASONE SODIUM PHOSPHATE 4 MG: 4 INJECTION, SOLUTION INTRAMUSCULAR; INTRAVENOUS at 10:50

## 2023-11-08 RX ADMIN — PROPOFOL INJECTABLE EMULSION 150 MG: 10 INJECTION, EMULSION INTRAVENOUS at 10:35

## 2023-11-08 RX ADMIN — OXYCODONE HYDROCHLORIDE 10 MG: 10 TABLET, FILM COATED, EXTENDED RELEASE ORAL at 08:53

## 2023-11-08 RX ADMIN — SODIUM CHLORIDE 75 ML/HR: 9 INJECTION, SOLUTION INTRAVENOUS at 14:52

## 2023-11-08 RX ADMIN — MELOXICAM 15 MG: 15 TABLET ORAL at 08:53

## 2023-11-08 RX ADMIN — BUPIVACAINE 10 ML: 13.3 INJECTION, SUSPENSION, LIPOSOMAL INFILTRATION at 10:00

## 2023-11-08 RX ADMIN — SODIUM CHLORIDE 2 G: 900 INJECTION INTRAVENOUS at 19:38

## 2023-11-08 RX ADMIN — ALPRAZOLAM 2 MG: 1 TABLET ORAL at 19:38

## 2023-11-08 RX ADMIN — CEFAZOLIN 2000 MG: 2 INJECTION, POWDER, FOR SOLUTION INTRAMUSCULAR; INTRAVENOUS at 10:46

## 2023-11-08 RX ADMIN — EPHEDRINE SULFATE 10 MG: 5 INJECTION INTRAVENOUS at 10:47

## 2023-11-08 RX ADMIN — SUGAMMADEX 200 MG: 100 INJECTION, SOLUTION INTRAVENOUS at 12:01

## 2023-11-08 RX ADMIN — PROPOFOL INJECTABLE EMULSION 125 MCG/KG/MIN: 10 INJECTION, EMULSION INTRAVENOUS at 10:40

## 2023-11-08 RX ADMIN — LISINOPRIL 20 MG: 20 TABLET ORAL at 15:40

## 2023-11-08 NOTE — ANESTHESIA POSTPROCEDURE EVALUATION
Patient: Andree Deluna    Procedure Summary       Date: 11/08/23 Room / Location: [facility] OR 11 / [facility] MAIN OR    Anesthesia Start: 1029 Anesthesia Stop: 1225    Procedure: TOTAL SHOULDER REVERSE ARTHROPLASTY (Left: Shoulder) Diagnosis:       Osteoarthritis of left glenohumeral joint      (Osteoarthritis of left glenohumeral joint [M19.012])    Surgeons: Shubham Samaniego MD Provider: Farnaz Fowler MD    Anesthesia Type: general with block, ERAS Protocol ASA Status: 3            Anesthesia Type: general with block, ERAS Protocol    Vitals  Vitals Value Taken Time   /81 11/08/23 1326   Temp 96.2 °F (35.7 °C) 11/08/23 1219   Pulse 55 11/08/23 1328   Resp 14 11/08/23 1304   SpO2 95 % 11/08/23 1328   Vitals shown include unfiled device data.        Post Anesthesia Care and Evaluation    Patient location during evaluation: PACU  Patient participation: complete - patient participated  Level of consciousness: awake and alert  Pain management: satisfactory to patient    Airway patency: patent  Anesthetic complications: No anesthetic complications  PONV Status: none  Cardiovascular status: acceptable  Respiratory status: acceptable  Hydration status: acceptable

## 2023-11-08 NOTE — LETTER
[facility] CASE MANAGEMENT  [address] [place] [address]  [phone]  [phone]        November 9, 2023      Patient: Andree Deluna  YOB: 1955  Date of Visit: 9/13/2023      New referral for OP PT at [facility]  Feel free to call with any question     [phone]    Sherrie Hahn RN

## 2023-11-08 NOTE — PLAN OF CARE
Goal Outcome Evaluation:  Plan of Care Reviewed With: patient, spouse            Pt. reevaluated this date POD # 0 LUE rTSA. Pt. completes ambulatory transfer to chair w/ CGA. Moderate A provided for donning/doffing ice/sling this date. PROM of surgical shoulder completed to approx 90* flexion, and pt completed all other post-op exercises and demoing proper understanding. She will require reinforcement however, with previous CVAs affecting memory.  will be home to provide care, and he recently had a shoulder replacement thus is very familiar. Pt. will benefit from continued skilled occupational therapy to maximize surgical benefits and improve ADL function, recommend outpatient therapy at d/c.       Anticipated Discharge Disposition (OT): home with outpatient therapy services, home with assist

## 2023-11-08 NOTE — CONSULTS
[facility]   Consult Note    Patient Name: Andree Deluna  : 1955  MRN: [id number]  Primary Care Physician:  Naga Griffith PA-C  Referring Physician: Shubham Samaniego, *  Date of admission: 2023  Date and Time of Care: 23 @1743    Inpatient Hospitalist Consult  Consult performed by: Christine Mcconnell MD  Consult ordered by: Shubham Samaniego MD          Subjective      Reason for Consult/ Chief Complaint: Postoperative medical comanagement    Consult Requested By: Dr Samaniego    History of Present Illness: Andree Deluna is a 68 y.o. female with history of HLD, HTN, CAD/MI/CABG H, CVA, GERD, A-fib, generalized anxiety disorder, B12 deficiency, class II obesity and left shoulder degenerative joint disease who is day 0 status post reverse left shoulder arthroplasty.  Hospitalist service is contacted for consultative co-management of chronic medical problems.  Met patient after she already returned to her room on the nursing unit.  She reports 4-5 unprovoked vomiting without nausea, abdominal pain, diarrhea, fever, chills, urinary symptoms or respiratory symptoms.  For now, patient states that what she needs the most is being left to sleep.  I have reviewed most recent laboratory data dated 2023.    ROS at least 14 systems reviewed.  Relevant information as above    Personal History     Past Medical History:   Diagnosis Date    ADHD unknown    Anemia     Anesthesia complication     difficult to wake up    Anxiety     Atrial fibrillation     Carotid artery disease     80% right internal carotid artery    Coronary artery disease     CVA (cerebral vascular accident)     right parietal right temporal    DDD (degenerative disc disease), lumbar     Depression     Extremity pain     Ankle pain    GERD (gastroesophageal reflux disease)     Hyperlipidemia unknown    Hypertension     Insomnia unknown    Laryngopharyngeal reflux     Low back pain      Mood disorder     Obesity unknown    Short of breath on exertion     Skin cancer of face     Sleep apnea     Suspected sleep apnea she has refused to be tested    Stroke (cerebrum)     Visual field defect     Left       Past Surgical History:   Procedure Laterality Date    CARDIAC CATHETERIZATION N/A 5/1/2023    Procedure: Left Heart Cath;  Surgeon: Kye Alcaraz MD;  Location: [facility] CATH INVASIVE LOCATION;  Service: Cardiovascular;  Laterality: N/A;    CAROTID ENDARTERECTOMY Right 11/10/2020    Procedure: CAROTID ENDARTERECTOMY;  Surgeon: Tavo Das MD;  Location: [facility] MAIN OR;  Service: Vascular;  Laterality: Right;    CHOLECYSTECTOMY      COLONOSCOPY      2016    CORONARY ARTERY BYPASS GRAFT N/A 5/6/2023    Procedure: CORONARY ARTERY BYPASS GRAFTING;  Surgeon: Errol Noonan MD;  Location: [facility];  Service: Cardiothoracic;  Laterality: N/A;  CABG X  3 using LIMA and right endospahenous vein;  2 coronary markers implanted.     FINGER SURGERY      KNEE ARTHROPLASTY      KNEE SURGERY Right     replaced    LAPAROSCOPIC GASTRIC BANDING      MITRAL VALVE REPAIR/REPLACEMENT N/A 5/6/2023    Procedure: MITRAL VALVE REPAIR/REPLACEMENT;  Surgeon: Errol Noonan MD;  Location: [facility];  Service: Cardiothoracic;  Laterality: N/A;  Mitral valve repaired using 28 mm Jorge Physio II Annuloplasty Ring.     ROTATOR CUFF REPAIR Right 2019    SHOULDER SURGERY Right 05/24/2019    scope/ cuff repair    TRANSESOPHAGEAL ECHOCARDIOGRAM (GALA) N/A 5/6/2023    Procedure: TRANSESOPHAGEAL ECHOCARDIOGRAM WITH ANESTHESIA;  Surgeon: Errol Noonan MD;  Location: [facility];  Service: Cardiothoracic;  Laterality: N/A;    TUBAL ABDOMINAL LIGATION         Family History: family history includes Diabetes in her mother and another family member; Heart disease in her father and mother. Otherwise pertinent FHx was reviewed and not pertinent to current issue.    Social History:  reports  that she has never smoked. She has never used smokeless tobacco. She reports that she does not drink alcohol and does not use drugs.    Home Medications:   ALPRAZolam, acetaminophen, apixaban, aspirin, atorvastatin, bisoprolol, furosemide, lamoTRIgine, lisinopril, metoprolol succinate XL, omeprazole, oxybutynin, potassium chloride, and vitamin B-12    Allergies:  No Known Allergies      Objective      Vitals:  Temp:  [96.2 °F (35.7 °C)-97.9 °F (36.6 °C)] 97 °F (36.1 °C)  Heart Rate:  [51-73] 58  Resp:  [10-22] 16  BP: (115-156)/(58-82) 115/74  Flow (L/min):  [2-6] 2    Physical Exam   General: Obese female resting in bed, normal interaction, not in distress  Mental status: Alert and oriented x3  Head: NC/AT  Eyes: EOMI, nonicteric  Oropharynx: Moist mucous membrane, no thrush  Neck: Supple, no tenderness, no JVD  CVS: Regular heart sounds, no gallops or murmurs  Respiration: Unlabored breathing, no wheezes or rales  GI: Soft, nondistended, nontender, bowel sounds present  : No bladder distention, no CVA tenderness  Skin: Dry: Normal color, normal temperature  Extremity: No leg edema, no calf tenderness  Musculoskeletal: Right upper extremity immobilized with sling  Neurology: Normal speech, no facial droop, moves all extremities  Psychiatry: Normal affect, normal interaction, good insight    Result Review    Result Review:  I have personally reviewed the results from the time of this admission to 11/8/2023 17:54 EST and agree with these findings:  [x]  Laboratory  []  Microbiology  []  Radiology  []  EKG/Telemetry   []  Cardiology/Vascular   []  Pathology  [x]  Old records including operative report  []  Other:    Assessment & Plan        Active Hospital Problems:  Active Hospital Problems    Diagnosis     **DJD of left shoulder     Osteoarthritis of left glenohumeral joint      Chronic left shoulder DJD status post reverse shoulder arthroplasty on 11/8  -Management per neurosurgery    Essential hypertension, BP  currently at goal  -Continue home lisinopril   -Home medication list include 2 beta-blockers, i.e. Zebeta and Toprol-XL.  Unclear if patient was taking both.  For now we will choose one of the beta-blockers and have the pharmacist verify her medication list    CAD/MI/CABG/CVA  -Continue statin  -Aspirin on hold perioperatively    Chronic atrial fibrillation, rate controlled  -Continue Eliquis and beta-blocker    GERD, on PPI    Class I obesity, BMI ~34  -Would benefit from lifestyle modification aimed at weight loss    Thank you for inviting us to participate in caring for Mrs. Deluna.  We will follow along with you to discharge.  Please do not hesitate to call with any questions    Signature: Electronically signed by Christine Mcconnell MD, 11/08/23, 17:54 EST.  [facility] Hospitalist Team

## 2023-11-08 NOTE — OP NOTE
TOTAL SHOULDER REVERSE ARTHROPLASTY  Procedure Report    Patient Name:  Andree Deluna  YOB: 1955    Date of Surgery:  11/8/2023     Indications: This is a 68 y.o. female with left shoulder pain.  Imaging demonstrated degenerative joint disease.  Treatment options were discussed.  They desired to proceed with reverse shoulder arthroplasty after discussing the risks including bleeding, scarring, infection, stiffness, nerve damage, tendon damage, artery damage, continued  pain, DVT, loss of life or limb, fracture, dislocation, and a need for further surgery.      Pre-op Diagnosis:   Osteoarthritis of left glenohumeral joint [M19.012]       Post-op Diagnosis:    Post-Op Diagnosis Codes:     * Osteoarthritis of left glenohumeral joint [M19.012]    Procedure/CPT® Codes: 02941    Procedure(s): Left  TOTAL SHOULDER REVERSE ARTHROPLASTY with subscapularis repair    Staff: Jesse Arguello physician assistant    was responsible for performing the following activities: Retraction, Suction, Irrigation, Suturing, Closing, and Placing Dressing and their skilled assistance was necessary for the success of this case.       Anesthesia: General with Block    Estimated Blood Loss: 100ml    Implants:    Implant Name Type Inv. Item Serial No.  Lot No. LRB No. Used Action   SUT NONABS MAXBRAID/PE NMBR2 HC5 38IN TATY [id number] - [id number] Implant SUT NONABS MAXBRAID/PE NMBR2 HC5 38IN TATY 563539  [employer] [id number] Left 3 Implanted   STEM HUM NONPOR REV [id number] - [id number] Implant STEM HUM NONPOR REV [id number]  [employer] [id number] Left 1 Implanted   GLENSPHR TRABECULARMETAL REV 36MM - [id number] Implant GLENSPHR TRABECULARMETAL REV 36MM  [employer] [id number] Left 1 Implanted   BASEPLT ELI TRABECULARMETAL REV 15MM - [id number] Implant BASEPLT ELI TRABECULARMETAL REV 15MM  [employer] [id number] Left 1 Implanted   SCRW CANC INV/REV 4.5X30MM - [id number] Implant SCRW CANC INV/REV 4.5X30MM  [employer] [id number] Left 1 Implanted   SCRW CANC INV/REV 4.5X30MM - [id number] Implant SCRW CANC INV/REV 4.5X30MM  [employer] [id number] Left 1 Implanted   LINER HUM/SHLDR TRABECULARMETAL REV POLY 60DEG 36MM PLS3 - [id number] Implant LINER HUM/SHLDR TRABECULARMETAL REV POLY 60DEG 36MM PLS3  [employer] [id number] Left 1 Implanted   SUT NONABS MAXBRAID/PE NMBR2 HC5 38IN TATY 107888 - [id number] Implant SUT NONABS MAXBRAID/PE NMBR2 HC5 38IN TATY 610378  [employer] [id number] Left 1 Implanted       IVF: see anesthesia      Complications: None    Specimens:none    Description of Procedure: The patient's operative site was marked in the  preoperative holding area.  They received regional anesthesia and were brought to  the operating room and placed supine on a well-padded operating room table.  General anesthesia was administered.  Antibiotics were dosed.  A timeout was  taken, confirming the correct operative site and procedure.  They were placed in  the beach chair position.  The left shoulder was prepped and draped in the  standard surgical fashion.  SCDs were placed. A deltopectoral incision was marked and injected with local anesthetic.  The skin was opened.  Flaps were raised.  The interval was opened and the cephalic vein was protected.  Adhesions were released from the deltoid.  The circumflex vessels were identified and ligated with suture and cautery.  The rotator interval was opened and a retractor was placed.  The subscapularis was  Tenotomized and the capsule was released down to the 6 o'clock position on the  humeral head protecting the axillary nerve.  A Fukuda retractor was placed and an inferior and anterior capsular release was performed palpating and protecting the axillary  nerve with my finger at all times.  The shoulder was dislocated.  The biceps  was tenodesed to the upper biceps groove and circumferential osteophytes  were removed to identify the native head-neck junction.  Reaming was started  behind  the biceps groove up to a size 11.  The cut was made in 20  degrees of retroversion and the final two reamers were used to prepare the  proximal humerus.  A trial was placed.  The glenoid was exposed after placing retractors.  The soft tissue was removed circumferentially.  The center point was marked and the glenoid was prepared in standard fashion.  The base plate was placed with good press-fit.  Two screws were placed with good purchase.  The locking caps were  placed.  The glenosphere was placed with good purchase.  The shoulder was  dislocated and the trial was removed from the canal and irrigated.  The true  stem was placed with good press-fit and trialing demonstrated +3 thickness to offer the best restoration of joint stability, muscle tension and range of motion.  The true polyethylene was placed with similar range of motion and stability.  A 3-minute Betadine wash performed and the subscapularis repaired with side to side suture.  The wound was closed in layers with absorbable suture and skin glue.  A sterile dressing and sling were applied.  They were awakened and taken to the recovery room.  There were no complications.  I was present for all portions.  All counts were correct.   Good capillary refill was noted to the hand.      Shubham Samaniego MD     Date: 11/8/2023  Time: 11:54 EST

## 2023-11-08 NOTE — THERAPY RE-EVALUATION
Patient Name: Andree Deluna  : 1955    MRN: [id number]                              Today's Date: 2023       Admit Date: 2023    Visit Dx:     ICD-10-CM ICD-9-CM   1. Osteoarthritis of left glenohumeral joint  M19.012 715.91     Patient Active Problem List   Diagnosis    Anemia in other chronic diseases classified elsewhere    Anxiety    B12 deficiency    Attention deficit disorder of adult with hyperactivity    Mood disorder    Complete rotator cuff tear or rupture of right shoulder, not specified as traumatic    Degeneration of intervertebral disc of cervical region    Degeneration of intervertebral disc of lumbosacral region    Fatigue    Hyperlipidemia    Hypertension    Insomnia    Obesity    Osteoarthritis of knee    CVA (cerebral vascular accident)    Visual field loss left    Acute midline low back pain without sciatica    GERD with esophagitis    Carotid stenosis, bilateral    Overactive bladder    History of stroke    Acute pain of right shoulder    Left cervical radiculopathy    Migraine headache    Medicare annual wellness visit, subsequent    Moderate episode of recurrent major depressive disorder    Piriformis syndrome, right    Precordial pain    History of Grimaldo's esophagus    Chronic cough    PATEL (generalized anxiety disorder)    Chest pain, unspecified type    Elevated troponin    Non-STEMI (non-ST elevated myocardial infarction)    Coronary artery disease involving native coronary artery of native heart with unstable angina pectoris    Abnormal findings on diagnostic imaging of heart and coronary circulation    S/P CABG x 3 with LIMA per Dr. Noonan 2023    S/P mitral valve repair per Dr. Noonan 2023    Postoperative atrial fibrillation    Atrial fibrillation with rapid ventricular response    Pleural effusion on left    Dyspnea    DJD of left shoulder    Osteoarthritis of left glenohumeral joint     Past Medical History:   Diagnosis Date    ADHD unknown     Anemia     Anesthesia complication     difficult to wake up    Anxiety     Atrial fibrillation     Carotid artery disease     80% right internal carotid artery    Coronary artery disease     CVA (cerebral vascular accident)     right parietal right temporal    DDD (degenerative disc disease), lumbar     Depression     Extremity pain     Ankle pain    GERD (gastroesophageal reflux disease)     Hyperlipidemia unknown    Hypertension     Insomnia unknown    Laryngopharyngeal reflux     Low back pain     Mood disorder     Obesity unknown    Short of breath on exertion     Skin cancer of face     Sleep apnea     Suspected sleep apnea she has refused to be tested    Stroke (cerebrum)     Visual field defect     Left     Past Surgical History:   Procedure Laterality Date    CARDIAC CATHETERIZATION N/A 5/1/2023    Procedure: Left Heart Cath;  Surgeon: Kye Alcaraz MD;  Location: [facility] CATH INVASIVE LOCATION;  Service: Cardiovascular;  Laterality: N/A;    CAROTID ENDARTERECTOMY Right 11/10/2020    Procedure: CAROTID ENDARTERECTOMY;  Surgeon: Tavo Das MD;  Location: [facility] MAIN OR;  Service: Vascular;  Laterality: Right;    CHOLECYSTECTOMY      COLONOSCOPY      2016    CORONARY ARTERY BYPASS GRAFT N/A 5/6/2023    Procedure: CORONARY ARTERY BYPASS GRAFTING;  Surgeon: Errol Noonan MD;  Location: [facility] CVOR;  Service: Cardiothoracic;  Laterality: N/A;  CABG X  3 using LIMA and right endospahenous vein;  2 coronary markers implanted.     FINGER SURGERY      KNEE ARTHROPLASTY      KNEE SURGERY Right     replaced    LAPAROSCOPIC GASTRIC BANDING      MITRAL VALVE REPAIR/REPLACEMENT N/A 5/6/2023    Procedure: MITRAL VALVE REPAIR/REPLACEMENT;  Surgeon: Errol Noonan MD;  Location: [facility];  Service: Cardiothoracic;  Laterality: N/A;  Mitral valve repaired using 28 mm Jorge Physio II Annuloplasty Ring.     ROTATOR CUFF REPAIR Right 2019    SHOULDER SURGERY Right 05/24/2019     scope/ cuff repair    TRANSESOPHAGEAL ECHOCARDIOGRAM (GALA) N/A 5/6/2023    Procedure: TRANSESOPHAGEAL ECHOCARDIOGRAM WITH ANESTHESIA;  Surgeon: Errol Noonan MD;  Location: [facility];  Service: Cardiothoracic;  Laterality: N/A;    TUBAL ABDOMINAL LIGATION        General Information       Row Name 11/08/23 1543          OT Time and Intention    Document Type re-evaluation  -LS     Mode of Treatment occupational therapy  -       Row Name 11/08/23 1543          General Information    Patient Profile Reviewed yes  -LS     Existing Precautions/Restrictions left;shoulder  -LS       Row Name 11/08/23 1543          Living Environment    People in Home spouse  -LS       Row Name 11/08/23 1543          Cognition    Orientation Status (Cognition) oriented x 3  -       Row Name 11/08/23 1543          Safety Issues, Functional Mobility    Impairments Affecting Function (Mobility) cognition;range of motion (ROM);pain;strength  -               User Key  (r) = Recorded By, (t) = Taken By, (c) = Cosigned By      Initials Name Provider Type    LS Kedar Kauffman OT Occupational Therapist                     Mobility/ADL's       Row Name 11/08/23 1543          Bed Mobility    Bed Mobility bed mobility (all) activities  -     All Activities, [place] (Bed Mobility) minimum assist (75% patient effort)  -     Assistive Device (Bed Mobility) bed rails;head of bed elevated  -       Row Name 11/08/23 1543          Transfers    Transfers sit-stand transfer;bed-chair transfer  -       Row Name 11/08/23 1543          Bed-Chair Transfer    Bed-Chair [place] (Transfers) contact guard  -       Row Name 11/08/23 1543          Sit-Stand Transfer    Sit-Stand [place] (Transfers) contact guard  -       Row Name 11/08/23 1543          Functional Mobility    Functional Mobility- Ind. Level contact guard assist  -       Row Name 11/08/23 1543          Activities of Daily Living    BADL Assessment/Intervention  upper body dressing;lower body dressing  -       Row Name 11/08/23 1543          Upper Body Dressing Assessment/Training    [place] Level (Upper Body Dressing) upper body dressing skills;doff;don;moderate assist (50% patient effort)  -     Position (Upper Body Dressing) supported sitting  -     Comment, (Upper Body Dressing) sling and cold therapy  -       Row Name 11/08/23 1543          Lower Body Dressing Assessment/Training    [place] Level (Lower Body Dressing) lower body dressing skills;maximum assist (25% patient effort)  -               User Key  (r) = Recorded By, (t) = Taken By, (c) = Cosigned By      Initials Name Provider Type     Kedar Kauffman OT Occupational Therapist                   Obj/Interventions       Row Name 11/08/23 1545          Sensory Assessment (Somatosensory)    Sensory Assessment Numbness 2*/2 nerve block in LUE  -       Row Name 11/08/23 1545          Range of Motion Comprehensive    Comment, General Range of Motion LUE PROM to 90 this date at shoulder. WNL elsewhere  -       Row Name 11/08/23 1545          Strength Comprehensive (MMT)    Comment, General Manual Muscle Testing (MMT) Assessment LUE NT, grossly normal elsewhere  -       Row Name 11/08/23 1545          Balance    Balance Assessment sitting static balance;standing static balance;standing dynamic balance;sitting dynamic balance  -     Static Sitting Balance standby assist  -LS     Dynamic Sitting Balance standby assist  -LS     Position, Sitting Balance sitting edge of bed  -LS     Static Standing Balance standby assist  -LS     Dynamic Standing Balance contact guard  -               User Key  (r) = Recorded By, (t) = Taken By, (c) = Cosigned By      Initials Name Provider Type     Kedar Kauffman OT Occupational Therapist                   Goals/Plan       Row Name 11/08/23 1550          Transfer Goal 1 (OT)    Activity/Assistive Device (Transfer Goal 1, OT) transfers, all  -      [place] Level/Cues Needed (Transfer Goal 1, OT) independent  -LS     Time Frame (Transfer Goal 1, OT) long term goal (LTG);2 weeks  -       Row Name 11/08/23 1550          Dressing Goal 1 (OT)    Activity/Device (Dressing Goal 1, OT) dressing skills, all  -LS     [place]/Cues Needed (Dressing Goal 1, OT) verbal cues required;standby assist  -LS     Time Frame (Dressing Goal 1, OT) long term goal (LTG);2 weeks  -       Row Name 11/08/23 1550          Toileting Goal 1 (OT)    Activity/Device (Toileting Goal 1, OT) toileting skills, all  -LS     [place] Level/Cues Needed (Toileting Goal 1, OT) modified independence  -LS     Time Frame (Toileting Goal 1, OT) long term goal (LTG);2 weeks  -       Row Name 11/08/23 3690          Therapy Assessment/Plan (OT)    Planned Therapy Interventions (OT) activity tolerance training;BADL retraining;functional balance retraining;IADL retraining;occupation/activity based interventions;ROM/therapeutic exercise;strengthening exercise;transfer/mobility retraining;patient/caregiver education/training;neuromuscular control/coordination retraining  -               User Key  (r) = Recorded By, (t) = Taken By, (c) = Cosigned By      Initials Name Provider Type    Kedar Kaba OT Occupational Therapist                   Clinical Impression       Row Name 11/08/23 9951          Pain Assessment    Pretreatment Pain Rating 1/10  -LS     Posttreatment Pain Rating 1/10  -LS     Pain Location - Side/Orientation Left  -LS     Pain Location - shoulder  -LS       Row Name 11/08/23 6604          Plan of Care Review    Plan of Care Reviewed With patient;spouse  -LS     Outcome Evaluation Pt. reevaluated this date POD # 0 LUE rTSA. Pt. completes ambulatory transfer to chair w/ CGA. Moderate A provided for donning/doffing ice/sling this date. PROM of surgical shoulder completed to approx 90* flexion, and pt completed all other post-op exercises and demoing proper understanding.  She will require reinforcement however, with previous CVAs affecting memory.  will be home to provide care, and he recently had a shoulder replacement thus is very familiar. Pt. will benefit from continued skilled occupational therapy to maximize surgical benefits and improve ADL function, recommend outpatient therapy at d/c.  -       Row Name 11/08/23 1547          Therapy Assessment/Plan (OT)    Rehab Potential (OT) good, to achieve stated therapy goals  -     Criteria for Skilled Therapeutic Interventions Met (OT) yes;skilled treatment is necessary  -     Therapy Frequency (OT) 5 times/wk  -     Predicted Duration of Therapy Intervention (OT) until dc  -       Row Name 11/08/23 1547          Therapy Plan Review/Discharge Plan (OT)    Anticipated Discharge Disposition (OT) home with outpatient therapy services;home with assist  -       Row Name 11/08/23 1547          Vital Signs    Pre SpO2 (%) 94  2L  -LS     O2 Delivery Pre Treatment nasal cannula  -LS     Intra SpO2 (%) 87  -LS     O2 Delivery Intra Treatment room air  -LS     Post SpO2 (%) 96  -LS     O2 Delivery Post Treatment nasal cannula  -LS     Pre Patient Position Supine  -LS     Intra Patient Position Standing  -LS     Post Patient Position Sitting  -LS       Row Name 11/08/23 1547          Positioning and Restraints    Pre-Treatment Position in bed  -LS     Post Treatment Position chair  -LS     In Chair notified nsg;reclined;call light within reach;encouraged to call for assist;exit alarm on  -LS               User Key  (r) = Recorded By, (t) = Taken By, (c) = Cosigned By      Initials Name Provider Type    LS Kedar Kauffman OT Occupational Therapist                   Outcome Measures       Row Name 11/08/23 6036          How much help from another is currently needed...    Putting on and taking off regular lower body clothing? 2  -LS     Bathing (including washing, rinsing, and drying) 3  -LS     Toileting (which includes using  toilet bed pan or urinal) 3  -LS     Putting on and taking off regular upper body clothing 2  -LS     Taking care of personal grooming (such as brushing teeth) 3  -LS     Eating meals 4  -LS     AM-PAC 6 Clicks Score (OT) 17  -LS       Row Name 11/08/23 1432          How much help from another person do you currently need...    Turning from your back to your side while in flat bed without using bedrails? 3  -AK     Moving from lying on back to sitting on the side of a flat bed without bedrails? 3  -AK     Moving to and from a bed to a chair (including a wheelchair)? 3  -AK     Standing up from a chair using your arms (e.g., wheelchair, bedside chair)? 3  -AK     Climbing 3-5 steps with a railing? 3  -AK     To walk in hospital room? 3  -AK     AM-PAC 6 Clicks Score (PT) 18  -AK     Highest level of mobility 6 --> Walked 10 steps or more  -AK       Row Name 11/08/23 1551          Functional Assessment    Outcome Measure Options AM-PAC 6 Clicks Daily Activity (OT)  -               User Key  (r) = Recorded By, (t) = Taken By, (c) = Cosigned By      Initials Name Provider Type    Hemalatha Rodriguez RN Registered Nurse    Kedar Kaba OT Occupational Therapist                    Occupational Therapy Education       Title: PT OT SLP Therapies (Done)       Topic: Occupational Therapy (Done)       Point: ADL training (Done)       Description:   Instruct learner(s) on proper safety adaptation and remediation techniques during self care or transfers.   Instruct in proper use of assistive devices.                  Learning Progress Summary             Patient Acceptance, E,TB, VU by FEDERICO at 11/8/2023 1552                         Point: Home exercise program (Done)       Description:   Instruct learner(s) on appropriate technique for monitoring, assisting and/or progressing therapeutic exercises/activities.                  Learning Progress Summary             Patient Acceptance, E,TB, VU by FEDERICO at 11/8/2023 1552                          Point: Precautions (Done)       Description:   Instruct learner(s) on prescribed precautions during self-care and functional transfers.                  Learning Progress Summary             Patient Acceptance, E,TB, VU by  at 11/8/2023 1552                         Point: Body mechanics (Done)       Description:   Instruct learner(s) on proper positioning and spine alignment during self-care, functional mobility activities and/or exercises.                  Learning Progress Summary             Patient Acceptance, E,TB, VU by  at 11/8/2023 1552                                         User Key       Initials Effective Dates Name Provider Type Discipline     09/22/22 -  Kedar Kauffman OT Occupational Therapist OT                  OT Recommendation and Plan  Planned Therapy Interventions (OT): activity tolerance training, BADL retraining, functional balance retraining, IADL retraining, occupation/activity based interventions, ROM/therapeutic exercise, strengthening exercise, transfer/mobility retraining, patient/caregiver education/training, neuromuscular control/coordination retraining  Therapy Frequency (OT): 5 times/wk  Plan of Care Review  Plan of Care Reviewed With: patient, spouse  Outcome Evaluation: Pt. reevaluated this date POD # 0 LUE rTSA. Pt. completes ambulatory transfer to chair w/ CGA. Moderate A provided for donning/doffing ice/sling this date. PROM of surgical shoulder completed to approx 90* flexion, and pt completed all other post-op exercises and demoing proper understanding. She will require reinforcement however, with previous CVAs affecting memory.  will be home to provide care, and he recently had a shoulder replacement thus is very familiar. Pt. will benefit from continued skilled occupational therapy to maximize surgical benefits and improve ADL function, recommend outpatient therapy at d/c.     Time Calculation:         Time Calculation- OT       Row Name 11/08/23  1556             Time Calculation- OT    OT Start Time 1456  -LS      OT Stop Time 1529  -LS      OT Time Calculation (min) 33 min  -LS      OT Received On 11/08/23  -      OT - Next Appointment 11/10/23  -      OT Goal Re-Cert Due Date 11/22/23  -         Untimed Charges    OT Eval/Re-eval Minutes 33  -LS         Total Minutes    Untimed Charges Total Minutes 33  -LS       Total Minutes 33  -LS                User Key  (r) = Recorded By, (t) = Taken By, (c) = Cosigned By      Initials Name Provider Type    Kedar Kaba OT Occupational Therapist                  Therapy Charges for Today       Code Description Service Date Service Provider Modifiers Qty    [id number] HC OT RE-EVAL 2 11/8/2023 Kedar Kauffman OT GO 1                 Kedar Kauffman OT  11/8/2023

## 2023-11-08 NOTE — PLAN OF CARE
"COPD EDUCATION by COPD CLINICAL EDUCATOR  12/22/2019  at  1:49 PM by Vania Carolina     Patient interviewed by COPD education team.  Patient unable to participate in full program.  Short intervention has been conducted.  A comprehensive packet including information about COPD, treatments, and smoking cessation given. Smoking Cessation Intervention and education completed, 3 minutes spent on smoking cessation education with patient. Provided smoking cessation packet with \"Tips to Quit\" and flyer for \"Free Smoking Cessation Classes\".     " Goal Outcome Evaluation:  Plan of Care Reviewed With: patient, spouse              Pt. reevaluated this date POD # 0 LUE rTSA. Pt. completes ambulatory transfer to chair w/ CGA. Moderate A provided for donning/doffing ice/sling this date. PROM of surgical shoulder completed to approx 90* flexion, and pt completed all other post-op exercises and demoing proper understanding. She will require reinforcement however, with previous CVAs affecting memory.  will be home to provide care, and he recently had a shoulder replacement thus is very familiar. Pt. will benefit from continued skilled occupational therapy to maximize surgical benefits and improve ADL function, recommend outpatient therapy at d/c.      Anticipated Discharge Disposition (OT): home with outpatient therapy services, home with assist

## 2023-11-08 NOTE — ANESTHESIA PREPROCEDURE EVALUATION
Anesthesia Evaluation     Patient summary reviewed and Nursing notes reviewed   no history of anesthetic complications:   NPO Solid Status: > 8 hours  NPO Liquid Status: > 8 hours           Airway   Mallampati: II  TM distance: >3 FB  Neck ROM: limited  Dental - normal exam     Pulmonary - normal exam   (+) ,shortness of breath, sleep apnea  (-) not a smoker  Cardiovascular - normal exam  Exercise tolerance: poor (<4 METS)    ECG reviewed    (+) hypertension, past MI  >12 months, CAD, CABG, dysrhythmias Atrial Fib, hyperlipidemia,  carotid artery disease carotid bilateral      Neuro/Psych  (+) CVA, psychiatric history Anxiety, Depression and ADD  GI/Hepatic/Renal/Endo    (+) obesity, GERD    Musculoskeletal     Abdominal    Substance History      OB/GYN          Other   arthritis,                   Anesthesia Plan    ASA 3     general with block and ERAS Protocol   total IV anesthesia  intravenous induction     Anesthetic plan, risks, benefits, and alternatives have been provided, discussed and informed consent has been obtained with: patient.    Use of blood products discussed with patient .    Plan discussed with CRNA and CAA.    CODE STATUS:

## 2023-11-08 NOTE — ANESTHESIA PROCEDURE NOTES
Peripheral Block      Patient reassessed immediately prior to procedure    Patient location during procedure: pre-op  Start time: 11/8/2023 10:00 AM  Stop time: 11/8/2023 10:07 AM  Reason for block: at surgeon's request and post-op pain management  Performed by  Anesthesiologist: Farnaz Fowler MD  Preanesthetic Checklist  Completed: patient identified, IV checked, site marked, risks and benefits discussed, surgical consent, monitors and equipment checked, pre-op evaluation and timeout performed  Prep:  Pt Position: supine  Sterile barriers:alcohol skin prep, cap, gloves, mask and washed/disinfected hands  Prep: ChloraPrep  Patient monitoring: blood pressure monitoring, continuous pulse oximetry and EKG  Procedure    Sedation: yes  Performed under: local infiltration  Guidance:ultrasound guided    ULTRASOUND INTERPRETATION.  Using ultrasound guidance a gauge needle was placed in close proximity to the brachial plexus nerve, at which point, under ultrasound guidance anesthetic was injected in the area of the nerve and spread of the anesthesia was seen on ultrasound in close proximity thereto.  There were no abnormalities seen on ultrasound; a digital image was taken; and the patient tolerated the procedure with no complications. Images:still images obtained, printed/placed on chart    Laterality:left  Block Type:interscalene  Injection Technique:single-shot  Needle Type:echogenic  Needle Gauge:20 G    Sedation medications used: fentaNYL citrate (PF) (SUBLIMAZE) injection - Intravenous   100 mcg - 11/8/2023 10:00:00 AM  Medications Used: bupivacaine liposome (EXPAREL) injection 1.3% - Perineural   10 mL - 11/8/2023 10:00:00 AM  bupivacaine PF (MARCAINE) injection 0.5% - Perineural   10 mL - 11/8/2023 10:00:00 AM      Post Assessment  Injection Assessment: negative aspiration for heme, no paresthesia on injection and incremental injection  Patient Tolerance:comfortable throughout block  Complications:no

## 2023-11-08 NOTE — ANESTHESIA PROCEDURE NOTES
Airway  Urgency: elective    Date/Time: 11/8/2023 10:40 AM  Airway not difficult    General Information and Staff    Patient location during procedure: OR  Anesthesiologist: Farnaz Fowler MD  CRNA/CAA: Pham Martines CRNA    Indications and Patient Condition  Indications for airway management: airway protection    Preoxygenated: yes (pt pre-O2 with 100% O2)  Mask difficulty assessment: 2 - vent by mask + OA or adjuvant +/- NMBA (easy BMV )    Final Airway Details  Final airway type: endotracheal airway      Successful airway: ETT  Cuffed: yes   Successful intubation technique: video laryngoscopy  Facilitating devices/methods: intubating stylet  Endotracheal tube insertion site: oral  Blade: [place]  Blade size: 3  ETT size (mm): 7.0  Cormack-Lehane Classification: grade I - full view of glottis  Placement verified by: chest auscultation and capnometry   Cuff volume (mL): 7  Measured from: lips  ETT/EBT  to lips (cm): 22  Number of attempts at approach: 1  Assessment: lips, teeth, and gum same as pre-op and atraumatic intubation    Additional Comments  ATOETx1. No change in dentition.

## 2023-11-08 NOTE — ADDENDUM NOTE
Addendum  created 11/08/23 1344 by Farnaz Fowler MD    Clinical Note Signed, Diagnosis association updated, Intraprocedure Blocks edited, SmartForm saved

## 2023-11-09 ENCOUNTER — NURSE TRIAGE (OUTPATIENT)
Dept: CALL CENTER | Facility: HOSPITAL | Age: 68
End: 2023-11-09
Payer: MEDICARE

## 2023-11-09 ENCOUNTER — APPOINTMENT (OUTPATIENT)
Dept: GENERAL RADIOLOGY | Facility: HOSPITAL | Age: 68
DRG: 193 | End: 2023-11-09
Payer: MEDICARE

## 2023-11-09 ENCOUNTER — HOSPITAL ENCOUNTER (INPATIENT)
Facility: HOSPITAL | Age: 68
LOS: 1 days | Discharge: LEFT BEFORE DISCHARGE | DRG: 193 | End: 2023-11-10
Attending: EMERGENCY MEDICINE | Admitting: STUDENT IN AN ORGANIZED HEALTH CARE EDUCATION/TRAINING PROGRAM
Payer: MEDICARE

## 2023-11-09 ENCOUNTER — APPOINTMENT (OUTPATIENT)
Dept: CT IMAGING | Facility: HOSPITAL | Age: 68
DRG: 193 | End: 2023-11-09
Payer: MEDICARE

## 2023-11-09 VITALS
RESPIRATION RATE: 12 BRPM | SYSTOLIC BLOOD PRESSURE: 154 MMHG | HEIGHT: 65 IN | TEMPERATURE: 97.6 F | HEART RATE: 57 BPM | OXYGEN SATURATION: 95 % | WEIGHT: 201.4 LBS | BODY MASS INDEX: 33.55 KG/M2 | DIASTOLIC BLOOD PRESSURE: 76 MMHG

## 2023-11-09 DIAGNOSIS — J18.9 PNEUMONIA OF BOTH LOWER LOBES DUE TO INFECTIOUS ORGANISM: Primary | ICD-10-CM

## 2023-11-09 DIAGNOSIS — R09.02 HYPOXEMIA: ICD-10-CM

## 2023-11-09 DIAGNOSIS — J98.11 MILD BASILAR ATELECTASIS OF BOTH LUNGS: ICD-10-CM

## 2023-11-09 DIAGNOSIS — Z98.890 HISTORY OF SHOULDER SURGERY: ICD-10-CM

## 2023-11-09 LAB
ALBUMIN SERPL-MCNC: 4 G/DL (ref 3.5–5.2)
ALBUMIN/GLOB SERPL: 1.3 G/DL
ALP SERPL-CCNC: 90 U/L (ref 39–117)
ALT SERPL W P-5'-P-CCNC: 7 U/L (ref 1–33)
ANION GAP SERPL CALCULATED.3IONS-SCNC: 10 MMOL/L (ref 5–15)
ANION GAP SERPL CALCULATED.3IONS-SCNC: 10 MMOL/L (ref 5–15)
APTT PPP: 24.7 SECONDS (ref 61–76.5)
AST SERPL-CCNC: 19 U/L (ref 1–32)
BASOPHILS # BLD AUTO: 0.1 10*3/MM3 (ref 0–0.2)
BASOPHILS NFR BLD AUTO: 0.9 % (ref 0–1.5)
BILIRUB SERPL-MCNC: 0.3 MG/DL (ref 0–1.2)
BUN SERPL-MCNC: 15 MG/DL (ref 8–23)
BUN SERPL-MCNC: 20 MG/DL (ref 8–23)
BUN/CREAT SERPL: 12.8 (ref 7–25)
BUN/CREAT SERPL: 13.5 (ref 7–25)
CALCIUM SPEC-SCNC: 9.2 MG/DL (ref 8.6–10.5)
CALCIUM SPEC-SCNC: 9.4 MG/DL (ref 8.6–10.5)
CHLORIDE SERPL-SCNC: 100 MMOL/L (ref 98–107)
CHLORIDE SERPL-SCNC: 99 MMOL/L (ref 98–107)
CO2 SERPL-SCNC: 27 MMOL/L (ref 22–29)
CO2 SERPL-SCNC: 29 MMOL/L (ref 22–29)
CREAT SERPL-MCNC: 1.17 MG/DL (ref 0.57–1)
CREAT SERPL-MCNC: 1.48 MG/DL (ref 0.57–1)
DEPRECATED RDW RBC AUTO: 45.9 FL (ref 37–54)
EGFRCR SERPLBLD CKD-EPI 2021: 38.9 ML/MIN/1.73
EGFRCR SERPLBLD CKD-EPI 2021: 51.6 ML/MIN/1.73
EOSINOPHIL # BLD AUTO: 0.1 10*3/MM3 (ref 0–0.4)
EOSINOPHIL NFR BLD AUTO: 1.2 % (ref 0.3–6.2)
ERYTHROCYTE [DISTWIDTH] IN BLOOD BY AUTOMATED COUNT: 14.3 % (ref 12.3–15.4)
GLOBULIN UR ELPH-MCNC: 3.1 GM/DL
GLUCOSE SERPL-MCNC: 130 MG/DL (ref 65–99)
GLUCOSE SERPL-MCNC: 186 MG/DL (ref 65–99)
HCT VFR BLD AUTO: 34.1 % (ref 34–46.6)
HGB BLD-MCNC: 11.1 G/DL (ref 12–15.9)
HOLD SPECIMEN: NORMAL
INR PPP: 0.94 (ref 0.93–1.1)
LYMPHOCYTES # BLD AUTO: 1.6 10*3/MM3 (ref 0.7–3.1)
LYMPHOCYTES NFR BLD AUTO: 19.4 % (ref 19.6–45.3)
MCH RBC QN AUTO: 29.6 PG (ref 26.6–33)
MCHC RBC AUTO-ENTMCNC: 32.6 G/DL (ref 31.5–35.7)
MCV RBC AUTO: 90.8 FL (ref 79–97)
MONOCYTES # BLD AUTO: 0.7 10*3/MM3 (ref 0.1–0.9)
MONOCYTES NFR BLD AUTO: 9.1 % (ref 5–12)
NEUTROPHILS NFR BLD AUTO: 5.7 10*3/MM3 (ref 1.7–7)
NEUTROPHILS NFR BLD AUTO: 69.4 % (ref 42.7–76)
NRBC BLD AUTO-RTO: 0.1 /100 WBC (ref 0–0.2)
PLATELET # BLD AUTO: 218 10*3/MM3 (ref 140–450)
PMV BLD AUTO: 9.2 FL (ref 6–12)
POTASSIUM SERPL-SCNC: 3.9 MMOL/L (ref 3.5–5.2)
POTASSIUM SERPL-SCNC: 4.2 MMOL/L (ref 3.5–5.2)
PROT SERPL-MCNC: 7.1 G/DL (ref 6–8.5)
PROTHROMBIN TIME: 10.3 SECONDS (ref 9.6–11.7)
RBC # BLD AUTO: 3.75 10*6/MM3 (ref 3.77–5.28)
SODIUM SERPL-SCNC: 136 MMOL/L (ref 136–145)
SODIUM SERPL-SCNC: 139 MMOL/L (ref 136–145)
TROPONIN T SERPL HS-MCNC: 11 NG/L
WBC NRBC COR # BLD: 8.2 10*3/MM3 (ref 3.4–10.8)

## 2023-11-09 PROCEDURE — 63710000001 OXYBUTYNIN 5 MG TABLET: Performed by: ORTHOPAEDIC SURGERY

## 2023-11-09 PROCEDURE — 97530 THERAPEUTIC ACTIVITIES: CPT

## 2023-11-09 PROCEDURE — 63710000001 LISINOPRIL 20 MG TABLET: Performed by: ORTHOPAEDIC SURGERY

## 2023-11-09 PROCEDURE — A9270 NON-COVERED ITEM OR SERVICE: HCPCS | Performed by: ORTHOPAEDIC SURGERY

## 2023-11-09 PROCEDURE — 63710000001 OXYCODONE 5 MG TABLET: Performed by: ORTHOPAEDIC SURGERY

## 2023-11-09 PROCEDURE — 63710000001 BISOPROLOL 5 MG TABLET: Performed by: ORTHOPAEDIC SURGERY

## 2023-11-09 PROCEDURE — 63710000001 POTASSIUM CHLORIDE 20 MEQ TABLET CONTROLLED-RELEASE: Performed by: ORTHOPAEDIC SURGERY

## 2023-11-09 PROCEDURE — 84484 ASSAY OF TROPONIN QUANT: CPT | Performed by: NURSE PRACTITIONER

## 2023-11-09 PROCEDURE — 63710000001 PANTOPRAZOLE 40 MG TABLET DELAYED-RELEASE: Performed by: ORTHOPAEDIC SURGERY

## 2023-11-09 PROCEDURE — 36415 COLL VENOUS BLD VENIPUNCTURE: CPT

## 2023-11-09 PROCEDURE — 97535 SELF CARE MNGMENT TRAINING: CPT

## 2023-11-09 PROCEDURE — 97110 THERAPEUTIC EXERCISES: CPT

## 2023-11-09 PROCEDURE — 85025 COMPLETE CBC W/AUTO DIFF WBC: CPT | Performed by: NURSE PRACTITIONER

## 2023-11-09 PROCEDURE — 25510000001 IOPAMIDOL PER 1 ML: Performed by: EMERGENCY MEDICINE

## 2023-11-09 PROCEDURE — 71275 CT ANGIOGRAPHY CHEST: CPT

## 2023-11-09 PROCEDURE — 93005 ELECTROCARDIOGRAM TRACING: CPT | Performed by: NURSE PRACTITIONER

## 2023-11-09 PROCEDURE — 85730 THROMBOPLASTIN TIME PARTIAL: CPT | Performed by: NURSE PRACTITIONER

## 2023-11-09 PROCEDURE — 71045 X-RAY EXAM CHEST 1 VIEW: CPT

## 2023-11-09 PROCEDURE — 80053 COMPREHEN METABOLIC PANEL: CPT | Performed by: NURSE PRACTITIONER

## 2023-11-09 PROCEDURE — 25010000002 CEFAZOLIN PER 500 MG: Performed by: ORTHOPAEDIC SURGERY

## 2023-11-09 PROCEDURE — 85610 PROTHROMBIN TIME: CPT | Performed by: NURSE PRACTITIONER

## 2023-11-09 PROCEDURE — 63710000001 LAMOTRIGINE 100 MG TABLET: Performed by: ORTHOPAEDIC SURGERY

## 2023-11-09 PROCEDURE — 63710000001 OXYCODONE 10 MG TABLET EXTENDED-RELEASE 12 HOUR: Performed by: ORTHOPAEDIC SURGERY

## 2023-11-09 PROCEDURE — 99285 EMERGENCY DEPT VISIT HI MDM: CPT

## 2023-11-09 PROCEDURE — 63710000001 FUROSEMIDE 40 MG TABLET: Performed by: ORTHOPAEDIC SURGERY

## 2023-11-09 RX ORDER — OXYCODONE HYDROCHLORIDE AND ACETAMINOPHEN 5; 325 MG/1; MG/1
1 TABLET ORAL EVERY 6 HOURS PRN
Qty: 28 TABLET | Refills: 0 | Status: CANCELLED | OUTPATIENT
Start: 2023-11-09

## 2023-11-09 RX ORDER — IPRATROPIUM BROMIDE AND ALBUTEROL SULFATE 2.5; .5 MG/3ML; MG/3ML
3 SOLUTION RESPIRATORY (INHALATION) ONCE
Status: COMPLETED | OUTPATIENT
Start: 2023-11-10 | End: 2023-11-10

## 2023-11-09 RX ORDER — OXYCODONE HYDROCHLORIDE AND ACETAMINOPHEN 5; 325 MG/1; MG/1
1 TABLET ORAL EVERY 6 HOURS PRN
Qty: 28 TABLET | Refills: 0 | Status: SHIPPED | OUTPATIENT
Start: 2023-11-09

## 2023-11-09 RX ORDER — SODIUM CHLORIDE 0.9 % (FLUSH) 0.9 %
10 SYRINGE (ML) INJECTION AS NEEDED
Status: DISCONTINUED | OUTPATIENT
Start: 2023-11-09 | End: 2023-11-10 | Stop reason: HOSPADM

## 2023-11-09 RX ORDER — ALPRAZOLAM 2 MG/1
2 TABLET ORAL NIGHTLY PRN
Status: CANCELLED | OUTPATIENT
Start: 2023-11-09

## 2023-11-09 RX ORDER — PROMETHAZINE HYDROCHLORIDE 12.5 MG/1
12.5 TABLET ORAL EVERY 6 HOURS PRN
Qty: 21 TABLET | Refills: 1 | Status: CANCELLED | OUTPATIENT
Start: 2023-11-09

## 2023-11-09 RX ORDER — PROMETHAZINE HYDROCHLORIDE 12.5 MG/1
12.5 TABLET ORAL EVERY 6 HOURS PRN
Qty: 21 TABLET | Refills: 1 | Status: SHIPPED | OUTPATIENT
Start: 2023-11-09

## 2023-11-09 RX ORDER — METHYLPREDNISOLONE SODIUM SUCCINATE 125 MG/2ML
125 INJECTION, POWDER, LYOPHILIZED, FOR SOLUTION INTRAMUSCULAR; INTRAVENOUS ONCE
Status: COMPLETED | OUTPATIENT
Start: 2023-11-10 | End: 2023-11-10

## 2023-11-09 RX ADMIN — PANTOPRAZOLE SODIUM 40 MG: 40 TABLET, DELAYED RELEASE ORAL at 09:17

## 2023-11-09 RX ADMIN — BISOPROLOL FUMARATE 10 MG: 5 TABLET ORAL at 09:17

## 2023-11-09 RX ADMIN — LISINOPRIL 20 MG: 20 TABLET ORAL at 09:17

## 2023-11-09 RX ADMIN — OXYBUTYNIN CHLORIDE 5 MG: 5 TABLET ORAL at 09:17

## 2023-11-09 RX ADMIN — FUROSEMIDE 40 MG: 40 TABLET ORAL at 09:17

## 2023-11-09 RX ADMIN — OXYCODONE HYDROCHLORIDE 10 MG: 10 TABLET, FILM COATED, EXTENDED RELEASE ORAL at 09:17

## 2023-11-09 RX ADMIN — IOPAMIDOL 100 ML: 755 INJECTION, SOLUTION INTRAVENOUS at 23:27

## 2023-11-09 RX ADMIN — POTASSIUM CHLORIDE 40 MEQ: 1500 TABLET, EXTENDED RELEASE ORAL at 09:17

## 2023-11-09 RX ADMIN — SODIUM CHLORIDE 2 G: 900 INJECTION INTRAVENOUS at 04:29

## 2023-11-09 RX ADMIN — OXYCODONE 10 MG: 5 TABLET ORAL at 05:28

## 2023-11-09 RX ADMIN — LAMOTRIGINE 100 MG: 100 TABLET ORAL at 09:17

## 2023-11-09 NOTE — Clinical Note
Level of Care: Telemetry [5]   Diagnosis: Bilateral pneumonia [511955]   Admitting Physician: DEMI REAL [[id number]]   Attending Physician: ALAN DIAZ [[id number]]   Certification: I Certify That Inpatient Hospital Services Are Medically Necessary For Greater Than 2 Midnights

## 2023-11-09 NOTE — PLAN OF CARE
Goal Outcome Evaluation:   Pt A&Ox4. Pt discharging home. Pain controlled per MAR. Dressing clean, dry, and intact.

## 2023-11-09 NOTE — PLAN OF CARE
Goal Outcome Evaluation: Pt A&Ox4 . Stand by assist. Pt complaining of nausea and vomiting and was treated per MAR. No evidence of vomit in pt room. Pt very anxious and paranoid about condition. VSS call light in reach. Careplan ongoing.

## 2023-11-09 NOTE — PROGRESS NOTES
[facility] Medicine Services   Daily Progress Note    Patient Name: Andree Deluna  : 1955  MRN: [id number]  Primary Care Physician:  Naga Griffith PA-C  Date of admission: 2023  Date and Time of Service: 23    Brief clinical summary:  Andree Deluna is a 68 y.o. female with history of HLD, HTN, CAD/MI/CABG H, CVA, GERD, A-fib, generalized anxiety disorder, B12 deficiency, class II obesity and left shoulder degenerative joint disease who is status post reverse left shoulder arthroplasty on .      Subjective      Medical follow-up on day 1 post left shoulder reverse arthroplasty.  Patient is doing well. Met her sitting in bedside chair eating breakfast.  Expresses no new concerns      Objective      Vitals:   Temp:  [96.2 °F (35.7 °C)-97.6 °F (36.4 °C)] 97.6 °F (36.4 °C)  Heart Rate:  [51-59] 57  Resp:  [11-22] 12  BP: (106-154)/(52-82) 154/76  Flow (L/min):  [2-6] 2    Physical Exam   General: Obese female resting in bedside chair, normal interaction, not in distress  Mental status: Alert and oriented x3  Head: NC/AT  Eyes: EOMI, nonicteric  Oropharynx: Moist mucous membrane, no thrush  Neck: Supple, no tenderness, no JVD  CVS: Regular heart sounds, no gallops or murmurs  Respiration: Unlabored breathing, no wheezes or rales  GI: Soft, nondistended, nontender, bowel sounds present  : No bladder distention, no CVA tenderness  Skin: Dry: Normal color, normal temperature  Extremity: No leg edema, no calf tenderness  Musculoskeletal: Right upper extremity immobilized with sling  Neurology: Normal speech, no facial droop, moves all extremities  Psychiatry: Normal affect, normal interaction, good insight     Result Review    Result Review:  I have personally reviewed the results from the time of this admission to 2023 11:12 EST and agree with these findings:  [x]  Laboratory  []  Microbiology  []  Radiology  []  EKG/Telemetry   []  Cardiology/Vascular   []   Pathology  []  Old records  []  Other:    Wounds (last 24 hours)       LDA Wound       Row Name 11/08/23 2030 11/08/23 1605 11/08/23 1404       Wound 11/08/23 1053 Left shoulder Incision    Wound - Properties Group Placement Date: 11/08/23  -CW Placement Time: 1053  -CW Present on Original Admission: N  -CW Side: Left  -CW Location: shoulder  -CW Primary Wound Type: Incision  -CW    Dressing Appearance dry;intact;no drainage  -TB dry;intact  -LB dry;intact  -PH    Closure Unable to assess  -TB Unable to assess  -LB Unable to assess  -PH    Base dressing in place, unable to visualize  -TB dressing in place, unable to visualize  -LB --    Dressing Care -- non-adherent;transparent film  -LB --    Retired Wound - Properties Group Placement Date: 11/08/23  -CW Placement Time: 1053  -CW Present on Original Admission: N  -CW Side: Left  -CW Location: shoulder  -CW Primary Wound Type: Incision  -CW    Retired Wound - Properties Group Date first assessed: 11/08/23  -CW Time first assessed: 1053  -CW Present on Original Admission: N  -CW Side: Left  -CW Location: shoulder  -CW Primary Wound Type: Incision  -CW      Row Name 11/08/23 1249 11/08/23 1234 11/08/23 1219       Wound 11/08/23 1053 Left shoulder Incision    Wound - Properties Group Placement Date: 11/08/23  -CW Placement Time: 1053  -CW Present on Original Admission: N  -CW Side: Left  -CW Location: shoulder  -CW Primary Wound Type: Incision  -CW    Dressing Appearance dry;intact;no drainage  -PH dry;intact;no drainage  -PH intact;dry;no drainage  -PH    Closure Unable to assess  -PH Unable to assess  -PH Unable to assess  -PH    Retired Wound - Properties Group Placement Date: 11/08/23  -CW Placement Time: 1053  -CW Present on Original Admission: N  -CW Side: Left  -CW Location: shoulder  -CW Primary Wound Type: Incision  -CW    Retired Wound - Properties Group Date first assessed: 11/08/23  -CW Time first assessed: 1053  -CW Present on Original Admission: N  -CW  Side: Left  -CW Location: shoulder  -CW Primary Wound Type: Incision  -CW      Row Name 11/08/23 1215 11/08/23 1139          Wound 11/08/23 1053 Left shoulder Incision    Wound - Properties Group Placement Date: 11/08/23  -CW Placement Time: 1053  -CW Present on Original Admission: N  -CW Side: Left  -CW Location: shoulder  -CW Primary Wound Type: Incision  -CW    Closure Adhesive bandage  skin glue,telfa,tegaderm, sling  -CW --     Dressing Care -- dressing applied  exofin,telfa, tegaderm,sling  -CW     Retired Wound - Properties Group Placement Date: 11/08/23  -CW Placement Time: 1053  -CW Present on Original Admission: N  -CW Side: Left  -CW Location: shoulder  -CW Primary Wound Type: Incision  -CW    Retired Wound - Properties Group Date first assessed: 11/08/23  -CW Time first assessed: 1053  -CW Present on Original Admission: N  -CW Side: Left  -CW Location: shoulder  -CW Primary Wound Type: Incision  -CW              User Key  (r) = Recorded By, (t) = Taken By, (c) = Cosigned By      Initials Name Provider Type    PH Lilli Hernandez, RN Registered Nurse    CW Celina Li RN Registered Nurse    Quynh Holguin LPN Licensed Nurse    Domo Mitchell RN Registered Nurse                      Assessment & Plan      apixaban, 5 mg, Oral, Q12H  atorvastatin, 80 mg, Oral, Nightly  benzoyl peroxide, , Topical, Nightly  bisoprolol, 10 mg, Oral, Daily  ceFAZolin, 2 g, Intravenous, Q8H  furosemide, 40 mg, Oral, Daily  lamoTRIgine, 100 mg, Oral, BID  lisinopril, 20 mg, Oral, Q24H  oxybutynin, 5 mg, Oral, Daily  oxyCODONE, 10 mg, Oral, Q12H  pantoprazole, 40 mg, Oral, Daily  potassium chloride, 40 mEq, Oral, Daily               Active Hospital Problems:  Active Hospital Problems    Diagnosis     **DJD of left shoulder     Osteoarthritis of left glenohumeral joint      Plan:   Chronic left shoulder DJD status post reverse shoulder arthroplasty on 11/8  -Uneventful postop course.  Management per neurosurgery      Essential hypertension, BP currently at goal  -Continue home lisinopril  and Zebeta  -Home medication list include 2 beta-blockers, i.e. Zebeta and Toprol-XL. For now we will choose one of the beta-blockers and have the pharmacist verify her medication list     CAD/MI/CABG/CVA  -Continue statin  -May resume aspirin when okay with Ortho      Chronic atrial fibrillation, rate controlled  -Continue Eliquis and beta-blocker     GERD, on PPI     Class I obesity, BMI ~34  -Would benefit from lifestyle modification aimed at weight loss    DVT prophylaxis:  Medical and mechanical DVT prophylaxis orders are present.    CODE STATUS:    Code Status (Patient has no pulse and is not breathing): CPR (Attempt to Resuscitate)  Medical Interventions (Patient has pulse or is breathing): Full      Disposition: Per orthopedic surgery.  Medically stable for discharge       Electronically signed by Christine Mcconnell MD, 11/09/23, 11:12 EST.  [facility] Hospitalist Team

## 2023-11-09 NOTE — PLAN OF CARE
Education: Post-op shoulder precautions, self-care techniques and HEP. Handout provided in total joint handbook.      Assessment: Patient demonstrates good progression toward stated goals. Also with good understanding of HEP and shoulder precautions. Anticipate discharge home with family assistance this date.      Plan: Home Health OT/PT

## 2023-11-09 NOTE — THERAPY TREATMENT NOTE
Subjective: Andree eDluna is s/p TSA, anticipating discharge this date.     Pain level: 3/10 VAS p/o shoulder    Objective: Pt states completing TSA HEP yesterday after therapy services without difficulty. OT reviewed HEP AAROM of shoulder, AROM of elbow/wrist and hand this date. Pt demos good understanding.  will be assisting at home upon dc.     Pt dons/doffs abductor sling with Naomi.  Completes UB dressing with hemiplegic techniques with Naomi, and LB dressing with Naomi. Patient also ambulated to restroom with SBA and completed toileting task with supervision.    Pt completes PROM shoulder flexion 0 - 90 this date.    Education: Post-op shoulder precautions, self-care techniques and HEP. Handout provided in total joint handbook.     Assessment: Patient demonstrates good progression toward stated goals. Also with good understanding of HEP and shoulder precautions. Anticipate discharge home with family assistance this date.     Plan: Home Health OT/PT

## 2023-11-09 NOTE — DISCHARGE PLACEMENT REQUEST
"Andree Sales (68 y.o. Female)       Date of Birth   1955    Social Security Number       Address   [id number] [place] [address] [address] [place] [address]    Home Phone   [phone]    MRN   [id number]       Hinduism   Uatsdin    Marital Status                               Admission Date   11/8/23    Admission Type   Elective    Admitting Provider   Shubham Samaniego MD    Attending Provider   Shubham Samaniego MD    Department, Room/Bed   [facility] SURGICAL INPATIENT, 4110/1       Discharge Date       Discharge Disposition       Discharge Destination                                 Attending Provider: Shubham Samaniego MD    Allergies: No Known Allergies    Isolation: None   Infection: None   Code Status: CPR    Ht: 165.1 cm (65\")   Wt: 91.4 kg (201 lb 6.4 oz)    Admission Cmt: None   Principal Problem: DJD of left shoulder [M19.012]                   Active Insurance as of 11/8/2023       Primary Coverage       Payor Plan Insurance Group Employer/Plan Group    HUMANA MEDICARE REPLACEMENT HUMANA MEDICARE REPLACEMENT [id number]       Payor Plan Address Payor Plan Phone Number Payor Plan Fax Number Effective Dates    [address] [address] [phone]  4/1/2023 - None Entered    [facility] [address]         Subscriber Name Subscriber Birth Date Member ID       ANDREE SALES 1955 [id number]                     Emergency Contacts        (Rel.) Home Phone Work Phone Mobile Phone    AUSTIN SALES (Spouse) [phone] -- [phone]    Eulalio Sales (Son) -- -- [phone]    Gold Sales (Son) -- -- [phone]                "

## 2023-11-09 NOTE — CASE MANAGEMENT/SOCIAL WORK
Case Management Discharge Note      Final Note: HOME         Selected Continued Care - Discharged on 11/9/2023 Admission date: 11/8/2023 - Discharge disposition: Home or Self Care        Therapy Coordination complete.      Service Provider Selected Services Address Phone Fax Patient Preferred    [facility] OUTPATIENT PHYSICAL THERAPY [facility] [address] [facility] [place] [address] [phone] --                      Transportation Services  Private: Car    Final Discharge Disposition Code: 01 - home or self-care

## 2023-11-09 NOTE — DISCHARGE INSTR - ACTIVITY
Continue wearing sling.  Keep dressing on.  Okay to shower and perform home exercises.  Continue polar care.  Perform range of motion at the elbow, wrist and digits 3-5 times daily.

## 2023-11-09 NOTE — PLAN OF CARE
"Goal Outcome Evaluation:               Pt stable, pain managed with PO pain meds per MAR. Pt ambulated to bathroom with standby assistance around 2045 without issue. However, while in the bathroom she cried out needed help, this nurse stepped in & pt was bent forward at a 90° angle & stated she was \"stuck in that position & couldn't stand up straight\". This nurse called for other staff to come in for assistance, 2 other nurses did so. Upon walking pt back to bed, one of those nurses prompted the pt to try to stand straight up so she doesn't fall forward and pt was able to suddenly stand straight. Pt later told this nurse she had fallen when the incident/bending forward had occurred. Pt was reminded this nurse was the one in the bathroom assisting her & a fall never occurred, pt said \"oh that was you? Yeah well I didn't fall down all the way but I almost did\". Pt did not fall. Bed alarm on, spouse at bedside, call light within reach, care plan ongoing.          "

## 2023-11-09 NOTE — DISCHARGE PLACEMENT REQUEST
"OP PT referral   Sherrie Hahn RN Case Manager  [phone]      Andree Sales (68 y.o. Female)       Date of Birth   1955    Social Security Number       Address   [address] [place] [address] [address] [place] [address]    Home Phone   [phone]    MRN   [id number]       Mormon   Gnosticism    Marital Status                               Admission Date   11/8/23    Admission Type   Elective    Admitting Provider   Shubham Samaniego MD    Attending Provider   Shubham Samaniego MD    Department, Room/Bed   [facility] SURGICAL INPATIENT, 4110/1       Discharge Date       Discharge Disposition   Home or Self Care    Discharge Destination                                 Attending Provider: Shubham Samaniego MD    Allergies: No Known Allergies    Isolation: None   Infection: None   Code Status: CPR    Ht: 165.1 cm (65\")   Wt: 91.4 kg (201 lb 6.4 oz)    Admission Cmt: None   Principal Problem: DJD of left shoulder [M19.012]                   Active Insurance as of 11/8/2023       Primary Coverage       Payor Plan Insurance Group Employer/Plan Group    HUMANA MEDICARE REPLACEMENT HUMANA MEDICARE REPLACEMENT [id number]       Payor Plan Address Payor Plan Phone Number Payor Plan Fax Number Effective Dates    [address] [address] [phone]  4/1/2023 - None Entered    [facility] [address]         Subscriber Name Subscriber Birth Date Member ID       ANDREE SALES 1955 [id number]                     Emergency Contacts        (Rel.) Home Phone Work Phone Mobile Phone    HENRRYAUSTIN ROSALES (Spouse) [phone] -- [phone]    HenrryMarjoried (Son) -- -- [phone]    HenrryManpreety (Son) -- -- [phone]          [facility] SURGICAL INPATIENT  [address] [place] [address]  Phone:  [phone]  Fax:  [phone] Date: Nov 9, 2023     Ambulatory Referral to Physical Therapy Evaluate and treat, POST OP     Patient:  Andree Sales MRN:  " [id number]  [address] [facility] [address]  [place] [address] :  1955  N:   Phone: [phone] Sex:  F     INSURANCE PAYOR PLAN GROUP # SUBSCRIBER ID  Primary:    HUMANA MEDICARE REPLACEMENT [id number]     Referring Provider Information:  GABRIELA OCONNOR Phone: [phone] Fax: [phone]      Referral Information:   # Visits:  1 Referral Type: Physical Therapy [AE1]  Urgency:  Routine Referral Reason: Specialty Services Required  Start Date: 2023 End Date:  To be determined by Insurer  Diagnosis: Osteoarthritis of left glenohumeral joint (M19.012 [ICD-10-CM] 715.91 [ICD-9-CM])      Refer to Dept:   Refer to Provider:   Refer to Provider Phone:   Refer to Facility:    Active assist forward elevation.  Passive abduction to 45 degrees.  Passive external rotation to 10 degrees.  Passive shoulder flexion as tolerated.  No internal rotation.  No pendulum exercises.  Specialty needed: Evaluate and treat  Specialty needed: POST OP  Follow-up needed: Yes     This document serves as a request of services and does not constitute Insurance authorization or approval of services.  To determine eligibility, please contact the members Insurance carrier to verify and review coverage.     If you have medical questions regarding this request for services. Please contact [facility] SURGICAL INPATIENT at [phone] during normal business hours.       Verbal Order Mode: Telephone with readback   Authorizing Provider: Gabriela Oconnor MD  Authorizing Provider's NPI: [id number]     Order Entered By: Coco Partida RN 2023  7:05 AM     Electronically signed by: Gabriela Oconnor MD 2023 10:17 AM

## 2023-11-09 NOTE — DISCHARGE SUMMARY
Date of Discharge:  11/9/2023    Discharge Diagnosis: Degenerative joint disease of left shoulder    Presenting Problem/History of Present Illness  Active Hospital Problems    Diagnosis  POA    **DJD of left shoulder [M19.012]  Yes    Osteoarthritis of left glenohumeral joint [M19.012]  Unknown      Resolved Hospital Problems   No resolved problems to display.        Hospital Course  Patient is a 68 y.o. female presented with left shoulder degenerative joint disease.  They underwent shoulder arthroplasty during admission and postop day 1 were tolerating diet and oral medication and pain was controlled.  Upon entering the room the patient was asleep laying inclined upright in her bed with her , Shai, at her bedside.    Procedures Performed: Reverse total shoulder      Consults:   Consults       Date and Time Order Name Status Description    11/8/2023  2:32 PM Inpatient Hospitalist Consult Completed               Condition on Discharge:  Improved    Vital Signs  Vitals:    11/08/23 2100 11/09/23 0030 11/09/23 0500 11/09/23 0824   BP: 122/63 106/52 123/66 154/76   BP Location: Right arm Right arm Right arm Right arm   Patient Position: Lying Lying Lying Lying   Pulse: 59 57     Resp: 11 15 17 12   Temp: 97.5 °F (36.4 °C) 97.6 °F (36.4 °C) 97.4 °F (36.3 °C) 97.6 °F (36.4 °C)   TempSrc: Oral Oral Oral Oral   SpO2: 93%  95%    Weight:       Height:           Physical Exam:  Dry dressing, Sensory and motor exam are intact all distributions, albeit decreased sensation to light touch of the thumb secondary to yesterday's limb block.  Otherwise she had complete function of the hand. Radial pulse is palpable and capillary refill is less than two seconds to all digits       Discharge Disposition  Home    Discharge Medications     Discharge Medications        New Medications        Instructions Start Date   oxyCODONE-acetaminophen 5-325 MG per tablet  Commonly known as: Percocet   1 tablet, Oral, Every 6 Hours PRN       promethazine 12.5 MG tablet  Commonly known as: PHENERGAN   12.5 mg, Oral, Every 6 Hours PRN             Changes to Medications        Instructions Start Date   lamoTRIgine 100 MG tablet  Commonly known as: LaMICtal  What changed: additional instructions   TAKE ONE TABLET BY MOUTH TWICE A DAY      omeprazole 40 MG capsule  Commonly known as: priLOSEC  What changed: additional instructions   TAKE ONE CAPSULE BY MOUTH DAILY             Continue These Medications        Instructions Start Date   acetaminophen 325 MG tablet  Commonly known as: TYLENOL   650 mg, Oral, Every 4 Hours PRN      ALPRAZolam 2 MG tablet  Commonly known as: XANAX   2 mg, Oral, Nightly PRN, for anxiety      apixaban 5 MG tablet tablet  Commonly known as: ELIQUIS   5 mg, Oral, Every 12 Hours Scheduled      aspirin 81 MG chewable tablet   81 mg, Oral, Daily      atorvastatin 80 MG tablet  Commonly known as: LIPITOR   80 mg, Oral, Nightly      bisoprolol 10 MG tablet  Commonly known as: ZEBeta   10 mg, Oral, Daily      furosemide 40 MG tablet  Commonly known as: LASIX   20 mg, Oral, Daily      lisinopril 20 MG tablet  Commonly known as: PRINIVIL,ZESTRIL   20 mg, Oral, Every 24 Hours Scheduled      metoprolol succinate XL 25 MG 24 hr tablet  Commonly known as: TOPROL-XL   25 mg, Oral, Daily      oxybutynin 5 MG tablet  Commonly known as: DITROPAN   5 mg, Oral, Daily      potassium chloride 20 MEQ CR tablet  Commonly known as: K-DUR,KLOR-CON   40 mEq, Oral, Daily      vitamin B-12 1000 MCG tablet  Commonly known as: CYANOCOBALAMIN   1,000 mcg, Oral, Daily, Hold for 1 week prior to surgery.  Stop 11/1                 Discharge instructions:  Continue wearing sling.  Keep dressing on.  Okay to shower and perform home exercises.  Continue polar care.  Perform range of motion at the elbow, wrist and digits 3-5 times daily.  Follow-up with Dr. Samaniego in 2 weeks    Follow-up Appointments  Future Appointments   Date Time Provider Department Center    11/27/2023 12:30 PM Shubham Samaniego MD [facility]   2/8/2024 12:50 PM Kye Alcaraz MD [facility]   3/28/2024  4:00 PM Kye Alcaraz MD [facility]              Jesse Arguello PA-C  11/09/23  10:42 EST      Disclaimer: Part of this note may be an electronic transcription/translation of spoken language to printed text using the Dragon Dictation System

## 2023-11-10 ENCOUNTER — TELEPHONE (OUTPATIENT)
Dept: FAMILY MEDICINE CLINIC | Facility: CLINIC | Age: 68
End: 2023-11-10

## 2023-11-10 VITALS
HEART RATE: 60 BPM | DIASTOLIC BLOOD PRESSURE: 75 MMHG | HEIGHT: 65 IN | TEMPERATURE: 97.9 F | SYSTOLIC BLOOD PRESSURE: 114 MMHG | BODY MASS INDEX: 33.49 KG/M2 | RESPIRATION RATE: 17 BRPM | OXYGEN SATURATION: 94 % | WEIGHT: 201 LBS

## 2023-11-10 PROBLEM — I48.0 PAROXYSMAL ATRIAL FIBRILLATION: Status: ACTIVE | Noted: 2023-11-10

## 2023-11-10 PROBLEM — I25.10 CHRONIC CORONARY ARTERY DISEASE: Status: ACTIVE | Noted: 2023-11-10

## 2023-11-10 PROBLEM — N28.9 ACUTE RENAL INSUFFICIENCY: Status: ACTIVE | Noted: 2023-11-10

## 2023-11-10 PROBLEM — J96.01 ACUTE RESPIRATORY FAILURE WITH HYPOXIA: Status: ACTIVE | Noted: 2023-11-10

## 2023-11-10 PROBLEM — I50.30: Status: ACTIVE | Noted: 2023-11-10

## 2023-11-10 PROBLEM — K21.9 GERD WITHOUT ESOPHAGITIS: Status: ACTIVE | Noted: 2023-11-10

## 2023-11-10 PROBLEM — J18.9 BILATERAL PNEUMONIA: Status: ACTIVE | Noted: 2023-11-10

## 2023-11-10 LAB
B PARAPERT DNA SPEC QL NAA+PROBE: NOT DETECTED
B PERT DNA SPEC QL NAA+PROBE: NOT DETECTED
C PNEUM DNA NPH QL NAA+NON-PROBE: NOT DETECTED
FLUAV SUBTYP SPEC NAA+PROBE: NOT DETECTED
FLUBV RNA ISLT QL NAA+PROBE: NOT DETECTED
HADV DNA SPEC NAA+PROBE: NOT DETECTED
HCOV 229E RNA SPEC QL NAA+PROBE: NOT DETECTED
HCOV HKU1 RNA SPEC QL NAA+PROBE: NOT DETECTED
HCOV NL63 RNA SPEC QL NAA+PROBE: NOT DETECTED
HCOV OC43 RNA SPEC QL NAA+PROBE: NOT DETECTED
HMPV RNA NPH QL NAA+NON-PROBE: NOT DETECTED
HPIV1 RNA ISLT QL NAA+PROBE: NOT DETECTED
HPIV2 RNA SPEC QL NAA+PROBE: NOT DETECTED
HPIV3 RNA NPH QL NAA+PROBE: NOT DETECTED
HPIV4 P GENE NPH QL NAA+PROBE: NOT DETECTED
M PNEUMO IGG SER IA-ACNC: NOT DETECTED
MRSA DNA SPEC QL NAA+PROBE: NORMAL
QT INTERVAL: 459 MS
QTC INTERVAL: 457 MS
RHINOVIRUS RNA SPEC NAA+PROBE: NOT DETECTED
RSV RNA NPH QL NAA+NON-PROBE: NOT DETECTED
SARS-COV-2 RNA NPH QL NAA+NON-PROBE: DETECTED

## 2023-11-10 PROCEDURE — 0202U NFCT DS 22 TRGT SARS-COV-2: CPT | Performed by: EMERGENCY MEDICINE

## 2023-11-10 PROCEDURE — 96365 THER/PROPH/DIAG IV INF INIT: CPT

## 2023-11-10 PROCEDURE — 96366 THER/PROPH/DIAG IV INF ADDON: CPT

## 2023-11-10 PROCEDURE — 25810000003 SODIUM CHLORIDE 0.9 % SOLUTION: Performed by: EMERGENCY MEDICINE

## 2023-11-10 PROCEDURE — 25810000003 SODIUM CHLORIDE 0.9 % SOLUTION: Performed by: NURSE PRACTITIONER

## 2023-11-10 PROCEDURE — 25010000002 METHYLPREDNISOLONE PER 125 MG: Performed by: EMERGENCY MEDICINE

## 2023-11-10 PROCEDURE — 96367 TX/PROPH/DG ADDL SEQ IV INF: CPT

## 2023-11-10 PROCEDURE — 96375 TX/PRO/DX INJ NEW DRUG ADDON: CPT

## 2023-11-10 PROCEDURE — 25010000002 PIPERACILLIN SOD-TAZOBACTAM PER 1 G: Performed by: NURSE PRACTITIONER

## 2023-11-10 PROCEDURE — 87040 BLOOD CULTURE FOR BACTERIA: CPT | Performed by: EMERGENCY MEDICINE

## 2023-11-10 PROCEDURE — 25010000002 VANCOMYCIN HCL IN NACL 1.5-0.9 GM/500ML-% SOLUTION: Performed by: NURSE PRACTITIONER

## 2023-11-10 PROCEDURE — 94640 AIRWAY INHALATION TREATMENT: CPT

## 2023-11-10 PROCEDURE — 87641 MR-STAPH DNA AMP PROBE: CPT | Performed by: NURSE PRACTITIONER

## 2023-11-10 PROCEDURE — 94799 UNLISTED PULMONARY SVC/PX: CPT

## 2023-11-10 RX ORDER — SODIUM CHLORIDE 9 MG/ML
75 INJECTION, SOLUTION INTRAVENOUS CONTINUOUS
Status: DISCONTINUED | OUTPATIENT
Start: 2023-11-10 | End: 2023-11-10 | Stop reason: HOSPADM

## 2023-11-10 RX ORDER — DOXYCYCLINE HYCLATE 100 MG/1
100 CAPSULE ORAL 2 TIMES DAILY
Qty: 20 CAPSULE | Refills: 0 | Status: SHIPPED | OUTPATIENT
Start: 2023-11-10 | End: 2023-11-20

## 2023-11-10 RX ORDER — ALPRAZOLAM 0.5 MG/1
2 TABLET ORAL NIGHTLY PRN
Status: DISCONTINUED | OUTPATIENT
Start: 2023-11-10 | End: 2023-11-10 | Stop reason: HOSPADM

## 2023-11-10 RX ORDER — IPRATROPIUM BROMIDE AND ALBUTEROL SULFATE 2.5; .5 MG/3ML; MG/3ML
3 SOLUTION RESPIRATORY (INHALATION) EVERY 4 HOURS PRN
Status: DISCONTINUED | OUTPATIENT
Start: 2023-11-10 | End: 2023-11-10 | Stop reason: HOSPADM

## 2023-11-10 RX ORDER — ACETAMINOPHEN 325 MG/1
650 TABLET ORAL EVERY 6 HOURS PRN
Status: DISCONTINUED | OUTPATIENT
Start: 2023-11-10 | End: 2023-11-10 | Stop reason: HOSPADM

## 2023-11-10 RX ORDER — OXYCODONE HYDROCHLORIDE 5 MG/1
5 TABLET ORAL EVERY 6 HOURS PRN
Status: DISCONTINUED | OUTPATIENT
Start: 2023-11-10 | End: 2023-11-10 | Stop reason: HOSPADM

## 2023-11-10 RX ORDER — VANCOMYCIN/0.9 % SOD CHLORIDE 1.5G/250ML
20 PLASTIC BAG, INJECTION (ML) INTRAVENOUS ONCE
Status: COMPLETED | OUTPATIENT
Start: 2023-11-10 | End: 2023-11-10

## 2023-11-10 RX ADMIN — METHYLPREDNISOLONE SODIUM SUCCINATE 125 MG: 125 INJECTION, POWDER, FOR SOLUTION INTRAMUSCULAR; INTRAVENOUS at 01:39

## 2023-11-10 RX ADMIN — IPRATROPIUM BROMIDE AND ALBUTEROL SULFATE 3 ML: .5; 3 SOLUTION RESPIRATORY (INHALATION) at 00:11

## 2023-11-10 RX ADMIN — SODIUM CHLORIDE 75 ML/HR: 9 INJECTION, SOLUTION INTRAVENOUS at 06:26

## 2023-11-10 RX ADMIN — PIPERACILLIN AND TAZOBACTAM 3.38 G: 3; .375 INJECTION, POWDER, FOR SOLUTION INTRAVENOUS at 01:39

## 2023-11-10 RX ADMIN — ALPRAZOLAM 2 MG: 0.5 TABLET ORAL at 03:35

## 2023-11-10 RX ADMIN — Medication 1500 MG: at 03:35

## 2023-11-10 RX ADMIN — OXYCODONE 5 MG: 5 TABLET ORAL at 03:35

## 2023-11-10 RX ADMIN — SODIUM CHLORIDE 500 ML: 9 INJECTION, SOLUTION INTRAVENOUS at 01:38

## 2023-11-10 RX ADMIN — Medication 10 ML: at 03:34

## 2023-11-10 RX ADMIN — PIPERACILLIN AND TAZOBACTAM 3.38 G: 3; .375 INJECTION, POWDER, FOR SOLUTION INTRAVENOUS at 06:25

## 2023-11-10 NOTE — TELEPHONE ENCOUNTER
"D/C today from shoulder replacement on 11/08/2023  O2 sat staying consistently 82-84%  Pulse oximeter usually runs 94%  Care advice given to go to the ED  Reason for Disposition   Oxygen level (e.g., pulse oximetry) 90 percent or lower    Additional Information   Negative: SEVERE difficulty breathing (e.g., struggling for each breath, speaks in single words, pulse > 120)   Negative: Bluish (or gray) lips or face now   Negative: Difficult to awaken or acting confused (e.g., disoriented, slurred speech)   Negative: Slow, shallow and weak breathing   Negative: Sounds like a life-threatening emergency to the triager   Negative: Breathing difficulty is main symptom   Negative: [1] Wheezing (high pitched whistling sound) is main concern AND [2] previous asthma attacks or use of asthma medicines   Negative: Diagnosed with Chronic Pulmonary Obstructive Disease (COPD) and on oxygen therapy   Negative: [1] MODERATE difficulty breathing (e.g., speaks in phrases, SOB even at rest, pulse 100 - 120) AND [2] new-onset or worse than normal   Negative: [1] MODERATE difficulty breathing AND [2] oxygen level (e.g., pulse oximetry) 91 to 94 percent    Answer Assessment - Initial Assessment Questions  1. MAIN CONCERN OR SYMPTOM: \"What's your main concern?\" (e.g., low oxygen level, breathing difficulty) \"What question do you have?\"  O2 sat 82-84% lips turn blue at times.  2. ONSET: \"When did the  *No Answer*  start?\"      this afternoon  3. OXYGEN THERAPY:     - \"Do you currently use home oxygen?\"     - If Yes, ask: \"What is your oxygen source?\" (e.g., O2 tank, O2 concentrator).     - If Yes, ask: \"How do you get the oxygen?\" (e.g., nasal prongs, face mask).     - If Yes, ask: \"How much oxygen are you supposed to use?\" (e.g., 1-2 L NC)  no  4.  OXYGEN EQUIPMENT:  \"Are you having any trouble with your oxygen equipment?\"  (e.g., cannula, mask, tubing, tank, concentrator)      *No Answer*  5. OXYGEN SATURATION MONITOR:      - \"Do you use an " "oxygen saturation monitor (pulse oximeter) at home?\"     - If Yes, ask: \"Where do you place the probe?\" (e.g., fingertip, ear lobe)    finger  6. OXYGEN LEVEL: \"What is your reading (oxygen level) today?\" \"What is your usual oxygen saturation reading?\" (e.g., 95%)      *No Answer*  7. VSS MONITORING: \"Do you monitor/measure your oxygen level or vital signs?\" (e.g., yes, no, measurements are automatically sent to provider/call center). Document CURRENT and NORMAL BASELINE values if available.      -  P: \"What is your pulse rate per minute?\"    -  RR: \"What is your respiratory rate per minute?\"      *No Answer*  8. BREATHING DIFFICULTY: \"Are you having any difficulty breathing?\" If Yes, ask: \"How bad is it?\"  (e.g., none, mild, moderate, severe)     - MILD: No SOB at rest, mild SOB with walking, speaks normally in sentences, able to lie down, no retractions, pulse < 100.     - MODERATE: SOB at rest, SOB with minimal exertion and prefers to sit, cannot lie down flat, speaks in phrases, mild retractions, audible wheezing, pulse 100-120.     - SEVERE: Very SOB at rest, speaks in single words, struggling to breathe, sitting hunched forward, retractions, pulse > 120       *No Answer*  9. OTHER SYMPTOMS: \"Do you have any other symptoms?\" (e.g., fever, change in sputum)      *No Answer*  10. SMOKING: \"Do you smoke currently?\" \"Is there anyone that smokes around you?\"  (Note: smoking around oxygen is dangerous!)        *No Answer*    Protocols used: Oxygen Monitoring and Hypoxia-ADULT-AH    "

## 2023-11-10 NOTE — TELEPHONE ENCOUNTER
Caller: AUSTIN SALES    Relationship: Emergency Contact    Best call back number: [phone]     What medication are you requesting: ANTIBIOTICS     If a prescription is needed, what is your preferred pharmacy and phone number: [facility] - [place], [place] - [address] - [phone] [facility] [phone]     Additional notes: PATIENTS  SATED THAT PATIENTS OXYGEN LEVELS GOT LOW AND PATIENT WAS ADMITTED TO [facility]. PATIENT HAS COVID AND PNEUMONIA. PATIENT DISCHARGED HERSELF FROM THE HOSPITAL BECAUSE  HOSPITALS FREAK PATIENT   PATIENTS  IS REQUESTING FOR ANTIBIOTICS FOR PATIENT TO TREAT BOTH COVID AND PNEUMONIA    PLEASE ADVISE

## 2023-11-10 NOTE — SIGNIFICANT NOTE
Post Fall Note    Patient: Andree Deluna   Location: [facility]    Time of fall: 0520    Date of fall:11/10/2023      Location of the fall: [facility] restroom    Describe the Fall: Pt slipped and fell on floor of [facility] bathroom with  assisting patient in restroom. Pt has a left arm sling post left shoulder replacement.     Interventions in place prior to fall: Call Light Within Reach, Encourage Use of Call Light, Bed Side Rails x1, x2, x3, Bed/Chair in Lowest Position, Bed/Chair Locked, Bed Alarm Set, Falls Wrist Band Applied, Fall Risk Care Plan Active, Non-Skid Footwear, Items within Reach, Room Clutter Free, and Adequate Lighting Provided    Was the fall witnessed? Yes    Where was the patient found?: na    Patient position when found?: sitting on restroom floor    If witnessed, what part of the body made contact with the floor or other object? buttocks    Injury: No    Is the patient having any pain?: No    Level of Consciousness: awake, alert, and oriented     Post fall assessment: 15    Physician notified: Yes    Name of physician: Loyda YAN     Family notified: Yes    Name of family member notified: Mike Deluna    Post fall precautions in place: Patient/ Family Educated, Call Light Within Reach, Encourage Use of Call Light, Bed Side Rails x1, x2, x3, Bed/Chair in Lowest Position, Bed/Chair Locked, Bed Alarm Set, Falls Wrist Band Applied, Fall Risk Care Plan Active, Non-Skid Footwear, Room Clutter Free, Items within Reach, Adequate Lighting Provided, and Falls Risk Signage Present    Teaching provided: Falls risk and importance of using assist staff and assistive equipment.          Electronically signed by Katherin Pino RN, 11/10/23, 05:35 EST.

## 2023-11-10 NOTE — CASE MANAGEMENT/SOCIAL WORK
Case Management/Social Work    Patient Name:  Andree Deluna  YOB: 1955  MRN: [id number]  Admit Date:  11/9/2023       in process of reviewing chart, and noted that chart was listed as patient being discharged. Pt was holding in ER awaiting inpatient bed.  inquired to charge nurse Sandra about statusof patient. Sandra  states that patient left AMA.  Pt not case managed prior to discharging .    Anel Reynaga RN, [facility]  Office: [phone]  Fax: [phone]  [email or web address]      Phone communication or documentation only - no physical contact with patient or family.      Electronically signed by:  Anel Reynaga RN  11/10/23 12:08 EST

## 2023-11-10 NOTE — ED TRIAGE NOTES
Pt reports SOA and a fall today. Pt was 82% on RA in triage when pt first walked in but increased to 89% at rest. Pt was placed on 2L NC and increased to 99%. Pt is not on home O2. Pt reports falling today on her left shoulder. Pt reports having surgery yesterday on that same shoulder yesterday. Denies hitting head and LOC and reports she was off her blood thinners for 5 days prior to surgery and has not resumed.

## 2023-11-10 NOTE — ED PROVIDER NOTES
Subjective   Provider in Triage Note  68-year-old female presents with hypoxia, she was notably 82% while walking in and increased to 89 when sitting.  Her  endorses that she was discharged from the hospital today although on room air before she was discharged she was dropping down to the low 80s.  She denies any chest pain but has some left shoulder discomfort from her surgery yesterday.  Denies any swelling to her legs or feet.  Has been off anticoagulation for the last 5 days though she   was on for CABG and valve repair    Due to significant overcrowding in the emergency department patient was initially seen and evaluated in triage.  Provider in triage recommended patient placement in the treatment area to initiate therapy and movement to an ER bed as soon as possible.   Orders placed; medications will be deferred to main provider per protocol.        History of Present Illness  Pit note reviewed agree with content    Patient reports that she is increasingly short of breath this evening.  Patient states that sometimes in the evening when she is relaxed she gets short of breath and checks her pulse oximetry at home and finds it to be in the 94 to 96% range at that time.  The patient states that she does have anxiety regarding her breathing but states has been worse today.  She reports subjective fever and chills.  She states she had a cough but has been nonproductive.  She reports no leg pain or swelling.  She reports no previous history of VTE/PE phenomenon.  She states she has had a lot of postoperative pain in her left shoulder and she thinks that this gets worse when she takes a deep breath      Review of Systems   Constitutional:  Positive for diaphoresis, fatigue and fever.   HENT:  Negative for sore throat.    Respiratory:  Positive for cough, chest tightness and shortness of breath.    Cardiovascular:  Positive for chest pain.   Gastrointestinal:  Positive for nausea.   Musculoskeletal:  Positive  for joint swelling.   Neurological:  Positive for weakness and light-headedness. Numbness: .TOMPHYSICALEXAM.  Hematological:  Does not bruise/bleed easily.   All other systems reviewed and are negative.      Past Medical History:   Diagnosis Date    ADHD unknown    Anemia     Anesthesia complication     difficult to wake up    Anxiety     Atrial fibrillation     Carotid artery disease     80% right internal carotid artery    Coronary artery disease     CVA (cerebral vascular accident)     right parietal right temporal    DDD (degenerative disc disease), lumbar     Depression     Extremity pain     Ankle pain    GERD (gastroesophageal reflux disease)     Hyperlipidemia unknown    Hypertension     Insomnia unknown    Laryngopharyngeal reflux     Low back pain     Mood disorder     Obesity unknown    Short of breath on exertion     Skin cancer of face     Sleep apnea     Suspected sleep apnea she has refused to be tested    Stroke (cerebrum)     Visual field defect     Left       No Known Allergies    Past Surgical History:   Procedure Laterality Date    CARDIAC CATHETERIZATION N/A 5/1/2023    Procedure: Left Heart Cath;  Surgeon: Kye Alcaraz MD;  Location: [facility] CATH INVASIVE LOCATION;  Service: Cardiovascular;  Laterality: N/A;    CAROTID ENDARTERECTOMY Right 11/10/2020    Procedure: CAROTID ENDARTERECTOMY;  Surgeon: Tavo Das MD;  Location: [facility] MAIN OR;  Service: Vascular;  Laterality: Right;    CHOLECYSTECTOMY      COLONOSCOPY      2016    CORONARY ARTERY BYPASS GRAFT N/A 5/6/2023    Procedure: CORONARY ARTERY BYPASS GRAFTING;  Surgeon: Errol Noonan MD;  Location: [facility] CVOR;  Service: Cardiothoracic;  Laterality: N/A;  CABG X  3 using LIMA and right endospahenous vein;  2 coronary markers implanted.     FINGER SURGERY      KNEE ARTHROPLASTY      KNEE SURGERY Right     replaced    LAPAROSCOPIC GASTRIC BANDING      MITRAL VALVE REPAIR/REPLACEMENT N/A 5/6/2023    Procedure:  MITRAL VALVE REPAIR/REPLACEMENT;  Surgeon: Errol Noonan MD;  Location: [facility];  Service: Cardiothoracic;  Laterality: N/A;  Mitral valve repaired using 28 mm Jorge Physio II Annuloplasty Ring.     ROTATOR CUFF REPAIR Right 2019    SHOULDER SURGERY Right 05/24/2019    scope/ cuff repair    TOTAL SHOULDER ARTHROPLASTY W/ DISTAL CLAVICLE EXCISION Left 11/8/2023    Procedure: TOTAL SHOULDER REVERSE ARTHROPLASTY;  Surgeon: Shubham Samaniego MD;  Location: [facility] MAIN OR;  Service: Orthopedics;  Laterality: Left;    TRANSESOPHAGEAL ECHOCARDIOGRAM (GALA) N/A 5/6/2023    Procedure: TRANSESOPHAGEAL ECHOCARDIOGRAM WITH ANESTHESIA;  Surgeon: Errol Noonan MD;  Location: [facility];  Service: Cardiothoracic;  Laterality: N/A;    TUBAL ABDOMINAL LIGATION         Family History   Problem Relation Age of Onset    Diabetes Other     Heart disease Mother     Diabetes Mother     Heart disease Father        Social History     Socioeconomic History    Marital status:    Tobacco Use    Smoking status: Never    Smokeless tobacco: Never   Vaping Use    Vaping Use: Never used   Substance and Sexual Activity    Alcohol use: No    Drug use: No    Sexual activity: Defer           Objective   Physical Exam  Alert Pastora Coma Scale 15   HEENT: Pupils equal and reactive to light. Conjunctivae are not injected. Normal tympanic membranes. Oropharynx and nares are normal.   Neck: Supple. Midline trachea. No JVD. No goiter.   Chest: Bilateral Rales worse on the left compared to the right there are also scattered wheezes bilaterally also worse on the left than the right and equal breath sounds bilaterally, regular rate and rhythm without murmur or rub.   Abdomen: Positive bowel sounds, nontender, nondistended. No rebound or peritoneal signs. No CVA tenderness.   Extremities no clubbing. cyanosis or edema. Motor sensory exam is normal. The full range of motion is intact   Skin: Warm and dry, no rashes or  petechia.   Lymphatic: No regional lymphadenopathy. No calf pain, swelling or Homans sign    Procedures           ED Course  ED Course as of 11/10/23 0026   Thu Nov 09, 2023   2354 Seen in ER overcrowding and overflow conditions [TH]      ED Course User Index  [TH] Marlon Humphrey MD           Labs Reviewed   COMPREHENSIVE METABOLIC PANEL - Abnormal; Notable for the following components:       Result Value    Glucose 130 (*)     Creatinine 1.48 (*)     eGFR 38.9 (*)     All other components within normal limits    Narrative:     GFR Normal >60  Chronic Kidney Disease <60  Kidney Failure <15     APTT - Abnormal; Notable for the following components:    PTT 24.7 (*)     All other components within normal limits   CBC WITH AUTO DIFFERENTIAL - Abnormal; Notable for the following components:    RBC 3.75 (*)     Hemoglobin 11.1 (*)     Lymphocyte % 19.4 (*)     All other components within normal limits   TROPONIN - Normal    Narrative:     High Sensitive Troponin T Reference Range:  <14.0 ng/L- Negative Female for AMI  <22.0 ng/L- Negative Male for AMI  >=14 - Abnormal Female indicating possible myocardial injury.  >=22 - Abnormal Male indicating possible myocardial injury.   Clinicians would have to utilize clinical acumen, EKG, Troponin, and serial changes to determine if it is an Acute Myocardial Infarction or myocardial injury due to an underlying chronic condition.        PROTIME-INR - Normal   RESPIRATORY PANEL PCR W/ COVID-19 (SARS-COV-2), NP SWAB IN UTM/VTP, 2 HR TAT   BLOOD CULTURE   BLOOD CULTURE   MRSA SCREEN, PCR   HIGH SENSITIVITIY TROPONIN T 2HR   CBC AND DIFFERENTIAL    Narrative:     The following orders were created for panel order CBC & Differential.  Procedure                               Abnormality         Status                     ---------                               -----------         ------                     CBC Auto Differential[[id number]]        Abnormal            Final result                  Please view results for these tests on the individual orders.   EXTRA TUBES    Narrative:     The following orders were created for panel order Extra Tubes.  Procedure                               Abnormality         Status                     ---------                               -----------         ------                     Gold Top - SST[[id number]]                                   Final result                 Please view results for these tests on the individual orders.   GOLD TOP - SST     Medications   sodium chloride 0.9 % flush 10 mL (has no administration in time range)   methylPREDNISolone sodium succinate (SOLU-Medrol) injection 125 mg (has no administration in time range)   doxycycline (VIBRAMYCIN) 100 mg in sodium chloride 0.9 % 100 mL IVPB (has no administration in time range)   sodium chloride 0.9 % bolus 500 mL (has no administration in time range)   sodium chloride 0.9 % infusion (has no administration in time range)   ipratropium-albuterol (DUO-NEB) nebulizer solution 3 mL (has no administration in time range)   acetaminophen (TYLENOL) tablet 650 mg (has no administration in time range)   cefTRIAXone (ROCEPHIN) 2,000 mg in sodium chloride 0.9 % 100 mL IVPB (has no administration in time range)   iopamidol (ISOVUE-370) 76 % injection 100 mL (100 mL Intravenous Given 11/9/23 2327)   ipratropium-albuterol (DUO-NEB) nebulizer solution 3 mL (3 mL Nebulization Given 11/10/23 0011)     CT Angiogram Chest Pulmonary Embolism    Result Date: 11/9/2023  Impression: 1.No evidence of pulmonary embolism. 2.Patchy airspace disease within the left lower lobe likely related to atelectasis. Mild pneumonia cannot be excluded.. 3.Cardiomegaly with suspected mild pulmonary edema pattern. 4.Ancillary findings as described above. Electronically Signed: Elodia Swanson MD  11/9/2023 11:34 PM EST  Workstation ID: [id number]    XR Chest 1 View    Result Date: 11/9/2023  Impression: Moderate left small right pleural  effusion suspected. Left greater right lower lobe airspace opacity atelectasis versus pneumonia. Cardiomegaly and postoperative changes of the heart. Electronically Signed: Chantal Johns MD  11/9/2023 9:12 PM EST  Workstation ID: [id number]    XR Shoulder 2+ View Left    Result Date: 11/8/2023  Impression: Postoperative changes Electronically Signed: Eugenio Richey MD  11/8/2023 12:42 PM EST  Workstation ID: [id number]                                   Medical Decision Making  O2 saturations improved with ER therapy.  The patient will be admitted for intravenous antibiotics and steroids (doxycycline and ceftriaxone).  Oxygen support will be continued the case was discussed the hospitalist practitioner    Amount and/or Complexity of Data Reviewed  Independent Historian: spouse  Labs: ordered. Decision-making details documented in ED Course.  Radiology: ordered and independent interpretation performed.  ECG/medicine tests: ordered and independent interpretation performed.     Details: Sinus rhythm, LVH, nonspecific changes, comparison of 11/1/2023 similar  Discussion of management or test interpretation with external provider(s): Discussed with hospitalist service    Risk  OTC drugs.  Prescription drug management.  Decision regarding hospitalization.  Risk Details: Eval progression of respiratory symptomatology to respiratory failure        Final diagnoses:   Pneumonia of both lower lobes due to infectious organism   Hypoxemia   Mild basilar atelectasis of both lungs   History of shoulder surgery       ED Disposition  ED Disposition       ED Disposition   Decision to Admit    Condition   --    Comment   Level of Care: Telemetry [5]   Diagnosis: Bilateral pneumonia [543384]   Admitting Physician: DEMI REAL [[id number]]   Attending Physician: DEMI REAL [[id number]]                 No follow-up provider specified.       Medication List      No changes were made to your prescriptions during this visit.            Kam  Marlon NOONAN MD  11/10/23 0026

## 2023-11-10 NOTE — ED NOTES
Pt is refusing labs and blood cultures. Dr. Humphrey notified of this and was instructed to start antibiotics. When trying to flush IV to give medication, pt stated it burned and was tearful. Charge RN now looking for new IV.

## 2023-11-10 NOTE — PROGRESS NOTES
"Pharmacy Antimicrobial Dosing Service    Subjective:  Andree Deluna is a 68 y.o.female admitted with acute respiratory failure with hypoxia. Pharmacy has been consulted to dose Vancomycin and Piperacillin/Tazobactam for possible PNA. Pt recently discharged s/p L shoulder arthroplasty on 11/8/2023.     PMH: CVA, CABG      Assessment/Plan    1. Day #1/4 Vancomycin: Goal -600 mcg*h/mL. Will give 1500mg (~21mg/kg DBW) IV once followed by 1250mg (~18mg/kg DBW) IV q24h for a predicted AUC ~513mcg*h/mL. No levels ordered for current four day duration and q24h dosing.    2. Day #1/4 Piperacillin/Tazobactam: 3.375g IV q8h for estCrCl > 20 mL/min.    3. MRSA: Pending.     Will continue to monitor drug levels, renal function, culture and sensitivities, and patient clinical status.       Objective:  Relevant clinical data and objective history reviewed:  165.1 cm (65\")   91.2 kg (201 lb)   Ideal body weight: 57 kg (125 lb 10.6 oz)  Adjusted ideal body weight: 70.7 kg (155 lb 12.8 oz)  Body mass index is 33.45 kg/m².        Results from last 7 days   Lab Units 11/09/23 2135 11/08/23  2329   CREATININE mg/dL 1.48* 1.17*     Estimated Creatinine Clearance: 41.7 mL/min (A) (by C-G formula based on SCr of 1.48 mg/dL (H)).  No intake/output data recorded.    Results from last 7 days   Lab Units 11/09/23 2135 11/08/23  2329   WBC 10*3/mm3 8.20 8.50     Temperature    11/09/23 2030   Temp: 97.8 °F (36.6 °C)     Baseline culture/source/susceptibility:  Microbiology Results (last 10 days)       Procedure Component Value - Date/Time    MRSA Screen, PCR (Inpatient) - Swab, Nares [[id number]]  (Normal) Collected: 11/01/23 1319    Lab Status: Final result Specimen: Swab from Nares Updated: 11/01/23 1615     MRSA PCR No MRSA Detected    Narrative:      The negative predictive value of this diagnostic test is high and should only be used to consider de-escalating anti-MRSA therapy. A positive result may indicate colonization with " MRSA and must be correlated clinically.            Ashley Diana, PharmD  11/10/23 00:36 EST

## 2023-11-10 NOTE — H&P
"    [facility]      Patient Name: Andree Deluna  : 1955  MRN: [id number]  Primary Care Physician:  Naga Griffith PA-C  Date of admission: 2023      Subjective      Chief Complaint: shortness of air     History of Present Illness: Andree Deluna is a 68 y.o. female , with a PMH of hyperlipidemia, hypertension, obesity, coronary artery disease, history of an MI, CABG,MV repair, diastolic dysfunction, EF 50%, right carotid stenosis, status post endarterectomy, overactive bladder, CVA, GERD, atrial fibrillation, generalized anxiety disorder, B12 deficiency, who presented to [facility] on [facility] complaining of shortness of air.  She reports her granddaughter told her she looked cyanotic and took her pulse ox at home and it was low. She reports a non productive cough. She reports she had a fever a few days ago . She denies any chest pain or BLE. She does not use home oxygen. She was just discharged from here yesterday after a left shoulder arthroplasty for degenerative joint disease.  She has been holding Eliquis since 5 days prior to surgery and was concerned she might have a pulmonary embolism.Diagnostics and labs today show: High-sensitivity troponin 11, creatinine 1.48 glucose 130, WBC 8.2, hemoglobin 11.1, neutrophils 69.4 EKG shows sinus rhythm heart rate 60 no acute ST elevation or depression, respiratory panel pending.  MRSA pending    Chest x-ray per radiology:    \"mpression:  Moderate left small right pleural effusion suspected. Left greater right lower lobe airspace opacity atelectasis versus pneumonia. Cardiomegaly and postoperative changes of the heart.  \"    CTA chest per radiology:    \"1.No evidence of pulmonary embolism.  2.Patchy airspace disease within the left lower lobe likely related to atelectasis. Mild pneumonia cannot be excluded..  3.Cardiomegaly with suspected mild pulmonary edema pattern.  4.Ancillary findings as described " "above.  \"     She is afebrile and not hypotensive. She had an oxygen saturation level of 89 in the ED and is now stable on 2 L via nasal cannula.  She was given 125 mg IV Solu-Medrol, and a DuoNeb treatment in the emergency department.  She was started on IV vancomycin and IV Zosyn for hospital-acquired pneumonia pharmacy to dose.  She will be admitted for further evaluation and treatment.  Her left shoulder is immobilized.  She reports no problems postop regarding her shoulder.    Review of Systems   Constitutional: Negative.   HENT: Negative.     Eyes: Negative.    Cardiovascular:  Positive for dyspnea on exertion.   Respiratory:  Positive for cough and shortness of breath.    Endocrine: Negative.    Hematologic/Lymphatic: Negative.    Skin: Negative.    Musculoskeletal:  Positive for joint pain.        Recent left shoulder arthroplasty    Gastrointestinal: Negative.    Genitourinary: Negative.    Neurological: Negative.    Psychiatric/Behavioral:  The patient is nervous/anxious.    Allergic/Immunologic: Negative.    All other systems reviewed and are negative.       Personal History     Past Medical History:   Diagnosis Date    ADHD unknown    Anemia     Anesthesia complication     difficult to wake up    Anxiety     Atrial fibrillation     Carotid artery disease     80% right internal carotid artery    Coronary artery disease     CVA (cerebral vascular accident)     right parietal right temporal    DDD (degenerative disc disease), lumbar     Depression     Extremity pain     Ankle pain    GERD (gastroesophageal reflux disease)     Hyperlipidemia unknown    Hypertension     Insomnia unknown    Laryngopharyngeal reflux     Low back pain     Mood disorder     Obesity unknown    Short of breath on exertion     Skin cancer of face     Sleep apnea     Suspected sleep apnea she has refused to be tested    Stroke (cerebrum)     Visual field defect     Left       Past Surgical History:   Procedure Laterality Date    " CARDIAC CATHETERIZATION N/A 5/1/2023    Procedure: Left Heart Cath;  Surgeon: Kye Alcaraz MD;  Location: [facility] CATH INVASIVE LOCATION;  Service: Cardiovascular;  Laterality: N/A;    CAROTID ENDARTERECTOMY Right 11/10/2020    Procedure: CAROTID ENDARTERECTOMY;  Surgeon: Tavo Das MD;  Location: [facility] MAIN OR;  Service: Vascular;  Laterality: Right;    CHOLECYSTECTOMY      COLONOSCOPY      2016    CORONARY ARTERY BYPASS GRAFT N/A 5/6/2023    Procedure: CORONARY ARTERY BYPASS GRAFTING;  Surgeon: Errol Noonan MD;  Location: [facility] CVOR;  Service: Cardiothoracic;  Laterality: N/A;  CABG X  3 using LIMA and right endospahenous vein;  2 coronary markers implanted.     FINGER SURGERY      KNEE ARTHROPLASTY      KNEE SURGERY Right     replaced    LAPAROSCOPIC GASTRIC BANDING      MITRAL VALVE REPAIR/REPLACEMENT N/A 5/6/2023    Procedure: MITRAL VALVE REPAIR/REPLACEMENT;  Surgeon: Errol Noonan MD;  Location: [facility];  Service: Cardiothoracic;  Laterality: N/A;  Mitral valve repaired using 28 mm Jorge Physio II Annuloplasty Ring.     ROTATOR CUFF REPAIR Right 2019    SHOULDER SURGERY Right 05/24/2019    scope/ cuff repair    TOTAL SHOULDER ARTHROPLASTY W/ DISTAL CLAVICLE EXCISION Left 11/8/2023    Procedure: TOTAL SHOULDER REVERSE ARTHROPLASTY;  Surgeon: Shubham Samaniego MD;  Location: [facility] MAIN OR;  Service: Orthopedics;  Laterality: Left;    TRANSESOPHAGEAL ECHOCARDIOGRAM (GALA) N/A 5/6/2023    Procedure: TRANSESOPHAGEAL ECHOCARDIOGRAM WITH ANESTHESIA;  Surgeon: Errol Noonan MD;  Location: [facility];  Service: Cardiothoracic;  Laterality: N/A;    TUBAL ABDOMINAL LIGATION         Family History: family history includes Diabetes in her mother and another family member; Heart disease in her father and mother. Otherwise pertinent FHx was reviewed and not pertinent to current issue.    Social History:  reports that she has never smoked. She has never  used smokeless tobacco. She reports that she does not drink alcohol and does not use drugs.    Home Medications:  Prior to Admission Medications       Prescriptions Last Dose Informant Patient Reported? Taking?    acetaminophen (TYLENOL) 325 MG tablet   No No    Take 2 tablets by mouth Every 4 (Four) Hours As Needed for Mild Pain.    ALPRAZolam (XANAX) 2 MG tablet   No No    TAKE ONE TABLET BY MOUTH ONCE NIGHTLY AS NEEDED FOR ANXIETY    Patient taking differently:  Take 1 tablet by mouth At Night As Needed for Sleep. for anxiety.   OK to take night before surgery    apixaban (ELIQUIS) 5 MG tablet tablet   No No    Take 1 tablet by mouth Every 12 (Twelve) Hours. Indications: Atrial Fibrillation    Patient taking differently:  Take 1 tablet by mouth Every 12 (Twelve) Hours. Stop taking 5 days prior to surgery, last dose Thursday 11/2  Indications: Atrial Fibrillation    aspirin 81 MG chewable tablet   No No    Chew 1 tablet Daily. Indications: antiplatelet    Patient taking differently:  Chew 1 tablet Daily. Stop for 5 days prior to surg, last dose 11/2  Indications: antiplatelet    atorvastatin (LIPITOR) 80 MG tablet   No No    Take 1 tablet by mouth Every Night. Indications: High Amount of Fats in the Blood    bisoprolol (ZEBeta) 10 MG tablet   No No    Take 1 tablet by mouth Daily.    Patient taking differently:  Take 1 tablet by mouth Daily. Take Day of surgery    furosemide (LASIX) 40 MG tablet   No No    Take 0.5 tablets by mouth Daily. Indications: Edema    Patient taking differently:  Take 1 tablet by mouth Daily. Hold Day of Surgery  Indications: Edema    lamoTRIgine (LaMICtal) 100 MG tablet   No No    TAKE ONE TABLET BY MOUTH TWICE A DAY    Patient taking differently:  Take 1 tablet by mouth 2 (Two) Times a Day. Take Day of Surgery    lisinopril (PRINIVIL,ZESTRIL) 20 MG tablet   No No    Take 1 tablet by mouth Daily. Indications: High Blood Pressure Disorder    Patient taking differently:  Take 1 tablet by  mouth Daily. Hold for 24 hours prior to surgery  Indications: High Blood Pressure Disorder    metoprolol succinate XL (TOPROL-XL) 25 MG 24 hr tablet   No No    TAKE ONE TABLET BY MOUTH DAILY    omeprazole (priLOSEC) 40 MG capsule   No No    TAKE ONE CAPSULE BY MOUTH DAILY    Patient taking differently:  Take 1 capsule by mouth Daily. OK to take Day of Surgery  Indications: Heartburn    oxybutynin (DITROPAN) 5 MG tablet   No No    Take 1 tablet by mouth Daily.    oxyCODONE-acetaminophen (Percocet) 5-325 MG per tablet   No No    Take 1 tablet by mouth Every 6 (Six) Hours As Needed for Moderate Pain.    potassium chloride (K-DUR,KLOR-CON) 20 MEQ CR tablet   No No    Take 2 tablets by mouth Daily. Indications: Low Amount of Potassium in the Blood    Patient taking differently:  Take 2 tablets by mouth Daily. Hold Day of Surgery  Indications: Low Amount of Potassium in the Blood    promethazine (PHENERGAN) 12.5 MG tablet   No No    Take 1 tablet by mouth Every 6 (Six) Hours As Needed for Nausea or Vomiting.    vitamin B-12 (CYANOCOBALAMIN) 1000 MCG tablet   Yes No    Take 1 tablet by mouth Daily. Hold for 1 week prior to surgery.  Stop 11/1              Allergies:  No Known Allergies    Objective      Vitals:   Temp:  [97.4 °F (36.3 °C)-97.8 °F (36.6 °C)] 97.8 °F (36.6 °C)  Heart Rate:  [58-70] 59  Resp:  [12-17] 16  BP: (123-154)/(66-76) 138/75  Flow (L/min):  [2] 2    Physical Exam  Vitals reviewed.   Constitutional:       Appearance: Normal appearance. She is obese.   HENT:      Head: Normocephalic and atraumatic.      Right Ear: External ear normal.      Left Ear: External ear normal.      Nose: Nose normal.      Mouth/Throat:      Mouth: Mucous membranes are moist.   Eyes:      Extraocular Movements: Extraocular movements intact.   Cardiovascular:      Rate and Rhythm: Normal rate and regular rhythm.      Pulses: Normal pulses.      Heart sounds: Normal heart sounds.   Pulmonary:      Breath sounds: Rales present.  "  Abdominal:      Palpations: Abdomen is soft.   Genitourinary:     Comments: deferred  Musculoskeletal:         General: Normal range of motion.      Cervical back: Normal range of motion and neck supple.   Skin:     General: Skin is warm and dry.   Neurological:      General: No focal deficit present.      Mental Status: She is alert and oriented to person, place, and time.   Psychiatric:         Mood and Affect: Mood normal.         Behavior: Behavior normal.         Thought Content: Thought content normal.         Judgment: Judgment normal.          Result Review    Result Review:  I have personally reviewed the results from the time of this admission to 11/10/2023 01:34 EST and agree with these findings:  [x]  Laboratory  [x]  Microbiology  [x]  Radiology  [x]  EKG/Telemetry   [x]  Cardiology/Vascular   []  Pathology  [x]  Old records  []  Other:  Most notable findings include: High-sensitivity troponin 11, creatinine 1.48 glucose 130, WBC 8.2, hemoglobin 11.1, neutrophils 69.4 EKG shows sinus rhythm heart rate 60 no acute ST elevation or depression, respiratory panel pending    Chest x-ray per radiology:    \"mpression:  Moderate left small right pleural effusion suspected. Left greater right lower lobe airspace opacity atelectasis versus pneumonia. Cardiomegaly and postoperative changes of the heart.  \"    CTA chest per radiology:    \"1.No evidence of pulmonary embolism.  2.Patchy airspace disease within the left lower lobe likely related to atelectasis. Mild pneumonia cannot be excluded..  3.Cardiomegaly with suspected mild pulmonary edema pattern.  4.Ancillary findings as described above.  \"        Assessment & Plan        Active Hospital Problems:  Active Hospital Problems    Diagnosis     **Acute respiratory failure with hypoxia     Bilateral pneumonia     Chronic coronary artery disease     NYHA class 1 heart failure with borderline preserved ejection fraction     Acute renal insufficiency     Paroxysmal " atrial fibrillation     GERD without esophagitis     DJD of left shoulder     Carotid stenosis, bilateral     B12 deficiency     Attention deficit disorder of adult with hyperactivity     Hyperlipidemia     Insomnia     Obesity     Anxiety      Plan:     Acute respiratory failure with hypoxia now stable on 2 L oxygen via nasal cannula likely secondary to bilateral pneumonia, mild pulmonary edema, DuoNebs every 4 hours as needed, acetaminophen for fever, see plan below    Bilateral pneumonia per CT and chest x-ray WBC not elevated afebrile, IV vancomycin and Zosyn pharmacy to dose for hospital-acquired pneumonia, blood cultures pending    Chronic coronary artery disease status post CABG and mitral valve repair, denies chest pain troponin not elevated, continuous cardiac monitoring was on home statin and aspirin and beta-blocker reorder pending verification of dosage    Diastolic heart failure, borderline preserved ejection fraction EF 50% per echo, no bilateral lower extremity edema, on home Lasix reorder pending verification    Acute renal insufficiency, creatinine 1.48 may be secondary to recent procedure, trend renal function avoid nephrotoxic meds as much as possible    Paroxysmal atrial fibrillation currently in sinus rhythm, Eliquis on hold for recent surgery was on home bisoprolol and metoprolol reorder pending verification    Degenerative joint disease left shoulder arthroplasty 11/8/2023, immobilized, no complaints, on home oxycodone acetaminophen 5/3/2025 every 6 hours reordered    Bilateral carotid stenosis status post endarterectomy, was on home aspirin and Eliquis, has been held due to surgery, order when appropriate    GERD  without esophagitis, stable reorder home PPI when verified    B12 deficiency, on home cyanocobalamin on hold for recent surgery reorder when appropriate    ADHD/no home meds    Hyperlipidemia, on home statin reorder pending verification    Insomnia, no meds on current home  list    Obesity BMI 33.45 lifestyle management education    Anxiety, home meds recent prescription noted for alprazolam reordered    Mood disorder, was on home Lamictal reorder pending verification    Overactive bladder was on oxybutynin reorder pending verification        DVT prophylaxis:  Mechanical DVT prophylaxis orders are present.    CODE STATUS:    Code Status (Patient has no pulse and is not breathing): CPR (Attempt to Resuscitate)  Medical Interventions (Patient has pulse or is breathing): Full Support    Admission Status:  I believe this patient meets observation status.    I discussed the patient's findings and my recommendations with patient and family.    This patient has been examined wearing appropriate Personal Protective Equipment     . 11/10/23      Signature: Electronically signed by SARAHY Hannah, 11/10/23, 1:35 AM EST.

## 2023-11-13 ENCOUNTER — TELEPHONE (OUTPATIENT)
Dept: CARDIOLOGY | Facility: CLINIC | Age: 68
End: 2023-11-13
Payer: MEDICARE

## 2023-11-13 NOTE — TELEPHONE ENCOUNTER
Patient has been taking a whole tablet of furosemide daily. Should be taking half tablet. Ran out of medication early. Pharmacy is going to get refill for patient. Mike wants to make sure it is safe for patient to take half tablet now since she has been taking a whole tablet.

## 2023-11-14 ENCOUNTER — LAB (OUTPATIENT)
Dept: FAMILY MEDICINE CLINIC | Facility: CLINIC | Age: 68
End: 2023-11-14
Payer: MEDICARE

## 2023-11-14 ENCOUNTER — OFFICE VISIT (OUTPATIENT)
Dept: FAMILY MEDICINE CLINIC | Facility: CLINIC | Age: 68
End: 2023-11-14
Payer: MEDICARE

## 2023-11-14 VITALS
WEIGHT: 205 LBS | RESPIRATION RATE: 16 BRPM | OXYGEN SATURATION: 97 % | HEIGHT: 65 IN | BODY MASS INDEX: 34.16 KG/M2 | SYSTOLIC BLOOD PRESSURE: 172 MMHG | HEART RATE: 69 BPM | DIASTOLIC BLOOD PRESSURE: 98 MMHG

## 2023-11-14 DIAGNOSIS — N17.9 AKI (ACUTE KIDNEY INJURY): ICD-10-CM

## 2023-11-14 DIAGNOSIS — M25.512 ACUTE PAIN OF LEFT SHOULDER DUE TO TRAUMA: ICD-10-CM

## 2023-11-14 DIAGNOSIS — G89.11 ACUTE PAIN OF LEFT SHOULDER DUE TO TRAUMA: ICD-10-CM

## 2023-11-14 DIAGNOSIS — U07.1 PNEUMONIA DUE TO COVID-19 VIRUS: ICD-10-CM

## 2023-11-14 DIAGNOSIS — M25.512 ACUTE PAIN OF LEFT SHOULDER DUE TO TRAUMA: Primary | ICD-10-CM

## 2023-11-14 DIAGNOSIS — J12.82 PNEUMONIA DUE TO COVID-19 VIRUS: ICD-10-CM

## 2023-11-14 DIAGNOSIS — G89.11 ACUTE PAIN OF LEFT SHOULDER DUE TO TRAUMA: Primary | ICD-10-CM

## 2023-11-14 DIAGNOSIS — I10 ESSENTIAL HYPERTENSION: ICD-10-CM

## 2023-11-14 PROCEDURE — 36415 COLL VENOUS BLD VENIPUNCTURE: CPT

## 2023-11-14 PROCEDURE — 80053 COMPREHEN METABOLIC PANEL: CPT | Performed by: PHYSICIAN ASSISTANT

## 2023-11-14 RX ORDER — FUROSEMIDE 40 MG/1
20 TABLET ORAL DAILY
Qty: 45 TABLET | Refills: 1 | Status: SHIPPED | OUTPATIENT
Start: 2023-11-14

## 2023-11-14 RX ORDER — LISINOPRIL 10 MG/1
10 TABLET ORAL DAILY
Qty: 90 TABLET | Refills: 3 | Status: SHIPPED | OUTPATIENT
Start: 2023-11-14 | End: 2024-11-08

## 2023-11-14 NOTE — TELEPHONE ENCOUNTER
That is fine.  Please have patient make an appointment with me the week after Thanksgiving.  She currently has COVID per hospital notes.

## 2023-11-14 NOTE — PROGRESS NOTES
Transitional Care Follow Up Visit  Subjective     Andree Deluna is a 68 y.o. female who presents for a transitional care management visit.    Within 48 business hours after discharge our office contacted her via telephone to coordinate her care and needs.      I reviewed and discussed the details of that call along with the discharge summary, hospital problems, inpatient lab results, inpatient diagnostic studies, and consultation reports with Andree.     Current outpatient and discharge medications have been reconciled for the patient.  Reviewed by: Naga Griffith PA-C          5/18/2023     6:57 PM   Date of TCM Phone Call   [facility]   Date of Admission 5/17/2023   Date of Discharge 5/18/2023   Discharge Disposition Home or Self Care     Risk for Readmission (LACE) Score: 7 (11/10/2023  6:00 AM)      History of Present Illness   Course During Hospital Stay:  Presents to the clinic for follow up after shoulder arthoplasty with subsequent pneumonia, covid 19, hypoxia. She was seen and then signed herself out of the hospital. She was not treated due to leaving. We sent in steroids and doxy and she has had improvement with the cough and soa. She has fallen since leaving the hospital and hit her left shoulder. She has not contacted ortho about this fall yet. No imaging done since fall. Had leesa prior to hospitalization and has worsened since. No history of leesa. She did have someone increase her lisinopril to 20mg and she has been taking this.      The following portions of the patient's history were reviewed and updated as appropriate: allergies, current medications, past family history, past medical history, past social history, past surgical history, and problem list.    Review of Systems    Objective   Physical Exam  Constitutional:       Appearance: Normal appearance.   Eyes:      Extraocular Movements: Extraocular movements intact.      Pupils: Pupils are equal, round, and reactive to light.    Cardiovascular:      Rate and Rhythm: Normal rate.      Heart sounds: No murmur heard.  Pulmonary:      Effort: Pulmonary effort is normal.      Breath sounds: No wheezing.   Musculoskeletal:         General: Swelling, tenderness and signs of injury (left shoulder in sling.) present.   Neurological:      General: No focal deficit present.      Mental Status: She is alert and oriented to person, place, and time.   Psychiatric:         Mood and Affect: Mood normal.         Behavior: Behavior normal.         Assessment & Plan   Diagnoses and all orders for this visit:    1. Acute pain of left shoulder due to trauma (Primary)  -     XR Shoulder 2+ View Left; Future    2. Pneumonia due to COVID-19 virus    3. MCKENNA (acute kidney injury)  -     Comprehensive metabolic panel; Future    4. Essential hypertension    Other orders  -     lisinopril (PRINIVIL,ZESTRIL) 10 MG tablet; Take 1 tablet by mouth Daily for 360 days.  Dispense: 90 tablet; Refill: 3      Due to worsening renal function decrease lisinopril back to 10mg. Finish abx for pneumonia-improving. She needs to get imaging on shoulder due to fall and recent procedure. Needs to follow up with  for this as well. Check lab today to reevaluate kidney function and then repeat again 1 week after decreasing lisinopril to 10mg.

## 2023-11-14 NOTE — TELEPHONE ENCOUNTER
Blood work completed by PCP, who stated kidney function was abnormal. Comprehensive Metabolic Panel (11/09/2023 21:35)       Pt will switch to half tablet, unless swelling increases, then she will take an additional 1/2.

## 2023-11-15 LAB
ALBUMIN SERPL-MCNC: 3.7 G/DL (ref 3.5–5.2)
ALBUMIN/GLOB SERPL: 1.1 G/DL
ALP SERPL-CCNC: 90 U/L (ref 39–117)
ALT SERPL W P-5'-P-CCNC: 11 U/L (ref 1–33)
ANION GAP SERPL CALCULATED.3IONS-SCNC: 11.6 MMOL/L (ref 5–15)
AST SERPL-CCNC: 14 U/L (ref 1–32)
BACTERIA SPEC AEROBE CULT: NORMAL
BILIRUB SERPL-MCNC: 0.6 MG/DL (ref 0–1.2)
BUN SERPL-MCNC: 12 MG/DL (ref 8–23)
BUN/CREAT SERPL: 10 (ref 7–25)
CALCIUM SPEC-SCNC: 9.8 MG/DL (ref 8.6–10.5)
CHLORIDE SERPL-SCNC: 101 MMOL/L (ref 98–107)
CO2 SERPL-SCNC: 28.4 MMOL/L (ref 22–29)
CREAT SERPL-MCNC: 1.2 MG/DL (ref 0.57–1)
EGFRCR SERPLBLD CKD-EPI 2021: 50 ML/MIN/1.73
GLOBULIN UR ELPH-MCNC: 3.3 GM/DL
GLUCOSE SERPL-MCNC: 87 MG/DL (ref 65–99)
POTASSIUM SERPL-SCNC: 4.6 MMOL/L (ref 3.5–5.2)
PROT SERPL-MCNC: 7 G/DL (ref 6–8.5)
SODIUM SERPL-SCNC: 141 MMOL/L (ref 136–145)

## 2023-11-16 ENCOUNTER — TELEPHONE (OUTPATIENT)
Dept: FAMILY MEDICINE CLINIC | Facility: CLINIC | Age: 68
End: 2023-11-16
Payer: MEDICARE

## 2023-11-16 NOTE — TELEPHONE ENCOUNTER
----- Message from Naga Griffith PA-C sent at 11/16/2023  7:01 AM EST -----  Everything is ok with shoulder from alignment perspective. Follow up with ortho for regular post op visit.

## 2023-11-16 NOTE — TELEPHONE ENCOUNTER
Hub to read  Everything is ok with shoulder from alignment perspective. Follow up with ortho for regular post op visit.

## 2023-11-16 NOTE — TELEPHONE ENCOUNTER
----- Message from Naga Griffith PA-C sent at 11/16/2023  7:00 AM EST -----  Elisas renal function is improving.

## 2023-11-21 NOTE — PROGRESS NOTES
Enter Query Response Below      Query Response: unknown  Electronically signed by SARAHY Hannah, 23, 9:49 PM EST.              If applicable, please update the problem list.         Patient: Andree Deluna        : 1955  Account: [id number]           Admit Date: 2023        How to Respond to this query:       a. Click New Note     b. Answer query within the yellow box.                c. Update the Problem List, if applicable.      If you have any questions about this query contact me at: [email or web address]    SARAHY Hannah:      68 year old female noted to have acute respiratory failure with hypoxia, bilateral pneumonia.   Treatment included IV Zosyn, IV Vancomycin.   Patient noted to have been discharged from this facility 2023 after undergoing left shoulder arthroplasty.   Respiratory Panel resulted 11/10/2023 noted COVID19 Detected.   Case Management notes patient left AMA.       Can the patient's condition be further specified as:    Active COVID 19 disease  History of COVID 19 with viral shedding  Other- specify __________  Unable to determine    By submitting this query, we are merely seeking further clarification of documentation to accurately reflect all conditions that you are monitoring, evaluating, treating or that extend the hospitalization or utilize additional resources of care. Please utilize your independent clinical judgment when addressing the question(s) above.     This query and your response, once completed, will be entered into the legal medical record.    Sincerely,  Zahira Kwong  Clinical Documentation [employer]

## 2023-11-27 ENCOUNTER — TELEPHONE (OUTPATIENT)
Dept: ORTHOPEDIC SURGERY | Facility: CLINIC | Age: 68
End: 2023-11-27

## 2023-11-27 ENCOUNTER — OFFICE VISIT (OUTPATIENT)
Dept: ORTHOPEDIC SURGERY | Facility: CLINIC | Age: 68
End: 2023-11-27
Payer: MEDICARE

## 2023-11-27 VITALS — WEIGHT: 205 LBS | BODY MASS INDEX: 34.16 KG/M2 | HEIGHT: 65 IN | HEART RATE: 55 BPM

## 2023-11-27 DIAGNOSIS — M19.012 OSTEOARTHRITIS OF LEFT GLENOHUMERAL JOINT: ICD-10-CM

## 2023-11-27 DIAGNOSIS — Z47.89 ORTHOPEDIC AFTERCARE: Primary | ICD-10-CM

## 2023-11-27 PROCEDURE — 99024 POSTOP FOLLOW-UP VISIT: CPT | Performed by: ORTHOPAEDIC SURGERY

## 2023-11-27 RX ORDER — OXYCODONE HYDROCHLORIDE AND ACETAMINOPHEN 5; 325 MG/1; MG/1
1 TABLET ORAL EVERY 6 HOURS PRN
Qty: 28 TABLET | Refills: 0 | Status: SHIPPED | OUTPATIENT
Start: 2023-11-27

## 2023-11-27 NOTE — TELEPHONE ENCOUNTER
Pharmacist called stating she wanted Dr. Samaniego to be aware patient is currently taking Xanax as well. I notified Dr. Samaniego and he said that was fine patient is only 3 weeks post op. She wanted a Dx code as well, so surgery codes were given

## 2023-11-27 NOTE — PATIENT INSTRUCTIONS
Shoulder Arthroplasty: Post- Operative Visit Objectives    Review the operative findings, procedures and photos.  Make sure medications are effective and not causing problems.  Pain: Oxycodone or hydrocodone is for pain. You may take 1 tablet every 6 hours as necessary.  Some patients don’t require the use of these…in those circumstances just use Tylenol extra-strength 1 or 2 tablets every 4-6 hours.   NSAIDs. For pain and inflammation.  You can take an over the counter anti-inflammatory of choice such as Aleve, Ibuprofen, Motrin or Advil during this time.  If you have had any problems stop taking these medicines and please tell us!  Wound Care:  Today we will remove your dressings.  There may be some residual glue on your incision.  It is now ok to shower without covering it. Please avoid submerging the incision for another two weeks.  Continue ice pack as needed.  Exercises and Physical Therapy   Continue your physical therapy and daily home exercises focusing especially on overhead motion.  Continue the sling and remove for activities such as showering and bringing you hand to your face or hair, no motion behind your back for the next 4 weeks.  Some shoulder replacements with a rotator cuff repair will have more restrictions and we will go over those.   Follow Up appointments Schedule Follow up visits as directed usually in 4 weeks..  Notes  Make sure you have all necessary notes and documentation for school or work.  Issues: Remember our goal is to make this process smooth and easy if there is any thing you need please ask us or call back [phone]

## 2023-11-27 NOTE — PROGRESS NOTES
"     Patient ID: Andree Deluna is a 68 y.o. female.    11/8/23 left reverse total shoulder with subscapularis repair  Had a fall couple weeks ago has not started therapy  Objective:    Pulse 55   Ht 165.1 cm (65\")   Wt 93 kg (205 lb)   BMI 34.11 kg/m²     Physical Examination:    Incision healing well no sign of infection  Sensory and motor exam are intact all distributions. Radial pulse is palpable and capillary refill is less than two seconds to all digits.  Passive elevation 120 without crepitance    Imaging:  Prior x-ray from [facility] demonstrates reverse total shoulder in position    Assessment:  Doing well after shoulder arthroplasty    Plan:  Continue sling begin therapy okay to shower without dressing see me in 3 weeks  "

## 2023-12-08 RX ORDER — OXYBUTYNIN CHLORIDE 5 MG/1
5 TABLET ORAL DAILY
Qty: 90 TABLET | Refills: 1 | Status: SHIPPED | OUTPATIENT
Start: 2023-12-08

## 2023-12-21 ENCOUNTER — TELEPHONE (OUTPATIENT)
Dept: ORTHOPEDIC SURGERY | Facility: CLINIC | Age: 68
End: 2023-12-21
Payer: MEDICARE

## 2023-12-21 NOTE — TELEPHONE ENCOUNTER
I tried calling patient and was unable to leave message because mailbox is full.  Patient can go to the urgent care to be evaluated due if she is in a lot of pain or if she can wait till her appointment on 12/28 we can get an xray then.  Dr. Samaniego is out of the office.     Ok for hub to relay.

## 2023-12-21 NOTE — TELEPHONE ENCOUNTER
Provider: ELVIN    Caller: PATIENT    Phone Number: [phone]    Reason for Call: PATIENT STATES THAT THIS PAST TUESDAY SHE TRIPPED OVER HER DOG AND LANDED ON HER LEFT SHOULDER. SHE STATES SHOULDER IS SORE BUT NOT HAVING ANY OTHER ISSUES. SHE STATES HER PHYSICAL THERAPIST THINKS SHE NEEDS TO HAVE A NEW XRAY DONE. PATIENT IS SCHEDULED ON 12/28/23 FOR A FOLLOW UP WITH DR. OCONNOR. SHE IS ASKING IF SHE NEEDS TO BE SEEN SOONER. PLEASE CALL HER TO DISCUSS.

## 2023-12-26 ENCOUNTER — TELEPHONE (OUTPATIENT)
Dept: FAMILY MEDICINE CLINIC | Facility: CLINIC | Age: 68
End: 2023-12-26
Payer: MEDICARE

## 2023-12-26 NOTE — TELEPHONE ENCOUNTER
Caller: Andree Deluna    Relationship: Self    Best call back number: [phone]     What medication are you requesting: NAUSEA MEDICATION    What are your current symptoms: NAUSEATED, DIFFICULTY EATING    How long have you been experiencing symptoms: SINCE 11-    Have you had these symptoms before:    [] Yes  [x] No    Have you been treated for these symptoms before:   [] Yes  [x] No    If a prescription is needed, what is your preferred pharmacy and phone number: [facility] [address] [facility] [address] [facility] [phone] [facility] [phone]      Additional notes: PATIENT STATED SHE HAD A SHOULDER REPLACEMENT DONE 11-.    PATIENT STATED SINCE 11- SHE HAS BEEN VERY NAUSEATED AND UNABLE TO EAT.    PATIENT IS REQUESTING A MEDICATION BE PRESCRIBED TO HELP.    PLEASE CALL TO DISCUSS IF NEEDED.

## 2023-12-26 NOTE — TELEPHONE ENCOUNTER
Hub to read  Shouldn't still be nauseated from surgery. We really need to look more in to this if she is still having nausea. Needs appointment

## 2023-12-26 NOTE — TELEPHONE ENCOUNTER
Name: Andree Deluna    Relationship: Self    Best Callback Number: [phone]     HUB PROVIDED THE RELAY MESSAGE FROM THE OFFICE   PATIENT VOICED UNDERSTANDING AND HAS NO FURTHER QUESTIONS AT THIS TIME    ADDITIONAL INFORMATION: RELAYED MESSAGE TO PATIENT AND SHE DID NOT WANT TO ADD ANYTHING BACK.

## 2023-12-28 ENCOUNTER — OFFICE VISIT (OUTPATIENT)
Dept: ORTHOPEDIC SURGERY | Facility: CLINIC | Age: 68
End: 2023-12-28
Payer: MEDICARE

## 2023-12-28 VITALS — BODY MASS INDEX: 34.16 KG/M2 | HEIGHT: 65 IN | WEIGHT: 205 LBS | HEART RATE: 55 BPM

## 2023-12-28 DIAGNOSIS — Z47.89 ORTHOPEDIC AFTERCARE: Primary | ICD-10-CM

## 2023-12-28 PROCEDURE — 99024 POSTOP FOLLOW-UP VISIT: CPT | Performed by: ORTHOPAEDIC SURGERY

## 2023-12-28 NOTE — PROGRESS NOTES
"     Patient ID: Andree Deluna is a 68 y.o. female.  11/8/23 left reverse total shoulder with subscapularis repair   Has had a couple falls recent increase in shoulder pain    Objective:    Pulse 55   Ht 165.1 cm (65\")   Wt 93 kg (205 lb)   BMI 34.11 kg/m²     Physical Examination:    Left shoulder healed incisions mild pain over the posterolateral acromion no pain over the scapular spine no tenderness over the humeral component passive elevation 150 abduction 90 external rotation 20  Sensory and motor exam are intact all distributions. Radial pulse is palpable and capillary refill is less than two seconds to all digits.    Imaging:  left Shoulder X-Ray  Indication: Postop total shoulder with a fall  AP Y and Lateral views  Findings: Reverse total shoulder position no fracture  no bony lesion  Soft tissues normal  not examined joint spaces  Hardware appropriately positioned yes      yes prior studies available for comparison.    Assessment:  Pain after shoulder arthroplasty    Plan:  Resume physical therapy conservatively out of sling see me with x-ray in 4 months  "

## 2024-01-02 ENCOUNTER — NURSE TRIAGE (OUTPATIENT)
Dept: CALL CENTER | Facility: HOSPITAL | Age: 69
End: 2024-01-02
Payer: MEDICARE

## 2024-01-02 RX ORDER — LAMOTRIGINE 100 MG/1
100 TABLET ORAL 2 TIMES DAILY
Qty: 60 TABLET | Refills: 2 | Status: SHIPPED | OUTPATIENT
Start: 2024-01-02

## 2024-01-02 NOTE — TELEPHONE ENCOUNTER
"HUB transferred call  Requesting appt with Dr Griffith    When call transferred from [facility]  is on the phone. Patient went in to her rehab appt at [place] They are wanting to make an appt with Dr Griffith  Walks 10-15 feet c/o SOB and gets nauseated  Unable to do a triage assessment Patient is in with the rehab appt  Call transferred to PCP office for appt Spoke with Kamla    Reason for Disposition   Caller requesting an appointment, triage offered and declined    Additional Information   Negative: Lab calling with strep throat test results and triager can call in prescription   Negative: Lab calling with urinalysis test results and triager can call in prescription   Negative: Medication questions   Negative: Medication renewal and refill questions   Negative: Pre-operative or pre-procedural questions   Negative: ED call to PCP (i.e., primary care provider; doctor, NP, or PA)   Negative: Doctor (or NP/PA) call to PCP   Negative: Call about patient who is currently hospitalized   Negative: Lab or radiology calling with CRITICAL test results   Negative: [1] Follow-up call from patient regarding patient's clinical status AND [2] information urgent   Negative: [1] Caller requests to speak ONLY to PCP AND [2] URGENT question   Negative: [1] Caller requests to speak to PCP now AND [2] won't tell us reason for call  (Exception: If 10 pm to 6 am, caller must first discuss reason for the call.)   Negative: Notification of hospital admission   Negative: Notification of death   Negative: Caller requesting lab results  (Exception: Routine or non-urgent lab result.)   Negative: Lab or radiology calling with test results   Negative: [1] Follow-up call from patient regarding patient's clinical status AND [2] information NON-URGENT   Negative: [1] Caller requests to speak ONLY to PCP AND [2] NON-URGENT question    Answer Assessment - Initial Assessment Questions  1. REASON FOR CALL or QUESTION: \"What is your reason for " "calling today?\" or \"How can I best  help you?\" or \"What question do you have that I can help answer?\"  Wanting to make an appt for wife.  2. CALLER: Document the source of call. (e.g., laboratory, patient).         Protocols used: PCP Call - No Triage-ADULT-AH    "

## 2024-01-03 ENCOUNTER — LAB (OUTPATIENT)
Dept: LAB | Facility: HOSPITAL | Age: 69
End: 2024-01-03
Payer: MEDICARE

## 2024-01-03 DIAGNOSIS — I48.0 PAROXYSMAL ATRIAL FIBRILLATION: ICD-10-CM

## 2024-01-03 DIAGNOSIS — I10 ESSENTIAL HYPERTENSION: ICD-10-CM

## 2024-01-03 DIAGNOSIS — R06.02 SHORTNESS OF BREATH: ICD-10-CM

## 2024-01-03 DIAGNOSIS — E78.5 DYSLIPIDEMIA: ICD-10-CM

## 2024-01-03 DIAGNOSIS — Z86.73 HISTORY OF CVA (CEREBROVASCULAR ACCIDENT): ICD-10-CM

## 2024-01-03 DIAGNOSIS — Z79.01 LONG TERM (CURRENT) USE OF ANTICOAGULANTS: ICD-10-CM

## 2024-01-03 DIAGNOSIS — I10 ESSENTIAL (PRIMARY) HYPERTENSION: ICD-10-CM

## 2024-01-03 DIAGNOSIS — R11.0 NAUSEA: ICD-10-CM

## 2024-01-03 DIAGNOSIS — I25.810 CORONARY ARTERY DISEASE INVOLVING CORONARY BYPASS GRAFT OF NATIVE HEART WITHOUT ANGINA PECTORIS: ICD-10-CM

## 2024-01-03 DIAGNOSIS — Z98.890 S/P MVR (MITRAL VALVE REPAIR): ICD-10-CM

## 2024-01-03 LAB
ALBUMIN SERPL-MCNC: 4.1 G/DL (ref 3.5–5.2)
ALBUMIN/GLOB SERPL: 1.4 G/DL
ALP SERPL-CCNC: 94 U/L (ref 39–117)
ALT SERPL W P-5'-P-CCNC: 8 U/L (ref 1–33)
ANION GAP SERPL CALCULATED.3IONS-SCNC: 13.2 MMOL/L (ref 5–15)
AST SERPL-CCNC: 13 U/L (ref 1–32)
BILIRUB SERPL-MCNC: 0.6 MG/DL (ref 0–1.2)
BUN SERPL-MCNC: 14 MG/DL (ref 8–23)
BUN/CREAT SERPL: 12.8 (ref 7–25)
CALCIUM SPEC-SCNC: 9.3 MG/DL (ref 8.6–10.5)
CHLORIDE SERPL-SCNC: 106 MMOL/L (ref 98–107)
CHOLEST SERPL-MCNC: 124 MG/DL (ref 0–200)
CO2 SERPL-SCNC: 25.8 MMOL/L (ref 22–29)
CREAT SERPL-MCNC: 1.09 MG/DL (ref 0.57–1)
EGFRCR SERPLBLD CKD-EPI 2021: 56.1 ML/MIN/1.73
GLOBULIN UR ELPH-MCNC: 3 GM/DL
GLUCOSE SERPL-MCNC: 99 MG/DL (ref 65–99)
HDLC SERPL-MCNC: 54 MG/DL (ref 40–60)
LDLC SERPL CALC-MCNC: 53 MG/DL (ref 0–100)
LDLC/HDLC SERPL: 0.98 {RATIO}
POTASSIUM SERPL-SCNC: 3.6 MMOL/L (ref 3.5–5.2)
PROT SERPL-MCNC: 7.1 G/DL (ref 6–8.5)
SODIUM SERPL-SCNC: 145 MMOL/L (ref 136–145)
TRIGL SERPL-MCNC: 85 MG/DL (ref 0–150)
VLDLC SERPL-MCNC: 17 MG/DL (ref 5–40)

## 2024-01-03 PROCEDURE — 83690 ASSAY OF LIPASE: CPT

## 2024-01-03 PROCEDURE — 80053 COMPREHEN METABOLIC PANEL: CPT

## 2024-01-03 PROCEDURE — 80061 LIPID PANEL: CPT

## 2024-01-03 PROCEDURE — 36415 COLL VENOUS BLD VENIPUNCTURE: CPT

## 2024-01-03 PROCEDURE — 83880 ASSAY OF NATRIURETIC PEPTIDE: CPT

## 2024-01-05 ENCOUNTER — LAB (OUTPATIENT)
Dept: FAMILY MEDICINE CLINIC | Facility: CLINIC | Age: 69
End: 2024-01-05
Payer: MEDICARE

## 2024-01-05 ENCOUNTER — OFFICE VISIT (OUTPATIENT)
Dept: FAMILY MEDICINE CLINIC | Facility: CLINIC | Age: 69
End: 2024-01-05
Payer: MEDICARE

## 2024-01-05 VITALS
HEART RATE: 59 BPM | HEIGHT: 65 IN | WEIGHT: 199.4 LBS | BODY MASS INDEX: 33.22 KG/M2 | DIASTOLIC BLOOD PRESSURE: 78 MMHG | OXYGEN SATURATION: 94 % | SYSTOLIC BLOOD PRESSURE: 126 MMHG | RESPIRATION RATE: 16 BRPM

## 2024-01-05 DIAGNOSIS — I10 ESSENTIAL (PRIMARY) HYPERTENSION: ICD-10-CM

## 2024-01-05 DIAGNOSIS — R06.02 SHORTNESS OF BREATH: Primary | ICD-10-CM

## 2024-01-05 DIAGNOSIS — R06.02 SHORTNESS OF BREATH: ICD-10-CM

## 2024-01-05 DIAGNOSIS — R11.0 NAUSEA: ICD-10-CM

## 2024-01-05 LAB
LIPASE SERPL-CCNC: 17 U/L (ref 13–60)
NT-PROBNP SERPL-MCNC: 1203 PG/ML (ref 0–900)

## 2024-01-05 PROCEDURE — 36415 COLL VENOUS BLD VENIPUNCTURE: CPT

## 2024-01-05 PROCEDURE — 85025 COMPLETE CBC W/AUTO DIFF WBC: CPT | Performed by: PHYSICIAN ASSISTANT

## 2024-01-05 PROCEDURE — 1159F MED LIST DOCD IN RCRD: CPT | Performed by: PHYSICIAN ASSISTANT

## 2024-01-05 PROCEDURE — 3074F SYST BP LT 130 MM HG: CPT | Performed by: PHYSICIAN ASSISTANT

## 2024-01-05 PROCEDURE — 1160F RVW MEDS BY RX/DR IN RCRD: CPT | Performed by: PHYSICIAN ASSISTANT

## 2024-01-05 PROCEDURE — 99214 OFFICE O/P EST MOD 30 MIN: CPT | Performed by: PHYSICIAN ASSISTANT

## 2024-01-05 PROCEDURE — 3078F DIAST BP <80 MM HG: CPT | Performed by: PHYSICIAN ASSISTANT

## 2024-01-05 NOTE — PROGRESS NOTES
"Subjective   Andree Deluna is a 68 y.o. female.     Chief Complaint   Patient presents with    Dizziness     2 months    Shortness of Breath    Nausea       /78   Pulse 59   Resp 16   Ht 165.1 cm (65\")   Wt 90.4 kg (199 lb 6.4 oz)   SpO2 94%   BMI 33.18 kg/m²     BP Readings from Last 3 Encounters:   01/08/24 167/77   01/05/24 126/78   11/14/23 172/98       Wt Readings from Last 3 Encounters:   01/08/24 90.3 kg (199 lb)   01/05/24 90.4 kg (199 lb 6.4 oz)   12/28/23 93 kg (205 lb)       HPI Presents to the clinic for dizziness, soa, nausea. Has had this intermittently on and off for quite some time. She has had recent cabg and follows with cardiology. She acually has appointment with them in a few days. Feels like the nausea began after starting medications post op. She has afib and is on eliquis. Does not have any lower extremity edema, orthopnea. She does have arreola. No chest pain at this time.     The following portions of the patient's history were reviewed and updated as appropriate: allergies, current medications, past family history, past medical history, past social history, past surgical history, and problem list.    Review of Systems    Objective   Physical Exam  Constitutional:       Appearance: She is well-developed.   HENT:      Head: Normocephalic and atraumatic.      Right Ear: Tympanic membrane normal.      Left Ear: Tympanic membrane normal.      Nose: No congestion.   Eyes:      Conjunctiva/sclera: Conjunctivae normal.      Pupils: Pupils are equal, round, and reactive to light.   Cardiovascular:      Rate and Rhythm: Normal rate. Rhythm irregular.      Heart sounds: No murmur heard.  Pulmonary:      Effort: Pulmonary effort is normal.      Breath sounds: Normal breath sounds.   Abdominal:      General: Bowel sounds are normal.      Palpations: Abdomen is soft.      Tenderness: There is no abdominal tenderness.   Musculoskeletal:         General: No deformity. Normal range of motion.    "   Cervical back: Normal range of motion and neck supple.   Lymphadenopathy:      Cervical: No cervical adenopathy.   Skin:     General: Skin is warm and dry.      Capillary Refill: Capillary refill takes less than 2 seconds.      Findings: No rash.   Neurological:      Mental Status: She is alert and oriented to person, place, and time.      Cranial Nerves: No cranial nerve deficit.   Psychiatric:         Behavior: Behavior normal.         Thought Content: Thought content normal.         Judgment: Judgment normal.           Diagnoses and all orders for this visit:    1. Shortness of breath (Primary)  -     CBC w AUTO Differential; Future  -     BNP; Future    2. Essential (primary) hypertension  -     BNP; Future    3. Nausea  -     Lipase; Future    I am concerned we may have a bit of fluid accumulation. I want to check bnp and possibly add cxr. She has an appointment with cardiology in a few days. We will check the lab and then hopefully will be back by the time she sees them. We need to review medications after this if nausea continues to see if there is anything possibly causing this.     Return in about 4 months (around 5/5/2024), or if symptoms worsen or fail to improve, for Recheck.

## 2024-01-06 LAB
BASOPHILS # BLD AUTO: 0.1 10*3/MM3 (ref 0–0.2)
BASOPHILS NFR BLD AUTO: 1.4 % (ref 0–1.5)
DEPRECATED RDW RBC AUTO: 41.6 FL (ref 37–54)
EOSINOPHIL # BLD AUTO: 0.21 10*3/MM3 (ref 0–0.4)
EOSINOPHIL NFR BLD AUTO: 3 % (ref 0.3–6.2)
ERYTHROCYTE [DISTWIDTH] IN BLOOD BY AUTOMATED COUNT: 13 % (ref 12.3–15.4)
HCT VFR BLD AUTO: 34.7 % (ref 34–46.6)
HGB BLD-MCNC: 10.8 G/DL (ref 12–15.9)
IMM GRANULOCYTES # BLD AUTO: 0.03 10*3/MM3 (ref 0–0.05)
IMM GRANULOCYTES NFR BLD AUTO: 0.4 % (ref 0–0.5)
LYMPHOCYTES # BLD AUTO: 1.41 10*3/MM3 (ref 0.7–3.1)
LYMPHOCYTES NFR BLD AUTO: 20.2 % (ref 19.6–45.3)
MCH RBC QN AUTO: 27.7 PG (ref 26.6–33)
MCHC RBC AUTO-ENTMCNC: 31.1 G/DL (ref 31.5–35.7)
MCV RBC AUTO: 89 FL (ref 79–97)
MONOCYTES # BLD AUTO: 0.41 10*3/MM3 (ref 0.1–0.9)
MONOCYTES NFR BLD AUTO: 5.9 % (ref 5–12)
NEUTROPHILS NFR BLD AUTO: 4.83 10*3/MM3 (ref 1.7–7)
NEUTROPHILS NFR BLD AUTO: 69.1 % (ref 42.7–76)
NRBC BLD AUTO-RTO: 0 /100 WBC (ref 0–0.2)
PLATELET # BLD AUTO: 306 10*3/MM3 (ref 140–450)
PMV BLD AUTO: 10.5 FL (ref 6–12)
RBC # BLD AUTO: 3.9 10*6/MM3 (ref 3.77–5.28)
WBC NRBC COR # BLD AUTO: 6.99 10*3/MM3 (ref 3.4–10.8)

## 2024-01-07 NOTE — PROGRESS NOTES
Subjective:     Encounter Date:01/08/2024      Patient ID: Andree Deluna is a 68 y.o. female.    Chief Complaint and history of present illness:     Follow-up for CAD, CABG, hypertension, dyslipidemia     History of present illness:     Ms.Elisa LOVELY Deluna has PMH of     CAD cardiac cath 5/1/2023 revealing multivessel coronary artery disease  CABG with LIMA to LAD, SVG to OM1 and SVG to PDA and mitral valve repair with physio #2 ring 5/6/202   Postop A-fib  Hypertension  Diastolic dysfunction-echo 8/7/2019 revealing septal asymmetric hypertrophy EF 60%, diastolic dysfunction  Dyslipidemia  CVA  80% right carotid disease  WEN  Carotid endarterectomy, cholecystectomy, tubal ligation, shoulder/knee/finger surgery  Family history of premature CAD father fatal MI at 54 mother fatal MI at 64.     Here for follow-up.  Denies any chest pain or shortness of breath.  Patient had rotator cuff surgery.  Is complaining of nausea.  Has appoint pain on the right side of the chest.  Cannot pointed out with the finger.  No aggravating or relieving factors.     Patient's arterial blood pressure is 153/80 1 repeat was 167/77, heart rate 56, O2 sat of 96% on room air..  BMI is over 30.     Presented through emergency room 4/30/2023 with midsternal chest pain .   Work-up here 4/30/2023/5/1/2023 revealed elevated troponin at 54-->120.  Glucose elevated.  Potassium 3.4.  A1c of 5.8.  Chest x-ray stable cardiomegaly.  EKG done 4/30/2023 reviewed/interpreted by me reveals sinus rhythm with a rate of 74 bpm with  ST segment depression in 1 and aVL        Labs from 5/3/2023 reveal lipid profile with cholesterol 150, triglycerides 104, HDL 62, LDL 64.  A1c of 5.8.  CMP with a glucose of 108.  proBNP normal at 585 on 5/17/2023.  Labs from 5/18/2023 reveal BMP with a glucose of 102.  Labs from 9/14/2023 revealed normal CMP except glucose of 103, creatinine 1.07, EGFR 57..  Lipid profile with cholesterol 164, triglycerides 110, HDL 82, LDL  63.  Labs from 1/3/2024 reveal CMP with a creatinine of 1.09, EGFR 56.  Lipid profile with cholesterol 124, triglycerides 85, HDL 54, LDL 53.  proBNP 1203.  CBC with a hemoglobin of 10.8        Assessment:  :        CAD cardiac cath 5/1/2023 revealing multivessel coronary artery disease  CABG with LIMA to LAD, SVG to OM1 and SVG to PDA and mitral valve repair with physio #2 ring 5/6/2023  Hypertension  Dyslipidemia  History of CVA 2019 and carotid disease, right CEA 2020  PAD  Hyperglycemia, prediabetes with A1c of 5.8  Obesity with BMI over 30           Recommendations / Plan:            Patient is complaining of nausea will hold statin since he feel improved.  If nausea does not improve advised her to follow-up with PMD and GI.  Patient's blood pressure is elevated here patient says it was normal with PMD and at home.  Will monitor home blood pressure readings.  Continue aspirin, Eliquis, bisoprolol lisinopril, furosemide, KCl as tolerated.  Recheck labs before visit.  Reviewed BMI over 30, counseled on weight loss diet and exercise.  Follow-up with PMD for labs.     Cardiac cath 5/1/2023 which revealed severe triple-vessel disease.  Patient underwent three-vessel CABG and mitral valve repair 5/6/2023         Procedures    Copied text in this portion of the note has been reviewed and is accurate as of 1/8/2024  The following portions of the patient's history were reviewed and updated as appropriate: allergies, current medications, past family history, past medical history, past social history, past surgical history and problem list.    Assessment:         MDM       Diagnosis Plan   1. Essential hypertension        2. Paroxysmal atrial fibrillation        3. Long term (current) use of anticoagulants        4. Dyslipidemia        5. History of CVA (cerebrovascular accident)        6. S/P mitral valve repair per Dr. Noonan 5/6/2023        7. Coronary artery disease involving coronary bypass graft of native  heart without angina pectoris        8. Stage 3a chronic kidney disease  Ambulatory Referral to Nephrology             Plan:               Past Medical History:  Past Medical History:   Diagnosis Date    ADHD unknown    Anemia     Anesthesia complication     difficult to wake up    Anxiety     Atrial fibrillation     Carotid artery disease     80% right internal carotid artery    Coronary artery disease     CVA (cerebral vascular accident)     right parietal right temporal    DDD (degenerative disc disease), lumbar     Depression     Extremity pain     Ankle pain    GERD (gastroesophageal reflux disease)     Hyperlipidemia unknown    Hypertension     Insomnia unknown    Laryngopharyngeal reflux     Low back pain     Mood disorder     Obesity unknown    Short of breath on exertion     Skin cancer of face     Sleep apnea     Suspected sleep apnea she has refused to be tested    Stroke (cerebrum)     Visual field defect     Left     Past Surgical History:  Past Surgical History:   Procedure Laterality Date    CARDIAC CATHETERIZATION N/A 5/1/2023    Procedure: Left Heart Cath;  Surgeon: Kye Alcaraz MD;  Location: [facility] CATH INVASIVE LOCATION;  Service: Cardiovascular;  Laterality: N/A;    CAROTID ENDARTERECTOMY Right 11/10/2020    Procedure: CAROTID ENDARTERECTOMY;  Surgeon: Tavo Das MD;  Location: [facility] MAIN OR;  Service: Vascular;  Laterality: Right;    CHOLECYSTECTOMY      COLONOSCOPY      2016    CORONARY ARTERY BYPASS GRAFT N/A 5/6/2023    Procedure: CORONARY ARTERY BYPASS GRAFTING;  Surgeon: Errol Noonan MD;  Location: [facility] CVOR;  Service: Cardiothoracic;  Laterality: N/A;  CABG X  3 using LIMA and right endospahenous vein;  2 coronary markers implanted.     FINGER SURGERY      KNEE ARTHROPLASTY      KNEE SURGERY Right     replaced    LAPAROSCOPIC GASTRIC BANDING      MITRAL VALVE REPAIR/REPLACEMENT N/A 5/6/2023    Procedure: MITRAL VALVE REPAIR/REPLACEMENT;  Surgeon:  Errol Noonan MD;  Location: [facility];  Service: Cardiothoracic;  Laterality: N/A;  Mitral valve repaired using 28 mm Jorge Physio II Annuloplasty Ring.     ROTATOR CUFF REPAIR Right 2019    SHOULDER SURGERY Right 05/24/2019    scope/ cuff repair    TOTAL SHOULDER ARTHROPLASTY W/ DISTAL CLAVICLE EXCISION Left 11/8/2023    Procedure: TOTAL SHOULDER REVERSE ARTHROPLASTY;  Surgeon: Shubham Samaniego MD;  Location: [facility] MAIN OR;  Service: Orthopedics;  Laterality: Left;    TRANSESOPHAGEAL ECHOCARDIOGRAM (GALA) N/A 5/6/2023    Procedure: TRANSESOPHAGEAL ECHOCARDIOGRAM WITH ANESTHESIA;  Surgeon: Errol Noonan MD;  Location: [facility];  Service: Cardiothoracic;  Laterality: N/A;    TUBAL ABDOMINAL LIGATION        Allergies:  No Known Allergies  Home Meds:  Current Meds:     Current Outpatient Medications:     acetaminophen (TYLENOL) 325 MG tablet, Take 2 tablets by mouth Every 4 (Four) Hours As Needed for Mild Pain., Disp: , Rfl:     ALPRAZolam (XANAX) 2 MG tablet, TAKE ONE TABLET BY MOUTH ONCE NIGHTLY AS NEEDED FOR ANXIETY (Patient taking differently: Take 1 tablet by mouth At Night As Needed for Sleep. for anxiety.   OK to take night before surgery), Disp: 30 tablet, Rfl: 5    apixaban (ELIQUIS) 5 MG tablet tablet, Take 1 tablet by mouth Every 12 (Twelve) Hours. Indications: Atrial Fibrillation (Patient taking differently: Take 1 tablet by mouth Every 12 (Twelve) Hours. Stop taking 5 days prior to surgery, last dose Thursday 11/2  Indications: Atrial Fibrillation), Disp: 180 tablet, Rfl: 3    aspirin 81 MG chewable tablet, Chew 1 tablet Daily. Indications: antiplatelet (Patient taking differently: Chew 1 tablet Daily. Stop for 5 days prior to surg, last dose 11/2  Indications: antiplatelet), Disp: 90 tablet, Rfl: 3    atorvastatin (LIPITOR) 80 MG tablet, Take 1 tablet by mouth Every Night. Indications: High Amount of Fats in the Blood, Disp: 90 tablet, Rfl: 3    bisoprolol (ZEBeta) 10 MG  "tablet, Take 1 tablet by mouth Daily. (Patient taking differently: Take 1 tablet by mouth Daily. Take Day of surgery), Disp: 90 tablet, Rfl: 3    furosemide (LASIX) 40 MG tablet, Take 0.5 tablets by mouth Daily. Indications: Edema, Disp: 45 tablet, Rfl: 1    lamoTRIgine (LaMICtal) 100 MG tablet, TAKE 1 TABLET BY MOUTH TWICE A DAY, Disp: 60 tablet, Rfl: 2    lisinopril (PRINIVIL,ZESTRIL) 10 MG tablet, Take 1 tablet by mouth Daily for 360 days., Disp: 90 tablet, Rfl: 3    omeprazole (priLOSEC) 40 MG capsule, TAKE ONE CAPSULE BY MOUTH DAILY (Patient taking differently: Take 1 capsule by mouth Daily. OK to take Day of Surgery  Indications: Heartburn), Disp: 90 capsule, Rfl: 1    oxybutynin (DITROPAN) 5 MG tablet, TAKE ONE TABLET BY MOUTH DAILY, Disp: 90 tablet, Rfl: 1    vitamin B-12 (CYANOCOBALAMIN) 1000 MCG tablet, Take 1 tablet by mouth Daily., Disp: , Rfl:     oxyCODONE-acetaminophen (Percocet) 5-325 MG per tablet, Take 1 tablet by mouth Every 6 (Six) Hours As Needed for Moderate Pain. (Patient not taking: Reported on 1/8/2024), Disp: 28 tablet, Rfl: 0  Social History:   Social History     Tobacco Use    Smoking status: Never     Passive exposure: Never    Smokeless tobacco: Never   Substance Use Topics    Alcohol use: No      Family History:  Family History   Problem Relation Age of Onset    Diabetes Other     Heart disease Mother     Diabetes Mother     Heart disease Father               Review of Systems   Constitutional: Positive for malaise/fatigue.   Cardiovascular:  Negative for chest pain, leg swelling and palpitations.   Respiratory:  Positive for shortness of breath.    Skin:  Negative for rash.   Gastrointestinal:  Positive for nausea.   Neurological:  Positive for light-headedness. Negative for dizziness and numbness.     All other systems are negative         Objective:     Physical Exam  /77 Comment: provider notified  Pulse 56   Ht 165.1 cm (65\")   Wt 90.3 kg (199 lb)   SpO2 96%   BMI 33.12 " "kg/m²   General:  Appears in no acute distress  Eyes: Sclera is anicteric,  conjunctiva is clear   HEENT:  No JVD.  No carotid bruits  Respiratory: Respirations regular and unlabored at rest.  Clear to auscultation  Cardiovascular: S1,S2 Regular rate and rhythm. .   Extremities: No digital clubbing or cyanosis, no edema  Skin: Color pink. Skin warm and dry to touch. No rashes  No xanthoma  Neuro: Alert and awake.    Lab Reviewed:         Kye Alcaraz MD  1/8/2024 16:05 EST      EMR Dragon/Transcription:   \"Dictated utilizing Dragon dictation\".        "

## 2024-01-08 ENCOUNTER — OFFICE VISIT (OUTPATIENT)
Dept: CARDIOLOGY | Facility: CLINIC | Age: 69
End: 2024-01-08
Payer: MEDICARE

## 2024-01-08 VITALS
SYSTOLIC BLOOD PRESSURE: 167 MMHG | BODY MASS INDEX: 33.15 KG/M2 | DIASTOLIC BLOOD PRESSURE: 77 MMHG | WEIGHT: 199 LBS | HEIGHT: 65 IN | HEART RATE: 56 BPM | OXYGEN SATURATION: 96 %

## 2024-01-08 DIAGNOSIS — I25.810 CORONARY ARTERY DISEASE INVOLVING CORONARY BYPASS GRAFT OF NATIVE HEART WITHOUT ANGINA PECTORIS: ICD-10-CM

## 2024-01-08 DIAGNOSIS — I10 ESSENTIAL HYPERTENSION: Primary | ICD-10-CM

## 2024-01-08 DIAGNOSIS — I48.0 PAROXYSMAL ATRIAL FIBRILLATION: ICD-10-CM

## 2024-01-08 DIAGNOSIS — Z86.73 HISTORY OF CVA (CEREBROVASCULAR ACCIDENT): ICD-10-CM

## 2024-01-08 DIAGNOSIS — E78.5 DYSLIPIDEMIA: ICD-10-CM

## 2024-01-08 DIAGNOSIS — Z98.890 S/P MVR (MITRAL VALVE REPAIR): ICD-10-CM

## 2024-01-08 DIAGNOSIS — Z79.01 LONG TERM (CURRENT) USE OF ANTICOAGULANTS: ICD-10-CM

## 2024-01-08 DIAGNOSIS — N18.31 STAGE 3A CHRONIC KIDNEY DISEASE: ICD-10-CM

## 2024-01-08 RX ORDER — LANOLIN ALCOHOL/MO/W.PET/CERES
1000 CREAM (GRAM) TOPICAL DAILY
COMMUNITY

## 2024-01-12 ENCOUNTER — TELEPHONE (OUTPATIENT)
Dept: FAMILY MEDICINE CLINIC | Facility: CLINIC | Age: 69
End: 2024-01-12

## 2024-01-15 DIAGNOSIS — R11.0 NAUSEA: Primary | ICD-10-CM

## 2024-01-15 RX ORDER — ONDANSETRON 4 MG/1
4 TABLET, FILM COATED ORAL EVERY 8 HOURS PRN
Qty: 15 TABLET | Refills: 1 | Status: SHIPPED | OUTPATIENT
Start: 2024-01-15

## 2024-01-29 ENCOUNTER — TELEPHONE (OUTPATIENT)
Dept: CARDIOLOGY | Facility: CLINIC | Age: 69
End: 2024-01-29
Payer: MEDICARE

## 2024-01-29 NOTE — TELEPHONE ENCOUNTER
Caller: Andree Deluna    Relationship: Self    Best call back number: [phone]        What was the call regarding: PT DOES NOT NEED A CALL BACK- SHE SPOKE WITH HER  WHO REMINDED HER WHY SHE NEEDED THE REFERRAL.

## 2024-03-29 RX ORDER — LAMOTRIGINE 100 MG/1
100 TABLET ORAL 2 TIMES DAILY
Qty: 180 TABLET | Refills: 1 | Status: SHIPPED | OUTPATIENT
Start: 2024-03-29

## 2024-04-01 DIAGNOSIS — F41.9 ANXIETY: ICD-10-CM

## 2024-04-01 RX ORDER — ALPRAZOLAM 2 MG/1
2 TABLET ORAL NIGHTLY PRN
Qty: 30 TABLET | Refills: 3 | Status: SHIPPED | OUTPATIENT
Start: 2024-04-01

## 2024-04-22 RX ORDER — OMEPRAZOLE 40 MG/1
40 CAPSULE, DELAYED RELEASE ORAL DAILY
Qty: 90 CAPSULE | Refills: 1 | Status: SHIPPED | OUTPATIENT
Start: 2024-04-22

## 2024-04-25 ENCOUNTER — OFFICE VISIT (OUTPATIENT)
Dept: ORTHOPEDIC SURGERY | Facility: CLINIC | Age: 69
End: 2024-04-25
Payer: MEDICARE

## 2024-04-25 VITALS — BODY MASS INDEX: 33.15 KG/M2 | HEART RATE: 64 BPM | HEIGHT: 65 IN | WEIGHT: 199 LBS

## 2024-04-25 DIAGNOSIS — M19.012 OSTEOARTHRITIS OF LEFT GLENOHUMERAL JOINT: ICD-10-CM

## 2024-04-25 DIAGNOSIS — Z47.89 ORTHOPEDIC AFTERCARE: Primary | ICD-10-CM

## 2024-04-25 PROCEDURE — 99212 OFFICE O/P EST SF 10 MIN: CPT | Performed by: ORTHOPAEDIC SURGERY

## 2024-04-25 NOTE — PROGRESS NOTES
"     Patient ID: Andree Deluna is a 68 y.o. female.  11/8/23 left reverse total shoulder with subscapularis repair   Pain much better    Review of Systems:        Objective:    Pulse 64   Ht 165.1 cm (65\")   Wt 90.3 kg (199 lb)   BMI 33.12 kg/m²     Physical Examination:     Left shoulder no bony tenderness active elevation 160 abduction 130 external rotation 30 internal rotation S1    Imaging:   left Shoulder X-Ray  Indication: Postop total shoulder  AP Y and Lateral views  Findings: Reverse total shoulder in position  no bony lesion  Soft tissues normal  normal joint spaces  Hardware appropriately positioned yes      yes prior studies available for comparison.    Assessment:    Doing well after shoulder arthroplasty    Plan:   Activity as tolerated x-ray in 6 months      Procedures          Disclaimer: Part of this note may be an electronic transcription/translation of spoken language to printed text using the Dragon Dictation System  "

## 2024-05-16 ENCOUNTER — OFFICE VISIT (OUTPATIENT)
Dept: FAMILY MEDICINE CLINIC | Facility: CLINIC | Age: 69
End: 2024-05-16
Payer: MEDICARE

## 2024-05-16 VITALS
BODY MASS INDEX: 33.76 KG/M2 | WEIGHT: 202.6 LBS | HEART RATE: 68 BPM | OXYGEN SATURATION: 97 % | DIASTOLIC BLOOD PRESSURE: 86 MMHG | RESPIRATION RATE: 20 BRPM | SYSTOLIC BLOOD PRESSURE: 140 MMHG | HEIGHT: 65 IN

## 2024-05-16 DIAGNOSIS — Z00.00 MEDICARE ANNUAL WELLNESS VISIT, SUBSEQUENT: Primary | ICD-10-CM

## 2024-05-16 DIAGNOSIS — Z78.0 POST-MENOPAUSAL: ICD-10-CM

## 2024-05-16 DIAGNOSIS — E78.2 MIXED HYPERLIPIDEMIA: Chronic | ICD-10-CM

## 2024-05-16 DIAGNOSIS — Z78.0 POSTMENOPAUSE: ICD-10-CM

## 2024-05-16 DIAGNOSIS — Z12.31 ENCOUNTER FOR SCREENING MAMMOGRAM FOR MALIGNANT NEOPLASM OF BREAST: ICD-10-CM

## 2024-05-16 DIAGNOSIS — I25.110 CORONARY ARTERY DISEASE INVOLVING NATIVE CORONARY ARTERY OF NATIVE HEART WITH UNSTABLE ANGINA PECTORIS: ICD-10-CM

## 2024-05-16 DIAGNOSIS — Z23 NEED FOR VACCINATION: ICD-10-CM

## 2024-05-16 DIAGNOSIS — I10 ESSENTIAL HYPERTENSION: ICD-10-CM

## 2024-05-16 DIAGNOSIS — N18.31 STAGE 3A CHRONIC KIDNEY DISEASE: ICD-10-CM

## 2024-05-16 PROCEDURE — 1159F MED LIST DOCD IN RCRD: CPT | Performed by: PHYSICIAN ASSISTANT

## 2024-05-16 PROCEDURE — G0009 ADMIN PNEUMOCOCCAL VACCINE: HCPCS | Performed by: PHYSICIAN ASSISTANT

## 2024-05-16 PROCEDURE — 3077F SYST BP >= 140 MM HG: CPT | Performed by: PHYSICIAN ASSISTANT

## 2024-05-16 PROCEDURE — 99214 OFFICE O/P EST MOD 30 MIN: CPT | Performed by: PHYSICIAN ASSISTANT

## 2024-05-16 PROCEDURE — 90677 PCV20 VACCINE IM: CPT | Performed by: PHYSICIAN ASSISTANT

## 2024-05-16 PROCEDURE — G0439 PPPS, SUBSEQ VISIT: HCPCS | Performed by: PHYSICIAN ASSISTANT

## 2024-05-16 PROCEDURE — 1160F RVW MEDS BY RX/DR IN RCRD: CPT | Performed by: PHYSICIAN ASSISTANT

## 2024-05-16 PROCEDURE — 1125F AMNT PAIN NOTED PAIN PRSNT: CPT | Performed by: PHYSICIAN ASSISTANT

## 2024-05-16 PROCEDURE — 3079F DIAST BP 80-89 MM HG: CPT | Performed by: PHYSICIAN ASSISTANT

## 2024-05-16 RX ORDER — DESVENLAFAXINE SUCCINATE 50 MG/1
50 TABLET, EXTENDED RELEASE ORAL DAILY
Qty: 90 TABLET | Refills: 3 | Status: SHIPPED | OUTPATIENT
Start: 2024-05-16 | End: 2024-05-20 | Stop reason: SDUPTHER

## 2024-05-16 NOTE — PROGRESS NOTES
The ABCs of the Annual Wellness Visit  Subsequent Medicare Wellness Visit    Subjective    Andree Deluna is a 68 y.o. female who presents for a Subsequent Medicare Wellness Visit.    The following portions of the patient's history were reviewed and   updated as appropriate: allergies, current medications, past family history, past medical history, past social history, past surgical history, and problem list.    Compared to one year ago, the patient feels her physical   health is worse.    Compared to one year ago, the patient feels her mental   health is worse.    Recent Hospitalizations:  This patient has had a [facility] admission record on file within the last 365 days.    Current Medical Providers:  Patient Care Team:  Naga Griffith PA-C as PCP - General (Physician Assistant)    Outpatient Medications Prior to Visit   Medication Sig Dispense Refill    acetaminophen (TYLENOL) 325 MG tablet Take 2 tablets by mouth Every 4 (Four) Hours As Needed for Mild Pain.      ALPRAZolam (XANAX) 2 MG tablet TAKE ONE TABLET BY MOUTH ONCE NIGHTLY AS NEEDED FOR ANXIETY 30 tablet 3    apixaban (ELIQUIS) 5 MG tablet tablet Take 1 tablet by mouth Every 12 (Twelve) Hours. Indications: Atrial Fibrillation (Patient taking differently: Take 1 tablet by mouth Every 12 (Twelve) Hours. Stop taking 5 days prior to surgery, last dose Thursday 11/2  Indications: Atrial Fibrillation) 180 tablet 3    aspirin 81 MG chewable tablet Chew 1 tablet Daily. Indications: antiplatelet (Patient taking differently: Chew 1 tablet Daily. Stop for 5 days prior to surg, last dose 11/2  Indications: antiplatelet) 90 tablet 3    atorvastatin (LIPITOR) 80 MG tablet Take 1 tablet by mouth Every Night. Indications: High Amount of Fats in the Blood 90 tablet 3    bisoprolol (ZEBeta) 10 MG tablet Take 1 tablet by mouth Daily. (Patient taking differently: Take 1 tablet by mouth Daily. Take Day of surgery) 90 tablet 3    furosemide (LASIX) 40 MG tablet  Take 0.5 tablets by mouth Daily. Indications: Edema 45 tablet 1    lamoTRIgine (LaMICtal) 100 MG tablet TAKE 1 TABLET BY MOUTH TWICE A  tablet 1    lisinopril (PRINIVIL,ZESTRIL) 10 MG tablet Take 1 tablet by mouth Daily for 360 days. 90 tablet 3    omeprazole (priLOSEC) 40 MG capsule TAKE 1 CAPSULE BY MOUTH DAILY 90 capsule 1    ondansetron (Zofran) 4 MG tablet Take 1 tablet by mouth Every 8 (Eight) Hours As Needed for Nausea or Vomiting. 15 tablet 1    oxybutynin (DITROPAN) 5 MG tablet TAKE ONE TABLET BY MOUTH DAILY 90 tablet 1    oxyCODONE-acetaminophen (Percocet) 5-325 MG per tablet Take 1 tablet by mouth Every 6 (Six) Hours As Needed for Moderate Pain. 28 tablet 0    vitamin B-12 (CYANOCOBALAMIN) 1000 MCG tablet Take 1 tablet by mouth Daily.       No facility-administered medications prior to visit.       Opioid medication/s are on active medication list.  and I have evaluated her active treatment plan and pain score trends (see table).  There were no vitals filed for this visit.  I have reviewed the chart for potential of high risk medication and harmful drug interactions in the elderly.          Aspirin is on active medication list. Aspirin use is indicated based on review of current medical condition/s. Pros and cons of this therapy have been discussed today. Benefits of this medication outweigh potential harm.  Patient has been encouraged to continue taking this medication.  .      Patient Active Problem List   Diagnosis    Anemia in other chronic diseases classified elsewhere    Anxiety    B12 deficiency    Attention deficit disorder of adult with hyperactivity    Mood disorder    Complete rotator cuff tear or rupture of right shoulder, not specified as traumatic    Degeneration of intervertebral disc of cervical region    Degeneration of intervertebral disc of lumbosacral region    Fatigue    Hyperlipidemia    Hypertension    Insomnia    Obesity    Osteoarthritis of knee    CVA (cerebral vascular  "accident)    Visual field loss left    Acute midline low back pain without sciatica    GERD with esophagitis    Carotid stenosis, bilateral    Overactive bladder    History of stroke    Acute pain of right shoulder    Left cervical radiculopathy    Migraine headache    Medicare annual wellness visit, subsequent    Moderate episode of recurrent major depressive disorder    Piriformis syndrome, right    Precordial pain    History of Grimaldo's esophagus    Chronic cough    PATEL (generalized anxiety disorder)    Chest pain, unspecified type    Elevated troponin    Non-STEMI (non-ST elevated myocardial infarction)    Coronary artery disease involving native coronary artery of native heart with unstable angina pectoris    Abnormal findings on diagnostic imaging of heart and coronary circulation    S/P CABG x 3 with LIMA per Dr. Noonan 5/6/2023    S/P mitral valve repair per Dr. Noonan 5/6/2023    Postoperative atrial fibrillation    Atrial fibrillation with rapid ventricular response    Pleural effusion on left    Dyspnea    DJD of left shoulder    Osteoarthritis of left glenohumeral joint    Acute respiratory failure with hypoxia    Bilateral pneumonia    Acute renal insufficiency    Chronic coronary artery disease    NYHA class 1 heart failure with borderline preserved ejection fraction    Paroxysmal atrial fibrillation    GERD without esophagitis    Stage 3a chronic kidney disease     Advance Care Planning   Advance Care Planning     Advance Directive is not on file.  ACP discussion was declined by the patient. Patient does not have an advance directive, declines further assistance.     Objective    Vitals:    05/16/24 1040   BP: 140/86   Pulse: 68   Resp: 20   SpO2: 97%   Weight: 91.9 kg (202 lb 9.6 oz)   Height: 165.1 cm (65\")     Estimated body mass index is 33.71 kg/m² as calculated from the following:    Height as of this encounter: 165.1 cm (65\").    Weight as of this encounter: 91.9 kg (202 lb 9.6 " oz).           Does the patient have evidence of cognitive impairment? No          HEALTH RISK ASSESSMENT    Smoking Status:  Social History     Tobacco Use   Smoking Status Never    Passive exposure: Never   Smokeless Tobacco Never     Alcohol Consumption:  Social History     Substance and Sexual Activity   Alcohol Use No     Fall Risk Screen:    NNAMDIADI Fall Risk Assessment was completed, and patient is at LOW risk for falls.Assessment completed on:2024    Depression Screenin/16/2024    10:39 AM   PHQ-2/PHQ-9 Depression Screening   Little Interest or Pleasure in Doing Things 0-->not at all   Feeling Down, Depressed or Hopeless 0-->not at all   PHQ-9: Brief Depression Severity Measure Score 0       Health Habits and Functional and Cognitive Screenin/16/2024    10:40 AM   Functional & Cognitive Status   Do you have difficulty preparing food and eating? No   Do you have difficulty bathing yourself, getting dressed or grooming yourself? No   Do you have difficulty using the toilet? No   Do you have difficulty moving around from place to place? No   Do you have trouble with steps or getting out of a bed or a chair? No   Current Diet Well Balanced Diet   Dental Exam Up to date   Eye Exam Up to date   Exercise (times per week) 1 times per week   Current Exercises Include Walking   Do you need help using the phone?  No   Are you deaf or do you have serious difficulty hearing?  No   Do you need help to go to places out of walking distance? No   Do you need help shopping? No   Do you need help preparing meals?  No   Do you need help with housework?  No   Do you need help with laundry? No   Do you need help taking your medications? No   Do you need help managing money? No   Do you ever drive or ride in a car without wearing a seat belt? No   Have you felt unusual stress, anger or loneliness in the last month? No   Who do you live with? Spouse   If you need help, do you have trouble finding someone  available to you? No   Have you been bothered in the last four weeks by sexual problems? No   Do you have difficulty concentrating, remembering or making decisions? No       Age-appropriate Screening Schedule:  Refer to the list below for future screening recommendations based on patient's age, sex and/or medical conditions. Orders for these recommended tests are listed in the plan section. The patient has been provided with a written plan.    Health Maintenance   Topic Date Due    DXA SCAN  Never done    Pneumococcal Vaccine 65+ (1 of 2 - PCV) Never done    ZOSTER VACCINE (1 of 2) Never done    PAP SMEAR  Never done    MAMMOGRAM  08/04/2023    COVID-19 Vaccine (3 - 2023-24 season) 09/01/2023    RSV Vaccine - Adults (1 - 1-dose 60+ series) 06/16/2024 (Originally 8/30/2017)    INFLUENZA VACCINE  08/01/2024    BMI FOLLOWUP  11/09/2024    LIPID PANEL  01/03/2025    ANNUAL WELLNESS VISIT  05/16/2025    COLORECTAL CANCER SCREENING  08/10/2026    HEPATITIS C SCREENING  Completed    TDAP/TD VACCINES  Discontinued                  CMS Preventative Services Quick Reference  Risk Factors Identified During Encounter  Chronic Pain: Natural history and expected course discussed. Questions answered.  Depression/Dysphoria: Prescribed new antidepressant medication treatment. Follow up visit planned.  Immunizations Discussed/Encouraged: Prevnar 20 (Pneumococcal 20-valent conjugate)  Dental Screening Recommended  Vision Screening Recommended  The above risks/problems have been discussed with the patient.  Pertinent information has been shared with the patient in the After Visit Summary.  An After Visit Summary and PPPS were made available to the patient.    Follow Up:   Next Medicare Wellness visit to be scheduled in 1 year.       Additional E&M Note during same encounter follows:  Patient has multiple medical problems which are significant and separately identifiable that require additional work above and beyond the Medicare  "Wellness Visit.      Chief Complaint  Medicare Wellness-subsequent, Hypertension, and Hyperlipidemia    Subjective        HPI  Andree Deluna is also being seen today for anxiety, hld, htn, and feeling off balance. Balance issues have been present for a year or so. Had pt that was helping and then she had to have heart surgery and shoulder surgery so had to stop. Has some knee issues that she wonders if this is causing it. She has had worsening depression and anxiety. Rough year due to health conditions and surgeries and she thinks this has caused the increase in depression.          Objective   Vital Signs:  /86   Pulse 68   Resp 20   Ht 165.1 cm (65\")   Wt 91.9 kg (202 lb 9.6 oz)   SpO2 97%   BMI 33.71 kg/m²     Physical Exam  Constitutional:       Appearance: She is well-developed.   HENT:      Head: Normocephalic and atraumatic.      Right Ear: Tympanic membrane normal.      Left Ear: Tympanic membrane normal.      Nose: No congestion.      Mouth/Throat:      Pharynx: No oropharyngeal exudate.   Eyes:      Conjunctiva/sclera: Conjunctivae normal.      Pupils: Pupils are equal, round, and reactive to light.   Cardiovascular:      Rate and Rhythm: Normal rate and regular rhythm.      Heart sounds: No murmur heard.  Pulmonary:      Effort: Pulmonary effort is normal.      Breath sounds: Normal breath sounds.   Abdominal:      General: Bowel sounds are normal.      Palpations: Abdomen is soft.   Musculoskeletal:         General: No deformity. Normal range of motion.      Cervical back: Normal range of motion and neck supple.   Lymphadenopathy:      Cervical: No cervical adenopathy.   Skin:     General: Skin is warm and dry.      Findings: No rash.   Neurological:      Mental Status: She is alert and oriented to person, place, and time.      Cranial Nerves: No cranial nerve deficit.   Psychiatric:         Behavior: Behavior normal.         Thought Content: Thought content normal.         Judgment: " Judgment normal.          The following data was reviewed by: Naga Griffith PA-C on 05/16/2024:  Common labs          11/14/2023    12:15 1/3/2024    13:11 1/5/2024    15:59   Common Labs   Glucose 87  99     BUN 12  14     Creatinine 1.20  1.09     Sodium 141  145     Potassium 4.6  3.6     Chloride 101  106     Calcium 9.8  9.3     Albumin 3.7  4.1     Total Bilirubin 0.6  0.6     Alkaline Phosphatase 90  94     AST (SGOT) 14  13     ALT (SGPT) 11  8     WBC   6.99    Hemoglobin   10.8    Hematocrit   34.7    Platelets   306    Total Cholesterol  124     Triglycerides  85     HDL Cholesterol  54     LDL Cholesterol   53       Data reviewed : Consultant notes orthopedic surgery           Assessment and Plan   Diagnoses and all orders for this visit:    1. Medicare annual wellness visit, subsequent (Primary)    2. Essential hypertension  Comments:  elevated a bit today. we will check home pressures and then adjust meds if needed.    3. Coronary artery disease involving native coronary artery of native heart with unstable angina pectoris  Comments:  s/p cabg. on bisoprolol, asa, and statin.    4. Mixed hyperlipidemia  Comments:  continue statin    5. Stage 3a chronic kidney disease  Comments:  stable at this time. recheck bmp- continue lisinopril. avoids nephrotoxic agents.    6. Encounter for screening mammogram for malignant neoplasm of breast  -     Mammo Screening Digital Tomosynthesis Bilateral With CAD; Future    7. Postmenopause  -     DEXA Bone Density Axial; Future    8. Post-menopausal  -     DEXA Bone Density Axial; Future    9. Need for vaccination  -     Pneumococcal Conjugate Vaccine 20-[facility] All    Other orders  -     desvenlafaxine (Pristiq) 50 MG 24 hr tablet; Take 1 tablet by mouth Daily.  Dispense: 90 tablet; Refill: 3      Healthy eating habits. Low saturated fats. High plant based. Avoid processed foods. 15-30 min of cardiovascular exercise 5 days per week with atleast 2-3 days of resistance  training.     PNA vaccine recommended.      I spent 45 minutes caring for Andree on this date of service. This time includes time spent by me in the following activities:preparing for the visit, reviewing tests, obtaining and/or reviewing a separately obtained history, performing a medically appropriate examination and/or evaluation , counseling and educating the patient/family/caregiver, ordering medications, tests, or procedures, and documenting information in the medical record  Follow Up   Return in about 6 months (around 11/16/2024), or if symptoms worsen or fail to improve, for Recheck.  Patient was given instructions and counseling regarding her condition or for health maintenance advice. Please see specific information pulled into the AVS if appropriate.

## 2024-05-20 RX ORDER — DESVENLAFAXINE SUCCINATE 50 MG/1
50 TABLET, EXTENDED RELEASE ORAL DAILY
Qty: 90 TABLET | Refills: 1 | Status: SHIPPED | OUTPATIENT
Start: 2024-05-20

## 2024-05-20 NOTE — TELEPHONE ENCOUNTER
Caller: Andree Deluna    Relationship: Self    Best call back number:     [phone]       Which medication are you concerned about: desvenlafaxine (Pristiq) 50 MG 24 hr tablet     Who prescribed you this medication: PCP SHAYLEE WHITE    When did you start taking this medication: 5/17/24    What are your concerns: BLOOD PRESSURE WAS HIGH AFTER TAKING THIS MEDICATION. SHE HAS NOT TAKEN IT SINCE THEN AND IS NOT SURE IF SHE SHOULD CONTINUE TAKING IT OR NOT.    How long have you had these concerns:

## 2024-05-22 RX ORDER — OXYBUTYNIN CHLORIDE 5 MG/1
5 TABLET ORAL DAILY
Qty: 90 TABLET | Refills: 1 | Status: SHIPPED | OUTPATIENT
Start: 2024-05-22

## 2024-05-22 NOTE — TELEPHONE ENCOUNTER
Caller: [employer]    Relationship: Emergency Contact    Best call back number: [phone]     Requested Prescriptions:   Requested Prescriptions     Pending Prescriptions Disp Refills    oxybutynin (DITROPAN) 5 MG tablet 90 tablet 1     Sig: Take 1 tablet by mouth Daily.        Pharmacy where request should be sent: [facility] [address] [place] [address] [phone] [place] [phone] FX     Last office visit with prescribing clinician: 5/16/2024   Last telemedicine visit with prescribing clinician: Visit date not found   Next office visit with prescribing clinician: 11/21/2024     Additional details provided by patient:     Does the patient have less than a 3 day supply:  [] Yes  [] No    Would you like a call back once the refill request has been completed: [] Yes [] No    If the office needs to give you a call back, can they leave a voicemail: [] Yes [] No    April Tanisha Killian Rep   05/22/24 11:12 EDT

## 2024-05-23 ENCOUNTER — TELEPHONE (OUTPATIENT)
Dept: FAMILY MEDICINE CLINIC | Facility: CLINIC | Age: 69
End: 2024-05-23
Payer: MEDICARE

## 2024-05-23 ENCOUNTER — OFFICE VISIT (OUTPATIENT)
Dept: PODIATRY | Facility: CLINIC | Age: 69
End: 2024-05-23
Payer: MEDICARE

## 2024-05-23 VITALS — WEIGHT: 202 LBS | BODY MASS INDEX: 33.66 KG/M2 | HEIGHT: 65 IN | RESPIRATION RATE: 20 BRPM

## 2024-05-23 DIAGNOSIS — L85.2 PUNCTATE KERATOSIS: ICD-10-CM

## 2024-05-23 DIAGNOSIS — M79.671 RIGHT FOOT PAIN: Primary | ICD-10-CM

## 2024-05-23 NOTE — TELEPHONE ENCOUNTER
Pt's spouse stated when pt took new rx (desvenlafaxine) blood pressure went up. Pt has not taken rx since initial dose and bp has been normal.

## 2024-05-23 NOTE — PROGRESS NOTES
05/23/2024  Foot and Ankle Surgery - New Patient   Provider: Dr. Gabriel Lutz DPM  Location: [facility]    Subjective:  Andree Deluna is a 68 y.o. female.     Chief Complaint   Patient presents with    Right Foot - Cyst, Initial Evaluation    Initial Evaluation     MATHEUS parra  5/10/2024       HPI: The patient presents for evaluation of a knot on her foot.    The patient reports a persistent knot on her foot, which has been present for an extended period. She experiences discomfort when the area is hit or when her footwear does not fit correctly.    No Known Allergies    Past Medical History:   Diagnosis Date    ADHD unknown    Anemia     Anesthesia complication     difficult to wake up    Anxiety     Atrial fibrillation     Carotid artery disease     80% right internal carotid artery    Coronary artery disease     CVA (cerebral vascular accident)     right parietal right temporal    DDD (degenerative disc disease), lumbar     Depression     Extremity pain     Ankle pain    GERD (gastroesophageal reflux disease)     Hyperlipidemia unknown    Hypertension     Insomnia unknown    Laryngopharyngeal reflux     Low back pain     Mood disorder     Obesity unknown    Short of breath on exertion     Skin cancer of face     Sleep apnea     Suspected sleep apnea she has refused to be tested    Stroke (cerebrum)     Visual field defect     Left       Past Surgical History:   Procedure Laterality Date    CARDIAC CATHETERIZATION N/A 5/1/2023    Procedure: Left Heart Cath;  Surgeon: Kye Alcaraz MD;  Location: [facility] CATH INVASIVE LOCATION;  Service: Cardiovascular;  Laterality: N/A;    CAROTID ENDARTERECTOMY Right 11/10/2020    Procedure: CAROTID ENDARTERECTOMY;  Surgeon: Tavo Das MD;  Location: [facility] MAIN OR;  Service: Vascular;  Laterality: Right;    CHOLECYSTECTOMY      COLONOSCOPY      2016    CORONARY ARTERY BYPASS GRAFT N/A 5/6/2023    Procedure: CORONARY ARTERY BYPASS  GRAFTING;  Surgeon: Errol Noonan MD;  Location: [facility];  Service: Cardiothoracic;  Laterality: N/A;  CABG X  3 using LIMA and right endospahenous vein;  2 coronary markers implanted.     FINGER SURGERY      KNEE ARTHROPLASTY      KNEE SURGERY Right     replaced    LAPAROSCOPIC GASTRIC BANDING      MITRAL VALVE REPAIR/REPLACEMENT N/A 5/6/2023    Procedure: MITRAL VALVE REPAIR/REPLACEMENT;  Surgeon: Errol Noonan MD;  Location: [facility];  Service: Cardiothoracic;  Laterality: N/A;  Mitral valve repaired using 28 mm Jorge Physio II Annuloplasty Ring.     ROTATOR CUFF REPAIR Right 2019    SHOULDER SURGERY Right 05/24/2019    scope/ cuff repair    TOTAL SHOULDER ARTHROPLASTY W/ DISTAL CLAVICLE EXCISION Left 11/8/2023    Procedure: TOTAL SHOULDER REVERSE ARTHROPLASTY;  Surgeon: Shubham Samaniego MD;  Location: [facility] MAIN OR;  Service: Orthopedics;  Laterality: Left;    TRANSESOPHAGEAL ECHOCARDIOGRAM (GALA) N/A 5/6/2023    Procedure: TRANSESOPHAGEAL ECHOCARDIOGRAM WITH ANESTHESIA;  Surgeon: Errol Noonan MD;  Location: [facility];  Service: Cardiothoracic;  Laterality: N/A;    TUBAL ABDOMINAL LIGATION         Family History   Problem Relation Age of Onset    Diabetes Other     Heart disease Mother     Diabetes Mother     Heart disease Father        Social History     Socioeconomic History    Marital status:    Tobacco Use    Smoking status: Never     Passive exposure: Never    Smokeless tobacco: Never   Vaping Use    Vaping status: Never Used   Substance and Sexual Activity    Alcohol use: No    Drug use: No    Sexual activity: Defer        Current Outpatient Medications on File Prior to Visit   Medication Sig Dispense Refill    acetaminophen (TYLENOL) 325 MG tablet Take 2 tablets by mouth Every 4 (Four) Hours As Needed for Mild Pain.      ALPRAZolam (XANAX) 2 MG tablet TAKE ONE TABLET BY MOUTH ONCE NIGHTLY AS NEEDED FOR ANXIETY 30 tablet 3    apixaban (ELIQUIS) 5 MG  tablet tablet Take 1 tablet by mouth Every 12 (Twelve) Hours. Indications: Atrial Fibrillation (Patient taking differently: Take 1 tablet by mouth Every 12 (Twelve) Hours. Stop taking 5 days prior to surgery, last dose Thursday 11/2  Indications: Atrial Fibrillation) 180 tablet 3    aspirin 81 MG chewable tablet Chew 1 tablet Daily. Indications: antiplatelet (Patient taking differently: Chew 1 tablet Daily. Stop for 5 days prior to surg, last dose 11/2  Indications: antiplatelet) 90 tablet 3    atorvastatin (LIPITOR) 80 MG tablet Take 1 tablet by mouth Every Night. Indications: High Amount of Fats in the Blood 90 tablet 3    bisoprolol (ZEBeta) 10 MG tablet Take 1 tablet by mouth Daily. (Patient taking differently: Take 1 tablet by mouth Daily. Take Day of surgery) 90 tablet 3    desvenlafaxine (Pristiq) 50 MG 24 hr tablet Take 1 tablet by mouth Daily. 90 tablet 1    furosemide (LASIX) 40 MG tablet Take 0.5 tablets by mouth Daily. Indications: Edema 45 tablet 1    lamoTRIgine (LaMICtal) 100 MG tablet TAKE 1 TABLET BY MOUTH TWICE A  tablet 1    lisinopril (PRINIVIL,ZESTRIL) 10 MG tablet Take 1 tablet by mouth Daily for 360 days. 90 tablet 3    omeprazole (priLOSEC) 40 MG capsule TAKE 1 CAPSULE BY MOUTH DAILY 90 capsule 1    ondansetron (Zofran) 4 MG tablet Take 1 tablet by mouth Every 8 (Eight) Hours As Needed for Nausea or Vomiting. 15 tablet 1    oxybutynin (DITROPAN) 5 MG tablet Take 1 tablet by mouth Daily. 90 tablet 1    oxyCODONE-acetaminophen (Percocet) 5-325 MG per tablet Take 1 tablet by mouth Every 6 (Six) Hours As Needed for Moderate Pain. 28 tablet 0    vitamin B-12 (CYANOCOBALAMIN) 1000 MCG tablet Take 1 tablet by mouth Daily.       No current facility-administered medications on file prior to visit.       Review of Systems:  General: Denies fever, chills, fatigue, and weakness.  Eyes: Denies vision loss, blurry vision, and excessive redness.  ENT: Denies hearing issues and difficulty  "swallowing.  Cardiovascular: Denies palpitations, chest pain, or syncopal episodes.  Respiratory: Denies shortness of breath, wheezing, and coughing.  GI: Denies abdominal pain, nausea, and vomiting.   : Denies frequency, hematuria, and urgency.  Musculoskeletal: Denies muscle cramps, joint pains, and stiffness.  Derm: Denies rash, open wounds, or suspicious lesions.  Neuro: Denies headaches, numbness, loss of coordination, and tremors.  Psych: Denies anxiety and depression.  Endocrine: Denies temperature intolerance and changes in appetite.  Heme: Denies bleeding disorders or abnormal bruising.     Objective   Resp 20   Ht 165.1 cm (65\")   Wt 91.6 kg (202 lb)   BMI 33.61 kg/m²     Foot/Ankle Exam     Right foot additional comments: Hyperkeratotic lesion noted on the plantar lateral aspect of the right rear foot. After debridement, the lesion is consistent with punctate keratosis. No underlying open wound or signs of infection in the integumentary system. Range of motion and muscle strength in the musculoskeletal system is appropriate.      Assessment & Plan   Diagnoses and all orders for this visit:    1. Right foot pain (Primary)  -     XR Foot 3+ View Right    2. Punctate keratosis      The recommended course of action involves the application of an acid application to the lesion. The patient has been informed of the potential discomfort associated with the treatment for the subsequent week, however, this should subside over time. Additionally, the patient has been advised to moisturize her feet daily using Aquaphor or a good foot lotion. Furthermore, the patient has been advised to wear good supportive tennis shoes and avoid wearing flip-flops, flats, or sandals.    Cantharone application under occlusion: Right foot x 1    Consent and time out was performed before proceeding with the procedure.  Debridement was performed with a 15 blade and Cantharone was applied in the standard fashion to the base of the " lesion and occluded with moleskin.  We did review aftercare instructions.  Patient tolerated the procedure well.      Orders Placed This Encounter   Procedures    XR Foot 3+ View Right     Order Specific Question:   Reason for Exam:     Answer:   lateral cyst   room 10  wb     Order Specific Question:   Does this patient have a diabetic monitoring/medication delivering device on?     Answer:   No     Order Specific Question:   Release to patient     Answer:   Routine Release [[id number]]        Note is dictated utilizing voice recognition software. Unfortunately this leads to occasional typographical errors. I apologize in advance if the situation occurs. If questions occur please do not hesitate to call our office.    Transcribed from ambient dictation for YESSENIA Lutz DPM by Andie Camejo.  05/23/24   17:25 EDT    Patient or patient representative verbalized consent to the visit recording.  I have personally performed the services described in this document as transcribed by the above individual, and it is both accurate and complete.

## 2024-05-29 NOTE — TELEPHONE ENCOUNTER
Rx Refill Note  Requested Prescriptions      No prescriptions requested or ordered in this encounter      Last office visit with prescribing clinician: 1/8/2024   Last telemedicine visit with prescribing clinician: Visit date not found   Next office visit with prescribing clinician: 7/11/2024                         Would you like a call back once the refill request has been completed: [] Yes [] No    If the office needs to give you a call back, can they leave a voicemail: [] Yes [] No    Opal Dillard MA  05/29/24, 15:34 EDT

## 2024-06-10 RX ORDER — LISINOPRIL AND HYDROCHLOROTHIAZIDE 25; 20 MG/1; MG/1
1 TABLET ORAL DAILY
Qty: 90 TABLET | Refills: 0 | Status: SHIPPED | OUTPATIENT
Start: 2024-06-10

## 2024-06-10 RX ORDER — OXYBUTYNIN CHLORIDE 5 MG/1
5 TABLET ORAL DAILY
Qty: 90 TABLET | Refills: 1 | Status: SHIPPED | OUTPATIENT
Start: 2024-06-10

## 2024-06-13 ENCOUNTER — OFFICE VISIT (OUTPATIENT)
Dept: PODIATRY | Facility: CLINIC | Age: 69
End: 2024-06-13
Payer: MEDICARE

## 2024-06-13 VITALS
OXYGEN SATURATION: 92 % | HEIGHT: 65 IN | RESPIRATION RATE: 20 BRPM | HEART RATE: 64 BPM | BODY MASS INDEX: 33.66 KG/M2 | WEIGHT: 202 LBS

## 2024-06-13 DIAGNOSIS — L85.2 PUNCTATE KERATOSIS: ICD-10-CM

## 2024-06-13 DIAGNOSIS — M79.671 RIGHT FOOT PAIN: Primary | ICD-10-CM

## 2024-06-14 NOTE — PROGRESS NOTES
06/13/2024  Foot and Ankle Surgery - Established Patient/Follow-up  Provider: Dr. Gabriel Lutz, GRACY  Location: [facility]    Subjective:  Andree Deluna is a 68 y.o. female.     Chief Complaint   Patient presents with    Right Foot - Follow-up, Cyst    Follow-up     MATHEUS Griffith last pcp visit 05/10/2024       History of Present Illness  The patient presents for evaluation of foot pain.    The patient reports an improvement in her foot condition.      No Known Allergies    Current Outpatient Medications on File Prior to Visit   Medication Sig Dispense Refill    acetaminophen (TYLENOL) 325 MG tablet Take 2 tablets by mouth Every 4 (Four) Hours As Needed for Mild Pain.      ALPRAZolam (XANAX) 2 MG tablet TAKE ONE TABLET BY MOUTH ONCE NIGHTLY AS NEEDED FOR ANXIETY 30 tablet 3    apixaban (ELIQUIS) 5 MG tablet tablet Take 1 tablet by mouth Every 12 (Twelve) Hours. Indications: Atrial Fibrillation 180 tablet 1    aspirin 81 MG chewable tablet Chew 1 tablet Daily. Indications: antiplatelet (Patient taking differently: Chew 1 tablet Daily. Stop for 5 days prior to surg, last dose 11/2  Indications: antiplatelet) 90 tablet 3    atorvastatin (LIPITOR) 80 MG tablet Take 1 tablet by mouth Every Night. Indications: High Amount of Fats in the Blood 90 tablet 3    bisoprolol (ZEBeta) 10 MG tablet Take 1 tablet by mouth Daily. (Patient taking differently: Take 1 tablet by mouth Daily. Take Day of surgery) 90 tablet 3    desvenlafaxine (Pristiq) 50 MG 24 hr tablet Take 1 tablet by mouth Daily. 90 tablet 1    furosemide (LASIX) 40 MG tablet Take 0.5 tablets by mouth Daily. Indications: Edema 45 tablet 1    lamoTRIgine (LaMICtal) 100 MG tablet TAKE 1 TABLET BY MOUTH TWICE A  tablet 1    lisinopril (PRINIVIL,ZESTRIL) 10 MG tablet Take 1 tablet by mouth Daily for 360 days. 90 tablet 3    lisinopril-hydrochlorothiazide (PRINZIDE,ZESTORETIC) 20-25 MG per tablet TAKE 1 TABLET BY MOUTH DAILY 90 tablet 0    omeprazole  "(priLOSEC) 40 MG capsule TAKE 1 CAPSULE BY MOUTH DAILY 90 capsule 1    ondansetron (Zofran) 4 MG tablet Take 1 tablet by mouth Every 8 (Eight) Hours As Needed for Nausea or Vomiting. 15 tablet 1    oxybutynin (DITROPAN) 5 MG tablet TAKE 1 TABLET BY MOUTH DAILY 90 tablet 1    vitamin B-12 (CYANOCOBALAMIN) 1000 MCG tablet Take 1 tablet by mouth Daily.      oxyCODONE-acetaminophen (Percocet) 5-325 MG per tablet Take 1 tablet by mouth Every 6 (Six) Hours As Needed for Moderate Pain. (Patient not taking: Reported on 6/13/2024) 28 tablet 0     No current facility-administered medications on file prior to visit.       Objective   Pulse 64   Resp 20   Ht 165.1 cm (65\")   Wt 91.6 kg (202 lb)   SpO2 92%   BMI 33.61 kg/m²     Foot/Ankle Exam  Physical Exam  Lesion has resolved to the plantar aspect of the right foot.  No other complicating features or signs of infection are noted.      Results      Assessment & Plan   Diagnoses and all orders for this visit:    1. Right foot pain (Primary)    2. Punctate keratosis      Assessment & Plan    The patient is advised to utilize an emery board or pumice stone for callus management. She is also encouraged to maintain supportive tennis shoes.  She is to call with any new issues or concerns.  I will see her on an as-needed basis.               Patient or patient representative verbalized consent for the use of Ambient Listening during the visit with  YESSENIA Lutz DPM for chart documentation. 6/14/2024  11:51 EDT    YESSENIA Lutz DPM  "

## 2024-06-21 RX ORDER — CITALOPRAM HYDROBROMIDE 10 MG/1
10 TABLET ORAL DAILY
Qty: 30 TABLET | Refills: 5 | Status: SHIPPED | OUTPATIENT
Start: 2024-06-21 | End: 2024-12-18

## 2024-07-08 NOTE — PROGRESS NOTES
Subjective:     Encounter Date:07/11/2024      Patient ID: Andree Deluna is a 68 y.o. female.    Chief Complaint and history of present illness:     Follow-up for CAD, CABG, hypertension, dyslipidemia     History of present illness:     Ms.Elisa LOVELY Deluna has PMH of     CAD cardiac cath 5/1/2023 revealing multivessel coronary artery disease  CABG with LIMA to LAD, SVG to OM1 and SVG to PDA and mitral valve repair with physio #2 ring 5/6/2023   Postop A-fib  Hypertension  Diastolic dysfunction-echo 8/7/2019 revealing septal asymmetric hypertrophy EF 60%, diastolic dysfunction  Dyslipidemia  CVA 2019  80% right carotid disease  Carotid endarterectomy, cholecystectomy, tubal ligation, shoulder/knee/finger surgery  WEN  Family history of premature CAD father fatal MI at 54 mother fatal MI at 64.     Here for follow-up.  Patient is complaining of chest pain, shortness of breath, fatigue and weakness, lightheadedness.  Denies any loss of consciousness.  Patient says the chest pain is fleeting and is on right side.     Patient's arterial blood pressure is 119/74, heart rate 58 bpm, O2 sat of 94%..  BMI is over 30.     Presented through emergency room 4/30/2023 with midsternal chest pain .   Work-up here 4/30/2023/5/1/2023 revealed elevated troponin at 54-->120.  Glucose elevated.  Potassium 3.4.  A1c of 5.8.  Chest x-ray stable cardiomegaly.  EKG done 4/30/2023 reviewed/interpreted by me reveals sinus rhythm with a rate of 74 bpm with  ST segment depression in 1 and aVL        Labs from 5/3/2023 reveal lipid profile with cholesterol 150, triglycerides 104, HDL 62, LDL 64.  A1c of 5.8.  CMP with a glucose of 108.  proBNP normal at 585 on 5/17/2023.  Labs from 5/18/2023 reveal BMP with a glucose of 102.  Labs from 9/14/2023 revealed normal CMP except glucose of 103, creatinine 1.07, EGFR 57..  Lipid profile with cholesterol 164, triglycerides 110, HDL 82, LDL 63.  Labs from 1/3/2024 reveal CMP with a creatinine of  1.09, EGFR 56.  Lipid profile with cholesterol 124, triglycerides 85, HDL 54, LDL 53.  proBNP 1203.  CBC with a hemoglobin of 10.8        Assessment:  :       Chest pain, atypical for angina  Dyspnea on exertion  CAD cardiac cath 5/1/2023 revealing multivessel coronary artery disease  CABG with LIMA to LAD, SVG to OM1 and SVG to PDA and mitral valve repair with physio #2 ring 5/6/2023  Hypertension  Dyslipidemia  History of CVA 2019 and carotid disease, right CEA 2020  PAD  Hyperglycemia, prediabetes with A1c of 5.8  Obesity with BMI over 30           Recommendations / Plan:            Reviewed EKG results with patient.  Patient has known coronary artery disease and previous CABG and mitral valve repair is complaining of atypical chest pain.  Advised patient to have stress test.  Patient thinks it is noncardiac and wants to wait and monitor.  Continue aspirin, Eliquis, atorvastatin, bisoprolol,, furosemide, KCl as tolerated.  Recheck labs before visit.  Reviewed BMI over 30, counseled on weight loss diet and exercise.  Follow-up with PMD for labs.     Cardiac cath 5/1/2023 which revealed severe triple-vessel disease.  Patient underwent three-vessel CABG and mitral valve repair 5/6/2023              ECG 12 Lead    Date/Time: 7/11/2024 3:51 PM  Performed by: Kye Alcaraz MD    Authorized by: Kye Alcaraz MD  Comparison: compared with previous ECG from 11/9/2023  Comparison to previous ECG: EKG done today reviewed/interpreted by me reveals sinus rhythm with a rate of 61 bpm, no significant change compared EKG from 11/9/2023      11 9    Copied text in this portion of the note has been reviewed and is accurate as of 7/11/2024  The following portions of the patient's history were reviewed and updated as appropriate: allergies, current medications, past family history, past medical history, past social history, past surgical history and problem list.    Assessment:         MDM       Diagnosis  Plan   1. Coronary artery disease of bypass graft of native heart with stable angina pectoris  ECG 12 Lead      2. Essential hypertension  ECG 12 Lead      3. Dyslipidemia  ECG 12 Lead      4. S/P mitral valve repair per Dr. Noonan 5/6/2023  ECG 12 Lead      5. History of CVA (cerebrovascular accident)  ECG 12 Lead      6. Palpitations  ECG 12 Lead    Holter Monitor - 72 Hour Up To 15 Days             Plan:               Past Medical History:  Past Medical History:   Diagnosis Date    ADHD unknown    Anemia     Anesthesia complication     difficult to wake up    Anxiety     Atrial fibrillation     Carotid artery disease     80% right internal carotid artery    Coronary artery disease     CVA (cerebral vascular accident)     right parietal right temporal    DDD (degenerative disc disease), lumbar     Depression     Extremity pain     Ankle pain    GERD (gastroesophageal reflux disease)     Hyperlipidemia unknown    Hypertension     Insomnia unknown    Laryngopharyngeal reflux     Low back pain     Mood disorder     Obesity unknown    Short of breath on exertion     Skin cancer of face     Sleep apnea     Suspected sleep apnea she has refused to be tested    Stroke (cerebrum)     Visual field defect     Left     Past Surgical History:  Past Surgical History:   Procedure Laterality Date    CARDIAC CATHETERIZATION N/A 5/1/2023    Procedure: Left Heart Cath;  Surgeon: Kye Alcaraz MD;  Location: [facility] CATH INVASIVE LOCATION;  Service: Cardiovascular;  Laterality: N/A;    CAROTID ENDARTERECTOMY Right 11/10/2020    Procedure: CAROTID ENDARTERECTOMY;  Surgeon: Tavo Das MD;  Location: [facility] MAIN OR;  Service: Vascular;  Laterality: Right;    CHOLECYSTECTOMY      COLONOSCOPY      2016    CORONARY ARTERY BYPASS GRAFT N/A 5/6/2023    Procedure: CORONARY ARTERY BYPASS GRAFTING;  Surgeon: Errol Noonan MD;  Location: [facility] CVOR;  Service: Cardiothoracic;  Laterality: N/A;  CABG X  3  using LIMA and right endospahenous vein;  2 coronary markers implanted.     FINGER SURGERY      KNEE ARTHROPLASTY      KNEE SURGERY Right     replaced    LAPAROSCOPIC GASTRIC BANDING      MITRAL VALVE REPAIR/REPLACEMENT N/A 5/6/2023    Procedure: MITRAL VALVE REPAIR/REPLACEMENT;  Surgeon: Errol Noonan MD;  Location: [facility];  Service: Cardiothoracic;  Laterality: N/A;  Mitral valve repaired using 28 mm Jorge Physio II Annuloplasty Ring.     ROTATOR CUFF REPAIR Right 2019    SHOULDER SURGERY Right 05/24/2019    scope/ cuff repair    TOTAL SHOULDER ARTHROPLASTY W/ DISTAL CLAVICLE EXCISION Left 11/8/2023    Procedure: TOTAL SHOULDER REVERSE ARTHROPLASTY;  Surgeon: Shubham Samaniego MD;  Location: [facility] MAIN OR;  Service: Orthopedics;  Laterality: Left;    TRANSESOPHAGEAL ECHOCARDIOGRAM (GALA) N/A 5/6/2023    Procedure: TRANSESOPHAGEAL ECHOCARDIOGRAM WITH ANESTHESIA;  Surgeon: Errol Noonan MD;  Location: [facility];  Service: Cardiothoracic;  Laterality: N/A;    TUBAL ABDOMINAL LIGATION        Allergies:  Allergies   Allergen Reactions    Desvenlafaxine Unknown - High Severity     ELEVATED BP     Home Meds:  Current Meds:     Current Outpatient Medications:     acetaminophen (TYLENOL) 325 MG tablet, Take 2 tablets by mouth Every 4 (Four) Hours As Needed for Mild Pain., Disp: , Rfl:     ALPRAZolam (XANAX) 2 MG tablet, TAKE ONE TABLET BY MOUTH ONCE NIGHTLY AS NEEDED FOR ANXIETY, Disp: 30 tablet, Rfl: 3    apixaban (ELIQUIS) 5 MG tablet tablet, Take 1 tablet by mouth Every 12 (Twelve) Hours. Indications: Atrial Fibrillation, Disp: 180 tablet, Rfl: 1    aspirin 81 MG chewable tablet, Chew 1 tablet Daily. Indications: antiplatelet (Patient taking differently: Chew 1 tablet Daily. Stop for 5 days prior to surg, last dose 11/2  Indications: antiplatelet), Disp: 90 tablet, Rfl: 3    atorvastatin (LIPITOR) 80 MG tablet, Take 1 tablet by mouth Every Night. Indications: High Amount of Fats in the  Blood, Disp: 90 tablet, Rfl: 3    bisoprolol (ZEBeta) 10 MG tablet, Take 1 tablet by mouth Daily. (Patient taking differently: Take 1 tablet by mouth Daily. Take Day of surgery), Disp: 90 tablet, Rfl: 3    citalopram (CeleXA) 10 MG tablet, Take 1 tablet by mouth Daily for 180 days., Disp: 30 tablet, Rfl: 5    furosemide (LASIX) 40 MG tablet, Take 0.5 tablets by mouth Daily. Indications: Edema, Disp: 45 tablet, Rfl: 1    lamoTRIgine (LaMICtal) 100 MG tablet, TAKE 1 TABLET BY MOUTH TWICE A DAY, Disp: 180 tablet, Rfl: 1    lisinopril (PRINIVIL,ZESTRIL) 10 MG tablet, Take 1 tablet by mouth Daily for 360 days., Disp: 90 tablet, Rfl: 3    omeprazole (priLOSEC) 40 MG capsule, TAKE 1 CAPSULE BY MOUTH DAILY, Disp: 90 capsule, Rfl: 1    oxybutynin (DITROPAN) 5 MG tablet, TAKE 1 TABLET BY MOUTH DAILY, Disp: 90 tablet, Rfl: 1    lisinopril-hydrochlorothiazide (PRINZIDE,ZESTORETIC) 20-25 MG per tablet, TAKE 1 TABLET BY MOUTH DAILY, Disp: 90 tablet, Rfl: 0    ondansetron (Zofran) 4 MG tablet, Take 1 tablet by mouth Every 8 (Eight) Hours As Needed for Nausea or Vomiting. (Patient not taking: Reported on 7/11/2024), Disp: 15 tablet, Rfl: 1    vitamin B-12 (CYANOCOBALAMIN) 1000 MCG tablet, Take 1 tablet by mouth Daily., Disp: , Rfl:   Social History:   Social History     Tobacco Use    Smoking status: Never     Passive exposure: Never    Smokeless tobacco: Never   Substance Use Topics    Alcohol use: No      Family History:  Family History   Problem Relation Age of Onset    Diabetes Other     Heart disease Mother     Diabetes Mother     Heart disease Father               Review of Systems   Constitutional: Positive for malaise/fatigue.   Cardiovascular:  Positive for chest pain. Negative for leg swelling and palpitations.   Respiratory:  Positive for shortness of breath.    Skin:  Negative for rash.   Neurological:  Positive for dizziness and light-headedness. Negative for numbness.     All other systems are negative        "  Objective:     Physical Exam  /74   Pulse 58   Ht 165.1 cm (65\")   Wt 92.1 kg (203 lb)   SpO2 94%   BMI 33.78 kg/m²   General:  Appears in no acute distress  Eyes: Sclera is anicteric,  conjunctiva is clear   HEENT:  No JVD.  No carotid bruits  Respiratory: Respirations regular and unlabored at rest.  Clear to auscultation  Cardiovascular: S1,S2 Regular rate and rhythm. .   Extremities: No digital clubbing or cyanosis, no edema  Skin: Color pink. Skin warm and dry to touch. No rashes  No xanthoma  Neuro: Alert and awake.    Lab Reviewed:         Kye Alcaraz MD  7/11/2024 16:42 EDT      EMR Dragon/Transcription:   \"Dictated utilizing Dragon dictation\".        "

## 2024-07-11 ENCOUNTER — OFFICE VISIT (OUTPATIENT)
Dept: CARDIOLOGY | Facility: CLINIC | Age: 69
End: 2024-07-11
Payer: MEDICARE

## 2024-07-11 VITALS
HEIGHT: 65 IN | HEART RATE: 58 BPM | WEIGHT: 203 LBS | OXYGEN SATURATION: 94 % | SYSTOLIC BLOOD PRESSURE: 119 MMHG | BODY MASS INDEX: 33.82 KG/M2 | DIASTOLIC BLOOD PRESSURE: 74 MMHG

## 2024-07-11 DIAGNOSIS — I25.708 CORONARY ARTERY DISEASE OF BYPASS GRAFT OF NATIVE HEART WITH STABLE ANGINA PECTORIS: Primary | ICD-10-CM

## 2024-07-11 DIAGNOSIS — Z86.73 HISTORY OF CVA (CEREBROVASCULAR ACCIDENT): ICD-10-CM

## 2024-07-11 DIAGNOSIS — R00.2 PALPITATIONS: ICD-10-CM

## 2024-07-11 DIAGNOSIS — I10 ESSENTIAL HYPERTENSION: ICD-10-CM

## 2024-07-11 DIAGNOSIS — E78.5 DYSLIPIDEMIA: ICD-10-CM

## 2024-07-11 DIAGNOSIS — Z98.890 S/P MVR (MITRAL VALVE REPAIR): ICD-10-CM

## 2024-07-11 PROCEDURE — 93000 ELECTROCARDIOGRAM COMPLETE: CPT | Performed by: INTERNAL MEDICINE

## 2024-07-11 PROCEDURE — 3074F SYST BP LT 130 MM HG: CPT | Performed by: INTERNAL MEDICINE

## 2024-07-11 PROCEDURE — 99214 OFFICE O/P EST MOD 30 MIN: CPT | Performed by: INTERNAL MEDICINE

## 2024-07-11 PROCEDURE — 1160F RVW MEDS BY RX/DR IN RCRD: CPT | Performed by: INTERNAL MEDICINE

## 2024-07-11 PROCEDURE — 3078F DIAST BP <80 MM HG: CPT | Performed by: INTERNAL MEDICINE

## 2024-07-11 PROCEDURE — 1159F MED LIST DOCD IN RCRD: CPT | Performed by: INTERNAL MEDICINE

## 2024-07-27 DIAGNOSIS — F41.9 ANXIETY: ICD-10-CM

## 2024-07-29 RX ORDER — ALPRAZOLAM 2 MG/1
2 TABLET ORAL NIGHTLY PRN
Qty: 30 TABLET | Refills: 0 | Status: SHIPPED | OUTPATIENT
Start: 2024-07-29

## 2024-07-30 ENCOUNTER — TELEPHONE (OUTPATIENT)
Dept: CARDIOLOGY | Facility: CLINIC | Age: 69
End: 2024-07-30
Payer: MEDICARE

## 2024-07-30 NOTE — TELEPHONE ENCOUNTER
Patient is having episodes of bradycardia or low heart rate please have patient decrease her bisoprolol to 5 mg daily.

## 2024-07-31 RX ORDER — BISOPROLOL FUMARATE 5 MG/1
5 TABLET, FILM COATED ORAL DAILY
COMMUNITY

## 2024-08-05 RX ORDER — ATORVASTATIN CALCIUM 80 MG/1
TABLET, FILM COATED ORAL
Qty: 90 TABLET | Refills: 3 | Status: SHIPPED | OUTPATIENT
Start: 2024-08-05

## 2024-08-05 NOTE — TELEPHONE ENCOUNTER
Rx Refill Note  Requested Prescriptions     Pending Prescriptions Disp Refills    atorvastatin (LIPITOR) 80 MG tablet [Pharmacy Med Name: Atorvastatin Calcium 80 MG Oral Tablet] 90 tablet 3     Sig: TAKE 1 TABLET BY MOUTH ONCE DAILY AT BEDTIME FOR  HIGH  LEVEL  OF  FATS  IN  BLOOD      Last office visit with prescribing clinician: 7/11/2024   Last telemedicine visit with prescribing clinician: Visit date not found   Next office visit with prescribing clinician: 7/17/2025                         Would you like a call back once the refill request has been completed: [] Yes [] No    If the office needs to give you a call back, can they leave a voicemail: [] Yes [] No    Sheila Phelps MA  08/05/24, 14:20 EDT

## 2024-08-21 ENCOUNTER — LAB (OUTPATIENT)
Dept: FAMILY MEDICINE CLINIC | Facility: CLINIC | Age: 69
End: 2024-08-21
Payer: MEDICARE

## 2024-08-21 ENCOUNTER — OFFICE VISIT (OUTPATIENT)
Dept: FAMILY MEDICINE CLINIC | Facility: CLINIC | Age: 69
End: 2024-08-21
Payer: MEDICARE

## 2024-08-21 VITALS
RESPIRATION RATE: 16 BRPM | DIASTOLIC BLOOD PRESSURE: 88 MMHG | WEIGHT: 192.8 LBS | BODY MASS INDEX: 32.12 KG/M2 | HEART RATE: 60 BPM | OXYGEN SATURATION: 96 % | HEIGHT: 65 IN | SYSTOLIC BLOOD PRESSURE: 136 MMHG

## 2024-08-21 DIAGNOSIS — R41.0 CONFUSION: ICD-10-CM

## 2024-08-21 DIAGNOSIS — I63.89 CEREBROVASCULAR ACCIDENT (CVA) DUE TO OTHER MECHANISM: ICD-10-CM

## 2024-08-21 DIAGNOSIS — D50.9 MICROCYTIC ANEMIA: ICD-10-CM

## 2024-08-21 DIAGNOSIS — Z78.0 POSTMENOPAUSE: ICD-10-CM

## 2024-08-21 DIAGNOSIS — N18.31 STAGE 3A CHRONIC KIDNEY DISEASE: ICD-10-CM

## 2024-08-21 DIAGNOSIS — Z00.00 MEDICARE ANNUAL WELLNESS VISIT, SUBSEQUENT: ICD-10-CM

## 2024-08-21 DIAGNOSIS — R44.3 HALLUCINATIONS: Primary | ICD-10-CM

## 2024-08-21 DIAGNOSIS — R44.3 HALLUCINATIONS: ICD-10-CM

## 2024-08-21 DIAGNOSIS — R74.8 ELEVATED ALKALINE PHOSPHATASE LEVEL: ICD-10-CM

## 2024-08-21 DIAGNOSIS — S09.90XA TRAUMATIC INJURY OF HEAD, INITIAL ENCOUNTER: ICD-10-CM

## 2024-08-21 LAB
T4 FREE SERPL-MCNC: 1.97 NG/DL (ref 0.93–1.7)
TSH SERPL DL<=0.05 MIU/L-ACNC: 0.02 UIU/ML (ref 0.27–4.2)

## 2024-08-21 PROCEDURE — 82306 VITAMIN D 25 HYDROXY: CPT | Performed by: PHYSICIAN ASSISTANT

## 2024-08-21 PROCEDURE — 83036 HEMOGLOBIN GLYCOSYLATED A1C: CPT | Performed by: PHYSICIAN ASSISTANT

## 2024-08-21 PROCEDURE — 82607 VITAMIN B-12: CPT | Performed by: PHYSICIAN ASSISTANT

## 2024-08-21 PROCEDURE — 82977 ASSAY OF GGT: CPT | Performed by: PHYSICIAN ASSISTANT

## 2024-08-21 PROCEDURE — 82728 ASSAY OF FERRITIN: CPT | Performed by: PHYSICIAN ASSISTANT

## 2024-08-21 PROCEDURE — 83540 ASSAY OF IRON: CPT | Performed by: PHYSICIAN ASSISTANT

## 2024-08-21 PROCEDURE — 84439 ASSAY OF FREE THYROXINE: CPT | Performed by: PHYSICIAN ASSISTANT

## 2024-08-21 PROCEDURE — 85045 AUTOMATED RETICULOCYTE COUNT: CPT | Performed by: PHYSICIAN ASSISTANT

## 2024-08-21 PROCEDURE — 82746 ASSAY OF FOLIC ACID SERUM: CPT | Performed by: PHYSICIAN ASSISTANT

## 2024-08-21 PROCEDURE — 84443 ASSAY THYROID STIM HORMONE: CPT | Performed by: PHYSICIAN ASSISTANT

## 2024-08-21 PROCEDURE — 36415 COLL VENOUS BLD VENIPUNCTURE: CPT

## 2024-08-21 PROCEDURE — 80053 COMPREHEN METABOLIC PANEL: CPT | Performed by: PHYSICIAN ASSISTANT

## 2024-08-21 PROCEDURE — 85025 COMPLETE CBC W/AUTO DIFF WBC: CPT | Performed by: PHYSICIAN ASSISTANT

## 2024-08-21 PROCEDURE — 84466 ASSAY OF TRANSFERRIN: CPT | Performed by: PHYSICIAN ASSISTANT

## 2024-08-21 NOTE — PATIENT INSTRUCTIONS
Please stop at lab on second floor to have blood drawn    Stat MRI head    Medications:  Continue current medications as prescribed  Consider prolonged weaning of Xanax    Future plans pending workup    Follow up if new/worsening symptoms

## 2024-08-21 NOTE — Clinical Note
Note is just as an FYI.  Comment difficult to read patient as I had never met her before so I am not sure what her baseline is.

## 2024-08-21 NOTE — PROGRESS NOTES
Chief Complaint  Hallucinations    HPI:    Andree Deluna presents to [facility]    Patient is a 68-year-old female with a history of hypertension, hyperlipidemia, coronary artery disease status post CABG x 3, mitral valve repair, paroxysmal atrial fibrillation, GERD, CKD, ADHD, anxiety/depression, CVA, migraines presenting for evaluation of hallucinations.    Patient reports that last night she was sleeping when she was woke up by the sound of horses.  reports that she reports that she was hearing horses and the night before saw dinosaurs. She also occasionally will here people whispering at times. She may have had symptoms similar symptoms after bypass surgery. Patient currently on lamotrigine 100 mg twice daily and Xanax 2 mg nightly as needed. She reportedly does not miss doses and compliant with medications. Denies new medications or changes in medications other than citalopram 10 mg that was started about a month ago. Denies alcohol, tobacco, recreational drug use. Denies seizures, dementia, Parkinson's. She did have an accidental fall after tripping over the dog. She fell back and hit the back of her head. Her seeing and hearing things have been worse after having a fall, hitting the back of her head. Denies dysuria, urgency, frequency, hematuria, cloudy/foul smelling urine, or flank pain. Psychiatric/mood has been overall been good. No significant depression or anxiety while on medications. Some times will have blurred vision. Denies double vision, aura, photophobia, phonophobia, slurred speech focal sensory/motor deficits. Denies fever, chills nausea, vomiting. Previous history of  right parietal stroke in 2019. Currently on aspirin and apixiban.     Review of Systems:  ROS negative unless otherwise noted in HPI above.    Past Medical History:   Diagnosis Date    ADHD unknown    Anemia     Anesthesia complication     difficult to wake up    Anxiety     Atrial  fibrillation     Carotid artery disease     80% right internal carotid artery    Coronary artery disease     CVA (cerebral vascular accident)     right parietal right temporal    DDD (degenerative disc disease), lumbar     Depression     Extremity pain     Ankle pain    GERD (gastroesophageal reflux disease)     Hyperlipidemia unknown    Hypertension     Insomnia unknown    Laryngopharyngeal reflux     Low back pain     Mood disorder     Obesity unknown    Short of breath on exertion     Skin cancer of face     Sleep apnea     Suspected sleep apnea she has refused to be tested    Stroke (cerebrum)     Visual field defect     Left         Current Outpatient Medications:     ALPRAZolam (XANAX) 2 MG tablet, TAKE 1 TABLET BY MOUTH NIGHTLY AS NEEDED FOR ANXIETY, Disp: 30 tablet, Rfl: 0    apixaban (ELIQUIS) 5 MG tablet tablet, Take 1 tablet by mouth Every 12 (Twelve) Hours. Indications: Atrial Fibrillation, Disp: 180 tablet, Rfl: 1    aspirin 81 MG chewable tablet, Chew 1 tablet Daily. Indications: antiplatelet (Patient taking differently: Chew 1 tablet Daily. Stop for 5 days prior to surg, last dose 11/2  Indications: antiplatelet), Disp: 90 tablet, Rfl: 3    atorvastatin (LIPITOR) 80 MG tablet, TAKE 1 TABLET BY MOUTH ONCE DAILY AT BEDTIME FOR  HIGH  LEVEL  OF  FATS  IN  BLOOD, Disp: 90 tablet, Rfl: 3    bisoprolol (ZEBeta) 5 MG tablet, Take 1 tablet by mouth Daily., Disp: , Rfl:     citalopram (CeleXA) 10 MG tablet, Take 1 tablet by mouth Daily for 180 days., Disp: 30 tablet, Rfl: 5    furosemide (LASIX) 40 MG tablet, Take 0.5 tablets by mouth Daily. Indications: Edema, Disp: 45 tablet, Rfl: 1    lamoTRIgine (LaMICtal) 100 MG tablet, TAKE 1 TABLET BY MOUTH TWICE A DAY, Disp: 180 tablet, Rfl: 1    lisinopril (PRINIVIL,ZESTRIL) 10 MG tablet, Take 1 tablet by mouth Daily for 360 days., Disp: 90 tablet, Rfl: 3    omeprazole (priLOSEC) 40 MG capsule, TAKE 1 CAPSULE BY MOUTH DAILY, Disp: 90 capsule, Rfl: 1    oxybutynin  "(DITROPAN) 5 MG tablet, TAKE 1 TABLET BY MOUTH DAILY, Disp: 90 tablet, Rfl: 1    acetaminophen (TYLENOL) 325 MG tablet, Take 2 tablets by mouth Every 4 (Four) Hours As Needed for Mild Pain. (Patient not taking: Reported on 8/21/2024), Disp: , Rfl:     lisinopril-hydrochlorothiazide (PRINZIDE,ZESTORETIC) 20-25 MG per tablet, TAKE 1 TABLET BY MOUTH DAILY (Patient not taking: Reported on 8/21/2024), Disp: 90 tablet, Rfl: 0    ondansetron (Zofran) 4 MG tablet, Take 1 tablet by mouth Every 8 (Eight) Hours As Needed for Nausea or Vomiting. (Patient not taking: Reported on 7/11/2024), Disp: 15 tablet, Rfl: 1    vitamin B-12 (CYANOCOBALAMIN) 1000 MCG tablet, Take 1 tablet by mouth Daily. (Patient not taking: Reported on 8/21/2024), Disp: , Rfl:     Social History     Socioeconomic History    Marital status:    Tobacco Use    Smoking status: Never     Passive exposure: Never    Smokeless tobacco: Never   Vaping Use    Vaping status: Never Used   Substance and Sexual Activity    Alcohol use: No    Drug use: No    Sexual activity: Defer        Objective   Vital Signs:  /88   Pulse 60   Resp 16   Ht 165.1 cm (65\")   Wt 87.5 kg (192 lb 12.8 oz)   SpO2 96%   BMI 32.08 kg/m²   Estimated body mass index is 32.08 kg/m² as calculated from the following:    Height as of this encounter: 165.1 cm (65\").    Weight as of this encounter: 87.5 kg (192 lb 12.8 oz).    Physical Exam:  General: Well-appearing patient, no apparent distress  HEENT: No posterior pharynx erythema, no tonsillar erythema or exudates, normal external auditory canals, TM normal without bulging or erythema pupils equal round reactive to light and accommodation, extraocular eye movements intact  Neck: No cervical lymphadenopathy  Cardiac: Regular rate and rhythm, normal S1/S2, no murmur, rubs or gallops, no lower extremity edema  Lungs: Clear to auscultation bilaterally, no crackles or wheezes  Abdomen: Soft, non-tender, no guarding or rebound " tenderness, no hepatosplenomegaly, slight asymmetry of the abdomen with left fat greater than right  Skin: Significant bruising on the back of the left arm/upper extremity  MSK: Grossly normal tone and strength, 5 out of 5 strength in upper and lower extremities bilaterally, no point tenderness over neck, shoulder, elbow, or wrist bilaterally, palpable knot on the superior aspect of the head just right of midline  Neuro: Alert and oriented x3, CN II-XII grossly intact, normal sensation in upper and lower extremities bilaterally  Psych: Appropriate mood and affect    Assessment and Plan:    (R44.3) Hallucinations -   (R41.0) Confusion  (S09.90XA) Traumatic injury of head, initial encounter   (I63.89) History of Cerebrovascular accident (CVA) due to other mechanism -   Assessment: Patient with reported increased confusion and complex hallucinations after accidental fall resulted in head trauma.  Concern for possible intracranial bleed given patient on aspirin and Eliquis for previous CVA.  Additional causes potentially include, but are not limited to, mental health, benzodiazepine use, UTI.  Discussed potential ED to help expedite workup, although patient and spouse declined.  Proceeding with workup as an outpatient, although patient and spouse both aware of signs and symptoms which should prompt immediate reevaluation.  Plan:   - MRI Brain Without Contrast  - Comprehensive metabolic panel, CBC & Differential, TSH Rfx On Abnormal To Free T4  - Urinalysis With Culture If Indicated   - Consider future prolonged weaning of Xanax  - Future plans pending workup  - Follow up if new/worsening symptoms    Patient was given instructions and counseling regarding her condition or for health maintenance advice. Please see specific information pulled into the AVS if appropriate.       Dr Logan Cates   Internal Medicine Physician  [facility]  [address], [address]  [place] [address]

## 2024-08-22 ENCOUNTER — LAB (OUTPATIENT)
Dept: LAB | Facility: HOSPITAL | Age: 69
End: 2024-08-22
Payer: MEDICARE

## 2024-08-22 ENCOUNTER — TELEPHONE (OUTPATIENT)
Dept: FAMILY MEDICINE CLINIC | Facility: CLINIC | Age: 69
End: 2024-08-22
Payer: MEDICARE

## 2024-08-22 ENCOUNTER — HOSPITAL ENCOUNTER (OUTPATIENT)
Dept: MRI IMAGING | Facility: HOSPITAL | Age: 69
Discharge: HOME | End: 2024-08-22
Payer: MEDICARE

## 2024-08-22 DIAGNOSIS — I63.89 CEREBROVASCULAR ACCIDENT (CVA) DUE TO OTHER MECHANISM: ICD-10-CM

## 2024-08-22 DIAGNOSIS — R74.8 ELEVATED ALKALINE PHOSPHATASE LEVEL: ICD-10-CM

## 2024-08-22 DIAGNOSIS — R44.3 HALLUCINATIONS: ICD-10-CM

## 2024-08-22 DIAGNOSIS — D50.9 MICROCYTIC ANEMIA: ICD-10-CM

## 2024-08-22 DIAGNOSIS — S09.90XA TRAUMATIC INJURY OF HEAD, INITIAL ENCOUNTER: ICD-10-CM

## 2024-08-22 DIAGNOSIS — E05.90 HYPERTHYROIDISM: Primary | ICD-10-CM

## 2024-08-22 LAB
25(OH)D3 SERPL-MCNC: 23.8 NG/ML (ref 30–100)
ALBUMIN SERPL-MCNC: 4.1 G/DL (ref 3.5–5.2)
ALBUMIN/GLOB SERPL: 1.2 G/DL
ALP SERPL-CCNC: 143 U/L (ref 39–117)
ALT SERPL W P-5'-P-CCNC: 11 U/L (ref 1–33)
ANION GAP SERPL CALCULATED.3IONS-SCNC: 14.1 MMOL/L (ref 5–15)
AST SERPL-CCNC: 16 U/L (ref 1–32)
BACTERIA UR QL AUTO: ABNORMAL /HPF
BASOPHILS # BLD AUTO: 0.1 10*3/MM3 (ref 0–0.2)
BASOPHILS NFR BLD AUTO: 1.2 % (ref 0–1.5)
BILIRUB SERPL-MCNC: 1.1 MG/DL (ref 0–1.2)
BILIRUB UR QL STRIP: NEGATIVE
BUN SERPL-MCNC: 7 MG/DL (ref 8–23)
BUN/CREAT SERPL: 8 (ref 7–25)
CALCIUM SPEC-SCNC: 9.5 MG/DL (ref 8.6–10.5)
CHLORIDE SERPL-SCNC: 103 MMOL/L (ref 98–107)
CLARITY UR: CLEAR
CO2 SERPL-SCNC: 23.9 MMOL/L (ref 22–29)
COD CRY URNS QL: ABNORMAL /HPF
COLOR UR: YELLOW
CREAT SERPL-MCNC: 0.88 MG/DL (ref 0.57–1)
DEPRECATED RDW RBC AUTO: 45.1 FL (ref 37–54)
EGFRCR SERPLBLD CKD-EPI 2021: 72.6 ML/MIN/1.73
EOSINOPHIL # BLD AUTO: 0.23 10*3/MM3 (ref 0–0.4)
EOSINOPHIL NFR BLD AUTO: 2.7 % (ref 0.3–6.2)
ERYTHROCYTE [DISTWIDTH] IN BLOOD BY AUTOMATED COUNT: 16.3 % (ref 12.3–15.4)
FERRITIN SERPL-MCNC: 66.1 NG/ML (ref 13–150)
FOLATE SERPL-MCNC: 10.2 NG/ML (ref 4.78–24.2)
GGT SERPL-CCNC: 16 U/L (ref 5–36)
GLOBULIN UR ELPH-MCNC: 3.3 GM/DL
GLUCOSE SERPL-MCNC: 92 MG/DL (ref 65–99)
GLUCOSE UR STRIP-MCNC: NEGATIVE MG/DL
HBA1C MFR BLD: 5.3 % (ref 4.8–5.6)
HCT VFR BLD AUTO: 33.6 % (ref 34–46.6)
HGB BLD-MCNC: 9.8 G/DL (ref 12–15.9)
HGB UR QL STRIP.AUTO: ABNORMAL
HYALINE CASTS UR QL AUTO: ABNORMAL /LPF
IMM GRANULOCYTES # BLD AUTO: 0.04 10*3/MM3 (ref 0–0.05)
IMM GRANULOCYTES NFR BLD AUTO: 0.5 % (ref 0–0.5)
IRON 24H UR-MRATE: 27 MCG/DL (ref 37–145)
IRON SATN MFR SERPL: 7 % (ref 20–50)
KETONES UR QL STRIP: NEGATIVE
LEUKOCYTE ESTERASE UR QL STRIP.AUTO: ABNORMAL
LYMPHOCYTES # BLD AUTO: 1.35 10*3/MM3 (ref 0.7–3.1)
LYMPHOCYTES NFR BLD AUTO: 15.7 % (ref 19.6–45.3)
MCH RBC QN AUTO: 22.5 PG (ref 26.6–33)
MCHC RBC AUTO-ENTMCNC: 29.2 G/DL (ref 31.5–35.7)
MCV RBC AUTO: 77.2 FL (ref 79–97)
MONOCYTES # BLD AUTO: 0.69 10*3/MM3 (ref 0.1–0.9)
MONOCYTES NFR BLD AUTO: 8 % (ref 5–12)
MUCOUS THREADS URNS QL MICRO: ABNORMAL /HPF
NEUTROPHILS NFR BLD AUTO: 6.21 10*3/MM3 (ref 1.7–7)
NEUTROPHILS NFR BLD AUTO: 71.9 % (ref 42.7–76)
NITRITE UR QL STRIP: NEGATIVE
NRBC BLD AUTO-RTO: 0 /100 WBC (ref 0–0.2)
PH UR STRIP.AUTO: 6 [PH] (ref 5–8)
PLATELET # BLD AUTO: 287 10*3/MM3 (ref 140–450)
PMV BLD AUTO: 10.8 FL (ref 6–12)
POTASSIUM SERPL-SCNC: 4.1 MMOL/L (ref 3.5–5.2)
PROT SERPL-MCNC: 7.4 G/DL (ref 6–8.5)
PROT UR QL STRIP: NEGATIVE
RBC # BLD AUTO: 4.35 10*6/MM3 (ref 3.77–5.28)
RBC # UR STRIP: ABNORMAL /HPF
REF LAB TEST METHOD: ABNORMAL
RETICS # AUTO: 0.11 10*6/MM3 (ref 0.02–0.13)
RETICS/RBC NFR AUTO: 2.62 % (ref 0.7–1.9)
SODIUM SERPL-SCNC: 141 MMOL/L (ref 136–145)
SP GR UR STRIP: 1.02 (ref 1–1.03)
SQUAMOUS #/AREA URNS HPF: ABNORMAL /HPF
TIBC SERPL-MCNC: 393 MCG/DL (ref 298–536)
TRANSFERRIN SERPL-MCNC: 264 MG/DL (ref 200–360)
UROBILINOGEN UR QL STRIP: ABNORMAL
VIT B12 BLD-MCNC: 880 PG/ML (ref 211–946)
WBC # UR STRIP: ABNORMAL /HPF
WBC NRBC COR # BLD AUTO: 8.62 10*3/MM3 (ref 3.4–10.8)

## 2024-08-22 PROCEDURE — 81001 URINALYSIS AUTO W/SCOPE: CPT

## 2024-08-22 PROCEDURE — 70551 MRI BRAIN STEM W/O DYE: CPT

## 2024-08-22 NOTE — TELEPHONE ENCOUNTER
----- Message from Logan Cates sent at 8/22/2024  7:46 AM EDT -----  Please let patient know that she had multiple abnormal results that require additional workup and treatment.    First, patient's thyroid function was hyperactive.  This means that she may have hyperthyroidism.  I need to add on additional test to her lab work and I would also recommend that she see endocrinology for additional considerations.  Sometimes we have to do additional testings or there is medications that may need to be administered by endocrinology.    Second, her red blood cell count was 9.8, which was down from about 11.7 9 months ago.  Patient did not report any bleeding, although she could potentially be losing it through the GI tract based on the lab results.  I am going to order additional lab testing to see if she is iron deficient, although I am also going to place an order for GI referral as she may need repeat upper endoscopy and colonoscopy to see if she has bleeding from her GI tract.    Third, one of her liver tests was slightly elevated.  I need to order additional labs to further investigate.    Lastly, her vitamin D level was low at 23.8.  I would recommend that she start over-the-counter vitamin D3 supplementation with 1000 units daily.  Her vitamin D can be rechecked at a future office visit to make sure that it increases.    I saw the patient has MRI scheduled for this morning, so I will touch base once I get the results.  I also saw that the urinalysis was not performed, so if that could be performed, I would greatly appreciate as well.    Logan Cates MD

## 2024-08-22 NOTE — TELEPHONE ENCOUNTER
----- Message from Logan Cates sent at 8/22/2024 12:52 PM EDT -----  Please let patient know that her MRI of her head overall did not show any acute abnormality.  There were changes previously noted related to stroke, but no new stroke.  There were no evidence of masses or bleed as well.  She did have some slight changes, I am not quite sure what the significance are, although I am going to try to reach out to somebody to see what next step may be.  May need to consider referral to neurology for help interpreting and determining if any additional workup is required.    Additionally, she has some labs coming back, although I want to wait until they all come back before reaching out.  I will likely reach out again tomorrow once they come back.      Logan Cates MD

## 2024-08-22 NOTE — TELEPHONE ENCOUNTER
I spoke with patient's  about lab results. I told him while she was there at the hospital getting her mri that she should be able to get the additional labs done that were needed. He expressed understanding at the time but I told him if they had questions to not hesitate and give us a call.

## 2024-08-23 RX ORDER — NITROFURANTOIN 25; 75 MG/1; MG/1
100 CAPSULE ORAL 2 TIMES DAILY
Qty: 10 CAPSULE | Refills: 0 | Status: SHIPPED | OUTPATIENT
Start: 2024-08-23

## 2024-08-23 RX ORDER — FERROUS SULFATE 325(65) MG
325 TABLET ORAL EVERY OTHER DAY
Qty: 30 TABLET | Refills: 2 | Status: SHIPPED | OUTPATIENT
Start: 2024-08-23

## 2024-08-30 DIAGNOSIS — F41.9 ANXIETY: ICD-10-CM

## 2024-08-31 RX ORDER — ALPRAZOLAM 2 MG
2 TABLET ORAL NIGHTLY PRN
Qty: 30 TABLET | Refills: 0 | Status: SHIPPED | OUTPATIENT
Start: 2024-08-31

## 2024-09-12 ENCOUNTER — TELEPHONE (OUTPATIENT)
Dept: FAMILY MEDICINE CLINIC | Facility: CLINIC | Age: 69
End: 2024-09-12
Payer: MEDICARE

## 2024-09-12 DIAGNOSIS — Z86.73 HISTORY OF CVA (CEREBROVASCULAR ACCIDENT): Primary | ICD-10-CM

## 2024-09-12 DIAGNOSIS — R93.89 ABNORMAL MRI: ICD-10-CM

## 2024-09-12 NOTE — TELEPHONE ENCOUNTER
PATIENT SAW DR DELVALLE ON 8-21     HE MENTIONED A NEUROLOGY REFERRAL       Caller: [employer]    Relationship: Emergency Contact    Best call back number:     [employer] () [phone] (Mobile)       What is the medical concern/diagnosis: CONFUSION   AND HALLUCINATIONS    What specialty or service is being requested: NEUROLOGY     What is the provider, practice or medical service name:

## 2024-09-12 NOTE — TELEPHONE ENCOUNTER
Order placed.  Sometimes neurology on the [place] side can take a long time to get in with, so I would like patient to at least consider following up with neurology on the [place] side.    Logan Cates MD

## 2024-09-23 RX ORDER — BISOPROLOL FUMARATE 5 MG/1
5 TABLET, FILM COATED ORAL DAILY
Qty: 90 TABLET | Refills: 3 | Status: SHIPPED | OUTPATIENT
Start: 2024-09-23

## 2024-10-01 DIAGNOSIS — F41.9 ANXIETY: ICD-10-CM

## 2024-10-01 RX ORDER — ALPRAZOLAM 2 MG
2 TABLET ORAL NIGHTLY PRN
Qty: 30 TABLET | Refills: 3 | Status: SHIPPED | OUTPATIENT
Start: 2024-10-01

## 2024-10-14 RX ORDER — LAMOTRIGINE 100 MG/1
100 TABLET ORAL 2 TIMES DAILY
Qty: 180 TABLET | Refills: 0 | Status: SHIPPED | OUTPATIENT
Start: 2024-10-14

## 2024-10-16 ENCOUNTER — TELEPHONE (OUTPATIENT)
Dept: FAMILY MEDICINE CLINIC | Facility: CLINIC | Age: 69
End: 2024-10-16
Payer: MEDICARE

## 2024-10-16 NOTE — TELEPHONE ENCOUNTER
Patient saw Dr Cates on 08/21/2024 and was referred to Dr. Ethan Sarabia on 09/12/2024. Dr. Sarabia's office cannot get patient in for quite some time.  is asking for patient to be referred to Dr. Eleazar Wolff in [place] and their phone number is [phone]. They can see patient next week.  asks that this be done asap because patient really needs seen soon.

## 2024-10-17 ENCOUNTER — OFFICE VISIT (OUTPATIENT)
Dept: CARDIOLOGY | Facility: CLINIC | Age: 69
End: 2024-10-17
Payer: MEDICARE

## 2024-10-17 VITALS
HEIGHT: 65 IN | OXYGEN SATURATION: 90 % | BODY MASS INDEX: 29.32 KG/M2 | SYSTOLIC BLOOD PRESSURE: 171 MMHG | RESPIRATION RATE: 18 BRPM | DIASTOLIC BLOOD PRESSURE: 86 MMHG | HEART RATE: 64 BPM | WEIGHT: 176 LBS

## 2024-10-17 DIAGNOSIS — I25.708 CORONARY ARTERY DISEASE OF BYPASS GRAFT OF NATIVE HEART WITH STABLE ANGINA PECTORIS: Primary | ICD-10-CM

## 2024-10-17 DIAGNOSIS — I10 ESSENTIAL HYPERTENSION: ICD-10-CM

## 2024-10-17 DIAGNOSIS — R06.02 SHORTNESS OF BREATH: ICD-10-CM

## 2024-10-17 NOTE — PROGRESS NOTES
Cardiology Clinic Note  Kamar Wheeler MD, PhD    Subjective:     Encounter Date:10/17/2024      Patient ID: Andree Deluna is a 69 y.o. female.    Chief Complaint:  Chief Complaint   Patient presents with    Shortness of Breath       HPI:    I had the pleasure to see this 69-year-old female today in clinic accompanied by her  who is a patient of mine.  He said she was having acute shortness of breath with difficulty walking across the room even 30 feet.  She has a history of multivessel bypass surgery and coronary artery disease with bypass in May 2023 with LIMA to LAD SVG to obtuse marginal 1 and SVG to PDA along with mitral valve repair with a ring.  She had postoperative atrial fibrillation, essential hypertension hyperlipidemia, diastolic dysfunction EF 60% historically, stroke back in  also obstructive carotid artery disease status post endarterectomy in the past.  Family history extensively coronary disease in her father and mother at young age.  Her father is  age 54.  She been having significant shortness of breath and dyspnea on exertion also intermittent chest pain.  She is seeing another provider who advised a stress test but patient did not proceed with that at this time and she is continue to be managed medically.  Her shortness of breath and dyspnea exertion have continued over the last 3 to 4 months and her  asked me to see her today in the office given progressive symptoms.      Medications reviewed with Lamictal, citalopram, Xanax nightly as needed  Cardiac medicines of lisinopril HCTZ, Lasix 20 daily bisoprolol    Last echo was perioperative last year but nothing since, EF 65% with normal RV size and function with normal atrial sizes, moderate MR at that time    EKG demonstrates sinus rhythm with diffuse ST-T wave abnormality in the inferolateral leads    Review of systems otherwise unremarkable x 14 point review of systems as mentioned above    Historical data copied  "forward from previous encounters in EMR including the history, exam, and assessment/plan has been reviewed and is unchanged unless noted otherwise.    Cardiac medicines reviewed with risk, benefits, and necessity of each discussed.    Risk and benefit of cardiac testing reviewed including death heart attack stroke pain bleeding infection need for vascular /cardiovascular surgery were discussed and the patient     Objective:         /86 (BP Location: Right arm, Patient Position: Sitting, Cuff Size: Adult)   Pulse 64   Resp 18   Ht 165.1 cm (65\")   Wt 79.8 kg (176 lb)   SpO2 90%   BMI 29.29 kg/m²     Physical Exam  Regular rate and rhythm no rubs gallops heave or lift 1 out of 6 stock ejection murmur lower sternal border  No clubbing cyanosis auscultation  Significant balance difficulties otherwise grossly intact  Soft nontender nondistended  Clear to auscultation  No carotid bruits or JVD    Assessment:       Valvular heart disease status post mitral valve repair physio #2 ring 2023  Multivessel CAD status post 5 past 2023  Hypertension  Hyperlipidemia  History of CVA with residual  Family history of early CAD and sudden cardiac death in her father  Abnormal EKG  Paroxysmal atrial fibrillation.  Prior stroke  Long-term use of anticoagulants      2D echo for structural and functional evaluation  Stress test look for any recurrent ischemia, patient agreeable now, not pursue this at that time  Labs, look for electrolyte abnormalities noting diffuse ST-T wave abnormality in inferolateral leads    Advised the patient to the hospital for any progressive dyspnea exertion refractory chest pain falls syncope or other complaints    Further recommendations to follow findings and clinical course    The pleasure to be involved in this patient's cardiovascular care.  Please call with any questions or concerns  Kamar Wheeler MD, PhD    Most recent EKG as reviewed and interpreted by me:  Procedures     Most recent echo " as reviewed and interpreted by me:  Results for orders placed during the hospital encounter of 23    Adult Transthoracic Echo Complete W/ Cont if Necessary Per Protocol    Interpretation Summary    Estimated right ventricular systolic pressure from tricuspid regurgitation is normal (<35 mmHg).      Normal LV size and contractility EF of 60 to 65%  Normal RV size  Normal atrial size  Pulmonic valve is not well visualized.  Aortic valve is trileaflet with mild sclerosis.  Mitral valve, tricuspid valve appears structurally normal, moderate mitral regurgitation seen.  No pericardial effusion seen.  Proximal aorta appears normal in size.      Most recent stress test as reviewed and interpreted by me:      Most recent cardiac catheterization as reviewed interpreted by me:  Results for orders placed during the hospital encounter of 23    Cardiac Catheterization/Vascular Study    Conclusion  Table formatting from the original result was not included.  May 1, 2023      Heart Cath Report    NAME:              Andree Deluna  :                1955  AGE/SEX:        67 y.o. female  MRN:                [id number]        Procedures Performed    Left heart catheterization  Selective coronary angiography  Left ventriculography  Mynx    :   Kye Alcaraz MD    Vascular Access Site: Femoral    Indication for procedure: Chest pain at rest consistent with unstable angina, acute non-ST elevation MI, positive family history of premature CAD, hypertension, dyslipidemia, history of CVA      Procedure Note    After discussing the risks, benefits, and alternatives of the procedure, informed consent was obtained.  Timeout was done before the procedure.  Moderate conscious sedation was given utilizing IV Versed and fentanyl administered by RN with continuous EKG oximetry and hemodynamic monitoring supervised by me throughout the entire case, conscious sedation time was 30 minutes.  I was present with the  patient for the duration of moderate sedation and supervised staff who had no other duties and monitored the patient for the entire procedure patient had Paz 2-3 sedation scale. the vascular access site was prepped and draped in the usual sterile fashion.  2% lidocaine was used for local anesthesia. Appropriate landmarks were assessed.  A 6 Georgian short sheath was inserted in the artery using the modified Seldinger technique.    Selective coronary angiography was performed with JL4 and JR4 diagnostic catheters. Left ventriculogram  was performed with an angled pigtail catheter.  JR4 was used to do LIMA injection..  All exchanges were performed over the wire.  No specimens were removed.  There were no apparent acute or early complications.  The patient tolerated the procedure well and was transferred to the recovery area in stable condition.      Closure device: Mynx device was deployed successfully after right iliofemoral angiogram was performed. Good hemostasis was achieved.    Complications:  None  Blood Loss: minimal    Hemodynamics    Pressures    Ao:    178/88 mmHg  LV:    180/28 mmHg  End-diastolic pressure:  28  mmHg  No significant aortic valvular gradient on pullback    Coronary Angiography    Left Main :  The left main   is calcified and without disease    Left Anterior Descending : Has heavily calcified 90% proximal narrowing apical segment has another 90% narrowing.    Left Circumflex : 30% proximal LAD and gives rise to a distal marginal which has 99% mid narrowing divides into 2 branches.  Distal circumflex is 99% occluded with faint bridging collaterals    Right Coronary Artery :  The right coronary artery   is 100% occluded in the ostium, with bridging collaterals from the left system visualizing distal vessel.    Dominance:  []  Left  [x]  Right  []  Co-Dominant    Lima %: Is patent.      Left Ventriculography:    Estimated Ejection Fraction: 50 %  Wall motion abnormalities: Lower limits of  normal contractility  Mitral Regurgitation: 1-2+ MR    Impression:    Severe triple-vessel disease  Lower limits of normal LV contractility with elevated LVEDP  Mild to moderate MR  Patent LIMA    Recommendations:    Surgical consultation for CABG evaluation        I sincerely appreciate the opportunity to participate in your patient's care. Please feel free to contact me anytime if I can be of assistance in this or any other way.      Pertinent History    Past Medical History:  Diagnosis Date   ADHD unknown   Anemia   Anxiety   Carotid artery disease  80% right internal carotid artery   CVA (cerebral vascular accident)  right parietal right temporal   DDD (degenerative disc disease), lumbar   Depression   Extremity pain  Ankle pain   GERD (gastroesophageal reflux disease)   Hyperlipidemia unknown   Hypertension   Insomnia unknown   Laryngopharyngeal reflux   Low back pain   Mood disorder   Obesity unknown   Skin cancer of face   Sleep apnea  Suspected sleep apnea she has refused to be tested   Stroke (cerebrum)   Visual field defect  Left    Past Surgical History:  Procedure Laterality Date   CAROTID ENDARTERECTOMY Right 11/10/2020  Procedure: CAROTID ENDARTERECTOMY;  Surgeon: Tavo Das MD;  Location: [facility] OR;  Service: Vascular;  Laterality: Right;   CHOLECYSTECTOMY   COLONOSCOPY  2016   FINGER SURGERY   KNEE ARTHROPLASTY   KNEE SURGERY Right  replaced   LAPAROSCOPIC GASTRIC BANDING   ROTATOR CUFF REPAIR Right 2019   SHOULDER SURGERY Right 05/24/2019  scope/ cuff repair   TUBAL ABDOMINAL LIGATION    Prior to Admission medications  Medication Sig Start Date End Date Taking? Authorizing Provider  ALPRAZolam (XANAX) 2 MG tablet TAKE 1 TABLET BY MOUTH AT NIGHT AS NEEDED FOR ANXIETY 4/10/23  Yes Naga Griffith PA-C  amLODIPine (NORVASC) 2.5 MG tablet TAKE ONE TABLET BY MOUTH DAILY 9/10/21  Yes Naga Griffith PA-C  aspirin 81 MG chewable tablet Chew 1 tablet Daily. 8/10/19  Yes Wes  Cong Hunter MD  atorvastatin (LIPITOR) 80 MG tablet TAKE ONE TABLET BY MOUTH ONCE NIGHTLY 12/14/22  Yes Naga Griffith PA-C  lamoTRIgine (LaMICtal) 100 MG tablet TAKE ONE TABLET BY MOUTH TWICE A DAY 1/4/23  Yes Naga Griffith PA-C  lisinopril-hydrochlorothiazide (PRINZIDE,ZESTORETIC) 20-25 MG per tablet TAKE ONE TABLET BY MOUTH DAILY 2/13/23  Yes Jelena Pennington MD  metoprolol succinate XL (TOPROL-XL) 25 MG 24 hr tablet TAKE ONE TABLET BY MOUTH DAILY 10/25/22  Yes Naga Griffith PA-C  omeprazole (priLOSEC) 40 MG capsule TAKE ONE CAPSULE BY MOUTH DAILY 4/20/23  Yes Naga Griffith PA-C  oxybutynin (DITROPAN) 5 MG tablet Take 1 tablet by mouth Daily. 11/28/22  Yes Naga Griffith PA-C  albuterol sulfate  (90 Base) MCG/ACT inhaler Inhale 2 puffs Every 6 (Six) Hours As Needed for Shortness of Air.  Patient not taking: Reported on 3/16/2023 10/19/22   Toribio Randle APRN  albuterol sulfate  (90 Base) MCG/ACT inhaler Inhale 2 puffs Every 4 (Four) Hours As Needed for Wheezing.  Patient not taking: Reported on 3/16/2023 1/19/23   Naga Griffith PA-C  buPROPion XL (WELLBUTRIN XL) 150 MG 24 hr tablet TAKE ONE TABLET BY MOUTH DAILY 7/27/22 5/1/23  Naga Griffith PA-C  promethazine-dextromethorphan (PROMETHAZINE-DM) 6.25-15 MG/5ML syrup Take 5 mL by mouth 4 (Four) Times a Day As Needed for Cough. 2/28/23 5/1/23  Naga Griffith PA-C  vilazodone (Viibryd) 20 MG tablet tablet Take 1 tablet by mouth Daily. 1/19/23 5/1/23  Naga Griffith PA-C      Pre-Procedure Notes  H&P Performed  [x]  Yes []  No       []  N/A    Indications:  []  ACS <= 24 HRS  []  ACS >24 HRS  []  New Onset Angina <= 2 mos  []  Worsening Angina  []  Resuscitated Cardiac Arrest  []  Angina on Exertion:  []  Suspected CAD  []  Valvular Disease  []  Pericardial Disease  []  Cardiac Arrythmia  []  Cardiomyopathy  []  LV Dysfunction  []  Syncope  []  Post Cardiac Transplant  []  Eval. For Exercise  Clearance  []  Other  []  Pre-Operative Evaluation  If Pre-Op Eval:  Evaluation for Surgery Type:  []  Cardiac Surgery   []  Non-Cardiac Surgery  Functional Capacity:  []  <4 METS  []  >=4 METS w/o symptoms  []  >= 4 METS with symptoms  []  Unknown  Surgical Risk:  []  Low  []  Intermediate  []  High Risk: Vascular  []  High Risk Non-Vascular    Risks, Benefits, & Complications Discussed:  [x]  Yes  []  No  []  N/A    Questions Answered:  [x]  Yes  []  No  []  N/A    Consent Obtained:  [x]  Yes  []  No  []  N/A    CHF: []  Yes  [x]  No  If Yes:  Newly Diagnosed?  []  Yes  []  No  If Yes:  HF Type:  []  Diastolic  []  Systolic  []  Unknown      Kye Alcaraz MD  5/1/2023  17:18 EDT  Electronically signed by Kye Alcaraz MD, 05/01/23, 5:18 PM EDT.    The following portions of the patient's history were reviewed and updated as appropriate: allergies, current medications, past family history, past medical history, past social history, past surgical history, and problem list.      ROS:  14 point review of systems negative except as mentioned above    Current Outpatient Medications:     ALPRAZolam (XANAX) 2 MG tablet, TAKE 1 TABLET BY MOUTH NIGHTLY AS NEEDED FOR ANXIETY, Disp: 30 tablet, Rfl: 3    apixaban (ELIQUIS) 5 MG tablet tablet, Take 1 tablet by mouth Every 12 (Twelve) Hours. Indications: Atrial Fibrillation, Disp: 180 tablet, Rfl: 1    aspirin 81 MG chewable tablet, Chew 1 tablet Daily. Indications: antiplatelet (Patient taking differently: Chew 1 tablet Daily. Stop for 5 days prior to surg, last dose 11/2  Indications: antiplatelet), Disp: 90 tablet, Rfl: 3    atorvastatin (LIPITOR) 80 MG tablet, TAKE 1 TABLET BY MOUTH ONCE DAILY AT BEDTIME FOR  HIGH  LEVEL  OF  FATS  IN  BLOOD, Disp: 90 tablet, Rfl: 3    bisoprolol (ZEBeta) 5 MG tablet, Take 1 tablet by mouth Daily., Disp: 90 tablet, Rfl: 3    citalopram (CeleXA) 10 MG tablet, Take 1 tablet by mouth Daily for 180 days., Disp: 30 tablet,  Rfl: 5    ferrous sulfate 325 (65 FE) MG tablet, Take 1 tablet by mouth Every Other Day., Disp: 30 tablet, Rfl: 2    furosemide (LASIX) 40 MG tablet, Take 0.5 tablets by mouth Daily. Indications: Edema, Disp: 45 tablet, Rfl: 1    lamoTRIgine (LaMICtal) 100 MG tablet, Take 1 tablet by mouth twice daily, Disp: 180 tablet, Rfl: 0    lisinopril (PRINIVIL,ZESTRIL) 10 MG tablet, Take 1 tablet by mouth Daily for 360 days., Disp: 90 tablet, Rfl: 3    omeprazole (priLOSEC) 40 MG capsule, TAKE 1 CAPSULE BY MOUTH DAILY, Disp: 90 capsule, Rfl: 1    oxybutynin (DITROPAN) 5 MG tablet, TAKE 1 TABLET BY MOUTH DAILY, Disp: 90 tablet, Rfl: 1    vitamin B-12 (CYANOCOBALAMIN) 1000 MCG tablet, Take 1 tablet by mouth Daily., Disp: , Rfl:     lisinopril-hydrochlorothiazide (PRINZIDE,ZESTORETIC) 20-25 MG per tablet, TAKE 1 TABLET BY MOUTH DAILY (Patient not taking: Reported on 10/17/2024), Disp: 90 tablet, Rfl: 0    Problem List:  Patient Active Problem List   Diagnosis    Anemia in other chronic diseases classified elsewhere    Anxiety    B12 deficiency    Attention deficit disorder of adult with hyperactivity    Mood disorder    Complete rotator cuff tear or rupture of right shoulder, not specified as traumatic    Degeneration of intervertebral disc of cervical region    Degeneration of intervertebral disc of lumbosacral region    Fatigue    Hyperlipidemia    Hypertension    Insomnia    Obesity    Osteoarthritis of knee    CVA (cerebral vascular accident)    Visual field loss left    Acute midline low back pain without sciatica    GERD with esophagitis    Carotid stenosis, bilateral    Overactive bladder    History of stroke    Acute pain of right shoulder    Left cervical radiculopathy    Migraine headache    Medicare annual wellness visit, subsequent    Moderate episode of recurrent major depressive disorder    Piriformis syndrome, right    Precordial pain    History of Grimaldo's esophagus    Chronic cough    PATEL (generalized anxiety  disorder)    Chest pain, unspecified type    Elevated troponin    Non-STEMI (non-ST elevated myocardial infarction)    Coronary artery disease involving native coronary artery of native heart with unstable angina pectoris    Abnormal findings on diagnostic imaging of heart and coronary circulation    S/P CABG x 3 with LIMA per Dr. Noonan 5/6/2023    S/P mitral valve repair per Dr. Noonan 5/6/2023    Postoperative atrial fibrillation    Atrial fibrillation with rapid ventricular response    Pleural effusion on left    Dyspnea    DJD of left shoulder    Osteoarthritis of left glenohumeral joint    Acute respiratory failure with hypoxia    Bilateral pneumonia    Acute renal insufficiency    Chronic coronary artery disease    NYHA class 1 heart failure with borderline preserved ejection fraction    Paroxysmal atrial fibrillation    GERD without esophagitis    Stage 3a chronic kidney disease     Past Medical History:  Past Medical History:   Diagnosis Date    ADHD unknown    Anemia     Anesthesia complication     difficult to wake up    Anxiety     Atrial fibrillation     Carotid artery disease     80% right internal carotid artery    Coronary artery disease     CVA (cerebral vascular accident)     right parietal right temporal    DDD (degenerative disc disease), lumbar     Depression     Extremity pain     Ankle pain    GERD (gastroesophageal reflux disease)     Hyperlipidemia unknown    Hypertension     Insomnia unknown    Laryngopharyngeal reflux     Low back pain     Mood disorder     Obesity unknown    Short of breath on exertion     Skin cancer of face     Sleep apnea     Suspected sleep apnea she has refused to be tested    Stroke (cerebrum)     Visual field defect     Left     Past Surgical History:  Past Surgical History:   Procedure Laterality Date    CARDIAC CATHETERIZATION N/A 5/1/2023    Procedure: Left Heart Cath;  Surgeon: Kye Alcaraz MD;  Location: [facility] CATH INVASIVE  LOCATION;  Service: Cardiovascular;  Laterality: N/A;    CAROTID ENDARTERECTOMY Right 11/10/2020    Procedure: CAROTID ENDARTERECTOMY;  Surgeon: Tavo Das MD;  Location: [facility] MAIN OR;  Service: Vascular;  Laterality: Right;    CHOLECYSTECTOMY      COLONOSCOPY      2016    CORONARY ARTERY BYPASS GRAFT N/A 5/6/2023    Procedure: CORONARY ARTERY BYPASS GRAFTING;  Surgeon: Errol Noonan MD;  Location: [facility];  Service: Cardiothoracic;  Laterality: N/A;  CABG X  3 using LIMA and right endospahenous vein;  2 coronary markers implanted.     FINGER SURGERY      KNEE ARTHROPLASTY      KNEE SURGERY Right     replaced    LAPAROSCOPIC GASTRIC BANDING      MITRAL VALVE REPAIR/REPLACEMENT N/A 5/6/2023    Procedure: MITRAL VALVE REPAIR/REPLACEMENT;  Surgeon: Errol Noonan MD;  Location: [facility];  Service: Cardiothoracic;  Laterality: N/A;  Mitral valve repaired using 28 mm Jorge Physio II Annuloplasty Ring.     ROTATOR CUFF REPAIR Right 2019    SHOULDER SURGERY Right 05/24/2019    scope/ cuff repair    TOTAL SHOULDER ARTHROPLASTY W/ DISTAL CLAVICLE EXCISION Left 11/8/2023    Procedure: TOTAL SHOULDER REVERSE ARTHROPLASTY;  Surgeon: Shubham Samaniego MD;  Location: [facility] MAIN OR;  Service: Orthopedics;  Laterality: Left;    TRANSESOPHAGEAL ECHOCARDIOGRAM (GALA) N/A 5/6/2023    Procedure: TRANSESOPHAGEAL ECHOCARDIOGRAM WITH ANESTHESIA;  Surgeon: Errol Noonan MD;  Location: [facility];  Service: Cardiothoracic;  Laterality: N/A;    TUBAL ABDOMINAL LIGATION       Social History:  Social History     Socioeconomic History    Marital status:    Tobacco Use    Smoking status: Never     Passive exposure: Never    Smokeless tobacco: Never   Vaping Use    Vaping status: Never Used   Substance and Sexual Activity    Alcohol use: No    Drug use: No    Sexual activity: Defer     Allergies:  Allergies   Allergen Reactions    Desvenlafaxine Unknown - High Severity     ELEVATED  BP     Immunizations:  Immunization History   Administered Date(s) Administered    COVID-19 (MODERNA) 1st,2nd,3rd Dose Monovalent 03/31/2021, 04/30/2021    Flublok 18+yrs 11/15/2019    Fluzone  >6mos 11/10/2010, 11/12/2015    Fluzone (or Fluarix & Flulaval for VFC) >6mos 10/09/2020    Pneumococcal Conjugate 20-Valent (PCV20) 05/16/2024            In-Office Procedure(s):  No orders to display        ASCVD RIsk Score::  The ASCVD Risk score (Misti SLOAN, et al., 2019) failed to calculate for the following reasons:    Risk score cannot be calculated because patient has a medical history suggesting prior/existing ASCVD    Imaging:    Results for orders placed in visit on 05/23/24    XR Foot 3+ View Right    Narrative  XR FOOT 3+ VW RIGHT    Date of Exam: 5/23/2024 1:55 PM EDT    Indication: lateral cyst   room 10  wb    Comparison: None available.    Findings:  No acute right foot fracture or joint malalignment is seen. No retained radiopaque foreign body is evident in the soft tissues. There is a moderate size plantar calcaneal spur measuring approximately 6 mm in length. The plantar arch is preserved.  Tibiotalar alignment is normal. Surrounding soft tissues appear unremarkable.    Impression  Impression:  Small plantar calcaneal spur. Otherwise, normal right foot series.      Electronically Signed: Mary Linares MD  5/28/2024 9:17 AM EDT  Workstation ID: [id number]       Results for orders placed during the hospital encounter of 11/09/23    CT Angiogram Chest Pulmonary Embolism    Narrative  CT ANGIOGRAM CHEST PULMONARY EMBOLISM    Date of Exam: 11/9/2023 10:55 PM EST    Indication: hypoxia recent surgery.    Comparison: 11/9/2023.    Technique: Axial CT images were obtained of the chest after the uneventful intravenous administration of iodinated contrast utilizing pulmonary embolism protocol.  Sagittal and coronal reconstructions were performed.  Automated exposure control and  iterative reconstruction methods were  used.      Findings:    Pulmonary arteries: Adequate opacification of the pulmonary arteries. No evidence of acute pulmonary embolism.    Lungs and Pleura: Patchy airspace disease is seen within the left lower lobe. Mild air bronchograms are present. Mild atelectatic changes are present within the right lung base. No significant pleural effusion. No focal pulmonary nodule identified. Mild  groundglass changes are present diffusely..    Mediastinum/Soco: No mediastinal or hilar lymphadenopathy.    Lymph nodes: No axillary or supraclavicular adenopathy.    Cardiovascular: The heart appears enlarged. Atherosclerotic calcifications are present including within the coronary arteries. There is a small hiatal hernia. Median sternotomy wires are present.. The pericardium is normal. The aorta and its arch branch  vessels are unremarkable.   Small left subcutaneous air is seen along the left chest wall likely related to recent surgery.    Upper Abdomen: The upper abdominal contents are unremarkable.    Bones and Soft Tissue: No suspicious osseous lesion.    Impression  Impression:  1.No evidence of pulmonary embolism.  2.Patchy airspace disease within the left lower lobe likely related to atelectasis. Mild pneumonia cannot be excluded..  3.Cardiomegaly with suspected mild pulmonary edema pattern.  4.Ancillary findings as described above.        Electronically Signed: Elodia Swanson MD  11/9/2023 11:34 PM EST  Workstation ID: [id number]      Results for orders placed during the hospital encounter of 11/09/23    CT Angiogram Chest Pulmonary Embolism    Narrative  CT ANGIOGRAM CHEST PULMONARY EMBOLISM    Date of Exam: 11/9/2023 10:55 PM EST    Indication: hypoxia recent surgery.    Comparison: 11/9/2023.    Technique: Axial CT images were obtained of the chest after the uneventful intravenous administration of iodinated contrast utilizing pulmonary embolism protocol.  Sagittal and coronal reconstructions were performed.  Automated  exposure control and  iterative reconstruction methods were used.      Findings:    Pulmonary arteries: Adequate opacification of the pulmonary arteries. No evidence of acute pulmonary embolism.    Lungs and Pleura: Patchy airspace disease is seen within the left lower lobe. Mild air bronchograms are present. Mild atelectatic changes are present within the right lung base. No significant pleural effusion. No focal pulmonary nodule identified. Mild  groundglass changes are present diffusely..    Mediastinum/Soco: No mediastinal or hilar lymphadenopathy.    Lymph nodes: No axillary or supraclavicular adenopathy.    Cardiovascular: The heart appears enlarged. Atherosclerotic calcifications are present including within the coronary arteries. There is a small hiatal hernia. Median sternotomy wires are present.. The pericardium is normal. The aorta and its arch branch  vessels are unremarkable.   Small left subcutaneous air is seen along the left chest wall likely related to recent surgery.    Upper Abdomen: The upper abdominal contents are unremarkable.    Bones and Soft Tissue: No suspicious osseous lesion.    Impression  Impression:  1.No evidence of pulmonary embolism.  2.Patchy airspace disease within the left lower lobe likely related to atelectasis. Mild pneumonia cannot be excluded..  3.Cardiomegaly with suspected mild pulmonary edema pattern.  4.Ancillary findings as described above.        Electronically Signed: Elodia Swanson MD  11/9/2023 11:34 PM EST  Workstation ID: [id number]      Lab Review:   Lab on 08/22/2024   Component Date Value    Color, UA 08/22/2024 Yellow     Appearance, UA 08/22/2024 Clear     pH, UA 08/22/2024 6.0     Specific Gravity, UA 08/22/2024 1.025     Glucose, UA 08/22/2024 Negative     Ketones, UA 08/22/2024 Negative     Bilirubin, UA 08/22/2024 Negative     Blood, UA 08/22/2024 Trace (A)     Protein, UA 08/22/2024 Negative     Leuk Esterase, UA 08/22/2024 Trace (A)     Nitrite, UA  08/22/2024 Negative     Urobilinogen, UA 08/22/2024 1.0 E.U./dL     RBC, UA 08/22/2024 0-2     WBC, UA 08/22/2024 0-2     Bacteria, UA 08/22/2024 Trace (A)     Squamous Epithelial Cell* 08/22/2024 3-6 (A)     Hyaline Casts, UA 08/22/2024 None Seen     Calcium Oxalate Crystals* 08/22/2024 Small/1+     Mucus, UA 08/22/2024 Small/1+ (A)     Methodology 08/22/2024 Manual Light Microscopy    Lab on 08/21/2024   Component Date Value    TSH 08/21/2024 0.024 (L)     Hemoglobin A1C 08/21/2024 5.30     Glucose 08/21/2024 92     BUN 08/21/2024 7 (L)     Creatinine 08/21/2024 0.88     Sodium 08/21/2024 141     Potassium 08/21/2024 4.1     Chloride 08/21/2024 103     CO2 08/21/2024 23.9     Calcium 08/21/2024 9.5     Total Protein 08/21/2024 7.4     Albumin 08/21/2024 4.1     ALT (SGPT) 08/21/2024 11     AST (SGOT) 08/21/2024 16     Alkaline Phosphatase 08/21/2024 143 (H)     Total Bilirubin 08/21/2024 1.1     Globulin 08/21/2024 3.3     A/G Ratio 08/21/2024 1.2     BUN/Creatinine Ratio 08/21/2024 8.0     Anion Gap 08/21/2024 14.1     eGFR 08/21/2024 72.6     25 Hydroxy, Vitamin D 08/21/2024 23.8 (L)     WBC 08/21/2024 8.62     RBC 08/21/2024 4.35     Hemoglobin 08/21/2024 9.8 (L)     Hematocrit 08/21/2024 33.6 (L)     MCV 08/21/2024 77.2 (L)     MCH 08/21/2024 22.5 (L)     MCHC 08/21/2024 29.2 (L)     RDW 08/21/2024 16.3 (H)     RDW-SD 08/21/2024 45.1     MPV 08/21/2024 10.8     Platelets 08/21/2024 287     Neutrophil % 08/21/2024 71.9     Lymphocyte % 08/21/2024 15.7 (L)     Monocyte % 08/21/2024 8.0     Eosinophil % 08/21/2024 2.7     Basophil % 08/21/2024 1.2     Immature Grans % 08/21/2024 0.5     Neutrophils, Absolute 08/21/2024 6.21     Lymphocytes, Absolute 08/21/2024 1.35     Monocytes, Absolute 08/21/2024 0.69     Eosinophils, Absolute 08/21/2024 0.23     Basophils, Absolute 08/21/2024 0.10     Immature Grans, Absolute 08/21/2024 0.04     nRBC 08/21/2024 0.0     Free T4 08/21/2024 1.97 (H)     Iron 08/21/2024 27 (L)      Iron Saturation (TSAT) 08/21/2024 7 (L)     Transferrin 08/21/2024 264     TIBC 08/21/2024 393     GGT 08/21/2024 16     Ferritin 08/21/2024 66.10     Vitamin B-12 08/21/2024 880     Folate 08/21/2024 10.20     Reticulocyte % 08/21/2024 2.62 (H)     Reticulocyte Absolute 08/21/2024 0.1127      Recent labs reviewed and interpreted for clinical significance and application            Level of Care:           Kamar Wheeler MD  10/17/24  .

## 2024-10-21 ENCOUNTER — TELEPHONE (OUTPATIENT)
Dept: PEDIATRICS | Facility: OTHER | Age: 69
End: 2024-10-21

## 2024-10-21 NOTE — TELEPHONE ENCOUNTER
PATIENT'S SPOUSE CALLED IN REGARDS TO REFERRAL TO NEUROLOGIST DR. GARCIA. CAN SOMEONE PLEASE UPDATE    CALL BACK NUMBER [phone]    PATIENT IS CONFUSED, DOING THINGS SHE NORMALLY WOULDN'T DO. FORGETFUL

## 2024-10-23 ENCOUNTER — TELEPHONE (OUTPATIENT)
Dept: FAMILY MEDICINE CLINIC | Facility: CLINIC | Age: 69
End: 2024-10-23

## 2024-10-23 NOTE — TELEPHONE ENCOUNTER
Caller: [employer]    Relationship: Emergency Contact    Best call back number: [phone]     What is the best time to reach you: ANY    Who are you requesting to speak with (clinical staff, provider,  specific staff member): SHAYLEE WHITE        What was the call regarding: A REFERRAL WAS SENT TO EZIO GARCIA IN [place] FOR NEUROLOGY.  THEY ARE NEEDING MEDICAL RECORDS FAXED TO THEM BEFORE THEY CAN GET PATIENT IN TO BE SEEN.    PLEASE CALL ONCE THIS HAS BEEN COMPLETED.

## 2024-10-23 NOTE — TELEPHONE ENCOUNTER
Called and advised the referral was faxed x2 on 10/16/24 and 10/17/24. Advised to call Dr. Wolff's office to schedule. Verbal understanding.

## 2024-10-23 NOTE — TELEPHONE ENCOUNTER
Caller: [employer]    Relationship: Emergency Contact    Best call back number:     What is the best time to reach you:     Who are you requesting to speak with (clinical staff, provider,  specific staff member): DR WHITE OR STAFF    Do you know the name of the person who called:     What was the call regarding: PATIENTS SPOUSE AUSTIN IS CALLING IN TO CHECK ON THE STATUS OF THE PATIENTS REFERRAL REQUEST FOR NEURO.  HE WANTS TO BE CALLED BACK TO DISCUSS.     Is it okay if the provider responds through [facility]:

## 2024-10-29 ENCOUNTER — TELEPHONE (OUTPATIENT)
Dept: FAMILY MEDICINE CLINIC | Facility: CLINIC | Age: 69
End: 2024-10-29
Payer: MEDICARE

## 2024-10-29 DIAGNOSIS — Z86.73 HISTORY OF CVA (CEREBROVASCULAR ACCIDENT): Primary | ICD-10-CM

## 2024-10-29 DIAGNOSIS — R41.0 CONFUSION: ICD-10-CM

## 2024-10-29 DIAGNOSIS — S09.90XA TRAUMATIC INJURY OF HEAD, INITIAL ENCOUNTER: ICD-10-CM

## 2024-10-29 DIAGNOSIS — R93.89 ABNORMAL MRI: ICD-10-CM

## 2024-10-29 NOTE — TELEPHONE ENCOUNTER
Caller: JUAN - NOT ON VERBAL    Relationship to patient: Grandchild      Patient is needing: THEY HEARD BACK FROM THE NUEROLOGIST BUT IT IS GOING TO BE A LONG TIME BEFORE THEY CAN GET HER IN.  IS THERE ANYWAY TO GET A STAT REFERRAL SO SHE CAN GET IN.  SHE KEEPS FALLING AND HER DEMENTIA IS GETTING WORSE.           Yes

## 2024-11-04 RX ORDER — FUROSEMIDE 40 MG/1
TABLET ORAL
Qty: 45 TABLET | Refills: 1 | Status: SHIPPED | OUTPATIENT
Start: 2024-11-04

## 2024-11-04 RX ORDER — LISINOPRIL 10 MG/1
10 TABLET ORAL DAILY
Qty: 90 TABLET | Refills: 0 | Status: SHIPPED | OUTPATIENT
Start: 2024-11-04

## 2024-11-04 RX ORDER — OMEPRAZOLE 40 MG/1
40 CAPSULE, DELAYED RELEASE ORAL DAILY
Qty: 90 CAPSULE | Refills: 0 | Status: SHIPPED | OUTPATIENT
Start: 2024-11-04

## 2024-11-04 NOTE — TELEPHONE ENCOUNTER
Rx Refill Note  Requested Prescriptions     Pending Prescriptions Disp Refills    furosemide (LASIX) 40 MG tablet [Pharmacy Med Name: Furosemide 40 MG Oral Tablet] 45 tablet 0     Sig: TAKE 1/2 (ONE-HALF) TABLET BY MOUTH ONCE DAILY FOR EDEMA      Last office visit with prescribing clinician: 7/11/2024   Last telemedicine visit with prescribing clinician: Visit date not found   Next office visit with prescribing clinician: 7/17/2025                         Would you like a call back once the refill request has been completed: [] Yes [] No    If the office needs to give you a call back, can they leave a voicemail: [] Yes [] No    Opal Dillard MA  11/04/24, 08:42 EST

## 2024-11-08 ENCOUNTER — READMISSION MANAGEMENT (OUTPATIENT)
Dept: CALL CENTER | Facility: HOSPITAL | Age: 69
End: 2024-11-08
Payer: MEDICARE

## 2024-11-08 NOTE — OUTREACH NOTE
Prep Survey      Flowsheet Row Responses   [facility] facility patient discharged from? Non-BH   Is LACE score < 7 ? Non-BH Discharge   Eligibility [facility]   Date of Admission 10/31/24   Date of Discharge 11/08/24   Discharge Disposition [facility]   Discharge diagnosis Recurrent falls (Primary Dx),  Weakness,  Confusion,  Thyroid function test abnormal,  Encephalopathy,   Does the patient have one of the following disease processes/diagnoses(primary or secondary)? Other   Prep survey completed? Yes            Larisa AMADO - Registered Nurse

## 2024-11-11 ENCOUNTER — TRANSITIONAL CARE MANAGEMENT TELEPHONE ENCOUNTER (OUTPATIENT)
Dept: CALL CENTER | Facility: HOSPITAL | Age: 69
End: 2024-11-11
Payer: MEDICARE

## 2024-11-11 NOTE — OUTREACH NOTE
Call Center TCM Note      Flowsheet Row Responses   [facility] patient discharged from? Non-  [[facility]]   Does the patient have one of the following disease processes/diagnoses(primary or secondary)? Other   TCM attempt successful? Yes   Call start time 1515   Call end time 1521   Discharge diagnosis Recurrent falls (Primary Dx),  Weakness,  Confusion,  Thyroid function test abnormal,  Encephalopathy,   Is patient permission given to speak with other caregiver? Yes   Person spoke with today (if not patient) and relationship spouse   Meds reviewed with patient/caregiver? Yes   Is the patient having any side effects they believe may be caused by any medication additions or changes? No   Does the patient have all medications ordered at discharge? Yes   Prescription comments Pt  states long list of new medications are in place   Is the patient taking all medications as directed (includes completed medication regime)? Yes   Does the patient have an appointment with their PCP within 7-14 days of discharge? Yes   Has home health visited the patient within 72 hours of discharge? N/A   Psychosocial issues? No   Did the patient receive a copy of their discharge instructions? Yes   Nursing interventions Reviewed instructions with patient   What is the patient's perception of their health status since discharge? Same   Is the patient/caregiver able to teach back signs and symptoms related to disease process for when to call PCP? Yes   Is the patient/caregiver able to teach back signs and symptoms related to disease process for when to call 911? Yes   Is the patient/caregiver able to teach back the hierarchy of who to call/visit for symptoms/problems? PCP, Specialist, Home health nurse, Urgent Care, ED, 911 Yes   If the patient is a current smoker, are they able to teach back resources for cessation? Not a smoker   TCM call completed? Yes   Wrap up additional comments D/C DX: bradley,  Recurrent falls  (Primary Dx),  Weakness,  Confusion,  Thyroid function test abnormal,  Encephalopathy - Pt spouse states pt about the same. Mr Deluna seems quite beside himself re: pt state of physical and mental health. Son and DIL are helping. Pt is scheduled for reg ov with PCP ERLIN Griffith on 11/21/2024,  offered sooner TCM APPT but felt it best to keep appt already scheduled. This date is within TCM APPT time frame.   Call end time 1521            Kendal Olivia MA    11/11/2024, 15:25 EST

## 2024-11-13 ENCOUNTER — TELEPHONE (OUTPATIENT)
Dept: FAMILY MEDICINE CLINIC | Facility: CLINIC | Age: 69
End: 2024-11-13

## 2024-11-13 NOTE — TELEPHONE ENCOUNTER
Caller: HIREN WITH [facility]    Relationship:     Best call back number: [phone]     What orders are you requesting (i.e. lab or imaging):      In what timeframe would the patient need to come in:      Where will you receive your lab/imaging services:      Additional notes: HIREN WITH [employer] CALLED AND NEED ORDERS FOR OCCUPATIONAL THERAPY ONCE WEEK FOR 4 WEEKS FOR SELF CARE, TRANSFER, EXERCISE PROGRAM.  SHE IS GOING TO FAX OVER THE ORDERS.

## 2024-11-15 ENCOUNTER — TELEPHONE (OUTPATIENT)
Dept: FAMILY MEDICINE CLINIC | Facility: CLINIC | Age: 69
End: 2024-11-15

## 2024-11-15 ENCOUNTER — TELEPHONE (OUTPATIENT)
Dept: CARDIOLOGY | Facility: CLINIC | Age: 69
End: 2024-11-15
Payer: MEDICARE

## 2024-11-15 NOTE — TELEPHONE ENCOUNTER
Caller: DEB GAYLE/JAXON    Relationship: Other    Best call back number: [phone]     What orders are you requesting (i.e. lab or imaging): PLAN OF CARE  PHYSICAL THERAPY      Where will you receive your lab/imaging services: IN THE HOME    Additional notes: 1 WEEK 1, 0 WEEK 1, 1 WEEK FOUR.  NO CALL BACK NEEDED

## 2024-11-15 NOTE — TELEPHONE ENCOUNTER
Hub staff attempted to follow warm transfer process and was unsuccessful     Caller: [employer]    Relationship to patient: Emergency Contact    Best call back number: [phone]    Patient is needing: PT'S SPOUSE IS CALLING TO RESCHEDULE ECHO AND FU ON 11/22/224. TRIED TO WT BUT JOHNATHAN AT OFFICE IS OUT TODAY. PLEASE ADVISE

## 2024-11-17 ENCOUNTER — APPOINTMENT (OUTPATIENT)
Dept: GENERAL RADIOLOGY | Facility: HOSPITAL | Age: 69
End: 2024-11-17
Payer: MEDICARE

## 2024-11-17 ENCOUNTER — HOSPITAL ENCOUNTER (INPATIENT)
Facility: HOSPITAL | Age: 69
LOS: 4 days | Discharge: HOME HEALTH | End: 2024-11-21
Attending: EMERGENCY MEDICINE | Admitting: INTERNAL MEDICINE
Payer: MEDICARE

## 2024-11-17 DIAGNOSIS — R11.0 NAUSEA: ICD-10-CM

## 2024-11-17 DIAGNOSIS — R53.1 GENERAL WEAKNESS: ICD-10-CM

## 2024-11-17 DIAGNOSIS — N39.0 URINARY TRACT INFECTION WITHOUT HEMATURIA, SITE UNSPECIFIED: Primary | ICD-10-CM

## 2024-11-17 LAB
ALBUMIN SERPL-MCNC: 3.2 G/DL (ref 3.5–5.2)
ALBUMIN/GLOB SERPL: 0.9 G/DL
ALP SERPL-CCNC: 178 U/L (ref 39–117)
ALT SERPL W P-5'-P-CCNC: 33 U/L (ref 1–33)
ANION GAP SERPL CALCULATED.3IONS-SCNC: 9.1 MMOL/L (ref 5–15)
AST SERPL-CCNC: 55 U/L (ref 1–32)
BACTERIA UR QL AUTO: ABNORMAL /HPF
BASOPHILS # BLD AUTO: 0.03 10*3/MM3 (ref 0–0.2)
BASOPHILS NFR BLD AUTO: 0.4 % (ref 0–1.5)
BILIRUB SERPL-MCNC: 1.5 MG/DL (ref 0–1.2)
BILIRUB UR QL STRIP: NEGATIVE
BUN SERPL-MCNC: 15 MG/DL (ref 8–23)
BUN/CREAT SERPL: 20.3 (ref 7–25)
CALCIUM SPEC-SCNC: 9.1 MG/DL (ref 8.6–10.5)
CHLORIDE SERPL-SCNC: 99 MMOL/L (ref 98–107)
CK SERPL-CCNC: 90 U/L (ref 20–180)
CLARITY UR: ABNORMAL
CO2 SERPL-SCNC: 31.9 MMOL/L (ref 22–29)
COLOR UR: ABNORMAL
CREAT SERPL-MCNC: 0.74 MG/DL (ref 0.57–1)
DEPRECATED RDW RBC AUTO: 62.6 FL (ref 37–54)
EGFRCR SERPLBLD CKD-EPI 2021: 88.8 ML/MIN/1.73
EOSINOPHIL # BLD AUTO: 0.02 10*3/MM3 (ref 0–0.4)
EOSINOPHIL NFR BLD AUTO: 0.2 % (ref 0.3–6.2)
ERYTHROCYTE [DISTWIDTH] IN BLOOD BY AUTOMATED COUNT: 19.5 % (ref 12.3–15.4)
GLOBULIN UR ELPH-MCNC: 3.5 GM/DL
GLUCOSE SERPL-MCNC: 110 MG/DL (ref 65–99)
GLUCOSE UR STRIP-MCNC: NEGATIVE MG/DL
HCT VFR BLD AUTO: 40.3 % (ref 34–46.6)
HGB BLD-MCNC: 12.3 G/DL (ref 12–15.9)
HGB UR QL STRIP.AUTO: ABNORMAL
HYALINE CASTS UR QL AUTO: ABNORMAL /LPF
IMM GRANULOCYTES # BLD AUTO: 0.03 10*3/MM3 (ref 0–0.05)
IMM GRANULOCYTES NFR BLD AUTO: 0.4 % (ref 0–0.5)
KETONES UR QL STRIP: ABNORMAL
LEUKOCYTE ESTERASE UR QL STRIP.AUTO: ABNORMAL
LYMPHOCYTES # BLD AUTO: 0.82 10*3/MM3 (ref 0.7–3.1)
LYMPHOCYTES NFR BLD AUTO: 9.6 % (ref 19.6–45.3)
MAGNESIUM SERPL-MCNC: 2 MG/DL (ref 1.6–2.4)
MCH RBC QN AUTO: 26.5 PG (ref 26.6–33)
MCHC RBC AUTO-ENTMCNC: 30.5 G/DL (ref 31.5–35.7)
MCV RBC AUTO: 86.9 FL (ref 79–97)
MONOCYTES # BLD AUTO: 0.82 10*3/MM3 (ref 0.1–0.9)
MONOCYTES NFR BLD AUTO: 9.6 % (ref 5–12)
NEUTROPHILS NFR BLD AUTO: 6.8 10*3/MM3 (ref 1.7–7)
NEUTROPHILS NFR BLD AUTO: 79.8 % (ref 42.7–76)
NITRITE UR QL STRIP: POSITIVE
NRBC BLD AUTO-RTO: 0 /100 WBC (ref 0–0.2)
PH UR STRIP.AUTO: 5.5 [PH] (ref 5–8)
PLATELET # BLD AUTO: 216 10*3/MM3 (ref 140–450)
PMV BLD AUTO: 9.9 FL (ref 6–12)
POTASSIUM SERPL-SCNC: 3.2 MMOL/L (ref 3.5–5.2)
PROT SERPL-MCNC: 6.7 G/DL (ref 6–8.5)
PROT UR QL STRIP: ABNORMAL
RBC # BLD AUTO: 4.64 10*6/MM3 (ref 3.77–5.28)
RBC # UR STRIP: ABNORMAL /HPF
REF LAB TEST METHOD: ABNORMAL
SODIUM SERPL-SCNC: 140 MMOL/L (ref 136–145)
SP GR UR STRIP: 1.01 (ref 1–1.03)
SQUAMOUS #/AREA URNS HPF: ABNORMAL /HPF
TRANS CELLS #/AREA URNS HPF: ABNORMAL /HPF
TROPONIN T SERPL HS-MCNC: 11 NG/L
UROBILINOGEN UR QL STRIP: ABNORMAL
WBC # UR STRIP: ABNORMAL /HPF
WBC NRBC COR # BLD AUTO: 8.52 10*3/MM3 (ref 3.4–10.8)

## 2024-11-17 PROCEDURE — 81001 URINALYSIS AUTO W/SCOPE: CPT | Performed by: EMERGENCY MEDICINE

## 2024-11-17 PROCEDURE — 82550 ASSAY OF CK (CPK): CPT | Performed by: EMERGENCY MEDICINE

## 2024-11-17 PROCEDURE — 99285 EMERGENCY DEPT VISIT HI MDM: CPT

## 2024-11-17 PROCEDURE — 93005 ELECTROCARDIOGRAM TRACING: CPT | Performed by: EMERGENCY MEDICINE

## 2024-11-17 PROCEDURE — P9612 CATHETERIZE FOR URINE SPEC: HCPCS

## 2024-11-17 PROCEDURE — 83735 ASSAY OF MAGNESIUM: CPT | Performed by: EMERGENCY MEDICINE

## 2024-11-17 PROCEDURE — 25010000002 CEFTRIAXONE PER 250 MG: Performed by: EMERGENCY MEDICINE

## 2024-11-17 PROCEDURE — 87186 SC STD MICRODIL/AGAR DIL: CPT | Performed by: EMERGENCY MEDICINE

## 2024-11-17 PROCEDURE — 87088 URINE BACTERIA CULTURE: CPT | Performed by: EMERGENCY MEDICINE

## 2024-11-17 PROCEDURE — 71045 X-RAY EXAM CHEST 1 VIEW: CPT

## 2024-11-17 PROCEDURE — 80053 COMPREHEN METABOLIC PANEL: CPT | Performed by: EMERGENCY MEDICINE

## 2024-11-17 PROCEDURE — 84484 ASSAY OF TROPONIN QUANT: CPT | Performed by: EMERGENCY MEDICINE

## 2024-11-17 PROCEDURE — 85025 COMPLETE CBC W/AUTO DIFF WBC: CPT | Performed by: EMERGENCY MEDICINE

## 2024-11-17 PROCEDURE — 36415 COLL VENOUS BLD VENIPUNCTURE: CPT

## 2024-11-17 PROCEDURE — 87086 URINE CULTURE/COLONY COUNT: CPT | Performed by: EMERGENCY MEDICINE

## 2024-11-17 RX ORDER — ONDANSETRON 2 MG/ML
4 INJECTION INTRAMUSCULAR; INTRAVENOUS EVERY 6 HOURS PRN
Status: DISCONTINUED | OUTPATIENT
Start: 2024-11-17 | End: 2024-11-21 | Stop reason: HOSPADM

## 2024-11-17 RX ORDER — SODIUM CHLORIDE 0.9 % (FLUSH) 0.9 %
10 SYRINGE (ML) INJECTION AS NEEDED
Status: DISCONTINUED | OUTPATIENT
Start: 2024-11-17 | End: 2024-11-21 | Stop reason: HOSPADM

## 2024-11-17 RX ORDER — SODIUM CHLORIDE 0.9 % (FLUSH) 0.9 %
10 SYRINGE (ML) INJECTION EVERY 12 HOURS SCHEDULED
Status: DISCONTINUED | OUTPATIENT
Start: 2024-11-17 | End: 2024-11-21 | Stop reason: HOSPADM

## 2024-11-17 RX ORDER — DONEPEZIL HYDROCHLORIDE 5 MG/1
5 TABLET, FILM COATED ORAL NIGHTLY
COMMUNITY

## 2024-11-17 RX ORDER — NITROGLYCERIN 0.4 MG/1
0.4 TABLET SUBLINGUAL
Status: DISCONTINUED | OUTPATIENT
Start: 2024-11-17 | End: 2024-11-19

## 2024-11-17 RX ORDER — ONDANSETRON 4 MG/1
4 TABLET, ORALLY DISINTEGRATING ORAL EVERY 6 HOURS PRN
Status: DISCONTINUED | OUTPATIENT
Start: 2024-11-17 | End: 2024-11-21 | Stop reason: HOSPADM

## 2024-11-17 RX ORDER — BISACODYL 5 MG/1
5 TABLET, DELAYED RELEASE ORAL DAILY PRN
Status: DISCONTINUED | OUTPATIENT
Start: 2024-11-17 | End: 2024-11-21 | Stop reason: HOSPADM

## 2024-11-17 RX ORDER — ALPRAZOLAM 0.5 MG
1 TABLET ORAL 2 TIMES DAILY PRN
Status: DISCONTINUED | OUTPATIENT
Start: 2024-11-17 | End: 2024-11-18

## 2024-11-17 RX ORDER — POLYETHYLENE GLYCOL 3350 17 G/17G
17 POWDER, FOR SOLUTION ORAL DAILY PRN
Status: DISCONTINUED | OUTPATIENT
Start: 2024-11-17 | End: 2024-11-21 | Stop reason: HOSPADM

## 2024-11-17 RX ORDER — MIRTAZAPINE 15 MG/1
7.5 TABLET, FILM COATED ORAL NIGHTLY
COMMUNITY

## 2024-11-17 RX ORDER — ACETAMINOPHEN 160 MG/5ML
650 SOLUTION ORAL EVERY 4 HOURS PRN
Status: DISCONTINUED | OUTPATIENT
Start: 2024-11-17 | End: 2024-11-21 | Stop reason: HOSPADM

## 2024-11-17 RX ORDER — SODIUM CHLORIDE 9 MG/ML
40 INJECTION, SOLUTION INTRAVENOUS AS NEEDED
Status: DISCONTINUED | OUTPATIENT
Start: 2024-11-17 | End: 2024-11-21 | Stop reason: HOSPADM

## 2024-11-17 RX ORDER — BISACODYL 10 MG
10 SUPPOSITORY, RECTAL RECTAL DAILY PRN
Status: DISCONTINUED | OUTPATIENT
Start: 2024-11-17 | End: 2024-11-21 | Stop reason: HOSPADM

## 2024-11-17 RX ORDER — ACETAMINOPHEN 325 MG/1
650 TABLET ORAL EVERY 4 HOURS PRN
Status: DISCONTINUED | OUTPATIENT
Start: 2024-11-17 | End: 2024-11-21 | Stop reason: HOSPADM

## 2024-11-17 RX ORDER — POTASSIUM CHLORIDE 1500 MG/1
40 TABLET, EXTENDED RELEASE ORAL EVERY 4 HOURS
Status: COMPLETED | OUTPATIENT
Start: 2024-11-17 | End: 2024-11-18

## 2024-11-17 RX ORDER — TRAZODONE HYDROCHLORIDE 100 MG/1
100 TABLET ORAL NIGHTLY
COMMUNITY
End: 2024-11-21 | Stop reason: HOSPADM

## 2024-11-17 RX ORDER — ACETAMINOPHEN 650 MG/1
650 SUPPOSITORY RECTAL EVERY 4 HOURS PRN
Status: DISCONTINUED | OUTPATIENT
Start: 2024-11-17 | End: 2024-11-21 | Stop reason: HOSPADM

## 2024-11-17 RX ORDER — AMOXICILLIN 250 MG
2 CAPSULE ORAL 2 TIMES DAILY PRN
Status: DISCONTINUED | OUTPATIENT
Start: 2024-11-17 | End: 2024-11-21 | Stop reason: HOSPADM

## 2024-11-17 RX ORDER — ALUMINA, MAGNESIA, AND SIMETHICONE 2400; 2400; 240 MG/30ML; MG/30ML; MG/30ML
15 SUSPENSION ORAL EVERY 6 HOURS PRN
Status: DISCONTINUED | OUTPATIENT
Start: 2024-11-17 | End: 2024-11-21 | Stop reason: HOSPADM

## 2024-11-17 RX ADMIN — POTASSIUM CHLORIDE 40 MEQ: 1500 TABLET, EXTENDED RELEASE ORAL at 21:03

## 2024-11-17 RX ADMIN — CEFTRIAXONE 1000 MG: 1 INJECTION, POWDER, FOR SOLUTION INTRAMUSCULAR; INTRAVENOUS at 16:52

## 2024-11-17 RX ADMIN — Medication 10 ML: at 21:03

## 2024-11-17 NOTE — ED NOTES
X-ray attempted to collect imaging. Pt's  went to X-ray with pt to assist. Pt ultimately came back to staging area and was non-compliant with imagining. X-ray will attempt later.

## 2024-11-17 NOTE — ED PROVIDER NOTES
Subjective   History of Present Illness  Chief complaint: General Weakness    69-year-old female presents with general weakness.  Family states symptoms have been getting progressively worse for quite some time.  Patient has had poor appetite and has not been eating and drinking much.  Patient does have a history of dementia.  Family is concerned she may have a urinary tract infection.  She has had no vomiting or diarrhea.  She denies any complaints of pain.  She has had no fever.  She denies any focal numbness, weakness, tingling.    History provided by:  Patient and relative      Review of Systems   Constitutional:  Negative for fever.   HENT:  Negative for congestion.    Respiratory:  Negative for cough and shortness of breath.    Cardiovascular:  Negative for chest pain.   Gastrointestinal:  Negative for abdominal pain and vomiting.   Neurological:  Positive for weakness. Negative for headaches.   Psychiatric/Behavioral:  Positive for confusion.        Past Medical History:   Diagnosis Date    ADHD unknown    Anemia     Anesthesia complication     difficult to wake up    Anxiety     Atrial fibrillation     Carotid artery disease     80% right internal carotid artery    Coronary artery disease     CVA (cerebral vascular accident)     right parietal right temporal    DDD (degenerative disc disease), lumbar     Depression     Extremity pain     Ankle pain    GERD (gastroesophageal reflux disease)     Hyperlipidemia unknown    Hypertension     Insomnia unknown    Laryngopharyngeal reflux     Low back pain     Mood disorder     Obesity unknown    Short of breath on exertion     Skin cancer of face     Sleep apnea     Suspected sleep apnea she has refused to be tested    Stroke (cerebrum)     Visual field defect     Left       Allergies   Allergen Reactions    Desvenlafaxine Unknown - High Severity     ELEVATED BP       Past Surgical History:   Procedure Laterality Date    CARDIAC CATHETERIZATION N/A 5/1/2023     Procedure: Left Heart Cath;  Surgeon: Kye Alcaraz MD;  Location: [facility] CATH INVASIVE LOCATION;  Service: Cardiovascular;  Laterality: N/A;    CAROTID ENDARTERECTOMY Right 11/10/2020    Procedure: CAROTID ENDARTERECTOMY;  Surgeon: Tavo Das MD;  Location: [facility] MAIN OR;  Service: Vascular;  Laterality: Right;    CHOLECYSTECTOMY      COLONOSCOPY      2016    CORONARY ARTERY BYPASS GRAFT N/A 5/6/2023    Procedure: CORONARY ARTERY BYPASS GRAFTING;  Surgeon: Errol Noonan MD;  Location: [facility];  Service: Cardiothoracic;  Laterality: N/A;  CABG X  3 using LIMA and right endospahenous vein;  2 coronary markers implanted.     FINGER SURGERY      KNEE ARTHROPLASTY      KNEE SURGERY Right     replaced    LAPAROSCOPIC GASTRIC BANDING      MITRAL VALVE REPAIR/REPLACEMENT N/A 5/6/2023    Procedure: MITRAL VALVE REPAIR/REPLACEMENT;  Surgeon: Errol Noonan MD;  Location: [facility];  Service: Cardiothoracic;  Laterality: N/A;  Mitral valve repaired using 28 mm Jorge Physio II Annuloplasty Ring.     ROTATOR CUFF REPAIR Right 2019    SHOULDER SURGERY Right 05/24/2019    scope/ cuff repair    TOTAL SHOULDER ARTHROPLASTY W/ DISTAL CLAVICLE EXCISION Left 11/8/2023    Procedure: TOTAL SHOULDER REVERSE ARTHROPLASTY;  Surgeon: Shubham Samaniego MD;  Location: [facility] MAIN OR;  Service: Orthopedics;  Laterality: Left;    TRANSESOPHAGEAL ECHOCARDIOGRAM (GALA) N/A 5/6/2023    Procedure: TRANSESOPHAGEAL ECHOCARDIOGRAM WITH ANESTHESIA;  Surgeon: Errol Noonan MD;  Location: [facility];  Service: Cardiothoracic;  Laterality: N/A;    TUBAL ABDOMINAL LIGATION         Family History   Problem Relation Age of Onset    Diabetes Other     Heart disease Mother     Diabetes Mother     Heart disease Father        Social History     Socioeconomic History    Marital status:    Tobacco Use    Smoking status: Never     Passive exposure: Never    Smokeless tobacco: Never   Vaping  "Use    Vaping status: Never Used   Substance and Sexual Activity    Alcohol use: No    Drug use: No    Sexual activity: Defer       /82   Pulse 68   Temp 99.9 °F (37.7 °C) (Oral)   Resp 16   Ht 165.1 cm (65\")   Wt 77.1 kg (170 lb)   SpO2 94%   BMI 28.29 kg/m²       Objective   Physical Exam  Vitals and nursing note reviewed.   Constitutional:       Appearance: Normal appearance.   HENT:      Head: Normocephalic and atraumatic.      Mouth/Throat:      Mouth: Mucous membranes are moist.   Cardiovascular:      Rate and Rhythm: Normal rate and regular rhythm.   Pulmonary:      Effort: Pulmonary effort is normal. No respiratory distress.      Breath sounds: Normal breath sounds.   Abdominal:      Palpations: Abdomen is soft.      Tenderness: There is no abdominal tenderness.   Musculoskeletal:         General: No deformity or signs of injury.   Skin:     General: Skin is warm and dry.   Neurological:      Mental Status: She is alert.      Comments: Oriented to person and place.  Confused in conversation.  Generally weak with no focal motor or sensory deficit appreciated.         Procedures           ED Course      Results for orders placed or performed during the hospital encounter of 11/17/24   Urinalysis With Culture If Indicated - Urine, Catheter    Collection Time: 11/17/24  2:25 PM    Specimen: Urine, Catheter   Result Value Ref Range    Color, UA Dark Yellow (A) Yellow, Straw    Appearance, UA Turbid (A) Clear    pH, UA 5.5 5.0 - 8.0    Specific Gravity, UA 1.015 1.005 - 1.030    Glucose, UA Negative Negative    Ketones, UA Trace (A) Negative    Bilirubin, UA Negative Negative    Blood, UA Large (3+) (A) Negative    Protein,  mg/dL (2+) (A) Negative    Leuk Esterase, UA Large (3+) (A) Negative    Nitrite, UA Positive (A) Negative    Urobilinogen, UA 1.0 E.U./dL 0.2 - 1.0 E.U./dL   Urinalysis, Microscopic Only - Urine, Catheter    Collection Time: 11/17/24  2:25 PM    Specimen: Urine, Catheter "   Result Value Ref Range    RBC, UA Unable to determine due to loaded field (A) None Seen, 0-2 /HPF    WBC, UA Too Numerous to Count (A) None Seen, 0-2 /HPF    Bacteria, UA 4+ (A) None Seen /HPF    Squamous Epithelial Cells, UA 3-6 (A) None Seen, 0-2 /HPF    Transitional Epithelial Cells, UA 0-2 0 - 2 /HPF    Hyaline Casts, UA Unable to determine due to loaded field None Seen /LPF    Methodology Manual Light Microscopy    Comprehensive Metabolic Panel    Collection Time: 11/17/24  2:37 PM    Specimen: Blood   Result Value Ref Range    Glucose 110 (H) 65 - 99 mg/dL    BUN 15 8 - 23 mg/dL    Creatinine 0.74 0.57 - 1.00 mg/dL    Sodium 140 136 - 145 mmol/L    Potassium 3.2 (L) 3.5 - 5.2 mmol/L    Chloride 99 98 - 107 mmol/L    CO2 31.9 (H) 22.0 - 29.0 mmol/L    Calcium 9.1 8.6 - 10.5 mg/dL    Total Protein 6.7 6.0 - 8.5 g/dL    Albumin 3.2 (L) 3.5 - 5.2 g/dL    ALT (SGPT) 33 1 - 33 U/L    AST (SGOT) 55 (H) 1 - 32 U/L    Alkaline Phosphatase 178 (H) 39 - 117 U/L    Total Bilirubin 1.5 (H) 0.0 - 1.2 mg/dL    Globulin 3.5 gm/dL    A/G Ratio 0.9 g/dL    BUN/Creatinine Ratio 20.3 7.0 - 25.0    Anion Gap 9.1 5.0 - 15.0 mmol/L    eGFR 88.8 >60.0 mL/min/1.73   Single High Sensitivity Troponin T    Collection Time: 11/17/24  2:37 PM    Specimen: Blood   Result Value Ref Range    HS Troponin T 11 <14 ng/L   Magnesium    Collection Time: 11/17/24  2:37 PM    Specimen: Blood   Result Value Ref Range    Magnesium 2.0 1.6 - 2.4 mg/dL   CK    Collection Time: 11/17/24  2:37 PM    Specimen: Blood   Result Value Ref Range    Creatine Kinase 90 20 - 180 U/L   CBC Auto Differential    Collection Time: 11/17/24  2:37 PM    Specimen: Blood   Result Value Ref Range    WBC 8.52 3.40 - 10.80 10*3/mm3    RBC 4.64 3.77 - 5.28 10*6/mm3    Hemoglobin 12.3 12.0 - 15.9 g/dL    Hematocrit 40.3 34.0 - 46.6 %    MCV 86.9 79.0 - 97.0 fL    MCH 26.5 (L) 26.6 - 33.0 pg    MCHC 30.5 (L) 31.5 - 35.7 g/dL    RDW 19.5 (H) 12.3 - 15.4 %    RDW-SD 62.6 (H)  37.0 - 54.0 fl    MPV 9.9 6.0 - 12.0 fL    Platelets 216 140 - 450 10*3/mm3    Neutrophil % 79.8 (H) 42.7 - 76.0 %    Lymphocyte % 9.6 (L) 19.6 - 45.3 %    Monocyte % 9.6 5.0 - 12.0 %    Eosinophil % 0.2 (L) 0.3 - 6.2 %    Basophil % 0.4 0.0 - 1.5 %    Immature Grans % 0.4 0.0 - 0.5 %    Neutrophils, Absolute 6.80 1.70 - 7.00 10*3/mm3    Lymphocytes, Absolute 0.82 0.70 - 3.10 10*3/mm3    Monocytes, Absolute 0.82 0.10 - 0.90 10*3/mm3    Eosinophils, Absolute 0.02 0.00 - 0.40 10*3/mm3    Basophils, Absolute 0.03 0.00 - 0.20 10*3/mm3    Immature Grans, Absolute 0.03 0.00 - 0.05 10*3/mm3    nRBC 0.0 0.0 - 0.2 /100 WBC   ECG 12 Lead Altered Mental Status    Collection Time: 11/17/24  2:40 PM   Result Value Ref Range    QT Interval 410 ms    QTC Interval 445 ms     XR Chest 1 View    Result Date: 11/17/2024  Impression: 1.Cardiomegaly. There is evidence for prior cardiothoracic surgery. Electronically Signed: Miguel Angel Hicks MD  11/17/2024 4:01 PM EST  Workstation ID: [id number]                                        My interpretation of EKG shows sinus rhythm, rate of 71, no ST elevation            Medical Decision Making    Patient had the above evaluation.  Results were discussed with the patient.  My interpretation of chest x-ray shows no infiltrate or effusion.  EKG shows no acute ischemia.  Troponin is negative.  White blood cell count is normal.  CMP is unremarkable.  Urinalysis shows significant urinary tract infection with large leukocyte esterase, positive nitrites, too numerous to count white blood cells, 4+ bacteria.  Patient was started on IV antibiotics.  I discussed with the hospitalist and the patient will be admitted for further evaluation and management.      Final diagnoses:   Urinary tract infection without hematuria, site unspecified   General weakness       ED Disposition  ED Disposition       ED Disposition   Decision to Admit    Condition   --    Comment   Level of Care: Med/Surg [1]   Admitting  Physician: NIYAH CARRANZA [[id number]]   Attending Physician: NIYAH CARRANZA [[id number]]                 No follow-up provider specified.       Medication List      No changes were made to your prescriptions during this visit.            Jomar Mejia MD  11/17/24 3955

## 2024-11-17 NOTE — H&P
History and Physical   Andree Deluna : 1955 MRN:[id number] LOS:0     Reason for admission: Weakness     Assessment / Plan        #AMS likely infection related encephalopathy  #Generalized weakness secondary to urinary tract infection  -Presented with generalized weakness.  Patient is recently in the [facility] per the chart review for frequent fall as well.  -UA with urinary tract infection.  -IV Rocephin started.  -Culture to follow-up.  -PT OT fall precaution.    Code Status (Patient has no pulse and is not breathing): CPR (Attempt to Resuscitate)  Medical Interventions (Patient has pulse or is breathing): Full Support       Nutrition: Diet: Regular/House; Fluid Consistency: Thin (IDDSI 0)     DVT Prophylaxis: Active VTE Prophylaxis  Mechanical:        Start        24  Maintain Sequential Compression Device  Continuous                          Select Pharmacologic VTE Prophylaxis if Desired & Appropriate      History of Present illness       69-year-old female with CAD status post CABG,Hypertension, history of CVA, hallucinations, CKD 3a, A-fib presented with generalized weakness and worsening with time. AMS present and agitated. Move all the ext actively.   Labs showing no leukocytosis.  UA with urinary tract infection.  Potassium 3.2.  Chest x-ray with cardiomegaly.  RVP pending.  Was in [facility] for frequent fall per the chart review.    Will be admitted for urinary tract infection generalized weakness possible need of placement.    Subjective / Review of systems     Review of Systems   Confused   No pain   Does not want to be in hospital     Past Medical/Surgical/Social/Family History & Allergies     Past Medical History:   Diagnosis Date    ADHD unknown    Anemia     Anesthesia complication     difficult to wake up    Anxiety     Atrial fibrillation     Carotid artery disease     80% right internal carotid artery    Coronary artery disease     CVA (cerebral vascular accident)      right parietal right temporal    DDD (degenerative disc disease), lumbar     Depression     Extremity pain     Ankle pain    GERD (gastroesophageal reflux disease)     Hyperlipidemia unknown    Hypertension     Insomnia unknown    Laryngopharyngeal reflux     Low back pain     Mood disorder     Obesity unknown    Short of breath on exertion     Skin cancer of face     Sleep apnea     Suspected sleep apnea she has refused to be tested    Stroke (cerebrum)     Visual field defect     Left      Past Surgical History:   Procedure Laterality Date    CARDIAC CATHETERIZATION N/A 5/1/2023    Procedure: Left Heart Cath;  Surgeon: Kye Alcaraz MD;  Location: [facility] CATH INVASIVE LOCATION;  Service: Cardiovascular;  Laterality: N/A;    CAROTID ENDARTERECTOMY Right 11/10/2020    Procedure: CAROTID ENDARTERECTOMY;  Surgeon: Tavo Das MD;  Location: [facility] MAIN OR;  Service: Vascular;  Laterality: Right;    CHOLECYSTECTOMY      COLONOSCOPY      2016    CORONARY ARTERY BYPASS GRAFT N/A 5/6/2023    Procedure: CORONARY ARTERY BYPASS GRAFTING;  Surgeon: Errol Noonan MD;  Location: [facility] CVOR;  Service: Cardiothoracic;  Laterality: N/A;  CABG X  3 using LIMA and right endospahenous vein;  2 coronary markers implanted.     FINGER SURGERY      KNEE ARTHROPLASTY      KNEE SURGERY Right     replaced    LAPAROSCOPIC GASTRIC BANDING      MITRAL VALVE REPAIR/REPLACEMENT N/A 5/6/2023    Procedure: MITRAL VALVE REPAIR/REPLACEMENT;  Surgeon: Errol Noonan MD;  Location: [facility];  Service: Cardiothoracic;  Laterality: N/A;  Mitral valve repaired using 28 mm Jorge Physio II Annuloplasty Ring.     ROTATOR CUFF REPAIR Right 2019    SHOULDER SURGERY Right 05/24/2019    scope/ cuff repair    TOTAL SHOULDER ARTHROPLASTY W/ DISTAL CLAVICLE EXCISION Left 11/8/2023    Procedure: TOTAL SHOULDER REVERSE ARTHROPLASTY;  Surgeon: Shubham Samaniego MD;  Location: [facility] MAIN OR;  Service:  Orthopedics;  Laterality: Left;    TRANSESOPHAGEAL ECHOCARDIOGRAM (GALA) N/A 5/6/2023    Procedure: TRANSESOPHAGEAL ECHOCARDIOGRAM WITH ANESTHESIA;  Surgeon: Errol Noonan MD;  Location: [facility];  Service: Cardiothoracic;  Laterality: N/A;    TUBAL ABDOMINAL LIGATION        Social History     Socioeconomic History    Marital status:    Tobacco Use    Smoking status: Never     Passive exposure: Never    Smokeless tobacco: Never   Vaping Use    Vaping status: Never Used   Substance and Sexual Activity    Alcohol use: No    Drug use: No    Sexual activity: Defer      Family History   Problem Relation Age of Onset    Diabetes Other     Heart disease Mother     Diabetes Mother     Heart disease Father       Allergies   Allergen Reactions    Desvenlafaxine Unknown - High Severity     ELEVATED BP        Home Medications     Prior to Admission medications    Medication Sig Start Date End Date Taking? Authorizing Provider   ALPRAZolam (XANAX) 2 MG tablet TAKE 1 TABLET BY MOUTH NIGHTLY AS NEEDED FOR ANXIETY 10/1/24   Naga Griffith PA-C   apixaban (ELIQUIS) 5 MG tablet tablet Take 1 tablet by mouth Every 12 (Twelve) Hours. Indications: Atrial Fibrillation 5/29/24   Kye Alcaraz MD   aspirin 81 MG chewable tablet Chew 1 tablet Daily. Indications: antiplatelet  Patient taking differently: Chew 1 tablet Daily. Stop for 5 days prior to surg, last dose 11/2  Indications: antiplatelet 6/12/23   Kye Alcaraz MD   atorvastatin (LIPITOR) 80 MG tablet TAKE 1 TABLET BY MOUTH ONCE DAILY AT BEDTIME FOR  HIGH  LEVEL  OF  FATS  IN  BLOOD 8/5/24   Kye Alcaraz MD   bisoprolol (ZEBeta) 5 MG tablet Take 1 tablet by mouth Daily. 9/23/24   Kye Alcaraz MD   citalopram (CeleXA) 10 MG tablet Take 1 tablet by mouth Daily for 180 days. 6/21/24 12/18/24  Naga Griffith PA-C   ferrous sulfate 325 (65 FE) MG tablet Take 1 tablet by mouth Every Other Day. 8/23/24   Loan  Logan PEACOCK MD   furosemide (LASIX) 40 MG tablet TAKE 1/2 (ONE-HALF) TABLET BY MOUTH ONCE DAILY FOR EDEMA 11/4/24   Kye Alcaraz MD   lamoTRIgine (LaMICtal) 100 MG tablet Take 1 tablet by mouth twice daily 10/14/24   Naga Griffith PA-C   lisinopril (PRINIVIL,ZESTRIL) 10 MG tablet Take 1 tablet by mouth once daily 11/4/24   Naga Griffiht PA-C   lisinopril-hydrochlorothiazide (PRINZIDE,ZESTORETIC) 20-25 MG per tablet TAKE 1 TABLET BY MOUTH DAILY  Patient not taking: Reported on 10/17/2024 6/10/24   Toshia Nevarez APRN   omeprazole (priLOSEC) 40 MG capsule Take 1 capsule by mouth once daily 11/4/24   Naga Griffith PA-C   oxybutynin (DITROPAN) 5 MG tablet TAKE 1 TABLET BY MOUTH DAILY 6/10/24   Naga Griffith PA-C   vitamin B-12 (CYANOCOBALAMIN) 1000 MCG tablet Take 1 tablet by mouth Daily.    Provider, MD Stephen      Objective / Physical Exam   Vital signs:  Temp: 99.9 °F (37.7 °C)  BP: 130/82  Heart Rate: 68  Resp: 16  SpO2: 94 %  Weight: 77.1 kg (170 lb)    Admission Weight: Weight: 77.1 kg (170 lb)    Physical Exam   Physical Exam  HENT:      Head: Normocephalic and atraumatic.      Nose: Nose normal.   Eyes:      Extraocular Movements: Extraocular movements intact.      Conjunctivae/sclera: Conjunctivae normal.      Pupils: Pupils are equal, round, and reactive to light.   Cardiovascular:      Rate and Rhythm: normal       Pulses: Normal pulses.      Heart sounds: Normal heart sounds.   Pulmonary:      Effort: normal      Breath sounds: normal   Abdominal:      General: Abdomen is flat. Bowel sounds are normal.      Palpations: Abdomen is soft.   Musculoskeletal:         General: Normal range of motion.      Cervical back: Normal range of motion and neck supple.   Skin:     General: Skin is dry.   Neurological:      General: No focal deficit present.      Mental Status: alert.   Psychiatric:         Mood and Affect: confused and agitated        Labs     Results from last 7 days    Lab Units 11/17/24  1437   WBC 10*3/mm3 8.52   HEMATOCRIT % 40.3   PLATELETS 10*3/mm3 216      Results from last 7 days   Lab Units 11/17/24  1437   SODIUM mmol/L 140   POTASSIUM mmol/L 3.2*   CHLORIDE mmol/L 99   CO2 mmol/L 31.9*   BUN mg/dL 15   CREATININE mg/dL 0.74        Current Medications   Scheduled Meds:[START ON 11/18/2024] cefTRIAXone, 1,000 mg, Intravenous, Q24H  sodium chloride, 10 mL, Intravenous, Q12H         Continuous Infusions:      Екатерина Valenzuela MD  [facility]   11/17/24   17:53 EST

## 2024-11-17 NOTE — ED NOTES
"Pt was very agitated when attempted to cath for urine collection, obtain EKG, and IV start. Pt refused on several occasions but was able to be redirected. Pt became hostile with  again for bringing her to the ED, and would then begin crying that she was missing her granddaughter's birthday.  shook his head and mouthed that it was \"not her birthday\" to staff present in room. Provider notified.   "

## 2024-11-17 NOTE — Clinical Note
Level of Care: Med/Surg [1]   Admitting Physician: NIYAH CARRANZA [[id number]]   Attending Physician: NIYAH CARRANZA [[id number]]

## 2024-11-18 LAB
ANION GAP SERPL CALCULATED.3IONS-SCNC: 9.1 MMOL/L (ref 5–15)
ARTERIAL PATENCY WRIST A: POSITIVE
ATMOSPHERIC PRESS: ABNORMAL MM[HG]
BASE EXCESS BLDA CALC-SCNC: 3.1 MMOL/L (ref 0–3)
BASOPHILS # BLD AUTO: 0.03 10*3/MM3 (ref 0–0.2)
BASOPHILS NFR BLD AUTO: 0.4 % (ref 0–1.5)
BDY SITE: ABNORMAL
BUN SERPL-MCNC: 13 MG/DL (ref 8–23)
BUN/CREAT SERPL: 22.4 (ref 7–25)
CALCIUM SPEC-SCNC: 9 MG/DL (ref 8.6–10.5)
CHLORIDE SERPL-SCNC: 104 MMOL/L (ref 98–107)
CO2 BLDA-SCNC: 30.5 MMOL/L (ref 22–29)
CO2 SERPL-SCNC: 28.9 MMOL/L (ref 22–29)
CREAT SERPL-MCNC: 0.58 MG/DL (ref 0.57–1)
DEPRECATED RDW RBC AUTO: 61.1 FL (ref 37–54)
EGFRCR SERPLBLD CKD-EPI 2021: 99.3 ML/MIN/1.73
EOSINOPHIL # BLD AUTO: 0.02 10*3/MM3 (ref 0–0.4)
EOSINOPHIL NFR BLD AUTO: 0.3 % (ref 0.3–6.2)
ERYTHROCYTE [DISTWIDTH] IN BLOOD BY AUTOMATED COUNT: 19.1 % (ref 12.3–15.4)
GLUCOSE BLDC GLUCOMTR-MCNC: 105 MG/DL (ref 74–100)
GLUCOSE SERPL-MCNC: 100 MG/DL (ref 65–99)
HCO3 BLDA-SCNC: 29.1 MMOL/L (ref 21–28)
HCT VFR BLD AUTO: 38.6 % (ref 34–46.6)
HEMODILUTION: NO
HGB BLD-MCNC: 12.2 G/DL (ref 12–15.9)
IMM GRANULOCYTES # BLD AUTO: 0.02 10*3/MM3 (ref 0–0.05)
IMM GRANULOCYTES NFR BLD AUTO: 0.3 % (ref 0–0.5)
INHALED O2 CONCENTRATION: 21 %
LYMPHOCYTES # BLD AUTO: 1.1 10*3/MM3 (ref 0.7–3.1)
LYMPHOCYTES NFR BLD AUTO: 14.5 % (ref 19.6–45.3)
MAGNESIUM SERPL-MCNC: 2.1 MG/DL (ref 1.6–2.4)
MCH RBC QN AUTO: 27.4 PG (ref 26.6–33)
MCHC RBC AUTO-ENTMCNC: 31.6 G/DL (ref 31.5–35.7)
MCV RBC AUTO: 86.5 FL (ref 79–97)
MODALITY: ABNORMAL
MONOCYTES # BLD AUTO: 0.94 10*3/MM3 (ref 0.1–0.9)
MONOCYTES NFR BLD AUTO: 12.4 % (ref 5–12)
NEUTROPHILS NFR BLD AUTO: 5.48 10*3/MM3 (ref 1.7–7)
NEUTROPHILS NFR BLD AUTO: 72.1 % (ref 42.7–76)
NRBC BLD AUTO-RTO: 0 /100 WBC (ref 0–0.2)
PCO2 BLDA: 48.3 MM HG (ref 35–48)
PH BLDA: 7.39 PH UNITS (ref 7.35–7.45)
PHOSPHATE SERPL-MCNC: 2.3 MG/DL (ref 2.5–4.5)
PLATELET # BLD AUTO: 207 10*3/MM3 (ref 140–450)
PMV BLD AUTO: 10.5 FL (ref 6–12)
PO2 BLD: 310 MM[HG] (ref 0–500)
PO2 BLDA: 65.2 MM HG (ref 83–108)
POTASSIUM SERPL-SCNC: 4.1 MMOL/L (ref 3.5–5.2)
QT INTERVAL: 410 MS
QTC INTERVAL: 445 MS
RBC # BLD AUTO: 4.46 10*6/MM3 (ref 3.77–5.28)
SAO2 % BLDCOA: 91.9 % (ref 94–98)
SODIUM SERPL-SCNC: 142 MMOL/L (ref 136–145)
WBC NRBC COR # BLD AUTO: 7.59 10*3/MM3 (ref 3.4–10.8)

## 2024-11-18 PROCEDURE — 97166 OT EVAL MOD COMPLEX 45 MIN: CPT

## 2024-11-18 PROCEDURE — 82803 BLOOD GASES ANY COMBINATION: CPT | Performed by: HOSPITALIST

## 2024-11-18 PROCEDURE — 80048 BASIC METABOLIC PNL TOTAL CA: CPT | Performed by: INTERNAL MEDICINE

## 2024-11-18 PROCEDURE — 85025 COMPLETE CBC W/AUTO DIFF WBC: CPT | Performed by: INTERNAL MEDICINE

## 2024-11-18 PROCEDURE — 84100 ASSAY OF PHOSPHORUS: CPT | Performed by: INTERNAL MEDICINE

## 2024-11-18 PROCEDURE — 36600 WITHDRAWAL OF ARTERIAL BLOOD: CPT | Performed by: HOSPITALIST

## 2024-11-18 PROCEDURE — 97162 PT EVAL MOD COMPLEX 30 MIN: CPT

## 2024-11-18 PROCEDURE — 25010000002 LORAZEPAM PER 2 MG: Performed by: HOSPITALIST

## 2024-11-18 PROCEDURE — 25010000002 CEFTRIAXONE PER 250 MG: Performed by: INTERNAL MEDICINE

## 2024-11-18 PROCEDURE — 82948 REAGENT STRIP/BLOOD GLUCOSE: CPT

## 2024-11-18 PROCEDURE — 83735 ASSAY OF MAGNESIUM: CPT | Performed by: INTERNAL MEDICINE

## 2024-11-18 RX ORDER — LAMOTRIGINE 100 MG/1
100 TABLET ORAL 2 TIMES DAILY
Status: DISCONTINUED | OUTPATIENT
Start: 2024-11-18 | End: 2024-11-21 | Stop reason: HOSPADM

## 2024-11-18 RX ORDER — MIRTAZAPINE 7.5 MG/1
7.5 TABLET, FILM COATED ORAL NIGHTLY
Status: DISCONTINUED | OUTPATIENT
Start: 2024-11-18 | End: 2024-11-18

## 2024-11-18 RX ORDER — ALPRAZOLAM 0.5 MG
0.5 TABLET ORAL NIGHTLY PRN
Status: DISCONTINUED | OUTPATIENT
Start: 2024-11-18 | End: 2024-11-18

## 2024-11-18 RX ORDER — ALPRAZOLAM 1 MG/1
1 TABLET ORAL NIGHTLY PRN
Status: DISCONTINUED | OUTPATIENT
Start: 2024-11-18 | End: 2024-11-21 | Stop reason: HOSPADM

## 2024-11-18 RX ORDER — PANTOPRAZOLE SODIUM 40 MG/1
40 TABLET, DELAYED RELEASE ORAL
Status: DISCONTINUED | OUTPATIENT
Start: 2024-11-18 | End: 2024-11-21 | Stop reason: HOSPADM

## 2024-11-18 RX ORDER — UREA 10 %
1000 LOTION (ML) TOPICAL DAILY
Status: DISCONTINUED | OUTPATIENT
Start: 2024-11-18 | End: 2024-11-21 | Stop reason: HOSPADM

## 2024-11-18 RX ORDER — ATORVASTATIN CALCIUM 40 MG/1
40 TABLET, FILM COATED ORAL NIGHTLY
Status: DISCONTINUED | OUTPATIENT
Start: 2024-11-18 | End: 2024-11-21 | Stop reason: HOSPADM

## 2024-11-18 RX ORDER — LORAZEPAM 2 MG/ML
0.5 INJECTION INTRAMUSCULAR EVERY 4 HOURS PRN
Status: DISCONTINUED | OUTPATIENT
Start: 2024-11-18 | End: 2024-11-21 | Stop reason: HOSPADM

## 2024-11-18 RX ORDER — BISOPROLOL FUMARATE 5 MG/1
5 TABLET, FILM COATED ORAL DAILY
Status: DISCONTINUED | OUTPATIENT
Start: 2024-11-18 | End: 2024-11-21 | Stop reason: HOSPADM

## 2024-11-18 RX ORDER — DONEPEZIL HYDROCHLORIDE 5 MG/1
5 TABLET, FILM COATED ORAL NIGHTLY
Status: DISCONTINUED | OUTPATIENT
Start: 2024-11-18 | End: 2024-11-21 | Stop reason: HOSPADM

## 2024-11-18 RX ADMIN — ALPRAZOLAM 1 MG: 0.5 TABLET ORAL at 03:13

## 2024-11-18 RX ADMIN — ATORVASTATIN CALCIUM 40 MG: 40 TABLET, FILM COATED ORAL at 20:24

## 2024-11-18 RX ADMIN — LORAZEPAM 0.5 MG: 2 INJECTION INTRAMUSCULAR; INTRAVENOUS at 17:26

## 2024-11-18 RX ADMIN — APIXABAN 5 MG: 5 TABLET, FILM COATED ORAL at 20:24

## 2024-11-18 RX ADMIN — Medication 10 ML: at 22:01

## 2024-11-18 RX ADMIN — POTASSIUM CHLORIDE 40 MEQ: 1500 TABLET, EXTENDED RELEASE ORAL at 00:42

## 2024-11-18 RX ADMIN — CEFTRIAXONE 1000 MG: 1 INJECTION, POWDER, FOR SOLUTION INTRAMUSCULAR; INTRAVENOUS at 16:42

## 2024-11-18 RX ADMIN — Medication 5 MG: at 20:24

## 2024-11-18 RX ADMIN — LAMOTRIGINE 100 MG: 100 TABLET ORAL at 20:24

## 2024-11-18 RX ADMIN — Medication 10 ML: at 11:54

## 2024-11-18 RX ADMIN — DONEPEZIL HYDROCHLORIDE 5 MG: 5 TABLET, FILM COATED ORAL at 20:24

## 2024-11-18 RX ADMIN — ALPRAZOLAM 1 MG: 1 TABLET ORAL at 20:24

## 2024-11-18 NOTE — THERAPY EVALUATION
Patient Name: Andree Deluna  : 1955    MRN: [id number]                              Today's Date: 2024       Admit Date: 2024    Visit Dx:     ICD-10-CM ICD-9-CM   1. Urinary tract infection without hematuria, site unspecified  N39.0 599.0   2. General weakness  R53.1 780.79     Patient Active Problem List   Diagnosis    Anemia in other chronic diseases classified elsewhere    Anxiety    B12 deficiency    Attention deficit disorder of adult with hyperactivity    Mood disorder    Complete rotator cuff tear or rupture of right shoulder, not specified as traumatic    Degeneration of intervertebral disc of cervical region    Degeneration of intervertebral disc of lumbosacral region    Fatigue    Hyperlipidemia    Hypertension    Insomnia    Obesity    Osteoarthritis of knee    CVA (cerebral vascular accident)    Visual field loss left    Acute midline low back pain without sciatica    GERD with esophagitis    Carotid stenosis, bilateral    Overactive bladder    History of stroke    Acute pain of right shoulder    Left cervical radiculopathy    Migraine headache    Medicare annual wellness visit, subsequent    Moderate episode of recurrent major depressive disorder    Piriformis syndrome, right    Precordial pain    History of Grimaldo's esophagus    Chronic cough    PATEL (generalized anxiety disorder)    Chest pain, unspecified type    Elevated troponin    Non-STEMI (non-ST elevated myocardial infarction)    Coronary artery disease involving native coronary artery of native heart with unstable angina pectoris    Abnormal findings on diagnostic imaging of heart and coronary circulation    S/P CABG x 3 with LIMA per Dr. Noonan 2023    S/P mitral valve repair per Dr. Noonan 2023    Postoperative atrial fibrillation    Atrial fibrillation with rapid ventricular response    Pleural effusion on left    Dyspnea    DJD of left shoulder    Osteoarthritis of left glenohumeral joint     Acute respiratory failure with hypoxia    Bilateral pneumonia    Acute renal insufficiency    Chronic coronary artery disease    NYHA class 1 heart failure with borderline preserved ejection fraction    Paroxysmal atrial fibrillation    GERD without esophagitis    Stage 3a chronic kidney disease    Weakness     Past Medical History:   Diagnosis Date    ADHD unknown    Anemia     Anesthesia complication     difficult to wake up    Anxiety     Atrial fibrillation     Carotid artery disease     80% right internal carotid artery    Coronary artery disease     CVA (cerebral vascular accident)     right parietal right temporal    DDD (degenerative disc disease), lumbar     Depression     Extremity pain     Ankle pain    GERD (gastroesophageal reflux disease)     Hyperlipidemia unknown    Hypertension     Insomnia unknown    Laryngopharyngeal reflux     Low back pain     Mood disorder     Obesity unknown    Short of breath on exertion     Skin cancer of face     Sleep apnea     Suspected sleep apnea she has refused to be tested    Stroke (cerebrum)     Visual field defect     Left     Past Surgical History:   Procedure Laterality Date    CARDIAC CATHETERIZATION N/A 5/1/2023    Procedure: Left Heart Cath;  Surgeon: Kye Alcaraz MD;  Location: [facility] CATH INVASIVE LOCATION;  Service: Cardiovascular;  Laterality: N/A;    CAROTID ENDARTERECTOMY Right 11/10/2020    Procedure: CAROTID ENDARTERECTOMY;  Surgeon: Tavo Das MD;  Location: [facility] MAIN OR;  Service: Vascular;  Laterality: Right;    CHOLECYSTECTOMY      COLONOSCOPY      2016    CORONARY ARTERY BYPASS GRAFT N/A 5/6/2023    Procedure: CORONARY ARTERY BYPASS GRAFTING;  Surgeon: Errol Noonan MD;  Location: [facility] CVOR;  Service: Cardiothoracic;  Laterality: N/A;  CABG X  3 using LIMA and right endospahenous vein;  2 coronary markers implanted.     FINGER SURGERY      KNEE ARTHROPLASTY      KNEE SURGERY Right     replaced    LAPAROSCOPIC  GASTRIC BANDING      MITRAL VALVE REPAIR/REPLACEMENT N/A 2023    Procedure: MITRAL VALVE REPAIR/REPLACEMENT;  Surgeon: Errol Noonan MD;  Location: [facility];  Service: Cardiothoracic;  Laterality: N/A;  Mitral valve repaired using 28 mm Jorge Physio II Annuloplasty Ring.     ROTATOR CUFF REPAIR Right     SHOULDER SURGERY Right 2019    scope/ cuff repair    TOTAL SHOULDER ARTHROPLASTY W/ DISTAL CLAVICLE EXCISION Left 2023    Procedure: TOTAL SHOULDER REVERSE ARTHROPLASTY;  Surgeon: Shubham Samaniego MD;  Location: [facility] OR;  Service: Orthopedics;  Laterality: Left;    TRANSESOPHAGEAL ECHOCARDIOGRAM (GALA) N/A 2023    Procedure: TRANSESOPHAGEAL ECHOCARDIOGRAM WITH ANESTHESIA;  Surgeon: Errol Noonan MD;  Location: [facility];  Service: Cardiothoracic;  Laterality: N/A;    TUBAL ABDOMINAL LIGATION        General Information       Row Name 24 1135          Physical Therapy Time and Intention    Document Type evaluation  -CR     Mode of Treatment physical therapy  -CR       Row Name 24 1135          General Information    Patient Profile Reviewed yes  -CR     Prior Level of Function min assist:;all household mobility;ADL's  using rw  -CR     Existing Precautions/Restrictions fall  -CR     Barriers to Rehab previous functional deficit;cognitive status  -CR       Row Name 24 1135          Living Environment    People in Home spouse;child(avery), adult  spouse works 2x/wk; other family with patient when spouse not home  -CR       Row Name 24 1138          Home Main Entrance    Number of Stairs, Main Entrance three  -CR     Stair Railings, Main Entrance railings safe and in good condition  -CR       Row Name 24 1138          Cognition    Orientation Status (Cognition) unable/difficult to assess  patient very drowsy at time of eval; only gave . appears to recognize spouse  -CR       Row Name 24 1138          Safety  Issues/Impairments Affecting Functional Mobility    Safety Issues Affecting Function (Mobility) ability to follow commands;at risk behavior observed;safety precautions follow-through/compliance  -CR     Impairments Affecting Function (Mobility) balance;cognition;strength  -CR               User Key  (r) = Recorded By, (t) = Taken By, (c) = Cosigned By      Initials Name Provider Type    CR Reyes, Carmela, PT Physical Therapist                   Mobility       Row Name 11/18/24 1140          Bed Mobility    Bed Mobility supine-sit  -CR     Supine-Sit [place] (Bed Mobility) moderate assist (50% patient effort);2 person assist  -CR     Comment, (Bed Mobility) patient was not assisting with activity. spouse reports patient not a morning person  -CR       Row Name 11/18/24 1140          Bed-Chair Transfer    Bed-Chair [place] (Transfers) contact guard  -CR     Assistive Device (Bed-Chair Transfers) walker, front-wheeled  -CR       Row Name 11/18/24 1140          Sit-Stand Transfer    Sit-Stand [place] (Transfers) moderate assist (50% patient effort);maximum assist (25% patient effort);1 person assist;2 person assist  -CR     Assistive Device (Sit-Stand Transfers) walker, front-wheeled  -CR       Row Name 11/18/24 1140          Gait/Stairs (Locomotion)    [place] Level (Gait) contact guard;minimum assist (75% patient effort)  -CR     Assistive Device (Gait) walker, 4-wheeled  -CR     Distance in Feet (Gait) 20  -CR     Deviations/Abnormal Patterns (Gait) stride length decreased  -CR               User Key  (r) = Recorded By, (t) = Taken By, (c) = Cosigned By      Initials Name Provider Type    CR Reyes, Carmela, PT Physical Therapist                   Obj/Interventions       Row Name 11/18/24 1141          Range of Motion Comprehensive    Comment, General Range of Motion AROM WFL  -CR       Row Name 11/18/24 1141          Strength Comprehensive (MMT)    Comment, General Manual Muscle Testing (MMT)  Assessment unable to assess accurately, not following commands well but no buckling noted during gait  -CR       Row Name 11/18/24 1141          Balance    Balance Assessment sitting static balance;standing static balance;standing dynamic balance  -CR     Static Sitting Balance minimal assist  -CR     Position, Sitting Balance sitting edge of bed  -CR     Static Standing Balance minimal assist;contact guard  -CR     Dynamic Standing Balance contact guard;minimal assist  -CR     Position/Device Used, Standing Balance walker, rolling  -CR     Comment, Balance patient inconsistent with standing balance  -CR               User Key  (r) = Recorded By, (t) = Taken By, (c) = Cosigned By      Initials Name Provider Type    CR Reyes, Carmela, PT Physical Therapist                   Goals/Plan       Row Name 11/18/24 1152          Bed Mobility Goal 1 (PT)    Activity/Assistive Device (Bed Mobility Goal 1, PT) sit to supine/supine to sit  -CR     [place] Level/Cues Needed (Bed Mobility Goal 1, PT) standby assist  -CR     Time Frame (Bed Mobility Goal 1, PT) long term goal (LTG);2 weeks  -CR       Row Name 11/18/24 1152          Transfer Goal 1 (PT)    Activity/Assistive Device (Transfer Goal 1, PT) sit-to-stand/stand-to-sit;walker, rolling  -CR     [place] Level/Cues Needed (Transfer Goal 1, PT) standby assist  -CR     Time Frame (Transfer Goal 1, PT) long term goal (LTG);2 weeks  -CR       Row Name 11/18/24 1152          Gait Training Goal 1 (PT)    Activity/Assistive Device (Gait Training Goal 1, PT) gait (walking locomotion);assistive device use;decrease fall risk;increase endurance/gait distance;improve balance and speed;walker, rolling  -CR     [place] Level (Gait Training Goal 1, PT) standby assist  -CR     Distance (Gait Training Goal 1, PT) 50 ft  x2  -CR     Time Frame (Gait Training Goal 1, PT) long term goal (LTG);2 weeks  -CR       Row Name 11/18/24 1152          Therapy Assessment/Plan (PT)     Planned Therapy Interventions (PT) balance training;bed mobility training;gait training;home exercise program;patient/family education;transfer training;neuromuscular re-education;strengthening  -CR               User Key  (r) = Recorded By, (t) = Taken By, (c) = Cosigned By      Initials Name Provider Type    CR Reyes, Carmela, PT Physical Therapist                   Clinical Impression       Row Name 11/18/24 1142          Pain    Additional Documentation Pain Scale: FACES Pre/Post-Treatment (Group)  -CR       Row Name 11/18/24 1142          Pain Scale: FACES Pre/Post-Treatment    Pain: FACES Scale, Pretreatment 0-->no hurt  -CR     Posttreatment Pain Rating 4-->hurts little more  -CR     Pre/Posttreatment Pain Comment grimacing when repositioned in bed and assisted into sitting  -CR       Row Name 11/18/24 1142          Plan of Care Review    Plan of Care Reviewed With patient  -CR     Outcome Evaluation 70 y/o female admitted on 11/17 due to progressive weakness as reported by spouse with resulting falls at home. Noted patient with multiple bruises all over. Patient is very drowsy during eval thus unable to answer any questions. Patient found with +UTI. PMH includes dementia, cva, afib, DDD. Patient resides with spouse who works 2 days/wk, son and DIL. Patient normally able to ambulate using rw however has been inconsistent per spouse report; at times needed min A ,other times on her own. At of eval, patient very drowsy which spouse reports is normal as patient is not a morning person. Needed mod A of 1-2 for supine to sit, min A to sit EOB as patient kept eyes closed. Lots of cues needed but able to come to standing with min A using rw for support. Patient ambulated 20 ft with min/CGA, no buckling, no veering however had to be cued not to sit in middle of gait. Patient used BSC and needed mod/max A to come to full standing after using toilet. Patient is not safe to be up on her own at home as she is a high risk  for falls, will benefit from SNF however family is wanting to take patient home and resume HH PT.  -CR       Row Name 11/18/24 1142          Therapy Assessment/Plan (PT)    Patient/Family Therapy Goals Statement (PT) return home  -CR     Rehab Potential (PT) fair  -CR     Criteria for Skilled Interventions Met (PT) yes;skilled treatment is necessary  -CR     Therapy Frequency (PT) 5 times/wk  -CR     Predicted Duration of Therapy Intervention (PT) dc  -CR               User Key  (r) = Recorded By, (t) = Taken By, (c) = Cosigned By      Initials Name Provider Type    CR Reyes, Carmela, NAVA Physical Therapist                   Outcome Measures       Row Name 11/18/24 1153 11/18/24 0820       How much help from another person do you currently need...    Turning from your back to your side while in flat bed without using bedrails? 3  -CR 3  -AD    Moving from lying on back to sitting on the side of a flat bed without bedrails? 2  -CR 3  -AD    Moving to and from a bed to a chair (including a wheelchair)? 3  -CR 2  -AD    Standing up from a chair using your arms (e.g., wheelchair, bedside chair)? 2  -CR 2  -AD    Climbing 3-5 steps with a railing? 3  -CR 1  -AD    To walk in hospital room? 3  -CR 2  -AD    AM-PAC 6 Clicks Score (PT) 16  -CR 13  -AD              User Key  (r) = Recorded By, (t) = Taken By, (c) = Cosigned By      Initials Name Provider Type    CR Reyes, Carmela, NAVA Physical Therapist    Yanet Auguste, RN Registered Nurse                                 Physical Therapy Education       Title: PT OT SLP Therapies (In Progress)       Topic: Physical Therapy (In Progress)       Point: Mobility training (Done)       Learning Progress Summary            Family Acceptance, E, VU by CR at 11/18/2024 1153                      Point: Home exercise program (Not Started)       Learner Progress:  Not documented in this visit.              Point: Body mechanics (Not Started)       Learner Progress:  Not documented in  this visit.              Point: Precautions (Not Started)       Learner Progress:  Not documented in this visit.                              User Key       Initials Effective Dates Name Provider Type Discipline    CR 06/16/21 -  Reyes, Carmela, NAVA Physical Therapist PT                  PT Recommendation and Plan  Planned Therapy Interventions (PT): balance training, bed mobility training, gait training, home exercise program, patient/family education, transfer training, neuromuscular re-education, strengthening  Outcome Evaluation: 68 y/o female admitted on 11/17 due to progressive weakness as reported by spouse with resulting falls at home. Noted patient with multiple bruises all over. Patient is very drowsy during eval thus unable to answer any questions. Patient found with +UTI. PMH includes dementia, cva, afib, DDD. Patient resides with spouse who works 2 days/wk, son and DIL. Patient normally able to ambulate using rw however has been inconsistent per spouse report; at times needed min A ,other times on her own. At of eval, patient very drowsy which spouse reports is normal as patient is not a morning person. Needed mod A of 1-2 for supine to sit, min A to sit EOB as patient kept eyes closed. Lots of cues needed but able to come to standing with min A using rw for support. Patient ambulated 20 ft with min/CGA, no buckling, no veering however had to be cued not to sit in middle of gait. Patient used BSC and needed mod/max A to come to full standing after using toilet. Patient is not safe to be up on her own at home as she is a high risk for falls, will benefit from SNF however family is wanting to take patient home and resume  PT.     Time Calculation:         PT Charges       Row Name 11/18/24 1927             Time Calculation    Start Time 0844  -CR      Stop Time 0905  -CR      Time Calculation (min) 21 min  -CR      PT Received On 11/18/24  -CR      PT - Next Appointment 11/19/24  -CR      PT Goal Re-Cert  Due Date 12/02/24  -CR         Time Calculation- PT    Total Timed Code Minutes- PT 0 minute(s)  -CR                User Key  (r) = Recorded By, (t) = Taken By, (c) = Cosigned By      Initials Name Provider Type    CR Reyes, Carmela, PT Physical Therapist                  Therapy Charges for Today       Code Description Service Date Service Provider Modifiers Qty    31251290604 HC PT EVAL MOD COMPLEXITY 4 11/18/2024 Reyes, Carmela, PT GP 1            PT G-Codes  AM-PAC 6 Clicks Score (PT): 16  PT Discharge Summary  Anticipated Discharge Disposition (PT): skilled nursing facility    Carmela Reyes, PT  11/18/2024

## 2024-11-18 NOTE — PROGRESS NOTES
[facility] MEDICINE SERVICE  DAILY PROGRESS NOTE    NAME: Andree Deluna  : 1955  MRN: [id number]      LOS: 1 day     PROVIDER OF SERVICE: Timothy Duane Brammell, MD    Chief Complaint: Weakness    Subjective:     Interval History:  History taken from: patient    Patient slept all morning and is now awake.  Son at bedside states that she is difficult to awaken at times.  Son does state that patient has been following at home.  Son is unaware of any seizure activity in the past.  He begins to cautiously gesture behind the patient's back when began to discuss her medications.  He states that her  controls her medicine and she does not have access.  She denies any complaints at present.  She denies any pain complaints.  Denies any shortness of breath.  Denies any urinary symptoms or any recent confirmed temperature elevation.        Review of Systems:   Review of Systems    Objective:     Vital Signs  Temp:  [99.1 °F (37.3 °C)-100.1 °F (37.8 °C)] 99.1 °F (37.3 °C)  Heart Rate:  [68-82] 75  Resp:  [13-30] 30  BP: (127-150)/(68-90) 127/76   Body mass index is 28.29 kg/m².    Physical Exam  Physical Exam       Diagnostic Data    Results from last 7 days   Lab Units 24  0617 24  1437   WBC 10*3/mm3 7.59 8.52   HEMOGLOBIN g/dL 12.2 12.3   HEMATOCRIT % 38.6 40.3   PLATELETS 10*3/mm3 207 216   GLUCOSE mg/dL 100* 110*   CREATININE mg/dL 0.58 0.74   BUN mg/dL 13 15   SODIUM mmol/L 142 140   POTASSIUM mmol/L 4.1 3.2*   AST (SGOT) U/L  --  55*   ALT (SGPT) U/L  --  33   ALK PHOS U/L  --  178*   BILIRUBIN mg/dL  --  1.5*   ANION GAP mmol/L 9.1 9.1       XR Chest 1 View    Result Date: 2024  Impression: 1.Cardiomegaly. There is evidence for prior cardiothoracic surgery. Electronically Signed: Miguel Angel Hicks MD  2024 4:01 PM EST  Workstation ID: [id number]           Assessment:    Encephalopathy metabolic versus toxic versus other  Probable UTI/E. coli  Polypharmacy  Dementia  History of  coronary artery disease/CABG  History of A-fib  Chronic anticoagulation.       Plan: Ongoing antibiotic coverage with sensitivities of E. coli pending.  Will certainly need medications reviewed with spouse prior to discharge.  Diuretics on hold at present.  Follow-up labs.  Decrease dose of Ativan.      Active and Resolved Problems  Active Hospital Problems    Diagnosis  POA    **Weakness [R53.1]  Yes      Resolved Hospital Problems   No resolved problems to display.           VTE Prophylaxis:  Pharmacologic & mechanical VTE prophylaxis orders are present.             Disposition Planning:     Barriers to Discharge:UTI Rx  Anticipated Date of Discharge: 11/19  Place of Discharge: home      Time: 30 minutes     Code Status and Medical Interventions: CPR (Attempt to Resuscitate); Full Support   Ordered at: 11/17/24 8182     Code Status (Patient has no pulse and is not breathing):    CPR (Attempt to Resuscitate)     Medical Interventions (Patient has pulse or is breathing):    Full Support       Signature: Electronically signed by Timothy Duane Brammell, MD, 11/18/24, 14:30 EST.  [facility] Hospitalist Team

## 2024-11-18 NOTE — NURSING NOTE
Patient not able to take pills. She is not able to follow commands at this time. She seems very sleepy and not fully waking up. Spouse said this is normal for the patient and it can take her up to 45 minutes to wake up. Dr. Dumont notified. Vitals stable. ABG's ordered. Glucose 105. No additional orders at this time.

## 2024-11-18 NOTE — THERAPY EVALUATION
Patient Name: Andree Deluna  : 1955    MRN: [id number]                              Today's Date: 2024       Admit Date: 2024    Visit Dx:     ICD-10-CM ICD-9-CM   1. Urinary tract infection without hematuria, site unspecified  N39.0 599.0   2. General weakness  R53.1 780.79     Patient Active Problem List   Diagnosis    Anemia in other chronic diseases classified elsewhere    Anxiety    B12 deficiency    Attention deficit disorder of adult with hyperactivity    Mood disorder    Complete rotator cuff tear or rupture of right shoulder, not specified as traumatic    Degeneration of intervertebral disc of cervical region    Degeneration of intervertebral disc of lumbosacral region    Fatigue    Hyperlipidemia    Hypertension    Insomnia    Obesity    Osteoarthritis of knee    CVA (cerebral vascular accident)    Visual field loss left    Acute midline low back pain without sciatica    GERD with esophagitis    Carotid stenosis, bilateral    Overactive bladder    History of stroke    Acute pain of right shoulder    Left cervical radiculopathy    Migraine headache    Medicare annual wellness visit, subsequent    Moderate episode of recurrent major depressive disorder    Piriformis syndrome, right    Precordial pain    History of Grimaldo's esophagus    Chronic cough    PATEL (generalized anxiety disorder)    Chest pain, unspecified type    Elevated troponin    Non-STEMI (non-ST elevated myocardial infarction)    Coronary artery disease involving native coronary artery of native heart with unstable angina pectoris    Abnormal findings on diagnostic imaging of heart and coronary circulation    S/P CABG x 3 with LIMA per Dr. Noonan 2023    S/P mitral valve repair per Dr. Noonan 2023    Postoperative atrial fibrillation    Atrial fibrillation with rapid ventricular response    Pleural effusion on left    Dyspnea    DJD of left shoulder    Osteoarthritis of left glenohumeral joint     Acute respiratory failure with hypoxia    Bilateral pneumonia    Acute renal insufficiency    Chronic coronary artery disease    NYHA class 1 heart failure with borderline preserved ejection fraction    Paroxysmal atrial fibrillation    GERD without esophagitis    Stage 3a chronic kidney disease    Weakness     Past Medical History:   Diagnosis Date    ADHD unknown    Anemia     Anesthesia complication     difficult to wake up    Anxiety     Atrial fibrillation     Carotid artery disease     80% right internal carotid artery    Coronary artery disease     CVA (cerebral vascular accident)     right parietal right temporal    DDD (degenerative disc disease), lumbar     Depression     Extremity pain     Ankle pain    GERD (gastroesophageal reflux disease)     Hyperlipidemia unknown    Hypertension     Insomnia unknown    Laryngopharyngeal reflux     Low back pain     Mood disorder     Obesity unknown    Short of breath on exertion     Skin cancer of face     Sleep apnea     Suspected sleep apnea she has refused to be tested    Stroke (cerebrum)     Visual field defect     Left     Past Surgical History:   Procedure Laterality Date    CARDIAC CATHETERIZATION N/A 5/1/2023    Procedure: Left Heart Cath;  Surgeon: Kye Alcaraz MD;  Location: [facility] CATH INVASIVE LOCATION;  Service: Cardiovascular;  Laterality: N/A;    CAROTID ENDARTERECTOMY Right 11/10/2020    Procedure: CAROTID ENDARTERECTOMY;  Surgeon: Tavo Das MD;  Location: [facility] MAIN OR;  Service: Vascular;  Laterality: Right;    CHOLECYSTECTOMY      COLONOSCOPY      2016    CORONARY ARTERY BYPASS GRAFT N/A 5/6/2023    Procedure: CORONARY ARTERY BYPASS GRAFTING;  Surgeon: Errol Noonan MD;  Location: [facility] CVOR;  Service: Cardiothoracic;  Laterality: N/A;  CABG X  3 using LIMA and right endospahenous vein;  2 coronary markers implanted.     FINGER SURGERY      KNEE ARTHROPLASTY      KNEE SURGERY Right     replaced    LAPAROSCOPIC  GASTRIC BANDING      MITRAL VALVE REPAIR/REPLACEMENT N/A 5/6/2023    Procedure: MITRAL VALVE REPAIR/REPLACEMENT;  Surgeon: Errol Noonan MD;  Location: [facility];  Service: Cardiothoracic;  Laterality: N/A;  Mitral valve repaired using 28 mm Jorge Physio II Annuloplasty Ring.     ROTATOR CUFF REPAIR Right 2019    SHOULDER SURGERY Right 05/24/2019    scope/ cuff repair    TOTAL SHOULDER ARTHROPLASTY W/ DISTAL CLAVICLE EXCISION Left 11/8/2023    Procedure: TOTAL SHOULDER REVERSE ARTHROPLASTY;  Surgeon: Shubham Samaniego MD;  Location: [facility] OR;  Service: Orthopedics;  Laterality: Left;    TRANSESOPHAGEAL ECHOCARDIOGRAM (GALA) N/A 5/6/2023    Procedure: TRANSESOPHAGEAL ECHOCARDIOGRAM WITH ANESTHESIA;  Surgeon: Errol Noonan MD;  Location: [facility];  Service: Cardiothoracic;  Laterality: N/A;    TUBAL ABDOMINAL LIGATION        General Information       Row Name 11/18/24 [facility]          OT Time and Intention    Document Type evaluation  -     Mode of Treatment occupational therapy  -       Row Name 11/18/24 [facility]          General Information    Patient Profile Reviewed yes  -BL     Prior Level of Function min assist:;all household mobility;ADL's  using rw  -BL     Existing Precautions/Restrictions fall  -BL     Barriers to Rehab previous functional deficit;cognitive status  -       Row Name 11/18/24 1359          Living Environment    People in Home spouse;child(avery), adult  spouse works 2x/wk; other family with patient when spouse not home  -       Row Name 11/18/24 1358          Home Main Entrance    Number of Stairs, Main Entrance three  -BL     Stair Railings, Main Entrance railings safe and in good condition  -       Row Name 11/18/24 1351          Cognition    Orientation Status (Cognition) unable/difficult to assess  Pt very lethargic this date and with difficutly staying awake  -       Row Name 11/18/24 1354          Safety Issues/Impairments Affecting Functional  Mobility    Impairments Affecting Function (Mobility) balance;cognition;strength  -               User Key  (r) = Recorded By, (t) = Taken By, (c) = Cosigned By      Initials Name Provider Type    Chiquis Wolf OT Occupational Therapist                     Mobility/ADL's       Row Name 11/18/24 1359          Bed Mobility    Bed Mobility supine-sit  -     Supine-Sit [place] (Bed Mobility) moderate assist (50% patient effort);2 person assist  -[facility] Name 11/18/24 1359          Bed-Chair Transfer    Bed-Chair [place] (Transfers) contact guard  -     Assistive Device (Bed-Chair Transfers) walker, front-wheeled  -       Row Name 11/18/24 1359          Sit-Stand Transfer    Sit-Stand [place] (Transfers) moderate assist (50% patient effort);maximum assist (25% patient effort);1 person assist;2 person assist  -     Assistive Device (Sit-Stand Transfers) walker, front-wheeled  -[facility] Name 11/18/24 1359          Activities of Daily Living    BADL Assessment/Intervention lower body dressing  -BL       Row Name 11/18/24 [facility]9          Lower Body Dressing Assessment/Training    [place] Level (Lower Body Dressing) don;socks;dependent (less than 25% patient effort)  -               User Key  (r) = Recorded By, (t) = Taken By, (c) = Cosigned By      Initials Name Provider Type    Chiquis Wolf OT Occupational Therapist                   Obj/Interventions       [facility] Name 11/18/24 1405          Sensory Assessment (Somatosensory)    Sensory Assessment (Somatosensory) unable/difficult to assess  -BL       Row Name 11/18/24 1405          Vision Assessment/Intervention    Visual Impairment/Limitations unable/difficult to assess  -BL       Row Name 11/18/24 1405          Range of Motion Comprehensive    General Range of Motion no range of motion deficits identified  -BL       Row Name 11/18/24 1405          Strength Comprehensive (MMT)    Comment, General Manual Muscle Testing  (MMT) Assessment Not assessed secondary to cognitive deficits  -       Row Name 11/18/24 1405          Balance    Balance Assessment sitting static balance;sitting dynamic balance;standing static balance;standing dynamic balance  -BL     Static Sitting Balance minimal assist  -BL     Dynamic Sitting Balance minimal assist  -BL     Position, Sitting Balance supported  -BL     Static Standing Balance minimal assist;contact guard  -BL     Dynamic Standing Balance contact guard;minimal assist  -BL     Position/Device Used, Standing Balance walker, front-wheeled  -BL               User Key  (r) = Recorded By, (t) = Taken By, (c) = Cosigned By      Initials Name Provider Type    BL Chiquis Lancaster, OT Occupational Therapist                   Goals/Plan       Row Name 11/18/24 1418          Transfer Goal 1 (OT)    Activity/Assistive Device (Transfer Goal 1, OT) sit-to-stand/stand-to-sit;toilet  -BL     [place] Level/Cues Needed (Transfer Goal 1, OT) contact guard required  -BL     Time Frame (Transfer Goal 1, OT) 2 weeks  -       Row Name 11/18/24 1418          Dressing Goal 1 (OT)    Activity/Device (Dressing Goal 1, OT) dressing skills, all  -BL     [place]/Cues Needed (Dressing Goal 1, OT) moderate assist (50-74% patient effort)  -BL     Time Frame (Dressing Goal 1, OT) 2 weeks  -       Row Name 11/18/24 1418          Toileting Goal 1 (OT)    Activity/Device (Toileting Goal 1, OT) toileting skills, all  -BL     [place] Level/Cues Needed (Toileting Goal 1, OT) moderate assist (50-74% patient effort)  -BL     Time Frame (Toileting Goal 1, OT) 2 weeks  -       Row Name 11/18/24 1418          Therapy Assessment/Plan (OT)    Planned Therapy Interventions (OT) activity tolerance training;adaptive equipment training;BADL retraining;cognitive/visual perception retraining;edema control/reduction;functional balance retraining;neuromuscular control/coordination retraining;occupation/activity based  interventions;patient/caregiver education/training;ROM/therapeutic exercise;strengthening exercise;transfer/mobility retraining  -               User Key  (r) = Recorded By, (t) = Taken By, (c) = Cosigned By      Initials Name Provider Type    Chiquis Wolf OT Occupational Therapist                   Clinical Impression       Row Name 11/18/24 1404          Pain Assessment    Pretreatment Pain Rating 0/10 - no pain  -BL     Posttreatment Pain Rating 0/10 - no pain  -BL       Row Name 11/18/24 1409          Pain Scale: FACES Pre/Post-Treatment    Pain: FACES Scale, Pretreatment 0-->no hurt  -BL     Posttreatment Pain Rating 4-->hurts little more  -BL     Pre/Posttreatment Pain Comment grimacing when repositioned in bed  -       Row Name 11/18/24 1403          Plan of Care Review    Plan of Care Reviewed With patient  -     Outcome Evaluation 68 y/o female admitted on 11/17 due to progressive weakness as reported by spouse with resulting falls at home. Noted patient with multiple bruises all over. Patient is very drowsy during eval thus unable to answer any questions. Patient found with +UTI. PMH includes dementia, cva, afib, DDD. Pt lives with her spouse who works 2x/week and her son and DIL. Per pt spouse, she was using a RW for functional mobility, however she has had several falls. She does require assistance with ADls. THis date, pt very drousy and required cues and increased time to wake up. She required mod A for bed mobility, Pt required cues to keep eyes open with increased cues. She required min A for STS and for functional mobility short household distance to the commode. She required dependent A for kacie care after toileting. Pt would benefit from A to the commode and a bladder schedule. This date OT recommending SNF at d/c, however family requesting to take her home with  OT. OT will follow.  -       Row Name 11/18/24 1401          Therapy Assessment/Plan (OT)    Criteria for Skilled  Therapeutic Interventions Met (OT) yes  -     Therapy Frequency (OT) 5 times/wk  -       Row Name 11/18/24 1409          Therapy Plan Review/Discharge Plan (OT)    Anticipated Discharge Disposition (OT) skilled nursing facility  -       Row Name 11/18/24 1409          Positioning and Restraints    Pre-Treatment Position in bed  -BL     Post Treatment Position bed  -BL     In Bed notified nsg;with family/caregiver;call light within reach;encouraged to call for assist  -BL               User Key  (r) = Recorded By, (t) = Taken By, (c) = Cosigned By      Initials Name Provider Type    Chiquis Wolf, MALCOLM Occupational Therapist                   Outcome Measures       Row Name 11/18/24 1153 11/18/24 0820       How much help from another person do you currently need...    Turning from your back to your side while in flat bed without using bedrails? 3  -CR 3  -AD    Moving from lying on back to sitting on the side of a flat bed without bedrails? 2  -CR 3  -AD    Moving to and from a bed to a chair (including a wheelchair)? 3  -CR 2  -AD    Standing up from a chair using your arms (e.g., wheelchair, bedside chair)? 2  -CR 2  -AD    Climbing 3-5 steps with a railing? 3  -CR 1  -AD    To walk in hospital room? 3  -CR 2  -AD    AM-PAC 6 Clicks Score (PT) 16  -CR 13  -AD              User Key  (r) = Recorded By, (t) = Taken By, (c) = Cosigned By      Initials Name Provider Type    CR Reyes, Carmela, PT Physical Therapist    AD Yanet Beaver, RN Registered Nurse                    Occupational Therapy Education       Title: PT OT SLP Therapies (In Progress)       Topic: Occupational Therapy (In Progress)       Point: ADL training (Done)       Description:   Instruct learner(s) on proper safety adaptation and remediation techniques during self care or transfers.   Instruct in proper use of assistive devices.                  Learning Progress Summary            Patient Acceptance, E,TB, VU,NR by  at 11/18/2024 3054                       Point: Home exercise program (Not Started)       Description:   Instruct learner(s) on appropriate technique for monitoring, assisting and/or progressing therapeutic exercises/activities.                  Learner Progress:  Not documented in this visit.              Point: Precautions (Not Started)       Description:   Instruct learner(s) on prescribed precautions during self-care and functional transfers.                  Learner Progress:  Not documented in this visit.              Point: Body mechanics (Not Started)       Description:   Instruct learner(s) on proper positioning and spine alignment during self-care, functional mobility activities and/or exercises.                  Learner Progress:  Not documented in this visit.                              User Key       Initials Effective Dates Name Provider Type Discipline     09/22/22 -  Chiquis Lancaster OT Occupational Therapist OT                  OT Recommendation and Plan  Planned Therapy Interventions (OT): activity tolerance training, adaptive equipment training, BADL retraining, cognitive/visual perception retraining, edema control/reduction, functional balance retraining, neuromuscular control/coordination retraining, occupation/activity based interventions, patient/caregiver education/training, ROM/therapeutic exercise, strengthening exercise, transfer/mobility retraining  Therapy Frequency (OT): 5 times/wk  Plan of Care Review  Plan of Care Reviewed With: patient  Outcome Evaluation: 70 y/o female admitted on 11/17 due to progressive weakness as reported by spouse with resulting falls at home. Noted patient with multiple bruises all over. Patient is very drowsy during eval thus unable to answer any questions. Patient found with +UTI. PMH includes dementia, cva, afib, DDD. Pt lives with her spouse who works 2x/week and her son and DIL. Per pt spouse, she was using a RW for functional mobility, however she has had several falls.  She does require assistance with ADls. THis date, pt very drousy and required cues and increased time to wake up. She required mod A for bed mobility, Pt required cues to keep eyes open with increased cues. She required min A for STS and for functional mobility short household distance to the commode. She required dependent A for kacie care after toileting. Pt would benefit from A to the commode and a bladder schedule. This date OT recommending SNF at d/c, however family requesting to take her home with  OT. OT will follow.     Time Calculation:         Time Calculation- OT       Row Name 11/18/24 1421             Time Calculation- OT    OT Start Time 0846  -BL      OT Stop Time 0905  -BL      OT Time Calculation (min) 19 min  -BL      OT Received On 11/18/24  -      OT - Next Appointment 11/19/24  -BL      OT Goal Re-Cert Due Date 12/02/24  -BL                User Key  (r) = Recorded By, (t) = Taken By, (c) = Cosigned By      Initials Name Provider Type     Chiquis Lancaster OT Occupational Therapist                  Therapy Charges for Today       Code Description Service Date Service Provider Modifiers Qty    [id number]  OT EVAL MOD COMPLEXITY 4 11/18/2024 Chiquis Lancaster OT GO 1                 Chiquis Lancaster OT  11/18/2024

## 2024-11-18 NOTE — PLAN OF CARE
Goal Outcome Evaluation:  Plan of Care Reviewed With: patient           Outcome Evaluation: 70 y/o female admitted on 11/17 due to progressive weakness as reported by spouse with resulting falls at home. Noted patient with multiple bruises all over. Patient is very drowsy during eval thus unable to answer any questions. Patient found with +UTI. PMH includes dementia, cva, afib, DDD. Patient resides with spouse who works 2 days/wk, son and DIL. Patient normally able to ambulate using rw however has been inconsistent per spouse report; at times needed min A ,other times on her own. At of eval, patient very drowsy which spouse reports is normal as patient is not a morning person. Needed mod A of 1-2 for supine to sit, min A to sit EOB as patient kept eyes closed. Lots of cues needed but able to come to standing with min A using rw for support. Patient ambulated 20 ft with min/CGA, no buckling, no veering however had to be cued not to sit in middle of gait. Patient used BSC and needed mod/max A to come to full standing after using toilet. Patient is not safe to be up on her own at home as she is a high risk for falls, will benefit from SNF however family is wanting to take patient home and resume HH PT.    Anticipated Discharge Disposition (PT): skilled nursing facility

## 2024-11-18 NOTE — CASE MANAGEMENT/SOCIAL WORK
Discharge Planning Assessment   Eddy     Patient Name: Andree Deluna  MRN: [id number]  Today's Date: 11/18/2024    Admit Date: 11/17/2024    Plan: From home w/ spouse. [facility] nursing/PT/OT/ST (current, [facility] placed).   Discharge Needs Assessment       Row Name 11/18/24 0936       Living Environment    People in Home spouse    Name(s) of People in Home  Mike and son and SARAH BETH are staying in the home to help PRN. (Michael and Eulalio)    Current Living Arrangements home    Potentially Unsafe Housing Conditions none    In the past 12 months has the electric, gas, oil, or water company threatened to shut off services in your home? No    Primary Care Provided by self;spouse/significant other    Provides Primary Care For no one, unable/limited ability to care for self    Family Caregiver if Needed spouse;child(avery), adult    Family Caregiver Names  Mike, SARAH BETH Rodriguez, and son Eulalio    Quality of Family Relationships helpful;involved;supportive    Able to Return to Prior Arrangements yes       Resource/Environmental Concerns    Resource/Environmental Concerns none    Transportation Concerns none       Transportation Needs    In the past 12 months, has lack of transportation kept you from medical appointments or from getting medications? no    In the past 12 months, has lack of transportation kept you from meetings, work, or from getting things needed for daily living? No       Food Insecurity    Within the past 12 months, you worried that your food would run out before you got the money to buy more. Never true    Within the past 12 months, the food you bought just didn't last and you didn't have money to get more. Never true       Transition Planning    Patient/Family Anticipates Transition to home with family    Patient/Family Anticipated Services at Transition none    Transportation Anticipated family or friend will provide       Discharge Needs Assessment    Readmission Within the Last 30 Days no previous  admission in last 30 days    Current Outpatient/Agency/Support Group homecare agency;other (see comments)  Nursing, PT/OT/ST in the home x2 days per week    Equipment Currently Used at Home walker, standard    Concerns to be Addressed discharge planning    Anticipated Changes Related to Illness none    Equipment Needed After Discharge none                   Discharge Plan       Row Name 11/18/24 0988       Plan    Plan From home w/ spouse. [facility] nursing/PT/OT/ST (current, [facility] placed).    Plan Comments CM met with patient and spouse Mike at the bedside while PT/OT were performing their assessment. Confirmed PCP, insurance, and pharmacy. Patient agreeable in M2B. Patient lives at home with her spouse and DIL and son are staying in the home to help patient with ADLS/IADLS PRN. Patient is current with [facility], placed referral in Epic basket and messaged liaison Alena ([facility] in). Patient requires assistance with all IADLS/ADLS and does not drive, spouse Mike able to provide DC transport. Paitent and spouse currently decline need for SNF or additional resources and wish to return home with home health. DC barriers: Med sitter present at the bedside, acute confusion and weakness continue.                  Continued Care and Services - Admitted Since 11/17/2024       Home Medical Care       Service Provider Request Status Services Address Phone Fax Patient Preferred    [facility] - Request Sent -- [address], [place] [address] [phone] --                  Expected Discharge Date and Time       Expected Discharge Date Expected Discharge Time    Nov 20, 2024            Demographic Summary       Row Name 11/18/24 0865       General Information    Admission Type inpatient    Arrived From emergency department    Required Notices Provided Important Message from Medicare    Referral Source admission list    Reason for Consult discharge planning    Preferred  Language English       Contact Information    Permission Granted to Share Info With     Contact Information Obtained for                    Functional Status       Row Name 11/18/24 0936       Functional Status    Usual Activity Tolerance moderate    Current Activity Tolerance fair       Functional Status, IADL    Medications independent;assistive person    Meal Preparation independent;assistive person    Housekeeping independent;assistive person    Laundry independent;assistive person    Shopping independent;assistive person           Galileo Pruitt RN     Cell number [phone]  Office number [phone]

## 2024-11-18 NOTE — NURSING NOTE
Dr. Dumont came to assess patient. She is more alert and awake and able to answer some questions at this time.

## 2024-11-18 NOTE — TELEPHONE ENCOUNTER
Left message for patient to call office to reschedule echo and f/u. Please transfer patient to office to speak to Lili.

## 2024-11-18 NOTE — PLAN OF CARE
Goal Outcome Evaluation:  Plan of Care Reviewed With: patient           Outcome Evaluation: 68 y/o female admitted on 11/17 due to progressive weakness as reported by spouse with resulting falls at home. Noted patient with multiple bruises all over. Patient is very drowsy during eval thus unable to answer any questions. Patient found with +UTI. PMH includes dementia, cva, afib, DDD. Pt lives with her spouse who works 2x/week and her son and DIL. Per pt spouse, she was using a RW for functional mobility, however she has had several falls. She does require assistance with ADls. THis date, pt very drousy and required cues and increased time to wake up. She required mod A for bed mobility, Pt required cues to keep eyes open with increased cues. She required min A for STS and for functional mobility short household distance to the commode. She required dependent A for kacie care after toileting. Pt would benefit from A to the commode and a bladder schedule. This date OT recommending SNF at d/c, however family requesting to take her home with  OT. OT will follow.    Anticipated Discharge Disposition (OT): skilled nursing facility

## 2024-11-18 NOTE — DISCHARGE PLACEMENT REQUEST
"Andree Sales (69 y.o. Female)       Date of Birth   1955    Social Security Number       Address   [address] [place] [address]    Home Phone   [phone]    MRN   [id number]       Amish   Confucianist    Marital Status                               Admission Date   11/17/24    Admission Type   Emergency    Admitting Provider   Екатерина Valenzuela MD    Attending Provider   Brammell, Timothy Duane, MD    Department, Room/Bed   [facility] EMERGENCY DEPARTMENT, EDH2/2       Discharge Date       Discharge Disposition       Discharge Destination                                 Attending Provider: Brammell, Timothy Duane, MD    Allergies: Desvenlafaxine    Isolation: None   Infection: None   Code Status: CPR    Ht: 165.1 cm (65\")   Wt: 77.1 kg (170 lb)    Admission Cmt: None   Principal Problem: Weakness [R53.1]                   Active Insurance as of 11/17/2024       Primary Coverage       Payor Plan Insurance Group Employer/Plan Group    HUMANA MEDICARE REPLACEMENT HUMANA MED ADV HMO [id number]       Payor Plan Address Payor Plan Phone Number Payor Plan Fax Number Effective Dates    [address] [address] [phone]  10/1/2024 - None Entered    [facility] [id number]         Subscriber Name Subscriber Birth Date Member ID       ANDREE SALES 1955 [id number]                     Emergency Contacts        (Rel.) Home Phone Work Phone Mobile Phone    AUSTIN SALES (Spouse) [phone] -- [phone]    Eulalio Sales (Son) -- -- [phone]    Gold Sales (Son) -- -- [phone]                "

## 2024-11-18 NOTE — PLAN OF CARE
Pt resting. Pt did have c/o mouth dryness. Lip balm applied and water given. Family at bedside. Pt had recent diagnosis of dementia.  requesting pt does not find out. Pt is does have intermit agitation, but she is currently in good spirits and accepting medical care.  states that at home pt is able to ambulate with walker. Pt currently not able to ambulate without 2 assist. Pt very confused and unsteady. Potassium replacement initiated. Ivabx for uti. No further orders at this time.  Care continues.   Problem: Violence Risk or Actual  Goal: Anger and Impulse Control  Intervention: Minimize Safety Risk  Recent Flowsheet Documentation  Taken 11/17/2024 2034 by Lilibeth Madsen, RN  Behavior Management: impulse control promoted  Sensory Stimulation Regulation:   auditory stimulation minimized   care clustered   lighting decreased  De-Escalation Techniques: 1:1 observation initiated  Enhanced Safety Measures:   bed alarm set    at bedside  Intervention: Promote Self-Control  Recent Flowsheet Documentation  Taken 11/17/2024 2034 by Lilibeth Madsen, RN  Supportive Measures: active listening utilized  Environmental Support: calm environment promoted   Goal Outcome Evaluation:

## 2024-11-18 NOTE — ED NOTES
ED RN Chi attempted for nearly 20 minutes to obtain repeat v/s. Pt refused multiple times, family attempted to speak to with pt about obtaining vitals, but pt still refused. Pt refused to let Dr. Valenzuela perform an assessment or answer any of her questions regarding medical hx.

## 2024-11-19 ENCOUNTER — TELEPHONE (OUTPATIENT)
Dept: CARDIOLOGY | Facility: CLINIC | Age: 69
End: 2024-11-19

## 2024-11-19 PROBLEM — G93.40 ENCEPHALOPATHY: Status: ACTIVE | Noted: 2024-11-19

## 2024-11-19 LAB — BACTERIA SPEC AEROBE CULT: ABNORMAL

## 2024-11-19 PROCEDURE — 25010000002 LORAZEPAM PER 2 MG: Performed by: HOSPITALIST

## 2024-11-19 PROCEDURE — 25010000002 CEFTRIAXONE PER 250 MG: Performed by: FAMILY MEDICINE

## 2024-11-19 PROCEDURE — 63710000001 DRONABINOL PER 5 MG: Performed by: FAMILY MEDICINE

## 2024-11-19 RX ORDER — DRONABINOL 5 MG/1
5 CAPSULE ORAL 2 TIMES DAILY
Status: DISCONTINUED | OUTPATIENT
Start: 2024-11-19 | End: 2024-11-21 | Stop reason: HOSPADM

## 2024-11-19 RX ORDER — MEGESTROL ACETATE 40 MG/ML
800 SUSPENSION ORAL DAILY
Status: DISCONTINUED | OUTPATIENT
Start: 2024-11-19 | End: 2024-11-19 | Stop reason: ALTCHOICE

## 2024-11-19 RX ORDER — RISPERIDONE 1 MG/1
1 TABLET ORAL EVERY 12 HOURS SCHEDULED
Status: DISCONTINUED | OUTPATIENT
Start: 2024-11-19 | End: 2024-11-21 | Stop reason: HOSPADM

## 2024-11-19 RX ADMIN — BISOPROLOL FUMARATE 5 MG: 5 TABLET, FILM COATED ORAL at 10:26

## 2024-11-19 RX ADMIN — CEFTRIAXONE 1000 MG: 1 INJECTION, POWDER, FOR SOLUTION INTRAMUSCULAR; INTRAVENOUS at 17:28

## 2024-11-19 RX ADMIN — LAMOTRIGINE 100 MG: 100 TABLET ORAL at 10:26

## 2024-11-19 RX ADMIN — LORAZEPAM 0.5 MG: 2 INJECTION INTRAMUSCULAR; INTRAVENOUS at 20:54

## 2024-11-19 RX ADMIN — Medication 10 ML: at 10:27

## 2024-11-19 RX ADMIN — CYANOCOBALAMIN TAB 500 MCG 1000 MCG: 500 TAB at 10:26

## 2024-11-19 RX ADMIN — APIXABAN 5 MG: 5 TABLET, FILM COATED ORAL at 10:26

## 2024-11-19 RX ADMIN — Medication 10 ML: at 20:20

## 2024-11-19 RX ADMIN — RISPERIDONE 1 MG: 1 TABLET, FILM COATED ORAL at 17:28

## 2024-11-19 RX ADMIN — LORAZEPAM 0.5 MG: 2 INJECTION INTRAMUSCULAR; INTRAVENOUS at 10:41

## 2024-11-19 RX ADMIN — LORAZEPAM 0.5 MG: 2 INJECTION INTRAMUSCULAR; INTRAVENOUS at 14:23

## 2024-11-19 NOTE — PROGRESS NOTES
[facility] MEDICINE SERVICE  DAILY PROGRESS NOTE    NAME: Andree Deluna  : 1955  MRN: [id number]      LOS: 2 days     PROVIDER OF SERVICE: Sharon Amos PA-C    Chief Complaint: Weakness    Subjective:     Interval History:  History taken from: chart family RN    Patient disoriented and is in 4 point restraints.  Lengthy discussion with patient's daughter-in-law at bedside, she reports patient has had signs of dementia for years however was only recently diagnosed.  Patient also has had significant weight loss, is not eating or drinking much at home, is now incontinent.  Patient's  and son are not at bedside.  Patient has been pocketing medications.        Review of Systems:   Review of Systems   Unable to perform ROS: Dementia       Objective:     Vital Signs  Temp:  [97.5 °F (36.4 °C)-99.1 °F (37.3 °C)] 97.8 °F (36.6 °C)  Heart Rate:  [71-84] 73  Resp:  [16-30] 18  BP: (127-159)/(72-93) 159/93   Body mass index is 28.29 kg/m².    Physical Exam  Physical Exam  Constitutional:       Appearance: Normal appearance.      Comments: Sleeping upon entering room, however, woke up spontaneously. She is in four point restraints   HENT:      Head: Normocephalic and atraumatic.      Mouth/Throat:      Mouth: Mucous membranes are moist.   Eyes:      Extraocular Movements: Extraocular movements intact.   Cardiovascular:      Rate and Rhythm: Normal rate and regular rhythm.   Pulmonary:      Effort: Pulmonary effort is normal.      Breath sounds: Normal breath sounds.   Abdominal:      General: Abdomen is flat.   Musculoskeletal:         General: Normal range of motion.      Cervical back: Normal range of motion.      Right lower leg: No edema.      Left lower leg: No edema.   Skin:     General: Skin is warm.   Neurological:      General: No focal deficit present.      Mental Status: She is alert. She is disoriented.            Diagnostic Data    Results from last 7 days   Lab Units 24  0066  11/17/24  1437   WBC 10*3/mm3 7.59 8.52   HEMOGLOBIN g/dL 12.2 12.3   HEMATOCRIT % 38.6 40.3   PLATELETS 10*3/mm3 207 216   GLUCOSE mg/dL 100* 110*   CREATININE mg/dL 0.58 0.74   BUN mg/dL 13 15   SODIUM mmol/L 142 140   POTASSIUM mmol/L 4.1 3.2*   AST (SGOT) U/L  --  55*   ALT (SGPT) U/L  --  33   ALK PHOS U/L  --  178*   BILIRUBIN mg/dL  --  1.5*   ANION GAP mmol/L 9.1 9.1       XR Chest 1 View    Result Date: 11/17/2024  Impression: 1.Cardiomegaly. There is evidence for prior cardiothoracic surgery. Electronically Signed: Miguel Angel Hicks MD  11/17/2024 4:01 PM EST  Workstation ID: [id number]       I reviewed the patient's new clinical results.    Assessment/Plan:     Active and Resolved Problems  Active Hospital Problems    Diagnosis  POA    **Weakness [R53.1]  Yes    Encephalopathy [G93.40]  Yes      Resolved Hospital Problems   No resolved problems to display.     Generalized weakness  Acute encephalopathy  Dementia  Hypertension  Hyperlipidemia  CAD status post CABG x 3  VHD status post mitral valve repair  Atrial fibrillation  HFpEF  History of CVA  Anxiety/mood disorder  GERD    Acute encephalopathy is likely multifactorial from underlying dementia, polypharmacy, UTI.  Had lengthy discussion with patient's daughter-in-law who is at bedside.  She states patient has had signs of dementia for years but was only recently diagnosed.  She reports patient has had significant weight loss, urinary incontinence, is not eating or drinking much at home, pocketing pills.  Discussed with her about the general progression of dementia.  Concerned about long-term nutrition and briefly brought up artificial nutrition. Patient's  and son are not at bedside, will need to discuss with them regarding goals of care.  Overall prognosis is poor.    Urine culture is growing E. coli, susceptibilities pending.  Continue ceftriaxone.    Diuretics on hold.  Patient is on Lamictal, no history of seizures.  Daughter-in-law reports that  she has been on this medication for years, likely taking for mood disorder.  Alprazolam dose was decreased. Avoid sedating drugs if at all possible.  Poor nutrition likely contributing to polypharmacy.      VTE Prophylaxis:  Pharmacologic & mechanical VTE prophylaxis orders are present.             Disposition Planning:     Barriers to Discharge:discussion of goals of care, improvement in mental status, final culture report  Anticipated Date of Discharge: 11/20  Place of Discharge: home      Time: 45 minutes     Code Status and Medical Interventions: CPR (Attempt to Resuscitate); Full Support   Ordered at: 11/17/24 1752     Code Status (Patient has no pulse and is not breathing):    CPR (Attempt to Resuscitate)     Medical Interventions (Patient has pulse or is breathing):    Full Support       Signature: Electronically signed by Sharon Amos PA-C, 11/19/24, 10:05 EST.  [facility] Hospitalist Team

## 2024-11-19 NOTE — CASE MANAGEMENT/SOCIAL WORK
Continued Stay Note  REECE Kam     Patient Name: Andree Deluna  MRN: [id number]  Today's Date: 11/19/2024    Admit Date: 11/17/2024    Plan: From home with family and [facility] (current, [facility] order in). Possible SNF.   Discharge Plan       Row Name 11/19/24 1524       Plan    Plan From home with family and [facility] (current, [facility] order in). Possible SNF.    Patient/Family in Agreement with Plan yes    Provided Post Acute Provider List? Yes    Post Acute Provider List Nursing Home    Delivered To Support Person    Support Person Daughter in law-Michael    Method of Delivery In person    Plan Comments CM met with patient, son Eulalio, and daughter in law Michael at bedside. Discussed therapy's recommendation for SNF and family wants to wait for patient's spouse to return to discuss further. SNF list provided with CM contact info. Palliative consult per hospitalist Dr Boyd.             Megan Naegele, RN     Office Phone: [phone]  Office Cell: [phone]

## 2024-11-19 NOTE — CONSULTS
Palliative Care Social Work Progress Note    Code Status:full code    Goals of Care: Full Treatment    Narrative: Palliative care  met with patients son Eulalio and spouse at bedside to discuss goals of care. Family shared patient was previous with HH and they would like to resume services at discharge. Patient currently in restraints after behaviors. Family shared patient usually has behaviors while in the hospital but does better at home. Education provided on hospive v OP palliative care. Family agreeable to outpatient palliative care with hopes of being able to resume home health. Referral placed to Kaleb from [facility]. Code status discussed and family would like to keep patient a full code with intervention for now. They state patient would not want to be intubated but would like additional time to discuss as a family before making changes. Emotional support provided. CM updated.     Plan: [facility] referral          Jocelin Ramirez

## 2024-11-19 NOTE — TELEPHONE ENCOUNTER
Caller: AUSTIN SALES    Relationship to patient: Emergency Contact    Best call back number: [phone]    Chief complaint: NA    Type of visit: TESTING & FU    Requested date: TBD     If rescheduling, when is the original appointment: PATIENT'S SPOUSE WASN'T SURE    Additional notes: PATIENT IS CURRENTLY ADMITTED IN [facility] ROOM 363. HAS TESTING AND FU, SPOUSE WASN'T SURE WHEN, STATED THAT IT NEEDED TO BE MOVED TILL SHE GETS OUT OF THE HOSPITAL. PLEASE ADVISE.

## 2024-11-19 NOTE — PLAN OF CARE
Goal Outcome Evaluation:      Patient remains in restraints with Epic sitter. Patient stable at this time.

## 2024-11-20 ENCOUNTER — TELEPHONE (OUTPATIENT)
Dept: FAMILY MEDICINE CLINIC | Facility: CLINIC | Age: 69
End: 2024-11-20
Payer: MEDICARE

## 2024-11-20 PROCEDURE — 25010000002 CEFTRIAXONE PER 250 MG: Performed by: FAMILY MEDICINE

## 2024-11-20 PROCEDURE — 63710000001 DRONABINOL PER 5 MG: Performed by: FAMILY MEDICINE

## 2024-11-20 PROCEDURE — 99221 1ST HOSP IP/OBS SF/LOW 40: CPT

## 2024-11-20 PROCEDURE — 25010000002 LORAZEPAM PER 2 MG: Performed by: HOSPITALIST

## 2024-11-20 RX ADMIN — ATORVASTATIN CALCIUM 40 MG: 40 TABLET, FILM COATED ORAL at 20:39

## 2024-11-20 RX ADMIN — APIXABAN 5 MG: 5 TABLET, FILM COATED ORAL at 08:46

## 2024-11-20 RX ADMIN — BISOPROLOL FUMARATE 5 MG: 5 TABLET, FILM COATED ORAL at 08:46

## 2024-11-20 RX ADMIN — Medication 5 MG: at 20:39

## 2024-11-20 RX ADMIN — Medication 10 ML: at 20:17

## 2024-11-20 RX ADMIN — ALPRAZOLAM 1 MG: 1 TABLET ORAL at 20:39

## 2024-11-20 RX ADMIN — Medication 10 ML: at 08:46

## 2024-11-20 RX ADMIN — DRONABINOL 5 MG: 5 CAPSULE ORAL at 16:48

## 2024-11-20 RX ADMIN — CEFTRIAXONE 1000 MG: 1 INJECTION, POWDER, FOR SOLUTION INTRAMUSCULAR; INTRAVENOUS at 16:48

## 2024-11-20 RX ADMIN — APIXABAN 5 MG: 5 TABLET, FILM COATED ORAL at 20:39

## 2024-11-20 RX ADMIN — CYANOCOBALAMIN TAB 500 MCG 1000 MCG: 500 TAB at 08:46

## 2024-11-20 RX ADMIN — RISPERIDONE 1 MG: 1 TABLET, FILM COATED ORAL at 20:39

## 2024-11-20 RX ADMIN — RISPERIDONE 1 MG: 1 TABLET, FILM COATED ORAL at 08:46

## 2024-11-20 RX ADMIN — LAMOTRIGINE 100 MG: 100 TABLET ORAL at 20:40

## 2024-11-20 RX ADMIN — DONEPEZIL HYDROCHLORIDE 5 MG: 5 TABLET, FILM COATED ORAL at 20:39

## 2024-11-20 RX ADMIN — LORAZEPAM 0.5 MG: 2 INJECTION INTRAMUSCULAR; INTRAVENOUS at 05:24

## 2024-11-20 RX ADMIN — LORAZEPAM 0.5 MG: 2 INJECTION INTRAMUSCULAR; INTRAVENOUS at 22:55

## 2024-11-20 RX ADMIN — LAMOTRIGINE 100 MG: 100 TABLET ORAL at 08:46

## 2024-11-20 NOTE — PROGRESS NOTES
[facility] MEDICINE SERVICE  DAILY PROGRESS NOTE    NAME: Andree Deluna  : 1955  MRN: [id number]      LOS: 3 days     PROVIDER OF SERVICE: Danielle Boyd MD    Chief Complaint: Weakness    Subjective:     Interval History:  History taken from: patient    Patient remains alert however she is confused and disoriented.   is present at bedside.  Patient denies any pain.  She remains in upper extremity restraints.        Review of Systems:   Review of Systems   Psychiatric/Behavioral:  Positive for agitation, behavioral problems, confusion and hallucinations. The patient is nervous/anxious.    All other systems reviewed and are negative.      Objective:     Vital Signs  Temp:  [97.5 °F (36.4 °C)-98.9 °F (37.2 °C)] 98.9 °F (37.2 °C)  Heart Rate:  [] 95  Resp:  [14-17] 17  BP: (146-168)/(87-97) 168/97   Body mass index is 28.29 kg/m².    Physical Exam  Physical Exam  Constitutional:       General: She is awake.      Appearance: She is well-developed and well-groomed. She is ill-appearing.   HENT:      Head: Normocephalic and atraumatic.      Nose: Nose normal.      Mouth/Throat:      Mouth: Mucous membranes are moist.      Pharynx: Oropharynx is clear.   Eyes:      Extraocular Movements: Extraocular movements intact.      Conjunctiva/sclera: Conjunctivae normal.      Pupils: Pupils are equal, round, and reactive to light.   Cardiovascular:      Rate and Rhythm: Normal rate and regular rhythm.      Pulses: Normal pulses.      Heart sounds: Normal heart sounds.   Pulmonary:      Effort: Pulmonary effort is normal.      Breath sounds: Normal breath sounds.   Abdominal:      General: Abdomen is flat. Bowel sounds are normal.      Palpations: Abdomen is soft.   Musculoskeletal:         General: Normal range of motion.      Cervical back: Normal range of motion and neck supple.      Right lower leg: No edema.      Left lower leg: No edema.   Skin:     General: Skin is warm and dry.    Neurological:      General: No focal deficit present.      Mental Status: She is alert. Mental status is at baseline. She is disoriented.      Cranial Nerves: Cranial nerves 2-12 are intact.      Sensory: Sensation is intact.      Motor: Motor function is intact.   Psychiatric:         Mood and Affect: Mood normal.         Behavior: Behavior is cooperative.            Diagnostic Data    Results from last 7 days   Lab Units 11/18/24  0617 11/17/24  1437   WBC 10*3/mm3 7.59 8.52   HEMOGLOBIN g/dL 12.2 12.3   HEMATOCRIT % 38.6 40.3   PLATELETS 10*3/mm3 207 216   GLUCOSE mg/dL 100* 110*   CREATININE mg/dL 0.58 0.74   BUN mg/dL 13 15   SODIUM mmol/L 142 140   POTASSIUM mmol/L 4.1 3.2*   AST (SGOT) U/L  --  55*   ALT (SGPT) U/L  --  33   ALK PHOS U/L  --  178*   BILIRUBIN mg/dL  --  1.5*   ANION GAP mmol/L 9.1 9.1       No radiology results for the last day      I reviewed the patient's new clinical results.    Assessment/Plan:     Active and Resolved Problems  Active Hospital Problems    Diagnosis  POA    **Weakness [R53.1]  Yes    Encephalopathy [G93.40]  Yes      Resolved Hospital Problems   No resolved problems to display.   Generalized weakness  Acute encephalopathy  Dementia  Hypertension  Hyperlipidemia  CAD status post CABG x 3  VHD status post mitral valve repair  Atrial fibrillation  HFpEF  History of CVA  Anxiety/mood disorder  GERD    Acute encephalopathy is likely multifactorial from underlying dementia, polypharmacy as well as underlying UTI.  I had a discussion with patient's family members present yesterday as well as earlier this morning.  They wish to continue full care including the possibility of PEG tube placement.  Patient has had significant weight loss urinary continence issues as well as increasing confusion at home.  Family member state the patient does much better at home and they declined any hospice services or the idea of palliative care.  We went ahead and added Marinol 5 mg p.o. twice  daily yesterday.  We will add Risperdal 2 mg p.o. twice daily now.  She was started on 1 mg last night.  This is because minimal improvement.  Will go ahead and consult psychiatry as well for behavioral modification.    Patient's prognosis is poor.  I have advised hospice consideration however family refuses at the present time.    Urine culture consistent with E. coli.  Susceptibilities consistent with sensitivity to Rocephin    Improved behaviors, patient able to increase p.o. intake, as well as we are able to get patient out of restraints family members wish to take patient home.  We discussed the possibility of disposition to nursing home facility secondary to increased level of care at home however family members declined this as well stating they can do a better job in their home.    VTE Prophylaxis:  Pharmacologic & mechanical VTE prophylaxis orders are present.             Disposition Planning:     Barriers to Discharge: Improvement of mentation, behavioral control  Anticipated Date of Discharge: 11/21/2024  Place of Discharge: Home as per family request      Time: 45 minutes     Code Status and Medical Interventions: CPR (Attempt to Resuscitate); Full Support   Ordered at: 11/17/24 7738     Code Status (Patient has no pulse and is not breathing):    CPR (Attempt to Resuscitate)     Medical Interventions (Patient has pulse or is breathing):    Full Support       Signature: Electronically signed by Danielle Boyd MD, 11/20/24, 09:51 EST.  [facility] Hospitalist Team

## 2024-11-20 NOTE — PLAN OF CARE
Goal Outcome Evaluation:      Patient refused to take meds, and became very agitated and verbally aggressive. Remains in restraints with epic sitter. Patient stable at this time.

## 2024-11-20 NOTE — SIGNIFICANT NOTE
11/20/24 0810   OTHER   Discipline occupational therapist   Rehab Time/Intention   Session Not Performed other (see comments)  (Agitated, in restraints. Will followup as appropriate for therapy paricipation.)   Recommendation   OT - Next Appointment 11/21/24

## 2024-11-20 NOTE — PLAN OF CARE
Goal Outcome Evaluation:              Outcome Evaluation: Family has been at bedside and states that she has been declining at home and has been increasingly confused. Pt remains in restraints for safety. Pt will go home with spouse at discharge.

## 2024-11-20 NOTE — SIGNIFICANT NOTE
11/20/24 1541   OTHER   Discipline physical therapy assistant   Rehab Time/Intention   Session Not Performed other (see comments)  (Pt with agitatin and on soft restraints. Not appropriate for PT at this time. Will follow up tomorrow if appropriate.)   Therapy Assessment/Plan (PT)   Criteria for Skilled Interventions Met (PT) yes   Recommendation   PT - Next Appointment 11/21/24

## 2024-11-20 NOTE — TELEPHONE ENCOUNTER
Sol from the [facility] called to let us know that the verbal release on patient dated 08/06/2019 is scanned in upside down and her supervisor said that needed to be corrected as soon as possible.

## 2024-11-20 NOTE — CONSULTS
Referring Provider: Danielle Boyd MD  Reason for Consultation: behavioral disturbance, dementia      Chief complaint : dementia with behavioral disturbance    Subjective .     History of present illness:  The patient is a 69 y.o. female who was admitted secondary to secondary to generalized weakness.      PMH: CAD status post CABG,Hypertension, history of CVA, hallucinations, CKD 3a, A-fib     Patient is being treated for a UTI. Primary team added risperidone 2mg BID today. She was started on 1mg yesterday with minimal improvement. They have advised hospice consideration but family refused at present time.     Patient was seen this afternoon. She was sleeping, and did not arouse to her name being called. She is in upper extremity restraints. Her  present at bedside was available and able to give history.  The patient's  reports that she was only recently diagnosed with dementia, and they had not told the patient. He says the agitation escalated with the UTI. Patient was declining at home with weight loss, not eating or drinking much, and incontinence, which prompted family to see dementia diagnosis, but this only recently happened.     The patient's spouse said that she was awake and agitated all last night, and is finally sleeping today. I explained to him that patient's with pre-existing dementia are prone to a worsening of their mental status with infections, like the UTI, as well as hospital admissions.       Review of Systems   Review of systems could not be obtained due to   patient confusion. patient sedation status.    The following portions of the patient's history were reviewed and updated as appropriate: allergies, current medications, past family history, past medical history, past social history, past surgical history and problem list.    History    Past psychiatric history : dementia     Past Medical History:   Diagnosis Date    ADHD unknown    Anemia     Anesthesia complication      difficult to wake up    Anxiety     Atrial fibrillation     Carotid artery disease     80% right internal carotid artery    Coronary artery disease     CVA (cerebral vascular accident)     right parietal right temporal    DDD (degenerative disc disease), lumbar     Depression     Extremity pain     Ankle pain    GERD (gastroesophageal reflux disease)     Hyperlipidemia unknown    Hypertension     Insomnia unknown    Laryngopharyngeal reflux     Low back pain     Mood disorder     Obesity unknown    Short of breath on exertion     Skin cancer of face     Sleep apnea     Suspected sleep apnea she has refused to be tested    Stroke (cerebrum)     Visual field defect     Left          Family History   Problem Relation Age of Onset    Diabetes Other     Heart disease Mother     Diabetes Mother     Heart disease Father         Social History     Tobacco Use    Smoking status: Never     Passive exposure: Never    Smokeless tobacco: Never   Vaping Use    Vaping status: Never Used   Substance Use Topics    Alcohol use: No    Drug use: No          Medications Prior to Admission   Medication Sig Dispense Refill Last Dose/Taking    ALPRAZolam (XANAX) 2 MG tablet TAKE 1 TABLET BY MOUTH NIGHTLY AS NEEDED FOR ANXIETY 30 tablet 3 11/16/2024 at  9:00 PM    apixaban (ELIQUIS) 5 MG tablet tablet Take 1 tablet by mouth Every 12 (Twelve) Hours. Indications: Atrial Fibrillation 180 tablet 1 11/17/2024 at  9:00 AM    aspirin 81 MG chewable tablet Chew 1 tablet Daily. Indications: antiplatelet (Patient taking differently: Chew 1 tablet Daily. Stop for 5 days prior to surg, last dose 11/2  Indications: antiplatelet) 90 tablet 3 11/17/2024 at  9:00 AM    atorvastatin (LIPITOR) 80 MG tablet TAKE 1 TABLET BY MOUTH ONCE DAILY AT BEDTIME FOR  HIGH  LEVEL  OF  FATS  IN  BLOOD 90 tablet 3 11/16/2024 at  9:00 PM    bisoprolol (ZEBeta) 5 MG tablet Take 1 tablet by mouth Daily. 90 tablet 3 11/17/2024 at  9:00 AM    donepezil (ARICEPT) 5 MG tablet  Take 1 tablet by mouth Every Night.   11/16/2024 at  9:00 PM    ferrous sulfate 325 (65 FE) MG tablet Take 1 tablet by mouth Every Other Day. 30 tablet 2 Past Week    furosemide (LASIX) 40 MG tablet TAKE 1/2 (ONE-HALF) TABLET BY MOUTH ONCE DAILY FOR EDEMA (Patient taking differently: Take 0.5 tablets by mouth Daily.) 45 tablet 1 11/17/2024 at  9:00 AM    lamoTRIgine (LaMICtal) 100 MG tablet Take 1 tablet by mouth twice daily 180 tablet 0 11/17/2024 at  9:00 AM    mirtazapine (REMERON) 15 MG tablet Take 0.5 tablets by mouth Every Night.   11/16/2024 at  9:00 PM    omeprazole (priLOSEC) 40 MG capsule Take 1 capsule by mouth once daily 90 capsule 0 11/17/2024 at  9:00 AM    oxybutynin (DITROPAN) 5 MG tablet TAKE 1 TABLET BY MOUTH DAILY 90 tablet 1 11/17/2024 at  9:00 AM    traZODone (DESYREL) 100 MG tablet Take 1 tablet by mouth Every Night.   11/16/2024 at  9:00 PM    vitamin B-12 (CYANOCOBALAMIN) 1000 MCG tablet Take 1 tablet by mouth Daily.   Past Week    citalopram (CeleXA) 10 MG tablet Take 1 tablet by mouth Daily for 180 days. 30 tablet 5     lisinopril (PRINIVIL,ZESTRIL) 10 MG tablet Take 1 tablet by mouth once daily 90 tablet 0     lisinopril-hydrochlorothiazide (PRINZIDE,ZESTORETIC) 20-25 MG per tablet TAKE 1 TABLET BY MOUTH DAILY (Patient not taking: Reported on 10/17/2024) 90 tablet 0         Scheduled Meds:  apixaban, 5 mg, Oral, Q12H  atorvastatin, 40 mg, Oral, Nightly  bisoprolol, 5 mg, Oral, Daily  cefTRIAXone, 1,000 mg, Intravenous, Q24H  donepezil, 5 mg, Oral, Nightly  dronabinol, 5 mg, Oral, BID  lamoTRIgine, 100 mg, Oral, BID  pantoprazole, 40 mg, Oral, Q AM  risperiDONE, 1 mg, Oral, Q12H  sodium chloride, 10 mL, Intravenous, Q12H  vitamin B-12, 1,000 mcg, Oral, Daily         Continuous Infusions:       PRN Meds:    acetaminophen **OR** acetaminophen **OR** acetaminophen    ALPRAZolam    aluminum-magnesium hydroxide-simethicone    senna-docusate sodium **AND** polyethylene glycol **AND** bisacodyl  "**AND** bisacodyl    Calcium Replacement - Follow Nurse / BPA Driven Protocol    LORazepam    Magnesium Cardiology Dose Replacement - Follow Nurse / BPA Driven Protocol    melatonin    ondansetron ODT **OR** ondansetron    Phosphorus Replacement - Follow Nurse / BPA Driven Protocol    Potassium Replacement - Follow Nurse / BPA Driven Protocol    [COMPLETED] Insert Peripheral IV **AND** sodium chloride    sodium chloride    sodium chloride      Allergies:  Desvenlafaxine      Objective     Vital Signs   /97 (BP Location: Right arm, Patient Position: Lying)   Pulse 95   Temp 98.9 °F (37.2 °C) (Axillary)   Resp 17   Ht 165.1 cm (65\")   Wt 77.1 kg (170 lb)   SpO2 96%   BMI 28.29 kg/m²     Physical Exam:    Musculoskeletal:   Muscle strength and tone: DEBORAH  Abnormal Movements: None noted.   Gait: DEBORAH, patient in bed.      General Appearance:    In bed, in NAD.      MENTAL STATUS EXAM   General Appearance:  Cleanly groomed and dressed  Eye Contact:  Closed  Attitude: unable to cooperate.  Speech:  Other  Other Comment:  DEBORAH  Language:  Other  Other Comment:  DEBORAH  Mood and affect:  Other  Other Comment:  DEBORAH  Thought Process:  Other  Other Comment:  DEBORAH  Associations/ Thought Content:  Other  Other Comment:  DEBORAH  Suicidal Ideations:  Other  Other Comment:  DEBORAH  Homicidal Ideation:  Other  Other Comment:  DEBORAH  Sensorium:  Confused  Orientation:  Person  Immediate Recall, Recent, and Remote Memory:  Deficit noted  Attention Span/ Concentration:  Poor  Fund of Knowledge:  Poor  Intellectual Functioning:  Below average  Insight:  Poor  Judgement:  Poor  Reliability:  Poor  Impulse Control:  Poor          Medications and allergies reviewed.    Result Review:  I have personally reviewed the results from the time of this admission to 11/20/2024 15:27 EST and agree with these findings:  [x]  Laboratory  []  Microbiology  []  Radiology  [x]  EKG/Telemetry   []  Cardiology/Vascular   []  Pathology  []  Old records  []  " Other:  Most notable findings include: , sodium 142    Assessment & Plan       Weakness    Encephalopathy       Assessment: dementia with behavioral disturbance, likely with delirium due to medical condition (UTI)  Treatment Plan: Patient presents with worsening mental status and agitation. She has a history of dementia and is being treated for UTI. Her dementia sounds pretty advanced, from family report, and she likely has had a worsening of her mental status due to delirium.     At the time of my assessment, patient is calm. Primary team has added risperidone 1mg BID. OK to continue.     Continue non-pharmacologic delirium precautions.     Continue supportive treatment.     Will continue to follow.     Treatment Plan discussed with: Family    I discussed the patients findings and my recommendations with patient and family    I have reviewed and approved the behavioral health treatment plans and problem list. Yes  Thank you for the consult   Referring MD has access to consult report and progress notes in EMR     SARAHY Paredes  11/20/24  15:27 EST

## 2024-11-21 ENCOUNTER — READMISSION MANAGEMENT (OUTPATIENT)
Dept: CALL CENTER | Facility: HOSPITAL | Age: 69
End: 2024-11-21
Payer: MEDICARE

## 2024-11-21 VITALS
HEART RATE: 78 BPM | OXYGEN SATURATION: 100 % | BODY MASS INDEX: 25.34 KG/M2 | SYSTOLIC BLOOD PRESSURE: 135 MMHG | WEIGHT: 152.12 LBS | TEMPERATURE: 97.1 F | DIASTOLIC BLOOD PRESSURE: 92 MMHG | HEIGHT: 65 IN | RESPIRATION RATE: 21 BRPM

## 2024-11-21 PROCEDURE — 97530 THERAPEUTIC ACTIVITIES: CPT

## 2024-11-21 PROCEDURE — 97535 SELF CARE MNGMENT TRAINING: CPT

## 2024-11-21 PROCEDURE — 63710000001 DRONABINOL PER 5 MG: Performed by: FAMILY MEDICINE

## 2024-11-21 PROCEDURE — 97116 GAIT TRAINING THERAPY: CPT

## 2024-11-21 PROCEDURE — 25010000002 LORAZEPAM PER 2 MG: Performed by: HOSPITALIST

## 2024-11-21 RX ORDER — DRONABINOL 2.5 MG/1
5 CAPSULE ORAL 2 TIMES DAILY
Qty: 16 CAPSULE | Refills: 0 | Status: SHIPPED | OUTPATIENT
Start: 2024-11-21 | End: 2024-11-25

## 2024-11-21 RX ORDER — RISPERIDONE 1 MG/1
1 TABLET ORAL EVERY 12 HOURS SCHEDULED
Qty: 60 TABLET | Refills: 0 | Status: SHIPPED | OUTPATIENT
Start: 2024-11-21

## 2024-11-21 RX ADMIN — LORAZEPAM 0.5 MG: 2 INJECTION INTRAMUSCULAR; INTRAVENOUS at 02:02

## 2024-11-21 RX ADMIN — APIXABAN 5 MG: 5 TABLET, FILM COATED ORAL at 12:16

## 2024-11-21 RX ADMIN — Medication 10 ML: at 12:19

## 2024-11-21 RX ADMIN — RISPERIDONE 1 MG: 1 TABLET, FILM COATED ORAL at 12:16

## 2024-11-21 RX ADMIN — PANTOPRAZOLE SODIUM 40 MG: 40 TABLET, DELAYED RELEASE ORAL at 05:13

## 2024-11-21 RX ADMIN — DRONABINOL 5 MG: 5 CAPSULE ORAL at 13:51

## 2024-11-21 RX ADMIN — CYANOCOBALAMIN TAB 500 MCG 1000 MCG: 500 TAB at 12:16

## 2024-11-21 RX ADMIN — LAMOTRIGINE 100 MG: 100 TABLET ORAL at 12:16

## 2024-11-21 RX ADMIN — BISOPROLOL FUMARATE 5 MG: 5 TABLET, FILM COATED ORAL at 12:16

## 2024-11-21 NOTE — PLAN OF CARE
"Assessment: Andree Deluna presents with ADL impairments affecting function including balance, cognition, endurance / activity tolerance, and strength. Pt much improved this date, but remains very confused and requires many cues and task segmentation for proper completion. Able to follow one step commands well. Demonstrated functioning below baseline abilities indicate the need for continued skilled intervention while inpatient. Tolerating session today without incident. Will continue to follow and progress as tolerated.      Plan/Recommendations:   Moderate Intensity Therapy recommended post-acute care. This is recommended as therapy feels the patient would require 3-4 days per week and wouldn't tolerate \"3 hour daily\" rehab intensity. SNF would be the preferred choice. If the patient does not agree to SNF, arrange HH or OP depending on home bound status. If patient is medically complex, consider LTACH.. Pt requires no DME at discharge.      Pt desires Home with Home Health and Home with family assist at discharge. Pt cooperative; agreeable to therapeutic recommendations and plan of care.     "

## 2024-11-21 NOTE — PROGRESS NOTES
[facility] MEDICINE SERVICE  DAILY PROGRESS NOTE    NAME: Andree Deluna  : 1955  MRN: [id number]      LOS: 4 days     PROVIDER OF SERVICE: Danielle Boyd MD    Chief Complaint: Weakness    Subjective:     Interval History:  History taken from: patient family    No acute overnight events noted.  Patient is somnolent this morning.  She remains in upper extremity restraints.   is present at bedside.        Review of Systems:   Review of Systems   All other systems reviewed and are negative.      Objective:     Vital Signs  Temp:  [97.6 °F (36.4 °C)-98.4 °F (36.9 °C)] 97.6 °F (36.4 °C)  Heart Rate:  [67-83] 67  Resp:  [13-26] 21  BP: (126-165)/(80-92) 143/84   Body mass index is 25.31 kg/m².    Physical Exam  Physical Exam  Constitutional:       General: She is awake.      Appearance: Normal appearance. She is well-developed and well-groomed.   HENT:      Head: Normocephalic and atraumatic.      Nose: Nose normal.      Mouth/Throat:      Mouth: Mucous membranes are moist.      Pharynx: Oropharynx is clear.   Eyes:      Extraocular Movements: Extraocular movements intact.      Conjunctiva/sclera: Conjunctivae normal.      Pupils: Pupils are equal, round, and reactive to light.   Cardiovascular:      Rate and Rhythm: Normal rate and regular rhythm.      Pulses: Normal pulses.      Heart sounds: Normal heart sounds.   Pulmonary:      Effort: Pulmonary effort is normal.      Breath sounds: Normal breath sounds.   Abdominal:      General: Abdomen is flat. Bowel sounds are normal.      Palpations: Abdomen is soft.   Musculoskeletal:         General: Normal range of motion.      Cervical back: Normal range of motion and neck supple.      Right lower leg: No edema.      Left lower leg: No edema.   Skin:     General: Skin is warm and dry.   Neurological:      General: No focal deficit present.      Mental Status: She is alert. Mental status is at baseline. She is disoriented.      Cranial Nerves:  Cranial nerves 2-12 are intact.      Sensory: Sensation is intact.      Motor: Motor function is intact.   Psychiatric:         Mood and Affect: Mood normal.         Behavior: Behavior is cooperative.            Diagnostic Data    Results from last 7 days   Lab Units 11/18/24  0617 11/17/24  1437   WBC 10*3/mm3 7.59 8.52   HEMOGLOBIN g/dL 12.2 12.3   HEMATOCRIT % 38.6 40.3   PLATELETS 10*3/mm3 207 216   GLUCOSE mg/dL 100* 110*   CREATININE mg/dL 0.58 0.74   BUN mg/dL 13 15   SODIUM mmol/L 142 140   POTASSIUM mmol/L 4.1 3.2*   AST (SGOT) U/L  --  55*   ALT (SGPT) U/L  --  33   ALK PHOS U/L  --  178*   BILIRUBIN mg/dL  --  1.5*   ANION GAP mmol/L 9.1 9.1       No radiology results for the last day      I reviewed the patient's new clinical results.    Assessment/Plan:     Active and Resolved Problems  Active Hospital Problems    Diagnosis  POA    **Weakness [R53.1]  Yes    Encephalopathy [G93.40]  Yes      Resolved Hospital Problems   No resolved problems to display.     Generalized weakness  Acute encephalopathy  Dementia  Hypertension  Hyperlipidemia  CAD status post CABG x 3  VHD status post mitral valve repair  Atrial fibrillation  HFpEF  History of CVA  Anxiety/mood disorder  GERD     Acute encephalopathy is likely multifactorial from underlying dementia, polypharmacy as well as underlying UTI.      Family wishes to continue full care.    Family declines disposition to rehab facility.    Continue Marinol for now.  Discontinue restraints.    Appreciate psychiatric evaluation and/or recommendations.  Continue Risperdal 2 mg p.o. twice daily for now.    Continue IV antibiotics for UTI.  May discontinue after today's dose.    Prognosis remains poor.    VTE Prophylaxis:  Pharmacologic & mechanical VTE prophylaxis orders are present.             Disposition Planning:     Barriers to Discharge: Improvement of behavior  Anticipated Date of Discharge: 11/22/2024  Place of Discharge: Home      Time: 35 minutes     Code  Status and Medical Interventions: CPR (Attempt to Resuscitate); Full Support   Ordered at: 11/17/24 1752     Code Status (Patient has no pulse and is not breathing):    CPR (Attempt to Resuscitate)     Medical Interventions (Patient has pulse or is breathing):    Full Support       Signature: Electronically signed by Danielle Boyd MD, 11/21/24, 10:04 [facility].  [facility]

## 2024-11-21 NOTE — PLAN OF CARE
Goal Outcome Evaluation:              Outcome Evaluation: patient resting comfortably in bed, vs stable, family at bedside. Patient awake and alert to self. Possible d/c today. restraints d/c'd

## 2024-11-21 NOTE — PLAN OF CARE
"Assessment: Andree Deluna presents with functional mobility impairments which indicate the need for skilled intervention. Tolerating session today without incident. Pt confused throughout session. Family reports this is near cognitive baseline. Pt requires min A for bed mobiltity, min Ax2 to stand using RW, and min A to ambulate short distances. Cuein required throughout for safety. Family planning to d/c  home with HHPT and 24/7 care. This PTA recommends SNF at discharge with possible transiting to memory care unit to provide appropriate needs and decrease caregiver burden. Will continue to follow and progress as tolerated.     Plan/Recommendations:   If medically appropriate, Moderate Intensity Therapy recommended post-acute care. This is recommended as therapy feels the patient would require 3-4 days per week and wouldn't tolerate \"3 hour daily\" rehab intensity. SNF would be the preferred choice. If the patient does not agree to SNF, arrange HH or OP depending on home bound status. If patient is medically complex, consider LTACH. Pt requires no DME at discharge.     Pt desires Skilled Rehab placement at discharge. Pt cooperative; agreeable to therapeutic recommendations and plan of care.     "

## 2024-11-21 NOTE — THERAPY TREATMENT NOTE
"Subjective: Pt supine upon arrival, with family at bedside. Agreeable to therapeutic plan of care.     Objective:     Bed mobility - Supine to sitting min A    Transfers - Min-A, Assist x 2, and with rolling walker. Cued for positioning.     Ambulation - 15 feet Min-A and with rolling walker and close chair follow.     Vitals: WNL    Pain: 0 VAS   Location:   Intervention for pain: N/A    Education: Provided education on the importance of mobility in the acute care setting, Verbal/Tactile Cues, Transfer Training, and Gait Training    Assessment: Andree Deluna presents with functional mobility impairments which indicate the need for skilled intervention. Tolerating session today without incident. Pt confused throughout session. Family reports this is near cognitive baseline. Pt requires min A for bed mobiltity, min Ax2 to stand using RW, and min A to ambulate short distances. Cuein required throughout for safety. Family planning to d/c  home with HHPT and  care. This PTA recommends SNF at discharge with possible transiting to memory care unit to provide appropriate needs and decrease caregiver burden. Will continue to follow and progress as tolerated.     Plan/Recommendations:   If medically appropriate, Moderate Intensity Therapy recommended post-acute care. This is recommended as therapy feels the patient would require 3-4 days per week and wouldn't tolerate \"3 hour daily\" rehab intensity. SNF would be the preferred choice. If the patient does not agree to SNF, arrange HH or OP depending on home bound status. If patient is medically complex, consider LTACH. Pt requires no DME at discharge.     Pt desires Skilled Rehab placement at discharge. Pt cooperative; agreeable to therapeutic recommendations and plan of care.         Basic Mobility 6-click:  Rollin = Total, A lot = 2, A little = 3; 4 = None  Supine>Sit:   1 = Total, A lot = 2, A little = 3; 4 = None   Sit>Stand with arms:  1 = Total, A lot " = 2, A little = 3; 4 = None  Bed>Chair:   1 = Total, A lot = 2, A little = 3; 4 = None  Ambulate in room:  1 = Total, A lot = 2, A little = 3; 4 = None  3-5 Steps with railin = Total, A lot = 2, A little = 3; 4 = None  Score: 12    Modified [place]: N/A = No pre-op stroke/TIA    Post-Tx Position: Up in Chair, Alarms activated, and Call light and personal items within reach  PPE: gloves    Therapy Charges for Today       Code Description Service Date Service Provider Modifiers Qty    80609093058 HC PT THERAPEUTIC ACT EA 15 MIN 2024 Getachew Rothman PTA GP 1    22569376911 HC GAIT TRAINING EA 15 MIN 2024 Getachew Rothman PTA GP 1           PT Charges       Row Name 24 1611             Time Calculation    Start Time 1400  -UN      Stop Time 1418  -UN      Time Calculation (min) 18 min  -UN      PT Received On 24  -UN      PT - Next Appointment 24  -UN         Time Calculation- PT    Total Timed Code Minutes- PT 18 minute(s)  -UN                User Key  (r) = Recorded By, (t) = Taken By, (c) = Cosigned By      Initials Name Provider Type    UN Getachew Rothman PTA Physical Therapist Assistant

## 2024-11-21 NOTE — PLAN OF CARE
Problem: Violence Risk or Actual  Goal: Anger and Impulse Control  Intervention: Minimize Safety Risk  Recent Flowsheet Documentation  Taken 11/21/2024 0200 by Gaurav John RN  De-Escalation Techniques:   1:1 observation initiated   medication administered   reoriented   stimulation decreased  Taken 11/21/2024 0000 by Gaurav John RN  De-Escalation Techniques:   1:1 observation initiated   family involvement requested   medication administered  Enhanced Safety Measures: bed alarm set  Taken 11/20/2024 2200 by Gaurav John RN  De-Escalation Techniques: 1:1 observation initiated  Enhanced Safety Measures:   bed alarm set   room near unit station  Taken 11/20/2024 2000 by Gaurav John RN  De-Escalation Techniques:   1:1 observation initiated   reoriented   stimulation decreased   Goal Outcome Evaluation:

## 2024-11-21 NOTE — THERAPY TREATMENT NOTE
"Subjective: Pt agreeable to therapeutic plan of care.  Cognition: oriented to Person    Objective:     Precautions - falls risk    Bed Mobility: Min-A supine to sit EOB  Functional Transfers: Min-A, Assist x 2, and with rolling walker     Balance: static, dynamic, and standing Min-A and with rolling walker  Functional Ambulation: Min-A and with rolling walker closely followed with chair and requires max verbal cues and task segmentation.    Lower Body Dressing: Max-A  ADL Position: supine  ADL Comments: Donning socks, managing briefs over hips      Vitals: WNL    Pain: 0 VAS  Location: NA  Interventions for pain: N/A  Education: Provided education on the importance of mobility in the acute care setting, Verbal/Tactile Cues, ADL training, and Transfer Training      Assessment: Andree Deluna presents with ADL impairments affecting function including balance, cognition, endurance / activity tolerance, and strength. Pt much improved this date, but remains very confused and requires many cues and task segmentation for proper completion. Able to follow one step commands well. Demonstrated functioning below baseline abilities indicate the need for continued skilled intervention while inpatient. Tolerating session today without incident. Will continue to follow and progress as tolerated.     Plan/Recommendations:   Moderate Intensity Therapy recommended post-acute care. This is recommended as therapy feels the patient would require 3-4 days per week and wouldn't tolerate \"3 hour daily\" rehab intensity. SNF would be the preferred choice. If the patient does not agree to SNF, arrange HH or OP depending on home bound status. If patient is medically complex, consider LTACH.. Pt requires no DME at discharge.     Pt desires Home with Home Health and Home with family assist at discharge. Pt cooperative; agreeable to therapeutic recommendations and plan of care.     Modified [place]: N/A = No pre-op stroke/TIA    Post-Tx " Position: Up in Chair, Alarms activated, and Call light and personal items within reach  PPE: gloves    Therapy Charges for Today       Code Description Service Date Service Provider Modifiers Qty    14903851538  OT THERAPEUTIC ACT EA 15 MIN 11/21/2024 Kedar Kauffman OT GO 1    [id number]  OT SELF CARE/MGMT/TRAIN EA 15 MIN 11/21/2024 Kedar Kauffman OT GO 1           Time Calculation- OT       Row Name 11/21/24 1556             Time Calculation- OT    OT Start Time 1401  -      OT Stop Time 1418  -      OT Time Calculation (min) 17 min  -      Total Timed Code Minutes- OT 17 minute(s)  -      OT Received On 11/21/24  -      OT - Next Appointment 11/25/24  -                User Key  (r) = Recorded By, (t) = Taken By, (c) = Cosigned By      Initials Name Provider Type    LS Kedar Kauffman OT Occupational Therapist

## 2024-11-21 NOTE — CASE MANAGEMENT/SOCIAL WORK
Continued Stay Note  REECE Kam     Patient Name: Andree Deluna  MRN: [id number]  Today's Date: 11/21/2024    Admit Date: 11/17/2024    Plan: Return home with family and [facility] (current, [facility] order in). Declines SNF.   Discharge Plan       Row Name 11/21/24 1617       Plan    Plan Return home with family and [facility] (current, [facility] order in). Declines SNF.    Patient/Family in Agreement with Plan yes    Plan Comments CM received update from family that they do not want SNF placement. Plan to return home with home health services. Copy of IMM letter provided to son at bedside. Updated liaison Alena of discharge orders.             Megan Naegele, RN     Office Phone: [phone]  Office Cell: [phone]

## 2024-11-21 NOTE — OUTREACH NOTE
Prep Survey      Flowsheet Row Responses   Religious facility patient discharged from? [facility]   Is LACE score < 7 ? No   Eligibility [facility]   Date of Admission 11/17/24   Date of Discharge 11/21/24   Discharge Disposition Home or Self Care   Discharge diagnosis Weakness, UTI   Does the patient have one of the following disease processes/diagnoses(primary or secondary)? Other   Does the patient have Home health ordered? Yes   What is the Home health agency?  [facility]   Is there a DME ordered? No   Prep survey completed? Yes            Tabby AMADO - Registered Nurse

## 2024-11-22 ENCOUNTER — TRANSITIONAL CARE MANAGEMENT TELEPHONE ENCOUNTER (OUTPATIENT)
Dept: CALL CENTER | Facility: HOSPITAL | Age: 69
End: 2024-11-22
Payer: MEDICARE

## 2024-11-22 ENCOUNTER — TELEPHONE (OUTPATIENT)
Dept: FAMILY MEDICINE CLINIC | Facility: CLINIC | Age: 69
End: 2024-11-22

## 2024-11-22 NOTE — OUTREACH NOTE
Call Center TCM Note      Flowsheet Row Responses   [facility] patient discharged from? [facility]   Does the patient have one of the following disease processes/diagnoses(primary or secondary)? Other   TCM attempt successful? Yes   Call start time 1310   Call end time 1313   Discharge diagnosis Weakness, UTI   Person spoke with today (if not patient) and relationship spouse   Meds reviewed with patient/caregiver? Yes   Is the patient having any side effects they believe may be caused by any medication additions or changes? No   Does the patient have all medications ordered at discharge? Yes   Is the patient taking all medications as directed (includes completed medication regime)? Yes   Does the patient have an appointment with their PCP within 7-14 days of discharge? Yes  [12/2/2024 at 11:00 AM]   What is the Home health agency?  [facility]   Has home health visited the patient within 72 hours of discharge? Yes   Psychosocial issues? No   Did the patient receive a copy of their discharge instructions? Yes   Nursing interventions Reviewed instructions with patient   What is the patient's perception of their health status since discharge? Improving   Is the patient/caregiver able to teach back signs and symptoms related to disease process for when to call PCP? Yes   Is the patient/caregiver able to teach back signs and symptoms related to disease process for when to call 911? Yes   Is the patient/caregiver able to teach back the hierarchy of who to call/visit for symptoms/problems? PCP, Specialist, Home health nurse, Urgent Care, ED, 911 Yes   TCM call completed? Yes   Wrap up additional comments Spouse denies pt has any urinary discomfort. [facility] visiting at time of call. Reviewed AVS/meds with spouse. Spouse verified PCP fu appt   Call end time 1313   Would this patient benefit from a Referral to [facility] Social Work? No   Is the patient interested in additional calls from an ambulatory ? Magali Sanz  RADHA Myers    11/22/2024, 13:14 EST

## 2024-11-22 NOTE — TELEPHONE ENCOUNTER
Caller: WILMER BOSE    Relationship to patient: Home Health    Best call back number: [phone]    Patient is needing: WILMER CALLED IN TO ADVISE PATIENT IS VERY AGITATED SINCE COMING HOME FROM THE HOSPITAL AND THE FAMILY IS WANTING TO KNOW IF ADIVAN OR KLONOPIN OR SOMETHING SIMILAR CAN BE CALLED IN. PLEASE CALL BACK TO ADVISE

## 2024-11-22 NOTE — CASE MANAGEMENT/SOCIAL WORK
Case Management Discharge Note      Final Note: Home w/ [facility]    Selected Continued Care - Discharged on 11/21/2024 Admission date: 11/17/2024 - Discharge disposition: Home-Health Care Svc      Home Medical Care Coordination complete.      Service Provider Services Address Phone Fax Patient Preferred    [facility], Home Nursing [facility] [facility], [place] [id number] [phone] --             Transportation Services  Private: Car    Final Discharge Disposition Code: 06 - home with home health care

## 2024-11-24 NOTE — DISCHARGE SUMMARY
[facility] Medicine Services  Discharge Summary    Date of Service: 2024  Patient Name: Andree Deluna  : 1955  MRN: [id number]    Date of Admission: 2024  Discharge Diagnosis: Weakness  Date of Discharge: 2024  Primary Care Physician: Naga Griffith PA-C      Presenting Problem:   Weakness [R53.1]  General weakness [R53.1]  Urinary tract infection without hematuria, site unspecified [N39.0]    Active and Resolved Hospital Problems:  Active Hospital Problems    Diagnosis POA    **Weakness [R53.1] Yes    Encephalopathy [G93.40] Yes      Resolved Hospital Problems   No resolved problems to display.         Hospital Course     HPI:    69-year-old female with CAD status post CABG,Hypertension, history of CVA, hallucinations, CKD 3a, A-fib presented with generalized weakness and worsening with time. AMS present and agitated. Move all the ext actively.   Labs showing no leukocytosis.  UA with urinary tract infection.  Potassium 3.2.  Chest x-ray with cardiomegaly.  RVP pending.  Was in [facility] for frequent fall per the chart review.     Will be admitted for urinary tract infection generalized weakness possible need of placement.    Hospital Course:  Patient was admitted with above.  Noted urine culture positive, patient received IV antibiotics through hospital course.  At time of discharge no further antibiotics were indicated.  Patient was agitated and/or had increased confusional episodes.  This prompted psychiatric evaluation and/or consultation.  Risperdal was added in addition to Marinol to help improve appetite.     Plan of care was reviewed extensively on a daily basis with patient's family members were present at bedside.  As per psychiatry recommendation Celexa as well as trazodone both were discontinued.      Physical and Occupational Therapy services did evaluate patient however they made a recommendation for rehab but family members declined and stated  they wish to take the patient home.  As per family wishes patient was discharged home.    Noted patient likely has underlying moderate to severe dementia.  We had reviewed plan of care with patient's daughter-in-law who was present at bedside.  She expressed agreement and understanding of this possible diagnosis however  as well as son had stated that patient does much better at home and they do not believe the patient has underlying dementia at this time.      They wish to continue with appetite stimulant including Marinol as well as Risperdal to help with behavioral issues however they declined the idea of palliative care or hospice evaluation.      We also discussed the possibility of a PEG tube placement for severe malnutrition.  Patient declined however family members stated they wanted her to have a PEG tube placed at some point in time.    Patient has been discharged home with PCP follow-up in 2 to 3 weeks.            DISCHARGE Follow Up Recommendations for labs and diagnostics: PCP follow-up in 2 to 3 weeks        Day of Discharge     Vital Signs:       Physical Exam:  Physical Exam review same-day evaluation      Pertinent  and/or Most Recent Results     LAB RESULTS:      Lab 11/18/24  0617 11/17/24  1437   WBC 7.59 8.52   HEMOGLOBIN 12.2 12.3   HEMATOCRIT 38.6 40.3   PLATELETS 207 216   NEUTROS ABS 5.48 6.80   IMMATURE GRANS (ABS) 0.02 0.03   LYMPHS ABS 1.10 0.82   MONOS ABS 0.94* 0.82   EOS ABS 0.02 0.02   MCV 86.5 86.9         Lab 11/18/24  0617 11/17/24  1437   SODIUM 142 140   POTASSIUM 4.1 3.2*   CHLORIDE 104 99   CO2 28.9 31.9*   ANION GAP 9.1 9.1   BUN 13 15   CREATININE 0.58 0.74   EGFR 99.3 88.8   GLUCOSE 100* 110*   CALCIUM 9.0 9.1   MAGNESIUM 2.1 2.0   PHOSPHORUS 2.3*  --          Lab 11/17/24  1437   TOTAL PROTEIN 6.7   ALBUMIN 3.2*   GLOBULIN 3.5   ALT (SGPT) 33   AST (SGOT) 55*   BILIRUBIN 1.5*   ALK PHOS 178*         Lab 11/17/24  1437   HSTROP T 11                 Lab 11/18/24  1200    PH, ARTERIAL 7.387   PCO2, ARTERIAL 48.3*   PO2 ART 65.2*   O2 SATURATION ART 91.9*   FIO2 21   HCO3 ART 29.1*   BASE EXCESS ART 3.1*     Brief Urine Lab Results  (Last result in the past 365 days)        Color   Clarity   Blood   Leuk Est   Nitrite   Protein   CREAT   Urine HCG        11/17/24 1425 Dark Yellow   Turbid   Large (3+)   Large (3+)   Positive   100 mg/dL (2+)                 Microbiology Results (last 10 days)       Procedure Component Value - Date/Time    Urine Culture - Urine, Urine, Catheter [[id number]]  (Abnormal)  (Susceptibility) Collected: 11/17/24 1425    Lab Status: Final result Specimen: Urine, Catheter Updated: 11/19/24 1029     Urine Culture >100,000 CFU/mL Escherichia coli    Narrative:      Colonization of the urinary tract without infection is common. Treatment is discouraged unless the patient is symptomatic, pregnant, or undergoing an invasive urologic procedure.    Susceptibility        Escherichia coli      BHAVIN      Amoxicillin + Clavulanate Intermediate      Ampicillin Resistant      Ampicillin + Sulbactam Resistant      Cefazolin (Urine) Susceptible      Cefepime Susceptible      Ceftazidime Susceptible      Ceftriaxone Susceptible      Gentamicin Susceptible      Levofloxacin Resistant      Nitrofurantoin Susceptible      Piperacillin + Tazobactam Susceptible      Trimethoprim + Sulfamethoxazole Susceptible                                   XR Chest 1 View    Result Date: 11/17/2024  Impression: Impression: 1.Cardiomegaly. There is evidence for prior cardiothoracic surgery. Electronically Signed: Miguel Angel Hicks MD  11/17/2024 4:01 PM EST  Workstation ID: [id number]     Results for orders placed during the hospital encounter of 04/30/23    Duplex Carotid Ultrasound CAR    Interpretation Summary    Right internal carotid artery demonstrates normal flow without evidence of hemodynamically significant stenosis.    Left internal carotid artery demonstrates a less than 50%  stenosis.      Results for orders placed during the hospital encounter of 04/30/23    Duplex Carotid Ultrasound CAR    Interpretation Summary    Right internal carotid artery demonstrates normal flow without evidence of hemodynamically significant stenosis.    Left internal carotid artery demonstrates a less than 50% stenosis.      Results for orders placed during the hospital encounter of 04/30/23    Adult Transthoracic Echo Complete W/ Cont if Necessary Per Protocol    Interpretation Summary    Estimated right ventricular systolic pressure from tricuspid regurgitation is normal (<35 mmHg).      Normal LV size and contractility EF of 60 to 65%  Normal RV size  Normal atrial size  Pulmonic valve is not well visualized.  Aortic valve is trileaflet with mild sclerosis.  Mitral valve, tricuspid valve appears structurally normal, moderate mitral regurgitation seen.  No pericardial effusion seen.  Proximal aorta appears normal in size.      Labs Pending at Discharge:  Pending Results       None            Procedures Performed           Consults:   Consults       Date and Time Order Name Status Description    11/20/2024  9:55 AM Inpatient Psychiatrist Consult Completed               Discharge Details        Discharge Medications        New Medications        Instructions Start Date   dronabinol 2.5 MG capsule  Commonly known as: MARINOL   5 mg, Oral, 2 Times Daily      risperiDONE 1 MG tablet  Commonly known as: risperDAL   1 mg, Oral, Every 12 Hours Scheduled             Changes to Medications        Instructions Start Date   furosemide 40 MG tablet  Commonly known as: LASIX  What changed: See the new instructions.   TAKE 1/2 (ONE-HALF) TABLET BY MOUTH ONCE DAILY FOR EDEMA             Continue These Medications        Instructions Start Date   ALPRAZolam 2 MG tablet  Commonly known as: XANAX   2 mg, Oral, Nightly PRN      apixaban 5 MG tablet tablet  Commonly known as: ELIQUIS   5 mg, Oral, Every 12 Hours Scheduled       aspirin 81 MG chewable tablet   81 mg, Oral, Daily      atorvastatin 80 MG tablet  Commonly known as: LIPITOR   TAKE 1 TABLET BY MOUTH ONCE DAILY AT BEDTIME FOR  HIGH  LEVEL  OF  FATS  IN  BLOOD      bisoprolol 5 MG tablet  Commonly known as: ZEBeta   5 mg, Oral, Daily      donepezil 5 MG tablet  Commonly known as: ARICEPT   5 mg, Nightly      ferrous sulfate 325 (65 FE) MG tablet   325 mg, Oral, Every Other Day      lamoTRIgine 100 MG tablet  Commonly known as: LaMICtal   100 mg, Oral, 2 Times Daily      mirtazapine 15 MG tablet  Commonly known as: REMERON   7.5 mg, Nightly      omeprazole 40 MG capsule  Commonly known as: priLOSEC   40 mg, Oral, Daily      oxybutynin 5 MG tablet  Commonly known as: DITROPAN   5 mg, Oral, Daily      vitamin B-12 1000 MCG tablet  Commonly known as: CYANOCOBALAMIN   1,000 mcg, Daily             Stop These Medications      citalopram 10 MG tablet  Commonly known as: CeleXA     lisinopril 10 MG tablet  Commonly known as: PRINIVIL,ZESTRIL     lisinopril-hydrochlorothiazide 20-25 MG per tablet  Commonly known as: PRINZIDE,ZESTORETIC     traZODone 100 MG tablet  Commonly known as: DESYREL              Allergies   Allergen Reactions    Desvenlafaxine Unknown - High Severity     ELEVATED BP         Discharge Disposition:   Home-Health Care [facility]    Diet:  Hospital:No active diet order        Discharge Activity:   Activity Instructions       Activity as Tolerated                CODE STATUS:  Code Status and Medical Interventions: CPR (Attempt to Resuscitate); Full Support   Ordered at: 11/17/24 6949     Code Status (Patient has no pulse and is not breathing):    CPR (Attempt to Resuscitate)     Medical Interventions (Patient has pulse or is breathing):    Full Support         Future Appointments   Date Time Provider Department Center   12/2/2024 11:00 AM Naga Griffith PA-C [facility]   12/13/2024 11:00 AM [facility] INJECTION ROOM [facility]   12/13/2024 11:45 AM [facility] CAMERA ROOM [facility]  [facility]   12/13/2024 12:00 PM [facility] TREADMILL 2  [facility]   12/13/2024  1:00 PM [facility] CAMERA ROOM 2  [facility]   12/13/2024  1:45 PM [facility] ECHO 1/OUTPATIENT  [facility]   1/16/2025  2:00 PM Rivka Cui APRN [facility]   7/17/2025 12:00 PM Kye Alcaraz MD [facility]       Additional Instructions for the Follow-ups that You Need to Schedule       Ambulatory Referral to Home Health (Hospital)   As directed      Face to Face Visit Date: 11/18/2024   Follow-up provider for Plan of Care?: I treated the patient in an acute care facility and will not continue treatment after discharge.   Follow-up provider: SHAYLEE WHITE [[id number]]   Reason/Clinical Findings: resumption of care   Describe mobility limitations that make leaving home difficult: increased weakness and confusion.   Nursing/Therapeutic Services Requested: Skilled Nursing Physical Therapy Occupational Therapy Speech Therapy   Skilled nursing orders: Medication education Cardiopulmonary assessments   PT orders: Strengthening   SLP orders: Other   Frequency: Other                Time spent on Discharge including face to face service: 35 minutes    Signature: Electronically signed by Danielle Boyd MD, 11/24/24, 10:41 EST.  [facility] Hospitalist Team

## 2024-11-25 ENCOUNTER — TELEPHONE (OUTPATIENT)
Dept: FAMILY MEDICINE CLINIC | Facility: CLINIC | Age: 69
End: 2024-11-25

## 2024-11-25 NOTE — TELEPHONE ENCOUNTER
Caller: AUSTIN SALES    Relationship: Emergency Contact    Best call back number: [phone]    What medication are you requesting: ANTIBIOTIC    What are your current symptoms: UNBALANCED AND CONFUSED     How long have you been experiencing symptoms: 4-5 DAYS     Have you had these symptoms before:    [x] Yes  [] No    Have you been treated for these symptoms before:   [x] Yes  [] No    If a prescription is needed, what is your preferred pharmacy and phone number: [facility] [address] [facility] [address] [place] [phone] [place] [phone]      Additional notes: ALSO WANTS TO CHECK ON THE MEDICATION THAT WAS REQUESTED ON 11/22/24

## 2024-11-26 ENCOUNTER — TELEPHONE (OUTPATIENT)
Dept: FAMILY MEDICINE CLINIC | Facility: CLINIC | Age: 69
End: 2024-11-26

## 2024-11-26 NOTE — TELEPHONE ENCOUNTER
Caller: JAXON    Relationship: Home Health    Best call back number: [phone]    What is the best time to reach you: ANY    Who are you requesting to speak with (clinical staff, provider,  specific staff member): CLINICAL    Do you know the name of the person who called: DEB    What was the call regarding:   FYI: PLAN OF CARE FOR SPEECH THERAPY FOR 6 WKS /ONCE WEEKLY      
Attending with

## 2024-12-02 ENCOUNTER — LAB (OUTPATIENT)
Dept: FAMILY MEDICINE CLINIC | Facility: CLINIC | Age: 69
End: 2024-12-02
Payer: MEDICARE

## 2024-12-02 ENCOUNTER — OFFICE VISIT (OUTPATIENT)
Dept: FAMILY MEDICINE CLINIC | Facility: CLINIC | Age: 69
End: 2024-12-02
Payer: MEDICARE

## 2024-12-02 VITALS
RESPIRATION RATE: 16 BRPM | HEART RATE: 76 BPM | BODY MASS INDEX: 26.26 KG/M2 | SYSTOLIC BLOOD PRESSURE: 108 MMHG | DIASTOLIC BLOOD PRESSURE: 60 MMHG | OXYGEN SATURATION: 97 % | HEIGHT: 65 IN | WEIGHT: 157.6 LBS

## 2024-12-02 DIAGNOSIS — N39.41 URGE INCONTINENCE OF URINE: ICD-10-CM

## 2024-12-02 DIAGNOSIS — G93.40 ENCEPHALOPATHY, UNSPECIFIED TYPE: Primary | ICD-10-CM

## 2024-12-02 DIAGNOSIS — E05.90 HYPERTHYROIDISM: ICD-10-CM

## 2024-12-02 DIAGNOSIS — R63.4 UNINTENTIONAL WEIGHT LOSS: ICD-10-CM

## 2024-12-02 DIAGNOSIS — K92.1 BLACK STOOLS: ICD-10-CM

## 2024-12-02 DIAGNOSIS — G93.40 ENCEPHALOPATHY, UNSPECIFIED TYPE: ICD-10-CM

## 2024-12-02 LAB
BASOPHILS # BLD AUTO: 0.12 10*3/MM3 (ref 0–0.2)
BASOPHILS NFR BLD AUTO: 1.6 % (ref 0–1.5)
DEPRECATED RDW RBC AUTO: 49.1 FL (ref 37–54)
EOSINOPHIL # BLD AUTO: 0.33 10*3/MM3 (ref 0–0.4)
EOSINOPHIL NFR BLD AUTO: 4.4 % (ref 0.3–6.2)
ERYTHROCYTE [DISTWIDTH] IN BLOOD BY AUTOMATED COUNT: 16.1 % (ref 12.3–15.4)
HCT VFR BLD AUTO: 44.5 % (ref 34–46.6)
HGB BLD-MCNC: 14 G/DL (ref 12–15.9)
IMM GRANULOCYTES # BLD AUTO: 0.02 10*3/MM3 (ref 0–0.05)
IMM GRANULOCYTES NFR BLD AUTO: 0.3 % (ref 0–0.5)
LYMPHOCYTES # BLD AUTO: 1.6 10*3/MM3 (ref 0.7–3.1)
LYMPHOCYTES NFR BLD AUTO: 21.2 % (ref 19.6–45.3)
MCH RBC QN AUTO: 27.1 PG (ref 26.6–33)
MCHC RBC AUTO-ENTMCNC: 31.5 G/DL (ref 31.5–35.7)
MCV RBC AUTO: 86.2 FL (ref 79–97)
MONOCYTES # BLD AUTO: 0.48 10*3/MM3 (ref 0.1–0.9)
MONOCYTES NFR BLD AUTO: 6.4 % (ref 5–12)
NEUTROPHILS NFR BLD AUTO: 5 10*3/MM3 (ref 1.7–7)
NEUTROPHILS NFR BLD AUTO: 66.1 % (ref 42.7–76)
NRBC BLD AUTO-RTO: 0 /100 WBC (ref 0–0.2)
PLATELET # BLD AUTO: 321 10*3/MM3 (ref 140–450)
PMV BLD AUTO: 11.2 FL (ref 6–12)
RBC # BLD AUTO: 5.16 10*6/MM3 (ref 3.77–5.28)
WBC NRBC COR # BLD AUTO: 7.55 10*3/MM3 (ref 3.4–10.8)

## 2024-12-02 PROCEDURE — 82607 VITAMIN B-12: CPT | Performed by: PHYSICIAN ASSISTANT

## 2024-12-02 PROCEDURE — 82180 ASSAY OF ASCORBIC ACID: CPT | Performed by: PHYSICIAN ASSISTANT

## 2024-12-02 PROCEDURE — 84443 ASSAY THYROID STIM HORMONE: CPT | Performed by: PHYSICIAN ASSISTANT

## 2024-12-02 PROCEDURE — 82728 ASSAY OF FERRITIN: CPT | Performed by: PHYSICIAN ASSISTANT

## 2024-12-02 PROCEDURE — 83540 ASSAY OF IRON: CPT | Performed by: PHYSICIAN ASSISTANT

## 2024-12-02 PROCEDURE — 84439 ASSAY OF FREE THYROXINE: CPT | Performed by: PHYSICIAN ASSISTANT

## 2024-12-02 PROCEDURE — 36415 COLL VENOUS BLD VENIPUNCTURE: CPT

## 2024-12-02 PROCEDURE — 84466 ASSAY OF TRANSFERRIN: CPT | Performed by: PHYSICIAN ASSISTANT

## 2024-12-02 PROCEDURE — 83520 IMMUNOASSAY QUANT NOS NONAB: CPT | Performed by: INTERNAL MEDICINE

## 2024-12-02 PROCEDURE — 85025 COMPLETE CBC W/AUTO DIFF WBC: CPT | Performed by: PHYSICIAN ASSISTANT

## 2024-12-02 PROCEDURE — 80053 COMPREHEN METABOLIC PANEL: CPT | Performed by: PHYSICIAN ASSISTANT

## 2024-12-02 PROCEDURE — 82746 ASSAY OF FOLIC ACID SERUM: CPT | Performed by: PHYSICIAN ASSISTANT

## 2024-12-02 RX ORDER — APIXABAN 5 MG/1
TABLET, FILM COATED ORAL
Qty: 180 TABLET | Refills: 3 | Status: SHIPPED | OUTPATIENT
Start: 2024-12-02

## 2024-12-02 RX ORDER — VIBEGRON 75 MG/1
75 TABLET, FILM COATED ORAL DAILY
Qty: 30 TABLET | Refills: 5 | Status: SHIPPED | OUTPATIENT
Start: 2024-12-02

## 2024-12-02 NOTE — TELEPHONE ENCOUNTER
Rx Refill Note  Requested Prescriptions     Pending Prescriptions Disp Refills    apixaban (Eliquis) 5 MG tablet tablet [Pharmacy Med Name: Eliquis 5 MG Oral Tablet] 180 tablet 3     Sig: TAKE 1 TABLET BY MOUTH EVERY 12 HOURS -INDICATION ATRIAL FIBRILLATION      Last office visit with prescribing clinician: 7/11/2024   Last telemedicine visit with prescribing clinician: Visit date not found   Next office visit with prescribing clinician: 7/17/2025                         Would you like a call back once the refill request has been completed: [] Yes [] No    If the office needs to give you a call back, can they leave a voicemail: [] Yes [] No    Heidi Duncan MA  12/02/24, 11:11 EST

## 2024-12-02 NOTE — PROGRESS NOTES
"Subjective   Andree Deluna is a 69 y.o. female.     Chief Complaint   Patient presents with    Transitional Care Management       /60 (BP Location: Left arm, Patient Position: Sitting, Cuff Size: Adult)   Pulse 76   Resp 16   Ht 165.1 cm (65\")   Wt 71.5 kg (157 lb 9.6 oz)   SpO2 97%   BMI 26.23 kg/m²     BP Readings from Last 3 Encounters:   12/02/24 108/60   11/21/24 135/92   10/17/24 171/86       Wt Readings from Last 3 Encounters:   12/02/24 71.5 kg (157 lb 9.6 oz)   11/21/24 69 kg (152 lb 1.9 oz)   10/17/24 79.8 kg (176 lb)       HPI patient presents to the clinic with her  due to encephalopathy, abnormal thyroid labs, unintentional weight loss, black-colored stools, urinary urgency.  Over the past 1 year Sima has had a decline in her health generally.  She had a coronary event and then after that she has had subsequent memory issues and has also had a few other hospitalizations that she has had struggles with including hallucinations and increased weakness.  She currently has confusion at home and she realizes this but  states that at times she does not realize how confused she is.  She will get agitated at times as well which she sometimes is aware of and then other times that she is not.  She had on lab work at [facility] a low TSH and an elevated T4 and was consulted by endocrinology who thought it likely was not overt hyperthyroidism and was more likely due to acute illness.  She had negative TPO and thyrotropin receptor antibodies.  She has had a significant amount of weight loss that is unintentional over the past 1 year.  This is the first time I have seen her in approximately 5 to 6 months and she has lost a significant more amount of weight than that since when I saw her last.  She was not given endocrinology follow-up after that visit.  She has a severely decreased appetite and has been states that she is not eating very much at this time.  She does complain of " dark-colored stools but she denies having any epigastric pain or discomfort.  She does complain of urinary urgency.  She will have a sudden urge to have to go urinate and will often times not make it to the bathroom in time.  Since her last visit with me she was prescribed donepezil, risperidone, and mirtazapine.    The following portions of the patient's history were reviewed and updated as appropriate: allergies, current medications, past family history, past medical history, past social history, past surgical history, and problem list.    Review of Systems    Objective   Physical Exam  Constitutional:       Appearance: She is well-developed.      Comments: Disheveled and cachectic appearing to normal baseline.    HENT:      Head: Normocephalic and atraumatic.   Eyes:      Conjunctiva/sclera: Conjunctivae normal.      Pupils: Pupils are equal, round, and reactive to light.   Cardiovascular:      Rate and Rhythm: Normal rate and regular rhythm.      Heart sounds: No murmur heard.  Pulmonary:      Effort: Pulmonary effort is normal.      Breath sounds: Normal breath sounds.   Abdominal:      General: Bowel sounds are normal.      Palpations: Abdomen is soft.   Musculoskeletal:         General: No deformity. Normal range of motion.      Cervical back: Normal range of motion and neck supple.   Lymphadenopathy:      Cervical: No cervical adenopathy.   Skin:     General: Skin is warm and dry.      Capillary Refill: Capillary refill takes less than 2 seconds.      Findings: No rash.   Neurological:      Mental Status: She is alert and oriented to person, place, and time.      Cranial Nerves: No cranial nerve deficit.      Coordination: Coordination normal.      Gait: Gait normal.   Psychiatric:         Speech: Speech is delayed and tangential.         Behavior: Behavior is slowed.         Cognition and Memory: Cognition is impaired. Memory is impaired.           Diagnoses and all orders for this visit:    1.  Encephalopathy, unspecified type (Primary)  -     Vitamin C; Future  -     Vitamin B12; Future  -     Folate; Future  -     Urinalysis With Culture If Indicated -; Future    2. Hyperthyroidism  -     TSH; Future  -     T4, free; Future    3. Black stools  -     CBC w AUTO Differential; Future  -     Comprehensive metabolic panel; Future  -     Iron and TIBC; Future  -     Ferritin; Future    4. Unintentional weight loss    5. Urge incontinence of urine    Other orders  -     Vibegron (Gemtesa) 75 MG tablet; Take 1 tablet by mouth Daily.  Dispense: 30 tablet; Refill: 5      We will have her start vibegron for the urge incontience. I need to check further labs on tu. She has deteriorated significantly since her coronary event earlier this year. I need to look at her cbc and iron levels to evaluate for signs of gi bleeding. She has very decreased nutrtiion at this time and we discussed this to extent. She had elevated t4 and low tsh in the hospital at [facility] and was not given endo follow up. It was thought this was reactive but we need to follow this. Follow up with neurology.     Return in about 4 months (around 4/2/2025), or if symptoms worsen or fail to improve, for Recheck.

## 2024-12-03 LAB
ALBUMIN SERPL-MCNC: 3.4 G/DL (ref 3.5–5.2)
ALBUMIN/GLOB SERPL: 0.9 G/DL
ALP SERPL-CCNC: 155 U/L (ref 39–117)
ALT SERPL W P-5'-P-CCNC: 28 U/L (ref 1–33)
ANION GAP SERPL CALCULATED.3IONS-SCNC: 14 MMOL/L (ref 5–15)
AST SERPL-CCNC: 42 U/L (ref 1–32)
BILIRUB SERPL-MCNC: 0.5 MG/DL (ref 0–1.2)
BUN SERPL-MCNC: 6 MG/DL (ref 8–23)
BUN/CREAT SERPL: 7 (ref 7–25)
CALCIUM SPEC-SCNC: 9.3 MG/DL (ref 8.6–10.5)
CHLORIDE SERPL-SCNC: 98 MMOL/L (ref 98–107)
CO2 SERPL-SCNC: 29 MMOL/L (ref 22–29)
CREAT SERPL-MCNC: 0.86 MG/DL (ref 0.57–1)
EGFRCR SERPLBLD CKD-EPI 2021: 74.2 ML/MIN/1.73
FERRITIN SERPL-MCNC: 160 NG/ML (ref 13–150)
FOLATE SERPL-MCNC: 13.6 NG/ML (ref 4.78–24.2)
GLOBULIN UR ELPH-MCNC: 3.9 GM/DL
GLUCOSE SERPL-MCNC: 94 MG/DL (ref 65–99)
IRON 24H UR-MRATE: 52 MCG/DL (ref 37–145)
IRON SATN MFR SERPL: 22 % (ref 20–50)
POTASSIUM SERPL-SCNC: 3 MMOL/L (ref 3.5–5.2)
PROT SERPL-MCNC: 7.3 G/DL (ref 6–8.5)
SODIUM SERPL-SCNC: 141 MMOL/L (ref 136–145)
T4 FREE SERPL-MCNC: 2.18 NG/DL (ref 0.92–1.68)
TIBC SERPL-MCNC: 232 MCG/DL (ref 298–536)
TRANSFERRIN SERPL-MCNC: 156 MG/DL (ref 200–360)
TSH SERPL DL<=0.05 MIU/L-ACNC: <0.005 UIU/ML (ref 0.27–4.2)
VIT B12 BLD-MCNC: 1804 PG/ML (ref 211–946)

## 2024-12-04 ENCOUNTER — LAB (OUTPATIENT)
Dept: FAMILY MEDICINE CLINIC | Facility: CLINIC | Age: 69
End: 2024-12-04
Payer: MEDICARE

## 2024-12-04 LAB
BACTERIA UR QL AUTO: ABNORMAL /HPF
BILIRUB UR QL STRIP: NEGATIVE
CLARITY UR: ABNORMAL
COLOR UR: YELLOW
GLUCOSE UR STRIP-MCNC: NEGATIVE MG/DL
HGB UR QL STRIP.AUTO: NEGATIVE
HOLD SPECIMEN: NORMAL
HYALINE CASTS UR QL AUTO: ABNORMAL /LPF
KETONES UR QL STRIP: NEGATIVE
LEUKOCYTE ESTERASE UR QL STRIP.AUTO: ABNORMAL
NITRITE UR QL STRIP: NEGATIVE
PH UR STRIP.AUTO: 5.5 [PH] (ref 5–8)
PROT UR QL STRIP: ABNORMAL
RBC # UR STRIP: ABNORMAL /HPF
REF LAB TEST METHOD: ABNORMAL
SP GR UR STRIP: 1.02 (ref 1–1.03)
SQUAMOUS #/AREA URNS HPF: ABNORMAL /HPF
TSH RECEP AB SER-ACNC: <1.1 IU/L (ref 0–1.75)
URATE CRY URNS QL MICRO: ABNORMAL /HPF
UROBILINOGEN UR QL STRIP: ABNORMAL
WBC # UR STRIP: ABNORMAL /HPF

## 2024-12-04 PROCEDURE — 87077 CULTURE AEROBIC IDENTIFY: CPT | Performed by: PHYSICIAN ASSISTANT

## 2024-12-04 PROCEDURE — 87086 URINE CULTURE/COLONY COUNT: CPT | Performed by: PHYSICIAN ASSISTANT

## 2024-12-04 PROCEDURE — 87186 SC STD MICRODIL/AGAR DIL: CPT

## 2024-12-04 PROCEDURE — 81001 URINALYSIS AUTO W/SCOPE: CPT | Performed by: PHYSICIAN ASSISTANT

## 2024-12-05 DIAGNOSIS — E05.90 HYPERTHYROIDISM: Primary | ICD-10-CM

## 2024-12-05 RX ORDER — POTASSIUM CHLORIDE 1500 MG/1
20 TABLET, EXTENDED RELEASE ORAL DAILY
Qty: 90 TABLET | Refills: 1 | Status: SHIPPED | OUTPATIENT
Start: 2024-12-05

## 2024-12-07 LAB — VIT C SERPL-MCNC: <0.1 MG/DL (ref 0.4–2)

## 2024-12-08 LAB
BACTERIA SPEC AEROBE CULT: ABNORMAL
BACTERIA SPEC AEROBE CULT: ABNORMAL

## 2024-12-09 RX ORDER — LEVOFLOXACIN 500 MG/1
500 TABLET, FILM COATED ORAL DAILY
Qty: 5 TABLET | Refills: 0 | Status: SHIPPED | OUTPATIENT
Start: 2024-12-09

## 2024-12-12 ENCOUNTER — TELEPHONE (OUTPATIENT)
Dept: FAMILY MEDICINE CLINIC | Facility: CLINIC | Age: 69
End: 2024-12-12

## 2024-12-12 NOTE — TELEPHONE ENCOUNTER
Caller: HENRRYAUSTIN    Relationship: Emergency Contact    Best call back number: [phone]    Requested Prescriptions:   Requested Prescriptions     Pending Prescriptions Disp Refills    furosemide (LASIX) 40 MG tablet 45 tablet 1    donepezil (ARICEPT) 5 MG tablet       Sig: Take 1 tablet by mouth Every Night.        Pharmacy where request should be sent: [facility] [address] [place], [place] [phone] [place] [phone]      Last office visit with prescribing clinician: 12/2/2024   Last telemedicine visit with prescribing clinician: Visit date not found   Next office visit with prescribing clinician: Visit date not found     Does the patient have less than a 3 day supply:  [x] Yes  [] No    Would you like a call back once the refill request has been completed: [] Yes [x] No    If the office needs to give you a call back, can they leave a voicemail: [] Yes [x] No    Tanisha Dueñas Rep   12/12/24 09:51 EST

## 2024-12-12 NOTE — TELEPHONE ENCOUNTER
Caller: AUSTIN SALES    Relationship to patient: Emergency Contact    Best call back number: [phone]    Patient is needing: AUSTIN IS CALLING TO SEE IF HIS WIFE IS STILL SUPPOSED TO BE TAKING mirtazapine (REMERON) 15 MG tablet, SHE JUST FINISHED THE BOTTLE OF IT AND DOESN'T KNOW IF HE SHOULD REQUEST A REFILL OR IF SHE'S DONE TAKING IT NOW.     PLEASE CALL TO CONFIRM OR DENY

## 2024-12-13 RX ORDER — FUROSEMIDE 40 MG/1
20 TABLET ORAL DAILY
Qty: 45 TABLET | Refills: 0 | OUTPATIENT
Start: 2024-12-13

## 2024-12-13 RX ORDER — MIRTAZAPINE 15 MG/1
7.5 TABLET, FILM COATED ORAL NIGHTLY
Qty: 60 TABLET | Refills: 0 | Status: SHIPPED | OUTPATIENT
Start: 2024-12-13

## 2024-12-13 RX ORDER — DONEPEZIL HYDROCHLORIDE 5 MG/1
5 TABLET, FILM COATED ORAL NIGHTLY
Qty: 90 TABLET | Refills: 0 | OUTPATIENT
Start: 2024-12-13

## 2024-12-17 ENCOUNTER — TELEPHONE (OUTPATIENT)
Dept: FAMILY MEDICINE CLINIC | Facility: CLINIC | Age: 69
End: 2024-12-17

## 2024-12-17 NOTE — TELEPHONE ENCOUNTER
Caller: DEB OGLESBY    Relationship: Caregiver (non-relative)    Best call back number: [phone]     What orders are you requesting : SWALLOW STUDY         Where will you receive your lab/imaging services: [facility], [place]   [phone]  PHONE  [phone]

## 2024-12-18 ENCOUNTER — TELEPHONE (OUTPATIENT)
Dept: FAMILY MEDICINE CLINIC | Facility: CLINIC | Age: 69
End: 2024-12-18

## 2024-12-18 DIAGNOSIS — R13.10 DYSPHAGIA, UNSPECIFIED TYPE: Primary | ICD-10-CM

## 2024-12-18 NOTE — TELEPHONE ENCOUNTER
Caller: ASUTIN SALES    Relationship: Emergency Contact    Best call back number:     [phone] (Mobile)       What medication are you requesting:     risperiDONE (risperDAL) 1 MG tablet       WAS GIVEN AT THE HOSPITAL AND WANTED TO KNOW IF YOU WANTED HER TO CONTINUE THIS MEDICATION     Have you had these symptoms before:    [x] Yes  [] No    Have you been treated for these symptoms before:   [x] Yes  [] No    If a prescription is needed, what is your preferred pharmacy and phone number: [facility] [address] [facility] [address] [facility] [phone] [facility] [phone]      Additional notes:

## 2024-12-19 RX ORDER — RISPERIDONE 1 MG/1
1 TABLET ORAL EVERY 12 HOURS SCHEDULED
Qty: 60 TABLET | Refills: 3 | Status: SHIPPED | OUTPATIENT
Start: 2024-12-19

## 2024-12-20 ENCOUNTER — TELEPHONE (OUTPATIENT)
Dept: FAMILY MEDICINE CLINIC | Facility: CLINIC | Age: 69
End: 2024-12-20

## 2024-12-20 NOTE — TELEPHONE ENCOUNTER
Caller: AUSTIN SALES    Relationship: Emergency Contact    Best call back number: [phone]     What medication are you requesting:  SEE SYMPTOMS    What are your current symptoms:   BAD COUGH,     How long have you been experiencing symptoms: SEVERAL WEEKS    Have you had these symptoms before:    [] Yes  [] No    Have you been treated for these symptoms before:   [] Yes  [] No    If a prescription is needed, what is your preferred pharmacy and phone number:    [facility] [address] [place], [place] - [address] [facility] [phone] [facility] [phone]

## 2024-12-27 ENCOUNTER — TELEPHONE (OUTPATIENT)
Dept: FAMILY MEDICINE CLINIC | Facility: CLINIC | Age: 69
End: 2024-12-27

## 2024-12-27 NOTE — TELEPHONE ENCOUNTER
Caller: DEB - [facility]    Relationship:     Best call back number:[phone]    What is the medical concern/diagnosis: SMALL MASS FOUND ON THE BACK OF PATIENTS TONGUE     What specialty or service is being requested: ENT REFERRAL     What is the provider, practice or medical service name: WHOEVER DR. TAPIA RECOMMENDS     What is the office location:     What is the office phone number:     Any additional details:     PLEASE CALL TO CONFIRM OR DENY

## 2024-12-30 ENCOUNTER — TELEPHONE (OUTPATIENT)
Dept: FAMILY MEDICINE CLINIC | Facility: CLINIC | Age: 69
End: 2024-12-30

## 2024-12-30 DIAGNOSIS — K14.8 TONGUE MASS: Primary | ICD-10-CM

## 2024-12-30 NOTE — TELEPHONE ENCOUNTER
Caller: AUSTIN SALES    Relationship: Emergency Contact    Best call back number:     [phone]       Which medication are you concerned about:   Vibegron (Gemtesa) 75 MG tablet     Who prescribed you this medication: Naga Griffith PA-C     When did you start taking this medication: NA    What are your concerns:   MEDICATION IS COSTING $117.00 PER MONTH.  IS THERE A CHEAPER SIMILAR MEDICATION?     [place] [id number] [facility] [address] [place] [phone] [place] [phone]

## 2024-12-30 NOTE — TELEPHONE ENCOUNTER
Caller: AUSTIN SLAES    Relationship: Emergency Contact    Best call back number:     [phone]       What is the medical concern/diagnosis:    THROAT- ENT-SMALL MASS ON BACK OR HER TONGUE    GI-SWALLOW STUDY, ACHALASIA    What specialty or service is being requested:      What is the provider, practice or medical service name: UNKNOWN    What is the office location: UNKNOWN    What is the office phone number: UNKNOWN    Any additional details: UNKNOWN      D

## 2024-12-31 RX ORDER — MIRABEGRON 50 MG/1
50 TABLET, FILM COATED, EXTENDED RELEASE ORAL DAILY
Qty: 30 TABLET | Refills: 5 | Status: SHIPPED | OUTPATIENT
Start: 2024-12-31

## 2024-12-31 RX ORDER — CITALOPRAM HYDROBROMIDE 10 MG/1
10 TABLET ORAL DAILY
Qty: 30 TABLET | Refills: 0 | Status: SHIPPED | OUTPATIENT
Start: 2024-12-31

## 2025-01-03 ENCOUNTER — TELEPHONE (OUTPATIENT)
Dept: FAMILY MEDICINE CLINIC | Facility: CLINIC | Age: 70
End: 2025-01-03

## 2025-01-03 NOTE — TELEPHONE ENCOUNTER
Caller: DEB OGLESBY    Relationship: [facility]    Best call back number: [phone]    What is the medical concern/diagnosis: WHEN PATIENT HAD SWALLOW STUDY THE SPEECH THERAPIST WHO DID IT RECOMMENDED SHE FOLLOW UP WITH A GI SPECIALIST - FREQUENT VOMITING / VOMIT REFLEX AND WEIGHT LOSS    What specialty or service is being requested: GI SPECIALIST     Any additional details: DEB FROM [facility] STATED THAT PATIENT HAD A SWALLOW STUDY DONE AND THE SPEECH THERAPIST WHO PERFORMED IT IS WANTING HER TO FOLLOW UP WITH A GI SPECIALIST. PATIENT HAS THE SWALLOW STUDY DONE AS SHE HAS BEEN HAVING FREQUENT VOMITING/VOMIT REFLEX, GI ISSUES, AND WEIGHT LOSS. DEB IS WANTING TO KNOW WHO ERLIN WHITE RECOMMENDS SHE SEE. PLEASE ADVISE.

## 2025-01-03 NOTE — TELEPHONE ENCOUNTER
Caller: DEB OGLESBY    Relationship to patient:     Best call back number: [phone]    Patient is needing:   CALLED BACK TO STATE THAT GI REFERRAL IS ALSO NEEDED DUE TO THE FINDINGS OF THE HIATAL HERNIA, ALONG WITH THE VOMITING.

## 2025-01-06 DIAGNOSIS — R13.10 DYSPHAGIA, UNSPECIFIED TYPE: Primary | ICD-10-CM

## 2025-01-07 ENCOUNTER — TELEPHONE (OUTPATIENT)
Dept: FAMILY MEDICINE CLINIC | Facility: CLINIC | Age: 70
End: 2025-01-07

## 2025-01-07 NOTE — TELEPHONE ENCOUNTER
Caller: DEB- [facility]    Relationship: Home Health    Best call back number: [phone]     What was the call regarding: DEB WITH [facility] IS CALLING TO INFORM PROVIDER THAT SHE IS GOING TO CONTINUE SPEECH THERAPY ONE TIME A WEEK FOR 5 WEEKS.     SHE IS ALSO REQUESTING THAT A LIST OF MEDICATIONS BE FAXED TO THEIR OFFICE A LIST OF MEDICATIONS. [phone] ATTENTION DEB     SHE HAS CONTACTED A GI DOCTOR FOR THEM TO TRY TO SCHEDULE AN APPOINTMENT IN RELATION TO ACHALASIA SYMPTOMS WHEN SWALLOW STUDY WAS CONDUCTED.   PLEASE ADVISE.

## 2025-01-09 NOTE — TELEPHONE ENCOUNTER
Relay: Called patient, unable to reach. Left detailed voice message. Please relay message below.     Naga Griffith PA-C         I placed an order for GI referral

## 2025-01-10 ENCOUNTER — HOSPITAL ENCOUNTER (OUTPATIENT)
Dept: NUCLEAR MEDICINE | Facility: HOSPITAL | Age: 70
Discharge: HOME | End: 2025-01-10
Payer: MEDICARE

## 2025-01-10 ENCOUNTER — HOSPITAL ENCOUNTER (OUTPATIENT)
Dept: CARDIOLOGY | Facility: HOSPITAL | Age: 70
Discharge: HOME | End: 2025-01-10
Payer: MEDICARE

## 2025-01-10 DIAGNOSIS — R06.02 SHORTNESS OF BREATH: ICD-10-CM

## 2025-01-10 DIAGNOSIS — I10 ESSENTIAL HYPERTENSION: ICD-10-CM

## 2025-01-10 DIAGNOSIS — I25.708 CORONARY ARTERY DISEASE OF BYPASS GRAFT OF NATIVE HEART WITH STABLE ANGINA PECTORIS: ICD-10-CM

## 2025-01-10 LAB
AV MEAN PRESS GRAD SYS DOP V1V2: 7 MMHG
AV VMAX SYS DOP: 176 CM/SEC
BH CV ECHO LEFT VENTRICLE GLOBAL LONGITUDINAL STRAIN: -14.9 %
BH CV ECHO MEAS - AO MAX PG: 12.4 MMHG
BH CV ECHO MEAS - AO V2 VTI: 41 CM
BH CV ECHO MEAS - AVA(I,D): 1.79 CM2
BH CV ECHO MEAS - EDV(CUBED): 50.7 ML
BH CV ECHO MEAS - EDV(MOD-SP4): 78.7 ML
BH CV ECHO MEAS - EF(MOD-SP4): 54.4 %
BH CV ECHO MEAS - ESV(CUBED): 19.7 ML
BH CV ECHO MEAS - ESV(MOD-SP4): 35.9 ML
BH CV ECHO MEAS - FS: 27 %
BH CV ECHO MEAS - IVS/LVPW: 1 CM
BH CV ECHO MEAS - IVSD: 1.1 CM
BH CV ECHO MEAS - LA DIMENSION: 4.8 CM
BH CV ECHO MEAS - LAT PEAK E' VEL: 11 CM/SEC
BH CV ECHO MEAS - LV DIASTOLIC VOL/BSA (35-75): 44.1 CM2
BH CV ECHO MEAS - LV MASS(C)D: 129.3 GRAMS
BH CV ECHO MEAS - LV MAX PG: 3.3 MMHG
BH CV ECHO MEAS - LV MEAN PG: 2 MMHG
BH CV ECHO MEAS - LV SYSTOLIC VOL/BSA (12-30): 20.1 CM2
BH CV ECHO MEAS - LV V1 MAX: 90.7 CM/SEC
BH CV ECHO MEAS - LV V1 VTI: 21.2 CM
BH CV ECHO MEAS - LVIDD: 3.7 CM
BH CV ECHO MEAS - LVIDS: 2.7 CM
BH CV ECHO MEAS - LVOT AREA: 3.5 CM2
BH CV ECHO MEAS - LVOT DIAM: 2.1 CM
BH CV ECHO MEAS - LVPWD: 1.1 CM
BH CV ECHO MEAS - MED PEAK E' VEL: 6.2 CM/SEC
BH CV ECHO MEAS - MV A MAX VEL: 87.5 CM/SEC
BH CV ECHO MEAS - MV DEC SLOPE: 497 CM/SEC2
BH CV ECHO MEAS - MV DEC TIME: 0.19 SEC
BH CV ECHO MEAS - MV E MAX VEL: 178 CM/SEC
BH CV ECHO MEAS - MV E/A: 2.03
BH CV ECHO MEAS - MV MAX PG: 11.6 MMHG
BH CV ECHO MEAS - MV MEAN PG: 4 MMHG
BH CV ECHO MEAS - MV P1/2T: 104.3 MSEC
BH CV ECHO MEAS - MV V2 VTI: 42.3 CM
BH CV ECHO MEAS - MVA(P1/2T): 2.11 CM2
BH CV ECHO MEAS - MVA(VTI): 1.74 CM2
BH CV ECHO MEAS - PA ACC TIME: 0.11 SEC
BH CV ECHO MEAS - PA V2 MAX: 69.9 CM/SEC
BH CV ECHO MEAS - RV MAX PG: 1.04 MMHG
BH CV ECHO MEAS - RV V1 MAX: 51.1 CM/SEC
BH CV ECHO MEAS - RV V1 VTI: 13.3 CM
BH CV ECHO MEAS - RVDD: 4.3 CM
BH CV ECHO MEAS - SV(LVOT): 73.4 ML
BH CV ECHO MEAS - SV(MOD-SP4): 42.8 ML
BH CV ECHO MEAS - SVI(LVOT): 41.1 ML/M2
BH CV ECHO MEAS - SVI(MOD-SP4): 24 ML/M2
BH CV ECHO MEAS - TAPSE (>1.6): 1.74 CM
BH CV ECHO MEAS - TR MAX PG: 40.7 MMHG
BH CV ECHO MEAS - TR MAX VEL: 319 CM/SEC
BH CV ECHO MEASUREMENTS AVERAGE E/E' RATIO: 20.7
BH CV ECHO SHUNT ASSESSMENT PERFORMED (HIDDEN SCRIPTING): 1
BH CV NUCLEAR PRIOR STUDY: 1
BH CV REST NUCLEAR ISOTOPE DOSE: 11 MCI
BH CV STRESS BP STAGE 1: NORMAL
BH CV STRESS BP STAGE 2: NORMAL
BH CV STRESS BP STAGE 3: NORMAL
BH CV STRESS COMMENTS STAGE 1: NORMAL
BH CV STRESS DOSE REGADENOSON STAGE 1: 0.4
BH CV STRESS DURATION MIN STAGE 1: 1
BH CV STRESS DURATION MIN STAGE 2: 1
BH CV STRESS DURATION MIN STAGE 3: 1
BH CV STRESS DURATION SEC STAGE 1: 10
BH CV STRESS DURATION SEC STAGE 2: 0
BH CV STRESS HR STAGE 1: 71
BH CV STRESS HR STAGE 2: 74
BH CV STRESS HR STAGE 3: 74
BH CV STRESS NUCLEAR ISOTOPE DOSE: 31.9 MCI
BH CV STRESS PROTOCOL 1: NORMAL
BH CV STRESS RECOVERY BP: NORMAL MMHG
BH CV STRESS RECOVERY HR: 74 BPM
BH CV STRESS STAGE 1: 1
BH CV STRESS STAGE 2: 2
BH CV STRESS STAGE 3: 3
BH CV XLRA - TDI S': 10 CM/SEC
LEFT ATRIUM VOLUME INDEX: 48.7 ML/M2
LV EF BIPLANE MOD: 54 %
MAXIMAL PREDICTED HEART RATE: 153 BPM
PERCENT MAX PREDICTED HR: 49.67 %
SINUS: 3 CM
SPECT HRT GATED+EF W RNC IV: 64 %
STJ: 2.4 CM
STRESS BASELINE BP: NORMAL MMHG
STRESS BASELINE HR: 56 BPM
STRESS PERCENT HR: 58 %
STRESS POST PEAK HR: 76 BPM
STRESS TARGET HR: 130 BPM

## 2025-01-10 PROCEDURE — A9502 TC99M TETROFOSMIN: HCPCS | Performed by: INTERNAL MEDICINE

## 2025-01-10 PROCEDURE — 93306 TTE W/DOPPLER COMPLETE: CPT

## 2025-01-10 PROCEDURE — 78452 HT MUSCLE IMAGE SPECT MULT: CPT

## 2025-01-10 PROCEDURE — 25010000002 REGADENOSON 0.4 MG/5ML SOLUTION: Performed by: INTERNAL MEDICINE

## 2025-01-10 PROCEDURE — 34310000005 TECHNETIUM TETROFOSMIN KIT: Performed by: INTERNAL MEDICINE

## 2025-01-10 PROCEDURE — 93017 CV STRESS TEST TRACING ONLY: CPT

## 2025-01-10 PROCEDURE — 93356 MYOCRD STRAIN IMG SPCKL TRCK: CPT

## 2025-01-10 RX ORDER — REGADENOSON 0.08 MG/ML
0.4 INJECTION, SOLUTION INTRAVENOUS
Status: COMPLETED | OUTPATIENT
Start: 2025-01-10 | End: 2025-01-10

## 2025-01-10 RX ADMIN — REGADENOSON 0.4 MG: 0.08 INJECTION, SOLUTION INTRAVENOUS at 12:39

## 2025-01-10 RX ADMIN — TETROFOSMIN 1 DOSE: 1.38 INJECTION, POWDER, LYOPHILIZED, FOR SOLUTION INTRAVENOUS at 12:39

## 2025-01-10 RX ADMIN — TETROFOSMIN 1 DOSE: 1.38 INJECTION, POWDER, LYOPHILIZED, FOR SOLUTION INTRAVENOUS at 11:27

## 2025-01-14 DIAGNOSIS — R07.9 CHEST PAIN, UNSPECIFIED TYPE: Primary | ICD-10-CM

## 2025-01-15 ENCOUNTER — LAB (OUTPATIENT)
Dept: LAB | Facility: HOSPITAL | Age: 70
End: 2025-01-15
Payer: MEDICARE

## 2025-01-15 DIAGNOSIS — R07.9 CHEST PAIN, UNSPECIFIED TYPE: ICD-10-CM

## 2025-01-15 LAB
ALBUMIN SERPL-MCNC: 3.9 G/DL (ref 3.5–5.2)
ALBUMIN/GLOB SERPL: 1 G/DL
ALP SERPL-CCNC: 119 U/L (ref 39–117)
ALT SERPL W P-5'-P-CCNC: 24 U/L (ref 1–33)
ANION GAP SERPL CALCULATED.3IONS-SCNC: 8.1 MMOL/L (ref 5–15)
AST SERPL-CCNC: 39 U/L (ref 1–32)
BASOPHILS # BLD AUTO: 0.08 10*3/MM3 (ref 0–0.2)
BASOPHILS NFR BLD AUTO: 1.1 % (ref 0–1.5)
BILIRUB SERPL-MCNC: 0.5 MG/DL (ref 0–1.2)
BUN SERPL-MCNC: 13 MG/DL (ref 8–23)
BUN/CREAT SERPL: 15.7 (ref 7–25)
CALCIUM SPEC-SCNC: 9.6 MG/DL (ref 8.6–10.5)
CHLORIDE SERPL-SCNC: 104 MMOL/L (ref 98–107)
CO2 SERPL-SCNC: 30.9 MMOL/L (ref 22–29)
CREAT SERPL-MCNC: 0.83 MG/DL (ref 0.57–1)
DEPRECATED RDW RBC AUTO: 59.7 FL (ref 37–54)
EGFRCR SERPLBLD CKD-EPI 2021: 77.4 ML/MIN/1.73
EOSINOPHIL # BLD AUTO: 0.21 10*3/MM3 (ref 0–0.4)
EOSINOPHIL NFR BLD AUTO: 2.8 % (ref 0.3–6.2)
ERYTHROCYTE [DISTWIDTH] IN BLOOD BY AUTOMATED COUNT: 17.7 % (ref 12.3–15.4)
GLOBULIN UR ELPH-MCNC: 3.9 GM/DL
GLUCOSE SERPL-MCNC: 103 MG/DL (ref 65–99)
HCT VFR BLD AUTO: 40.7 % (ref 34–46.6)
HGB BLD-MCNC: 12.6 G/DL (ref 12–15.9)
IMM GRANULOCYTES # BLD AUTO: 0.02 10*3/MM3 (ref 0–0.05)
IMM GRANULOCYTES NFR BLD AUTO: 0.3 % (ref 0–0.5)
INR PPP: 1.18 (ref 0.9–1.1)
LYMPHOCYTES # BLD AUTO: 1.73 10*3/MM3 (ref 0.7–3.1)
LYMPHOCYTES NFR BLD AUTO: 22.9 % (ref 19.6–45.3)
MCH RBC QN AUTO: 28.4 PG (ref 26.6–33)
MCHC RBC AUTO-ENTMCNC: 31 G/DL (ref 31.5–35.7)
MCV RBC AUTO: 91.7 FL (ref 79–97)
MONOCYTES # BLD AUTO: 0.45 10*3/MM3 (ref 0.1–0.9)
MONOCYTES NFR BLD AUTO: 6 % (ref 5–12)
NEUTROPHILS NFR BLD AUTO: 5.07 10*3/MM3 (ref 1.7–7)
NEUTROPHILS NFR BLD AUTO: 66.9 % (ref 42.7–76)
NRBC BLD AUTO-RTO: 0 /100 WBC (ref 0–0.2)
PLATELET # BLD AUTO: 230 10*3/MM3 (ref 140–450)
PMV BLD AUTO: 10.9 FL (ref 6–12)
POTASSIUM SERPL-SCNC: 4.4 MMOL/L (ref 3.5–5.2)
PROT SERPL-MCNC: 7.8 G/DL (ref 6–8.5)
PROTHROMBIN TIME: 15.2 SECONDS (ref 11.7–14.2)
RBC # BLD AUTO: 4.44 10*6/MM3 (ref 3.77–5.28)
SODIUM SERPL-SCNC: 143 MMOL/L (ref 136–145)
WBC NRBC COR # BLD AUTO: 7.56 10*3/MM3 (ref 3.4–10.8)

## 2025-01-15 PROCEDURE — 85610 PROTHROMBIN TIME: CPT

## 2025-01-15 PROCEDURE — 36415 COLL VENOUS BLD VENIPUNCTURE: CPT

## 2025-01-15 PROCEDURE — 80053 COMPREHEN METABOLIC PANEL: CPT

## 2025-01-15 PROCEDURE — 85025 COMPLETE CBC W/AUTO DIFF WBC: CPT

## 2025-01-16 ENCOUNTER — HOSPITAL ENCOUNTER (INPATIENT)
Facility: HOSPITAL | Age: 70
LOS: 1 days | Discharge: HOME | DRG: 322 | End: 2025-01-18
Attending: INTERNAL MEDICINE | Admitting: STUDENT IN AN ORGANIZED HEALTH CARE EDUCATION/TRAINING PROGRAM
Payer: MEDICARE

## 2025-01-16 DIAGNOSIS — R07.9 CHEST PAIN, UNSPECIFIED TYPE: ICD-10-CM

## 2025-01-16 PROBLEM — I25.10 CAD (CORONARY ARTERY DISEASE): Status: ACTIVE | Noted: 2025-01-16

## 2025-01-16 LAB — ACT BLD: 268 SECONDS (ref 89–137)

## 2025-01-16 PROCEDURE — C1894 INTRO/SHEATH, NON-LASER: HCPCS | Performed by: INTERNAL MEDICINE

## 2025-01-16 PROCEDURE — 99152 MOD SED SAME PHYS/QHP 5/>YRS: CPT | Performed by: INTERNAL MEDICINE

## 2025-01-16 PROCEDURE — 63710000001 RISPERIDONE 1 MG TABLET: Performed by: INTERNAL MEDICINE

## 2025-01-16 PROCEDURE — A9270 NON-COVERED ITEM OR SERVICE: HCPCS | Performed by: INTERNAL MEDICINE

## 2025-01-16 PROCEDURE — 93459 L HRT ART/GRFT ANGIO: CPT | Performed by: INTERNAL MEDICINE

## 2025-01-16 PROCEDURE — 4A023N7 MEASUREMENT OF CARDIAC SAMPLING AND PRESSURE, LEFT HEART, PERCUTANEOUS APPROACH: ICD-10-PCS | Performed by: INTERNAL MEDICINE

## 2025-01-16 PROCEDURE — B2131ZZ FLUOROSCOPY OF MULTIPLE CORONARY ARTERY BYPASS GRAFTS USING LOW OSMOLAR CONTRAST: ICD-10-PCS | Performed by: INTERNAL MEDICINE

## 2025-01-16 PROCEDURE — B2151ZZ FLUOROSCOPY OF LEFT HEART USING LOW OSMOLAR CONTRAST: ICD-10-PCS | Performed by: INTERNAL MEDICINE

## 2025-01-16 PROCEDURE — C1874 STENT, COATED/COV W/DEL SYS: HCPCS | Performed by: INTERNAL MEDICINE

## 2025-01-16 PROCEDURE — C1725 CATH, TRANSLUMIN NON-LASER: HCPCS | Performed by: INTERNAL MEDICINE

## 2025-01-16 PROCEDURE — 25510000001 IOPAMIDOL PER 1 ML: Performed by: INTERNAL MEDICINE

## 2025-01-16 PROCEDURE — 25010000002 FENTANYL CITRATE (PF) 100 MCG/2ML SOLUTION: Performed by: INTERNAL MEDICINE

## 2025-01-16 PROCEDURE — 25010000002 LIDOCAINE 2% SOLUTION: Performed by: INTERNAL MEDICINE

## 2025-01-16 PROCEDURE — 25010000002 MIDAZOLAM PER 1 MG: Performed by: INTERNAL MEDICINE

## 2025-01-16 PROCEDURE — 63710000001 CITALOPRAM 20 MG TABLET: Performed by: INTERNAL MEDICINE

## 2025-01-16 PROCEDURE — B2111ZZ FLUOROSCOPY OF MULTIPLE CORONARY ARTERIES USING LOW OSMOLAR CONTRAST: ICD-10-PCS | Performed by: INTERNAL MEDICINE

## 2025-01-16 PROCEDURE — S0260 H&P FOR SURGERY: HCPCS | Performed by: INTERNAL MEDICINE

## 2025-01-16 PROCEDURE — C1760 CLOSURE DEV, VASC: HCPCS | Performed by: INTERNAL MEDICINE

## 2025-01-16 PROCEDURE — 25010000002 ONDANSETRON PER 1 MG: Performed by: INTERNAL MEDICINE

## 2025-01-16 PROCEDURE — C1887 CATHETER, GUIDING: HCPCS | Performed by: INTERNAL MEDICINE

## 2025-01-16 PROCEDURE — 63710000001 LAMOTRIGINE 100 MG TABLET: Performed by: INTERNAL MEDICINE

## 2025-01-16 PROCEDURE — 99153 MOD SED SAME PHYS/QHP EA: CPT | Performed by: INTERNAL MEDICINE

## 2025-01-16 PROCEDURE — 92937 PRQ TRLUML REVSC CAB GRF 1: CPT | Performed by: INTERNAL MEDICINE

## 2025-01-16 PROCEDURE — C9604 PERC D-E COR REVASC T CABG S: HCPCS | Performed by: INTERNAL MEDICINE

## 2025-01-16 PROCEDURE — 63710000001 FUROSEMIDE 20 MG TABLET: Performed by: INTERNAL MEDICINE

## 2025-01-16 PROCEDURE — 63710000001 FERROUS SULFATE 325 (65 FE) MG TABLET: Performed by: INTERNAL MEDICINE

## 2025-01-16 PROCEDURE — 027034Z DILATION OF CORONARY ARTERY, ONE ARTERY WITH DRUG-ELUTING INTRALUMINAL DEVICE, PERCUTANEOUS APPROACH: ICD-10-PCS | Performed by: INTERNAL MEDICINE

## 2025-01-16 PROCEDURE — 25010000002 NITROGLYCERIN 5 MG/ML SOLUTION: Performed by: INTERNAL MEDICINE

## 2025-01-16 PROCEDURE — 63710000001 PANTOPRAZOLE 40 MG TABLET DELAYED-RELEASE: Performed by: INTERNAL MEDICINE

## 2025-01-16 PROCEDURE — 63710000001 ALPRAZOLAM 1 MG TABLET: Performed by: INTERNAL MEDICINE

## 2025-01-16 PROCEDURE — 25010000002 HEPARIN (PORCINE) PER 1000 UNITS: Performed by: INTERNAL MEDICINE

## 2025-01-16 PROCEDURE — C1769 GUIDE WIRE: HCPCS | Performed by: INTERNAL MEDICINE

## 2025-01-16 PROCEDURE — 25810000003 SODIUM CHLORIDE 0.9 % SOLUTION: Performed by: INTERNAL MEDICINE

## 2025-01-16 PROCEDURE — 63710000001 POTASSIUM CHLORIDE 20 MEQ TABLET CONTROLLED-RELEASE: Performed by: INTERNAL MEDICINE

## 2025-01-16 PROCEDURE — 63710000001 MIRTAZAPINE 15 MG TABLET: Performed by: INTERNAL MEDICINE

## 2025-01-16 PROCEDURE — 63710000001 VITAMIN C 500 MG TABLET: Performed by: INTERNAL MEDICINE

## 2025-01-16 PROCEDURE — 63710000001 CHOLECALCIFEROL 25 MCG (1000 UT) TABLET: Performed by: INTERNAL MEDICINE

## 2025-01-16 PROCEDURE — 63710000001 VITAMIN B-12 500 MCG TABLET: Performed by: INTERNAL MEDICINE

## 2025-01-16 PROCEDURE — 63710000001 ATORVASTATIN 40 MG TABLET: Performed by: INTERNAL MEDICINE

## 2025-01-16 PROCEDURE — 85347 COAGULATION TIME ACTIVATED: CPT

## 2025-01-16 DEVICE — STNT CORNRY RESOLUTE ONYX RX 2X15MM: Type: IMPLANTABLE DEVICE | Site: CORONARY | Status: FUNCTIONAL

## 2025-01-16 RX ORDER — RISPERIDONE 1 MG/1
1 TABLET ORAL EVERY 12 HOURS SCHEDULED
Status: DISCONTINUED | OUTPATIENT
Start: 2025-01-16 | End: 2025-01-18 | Stop reason: HOSPADM

## 2025-01-16 RX ORDER — LAMOTRIGINE 100 MG/1
100 TABLET ORAL 2 TIMES DAILY
Qty: 180 TABLET | Refills: 0 | Status: SHIPPED | OUTPATIENT
Start: 2025-01-16

## 2025-01-16 RX ORDER — SODIUM CHLORIDE 9 MG/ML
INJECTION, SOLUTION INTRAVENOUS
Status: COMPLETED | OUTPATIENT
Start: 2025-01-16 | End: 2025-01-16

## 2025-01-16 RX ORDER — ASPIRIN 81 MG/1
81 TABLET, CHEWABLE ORAL DAILY
Status: DISCONTINUED | OUTPATIENT
Start: 2025-01-17 | End: 2025-01-18 | Stop reason: HOSPADM

## 2025-01-16 RX ORDER — HEPARIN SODIUM 1000 [USP'U]/ML
INJECTION, SOLUTION INTRAVENOUS; SUBCUTANEOUS
Status: DISCONTINUED | OUTPATIENT
Start: 2025-01-16 | End: 2025-01-16 | Stop reason: HOSPADM

## 2025-01-16 RX ORDER — IOPAMIDOL 755 MG/ML
INJECTION, SOLUTION INTRAVASCULAR
Status: DISCONTINUED | OUTPATIENT
Start: 2025-01-16 | End: 2025-01-16 | Stop reason: HOSPADM

## 2025-01-16 RX ORDER — FENTANYL CITRATE 50 UG/ML
INJECTION, SOLUTION INTRAMUSCULAR; INTRAVENOUS
Status: DISCONTINUED | OUTPATIENT
Start: 2025-01-16 | End: 2025-01-16 | Stop reason: HOSPADM

## 2025-01-16 RX ORDER — MULTIVITAMIN WITH IRON
1000 TABLET ORAL DAILY
Status: DISCONTINUED | OUTPATIENT
Start: 2025-01-16 | End: 2025-01-18 | Stop reason: HOSPADM

## 2025-01-16 RX ORDER — ASPIRIN 325 MG
TABLET ORAL
Status: DISCONTINUED | OUTPATIENT
Start: 2025-01-16 | End: 2025-01-16 | Stop reason: HOSPADM

## 2025-01-16 RX ORDER — MULTIVIT WITH MINERALS/LUTEIN
1000 TABLET ORAL DAILY
COMMUNITY

## 2025-01-16 RX ORDER — ATORVASTATIN CALCIUM 40 MG/1
80 TABLET, FILM COATED ORAL NIGHTLY
Status: DISCONTINUED | OUTPATIENT
Start: 2025-01-16 | End: 2025-01-18 | Stop reason: HOSPADM

## 2025-01-16 RX ORDER — ATORVASTATIN CALCIUM 80 MG/1
80 TABLET, FILM COATED ORAL DAILY
COMMUNITY
End: 2025-01-31 | Stop reason: SDUPTHER

## 2025-01-16 RX ORDER — ALPRAZOLAM 1 MG/1
2 TABLET ORAL NIGHTLY PRN
Status: DISCONTINUED | OUTPATIENT
Start: 2025-01-16 | End: 2025-01-18 | Stop reason: HOSPADM

## 2025-01-16 RX ORDER — PANTOPRAZOLE SODIUM 40 MG/1
40 TABLET, DELAYED RELEASE ORAL ONCE
Status: COMPLETED | OUTPATIENT
Start: 2025-01-16 | End: 2025-01-16

## 2025-01-16 RX ORDER — LIDOCAINE HYDROCHLORIDE 20 MG/ML
INJECTION, SOLUTION INFILTRATION; PERINEURAL
Status: DISCONTINUED | OUTPATIENT
Start: 2025-01-16 | End: 2025-01-16 | Stop reason: HOSPADM

## 2025-01-16 RX ORDER — FUROSEMIDE 20 MG/1
20 TABLET ORAL DAILY
COMMUNITY
End: 2025-01-31 | Stop reason: SDUPTHER

## 2025-01-16 RX ORDER — ACETAMINOPHEN 325 MG/1
650 TABLET ORAL EVERY 4 HOURS PRN
Status: DISCONTINUED | OUTPATIENT
Start: 2025-01-16 | End: 2025-01-18 | Stop reason: HOSPADM

## 2025-01-16 RX ORDER — LAMOTRIGINE 100 MG/1
100 TABLET ORAL 2 TIMES DAILY
Status: DISCONTINUED | OUTPATIENT
Start: 2025-01-16 | End: 2025-01-18 | Stop reason: HOSPADM

## 2025-01-16 RX ORDER — GUAIFENESIN/DEXTROMETHORPHAN 100-10MG/5
5 SYRUP ORAL EVERY 4 HOURS PRN
Status: DISCONTINUED | OUTPATIENT
Start: 2025-01-16 | End: 2025-01-18 | Stop reason: HOSPADM

## 2025-01-16 RX ORDER — NITROGLYCERIN 0.4 MG/1
0.4 TABLET SUBLINGUAL
Status: DISCONTINUED | OUTPATIENT
Start: 2025-01-16 | End: 2025-01-18 | Stop reason: HOSPADM

## 2025-01-16 RX ORDER — PANTOPRAZOLE SODIUM 40 MG/1
40 TABLET, DELAYED RELEASE ORAL
Status: DISCONTINUED | OUTPATIENT
Start: 2025-01-17 | End: 2025-01-16

## 2025-01-16 RX ORDER — NITROGLYCERIN 5 MG/ML
INJECTION, SOLUTION INTRAVENOUS
Status: DISCONTINUED | OUTPATIENT
Start: 2025-01-16 | End: 2025-01-16 | Stop reason: HOSPADM

## 2025-01-16 RX ORDER — POTASSIUM CHLORIDE 1500 MG/1
20 TABLET, EXTENDED RELEASE ORAL DAILY
Status: DISCONTINUED | OUTPATIENT
Start: 2025-01-16 | End: 2025-01-18 | Stop reason: HOSPADM

## 2025-01-16 RX ORDER — MIDAZOLAM HYDROCHLORIDE 1 MG/ML
INJECTION, SOLUTION INTRAMUSCULAR; INTRAVENOUS
Status: DISCONTINUED | OUTPATIENT
Start: 2025-01-16 | End: 2025-01-16 | Stop reason: HOSPADM

## 2025-01-16 RX ORDER — CITALOPRAM HYDROBROMIDE 20 MG/1
10 TABLET ORAL DAILY
Status: DISCONTINUED | OUTPATIENT
Start: 2025-01-16 | End: 2025-01-17

## 2025-01-16 RX ORDER — ASCORBIC ACID 500 MG
1000 TABLET ORAL DAILY
Status: DISCONTINUED | OUTPATIENT
Start: 2025-01-16 | End: 2025-01-18 | Stop reason: HOSPADM

## 2025-01-16 RX ORDER — LORATADINE 10 MG/1
10 TABLET ORAL DAILY
COMMUNITY

## 2025-01-16 RX ORDER — FERROUS SULFATE 325(65) MG
325 TABLET ORAL EVERY OTHER DAY
Status: DISCONTINUED | OUTPATIENT
Start: 2025-01-16 | End: 2025-01-18 | Stop reason: HOSPADM

## 2025-01-16 RX ORDER — CHOLECALCIFEROL (VITAMIN D3) 25 MCG
1000 TABLET ORAL 2 TIMES DAILY
COMMUNITY

## 2025-01-16 RX ORDER — BISOPROLOL FUMARATE 5 MG/1
5 TABLET, FILM COATED ORAL DAILY
Status: DISCONTINUED | OUTPATIENT
Start: 2025-01-16 | End: 2025-01-18 | Stop reason: HOSPADM

## 2025-01-16 RX ORDER — MIRTAZAPINE 15 MG/1
7.5 TABLET, FILM COATED ORAL NIGHTLY
Status: DISCONTINUED | OUTPATIENT
Start: 2025-01-16 | End: 2025-01-18 | Stop reason: HOSPADM

## 2025-01-16 RX ORDER — CLOPIDOGREL BISULFATE 75 MG/1
75 TABLET ORAL DAILY
Status: DISCONTINUED | OUTPATIENT
Start: 2025-01-17 | End: 2025-01-18 | Stop reason: HOSPADM

## 2025-01-16 RX ORDER — SODIUM CHLORIDE 9 MG/ML
3 INJECTION, SOLUTION INTRAVENOUS CONTINUOUS
Status: DISPENSED | OUTPATIENT
Start: 2025-01-16 | End: 2025-01-16

## 2025-01-16 RX ORDER — FUROSEMIDE 20 MG/1
20 TABLET ORAL DAILY
Status: DISCONTINUED | OUTPATIENT
Start: 2025-01-16 | End: 2025-01-18 | Stop reason: HOSPADM

## 2025-01-16 RX ORDER — ASPIRIN 81 MG/1
81 TABLET, CHEWABLE ORAL DAILY
COMMUNITY
End: 2025-01-31

## 2025-01-16 RX ORDER — CHOLECALCIFEROL (VITAMIN D3) 25 MCG
1000 TABLET ORAL DAILY
Status: DISCONTINUED | OUTPATIENT
Start: 2025-01-16 | End: 2025-01-18 | Stop reason: HOSPADM

## 2025-01-16 RX ORDER — ONDANSETRON 2 MG/ML
4 INJECTION INTRAMUSCULAR; INTRAVENOUS EVERY 6 HOURS PRN
Status: DISCONTINUED | OUTPATIENT
Start: 2025-01-16 | End: 2025-01-18 | Stop reason: HOSPADM

## 2025-01-16 RX ORDER — PANTOPRAZOLE SODIUM 40 MG/1
40 TABLET, DELAYED RELEASE ORAL
Status: DISCONTINUED | OUTPATIENT
Start: 2025-01-17 | End: 2025-01-18 | Stop reason: HOSPADM

## 2025-01-16 RX ORDER — ONDANSETRON 2 MG/ML
4 INJECTION INTRAMUSCULAR; INTRAVENOUS ONCE
Status: COMPLETED | OUTPATIENT
Start: 2025-01-16 | End: 2025-01-16

## 2025-01-16 RX ORDER — CLOPIDOGREL 300 MG/1
TABLET, FILM COATED ORAL
Status: DISCONTINUED | OUTPATIENT
Start: 2025-01-16 | End: 2025-01-16 | Stop reason: HOSPADM

## 2025-01-16 RX ADMIN — LAMOTRIGINE 100 MG: 100 TABLET ORAL at 15:18

## 2025-01-16 RX ADMIN — POTASSIUM CHLORIDE 20 MEQ: 1500 TABLET, EXTENDED RELEASE ORAL at 15:18

## 2025-01-16 RX ADMIN — Medication 1000 MCG: at 15:18

## 2025-01-16 RX ADMIN — ALPRAZOLAM 2 MG: 1 TABLET ORAL at 22:38

## 2025-01-16 RX ADMIN — MIRTAZAPINE 7.5 MG: 15 TABLET, FILM COATED ORAL at 20:48

## 2025-01-16 RX ADMIN — RISPERIDONE 1 MG: 1 TABLET, FILM COATED ORAL at 20:47

## 2025-01-16 RX ADMIN — ONDANSETRON 4 MG: 2 INJECTION INTRAMUSCULAR; INTRAVENOUS at 11:47

## 2025-01-16 RX ADMIN — OXYCODONE HYDROCHLORIDE AND ACETAMINOPHEN 1000 MG: 500 TABLET ORAL at 15:16

## 2025-01-16 RX ADMIN — ATORVASTATIN CALCIUM 80 MG: 40 TABLET, FILM COATED ORAL at 20:48

## 2025-01-16 RX ADMIN — LAMOTRIGINE 100 MG: 100 TABLET ORAL at 20:48

## 2025-01-16 RX ADMIN — PANTOPRAZOLE SODIUM 40 MG: 40 TABLET, DELAYED RELEASE ORAL at 12:41

## 2025-01-16 RX ADMIN — Medication 1000 UNITS: at 15:18

## 2025-01-16 RX ADMIN — CITALOPRAM HYDROBROMIDE 10 MG: 20 TABLET ORAL at 15:18

## 2025-01-16 RX ADMIN — FERROUS SULFATE TAB 325 MG (65 MG ELEMENTAL FE) 325 MG: 325 (65 FE) TAB at 15:17

## 2025-01-16 RX ADMIN — SODIUM CHLORIDE 3 ML/KG/HR: 9 INJECTION, SOLUTION INTRAVENOUS at 15:35

## 2025-01-16 RX ADMIN — FUROSEMIDE 20 MG: 20 TABLET ORAL at 15:18

## 2025-01-16 NOTE — H&P
Cardiology H&P  Kamar Wheeler MD, PhD      Patient Care Team:  Naga Griffith PA-C as PCP - General (Physician Assistant)    CHIEF COMPLAINT: Abnormal stress test    HISTORY OF PRESENT ILLNESS:    This is a 69-year-old female who underwent stress testing which was abnormal with lateral inferolateral ischemia with preserved EF.  She is here for left heart catheterization, no contrast anticoagulation or antiplatelet contraindications.  Awake and alert, chest pain-free today, she has a known history of CAD  Comorbidities reviewed    Labs and vitals reviewed in EMR  Primary consultant notes reviewed  Historical data copied forward from previous encounters in EMR is unchanged  Review of systems otherwise negative x 14 point review of systems except as mentioned above        Past Medical History:   Diagnosis Date    ADHD unknown    Anemia     Anesthesia complication     difficult to wake up    Anxiety     Atrial fibrillation     Carotid artery disease     80% right internal carotid artery    Coronary artery disease     CVA (cerebral vascular accident)     right parietal right temporal    DDD (degenerative disc disease), lumbar     Depression     Extremity pain     Ankle pain    GERD (gastroesophageal reflux disease)     Hyperlipidemia unknown    Hypertension     Insomnia unknown    Laryngopharyngeal reflux     Low back pain     Mood disorder     Obesity unknown    Short of breath on exertion     Skin cancer of face     Sleep apnea     Suspected sleep apnea she has refused to be tested    Stroke (cerebrum)     Visual field defect     Left     Past Surgical History:   Procedure Laterality Date    CARDIAC CATHETERIZATION N/A 5/1/2023    Procedure: Left Heart Cath;  Surgeon: Kye Alcaraz MD;  Location: [facility] CATH INVASIVE LOCATION;  Service: Cardiovascular;  Laterality: N/A;    CAROTID ENDARTERECTOMY Right 11/10/2020    Procedure: CAROTID ENDARTERECTOMY;  Surgeon: Tavo Das MD;  Location:   [facility] MAIN OR;  Service: Vascular;  Laterality: Right;    CHOLECYSTECTOMY      COLONOSCOPY      2016    CORONARY ARTERY BYPASS GRAFT N/A 5/6/2023    Procedure: CORONARY ARTERY BYPASS GRAFTING;  Surgeon: Errol Noonan MD;  [facility];  Service: Cardiothoracic;  Laterality: N/A;  CABG X  3 using LIMA and right endospahenous vein;  2 coronary markers implanted.     FINGER SURGERY      KNEE ARTHROPLASTY      KNEE SURGERY Right     replaced    LAPAROSCOPIC GASTRIC BANDING      MITRAL VALVE REPAIR/REPLACEMENT N/A 5/6/2023    Procedure: MITRAL VALVE REPAIR/REPLACEMENT;  Surgeon: Errol Noonan MD;  Location: [facility];  Service: Cardiothoracic;  Laterality: N/A;  Mitral valve repaired using 28 mm Jorge Physio II Annuloplasty Ring.     ROTATOR CUFF REPAIR Right 2019    SHOULDER SURGERY Right 05/24/2019    scope/ cuff repair    TOTAL SHOULDER ARTHROPLASTY W/ DISTAL CLAVICLE EXCISION Left 11/8/2023    Procedure: TOTAL SHOULDER REVERSE ARTHROPLASTY;  Surgeon: Shubham Samaniego MD;  [facility] MA[facility];  Service: Orthopedics;  Laterality: Left;    TRANSESOPHAGEAL ECHOCARDIOGRAM (GALA) N/A 5/6/2023    Procedure: TRANSESOPHAGEAL ECHOCARDIOGRAM WITH ANESTHESIA;  Surgeon: Errol Noonan MD;  Location: [facility];  Service: Cardiothoracic;  Laterality: N/A;    TUBAL ABDOMINAL LIGATION       Family History   Problem Relation Age of Onset    Diabetes Other     Heart disease Mother     Diabetes Mother     Heart disease Father      Social History     Tobacco Use    Smoking status: Never     Passive exposure: Never    Smokeless tobacco: Never   Vaping Use    Vaping status: Never Used   Substance Use Topics    Alcohol use: No    Drug use: No     Medications Prior to Admission   Medication Sig Dispense Refill Last Dose/Taking    ALPRAZolam (XANAX) 2 MG tablet TAKE 1 TABLET BY MOUTH NIGHTLY AS NEEDED FOR ANXIETY 30 tablet 3 1/15/2025 at 10:30 PM    apixaban (ELIQUIS) 5 MG tablet tablet  Take 1 tablet by mouth 2 (Two) Times a Day.   1/14/2025    ascorbic acid (VITAMIN C) 1000 MG tablet Take 1 tablet by mouth Daily.   1/14/2025    aspirin 81 MG chewable tablet Chew 1 tablet Daily.   1/14/2025 at 10:30 AM    atorvastatin (LIPITOR) 80 MG tablet Take 1 tablet by mouth Daily.   Taking    bisoprolol (ZEBeta) 5 MG tablet Take 1 tablet by mouth Daily. 90 tablet 3 1/14/2025 at 10:30 AM    cholecalciferol (VITAMIN D3) 25 MCG (1000 UT) tablet Take 1 tablet by mouth Daily.   1/14/2025    ferrous sulfate 325 (65 FE) MG tablet Take 1 tablet by mouth Every Other Day. 30 tablet 2 1/14/2025    furosemide (LASIX) 20 MG tablet Take 1 tablet by mouth Daily. Pt is out of this medication.   Taking    lamoTRIgine (LaMICtal) 100 MG tablet Take 1 tablet by mouth twice daily 180 tablet 0 1/15/2025 at 10:30 PM    loratadine (CLARITIN) 10 MG tablet Take 1 tablet by mouth Daily.   1/14/2025    Mirabegron ER (MYRBETRIQ) 50 MG tablet sustained-release 24 hour 24 hr tablet Take 50 mg by mouth Daily. 30 tablet 5 1/14/2025    mirtazapine (REMERON) 15 MG tablet Take 0.5 tablets by mouth Every Night. 60 tablet 0 1/15/2025 at 10:30 PM    omeprazole (priLOSEC) 40 MG capsule Take 1 capsule by mouth once daily 90 capsule 0 1/14/2025    potassium chloride (KLOR-CON M20) 20 MEQ CR tablet Take 1 tablet by mouth Daily. 90 tablet 1 1/14/2025    risperiDONE (risperDAL) 1 MG tablet Take 1 tablet by mouth Every 12 (Twelve) Hours. 60 tablet 3 1/15/2025    vitamin B-12 (CYANOCOBALAMIN) 1000 MCG tablet Take 1 tablet by mouth Daily.   1/14/2025    citalopram (CeleXA) 10 MG tablet Take 1 tablet by mouth once daily (Patient taking differently: Take 1 tablet by mouth Daily. Has not started yet.) 30 tablet 0      Allergies:  Desvenlafaxine    REVIEW OF SYSTEMS:  Please see the above history of present illness for pertinent positives and negatives.  The remainder of the patient's systems have been reviewed and are negative.     Vital Signs  Temp:  [97.9  "°F (36.6 °C)] 97.9 °F (36.6 °C)  Heart Rate:  [62] 62  Resp:  [16] 16  BP: (175)/(92) 175/92    Flowsheet Rows      Flowsheet Row First Filed Value   Admission Height 168.9 cm (66.5\") Documented at 01/16/2025 0809   Admission Weight 75.2 kg (165 lb 12.6 oz) Documented at 01/16/2025 0809             Physical Exam:  Physical Exam   Constitutional: Patient appears no acute distress   HEENT:   Head: Normocephalic and atraumatic.   Eyes:  Pupils are equal, round, and reactive to light. EOM are intact. Sclerae are anicteric and noninjected.  Mouth and Throat: Patient has moist mucous membranes. Oropharynx is clear of any erythema or exudate.     Neck: Neck supple. No JVD present. No thyromegaly present. No lymphadenopathy present.  Cardiovascular: Regular rate, regular rhythm, S1 normal and S2 normal.  Exam reveals no gallop and no friction rub.  No murmur heard.  Pulmonary/Chest: Lungs are clear to auscultation bilaterally. No respiratory distress. No wheezes. No rhonchi. No rales.   Abdominal: Soft. Bowel sounds are normal. No distension and no mass. There is no hepatosplenomegaly. There is no tenderness.   Musculoskeletal: Normal muscle tone  Extremities: No edema. Pulses are palpable in all 4 extremities.  Neurological: Grossly intact no deficits  Skin: Skin is warm. No rash noted. Nails show no clubbing.  No cyanosis or erythema.     Results Review:    I reviewed the patient's new clinical results.  Lab Results (most recent)       None            Imaging Results (Most Recent)       None          reviewed    ECG/EMG Results (most recent)       None            Assessment & Plan   Okay for left heart catheterization today  Risks and benefits including death heart attack stroke pain bleeding infection need for vascular or cardiovascular surgery were discussed reviewed  She wished to proceed okay to proceed safely today      History of bypass surgery 2023  Multivessel disease,  of the right, diffuse disease circumflex " OM, proximal LAD disease, also distal apical LAD disease not amenable to intervention at that time  Preserved EF overall    No significant valvular abnormality other than moderate mitral valve regurgitation, mild aortic valve sclerosis    I discussed the patient's findings and my recommendations with patient and   staff    Kamar Wheeler MD  01/16/25  09:44 EST

## 2025-01-16 NOTE — CONSULTS
Patient seen and spoke with regarding Cardiac Rehab. Says she will probably come to our rehab vs [facility]. Given handouts on pci, CIG, heart healthy diet, anti platelets, and Cardiac Rehab brochure.

## 2025-01-16 NOTE — Clinical Note
First balloon inflation max pressure = 16 paola. First balloon inflation duration = 20 seconds. Second inflation of balloon - Max pressure = 18 paola. 2nd Inflation of balloon - Duration = 50 seconds. Third inflation of balloon - Max pressure = 18 paola. 3rd Inflation of balloon - Duration = 10 seconds.

## 2025-01-16 NOTE — CASE MANAGEMENT/SOCIAL WORK
Discharge Planning Assessment   Eddy     Patient Name: Andree Deluna  MRN: [id number]  Today's Date: 1/16/2025    Admit Date: 1/16/2025    Plan: DC Plan: Anticipate routine home with spouse, 4 adult children, and dependent grandchild. Current with [facility] services. Family will provide transport.   Discharge Needs Assessment       Row Name 01/16/25 1503       Living Environment    People in Home spouse;child(avery), adult;grandchild(avery)    Name(s) of People in Home Lives with spouse Mike, 4 Adult children, and dependent grandchild.    Current Living Arrangements home    Potentially Unsafe Housing Conditions none    In the past 12 months has the electric, gas, oil, or water company threatened to shut off services in your home? No    Primary Care Provided by spouse/significant other    Provides Primary Care For no one    Family Caregiver if Needed spouse    Family Caregiver Names Mike Deluna    Quality of Family Relationships helpful;involved;supportive    Able to Return to Prior Arrangements yes       Resource/Environmental Concerns    Resource/Environmental Concerns none    Transportation Concerns none       Transportation Needs    In the past 12 months, has lack of transportation kept you from medical appointments or from getting medications? no    In the past 12 months, has lack of transportation kept you from meetings, work, or from getting things needed for daily living? No       Food Insecurity    Within the past 12 months, you worried that your food would run out before you got the money to buy more. Never true    Within the past 12 months, the food you bought just didn't last and you didn't have money to get more. Never true       Transition Planning    Patient/Family Anticipates Transition to home with family;home with help/services    Patient/Family Anticipated Services at Transition home health care    Transportation Anticipated family or friend will provide       Discharge Needs Assessment     Readmission Within the Last 30 Days no previous admission in last 30 days    Current Outpatient/Agency/Support Group homecare agency    Equipment Currently Used at Home shower chair;commode;walker, rolling    Concerns to be Addressed discharge planning    Do you want help finding or keeping work or a job? I do not need or want help    Do you want help with school or training? For example, starting or completing job training or getting a high school diploma, GED or equivalent No    Anticipated Changes Related to Illness none    Equipment Needed After Discharge none    Provided Post Acute Provider List? N/A    Provided Post Acute Provider Quality & Resource List? N/A    Patient's Choice of Community Agency(s) Current with [facility].    Current Discharge Risk physical impairment                   Discharge Plan       Row Name 01/16/25 1505       Plan    Plan DC Plan: Anticipate routine home with spouse, 4 adult children, and dependent grandchild. Current with [facility]. Family will provide transport.    Patient/Family in Agreement with Plan yes    Provided Post Acute Provider List? N/A    Provided Post Acute Provider Quality & Resource List? N/A    Plan Comments CM spoke with patient at bedside to discuss admission assessment and discharge planning. Patient confirms PCP and pharmacy. Patient confirms she is agreeable to meds to bed program at this time. CM updated pharmacy in [facility]. Patient denies any difficulty affording medications or food at this time. Patient is current on Eliquis and states it is currently over $100 per month. CM advised patient to have family help her apply for financial assistance on DEQ. Patient denies any additional needs for services or DME at this time. Uses [facility] at home and has a RW and BSC available if needed. Patient is current with [facility]. CM placed referral in basket and notified liaison to follow. Has been to [facility] for rehab in the past. Patient reports  requires assist x1 with ADL's and IADL's post CVA, and no longer drives. Patient spouse will provide transportation when ready for DC.CM will continue to follow for any additional needs and adjust DC plan accordingly. DC Barriers: Cardiac monitoring, IVF's, Post heart cath monitroring, and monitor labs.                  Continued Care and Services - Admitted Since 1/16/2025       Home Medical Care       Service Provider Request Status Services Address Phone Fax Patient Preferred    [facility] Pending - Request Sent [facility] [phone] [facility] [address] [address] [phone] --                  Selected Continued Care - Prior Encounters Includes continued care and service providers with selected services from prior encounters from 10/18/2024 to 1/16/2025      Discharged on 11/21/2024 Admission date: 11/17/2024 - Discharge disposition: Home-Health Care c      Home Medical Care       Service Provider Services Address Phone Fax Patient Preferred    [facility] [address] [facility], [facility] [address] [address] [phone] --                          Expected Discharge Date and Time       Expected Discharge Date Expected Discharge Time    Jan 17, 2025            Demographic Summary       Row Name 01/16/25 1502       General Information    Admission Type observation    Arrived From home;other (see comments)  cath lab    Required Notices Provided Observation Status Notice    Referral Source admission list    Reason for Consult discharge planning    Preferred Language English       Contact Information    Permission Granted to Share Info With                    Functional Status       Row Name 01/16/25 1502       Functional Status    Usual Activity Tolerance moderate    Current Activity Tolerance moderate    Functional Status Comments Requires assist x1 with ADL's since recent stroke. Was  independent prior.       Physical Activity    On average, how many days per week do you engage in moderate to strenuous exercise (like a brisk walk)? 0 days    On average, how many minutes do you engage in exercise at this level? 0 min    Number of minutes of exercise per week 0       Functional Status, IADL    Medications assistive person    Meal Preparation assistive person    Housekeeping assistive person    Laundry assistive person    Shopping assistive person    If for any reason you need help with day-to-day activities such as bathing, preparing meals, shopping, managing finances, etc., do you get the help you need? I get all the help I need       Mental Status    General Appearance WDL WDL       Mental Status Summary    Recent Changes in Mental Status/Cognitive Functioning no changes       Employment/    Employment Status disabled    Current or Previous Occupation not applicable                     Hattie Alicea RN    Office Phone: [phone]  Office Cell:     [phone]

## 2025-01-16 NOTE — Clinical Note
First balloon inflation max pressure = 12 paola. First balloon inflation duration = 45 seconds. Second inflation of balloon - Max pressure = 8 paola. 2nd Inflation of balloon - Duration = 6 seconds. Third inflation of balloon - Max pressure = 16 paola. 3rd Inflation of balloon - Duration = 16 seconds.

## 2025-01-17 ENCOUNTER — TRANSCRIBE ORDERS (OUTPATIENT)
Dept: CARDIAC REHAB | Facility: HOSPITAL | Age: 70
End: 2025-01-17
Payer: MEDICARE

## 2025-01-17 DIAGNOSIS — Z95.5 S/P DRUG ELUTING CORONARY STENT PLACEMENT: Primary | ICD-10-CM

## 2025-01-17 PROBLEM — R53.1 GENERALIZED WEAKNESS: Status: ACTIVE | Noted: 2025-01-17

## 2025-01-17 LAB
ANION GAP SERPL CALCULATED.3IONS-SCNC: 7.8 MMOL/L (ref 5–15)
B PARAPERT DNA SPEC QL NAA+PROBE: NOT DETECTED
B PERT DNA SPEC QL NAA+PROBE: NOT DETECTED
BUN SERPL-MCNC: 8 MG/DL (ref 8–23)
BUN/CREAT SERPL: 11.1 (ref 7–25)
C PNEUM DNA NPH QL NAA+NON-PROBE: NOT DETECTED
CALCIUM SPEC-SCNC: 8.9 MG/DL (ref 8.6–10.5)
CHLORIDE SERPL-SCNC: 104 MMOL/L (ref 98–107)
CHOLEST SERPL-MCNC: 128 MG/DL (ref 0–200)
CO2 SERPL-SCNC: 27.2 MMOL/L (ref 22–29)
CREAT SERPL-MCNC: 0.72 MG/DL (ref 0.57–1)
DEPRECATED RDW RBC AUTO: 60.6 FL (ref 37–54)
EGFRCR SERPLBLD CKD-EPI 2021: 91.8 ML/MIN/1.73
ERYTHROCYTE [DISTWIDTH] IN BLOOD BY AUTOMATED COUNT: 17.7 % (ref 12.3–15.4)
FLUAV SUBTYP SPEC NAA+PROBE: NOT DETECTED
FLUBV RNA ISLT QL NAA+PROBE: NOT DETECTED
FOLATE SERPL-MCNC: 8.89 NG/ML (ref 4.78–24.2)
GLUCOSE SERPL-MCNC: 86 MG/DL (ref 65–99)
HADV DNA SPEC NAA+PROBE: NOT DETECTED
HBA1C MFR BLD: 5.46 % (ref 4.8–5.6)
HCOV 229E RNA SPEC QL NAA+PROBE: NOT DETECTED
HCOV HKU1 RNA SPEC QL NAA+PROBE: NOT DETECTED
HCOV NL63 RNA SPEC QL NAA+PROBE: NOT DETECTED
HCOV OC43 RNA SPEC QL NAA+PROBE: NOT DETECTED
HCT VFR BLD AUTO: 39.5 % (ref 34–46.6)
HDLC SERPL-MCNC: 70 MG/DL (ref 40–60)
HGB BLD-MCNC: 11.9 G/DL (ref 12–15.9)
HMPV RNA NPH QL NAA+NON-PROBE: NOT DETECTED
HPIV1 RNA ISLT QL NAA+PROBE: NOT DETECTED
HPIV2 RNA SPEC QL NAA+PROBE: NOT DETECTED
HPIV3 RNA NPH QL NAA+PROBE: NOT DETECTED
HPIV4 P GENE NPH QL NAA+PROBE: NOT DETECTED
LDLC SERPL CALC-MCNC: 43 MG/DL (ref 0–100)
LDLC/HDLC SERPL: 0.61 {RATIO}
M PNEUMO IGG SER IA-ACNC: NOT DETECTED
MAGNESIUM SERPL-MCNC: 2.1 MG/DL (ref 1.6–2.4)
MCH RBC QN AUTO: 27.9 PG (ref 26.6–33)
MCHC RBC AUTO-ENTMCNC: 30.1 G/DL (ref 31.5–35.7)
MCV RBC AUTO: 92.7 FL (ref 79–97)
PLATELET # BLD AUTO: 196 10*3/MM3 (ref 140–450)
PMV BLD AUTO: 10.2 FL (ref 6–12)
POTASSIUM SERPL-SCNC: 4 MMOL/L (ref 3.5–5.2)
RBC # BLD AUTO: 4.26 10*6/MM3 (ref 3.77–5.28)
RHINOVIRUS RNA SPEC NAA+PROBE: NOT DETECTED
RSV RNA NPH QL NAA+NON-PROBE: NOT DETECTED
SARS-COV-2 RNA RESP QL NAA+PROBE: NOT DETECTED
SODIUM SERPL-SCNC: 139 MMOL/L (ref 136–145)
TRIGL SERPL-MCNC: 77 MG/DL (ref 0–150)
TSH SERPL DL<=0.05 MIU/L-ACNC: 0.27 UIU/ML (ref 0.27–4.2)
VIT B12 BLD-MCNC: 1190 PG/ML (ref 211–946)
VLDLC SERPL-MCNC: 15 MG/DL (ref 5–40)
WBC NRBC COR # BLD AUTO: 9.56 10*3/MM3 (ref 3.4–10.8)

## 2025-01-17 PROCEDURE — A9270 NON-COVERED ITEM OR SERVICE: HCPCS | Performed by: INTERNAL MEDICINE

## 2025-01-17 PROCEDURE — 80061 LIPID PANEL: CPT | Performed by: INTERNAL MEDICINE

## 2025-01-17 PROCEDURE — 63710000001 VITAMIN C 500 MG TABLET: Performed by: INTERNAL MEDICINE

## 2025-01-17 PROCEDURE — 83735 ASSAY OF MAGNESIUM: CPT

## 2025-01-17 PROCEDURE — 99232 SBSQ HOSP IP/OBS MODERATE 35: CPT | Performed by: INTERNAL MEDICINE

## 2025-01-17 PROCEDURE — 82607 VITAMIN B-12: CPT

## 2025-01-17 PROCEDURE — 0202U NFCT DS 22 TRGT SARS-COV-2: CPT

## 2025-01-17 PROCEDURE — 80048 BASIC METABOLIC PNL TOTAL CA: CPT | Performed by: INTERNAL MEDICINE

## 2025-01-17 PROCEDURE — 82746 ASSAY OF FOLIC ACID SERUM: CPT

## 2025-01-17 PROCEDURE — 63710000001 CHOLECALCIFEROL 25 MCG (1000 UT) TABLET: Performed by: INTERNAL MEDICINE

## 2025-01-17 PROCEDURE — 87040 BLOOD CULTURE FOR BACTERIA: CPT

## 2025-01-17 PROCEDURE — 84443 ASSAY THYROID STIM HORMONE: CPT

## 2025-01-17 PROCEDURE — 85027 COMPLETE CBC AUTOMATED: CPT | Performed by: INTERNAL MEDICINE

## 2025-01-17 PROCEDURE — 83036 HEMOGLOBIN GLYCOSYLATED A1C: CPT | Performed by: INTERNAL MEDICINE

## 2025-01-17 PROCEDURE — 63710000001 RISPERIDONE 1 MG TABLET: Performed by: INTERNAL MEDICINE

## 2025-01-17 PROCEDURE — 63710000001 POTASSIUM CHLORIDE 20 MEQ TABLET CONTROLLED-RELEASE: Performed by: INTERNAL MEDICINE

## 2025-01-17 PROCEDURE — 63710000001 LAMOTRIGINE 100 MG TABLET: Performed by: INTERNAL MEDICINE

## 2025-01-17 PROCEDURE — 63710000001 ASPIRIN 81 MG CHEWABLE TABLET: Performed by: INTERNAL MEDICINE

## 2025-01-17 PROCEDURE — 63710000001 PANTOPRAZOLE 40 MG TABLET DELAYED-RELEASE: Performed by: INTERNAL MEDICINE

## 2025-01-17 PROCEDURE — 63710000001 FUROSEMIDE 20 MG TABLET: Performed by: INTERNAL MEDICINE

## 2025-01-17 PROCEDURE — 63710000001 BISOPROLOL 5 MG TABLET: Performed by: INTERNAL MEDICINE

## 2025-01-17 PROCEDURE — 63710000001 VITAMIN B-12 500 MCG TABLET: Performed by: INTERNAL MEDICINE

## 2025-01-17 PROCEDURE — 63710000001 CITALOPRAM 20 MG TABLET: Performed by: INTERNAL MEDICINE

## 2025-01-17 PROCEDURE — 63710000001 CLOPIDOGREL 75 MG TABLET: Performed by: INTERNAL MEDICINE

## 2025-01-17 RX ADMIN — MIRTAZAPINE 7.5 MG: 15 TABLET, FILM COATED ORAL at 21:09

## 2025-01-17 RX ADMIN — Medication 1000 UNITS: at 08:31

## 2025-01-17 RX ADMIN — FUROSEMIDE 20 MG: 20 TABLET ORAL at 08:31

## 2025-01-17 RX ADMIN — BISOPROLOL FUMARATE 5 MG: 5 TABLET ORAL at 10:05

## 2025-01-17 RX ADMIN — Medication 1000 MCG: at 08:31

## 2025-01-17 RX ADMIN — PANTOPRAZOLE SODIUM 40 MG: 40 TABLET, DELAYED RELEASE ORAL at 08:30

## 2025-01-17 RX ADMIN — ATORVASTATIN CALCIUM 80 MG: 40 TABLET, FILM COATED ORAL at 21:09

## 2025-01-17 RX ADMIN — ALPRAZOLAM 1 MG: 1 TABLET ORAL at 21:09

## 2025-01-17 RX ADMIN — OXYCODONE HYDROCHLORIDE AND ACETAMINOPHEN 1000 MG: 500 TABLET ORAL at 08:31

## 2025-01-17 RX ADMIN — CLOPIDOGREL BISULFATE 75 MG: 75 TABLET ORAL at 08:30

## 2025-01-17 RX ADMIN — RISPERIDONE 1 MG: 1 TABLET, FILM COATED ORAL at 08:30

## 2025-01-17 RX ADMIN — POTASSIUM CHLORIDE 20 MEQ: 1500 TABLET, EXTENDED RELEASE ORAL at 08:30

## 2025-01-17 RX ADMIN — ASPIRIN 81 MG CHEWABLE TABLET 81 MG: 81 TABLET CHEWABLE at 08:31

## 2025-01-17 RX ADMIN — CITALOPRAM HYDROBROMIDE 10 MG: 20 TABLET ORAL at 08:30

## 2025-01-17 RX ADMIN — LAMOTRIGINE 100 MG: 100 TABLET ORAL at 08:30

## 2025-01-17 RX ADMIN — RISPERIDONE 1 MG: 1 TABLET, FILM COATED ORAL at 21:09

## 2025-01-17 RX ADMIN — LAMOTRIGINE 100 MG: 100 TABLET ORAL at 21:09

## 2025-01-17 NOTE — PROGRESS NOTES
CARDIOLOGY FOLLOW-UP PROGRESS NOTE      Reason for follow-up:    CAD  Prior CABG  Post PCI     Attending: Kamar Wheeler,*    Greater than 50% of total encounter time in medical decision making performed by me  Seen and examined on date of service      Subjective .     Denies chest pain or dyspnea at present    Overall feels poorly, tired fatigued, nauseated at times  Has chronic illness which has been known for quite some time     ROS  Pertinent items are noted in HPI, all other systems reviewed and negative  Allergies: Desvenlafaxine    Scheduled Meds:[START ON 1/18/2025] apixaban, 5 mg, Oral, Q12H  ascorbic acid, 1,000 mg, Oral, Daily  aspirin, 81 mg, Oral, Daily  atorvastatin, 80 mg, Oral, Nightly  bisoprolol, 5 mg, Oral, Daily  cholecalciferol, 1,000 Units, Oral, Daily  citalopram, 10 mg, Oral, Daily  clopidogrel, 75 mg, Oral, Daily  ferrous sulfate, 325 mg, Oral, Every Other Day  furosemide, 20 mg, Oral, Daily  lamoTRIgine, 100 mg, Oral, BID  mirtazapine, 7.5 mg, Oral, Nightly  pantoprazole, 40 mg, Oral, QAM AC  potassium chloride, 20 mEq, Oral, Daily  risperiDONE, 1 mg, Oral, Q12H  vitamin B-12, 1,000 mcg, Oral, Daily        Continuous Infusions:     PRN Meds:.  acetaminophen    ALPRAZolam    guaiFENesin-dextromethorphan    nitroglycerin    ondansetron    Objective     VITAL SIGNS  Patient Vitals for the past 24 hrs:   BP Temp Temp src Pulse Resp SpO2 Weight   01/17/25 1215 103/60 -- -- -- -- -- --   01/17/25 1112 120/71 98.3 °F (36.8 °C) Oral 85 21 92 % --   01/17/25 0835 143/74 97.4 °F (36.3 °C) Oral 74 18 95 % --   01/17/25 0700 138/70 -- -- 63 -- 100 % --   01/17/25 0600 130/71 -- -- 65 -- 100 % --   01/17/25 0500 121/61 -- -- 64 -- 100 % --   01/17/25 0400 -- 97.4 °F (36.3 °C) Oral -- -- -- --   01/17/25 0200 (!) 89/54 -- -- 65 -- 92 % --   01/17/25 0100 (!) 88/54 -- -- 65 -- 92 % --   01/17/25 0015 -- 97.6 °F (36.4 °C) Oral -- 15 -- --   01/17/25 0000 103/60 -- -- 68 -- 93 % --   01/16/25 2050  "136/72 98 °F (36.7 °C) Oral -- 19 -- --   01/16/25 1800 139/68 -- -- 67 -- 93 % --   01/16/25 1700 141/71 -- -- 69 -- 97 % --   01/16/25 1600 156/83 -- -- 67 -- 95 % --   01/16/25 1556 155/89 97.9 °F (36.6 °C) Oral 66 10 95 % --   01/16/25 1500 155/89 -- -- 61 -- 95 % --   01/16/25 1445 134/82 97.8 °F (36.6 °C) Oral 62 16 97 % 74.9 kg (165 lb 2 oz)   01/16/25 1405 120/79 -- -- 61 -- 95 % --   01/16/25 1400 136/70 -- -- 60 -- 90 % --        Flowsheet Rows      Flowsheet Row First Filed Value   Admission Height 168.9 cm (66.5\") Documented at 01/16/2025 0809   Admission Weight 75.2 kg (165 lb 12.6 oz) Documented at 01/16/2025 0809            Body mass index is 26.25 kg/m².      Intake/Output Summary (Last 24 hours) at 1/17/2025 1358  Last data filed at 1/17/2025 0801  Gross per 24 hour   Intake 480 ml   Output 1050 ml   Net -570 ml        TELEMETRY:     SR    Physical Exam:  Vitals reviewed.   Constitutional:       General: Not in acute distress.     Appearance: Normal appearance. Well-developed.   Eyes:      Pupils: Pupils are equal, round, and reactive to light.   HENT:      Head: Normocephalic and atraumatic.   Neck:      Vascular: No JVD.   Pulmonary:      Effort: Pulmonary effort is normal.      Breath sounds: Normal breath sounds.   Cardiovascular:      Normal rate. Regular rhythm.   Pulses:     Intact distal pulses.   Edema:     Peripheral edema absent.   Abdominal:      General: There is no distension.      Palpations: Abdomen is soft.      Tenderness: There is no abdominal tenderness.   Musculoskeletal: Normal range of motion.      Cervical back: Normal range of motion and neck supple. Skin:     General: Skin is warm and dry.   Neurological:      Mental Status: Alert and oriented to person, place, and time.        Scribe findings above    Results Review:   I reviewed the patient's new clinical results.    CBC    Results from last 7 days   Lab Units 01/17/25  0320 01/15/25  1753   WBC 10*3/mm3 9.56 7.56 " "  HEMOGLOBIN g/dL 11.9* 12.6   PLATELETS 10*3/mm3 196 230     BMP   Results from last 7 days   Lab Units 01/17/25  0320 01/15/25  1753   SODIUM mmol/L 139 143   POTASSIUM mmol/L 4.0 4.4   CHLORIDE mmol/L 104 104   CO2 mmol/L 27.2 30.9*   BUN mg/dL 8 13   CREATININE mg/dL 0.72 0.83   GLUCOSE mg/dL 86 103*     Cr Clearance Estimated Creatinine Clearance: 79.4 mL/min (by C-G formula based on SCr of 0.72 mg/dL).  Coag   Results from last 7 days   Lab Units 01/15/25  1753   INR  1.18*     HbA1C   Lab Results   Component Value Date    HGBA1C 5.46 01/17/2025    HGBA1C 5.30 08/21/2024    HGBA1C 5.60 11/01/2023     Blood Glucose No results found for: \"POCGLU\"  Infection     CMP   Results from last 7 days   Lab Units 01/17/25  0320 01/15/25  1753   SODIUM mmol/L 139 143   POTASSIUM mmol/L 4.0 4.4   CHLORIDE mmol/L 104 104   CO2 mmol/L 27.2 30.9*   BUN mg/dL 8 13   CREATININE mg/dL 0.72 0.83   GLUCOSE mg/dL 86 103*   ALBUMIN g/dL  --  3.9   BILIRUBIN mg/dL  --  0.5   ALK PHOS U/L  --  119*   AST (SGOT) U/L  --  39*   ALT (SGPT) U/L  --  24     ABG      UA      PREETI  No results found for: \"POCMETH\", \"POCAMPHET\", \"POCBARBITUR\", \"POCBENZO\", \"POCCOCAINE\", \"POCOPIATES\", \"POCOXYCODO\", \"POCPHENCYC\", \"POCPROPOXY\", \"POCTHC\", \"POCTRICYC\"  Lysis Labs   Results from last 7 days   Lab Units 01/17/25  0320 01/15/25  1753   INR   --  1.18*   HEMOGLOBIN g/dL 11.9* 12.6   PLATELETS 10*3/mm3 196 230   CREATININE mg/dL 0.72 0.83     Radiology(recent) Cardiac Catheterization/Vascular Study    Addendum Date: 1/16/2025    Primary operative Kamar Wheeler MD, PhD [facility] cardiology Date of service 1- Procedure 1.  Left heart catheterization native bypass grafts, LV gram, transaortic valve gradient assessment 2.,  Percutaneous coronary invention distal LAD via LIMA, predilation 1.2 x 12, 2.0 x 12 and PCI with 2.0 x 15 Jozef drug-eluting stent postdilated 2.25,  reduction of stenosis from 99% to 0% residual, BIPIN-3 flow pre and post " Indication Ischemic heart disease, obstructive CAD After informed consent patient brought to the Cath Lab sterilely prepped and draped in usual fashion with exposure the right groin for right common femoral arterial access via micropuncture and modified Seldinger technique with placement of a 6 Guinean sheath.  035 guidewire was advanced to eval followed by diagnostic JL 4 and JR4 catheters for selective left and right coronary angiography respectively, JR4 was used for native and bypass graft imaging including LIMA to LAD, multipurpose catheter was used for SVG to RCA, there was small vessel disease in the lateral wall and inferior distribution with widely patent SVGs to the obtuse marginal branch, retrograde disease unfortunately too small for intervention, patent SVG to RCA with small vessel disease in the PLV distribution, there was also apical LAD disease 99% amenable for intervention via LIMA which was performed.  Patient heparinized with 100 units/kg of heparin on a background therapy of aspirin.  A 6 Guinean IM guide was used engage the LIMA with GuideLiner support and a run-through wire to the apical portion of the LAD over which initially we were unable to pass a balloon, a microcatheter Turnpike was utilized and passed with manipulation distal to the lesion followed by withdrawal and advancement of a 1.2 compliant balloon inflated to 18 paola for 1 minute, this was followed by further predilatation with 2.0 x 12 NC balloon with full expansion 8 to 10 paola and PCI with 2.0 x 15 Jozef drug-eluting stent deployed at 12 paola followed by postdilatation 2.25 mm with great angiographic results reduction stenosis to 0% residual, distal or apical wraparound disease left to be treated medically given small size and poor runoff, intervention without distal wire trauma edge dissection or complications.  Final angiography with BIPIN-3 flow, LIMA throughout its course was intact with no wire trauma or dissection or issues.   "All catheters\" removed, 6 Mongolian Angio-Seal was used to close right common femoral arteriotomy with immediate hemostasis and maintenance of distal pulses Complications none Blood loss less than 10 cc Sedation time of 1 hour and 20 minutes Contrast 180 cc Findings Opening pressure of 154/75 mean 105 /3 with an EDP of 10-12 Closing pressure 134/62 with a mean of 91 Normal transaortic gradient Normal LV systolic function of 50-55% Angiography 1.  Left main, no disease 2.  Circumflex is nondominant with ostial 80% disease heavily calcified in the AV groove and  at the takeoff of the obtuse marginal branch, proximal obtuse marginal branches are also  along with distal branches, SVG to distal obtuse marginal branch widely patent antegradely however retrograde limb has 80% disease not amenable to intervention secondary to small vessel size less than 1.5 mm in diameter which perfuses an inferior branch which is terminal and small caliber and also has collaterals distally, this we treated medically 3.  LAD is a medium to large caliber vessel with diffuse disease in its midportion essentially subtotally occluded there is slight competitive flow distally, diagonal branch also with significant disease not amenable to intervention small vessel size less than 1.5 mm in diameter, LIMA to LAD is widely patent including anastomosis however distal apical LAD has a 99% lesion focally followed by diffuse small vessel disease at the wraparound portion but this lesion appears amenable to intervention 4.  RCA  at the ostium with only the conus branch apparent, SVG to distal RCA is patent, there is small vessel disease in the PLV branch not amenable to intervention, PDA patent flow with small vessel diffuse disease SVG to RCA widely patent throughout its course SVG to obtuse marginal widely patent throughout its course with good anastomosis LIMA to LAD widely patent throughout its course with good anastomosis Conclusions " recommendations 1 multivessel native CAD with progression since prior cath in 2023 Widely patent grafts LIMA to LAD, SVG to RCA and obtuse marginal branch Apical native LAD disease minimal intervention as described, 2.0 x 15 Jozef drug-eluting stent postdilated 2.2 with good results 0% restenosis and BIPIN-3 flow Small vessel disease obtuse marginal branch distal RCA distribution not amenable to intervention, diagonal branch not amenable to intervention No further options for revascularization at this time DAPT with aspirin Plavix, after 30 days stop aspirin as she will be on Eliquis Stop Plavix at 6 months with return to low-dose aspirin combination with Eliquis Second prevention goals, statin beta-blocker afterload reduction Kamar Wheeler MD, PhD     Imaging Results (Last 24 Hours)       ** No results found for the last 24 hours. **                  EKG          I personally viewed and interpreted the patient's EKG/Telemetry data:        ECHOCARDIOGRAM:     Results for orders placed during the hospital encounter of 01/10/25    Adult Transthoracic Echo Complete W/ Color, Spectral and Contrast if Necessary Per Protocol    Interpretation Summary    Left ventricular systolic function is normal. Left ventricular ejection fraction appears to be 56 - 60%.    Left ventricular diastolic function is consistent with (grade II w/high LAP) pseudonormalization.    Mildly reduced right ventricular systolic function noted.    The right ventricular cavity is mildly dilated.    The left atrial cavity is severely dilated.    Left atrial volume is severely increased.    Saline test results are negative for right to left atrial level shunt.    There is moderate calcification of the aortic valve.    There is a mitral valve ring present.    Estimated right ventricular systolic pressure from tricuspid regurgitation is mildly elevated (35-45 mmHg).        STRESS MYOVIEW:      CARDIAC CATHETERIZATION:  Angiography  1.  Left main, no  disease  2.  Circumflex is nondominant with ostial 80% disease heavily calcified in the AV groove and  at the takeoff of the obtuse marginal branch, proximal obtuse marginal branches are also  along with distal branches, SVG to distal obtuse marginal branch widely patent antegradely however retrograde limb has 80% disease not amenable to intervention secondary to small vessel size less than 1.5 mm in diameter which perfuses an inferior branch which is terminal and small caliber and also has collaterals distally, this we treated medically  3.  LAD is a medium to large caliber vessel with diffuse disease in its midportion essentially subtotally occluded there is slight competitive flow distally, diagonal branch also with significant disease not amenable to intervention small vessel size less than 1.5 mm in diameter, LIMA to LAD is widely patent including anastomosis however distal apical LAD has a 99% lesion focally followed by diffuse small vessel disease at the wraparound portion but this lesion appears amenable to intervention  4.  RCA  at the ostium with only the conus branch apparent, SVG to distal RCA is patent, there is small vessel disease in the PLV branch not amenable to intervention, PDA patent flow with small vessel diffuse disease     SVG to RCA widely patent throughout its course  SVG to obtuse marginal widely patent throughout its course with good anastomosis  LIMA to LAD widely patent throughout its course with good anastomosis     Conclusions recommendations  1 multivessel native CAD with progression since prior cath in 2023  Widely patent grafts LIMA to LAD, SVG to RCA and obtuse marginal branch  Apical native LAD disease minimal intervention as described, 2.0 x 15 Jozef drug-eluting stent postdilated 2.2 with good results 0% restenosis and BIPIN-3 flow  Small vessel disease obtuse marginal branch distal RCA distribution not amenable to intervention, diagonal branch not amenable to  intervention  No further options for revascularization at this time     DAPT with aspirin Plavix, after 30 days stop aspirin as she will be on Eliquis  Stop Plavix at 6 months with return to low-dose aspirin combination with Eliquis     Second prevention goals, statin beta-blocker afterload reduction     Kamar Wheeler MD, PhD    OTHER:         Assessment & Plan            Chest pain, unspecified type    CAD (coronary artery disease)        ASSESSMENT:    CAD  Prior CABG  S/p cardiac catheterization  with PCI to native LAD  MV repair  HTN  Dyslipidemia  PAF  Hx CVA    PLAN:    Pt reporting weakness, generalize malaise, certainly not new this is chronic  No acute chest pain, dyspnea reported  Continue DAPT  Eliquis to resume tomorrow  Will ask PT to see  Will ask hospitalist to assume primary role      Continue to follow closely after LAD intervention  Kamar Wheeler MD, PhD      Hopefully home soon      Medicines reviewed individually      Toshia Montenegro, APRN  01/17/25  13:58 EST

## 2025-01-17 NOTE — CASE MANAGEMENT/SOCIAL WORK
Continued Stay Note  [facility]     Patient Name: Andree Deluna  MRN: [id number]  Today's Date: 1/17/2025    Admit Date: 1/16/2025    Plan: DC Plan: Anticipate routine home with spouse, 4 adult children, and dependent grandchild. Current with [facility] services. Family will provide transport.   Discharge Plan       Row Name 01/17/25 1401       Plan    Plan DC Plan: Anticipate routine home with spouse, 4 adult children, and dependent grandchild. Current with [facility] services. Family will provide transport.    Patient/Family in Agreement with Plan yes    Provided Post Acute Provider List? N/A    Provided Post Acute Provider Quality & Resource List? N/A    Plan Comments CM reviewed chart documentation for clinical updates. Patient seen by HF Navigator today and patient intends to follow up with HF clinic here at [facility]. Awaiting updated notes from Cardiology. Anticipating DC later today if cleared.M will continue to follow for any additional needs and adjust DC plan accordingly. DC Barriers: Cardiac monitoring.                 Expected Discharge Date and Time       Expected Discharge Date Expected Discharge Time    Jan 17, 2025               Hattie Alicea RN    Office Phone: [phone]  Office Cell:     [phone]

## 2025-01-17 NOTE — CONSULTS
"    [facility] Medicine Services  Consult Note    Patient Name: Andree Deluna  : 1955  MRN: [id number]  Primary Care Physician:  Naga Griffith PA-C  Referring Physician: Kamar Wheeler,*  Date of admission: 2025  Date and Time of Care: 25 at 1500    Inpatient Hospitalist Consult  Consult performed by: Anne Valladares PA-C  Consult ordered by: Toshia Montenegro APRN      Reason for Consult/ Chief Complaint: assume primary care    Consult Requested By: Toshia Montenegro APRN    Subjective:     History of Present Illness:   Per the documentation by Dr. Wheeler dated 25,  \"This is a 69-year-old female who underwent stress testing which was abnormal with lateral inferolateral ischemia with preserved EF.  She is here for left heart catheterization, no contrast anticoagulation or antiplatelet contraindications.  Awake and alert, chest pain-free today, she has a known history of CAD  Comorbidities reviewed\"    Andree Deluna is a 69 y.o. female with a CMH of CAD status post CABG, HTN, HLD, valvular heart disease status post mitral valve repair, paroxysmal A-fib, GERD, history of CVA who presented to [facility] on 2025 for elective heart catheterization requiring PCI with Dr. Wheeler.  Due to patient reporting chronic weakness and generalized malaise, hospital service was consulted to assume primary.  Patient states weakness and malaise has been ongoing for a few weeks and has been worsening over time. Patient's  is bedside who assists with history.  Patient endorses chronic cough but denies chest pain and shortness of breath.  Patient also denies abdominal pain, nausea, vomiting.  Denies dysuria, urinary frequency/urgency.  Denies recent falls.  Patient does endorse a history of CVA with no residual deficits but currently denies headache, vision changes, unilateral weakness/paresthesias. Also, patients  states that patient has an appointment next week " with endocrinology for thyroid disease.    Of note, patient was admitted 11/17/2024 to 11/24/2024 for weakness secondary to UTI.  Urine cultures at that time were positive for Enterococcus and Acinetobacter.  Hospitalization was complicated by delirium prompting psychiatric consultation.  Patient was started on Risperdal with Celexa and trazodone to be discontinued.  Patient was deemed to have underlying moderate to severe dementia.  Hospitalization was also complicated by severe malnutrition and PEG tube placement was discussed.  However, patient declined PEG despite family members agreeing with placement.  Palliative care was also involved to discuss goals of care.    Review of Systems:   Review of Systems   Constitutional:  Positive for fatigue. Negative for chills and fever.   Respiratory:  Positive for cough. Negative for shortness of breath.    Cardiovascular:  Negative for chest pain.   Gastrointestinal:  Negative for abdominal pain, nausea and vomiting.   Neurological:  Positive for weakness. Negative for dizziness, speech difficulty, light-headedness and numbness.   All other systems reviewed and are negative.      Personal History:     Past Medical History:   Diagnosis Date    ADHD unknown    Anemia     Anesthesia complication     difficult to wake up    Anxiety     Atrial fibrillation     Carotid artery disease     80% right internal carotid artery    Coronary artery disease     CVA (cerebral vascular accident)     right parietal right temporal    DDD (degenerative disc disease), lumbar     Depression     Extremity pain     Ankle pain    GERD (gastroesophageal reflux disease)     Hyperlipidemia unknown    Hypertension     Insomnia unknown    Laryngopharyngeal reflux     Low back pain     Mood disorder     Obesity unknown    Short of breath on exertion     Skin cancer of face     Sleep apnea     Suspected sleep apnea she has refused to be tested    Stroke (cerebrum)     Visual field defect     Left        Past Surgical History:   Procedure Laterality Date    CARDIAC CATHETERIZATION N/A 5/1/2023    Procedure: Left Heart Cath;  Surgeon: Kye Alcaraz MD;  Location: [facility] CATH INVASIVE LOCATION;  Service: Cardiovascular;  Laterality: N/A;    CARDIAC CATHETERIZATION N/A 1/16/2025    Procedure: Left Heart Cath;  Surgeon: Kamar Wheeler MD;  Location: [facility] CATH INVASIVE LOCATION;  Service: Cardiology;  Laterality: N/A;    CAROTID ENDARTERECTOMY Right 11/10/2020    Procedure: CAROTID ENDARTERECTOMY;  Surgeon: Tavo Das MD;  Location: [facility] MAIN OR;  Service: Vascular;  Laterality: Right;    CHOLECYSTECTOMY      COLONOSCOPY      2016    CORONARY ARTERY BYPASS GRAFT N/A 5/6/2023    Procedure: CORONARY ARTERY BYPASS GRAFTING;  Surgeon: Errol Noonan MD;  Location: [facility];  Service: Cardiothoracic;  Laterality: N/A;  CABG X  3 using LIMA and right endospahenous vein;  2 coronary markers implanted.     FINGER SURGERY      KNEE ARTHROPLASTY      KNEE SURGERY Right     replaced    LAPAROSCOPIC GASTRIC BANDING      MITRAL VALVE REPAIR/REPLACEMENT N/A 5/6/2023    Procedure: MITRAL VALVE REPAIR/REPLACEMENT;  Surgeon: Errol Noonan MD;  Location: [facility];  Service: Cardiothoracic;  Laterality: N/A;  Mitral valve repaired using 28 mm Jorge Physio II Annuloplasty Ring.     ROTATOR CUFF REPAIR Right 2019    SHOULDER SURGERY Right 05/24/2019    scope/ cuff repair    TOTAL SHOULDER ARTHROPLASTY W/ DISTAL CLAVICLE EXCISION Left 11/8/2023    Procedure: TOTAL SHOULDER REVERSE ARTHROPLASTY;  Surgeon: Shubham Samaniego MD;  Location: [facility] MAIN OR;  Service: Orthopedics;  Laterality: Left;    TRANSESOPHAGEAL ECHOCARDIOGRAM (GALA) N/A 5/6/2023    Procedure: TRANSESOPHAGEAL ECHOCARDIOGRAM WITH ANESTHESIA;  Surgeon: Errol Noonan MD;  Location: [facility];  Service: Cardiothoracic;  Laterality: N/A;    TUBAL ABDOMINAL LIGATION         Family History:  family history includes Diabetes in her mother and another family member; Heart disease in her father and mother. Otherwise pertinent FHx was reviewed and not pertinent to current issue.    Social History:  reports that she has never smoked. She has never been exposed to tobacco smoke. She has never used smokeless tobacco. She reports that she does not drink alcohol and does not use drugs.    Home Medications:   ALPRAZolam, Mirabegron ER, apixaban, ascorbic acid, aspirin, atorvastatin, bisoprolol, cholecalciferol, citalopram, ferrous sulfate, furosemide, lamoTRIgine, loratadine, mirtazapine, omeprazole, potassium chloride, risperiDONE, and vitamin B-12    Allergies:  Allergies   Allergen Reactions    Desvenlafaxine Unknown - High Severity     ELEVATED BP         Objective:     Vital Signs  Temp:  [97.4 °F (36.3 °C)-98.3 °F (36.8 °C)] 98.3 °F (36.8 °C)  Heart Rate:  [63-85] 85  Resp:  [10-21] 21  BP: ()/(54-89) 103/60  Flow (L/min) (Oxygen Therapy):  [2] 2   Body mass index is 26.25 kg/m².    Physical Exam  Physical Exam  General: 68 yo WF, Alert and oriented x4 , well nourished, no acute distress.  HENT: Normocephalic, normal hearing, moist oral mucosa, no scleral icterus.  Neck: Supple, nontender, no carotid bruits, no JVD, no LAD.  Lungs: Clear to auscultation, nonlabored respiration.  Heart: RRR, no murmur, gallop or edema.  Abdomen: Soft, nontender, nondistended, + bowel sounds.  Musculoskeletal: Normal range of motion and strength, no tenderness or swelling.  Skin: Skin is warm, dry and pink, no rashes or lesions.  Psychiatric: Cooperative, appropriate mood and affect.  Neurologic: Equal strength bilaterally. No focal motor or sensory deficits appreciated. No facial droop or aphasia present.        Scheduled Meds   [START ON 1/18/2025] apixaban, 5 mg, Oral, Q12H  ascorbic acid, 1,000 mg, Oral, Daily  aspirin, 81 mg, Oral, Daily  atorvastatin, 80 mg, Oral, Nightly  bisoprolol, 5 mg, Oral,  Daily  cholecalciferol, 1,000 Units, Oral, Daily  citalopram, 10 mg, Oral, Daily  clopidogrel, 75 mg, Oral, Daily  ferrous sulfate, 325 mg, Oral, Every Other Day  furosemide, 20 mg, Oral, Daily  lamoTRIgine, 100 mg, Oral, BID  mirtazapine, 7.5 mg, Oral, Nightly  pantoprazole, 40 mg, Oral, QAM AC  potassium chloride, 20 mEq, Oral, Daily  risperiDONE, 1 mg, Oral, Q12H  vitamin B-12, 1,000 mcg, Oral, Daily       PRN Meds     acetaminophen    ALPRAZolam    guaiFENesin-dextromethorphan    nitroglycerin    ondansetron   Infusions         Diagnostic Data    Results from last 7 days   Lab Units 01/17/25  0320 01/15/25  1753   WBC 10*3/mm3 9.56 7.56   HEMOGLOBIN g/dL 11.9* 12.6   HEMATOCRIT % 39.5 40.7   PLATELETS 10*3/mm3 196 230   GLUCOSE mg/dL 86 103*   CREATININE mg/dL 0.72 0.83   BUN mg/dL 8 13   SODIUM mmol/L 139 143   POTASSIUM mmol/L 4.0 4.4   AST (SGOT) U/L  --  39*   ALT (SGPT) U/L  --  24   ALK PHOS U/L  --  119*   BILIRUBIN mg/dL  --  0.5   ANION GAP mmol/L 7.8 8.1       Cardiac Catheterization/Vascular Study    Addendum Date: 1/16/2025    Primary operative Kamar Wheeler MD, PhD [facility] cardiology Date of service 1- Procedure 1.  Left heart catheterization native bypass grafts, LV gram, transaortic valve gradient assessment 2.,  Percutaneous coronary invention distal LAD via LIMA, predilation 1.2 x 12, 2.0 x 12 and PCI with 2.0 x 15 Stephen drug-eluting stent postdilated 2.25,  reduction of stenosis from 99% to 0% residual, BIPIN-3 flow pre and post Indication Ischemic heart disease, obstructive CAD After informed consent patient brought to the Cath Lab sterilely prepped and draped in usual fashion with exposure the right groin for right common femoral arterial access via micropuncture and modified Seldinger technique with placement of a 6 [place] sheath.  035 guidewire was advanced to eval followed by diagnostic JL 4 and JR4 catheters for selective left and right coronary angiography respectively, JRLynn  "was used for native and bypass graft imaging including LIMA to LAD, multipurpose catheter was used for SVG to RCA, there was small vessel disease in the lateral wall and inferior distribution with widely patent SVGs to the obtuse marginal branch, retrograde disease unfortunately too small for intervention, patent SVG to RCA with small vessel disease in the PLV distribution, there was also apical LAD disease 99% amenable for intervention via LIMA which was performed.  Patient heparinized with 100 units/kg of heparin on a background therapy of aspirin.  A 6 Citizen of [place] IM guide was used engage the LIMA with GuideLiner support and a run-through wire to the apical portion of the LAD over which initially we were unable to pass a balloon, a microcatheter Turnpike was utilized and passed with manipulation distal to the lesion followed by withdrawal and advancement of a 1.2 compliant balloon inflated to 18 paola for 1 minute, this was followed by further predilatation with 2.0 x 12 NC balloon with full expansion 8 to 10 paola and PCI with 2.0 x 15 Jozef drug-eluting stent deployed at 12 paola followed by postdilatation 2.25 mm with great angiographic results reduction stenosis to 0% residual, distal or apical wraparound disease left to be treated medically given small size and poor runoff, intervention without distal wire trauma edge dissection or complications.  Final angiography with BIPIN-3 flow, LIMA throughout its course was intact with no wire trauma or dissection or issues.  All catheters\" removed, 6 [place] Angio-Seal was used to close right common femoral arteriotomy with immediate hemostasis and maintenance of distal pulses Complications none Blood loss less than 10 cc Sedation time of 1 hour and 20 minutes Contrast 180 cc Findings Opening pressure of 154/75 mean 105 /3 with an EDP of 10-12 Closing pressure 134/62 with a mean of 91 Normal transaortic gradient Normal LV systolic function of 50-55% Angiography 1.  Left " main, no disease 2.  Circumflex is nondominant with ostial 80% disease heavily calcified in the AV groove and  at the takeoff of the obtuse marginal branch, proximal obtuse marginal branches are also  along with distal branches, SVG to distal obtuse marginal branch widely patent antegradely however retrograde limb has 80% disease not amenable to intervention secondary to small vessel size less than 1.5 mm in diameter which perfuses an inferior branch which is terminal and small caliber and also has collaterals distally, this we treated medically 3.  LAD is a medium to large caliber vessel with diffuse disease in its midportion essentially subtotally occluded there is slight competitive flow distally, diagonal branch also with significant disease not amenable to intervention small vessel size less than 1.5 mm in diameter, LIMA to LAD is widely patent including anastomosis however distal apical LAD has a 99% lesion focally followed by diffuse small vessel disease at the wraparound portion but this lesion appears amenable to intervention 4.  RCA  at the ostium with only the conus branch apparent, SVG to distal RCA is patent, there is small vessel disease in the PLV branch not amenable to intervention, PDA patent flow with small vessel diffuse disease SVG to RCA widely patent throughout its course SVG to obtuse marginal widely patent throughout its course with good anastomosis LIMA to LAD widely patent throughout its course with good anastomosis Conclusions recommendations 1 multivessel native CAD with progression since prior cath in 2023 Widely patent grafts LIMA to LAD, SVG to RCA and obtuse marginal branch Apical native LAD disease minimal intervention as described, 2.0 x 15 Jozef drug-eluting stent postdilated 2.2 with good results 0% restenosis and BIPIN-3 flow Small vessel disease obtuse marginal branch distal RCA distribution not amenable to intervention, diagonal branch not amenable to intervention No  further options for revascularization at this time DAPT with aspirin Plavix, after 30 days stop aspirin as she will be on Eliquis Stop Plavix at 6 months with return to low-dose aspirin combination with Eliquis Second prevention goals, statin beta-blocker afterload reduction Kamar Wheeler MD, PhD       I reviewed the patient's new clinical results.    Assessment/Plan:     Active and Resolved Problems  Active Hospital Problems    Diagnosis  POA    **Chest pain, unspecified type [R07.9]  Unknown    Generalized weakness [R53.1]  Yes    CAD (coronary artery disease) [I25.10]  Yes      Resolved Hospital Problems   No resolved problems to display.         Generalized weakness  Likely 2/2 deconditioning in setting of advancing dementia   AM labs with no leukocytosis-WBC 9.5  Will r/o other causes including infectious etiology   Blood cultures, UA, TSH, RVP pending  PT/OT consulted, will likely need SNF placement  Fall precautions    Chest pain  CAD status post CABG status post PCI  Valvular heart disease status post MVR  Hypertension  Hyperlipidemia  Paroxysmal A-fib  S/p cardiac catheterization with PCI to native LAD with Dr. Wheeler in 25  Continue DAPT, Eliquis to resume tomorrow  Stay stop aspirin in 30 days as she will be on Eliquis  Cardiology following    History of CVA  No residual deficits noted  Continue to monitor     Dementia  Evaluated by psychiatry last admission  Continue Risperdal  No longer supposed to be on Celexa, will DC  High likelihood of hospital induced delirium        VTE Prophylaxis:  Pharmacologic VTE prophylaxis orders are present.         Code status is   Code Status and Medical Interventions: CPR (Attempt to Resuscitate); Full Support   Ordered at: 01/16/25 1133     Level Of Support Discussed With:    Patient     Code Status (Patient has no pulse and is not breathing):    CPR (Attempt to Resuscitate)     Medical Interventions (Patient has pulse or is breathing):    Full Support       Plan for  disposition:home vs SNF in 2-3 days    Time: 30 minutes        Signature: Electronically signed by Anne Valladares PA-C, 01/17/25, 15:26 EST.  [facility] Hospitalist Team

## 2025-01-18 ENCOUNTER — READMISSION MANAGEMENT (OUTPATIENT)
Dept: CALL CENTER | Facility: HOSPITAL | Age: 70
End: 2025-01-18
Payer: MEDICARE

## 2025-01-18 VITALS
RESPIRATION RATE: 18 BRPM | OXYGEN SATURATION: 90 % | DIASTOLIC BLOOD PRESSURE: 60 MMHG | HEIGHT: 67 IN | WEIGHT: 165.12 LBS | SYSTOLIC BLOOD PRESSURE: 112 MMHG | TEMPERATURE: 98.2 F | HEART RATE: 67 BPM | BODY MASS INDEX: 25.92 KG/M2

## 2025-01-18 LAB
25(OH)D3 SERPL-MCNC: 28.9 NG/ML (ref 30–100)
ANION GAP SERPL CALCULATED.3IONS-SCNC: 5.1 MMOL/L (ref 5–15)
BACTERIA UR QL AUTO: ABNORMAL /HPF
BASOPHILS # BLD AUTO: 0.06 10*3/MM3 (ref 0–0.2)
BASOPHILS NFR BLD AUTO: 0.8 % (ref 0–1.5)
BILIRUB UR QL STRIP: NEGATIVE
BUN SERPL-MCNC: 12 MG/DL (ref 8–23)
BUN/CREAT SERPL: 16.4 (ref 7–25)
CALCIUM SPEC-SCNC: 9.2 MG/DL (ref 8.6–10.5)
CHLORIDE SERPL-SCNC: 103 MMOL/L (ref 98–107)
CLARITY UR: ABNORMAL
CO2 SERPL-SCNC: 29.9 MMOL/L (ref 22–29)
COD CRY URNS QL: ABNORMAL /HPF
COLOR UR: ABNORMAL
CREAT SERPL-MCNC: 0.73 MG/DL (ref 0.57–1)
DEPRECATED RDW RBC AUTO: 59.8 FL (ref 37–54)
EGFRCR SERPLBLD CKD-EPI 2021: 90.3 ML/MIN/1.73
EOSINOPHIL # BLD AUTO: 0.28 10*3/MM3 (ref 0–0.4)
EOSINOPHIL NFR BLD AUTO: 3.5 % (ref 0.3–6.2)
ERYTHROCYTE [DISTWIDTH] IN BLOOD BY AUTOMATED COUNT: 18 % (ref 12.3–15.4)
GLUCOSE SERPL-MCNC: 124 MG/DL (ref 65–99)
GLUCOSE UR STRIP-MCNC: NEGATIVE MG/DL
HCT VFR BLD AUTO: 34.5 % (ref 34–46.6)
HGB BLD-MCNC: 10.5 G/DL (ref 12–15.9)
HGB UR QL STRIP.AUTO: NEGATIVE
HYALINE CASTS UR QL AUTO: ABNORMAL /LPF
IMM GRANULOCYTES # BLD AUTO: 0.02 10*3/MM3 (ref 0–0.05)
IMM GRANULOCYTES NFR BLD AUTO: 0.3 % (ref 0–0.5)
KETONES UR QL STRIP: ABNORMAL
LEUKOCYTE ESTERASE UR QL STRIP.AUTO: ABNORMAL
LYMPHOCYTES # BLD AUTO: 1.45 10*3/MM3 (ref 0.7–3.1)
LYMPHOCYTES NFR BLD AUTO: 18.4 % (ref 19.6–45.3)
MCH RBC QN AUTO: 27.6 PG (ref 26.6–33)
MCHC RBC AUTO-ENTMCNC: 30.4 G/DL (ref 31.5–35.7)
MCV RBC AUTO: 90.8 FL (ref 79–97)
MONOCYTES # BLD AUTO: 0.57 10*3/MM3 (ref 0.1–0.9)
MONOCYTES NFR BLD AUTO: 7.2 % (ref 5–12)
NEUTROPHILS NFR BLD AUTO: 5.51 10*3/MM3 (ref 1.7–7)
NEUTROPHILS NFR BLD AUTO: 69.8 % (ref 42.7–76)
NITRITE UR QL STRIP: POSITIVE
NRBC BLD AUTO-RTO: 0 /100 WBC (ref 0–0.2)
PH UR STRIP.AUTO: <=5 [PH] (ref 5–8)
PLATELET # BLD AUTO: 202 10*3/MM3 (ref 140–450)
PMV BLD AUTO: 10.3 FL (ref 6–12)
POTASSIUM SERPL-SCNC: 4.1 MMOL/L (ref 3.5–5.2)
PROT UR QL STRIP: NEGATIVE
RBC # BLD AUTO: 3.8 10*6/MM3 (ref 3.77–5.28)
RBC # UR STRIP: ABNORMAL /HPF
REF LAB TEST METHOD: ABNORMAL
SODIUM SERPL-SCNC: 138 MMOL/L (ref 136–145)
SP GR UR STRIP: 1.02 (ref 1–1.03)
SQUAMOUS #/AREA URNS HPF: ABNORMAL /HPF
UROBILINOGEN UR QL STRIP: ABNORMAL
WBC # UR STRIP: ABNORMAL /HPF
WBC NRBC COR # BLD AUTO: 7.89 10*3/MM3 (ref 3.4–10.8)

## 2025-01-18 PROCEDURE — 87086 URINE CULTURE/COLONY COUNT: CPT

## 2025-01-18 PROCEDURE — 87147 CULTURE TYPE IMMUNOLOGIC: CPT

## 2025-01-18 PROCEDURE — 85025 COMPLETE CBC W/AUTO DIFF WBC: CPT

## 2025-01-18 PROCEDURE — 87186 SC STD MICRODIL/AGAR DIL: CPT

## 2025-01-18 PROCEDURE — 82306 VITAMIN D 25 HYDROXY: CPT

## 2025-01-18 PROCEDURE — 81001 URINALYSIS AUTO W/SCOPE: CPT

## 2025-01-18 PROCEDURE — 80048 BASIC METABOLIC PNL TOTAL CA: CPT

## 2025-01-18 RX ADMIN — POTASSIUM CHLORIDE 20 MEQ: 1500 TABLET, EXTENDED RELEASE ORAL at 08:27

## 2025-01-18 RX ADMIN — OXYCODONE HYDROCHLORIDE AND ACETAMINOPHEN 1000 MG: 500 TABLET ORAL at 08:27

## 2025-01-18 RX ADMIN — FUROSEMIDE 20 MG: 20 TABLET ORAL at 08:27

## 2025-01-18 RX ADMIN — APIXABAN 5 MG: 5 TABLET, FILM COATED ORAL at 08:27

## 2025-01-18 RX ADMIN — LAMOTRIGINE 100 MG: 100 TABLET ORAL at 08:27

## 2025-01-18 RX ADMIN — CLOPIDOGREL BISULFATE 75 MG: 75 TABLET ORAL at 08:27

## 2025-01-18 RX ADMIN — ASPIRIN 81 MG CHEWABLE TABLET 81 MG: 81 TABLET CHEWABLE at 08:26

## 2025-01-18 RX ADMIN — Medication 1000 UNITS: at 08:26

## 2025-01-18 RX ADMIN — Medication 1000 MCG: at 08:27

## 2025-01-18 RX ADMIN — PANTOPRAZOLE SODIUM 40 MG: 40 TABLET, DELAYED RELEASE ORAL at 08:27

## 2025-01-18 RX ADMIN — RISPERIDONE 1 MG: 1 TABLET, FILM COATED ORAL at 08:27

## 2025-01-18 RX ADMIN — BISOPROLOL FUMARATE 5 MG: 5 TABLET ORAL at 08:27

## 2025-01-18 RX ADMIN — FERROUS SULFATE TAB 325 MG (65 MG ELEMENTAL FE) 325 MG: 325 (65 FE) TAB at 08:26

## 2025-01-18 NOTE — DISCHARGE SUMMARY
"             [facility]  Discharge Summary    Date of Service: 2025  Patient Name: Andree Deluna  : 1955  MRN: [id number]    Date of Admission: 2025  Discharge Diagnosis: Chest pain, unspecified type  Date of Discharge: 2025  Primary Care Physician: Naga Griffith PA-C    Presenting Problem:   Chest pain, unspecified type [R07.9]  CAD (coronary artery disease) [I25.10]  Generalized weakness [R53.1]    Active and Resolved Hospital Problems:  Active Hospital Problems    Diagnosis POA    **Chest pain, unspecified type [R07.9] Unknown    Generalized weakness [R53.1] Yes    CAD (coronary artery disease) [I25.10] Yes      Resolved Hospital Problems   No resolved problems to display.         Hospital Course     HPI:  \"This is a 69-year-old female who underwent stress testing which was abnormal with lateral inferolateral ischemia with preserved EF.  She is here for left heart catheterization, no contrast anticoagulation or antiplatelet contraindications.  Awake and alert, chest pain-free today, she has a known history of CAD  Comorbidities reviewed\"    Hospital Course:  Patient underwent cardiac catheterization with PCI to native LAD.  Continue DAPT with Plavix and Eliquis and aspirin after 30 days stop aspirin as she will be on Eliquis.  Stop Plavix at 6 months with return to low-dose aspirin combination with Eliquis as per cardiology.    Patient did not complain of any dysuria fever or incontinence.  UA was positive for nitrite and leukocyte esterase.  Urine culture is pending at discharge patient with asymptomatic bacteriuria.  If urine culture positive and patient becomes symptomatic we will treat patient with IV antibiotics or oral antibiotic depending on sensitivities.  Her TSH and A1c was normal.  Her vitamin B12 was discontinued as levels were approximately 1200.  Normal folic acid.  Her lipid profile was normal with elevated HDL.  Patient will continue taking " statin beta-blocker    Patient's urine and blood cultures are pending at the time of discharge and she will follow-up with PCP in 2 to 3 days.    DISCHARGE Follow Up Recommendations for labs and diagnostics:   Follow-up with PCP in 2 to 3 days for follow-up on urine and blood cultures  Follow-up with cardiology in 2 weeks    Reasons For Change In Medications and Indications for New Medications:      Day of Discharge     Vital Signs:  Temp:  [97.7 °F (36.5 °C)-98.5 °F (36.9 °C)] 98.2 °F (36.8 °C)  Heart Rate:  [67-85] 67  Resp:  [17-21] 18  BP: ()/(45-83) 112/60  Flow (L/min) (Oxygen Therapy):  [0-2] 0    Physical Exam:  Vitals and nursing note reviewed.   Constitutional:       Appearance: Normal appearance.   HENT:      Head: Normocephalic and atraumatic.   Cardiovascular:      Rate and Rhythm: Normal rate and rhythm.      Heart sounds: Normal heart sounds.   Pulmonary:      Effort: Pulmonary effort is normal.      Breath sounds: Decreased breath sounds.   Abdominal:      Palpations: Abdomen is soft.   Musculoskeletal:      Cervical back: Neck supple.   Neurological:      Mental Status: Mental status is at baseline.     Pertinent  and/or Most Recent Results     LAB RESULTS:      Lab 01/18/25  0422 01/17/25  0320 01/15/25  1753   WBC 7.89 9.56 7.56   HEMOGLOBIN 10.5* 11.9* 12.6   HEMATOCRIT 34.5 39.5 40.7   PLATELETS 202 196 230   NEUTROS ABS 5.51  --  5.07   IMMATURE GRANS (ABS) 0.02  --  0.02   LYMPHS ABS 1.45  --  1.73   MONOS ABS 0.57  --  0.45   EOS ABS 0.28  --  0.21   MCV 90.8 92.7 91.7   PROTIME  --   --  15.2*         Lab 01/18/25  0422 01/17/25  0320 01/15/25  1753   SODIUM 138 139 143   POTASSIUM 4.1 4.0 4.4   CHLORIDE 103 104 104   CO2 29.9* 27.2 30.9*   ANION GAP 5.1 7.8 8.1   BUN 12 8 13   CREATININE 0.73 0.72 0.83   EGFR 90.3 91.8 77.4   GLUCOSE 124* 86 103*   CALCIUM 9.2 8.9 9.6   MAGNESIUM  --  2.1  --    HEMOGLOBIN A1C  --  5.46  --    TSH  --  0.271  --          Lab 01/15/25  0424   TOTAL  PROTEIN 7.8   ALBUMIN 3.9   GLOBULIN 3.9   ALT (SGPT) 24   AST (SGOT) 39*   BILIRUBIN 0.5   ALK PHOS 119*         Lab 01/15/25  1753   PROTIME 15.2*   INR 1.18*         Lab 01/17/25  0320   CHOLESTEROL 128   LDL CHOL 43   HDL CHOL 70*   TRIGLYCERIDES 77         Lab 01/17/25  1617   FOLATE 8.89   VITAMIN B 12 1,190*         Brief Urine Lab Results  (Last result in the past 365 days)        Color   Clarity   Blood   Leuk Est   Nitrite   Protein   CREAT   Urine HCG        01/18/25 0903 Dark Yellow   Cloudy   Negative   Small (1+)   Positive   Negative                 Microbiology Results (last 10 days)       Procedure Component Value - Date/Time    Respiratory Panel PCR w/COVID-19(SARS-CoV-2) [facility] In-House, NP Swab in UTM/VTM, 2 HR TAT - Swab, Nasopharynx [[id number]]  (Normal) Collected: 01/17/25 1440    Lab Status: Final result Specimen: Swab from Nasopharynx Updated: 01/17/25 1531     ADENOVIRUS, PCR Not Detected     Coronavirus 229E Not Detected     Coronavirus HKU1 Not Detected     Coronavirus NL63 Not Detected     Coronavirus OC43 Not Detected     COVID19 Not Detected     Human Metapneumovirus Not Detected     Human Rhinovirus/Enterovirus Not Detected     Influenza A PCR Not Detected     Influenza B PCR Not Detected     Parainfluenza Virus 1 Not Detected     Parainfluenza Virus 2 Not Detected     Parainfluenza Virus 3 Not Detected     Parainfluenza Virus 4 Not Detected     RSV, PCR Not Detected     Bordetella pertussis pcr Not Detected     Bordetella parapertussis PCR Not Detected     Chlamydophila pneumoniae PCR Not Detected     Mycoplasma pneumo by PCR Not Detected    Narrative:      In the setting of a positive respiratory panel with a viral infection PLUS a negative procalcitonin without other underlying concern for bacterial infection, consider observing off antibiotics or discontinuation of antibiotics and continue supportive care. If the respiratory panel is positive for atypical  bacterial infection (Bordetella pertussis, Chlamydophila pneumoniae, or Mycoplasma pneumoniae), consider antibiotic de-escalation to target atypical bacterial infection.            Cardiac Catheterization/Vascular Study    Addendum Date: 1/16/2025    Primary operative Kamar Wheeler MD, PhD [facility] cardiology Date of service 1- Procedure 1.  Left heart catheterization native bypass grafts, LV gram, transaortic valve gradient assessment 2.,  Percutaneous coronary invention distal LAD via LIMA, predilation 1.2 x 12, 2.0 x 12 and PCI with 2.0 x 15 Jozef drug-eluting stent postdilated 2.25,  reduction of stenosis from 99% to 0% residual, BIPIN-3 flow pre and post Indication Ischemic heart disease, obstructive CAD After informed consent patient brought to the Cath Lab sterilely prepped and draped in usual fashion with exposure the right groin for right common femoral arterial access via micropuncture and modified Seldinger technique with placement of a 6 German sheath.  035 guidewire was advanced to eval followed by diagnostic JL 4 and JR4 catheters for selective left and right coronary angiography respectively, JR4 was used for native and bypass graft imaging including LIMA to LAD, multipurpose catheter was used for SVG to RCA, there was small vessel disease in the lateral wall and inferior distribution with widely patent SVGs to the obtuse marginal branch, retrograde disease unfortunately too small for intervention, patent SVG to RCA with small vessel disease in the PLV distribution, there was also apical LAD disease 99% amenable for intervention via LIMA which was performed.  Patient heparinized with 100 units/kg of heparin on a background therapy of aspirin.  A 6 German IM guide was used engage the LIMA with GuideLiner support and a run-through wire to the apical portion of the LAD over which initially we were unable to pass a balloon, a microcatheter Turnpike was utilized and passed with manipulation  "distal to the lesion followed by withdrawal and advancement of a 1.2 compliant balloon inflated to 18 paola for 1 minute, this was followed by further predilatation with 2.0 x 12 NC balloon with full expansion 8 to 10 paola and PCI with 2.0 x 15 [place] drug-eluting stent deployed at 12 paola followed by postdilatation 2.25 mm with great angiographic results reduction stenosis to 0% residual, distal or apical wraparound disease left to be treated medically given small size and poor runoff, intervention without distal wire trauma edge dissection or complications.  Final angiography with BIPIN-3 flow, LIMA throughout its course was intact with no wire trauma or dissection or issues.  All catheters\" removed, 6 [place] Angio-Seal was used to close right common femoral arteriotomy with immediate hemostasis and maintenance of distal pulses Complications none Blood loss less than 10 cc Sedation time of 1 hour and 20 minutes Contrast 180 cc Findings Opening pressure of 154/75 mean 105 /3 with an EDP of 10-12 Closing pressure 134/62 with a mean of 91 Normal transaortic gradient Normal LV systolic function of 50-55% Angiography 1.  Left main, no disease 2.  Circumflex is nondominant with ostial 80% disease heavily calcified in the AV groove and  at the takeoff of the obtuse marginal branch, proximal obtuse marginal branches are also  along with distal branches, SVG to distal obtuse marginal branch widely patent antegradely however retrograde limb has 80% disease not amenable to intervention secondary to small vessel size less than 1.5 mm in diameter which perfuses an inferior branch which is terminal and small caliber and also has collaterals distally, this we treated medically 3.  LAD is a medium to large caliber vessel with diffuse disease in its midportion essentially subtotally occluded there is slight competitive flow distally, diagonal branch also with significant disease not amenable to intervention small vessel size " less than 1.5 mm in diameter, LIMA to LAD is widely patent including anastomosis however distal apical LAD has a 99% lesion focally followed by diffuse small vessel disease at the wraparound portion but this lesion appears amenable to intervention 4.  RCA  at the ostium with only the conus branch apparent, SVG to distal RCA is patent, there is small vessel disease in the PLV branch not amenable to intervention, PDA patent flow with small vessel diffuse disease SVG to RCA widely patent throughout its course SVG to obtuse marginal widely patent throughout its course with good anastomosis LIMA to LAD widely patent throughout its course with good anastomosis Conclusions recommendations 1 multivessel native CAD with progression since prior cath in 2023 Widely patent grafts LIMA to LAD, SVG to RCA and obtuse marginal branch Apical native LAD disease minimal intervention as described, 2.0 x 15 Jozef drug-eluting stent postdilated 2.2 with good results 0% restenosis and BIPIN-3 flow Small vessel disease obtuse marginal branch distal RCA distribution not amenable to intervention, diagonal branch not amenable to intervention No further options for revascularization at this time DAPT with aspirin Plavix, after 30 days stop aspirin as she will be on Eliquis Stop Plavix at 6 months with return to low-dose aspirin combination with Eliquis Second prevention goals, statin beta-blocker afterload reduction Kamar Wheeler MD, PhD     Results for orders placed during the hospital encounter of 04/30/23    Duplex Carotid Ultrasound CAR    Interpretation Summary    Right internal carotid artery demonstrates normal flow without evidence of hemodynamically significant stenosis.    Left internal carotid artery demonstrates a less than 50% stenosis.      Results for orders placed during the hospital encounter of 04/30/23    Duplex Carotid Ultrasound CAR    Interpretation Summary    Right internal carotid artery demonstrates normal flow  without evidence of hemodynamically significant stenosis.    Left internal carotid artery demonstrates a less than 50% stenosis.      Results for orders placed during the hospital encounter of 01/10/25    Adult Transthoracic Echo Complete W/ Color, Spectral and Contrast if Necessary Per Protocol    Interpretation Summary    Left ventricular systolic function is normal. Left ventricular ejection fraction appears to be 56 - 60%.    Left ventricular diastolic function is consistent with (grade II w/high LAP) pseudonormalization.    Mildly reduced right ventricular systolic function noted.    The right ventricular cavity is mildly dilated.    The left atrial cavity is severely dilated.    Left atrial volume is severely increased.    Saline test results are negative for right to left atrial level shunt.    There is moderate calcification of the aortic valve.    There is a mitral valve ring present.    Estimated right ventricular systolic pressure from tricuspid regurgitation is mildly elevated (35-45 mmHg).      Labs Pending at Discharge:  Pending Results       Procedure [Order ID] Specimen - Date/Time    Blood Culture - Blood, Arm, Right [[id number]] Collected: 01/17/25 1617    Specimen: Blood from Arm, Right Updated: 01/17/25 1624    Blood Culture - Blood, Hand, Right [[id number]] Collected: 01/17/25 1617    Specimen: Blood from Hand, Right Updated: 01/17/25 1624    Urine Culture - Urine, Urine, Clean Catch [[id number]] Collected: 01/18/25 0903    Specimen: Urine, Clean Catch Updated: 01/18/25 0939    Vitamin D,25-Hydroxy [[id number]] Collected: 01/18/25 0422    Specimen: Blood Updated: 01/18/25 0428            Procedures Performed  Procedure(s):  Left Heart Cath       Consults:   Consults       Date and Time Order Name Status Description    1/17/2025  2:03 PM Inpatient Hospitalist Consult Completed             Discharge Details        Discharge Medications        Changes to Medications        Instructions Start Date    citalopram 10 MG tablet  Commonly known as: CeleXA   10 mg, Oral, Daily             Continue These Medications        Instructions Start Date   ALPRAZolam 2 MG tablet  Commonly known as: XANAX   2 mg, Oral, Nightly PRN      apixaban 5 MG tablet tablet  Commonly known as: ELIQUIS   5 mg, 2 Times Daily      ascorbic acid 1000 MG tablet  Commonly known as: VITAMIN C   1,000 mg, Daily      aspirin 81 MG chewable tablet   81 mg, Daily      atorvastatin 80 MG tablet  Commonly known as: LIPITOR   80 mg, Daily      bisoprolol 5 MG tablet  Commonly known as: ZEBeta   5 mg, Oral, Daily      cholecalciferol 25 MCG (1000 UT) tablet  Commonly known as: VITAMIN D3   1,000 Units, Daily      ferrous sulfate 325 (65 FE) MG tablet   325 mg, Oral, Every Other Day      furosemide 20 MG tablet  Commonly known as: LASIX   20 mg, Daily      lamoTRIgine 100 MG tablet  Commonly known as: LaMICtal   100 mg, Oral, 2 Times Daily      loratadine 10 MG tablet  Commonly known as: CLARITIN   10 mg, Daily      Mirabegron ER 50 MG tablet sustained-release 24 hour 24 hr tablet  Commonly known as: MYRBETRIQ   50 mg, Oral, Daily      mirtazapine 15 MG tablet  Commonly known as: REMERON   7.5 mg, Oral, Nightly      omeprazole 40 MG capsule  Commonly known as: priLOSEC   40 mg, Oral, Daily      potassium chloride 20 MEQ CR tablet  Commonly known as: KLOR-CON M20   20 mEq, Oral, Daily      risperiDONE 1 MG tablet  Commonly known as: risperDAL   1 mg, Oral, Every 12 Hours Scheduled             Stop These Medications      vitamin B-12 1000 MCG tablet  Commonly known as: CYANOCOBALAMIN              Allergies   Allergen Reactions    Desvenlafaxine Unknown - High Severity     ELEVATED BP       Discharge Disposition:   Home-Health Care [facility]    Diet:  Diet Instructions       Diet: Cardiac Diets; Low Sodium (2g); Regular (IDDSI 7); Thin (IDDSI 0)      Discharge Diet: Cardiac Diets    Cardiac Diet: Low Sodium (2g)    Texture: Regular (IDDSI 7)    Fluid  Consistency: Thin (IDDSI 0)            Discharge Activity:   Activity Instructions       Activity as Tolerated              CODE STATUS:  Code Status and Medical Interventions: CPR (Attempt to Resuscitate); Full Support   Ordered at: 01/16/25 1133     Level Of Support Discussed With:    Patient     Code Status (Patient has no pulse and is not breathing):    CPR (Attempt to Resuscitate)     Medical Interventions (Patient has pulse or is breathing):    Full Support       Future Appointments   Date Time Provider Department Center   1/22/2025  1:30 PM Ira Dumas MD [facility]       Additional Instructions for the Follow-ups that You Need to Schedule       Discharge Follow-up with PCP   As directed       Currently Documented PCP:    Naga Griffith PA-C    PCP Phone Number:    [phone]     Follow Up Details: 2-3d        Discharge Follow-up with Specified Provider: cardio; 2 Weeks   As directed      To: cardio   Follow Up: 2 Weeks   Follow Up Details: Follow-up                Time spent on Discharge including face to face service:  >30 minutes    Signature: Electronically signed by Ingrid Andrew MD, 01/18/25, 11:01 EST.  [facility] Hospitalist Team

## 2025-01-18 NOTE — SIGNIFICANT NOTE
Discharge instructions went over with pt and spouse, copy given to pt. Pt discharged via wc off floor   01/18/25 1147   Discharge of Care   Discharge Mode wheel chair   Discharged Accompanied by spouse   Discharge Teaching Done  Yes   Learning Method Explanation;Teach Back;Written Materials

## 2025-01-18 NOTE — OUTREACH NOTE
Prep Survey      Flowsheet Row Responses   [facility] [place] patient discharged from? [facility]   Is LACE score < 7 ? No   Eligibility [facility]   Date of Admission 01/16/25   Date of Discharge 01/18/25   Discharge Disposition Home-Health Care [facility]   Discharge diagnosis *Chest pain, unspecified type   Does the patient have one of the following disease processes/diagnoses(primary or secondary)? Other   Does the patient have Home health ordered? Yes   What is the Home health agency?  [facility]   Is there a DME ordered? No   Prep survey completed? Yes            CARLOTA CASAS - Registered Nurse

## 2025-01-18 NOTE — PLAN OF CARE
"Goal Outcome Evaluation:  Plan of Care Reviewed With: patient, spouse        Progress: no change        No c/o pain. Pt on RA,  remained at bedside. Pt did not urinate during this shift. Pt stated she did not think she could go and declined straight cath when offered to obtain sample. Hospitalist informed and said \"we can wait until she voids.\"                        "

## 2025-01-19 NOTE — CASE MANAGEMENT/SOCIAL WORK
Case Management Discharge Note      Final Note: Home with family and [facility]      Selected Continued Care - Prior Encounters Includes continued care and service providers with selected services from prior encounters from 10/18/2024 to 1/18/2025      Discharged on 11/21/2024 Admission date: 11/17/2024 - Discharge disposition: Home-Health Care [facility]      Home Medical Care       Service Provider Services Address Phone Fax Patient Preferred    [facility] Home Nursing [address] [place] [address], [place] [id number] [id number] [phone] --                       Transportation Services  Private: Car    Final Discharge Disposition Code: 06 - home with home health care

## 2025-01-20 ENCOUNTER — TELEPHONE (OUTPATIENT)
Dept: FAMILY MEDICINE CLINIC | Facility: CLINIC | Age: 70
End: 2025-01-20
Payer: MEDICARE

## 2025-01-20 ENCOUNTER — TRANSITIONAL CARE MANAGEMENT TELEPHONE ENCOUNTER (OUTPATIENT)
Dept: CALL CENTER | Facility: HOSPITAL | Age: 70
End: 2025-01-20
Payer: MEDICARE

## 2025-01-20 DIAGNOSIS — I25.10 CORONARY ARTERY DISEASE INVOLVING NATIVE HEART, UNSPECIFIED VESSEL OR LESION TYPE, UNSPECIFIED WHETHER ANGINA PRESENT: ICD-10-CM

## 2025-01-20 DIAGNOSIS — I25.10 CORONARY ARTERY DISEASE INVOLVING NATIVE CORONARY ARTERY OF NATIVE HEART, UNSPECIFIED WHETHER ANGINA PRESENT: Primary | ICD-10-CM

## 2025-01-20 LAB — BACTERIA SPEC AEROBE CULT: ABNORMAL

## 2025-01-20 RX ORDER — CLOPIDOGREL BISULFATE 75 MG/1
75 TABLET ORAL DAILY
Qty: 30 TABLET | Refills: 0 | Status: SHIPPED | OUTPATIENT
Start: 2025-01-20

## 2025-01-20 NOTE — OUTREACH NOTE
Call Center TCM Note      Flowsheet Row Responses   [facility] patient discharged from? Agency   Does the patient have one of the following disease processes/diagnoses(primary or secondary)? Other   TCM attempt successful? Yes   Call start time 1449   Call end time 1457   Discharge diagnosis *Chest pain, unspecified type   Person spoke with today (if not patient) and relationship spouse   Meds reviewed with patient/caregiver? Yes   Is the patient having any side effects they believe may be caused by any medication additions or changes? No   Does the patient have all medications ordered at discharge? Yes   Is the patient taking all medications as directed (includes completed medication regime)? Yes   Comments Endocrinology 1/22/25,  Needs Cardiology fu appt   Does the patient have an appointment with their PCP within 7-14 days of discharge? Yes  [1/31/2025 11:00 AM]   What is the Home health agency?  [facility]   Has home health visited the patient within 72 hours of discharge? N/A   Psychosocial issues? No   Did the patient receive a copy of their discharge instructions? Yes   Nursing interventions Reviewed instructions with patient   What is the patient's perception of their health status since discharge? Improving   Is the patient/caregiver able to teach back signs and symptoms related to disease process for when to call PCP? Yes   Is the patient/caregiver able to teach back signs and symptoms related to disease process for when to call 911? Yes   Is the patient/caregiver able to teach back the hierarchy of who to call/visit for symptoms/problems? PCP, Specialist, Home health nurse, Urgent Care, ED, 911 Yes   If the patient is a current smoker, are they able to teach back resources for cessation? Not a smoker   TCM call completed? Yes   Wrap up additional comments Spouse denies pt has any chest pain. Reviewed AVS/meds/cardiac warning s/s and when to call 911 with spouse. Spouse verified PCP,  Endocrinology fu appts. Spouse advised to make a cardiology fu appt for pt.   Call end time [phone]   Would this patient benefit from a Referral to [facility] Social Work? No   Is the patient interested in additional calls from an ambulatory ? No            Umu Myers RN    1/20/2025, 15:00 EST

## 2025-01-20 NOTE — TELEPHONE ENCOUNTER
Left message on 's voice mail at 2:50pm to call the office back.    RELAY    Patient was in [facility] and they would have the same list of medications that we have.           Can you get specific questions or concerns he might have?

## 2025-01-20 NOTE — TELEPHONE ENCOUNTER
Caller: Andree Deluna    Relationship to patient: Self    Best call back number: [phone]    Patient is needing: PATIENTS  CALLED IN BECAUSE HIS WIFE WAS PUT INTO THE HOSPITAL. HIS WIFE'S MED LIST THEM GAVE HER DOESN'T MATCH UP WITH WHAT SHE'S CURRENTLY TAKING AND HE'S VERY CONFUSED. HE WOULD LIKE  CALL BACK TO DISCUSS HIS WIFE'S CURRENT MED LIST      PLEASE CALL TO DISCUSS

## 2025-01-21 ENCOUNTER — TELEPHONE (OUTPATIENT)
Dept: FAMILY MEDICINE CLINIC | Facility: CLINIC | Age: 70
End: 2025-01-21

## 2025-01-21 ENCOUNTER — TELEPHONE (OUTPATIENT)
Dept: CARDIOLOGY | Facility: CLINIC | Age: 70
End: 2025-01-21

## 2025-01-21 NOTE — TELEPHONE ENCOUNTER
Caller: SHANITA COATES    Relationship: Home Health    Best call back number: [phone]     PATIENT WAS ASSESSED TODAY FOR RESUMPTION OF CARE FOR SPEECH THERAPY. PATIENT WILL BE SEEN ONCE A WEEK FOR THE NEXT 6 WEEKS. PATIENT ALSO UNDER WENT A HEART CATH ON 1/17/25 AT [facility] BUT IS BACK HOME.

## 2025-01-21 NOTE — TELEPHONE ENCOUNTER
Caller: Andree Deluna    Relationship: Self    Best call back number: [phone]    What is the best time to reach you: ANY    Who are you requesting to speak with (clinical staff, provider,  specific staff member): CLINICAL      What was the call regarding: PATIENT'S  [place] STATED THAT PATIENT'S MEDICATIONS WERE CHANGED IN THE HOSPITAL AND PATIENT NEEDS A CALL TO GO OVER WHAT MEDICATIONS SHE IS SUPPOSE TO BE TAKING AND NOT TAKING. PLEASE CONTACT PATIENT TO ADVISE. THANK YOU.     Is it okay if the provider responds through MyChart: CALL

## 2025-01-21 NOTE — TELEPHONE ENCOUNTER
ALPRAZolam 2 MG tablet  Commonly known as: XANAX    2 mg, Oral, Nightly PRN        apixaban 5 MG tablet tablet  Commonly known as: ELIQUIS    5 mg, 2 Times Daily        ascorbic acid 1000 MG tablet  Commonly known as: VITAMIN C    1,000 mg, Daily        aspirin 81 MG chewable tablet    81 mg, Daily        atorvastatin 80 MG tablet  Commonly known as: LIPITOR    80 mg, Daily        bisoprolol 5 MG tablet  Commonly known as: ZEBeta    5 mg, Oral, Daily        cholecalciferol 25 MCG (1000 UT) tablet  Commonly known as: VITAMIN D3    1,000 Units, Daily        ferrous sulfate 325 (65 FE) MG tablet    325 mg, Oral, Every Other Day        furosemide 20 MG tablet  Commonly known as: LASIX    20 mg, Daily        lamoTRIgine 100 MG tablet  Commonly known as: LaMICtal    100 mg, Oral, 2 Times Daily        loratadine 10 MG tablet  Commonly known as: CLARITIN    10 mg, Daily        Mirabegron ER 50 MG tablet sustained-release 24 hour 24 hr tablet  Commonly known as: MYRBETRIQ    50 mg, Oral, Daily        mirtazapine 15 MG tablet  Commonly known as: REMERON    7.5 mg, Oral, Nightly        omeprazole 40 MG capsule  Commonly known as: priLOSEC    40 mg, Oral, Daily        potassium chloride 20 MEQ CR tablet  Commonly known as: KLOR-CON M20    20 mEq, Oral, Daily        risperiDONE 1 MG tablet  Commonly known as: risperDAL    1 mg, Oral, Every 12 Hours Scheduled        Hub can read to the patient I tried to call no answer

## 2025-01-21 NOTE — TELEPHONE ENCOUNTER
Caller: DEB OGLESBY    Relationship:     Best call back number: [phone]     What is the medical concern/diagnosis: VOMITING, HIATAL HERNIA     What specialty or service is being requested: GASTRO     What is the provider, practice or medical service name: [facility]    What is the office location:  [address] [place] [address], [place] [facility]    What is the office phone number: [phone]    Any additional details: PATIENT IS NEEDING A REFERRAL SENT IN TO BE ABLE TO ADDRESS SOME ONGOING ISSUES.

## 2025-01-22 ENCOUNTER — OFFICE VISIT (OUTPATIENT)
Dept: ENDOCRINOLOGY | Facility: CLINIC | Age: 70
End: 2025-01-22
Payer: MEDICARE

## 2025-01-22 VITALS
HEIGHT: 67 IN | WEIGHT: 167 LBS | SYSTOLIC BLOOD PRESSURE: 118 MMHG | HEART RATE: 74 BPM | OXYGEN SATURATION: 97 % | DIASTOLIC BLOOD PRESSURE: 82 MMHG | BODY MASS INDEX: 26.21 KG/M2

## 2025-01-22 DIAGNOSIS — E05.90 HYPERTHYROIDISM: Primary | ICD-10-CM

## 2025-01-22 LAB
BACTERIA SPEC AEROBE CULT: NORMAL
BACTERIA SPEC AEROBE CULT: NORMAL

## 2025-01-22 PROCEDURE — 3074F SYST BP LT 130 MM HG: CPT | Performed by: INTERNAL MEDICINE

## 2025-01-22 PROCEDURE — 99203 OFFICE O/P NEW LOW 30 MIN: CPT | Performed by: INTERNAL MEDICINE

## 2025-01-22 PROCEDURE — 3079F DIAST BP 80-89 MM HG: CPT | Performed by: INTERNAL MEDICINE

## 2025-01-27 ENCOUNTER — TELEPHONE (OUTPATIENT)
Dept: CARDIOLOGY | Facility: CLINIC | Age: 70
End: 2025-01-27
Payer: MEDICARE

## 2025-01-27 NOTE — TELEPHONE ENCOUNTER
Caller: [employer]    Relationship: Emergency Contact    Best call back number: [phone]    What was the call regarding: PT'S  IS RETURNING A MISSED CALL TO SOMEONE BUT I DIDN'T SEE ANY NOTES IN THE CHART, PLEASE ADVISE

## 2025-01-28 RX ORDER — CITALOPRAM HYDROBROMIDE 10 MG/1
10 TABLET ORAL DAILY
Qty: 30 TABLET | Refills: 0 | Status: SHIPPED | OUTPATIENT
Start: 2025-01-28 | End: 2025-01-31 | Stop reason: SDUPTHER

## 2025-01-28 NOTE — PROGRESS NOTES
[facility]    Referring Provider: Naga Griffith PA-C  Reason for Consultation: Thyroid evaluation & management.    Patient Care Team:  Naga Griffith PA-C as PCP - General (Physician Assistant)    Chief complaint Hyperthyroidism (NP / Hyperthyroidism )      Subjective .     History of present illness:    This is a  69 y.o. female with abnormal thyroid tests referred for further w/u.  C/o 40 lb wt loss last few months. Recovering some more recently.  Hospitalized @ [facility] in Nov for recurrent falls.  Saw Dr. Mcgrath, endocrinologist, due to abnormal thyroid test. No Rx initiated.  Feeling unsteady. Also has hair loss & cold intolerance.  Never smoked.   Taking vit D.  Had a stroke in 2019.  Coronary stent placement last week.     Review of Systems  Review of Systems   Constitutional:  Positive for unexpected weight loss.   HENT:  Negative for trouble swallowing.    Eyes:  Negative for blurred vision.   Cardiovascular:  Negative for palpitations and leg swelling.   Gastrointestinal:  Positive for vomiting. Negative for constipation and nausea.   Endocrine: Positive for cold intolerance and polydipsia. Negative for polyuria.   Skin:         Hair loss   Neurological:  Positive for tremors and numbness. Negative for dizziness and headache.     History  Past Medical History:   Diagnosis Date    ADHD unknown    Anemia     Anesthesia complication     difficult to wake up    Anxiety     Atrial fibrillation     Carotid artery disease     80% right internal carotid artery    Coronary artery disease     CVA (cerebral vascular accident)     right parietal right temporal    DDD (degenerative disc disease), lumbar     Depression     Extremity pain     Ankle pain    GERD (gastroesophageal reflux disease)     Hyperlipidemia unknown    Hypertension     Insomnia unknown    Laryngopharyngeal reflux     Low back pain     Mood disorder     Obesity unknown    Short of breath on exertion     Skin cancer of  face     Sleep apnea     Suspected sleep apnea she has refused to be tested    Stroke (cerebrum)     Visual field defect     Left     Past Surgical History:   Procedure Laterality Date    CARDIAC CATHETERIZATION N/A 5/1/2023    Procedure: Left Heart Cath;  Surgeon: Kye Alcaraz MD;  Location: [facility] CATH INVASIVE LOCATION;  Service: Cardiovascular;  Laterality: N/A;    CARDIAC CATHETERIZATION N/A 1/16/2025    Procedure: Left Heart Cath;  Surgeon: Kamar Wheeler MD;  Location: [facility] CATH INVASIVE LOCATION;  Service: Cardiology;  Laterality: N/A;    CAROTID ENDARTERECTOMY Right 11/10/2020    Procedure: CAROTID ENDARTERECTOMY;  Surgeon: Tavo Das MD;  Location: [facility] MAIN OR;  Service: Vascular;  Laterality: Right;    CHOLECYSTECTOMY      COLONOSCOPY      2016    CORONARY ARTERY BYPASS GRAFT N/A 5/6/2023    Procedure: CORONARY ARTERY BYPASS GRAFTING;  Surgeon: Errol Noonan MD;  Location: [facility] CVOR;  Service: Cardiothoracic;  Laterality: N/A;  CABG X  3 using LIMA and right endospahenous vein;  2 coronary markers implanted.     FINGER SURGERY      KNEE ARTHROPLASTY      KNEE SURGERY Right     replaced    LAPAROSCOPIC GASTRIC BANDING      MITRAL VALVE REPAIR/REPLACEMENT N/A 5/6/2023    Procedure: MITRAL VALVE REPAIR/REPLACEMENT;  Surgeon: Errol Noonan MD;  Location: [facility];  Service: Cardiothoracic;  Laterality: N/A;  Mitral valve repaired using 28 mm Jorge Physio II Annuloplasty Ring.     ROTATOR CUFF REPAIR Right 2019    SHOULDER SURGERY Right 05/24/2019    scope/ cuff repair    TOTAL SHOULDER ARTHROPLASTY W/ DISTAL CLAVICLE EXCISION Left 11/8/2023    Procedure: TOTAL SHOULDER REVERSE ARTHROPLASTY;  Surgeon: Shubham Samaniego MD;  Location: [facility] MAIN OR;  Service: Orthopedics;  Laterality: Left;    TRANSESOPHAGEAL ECHOCARDIOGRAM (GALA) N/A 5/6/2023    Procedure: TRANSESOPHAGEAL ECHOCARDIOGRAM WITH ANESTHESIA;  Surgeon: Errol Noonan  MD;  Location: [facility];  Service: Cardiothoracic;  Laterality: N/A;    TUBAL ABDOMINAL LIGATION       Family History   Problem Relation Age of Onset    Heart disease Mother     Diabetes Mother     Heart disease Father     Cancer Maternal Grandmother     Diabetes Other      Social History     Tobacco Use    Smoking status: Never     Passive exposure: Never    Smokeless tobacco: Never   Vaping Use    Vaping status: Never Used   Substance Use Topics    Alcohol use: No    Drug use: No         Allergies:  Desvenlafaxine    Objective     Vital Signs      Vitals:    01/22/25 1332   BP: 118/82   Pulse: 74   SpO2: 97%         Physical Exam:     General Appearance:    Alert, cooperative, in no acute distress   Head:    Normocephalic, without obvious abnormality, atraumatic   Eyes:            No lid lag, conjunctivae and sclerae normal, no   icterus, no pallor, corneas clear, PERRLA   Throat:   No oral lesions,  oral mucosa moist   Neck:   No adenopathy, supple,  no thyromegaly, no carotid bruit   Lungs:     Clear     Heart:    Regular rhythm and normal rate   Chest Wall:    No abnormalities observed   Abdomen:     Normal bowel sounds, soft                 Extremities:   Moves all extremities well, no edema or tremors             Pulses:   Pulses palpable and equal bilaterally   Skin:   Dry. Ecchymosis from recent IV placement   Neurologic:  DTR 1+       Results Review  I have reviewed the patient's new clinical results, labs & imaging.    TSH 0.024,  FreeT4 1.97 on 8/21/2024  TSH <0.01,  FreeT4 1.82, FreeT3 2.9, TSI < 0.1 on 11/8/2024  TSH <0.005, FreeT4 2.18 on 12/2/2024    Lab Results   Component Value Date    HGBA1C 5.46 01/17/2025     Lab Results   Component Value Date    TSH 0.271 01/17/2025     Vit D level 28.7 on 1/18/2025    Assessment & Plan     Hyperthyroidism    Get labs drawn @ PCP on 1/31.  Will call you with the lab results.  Increase vit D to 2 daily.  Info on hyperthyroidism given to pt. Likely  thyroiditis.  I discussed the patients findings and my recommendations with patient & .    Ira Dumas MD  01/27/25  21:17 EST

## 2025-01-31 ENCOUNTER — OFFICE VISIT (OUTPATIENT)
Dept: FAMILY MEDICINE CLINIC | Facility: CLINIC | Age: 70
End: 2025-01-31
Payer: MEDICARE

## 2025-01-31 VITALS
HEIGHT: 67 IN | SYSTOLIC BLOOD PRESSURE: 128 MMHG | RESPIRATION RATE: 16 BRPM | WEIGHT: 170.1 LBS | HEART RATE: 67 BPM | BODY MASS INDEX: 26.7 KG/M2 | OXYGEN SATURATION: 96 % | DIASTOLIC BLOOD PRESSURE: 74 MMHG

## 2025-01-31 DIAGNOSIS — I25.10 CORONARY ARTERY DISEASE INVOLVING NATIVE CORONARY ARTERY OF NATIVE HEART, UNSPECIFIED WHETHER ANGINA PRESENT: Primary | ICD-10-CM

## 2025-01-31 DIAGNOSIS — E54 VITAMIN C DEFICIENCY: ICD-10-CM

## 2025-01-31 DIAGNOSIS — I77.9 CAROTID ARTERY DISORDER: ICD-10-CM

## 2025-01-31 DIAGNOSIS — E05.90 HYPERTHYROIDISM: ICD-10-CM

## 2025-01-31 DIAGNOSIS — N39.41 URGE INCONTINENCE OF URINE: ICD-10-CM

## 2025-01-31 DIAGNOSIS — R63.4 UNINTENTIONAL WEIGHT LOSS: ICD-10-CM

## 2025-01-31 DIAGNOSIS — Z86.73 HISTORY OF STROKE: Chronic | ICD-10-CM

## 2025-01-31 RX ORDER — OMEPRAZOLE 40 MG/1
40 CAPSULE, DELAYED RELEASE ORAL DAILY
COMMUNITY

## 2025-01-31 RX ORDER — FUROSEMIDE 20 MG/1
20 TABLET ORAL DAILY
Qty: 90 TABLET | Refills: 3 | Status: SHIPPED | OUTPATIENT
Start: 2025-01-31

## 2025-01-31 RX ORDER — CITALOPRAM HYDROBROMIDE 10 MG/1
10 TABLET ORAL DAILY
Qty: 90 TABLET | Refills: 3 | Status: SHIPPED | OUTPATIENT
Start: 2025-01-31

## 2025-01-31 RX ORDER — ATORVASTATIN CALCIUM 80 MG/1
80 TABLET, FILM COATED ORAL DAILY
Qty: 90 TABLET | Refills: 3 | Status: SHIPPED | OUTPATIENT
Start: 2025-01-31

## 2025-01-31 RX ORDER — BENZONATATE 200 MG/1
200 CAPSULE ORAL 3 TIMES DAILY PRN
Qty: 30 CAPSULE | Refills: 1 | Status: SHIPPED | OUTPATIENT
Start: 2025-01-31

## 2025-01-31 RX ORDER — CLOPIDOGREL BISULFATE 75 MG/1
75 TABLET ORAL DAILY
Qty: 90 TABLET | Refills: 1 | Status: SHIPPED | OUTPATIENT
Start: 2025-01-31 | End: 2025-07-30

## 2025-01-31 NOTE — PROGRESS NOTES
Transitional Care Follow Up Visit  Subjective     Andree Deluna is a 69 y.o. female who presents for a transitional care management visit.    Within 48 business hours after discharge our office contacted her via telephone to coordinate her care and needs.      I reviewed and discussed the details of that call along with the discharge summary, hospital problems, inpatient lab results, inpatient diagnostic studies, and consultation reports with Andree.     Current outpatient and discharge medications have been reconciled for the patient.  Reviewed by: Naga Griffith PA-C          1/18/2025     5:23 PM   Date of TCM Phone Call   [facility]   Date of Admission 1/16/2025   Date of Discharge 1/18/2025   Discharge Disposition Home-Health Care [facility]     Risk for Readmission (LACE) Score: 12 (1/18/2025  6:00 AM)      History of Present Illness   Course During Hospital Stay:  Was admitted for chest pain and new blockage in coronary vessel. She had PCI performed to her native LAD. She was discharged on plavix, asa, eliquis for 30 days. After 30 days she was to d/c asa and continue plavix and eliquis for 6 months. At 6 months she was to stay on eliquis and go back on asa without plavix. She had UTI as well while there. Andree has been having a lot of memory issues and failure to thrive over the past year. This has began to improve since our visit in December. She was found at that time to have vit c deficiency, uti, thyroiditis. Since supplementing vit c, treating uti, and time with thyroiditis appetite has been improving and memory has improved a lot. She is up 13 pounds in 2 months since our last appointment.      The following portions of the patient's history were reviewed and updated as appropriate: allergies, current medications, past family history, past medical history, past social history, past surgical history, and problem list.    Review of Systems    Objective   /74 (BP Location: Left arm,  "Patient Position: Sitting, Cuff Size: Adult)   Pulse 67   Resp 16   Ht 168.9 cm (66.5\")   Wt 77.2 kg (170 lb 1.6 oz)   SpO2 96%   BMI 27.04 kg/m²   Physical Exam  Constitutional:       Appearance: She is well-developed.   HENT:      Head: Normocephalic and atraumatic.      Right Ear: Tympanic membrane normal.      Left Ear: Tympanic membrane normal.      Nose: No congestion.      Mouth/Throat:      Pharynx: No oropharyngeal exudate.   Eyes:      Conjunctiva/sclera: Conjunctivae normal.      Pupils: Pupils are equal, round, and reactive to light.   Cardiovascular:      Rate and Rhythm: Normal rate and regular rhythm.      Heart sounds: No murmur heard.  Pulmonary:      Effort: Pulmonary effort is normal.      Breath sounds: Normal breath sounds.   Abdominal:      General: There is no distension.   Musculoskeletal:         General: No deformity. Normal range of motion.      Cervical back: Normal range of motion and neck supple.   Lymphadenopathy:      Cervical: No cervical adenopathy.   Skin:     General: Skin is warm and dry.      Findings: No rash.   Neurological:      Mental Status: She is alert and oriented to person, place, and time.      Cranial Nerves: No cranial nerve deficit.      Coordination: Coordination normal.      Gait: Gait normal.   Psychiatric:         Behavior: Behavior normal.         Thought Content: Thought content normal.         Judgment: Judgment normal.         Assessment & Plan   Diagnoses and all orders for this visit:    1. Coronary artery disease involving native coronary artery of native heart, unspecified whether angina present (Primary)  Comments:  continue plavix and eliquis per cardiology. continue lipitor and also bisoprolol  Orders:  -     clopidogrel (Plavix) 75 MG tablet; Take 1 tablet by mouth Daily for 180 days.  Dispense: 90 tablet; Refill: 1    2. History of stroke  Comments:  same as above  Orders:  -     Ambulatory Referral to Vascular Surgery    3. Carotid artery " disorder  Comments:  referral placed back to  for carotid re-evaluation.  Orders:  -     Ambulatory Referral to Vascular Surgery    4. Unintentional weight loss  Comments:  improving. likely multifactoria before from UTI, Vitamin c deficiency, and thyroditis.    5. Urge incontinence of urine  Comments:  gemtesa cost is to high. switch to myrbetriq. avoid oxybutinin with memory issues.    6. Hyperthyroidism  Comments:  monitoring and improving. likely thyroiditis    7. Vitamin C deficiency  Comments:  conitnuing supplementation for now.    Other orders  -     citalopram (CeleXA) 10 MG tablet; Take 1 tablet by mouth Daily.  Dispense: 90 tablet; Refill: 3  -     atorvastatin (LIPITOR) 80 MG tablet; Take 1 tablet by mouth Daily.  Dispense: 90 tablet; Refill: 3  -     apixaban (ELIQUIS) 5 MG tablet tablet; Take 1 tablet by mouth 2 (Two) Times a Day for 180 days.  Dispense: 180 tablet; Refill: 1  -     furosemide (LASIX) 20 MG tablet; Take 1 tablet by mouth Daily. Pt is out of this medication.  Dispense: 90 tablet; Refill: 3  -     benzonatate (TESSALON) 200 MG capsule; Take 1 capsule by mouth 3 (Three) Times a Day As Needed for Cough.  Dispense: 30 capsule; Refill: 1

## 2025-02-06 NOTE — PROGRESS NOTES
"Cardiology Office Follow Up Visit      Primary Care Provider:  Naga Griffith PA-C    Reason for f/u:     Hospital follow-up      Subjective     CC:    Follow-up from admission to [facility] 1/16/2025 cardiac catheterization    History of Present Illness       Andree Deluna is a 69 y.o. female patient of Dr. Wheeler. She was noted to have a history of multivessel bypass in May 2023, LIMA to LAD, SVG to obtuse marginal 1 and SVG to PDA with mitral valve repair with ring at that time.  Postoperatively, she did have atrial fibrillation.  Other history includes essential hypertension, hyperlipidemia, diastolic dysfunction with EF 60%, CVA in 2019 and obstructive carotid artery disease status post endarterectomy.  She was seen in office in October 2024 by Dr. Wheeler with complaints of shortness of breath with minimal activity, walking 30 feet. He ordered stress test for further evaluation at that time.    Previous cardiac monitor from July 2024 predominantly sinus rhythm with heart rate ranging 46 to 80 bpm, average 56 bpm.  Rare supraventricular ectopic and ventricular ectopic beats seen.  Patient event dizziness correlated to bradycardic rate 53.  On 1/10/25 stress testing was done which was abnormal with lateral inferolateral ischemia with preserved EF. Echocardiogram from January 10, 2025 normal left systolic function with EF 56 to 60%, mildly reduced right ventricular systolic function, grade 2 diastolic dysfunction, severe dilation of left atrial cavity, negative for right-to-left atrial shunt, moderate calcification of aortic valve, mitral valve ring present, estimated RVSP 35 to 45 mmHg. No significant valvular abnormality other than moderate mitral valve regurgitation, mild aortic valve sclerosis     She then presented to [facility] on 1/16/2025 for left heartcatheterization.  Catheterization conclusion:  \"1 multivessel native CAD with progression since prior cath in 2023  Widely patent grafts LIMA to LAD, SVG to RCA and " "obtuse marginal branch  Apical native LAD disease minimal intervention as described, 2.0 x 15 Kanab drug-eluting stent postdilated 2.2 with good results 0% restenosis and BIPIN-3 flow  Small vessel disease obtuse marginal branch distal RCA distribution not amenable to intervention, diagonal branch not amenable to intervention  No further options for revascularization at this time\"  Plan was to place patient on DAPT with aspirin and Plavix.  After 30 days, would stop aspirin.  She will be on Eliquis.  At 6-month wanda, stop Plavix and return on low-dose aspirin in combination with Eliquis.  Continue secondary prevention measures as well as afterload reduction, statin and beta-blocker.    Today, patient returns to clinic for follow-up. She states she has been feeling well.  She states that the shortness of breath that she initially had when she saw Dr. Wheeler is all but gone.  Occasionally she will get short of breath when she walks big distances.  She reports applications post catheterization.  Her site was examined and is healed over with no residual swelling, hematoma or bruising.  Neurovascular checks of right lower extremity intact.  She has had some unsteadiness with ambulation.  She states that this has been present to some degree since her stroke, however she feels it has gotten worse with all the ice and snow recently had a she has been nervous about falling.  Cardiac rehab was ordered prior to her hospital discharge and she is anxious to begin as she feels this will help.Her EKG is sinus rhythm rate 64, blood pressure 131/87.    ASSESSMENT/PLAN:      Diagnoses and all orders for this visit:    1. Coronary artery disease involving native heart, unspecified vessel or lesion type, unspecified whether angina present (Primary)    2. S/P mitral valve repair per Dr. Noonan 5/6/2023    3. Primary hypertension    4. Mixed hyperlipidemia    5. Paroxysmal atrial fibrillation    6. Cerebrovascular accident (CVA) due " to other mechanism    Other orders  -     ECG 12 Lead        MEDICAL DECISION MAKING:  Status post CABG 2023, status post PCI to native LAD 1/16/2025.  She was to take aspirin for 30 days and Plavix for 6 months along with her Eliquis which she takes for A-fib.  Patient states that she was taken off of her aspirin by another provider.  Her 30 days would be up next week.  Advised her to keep her Plavix and Eliquis for now with plan at the 6-month wanda to stop Eliquis and restart low-dose aspirin with the Eliquis.  Lipitor 80 mg daily remains.  Cardiac rehab referral placed.  2.   Mitral valve ring noted on echocardiogram, significant mitral valve stenosis noted  3.   Blood pressure 131/87.  She remains on bisoprolol 5 mg daily and takes Lasix 20 mg daily.  4.   Lipitor 80 mg daily.  LDL last month was 43  5.   Eliquis 5 mg twice daily for anticoagulation of paroxysmal A-fib, she is on bisoprolol 5 mg daily.  She is sinus rhythm with heart rate 63 today  6.   Plavix and Lipitor    Follow-up with Dr. Wheeler for follow-up prior to switching Plavix back to aspirin and proximately 6 months.  Call for any symptoms or other problems before then as needed.    Past Medical History:   Diagnosis Date    ADHD unknown    Anemia     Anesthesia complication     difficult to wake up    Anxiety     Atrial fibrillation     Carotid artery disease     80% right internal carotid artery    Coronary artery disease     CVA (cerebral vascular accident)     right parietal right temporal    DDD (degenerative disc disease), lumbar     Depression     Extremity pain     Ankle pain    GERD (gastroesophageal reflux disease)     Hyperlipidemia unknown    Hypertension     Insomnia unknown    Laryngopharyngeal reflux     Low back pain     Mood disorder     Obesity unknown    Short of breath on exertion     Skin cancer of face     Sleep apnea     Suspected sleep apnea she has refused to be tested    Stroke (cerebrum)     Visual field defect     Left        Past Surgical History:   Procedure Laterality Date    CARDIAC CATHETERIZATION N/A 5/1/2023    Procedure: Left Heart Cath;  Surgeon: Kye Alcaraz MD;  Location: [facility] CATH INVASIVE LOCATION;  Service: Cardiovascular;  Laterality: N/A;    CARDIAC CATHETERIZATION N/A 1/16/2025    Procedure: Left Heart Cath;  Surgeon: Kamar Wheeler MD;  Location: [facility] CATH INVASIVE LOCATION;  Service: Cardiology;  Laterality: N/A;    CAROTID ENDARTERECTOMY Right 11/10/2020    Procedure: CAROTID ENDARTERECTOMY;  Surgeon: Tavo Das MD;  Location: [facility] MAIN OR;  Service: Vascular;  Laterality: Right;    CHOLECYSTECTOMY      COLONOSCOPY      2016    CORONARY ARTERY BYPASS GRAFT N/A 5/6/2023    Procedure: CORONARY ARTERY BYPASS GRAFTING;  Surgeon: Errol Noonan MD;  Location: [facility];  Service: Cardiothoracic;  Laterality: N/A;  CABG X  3 using LIMA and right endospahenous vein;  2 coronary markers implanted.     FINGER SURGERY      KNEE ARTHROPLASTY      KNEE SURGERY Right     replaced    LAPAROSCOPIC GASTRIC BANDING      MITRAL VALVE REPAIR/REPLACEMENT N/A 5/6/2023    Procedure: MITRAL VALVE REPAIR/REPLACEMENT;  Surgeon: Errol Noonan MD;  Location: [facility];  Service: Cardiothoracic;  Laterality: N/A;  Mitral valve repaired using 28 mm Jorge Physio II Annuloplasty Ring.     ROTATOR CUFF REPAIR Right 2019    SHOULDER SURGERY Right 05/24/2019    scope/ cuff repair    TOTAL SHOULDER ARTHROPLASTY W/ DISTAL CLAVICLE EXCISION Left 11/8/2023    Procedure: TOTAL SHOULDER REVERSE ARTHROPLASTY;  Surgeon: Shubham Samaniego MD;  Location: [facility] MAIN OR;  Service: Orthopedics;  Laterality: Left;    TRANSESOPHAGEAL ECHOCARDIOGRAM (GALA) N/A 5/6/2023    Procedure: TRANSESOPHAGEAL ECHOCARDIOGRAM WITH ANESTHESIA;  Surgeon: Errol Noonan MD;  Location: [facility];  Service: Cardiothoracic;  Laterality: N/A;    TUBAL ABDOMINAL LIGATION           Current Outpatient  Medications:     ALPRAZolam (XANAX) 2 MG tablet, TAKE 1 TABLET BY MOUTH NIGHTLY AS NEEDED FOR ANXIETY, Disp: 30 tablet, Rfl: 3    apixaban (ELIQUIS) 5 MG tablet tablet, Take 1 tablet by mouth 2 (Two) Times a Day for 180 days., Disp: 180 tablet, Rfl: 1    ascorbic acid (VITAMIN C) 1000 MG tablet, Take 1 tablet by mouth Daily., Disp: , Rfl:     atorvastatin (LIPITOR) 80 MG tablet, Take 1 tablet by mouth Daily., Disp: 90 tablet, Rfl: 3    benzonatate (TESSALON) 200 MG capsule, Take 1 capsule by mouth 3 (Three) Times a Day As Needed for Cough., Disp: 30 capsule, Rfl: 1    bisoprolol (ZEBeta) 5 MG tablet, Take 1 tablet by mouth Daily., Disp: 90 tablet, Rfl: 3    cholecalciferol (VITAMIN D3) 25 MCG (1000 UT) tablet, Take 1 tablet by mouth 2 (Two) Times a Day., Disp: , Rfl:     citalopram (CeleXA) 10 MG tablet, Take 1 tablet by mouth Daily., Disp: 90 tablet, Rfl: 3    clopidogrel (Plavix) 75 MG tablet, Take 1 tablet by mouth Daily for 180 days., Disp: 90 tablet, Rfl: 1    ferrous sulfate 325 (65 FE) MG tablet, Take 1 tablet by mouth Every Other Day., Disp: 30 tablet, Rfl: 2    furosemide (LASIX) 20 MG tablet, Take 1 tablet by mouth Daily. Pt is out of this medication., Disp: 90 tablet, Rfl: 3    lamoTRIgine (LaMICtal) 100 MG tablet, Take 1 tablet by mouth twice daily, Disp: 180 tablet, Rfl: 0    loratadine (CLARITIN) 10 MG tablet, Take 1 tablet by mouth Daily., Disp: , Rfl:     Mirabegron ER (MYRBETRIQ) 50 MG tablet sustained-release 24 hour 24 hr tablet, Take 50 mg by mouth Daily., Disp: 30 tablet, Rfl: 5    mirtazapine (REMERON) 15 MG tablet, Take 0.5 tablets by mouth Every Night., Disp: 60 tablet, Rfl: 0    omeprazole (priLOSEC) 40 MG capsule, Take 1 capsule by mouth Daily. Indications: Grimaldo's Esophagus, Disp: , Rfl:     potassium chloride (KLOR-CON M20) 20 MEQ CR tablet, Take 1 tablet by mouth Daily., Disp: 90 tablet, Rfl: 1    risperiDONE (risperDAL) 1 MG tablet, Take 1 tablet by mouth Every 12 (Twelve) Hours.,  Disp: 60 tablet, Rfl: 3    Social History     Socioeconomic History    Marital status:    Tobacco Use    Smoking status: Never     Passive exposure: Never    Smokeless tobacco: Never   Vaping Use    Vaping status: Never Used   Substance and Sexual Activity    Alcohol use: No    Drug use: No    Sexual activity: Defer       Family History   Problem Relation Age of Onset    Heart disease Mother     Diabetes Mother     Heart disease Father     Cancer Maternal Grandmother     Diabetes Other        The following portions of the patient's history were reviewed and updated as appropriate: allergies, current medications, past family history, past medical history, past social history, past surgical history and problem list.    Review of Systems   Constitutional: Negative for diaphoresis, malaise/fatigue, weight gain and weight loss.   Eyes:  Negative for visual disturbance.   Cardiovascular:  Negative for chest pain, claudication, cyanosis, dyspnea on exertion, irregular heartbeat, leg swelling, near-syncope, orthopnea, palpitations, paroxysmal nocturnal dyspnea and syncope.   Respiratory:  Negative for cough, hemoptysis, shortness of breath, sleep disturbances due to breathing and snoring.    Endocrine: Negative for polyuria.   Skin:  Negative for color change.   Musculoskeletal:  Negative for joint swelling, muscle weakness and myalgias.   Gastrointestinal:  Negative for abdominal pain, hematemesis, hematochezia, melena, nausea and vomiting.   Genitourinary:  Negative for dysuria and hematuria.   Neurological:  Negative for dizziness, focal weakness, headaches, light-headedness, numbness, paresthesias, vertigo and weakness.   Psychiatric/Behavioral:  Negative for altered mental status.    All other systems reviewed and are negative.      Pertinent items are noted in HPI, all other systems reviewed and negative    /87 (BP Location: Right arm, Patient Position: Sitting, Cuff Size: Adult)   Pulse 63   Resp 18    Wt 77.1 kg (170 lb)   SpO2 93%   BMI 27.03 kg/m² .  Objective     Vitals reviewed.   Constitutional:       Appearance: Normal appearance. Not in distress. Not diaphoretic.   Eyes:      Extraocular Movements: Extraocular movements intact.      Conjunctiva/sclera: Conjunctivae normal.      Pupils: Pupils are equal, round, and reactive to light.   HENT:      Head: Normocephalic and atraumatic.   Neck:      Vascular: No JVD.   Pulmonary:      Effort: Pulmonary effort is normal. No tachypnea or accessory muscle usage.      Breath sounds: Normal breath sounds. No stridor.   Cardiovascular:      PMI at left midclavicular line. Normal rate. Regular rhythm. Normal S1. Normal S2.       Murmurs: There is no murmur.      No gallop.  No click. No rub.   Pulses:     Intact distal pulses.   Edema:     Peripheral edema absent.   Abdominal:      General: There is no distension.      Palpations: Abdomen is soft.      Tenderness: There is no abdominal tenderness.   Skin:     General: Skin is warm and dry.      Coloration: Skin is not cyanotic.      Findings: No erythema.   Neurological:      General: No focal deficit present.      Mental Status: Alert, oriented to person, place, and time and oriented to person, place and time.   Psychiatric:         Mood and Affect: Mood and affect normal.         Behavior: Behavior normal.         Cognition and Memory: Cognition normal.         Judgment: Judgment normal.         ECG 12 Lead    Date/Time: 2/7/2025 9:59 AM  Performed by: Jelena Johnson APRN    Authorized by: Jelena Johnson APRN  Comparison: compared with previous ECG from 11/17/2024  Similar to previous ECG  Rhythm: sinus rhythm  Rate: normal  BPM: 64  Conduction: conduction normal  QRS axis: normal  Other findings: non-specific ST-T wave changes        EKG ordered by and reviewed by me in office    Andree Deluna  reports that she has never smoked. She has never been exposed to tobacco smoke. She has never used  smokeless tobacco.

## 2025-02-07 ENCOUNTER — OFFICE VISIT (OUTPATIENT)
Dept: CARDIOLOGY | Facility: CLINIC | Age: 70
End: 2025-02-07
Payer: MEDICARE

## 2025-02-07 VITALS
SYSTOLIC BLOOD PRESSURE: 131 MMHG | DIASTOLIC BLOOD PRESSURE: 87 MMHG | HEART RATE: 63 BPM | BODY MASS INDEX: 27.03 KG/M2 | RESPIRATION RATE: 18 BRPM | WEIGHT: 170 LBS | OXYGEN SATURATION: 93 %

## 2025-02-07 DIAGNOSIS — I10 PRIMARY HYPERTENSION: Chronic | ICD-10-CM

## 2025-02-07 DIAGNOSIS — I25.10 CORONARY ARTERY DISEASE INVOLVING NATIVE HEART, UNSPECIFIED VESSEL OR LESION TYPE, UNSPECIFIED WHETHER ANGINA PRESENT: Primary | ICD-10-CM

## 2025-02-07 DIAGNOSIS — Z95.5 S/P CORONARY ARTERY STENT PLACEMENT: ICD-10-CM

## 2025-02-07 DIAGNOSIS — E78.2 MIXED HYPERLIPIDEMIA: Chronic | ICD-10-CM

## 2025-02-07 DIAGNOSIS — I63.89 CEREBROVASCULAR ACCIDENT (CVA) DUE TO OTHER MECHANISM: ICD-10-CM

## 2025-02-07 DIAGNOSIS — Z98.890 S/P MVR (MITRAL VALVE REPAIR): ICD-10-CM

## 2025-02-07 DIAGNOSIS — Z95.1 S/P CABG X 3: ICD-10-CM

## 2025-02-07 DIAGNOSIS — I48.0 PAROXYSMAL ATRIAL FIBRILLATION: ICD-10-CM

## 2025-02-07 DIAGNOSIS — I65.22 CAROTID STENOSIS, LEFT: Primary | ICD-10-CM

## 2025-02-09 DIAGNOSIS — F41.9 ANXIETY: ICD-10-CM

## 2025-02-10 RX ORDER — ALPRAZOLAM 2 MG/1
2 TABLET ORAL NIGHTLY PRN
Qty: 30 TABLET | Refills: 0 | Status: SHIPPED | OUTPATIENT
Start: 2025-02-10

## 2025-02-13 ENCOUNTER — HOSPITAL ENCOUNTER (OUTPATIENT)
Dept: CARDIOLOGY | Facility: HOSPITAL | Age: 70
Discharge: HOME | End: 2025-02-13
Admitting: NURSE PRACTITIONER
Payer: MEDICARE

## 2025-02-13 DIAGNOSIS — I65.22 CAROTID STENOSIS, LEFT: ICD-10-CM

## 2025-02-13 LAB
BH CV XLRA MEAS LEFT CAROTID BULB EDV: 26.6 CM/SEC
BH CV XLRA MEAS LEFT CAROTID BULB PSV: 65.9 CM/SEC
BH CV XLRA MEAS LEFT DIST CCA EDV: 18.9 CM/SEC
BH CV XLRA MEAS LEFT DIST CCA PSV: 53.9 CM/SEC
BH CV XLRA MEAS LEFT DIST ICA EDV: -18 CM/SEC
BH CV XLRA MEAS LEFT DIST ICA PSV: -47 CM/SEC
BH CV XLRA MEAS LEFT ICA/CCA RATIO: -2.25
BH CV XLRA MEAS LEFT PROX CCA EDV: 20.3 CM/SEC
BH CV XLRA MEAS LEFT PROX CCA PSV: 58.1 CM/SEC
BH CV XLRA MEAS LEFT PROX ECA PSV: -58.1 CM/SEC
BH CV XLRA MEAS LEFT PROX ICA EDV: -37.3 CM/SEC
BH CV XLRA MEAS LEFT PROX ICA PSV: -131 CM/SEC
BH CV XLRA MEAS LEFT PROX SCLA PSV: 85.5 CM/SEC
BH CV XLRA MEAS LEFT VERTEBRAL A EDV: -21.6 CM/SEC
BH CV XLRA MEAS LEFT VERTEBRAL A PSV: -51.1 CM/SEC
BH CV XLRA MEAS RIGHT CAROTID BULB EDV: -12.3 CM/SEC
BH CV XLRA MEAS RIGHT CAROTID BULB PSV: -26.3 CM/SEC
BH CV XLRA MEAS RIGHT DIST CCA EDV: -9.8 CM/SEC
BH CV XLRA MEAS RIGHT DIST CCA PSV: -30 CM/SEC
BH CV XLRA MEAS RIGHT DIST ICA EDV: -20.2 CM/SEC
BH CV XLRA MEAS RIGHT DIST ICA PSV: -58.4 CM/SEC
BH CV XLRA MEAS RIGHT ICA/CCA RATIO: -1.26
BH CV XLRA MEAS RIGHT PROX CCA EDV: 11.3 CM/SEC
BH CV XLRA MEAS RIGHT PROX CCA PSV: 46.2 CM/SEC
BH CV XLRA MEAS RIGHT PROX ECA PSV: -129 CM/SEC
BH CV XLRA MEAS RIGHT PROX ICA EDV: -15.4 CM/SEC
BH CV XLRA MEAS RIGHT PROX ICA PSV: -35.6 CM/SEC
BH CV XLRA MEAS RIGHT PROX SCLA PSV: 81.3 CM/SEC
BH CV XLRA MEAS RIGHT VERTEBRAL A EDV: -12.3 CM/SEC
BH CV XLRA MEAS RIGHT VERTEBRAL A PSV: -38.6 CM/SEC

## 2025-02-13 PROCEDURE — 93880 EXTRACRANIAL BILAT STUDY: CPT

## 2025-02-14 RX ORDER — OMEPRAZOLE 40 MG/1
40 CAPSULE, DELAYED RELEASE ORAL DAILY
Qty: 90 CAPSULE | Refills: 1 | Status: SHIPPED | OUTPATIENT
Start: 2025-02-14

## 2025-02-14 NOTE — TELEPHONE ENCOUNTER
Caller: [employer]    Relationship: Emergency Contact    Best call back number:     [phone]       Requested Prescriptions:   Requested Prescriptions     Pending Prescriptions Disp Refills    omeprazole (priLOSEC) 40 MG capsule       Sig: Take 1 capsule by mouth Daily. Indications: Grimaldo's Esophagus        Pharmacy where request should be sent: [facility] [address] [facility] [phone] [facility] [phone] FX     Last office visit with prescribing clinician: 1/31/2025   Last telemedicine visit with prescribing clinician: Visit date not found   Next office visit with prescribing clinician: 5/29/2025     Additional details provided by patient:     Does the patient have less than a 3 day supply:  [x] Yes  [] No    Would you like a call back once the refill request has been completed: [] Yes [] No    If the office needs to give you a call back, can they leave a voicemail: [] Yes [] No    Tanisha Griffin Rep   02/14/25 14:32 EST

## 2025-02-17 RX ORDER — BENZONATATE 200 MG/1
200 CAPSULE ORAL 3 TIMES DAILY PRN
Qty: 30 CAPSULE | Refills: 0 | Status: SHIPPED | OUTPATIENT
Start: 2025-02-17

## 2025-02-17 RX ORDER — PNV NO.95/FERROUS FUM/FOLIC AC 28MG-0.8MG
1 TABLET ORAL
Qty: 30 TABLET | Refills: 0 | Status: SHIPPED | OUTPATIENT
Start: 2025-02-17

## 2025-02-18 ENCOUNTER — TELEPHONE (OUTPATIENT)
Dept: FAMILY MEDICINE CLINIC | Facility: CLINIC | Age: 70
End: 2025-02-18

## 2025-02-18 NOTE — TELEPHONE ENCOUNTER
Caller: [employer]    Relationship to patient: Emergency Contact    Best call back number: [phone]       Patient is needing: PATIENT'S  CALLING AND SAID PATIENT IS RUNNING A FEVER AND WANTS TO KNOW WHAT SHE CAN WITH HER OTHER MEDICATIONS TO HELP WITH FEVER. PLEASE CALL BACK TO DISCUSS.

## 2025-02-21 NOTE — PROGRESS NOTES
NO PROTOCOL AVAILABLE    Orders have been prepped according to providers notes and APPROVED     "Chief Complaint  No chief complaint on file.    Subjective      HPI: Andree Deluna is a 69 y.o. female seen for evaluation of carotid stenosis.  Underwent right CEA 4-5 years ago, lost to follow-up.  No previous or recent TIA, amaurosis fugax or stroke.  Takes full dose apixaban and clopidogrel.  On high-dose atorvastatin.  No lower extremity claudications.    Objective   Vital Signs:  There were no vitals taken for this visit.  Estimated body mass index is 27.03 kg/m² as calculated from the following:    Height as of 1/31/25: 168.9 cm (66.5\").    Weight as of 2/7/25: 77.1 kg (170 lb).   BMI is >= 25 and <30. (Overweight) The following options were offered after discussion;: Information on healthy weight added to patient's after visit summary.   Andree Deluna  reports that she has never smoked. She has never been exposed to tobacco smoke. She has never used smokeless tobacco..     Exam: Healed right cervical incision.  No bruits.  Easily palpable pedal artery pulses bilaterally.    Personal review of data: Images and tracings of bilateral carotid duplex scan 2/13/2025 with normal postop appearance of the right internal carotid artery post endarterectomy.  There is left internal carotid artery plaque without significant stenosis.  Common labs          1/15/2025    17:53 1/17/2025    03:20 1/18/2025    04:22   Common Labs   Glucose 103  86  124    BUN 13  8  12    Creatinine 0.83  0.72  0.73    Sodium 143  139  138    Potassium 4.4  4.0  4.1    Chloride 104  104  103    Calcium 9.6  8.9  9.2    Albumin 3.9      Total Bilirubin 0.5      Alkaline Phosphatase 119      AST (SGOT) 39      ALT (SGPT) 24      WBC 7.56  9.56  7.89    Hemoglobin 12.6  11.9  10.5    Hematocrit 40.7  39.5  34.5    Platelets 230  196  202    Total Cholesterol  128     Triglycerides  77     HDL Cholesterol  70     LDL Cholesterol   43     Hemoglobin A1C  5.46          Assessment and Plan     Diagnoses and all orders for this visit:    1. " Carotid stenosis, bilateral (Primary)  -     Duplex Carotid Ultrasound CAR; Future    Summary: 69-year-old woman return for evaluation of carotid stenosis.  Underwent right CEA November, 2020 for asymptomatic high-grade stenosis.  Lost to follow-up.  Takes clopidogrel, full dose apixaban and high-dose atorvastatin.  Recent duplex earlier this month with normal postop appearance right ICA, with no significant left ICA stenosis.  Patent vertebral arteries bilaterally with antegrade flow.    Recommend repeat carotid duplex scan 1 year.  Emphasized need for long-term follow-up.    Follow Up     No follow-ups on file.  Patient was given instructions and counseling regarding her condition or for health maintenance advice. Please see specific information pulled into the AVS if appropriate.

## 2025-02-25 ENCOUNTER — OFFICE VISIT (OUTPATIENT)
Age: 70
End: 2025-02-25
Payer: MEDICARE

## 2025-02-25 VITALS
BODY MASS INDEX: 26.84 KG/M2 | HEIGHT: 66 IN | DIASTOLIC BLOOD PRESSURE: 84 MMHG | WEIGHT: 167 LBS | SYSTOLIC BLOOD PRESSURE: 138 MMHG

## 2025-02-25 DIAGNOSIS — I65.23 CAROTID STENOSIS, BILATERAL: Primary | ICD-10-CM

## 2025-03-04 ENCOUNTER — OFFICE VISIT (OUTPATIENT)
Dept: FAMILY MEDICINE CLINIC | Facility: CLINIC | Age: 70
End: 2025-03-04
Payer: MEDICARE

## 2025-03-04 VITALS
WEIGHT: 163.3 LBS | BODY MASS INDEX: 26.24 KG/M2 | SYSTOLIC BLOOD PRESSURE: 126 MMHG | OXYGEN SATURATION: 94 % | RESPIRATION RATE: 18 BRPM | HEART RATE: 63 BPM | HEIGHT: 66 IN | DIASTOLIC BLOOD PRESSURE: 83 MMHG

## 2025-03-04 DIAGNOSIS — R05.3 CHRONIC COUGH: Primary | ICD-10-CM

## 2025-03-04 DIAGNOSIS — F41.9 ANXIETY: ICD-10-CM

## 2025-03-04 LAB
EXPIRATION DATE: NORMAL
FLUAV AG UPPER RESP QL IA.RAPID: NOT DETECTED
FLUBV AG UPPER RESP QL IA.RAPID: NOT DETECTED
INTERNAL CONTROL: NORMAL
Lab: NORMAL
SARS-COV-2 AG UPPER RESP QL IA.RAPID: NOT DETECTED

## 2025-03-04 NOTE — PROGRESS NOTES
"Chief Complaint  Cough (Has had cough since last fall break)  Subjective        Andree Deluna presents to [facility]  History of Present Illness  Pt comes in today with c/o cough that started about 3+ months ago.  Cough is productive and at times with have vomiting.  Some wheezing.  +SOA.  Pt is a non smoker.   No hx of asthma or COPD.  Has been seen in office and given tessalon perles.   No fever or chills.  Not much nasal congestion.  Taking otc allergy medication and nose spray.   Cough      Review of Systems   Respiratory:  Positive for cough.      Objective     Vital Signs:   /83   Pulse 63   Resp 18   Ht 168.9 cm (66.5\")   Wt 74.1 kg (163 lb 4.8 oz)   SpO2 94%   BMI 25.97 kg/m²       BP Readings from Last 3 Encounters:   03/04/25 126/83   02/25/25 138/84   02/07/25 131/87       Wt Readings from Last 3 Encounters:   03/04/25 74.1 kg (163 lb 4.8 oz)   02/25/25 75.8 kg (167 lb)   02/07/25 77.1 kg (170 lb)     Physical Exam  Constitutional:       Appearance: She is well-developed.   Eyes:      Pupils: Pupils are equal, round, and reactive to light.   Cardiovascular:      Rate and Rhythm: Normal rate and regular rhythm.   Pulmonary:      Effort: Pulmonary effort is normal.      Breath sounds: Normal breath sounds.   Neurological:      Mental Status: She is alert and oriented to person, place, and time.        Result Review :                 Assessment and Plan    Diagnoses and all orders for this visit:    1. Chronic cough (Primary)  -     XR Chest PA & Lateral; Future  -     POCT SARS-CoV-2 Antigen DONNA + Flu    Covid/flu (-)  CXR to r/o PNA   Cont tessalon perles prn  Albuterol prn  Mucinex otc   During this office visit, we discussed the pertinent aspects of the visit and treatment recommendations. Pt verbalizes understanding. Follow up was discussed. Patient was given the opportunity to ask questions and discuss other concerns.         Follow Up   Return if " symptoms worsen or fail to improve.  Patient was given instructions and counseling regarding her condition or for health maintenance advice. Please see specific information pulled into the AVS if appropriate.

## 2025-03-06 RX ORDER — ALPRAZOLAM 2 MG/1
2 TABLET ORAL NIGHTLY PRN
Qty: 30 TABLET | Refills: 0 | Status: SHIPPED | OUTPATIENT
Start: 2025-03-06

## 2025-03-07 ENCOUNTER — OFFICE VISIT (OUTPATIENT)
Dept: CARDIAC REHAB | Facility: HOSPITAL | Age: 70
End: 2025-03-07
Payer: MEDICARE

## 2025-03-07 ENCOUNTER — HOSPITAL ENCOUNTER (OUTPATIENT)
Dept: GENERAL RADIOLOGY | Facility: HOSPITAL | Age: 70
Discharge: HOME | End: 2025-03-07
Payer: MEDICARE

## 2025-03-07 DIAGNOSIS — Z95.5 S/P DRUG ELUTING CORONARY STENT PLACEMENT: Primary | ICD-10-CM

## 2025-03-07 DIAGNOSIS — R05.3 CHRONIC COUGH: ICD-10-CM

## 2025-03-07 PROCEDURE — 71046 X-RAY EXAM CHEST 2 VIEWS: CPT

## 2025-03-07 PROCEDURE — 93798 PHYS/QHP OP CAR RHAB W/ECG: CPT

## 2025-03-13 ENCOUNTER — TREATMENT (OUTPATIENT)
Dept: CARDIAC REHAB | Facility: HOSPITAL | Age: 70
End: 2025-03-13
Payer: MEDICARE

## 2025-03-13 DIAGNOSIS — Z95.5 S/P DRUG ELUTING CORONARY STENT PLACEMENT: Primary | ICD-10-CM

## 2025-03-13 PROCEDURE — 93798 PHYS/QHP OP CAR RHAB W/ECG: CPT

## 2025-03-14 ENCOUNTER — TREATMENT (OUTPATIENT)
Dept: CARDIAC REHAB | Facility: HOSPITAL | Age: 70
End: 2025-03-14
Payer: MEDICARE

## 2025-03-14 DIAGNOSIS — Z95.5 S/P DRUG ELUTING CORONARY STENT PLACEMENT: Primary | ICD-10-CM

## 2025-03-14 PROCEDURE — 93798 PHYS/QHP OP CAR RHAB W/ECG: CPT

## 2025-03-20 ENCOUNTER — TELEPHONE (OUTPATIENT)
Dept: FAMILY MEDICINE CLINIC | Facility: CLINIC | Age: 70
End: 2025-03-20

## 2025-03-20 ENCOUNTER — TREATMENT (OUTPATIENT)
Dept: CARDIAC REHAB | Facility: HOSPITAL | Age: 70
End: 2025-03-20
Payer: MEDICARE

## 2025-03-20 DIAGNOSIS — Z95.5 S/P DRUG ELUTING CORONARY STENT PLACEMENT: Primary | ICD-10-CM

## 2025-03-20 PROCEDURE — 93798 PHYS/QHP OP CAR RHAB W/ECG: CPT

## 2025-03-20 NOTE — TELEPHONE ENCOUNTER
Patient's  Mike called back at 2:50pm and I gave him the message from Toshia in the result note on the xray. I just cannot document on that result note that I gave him the message.

## 2025-03-21 ENCOUNTER — TREATMENT (OUTPATIENT)
Dept: CARDIAC REHAB | Facility: HOSPITAL | Age: 70
End: 2025-03-21
Payer: MEDICARE

## 2025-03-21 DIAGNOSIS — Z95.5 S/P DRUG ELUTING CORONARY STENT PLACEMENT: Primary | ICD-10-CM

## 2025-03-21 PROCEDURE — 93798 PHYS/QHP OP CAR RHAB W/ECG: CPT

## 2025-03-27 ENCOUNTER — TREATMENT (OUTPATIENT)
Dept: CARDIAC REHAB | Facility: HOSPITAL | Age: 70
End: 2025-03-27
Payer: MEDICARE

## 2025-03-27 DIAGNOSIS — Z95.5 S/P DRUG ELUTING CORONARY STENT PLACEMENT: Primary | ICD-10-CM

## 2025-03-27 PROCEDURE — 93798 PHYS/QHP OP CAR RHAB W/ECG: CPT

## 2025-03-28 ENCOUNTER — TREATMENT (OUTPATIENT)
Dept: CARDIAC REHAB | Facility: HOSPITAL | Age: 70
End: 2025-03-28
Payer: MEDICARE

## 2025-03-28 DIAGNOSIS — Z95.5 S/P DRUG ELUTING CORONARY STENT PLACEMENT: Primary | ICD-10-CM

## 2025-03-28 PROCEDURE — 93798 PHYS/QHP OP CAR RHAB W/ECG: CPT

## 2025-04-03 ENCOUNTER — TREATMENT (OUTPATIENT)
Dept: CARDIAC REHAB | Facility: HOSPITAL | Age: 70
End: 2025-04-03
Payer: MEDICARE

## 2025-04-03 DIAGNOSIS — Z95.5 S/P DRUG ELUTING CORONARY STENT PLACEMENT: Primary | ICD-10-CM

## 2025-04-03 PROCEDURE — 93798 PHYS/QHP OP CAR RHAB W/ECG: CPT

## 2025-04-04 ENCOUNTER — TREATMENT (OUTPATIENT)
Dept: CARDIAC REHAB | Facility: HOSPITAL | Age: 70
End: 2025-04-04
Payer: MEDICARE

## 2025-04-04 DIAGNOSIS — Z95.5 S/P DRUG ELUTING CORONARY STENT PLACEMENT: Primary | ICD-10-CM

## 2025-04-04 PROCEDURE — 93798 PHYS/QHP OP CAR RHAB W/ECG: CPT

## 2025-04-07 ENCOUNTER — APPOINTMENT (OUTPATIENT)
Dept: GENERAL RADIOLOGY | Facility: HOSPITAL | Age: 70
End: 2025-04-07
Payer: MEDICARE

## 2025-04-07 ENCOUNTER — HOSPITAL ENCOUNTER (INPATIENT)
Facility: HOSPITAL | Age: 70
LOS: 1 days | Discharge: HOME HEALTH | End: 2025-04-09
Attending: STUDENT IN AN ORGANIZED HEALTH CARE EDUCATION/TRAINING PROGRAM | Admitting: FAMILY MEDICINE
Payer: MEDICARE

## 2025-04-07 DIAGNOSIS — R06.00 DYSPNEA, UNSPECIFIED TYPE: Primary | ICD-10-CM

## 2025-04-07 DIAGNOSIS — R09.02 HYPOXIA: ICD-10-CM

## 2025-04-07 LAB
ALBUMIN SERPL-MCNC: 4.3 G/DL (ref 3.5–5.2)
ALBUMIN/GLOB SERPL: 1 G/DL
ALP SERPL-CCNC: 121 U/L (ref 39–117)
ALT SERPL W P-5'-P-CCNC: 15 U/L (ref 1–33)
ANION GAP SERPL CALCULATED.3IONS-SCNC: 13.5 MMOL/L (ref 5–15)
AST SERPL-CCNC: 33 U/L (ref 1–32)
BASOPHILS # BLD AUTO: 0.07 10*3/MM3 (ref 0–0.2)
BASOPHILS NFR BLD AUTO: 1 % (ref 0–1.5)
BILIRUB SERPL-MCNC: 0.7 MG/DL (ref 0–1.2)
BUN SERPL-MCNC: 7 MG/DL (ref 8–23)
BUN/CREAT SERPL: 8.5 (ref 7–25)
CALCIUM SPEC-SCNC: 9.7 MG/DL (ref 8.6–10.5)
CHLORIDE SERPL-SCNC: 97 MMOL/L (ref 98–107)
CO2 SERPL-SCNC: 26.5 MMOL/L (ref 22–29)
CREAT SERPL-MCNC: 0.82 MG/DL (ref 0.57–1)
DEPRECATED RDW RBC AUTO: 49.6 FL (ref 37–54)
EGFRCR SERPLBLD CKD-EPI 2021: 78.5 ML/MIN/1.73
EOSINOPHIL # BLD AUTO: 0.14 10*3/MM3 (ref 0–0.4)
EOSINOPHIL NFR BLD AUTO: 2 % (ref 0.3–6.2)
ERYTHROCYTE [DISTWIDTH] IN BLOOD BY AUTOMATED COUNT: 14.5 % (ref 12.3–15.4)
GLOBULIN UR ELPH-MCNC: 4.2 GM/DL
GLUCOSE SERPL-MCNC: 93 MG/DL (ref 65–99)
HCT VFR BLD AUTO: 44.2 % (ref 34–46.6)
HGB BLD-MCNC: 13.7 G/DL (ref 12–15.9)
HOLD SPECIMEN: NORMAL
HOLD SPECIMEN: NORMAL
IMM GRANULOCYTES # BLD AUTO: 0.01 10*3/MM3 (ref 0–0.05)
IMM GRANULOCYTES NFR BLD AUTO: 0.1 % (ref 0–0.5)
LYMPHOCYTES # BLD AUTO: 0.96 10*3/MM3 (ref 0.7–3.1)
LYMPHOCYTES NFR BLD AUTO: 13.7 % (ref 19.6–45.3)
MCH RBC QN AUTO: 29 PG (ref 26.6–33)
MCHC RBC AUTO-ENTMCNC: 31 G/DL (ref 31.5–35.7)
MCV RBC AUTO: 93.4 FL (ref 79–97)
MONOCYTES # BLD AUTO: 0.65 10*3/MM3 (ref 0.1–0.9)
MONOCYTES NFR BLD AUTO: 9.3 % (ref 5–12)
NEUTROPHILS NFR BLD AUTO: 5.17 10*3/MM3 (ref 1.7–7)
NEUTROPHILS NFR BLD AUTO: 73.9 % (ref 42.7–76)
NRBC BLD AUTO-RTO: 0 /100 WBC (ref 0–0.2)
NT-PROBNP SERPL-MCNC: 581 PG/ML (ref 0–900)
PLATELET # BLD AUTO: 202 10*3/MM3 (ref 140–450)
PMV BLD AUTO: 10.9 FL (ref 6–12)
POTASSIUM SERPL-SCNC: 4.1 MMOL/L (ref 3.5–5.2)
PROT SERPL-MCNC: 8.5 G/DL (ref 6–8.5)
RBC # BLD AUTO: 4.73 10*6/MM3 (ref 3.77–5.28)
SODIUM SERPL-SCNC: 137 MMOL/L (ref 136–145)
TROPONIN T SERPL HS-MCNC: 16 NG/L
WBC NRBC COR # BLD AUTO: 7 10*3/MM3 (ref 3.4–10.8)
WHOLE BLOOD HOLD COAG: NORMAL
WHOLE BLOOD HOLD SPECIMEN: NORMAL

## 2025-04-07 PROCEDURE — 85025 COMPLETE CBC W/AUTO DIFF WBC: CPT

## 2025-04-07 PROCEDURE — 25010000002 METHYLPREDNISOLONE PER 125 MG

## 2025-04-07 PROCEDURE — 84484 ASSAY OF TROPONIN QUANT: CPT

## 2025-04-07 PROCEDURE — 80053 COMPREHEN METABOLIC PANEL: CPT

## 2025-04-07 PROCEDURE — 0202U NFCT DS 22 TRGT SARS-COV-2: CPT

## 2025-04-07 PROCEDURE — 83880 ASSAY OF NATRIURETIC PEPTIDE: CPT

## 2025-04-07 PROCEDURE — 93005 ELECTROCARDIOGRAM TRACING: CPT | Performed by: STUDENT IN AN ORGANIZED HEALTH CARE EDUCATION/TRAINING PROGRAM

## 2025-04-07 PROCEDURE — 99285 EMERGENCY DEPT VISIT HI MDM: CPT

## 2025-04-07 PROCEDURE — 71045 X-RAY EXAM CHEST 1 VIEW: CPT

## 2025-04-07 PROCEDURE — 93005 ELECTROCARDIOGRAM TRACING: CPT

## 2025-04-07 RX ORDER — METHYLPREDNISOLONE SODIUM SUCCINATE 125 MG/2ML
125 INJECTION, POWDER, LYOPHILIZED, FOR SOLUTION INTRAMUSCULAR; INTRAVENOUS ONCE
Status: COMPLETED | OUTPATIENT
Start: 2025-04-07 | End: 2025-04-07

## 2025-04-07 RX ORDER — SODIUM CHLORIDE 0.9 % (FLUSH) 0.9 %
10 SYRINGE (ML) INJECTION AS NEEDED
Status: DISCONTINUED | OUTPATIENT
Start: 2025-04-07 | End: 2025-04-09 | Stop reason: HOSPADM

## 2025-04-07 RX ORDER — IPRATROPIUM BROMIDE AND ALBUTEROL SULFATE 2.5; .5 MG/3ML; MG/3ML
3 SOLUTION RESPIRATORY (INHALATION) ONCE
Status: COMPLETED | OUTPATIENT
Start: 2025-04-07 | End: 2025-04-08

## 2025-04-07 RX ADMIN — METHYLPREDNISOLONE SODIUM SUCCINATE 125 MG: 125 INJECTION, POWDER, FOR SOLUTION INTRAMUSCULAR; INTRAVENOUS at 23:36

## 2025-04-07 NOTE — Clinical Note
Level of Care: Telemetry [5]   Admitting Physician: SHERRY BALES [[id number]]   Attending Physician: SHERRY BALES [[id number]]

## 2025-04-08 PROBLEM — J96.91 HYPOXIC RESPIRATORY FAILURE: Status: ACTIVE | Noted: 2025-04-08

## 2025-04-08 PROBLEM — J96.01 ACUTE HYPOXIC RESPIRATORY FAILURE: Status: ACTIVE | Noted: 2025-04-08

## 2025-04-08 LAB
B PARAPERT DNA SPEC QL NAA+PROBE: NOT DETECTED
B PERT DNA SPEC QL NAA+PROBE: NOT DETECTED
C PNEUM DNA NPH QL NAA+NON-PROBE: NOT DETECTED
FLUAV SUBTYP SPEC NAA+PROBE: NOT DETECTED
FLUBV RNA ISLT QL NAA+PROBE: NOT DETECTED
GEN 5 1HR TROPONIN T REFLEX: 15 NG/L
HADV DNA SPEC NAA+PROBE: NOT DETECTED
HCOV 229E RNA SPEC QL NAA+PROBE: NOT DETECTED
HCOV HKU1 RNA SPEC QL NAA+PROBE: NOT DETECTED
HCOV NL63 RNA SPEC QL NAA+PROBE: NOT DETECTED
HCOV OC43 RNA SPEC QL NAA+PROBE: NOT DETECTED
HMPV RNA NPH QL NAA+NON-PROBE: NOT DETECTED
HPIV1 RNA ISLT QL NAA+PROBE: NOT DETECTED
HPIV2 RNA SPEC QL NAA+PROBE: NOT DETECTED
HPIV3 RNA NPH QL NAA+PROBE: DETECTED
HPIV4 P GENE NPH QL NAA+PROBE: NOT DETECTED
M PNEUMO IGG SER IA-ACNC: NOT DETECTED
MAGNESIUM SERPL-MCNC: 2 MG/DL (ref 1.6–2.4)
QT INTERVAL: 333 MS
QTC INTERVAL: 414 MS
RHINOVIRUS RNA SPEC NAA+PROBE: NOT DETECTED
RSV RNA NPH QL NAA+NON-PROBE: NOT DETECTED
SARS-COV-2 RNA RESP QL NAA+PROBE: NOT DETECTED
TROPONIN T % DELTA: -6
TROPONIN T NUMERIC DELTA: -1 NG/L

## 2025-04-08 PROCEDURE — 94664 DEMO&/EVAL PT USE INHALER: CPT

## 2025-04-08 PROCEDURE — 83735 ASSAY OF MAGNESIUM: CPT

## 2025-04-08 PROCEDURE — 94799 UNLISTED PULMONARY SVC/PX: CPT

## 2025-04-08 PROCEDURE — 25010000002 METHYLPREDNISOLONE PER 40 MG: Performed by: FAMILY MEDICINE

## 2025-04-08 PROCEDURE — 94640 AIRWAY INHALATION TREATMENT: CPT

## 2025-04-08 PROCEDURE — 94761 N-INVAS EAR/PLS OXIMETRY MLT: CPT

## 2025-04-08 PROCEDURE — 36415 COLL VENOUS BLD VENIPUNCTURE: CPT

## 2025-04-08 PROCEDURE — 84484 ASSAY OF TROPONIN QUANT: CPT

## 2025-04-08 RX ORDER — SODIUM CHLORIDE 0.9 % (FLUSH) 0.9 %
10 SYRINGE (ML) INJECTION AS NEEDED
Status: DISCONTINUED | OUTPATIENT
Start: 2025-04-08 | End: 2025-04-09 | Stop reason: HOSPADM

## 2025-04-08 RX ORDER — CLOPIDOGREL BISULFATE 75 MG/1
75 TABLET ORAL DAILY
Status: DISCONTINUED | OUTPATIENT
Start: 2025-04-08 | End: 2025-04-09 | Stop reason: HOSPADM

## 2025-04-08 RX ORDER — LAMOTRIGINE 100 MG/1
100 TABLET ORAL 2 TIMES DAILY
Status: DISCONTINUED | OUTPATIENT
Start: 2025-04-08 | End: 2025-04-09 | Stop reason: HOSPADM

## 2025-04-08 RX ORDER — PANTOPRAZOLE SODIUM 40 MG/1
40 TABLET, DELAYED RELEASE ORAL
Status: DISCONTINUED | OUTPATIENT
Start: 2025-04-09 | End: 2025-04-09 | Stop reason: HOSPADM

## 2025-04-08 RX ORDER — POTASSIUM CHLORIDE 1500 MG/1
20 TABLET, EXTENDED RELEASE ORAL DAILY
Status: DISCONTINUED | OUTPATIENT
Start: 2025-04-08 | End: 2025-04-09 | Stop reason: HOSPADM

## 2025-04-08 RX ORDER — SODIUM CHLORIDE 9 MG/ML
40 INJECTION, SOLUTION INTRAVENOUS AS NEEDED
Status: DISCONTINUED | OUTPATIENT
Start: 2025-04-08 | End: 2025-04-09 | Stop reason: HOSPADM

## 2025-04-08 RX ORDER — POLYETHYLENE GLYCOL 3350 17 G/17G
17 POWDER, FOR SOLUTION ORAL DAILY PRN
Status: DISCONTINUED | OUTPATIENT
Start: 2025-04-08 | End: 2025-04-09 | Stop reason: HOSPADM

## 2025-04-08 RX ORDER — ALPRAZOLAM 1 MG/1
1 TABLET ORAL NIGHTLY PRN
Status: DISCONTINUED | OUTPATIENT
Start: 2025-04-08 | End: 2025-04-09 | Stop reason: HOSPADM

## 2025-04-08 RX ORDER — BISOPROLOL FUMARATE 5 MG/1
5 TABLET, FILM COATED ORAL DAILY
Status: DISCONTINUED | OUTPATIENT
Start: 2025-04-08 | End: 2025-04-09 | Stop reason: HOSPADM

## 2025-04-08 RX ORDER — ATORVASTATIN CALCIUM 40 MG/1
80 TABLET, FILM COATED ORAL DAILY
Status: DISCONTINUED | OUTPATIENT
Start: 2025-04-08 | End: 2025-04-09 | Stop reason: HOSPADM

## 2025-04-08 RX ORDER — ASCORBIC ACID 500 MG
500 TABLET ORAL DAILY
Status: DISCONTINUED | OUTPATIENT
Start: 2025-04-08 | End: 2025-04-09 | Stop reason: HOSPADM

## 2025-04-08 RX ORDER — NITROGLYCERIN 0.4 MG/1
0.4 TABLET SUBLINGUAL
Status: DISCONTINUED | OUTPATIENT
Start: 2025-04-08 | End: 2025-04-09 | Stop reason: HOSPADM

## 2025-04-08 RX ORDER — BISACODYL 10 MG
10 SUPPOSITORY, RECTAL RECTAL DAILY PRN
Status: DISCONTINUED | OUTPATIENT
Start: 2025-04-08 | End: 2025-04-09 | Stop reason: HOSPADM

## 2025-04-08 RX ORDER — BUDESONIDE 0.5 MG/2ML
0.5 INHALANT ORAL
Status: DISCONTINUED | OUTPATIENT
Start: 2025-04-08 | End: 2025-04-09 | Stop reason: HOSPADM

## 2025-04-08 RX ORDER — FERROUS SULFATE 325(65) MG
325 TABLET ORAL 2 TIMES DAILY WITH MEALS
Status: DISCONTINUED | OUTPATIENT
Start: 2025-04-08 | End: 2025-04-09 | Stop reason: HOSPADM

## 2025-04-08 RX ORDER — FUROSEMIDE 40 MG/1
20 TABLET ORAL DAILY
Status: DISCONTINUED | OUTPATIENT
Start: 2025-04-08 | End: 2025-04-09 | Stop reason: HOSPADM

## 2025-04-08 RX ORDER — AMOXICILLIN 250 MG
2 CAPSULE ORAL 2 TIMES DAILY PRN
Status: DISCONTINUED | OUTPATIENT
Start: 2025-04-08 | End: 2025-04-09 | Stop reason: HOSPADM

## 2025-04-08 RX ORDER — MIRTAZAPINE 7.5 MG/1
7.5 TABLET, FILM COATED ORAL NIGHTLY
Status: DISCONTINUED | OUTPATIENT
Start: 2025-04-08 | End: 2025-04-09 | Stop reason: HOSPADM

## 2025-04-08 RX ORDER — CHOLECALCIFEROL (VITAMIN D3) 25 MCG
1000 TABLET ORAL DAILY
Status: DISCONTINUED | OUTPATIENT
Start: 2025-04-08 | End: 2025-04-09 | Stop reason: HOSPADM

## 2025-04-08 RX ORDER — IPRATROPIUM BROMIDE AND ALBUTEROL SULFATE 2.5; .5 MG/3ML; MG/3ML
3 SOLUTION RESPIRATORY (INHALATION)
Status: DISCONTINUED | OUTPATIENT
Start: 2025-04-08 | End: 2025-04-09 | Stop reason: HOSPADM

## 2025-04-08 RX ORDER — SODIUM CHLORIDE 0.9 % (FLUSH) 0.9 %
10 SYRINGE (ML) INJECTION EVERY 12 HOURS SCHEDULED
Status: DISCONTINUED | OUTPATIENT
Start: 2025-04-08 | End: 2025-04-09 | Stop reason: HOSPADM

## 2025-04-08 RX ORDER — BISACODYL 5 MG/1
5 TABLET, DELAYED RELEASE ORAL DAILY PRN
Status: DISCONTINUED | OUTPATIENT
Start: 2025-04-08 | End: 2025-04-09 | Stop reason: HOSPADM

## 2025-04-08 RX ORDER — OXYBUTYNIN CHLORIDE 5 MG/1
5 TABLET, EXTENDED RELEASE ORAL DAILY
Status: DISCONTINUED | OUTPATIENT
Start: 2025-04-08 | End: 2025-04-09 | Stop reason: HOSPADM

## 2025-04-08 RX ORDER — METHYLPREDNISOLONE SODIUM SUCCINATE 40 MG/ML
40 INJECTION, POWDER, LYOPHILIZED, FOR SOLUTION INTRAMUSCULAR; INTRAVENOUS EVERY 12 HOURS
Status: DISCONTINUED | OUTPATIENT
Start: 2025-04-08 | End: 2025-04-09 | Stop reason: HOSPADM

## 2025-04-08 RX ORDER — CITALOPRAM HYDROBROMIDE 20 MG/1
10 TABLET ORAL DAILY
Status: DISCONTINUED | OUTPATIENT
Start: 2025-04-08 | End: 2025-04-09 | Stop reason: HOSPADM

## 2025-04-08 RX ORDER — RISPERIDONE 1 MG/1
1 TABLET ORAL EVERY 12 HOURS SCHEDULED
Status: DISCONTINUED | OUTPATIENT
Start: 2025-04-08 | End: 2025-04-09 | Stop reason: HOSPADM

## 2025-04-08 RX ORDER — FERROUS SULFATE 324(65)MG
324 TABLET, DELAYED RELEASE (ENTERIC COATED) ORAL 2 TIMES DAILY WITH MEALS
Status: DISCONTINUED | OUTPATIENT
Start: 2025-04-08 | End: 2025-04-08

## 2025-04-08 RX ADMIN — Medication 10 ML: at 08:48

## 2025-04-08 RX ADMIN — CLOPIDOGREL BISULFATE 75 MG: 75 TABLET, FILM COATED ORAL at 08:45

## 2025-04-08 RX ADMIN — APIXABAN 5 MG: 5 TABLET, FILM COATED ORAL at 08:45

## 2025-04-08 RX ADMIN — IPRATROPIUM BROMIDE AND ALBUTEROL SULFATE 3 ML: .5; 3 SOLUTION RESPIRATORY (INHALATION) at 15:57

## 2025-04-08 RX ADMIN — MIRTAZAPINE 7.5 MG: 7.5 TABLET, FILM COATED ORAL at 20:38

## 2025-04-08 RX ADMIN — IPRATROPIUM BROMIDE AND ALBUTEROL SULFATE 3 ML: .5; 3 SOLUTION RESPIRATORY (INHALATION) at 12:24

## 2025-04-08 RX ADMIN — BUDESONIDE 0.5 MG: 0.5 INHALANT RESPIRATORY (INHALATION) at 06:40

## 2025-04-08 RX ADMIN — APIXABAN 5 MG: 5 TABLET, FILM COATED ORAL at 20:39

## 2025-04-08 RX ADMIN — METHYLPREDNISOLONE SODIUM SUCCINATE 40 MG: 40 INJECTION, POWDER, FOR SOLUTION INTRAMUSCULAR; INTRAVENOUS at 16:40

## 2025-04-08 RX ADMIN — OXYCODONE HYDROCHLORIDE AND ACETAMINOPHEN 500 MG: 500 TABLET ORAL at 16:40

## 2025-04-08 RX ADMIN — IPRATROPIUM BROMIDE AND ALBUTEROL SULFATE 3 ML: .5; 3 SOLUTION RESPIRATORY (INHALATION) at 00:29

## 2025-04-08 RX ADMIN — ATORVASTATIN CALCIUM 80 MG: 40 TABLET, FILM COATED ORAL at 08:45

## 2025-04-08 RX ADMIN — IPRATROPIUM BROMIDE AND ALBUTEROL SULFATE 3 ML: .5; 3 SOLUTION RESPIRATORY (INHALATION) at 20:48

## 2025-04-08 RX ADMIN — RISPERIDONE 1 MG: 1 TABLET, FILM COATED ORAL at 20:38

## 2025-04-08 RX ADMIN — SENNOSIDES AND DOCUSATE SODIUM 2 TABLET: 50; 8.6 TABLET ORAL at 21:11

## 2025-04-08 RX ADMIN — LAMOTRIGINE 100 MG: 100 TABLET ORAL at 04:41

## 2025-04-08 RX ADMIN — FUROSEMIDE 20 MG: 40 TABLET ORAL at 08:45

## 2025-04-08 RX ADMIN — LAMOTRIGINE 100 MG: 100 TABLET ORAL at 20:38

## 2025-04-08 RX ADMIN — OXYBUTYNIN CHLORIDE 5 MG: 5 TABLET, EXTENDED RELEASE ORAL at 08:45

## 2025-04-08 RX ADMIN — Medication 10 MG: at 20:38

## 2025-04-08 RX ADMIN — Medication 10 ML: at 20:39

## 2025-04-08 RX ADMIN — BUDESONIDE 0.5 MG: 0.5 INHALANT RESPIRATORY (INHALATION) at 20:52

## 2025-04-08 RX ADMIN — BISOPROLOL FUMARATE 5 MG: 5 TABLET ORAL at 08:45

## 2025-04-08 RX ADMIN — POTASSIUM CHLORIDE 20 MEQ: 1500 TABLET, EXTENDED RELEASE ORAL at 16:41

## 2025-04-08 RX ADMIN — IPRATROPIUM BROMIDE AND ALBUTEROL SULFATE 3 ML: .5; 3 SOLUTION RESPIRATORY (INHALATION) at 06:36

## 2025-04-08 RX ADMIN — FERROUS SULFATE TAB 325 MG (65 MG ELEMENTAL FE) 325 MG: 325 (65 FE) TAB at 16:40

## 2025-04-08 RX ADMIN — Medication 1000 UNITS: at 16:41

## 2025-04-08 RX ADMIN — CITALOPRAM 10 MG: 20 TABLET, FILM COATED ORAL at 08:45

## 2025-04-08 NOTE — PLAN OF CARE
Goal Outcome Evaluation:   Pt arrived on the unit on 3 liters of oxygen with shortness of air. Pt's O2 saturations are stable above 92%.

## 2025-04-08 NOTE — H&P
[facility] Medicine Services  History & Physical    Patient Name: Andree Deluna  : 1955  MRN: [id number]  Primary Care Physician:  Naga Griffith PA-C  Date of admission: 2025  Date and Time of Service: 2025 at 0330    Subjective      Chief Complaint: Shortness of breath and congestion    History of Present Illness: Andree Deluna is a 69 y.o. female with a CMH of CAD status post CABG, A-fib on anticoagulation, CVA, hypertension, hyperlipidemia, obstructive sleep apnea, ADHD, who presented to [facility] on 2025 with shortness of breath and congestion.    Patient is currently awake alert [place] x 3, conversational, reports shortness of breath which has been worsening for the past 3 to 4 days, denies any chest pain, denies any signs of GI bleeding including hematochezia melena or hematemesis.  Patient reports she has stent placed about 2 months ago currently on Plavix and, compliant with her Eliquis for A-fib.  Otherwise patient denies any fevers, reports some chills, denies any dysuria.    In ED, vital stable, afebrile, proBNP 580, CMP and CBC mostly unremarkable, respiratory panel positive for parainfluenza virus, chest x-ray showing no acute cardiopulmonary findings, EKG showing sinus rhythm, PVCs noted.  Patient given dose of methylprednisone 125 once, DuoNebs.  Patient mated to medicine for further inpatient management.        Review of Systems negative unless mentioned above    Personal History     Past Medical History:   Diagnosis Date    ADHD unknown    Anemia     Anesthesia complication     difficult to wake up    Anxiety     Atrial fibrillation     Carotid artery disease     80% right internal carotid artery    Coronary artery disease     CVA (cerebral vascular accident)     right parietal right temporal    DDD (degenerative disc disease), lumbar     Depression     Extremity pain     Ankle pain    GERD (gastroesophageal reflux disease)     Hyperlipidemia  unknown    Hypertension     Insomnia unknown    Laryngopharyngeal reflux     Low back pain     Mood disorder     Obesity unknown    Short of breath on exertion     Skin cancer of face     Sleep apnea     Suspected sleep apnea she has refused to be tested    Stroke (cerebrum)     Visual field defect     Left       Past Surgical History:   Procedure Laterality Date    CARDIAC CATHETERIZATION N/A 5/1/2023    Procedure: Left Heart Cath;  Surgeon: Kye Alcaraz MD;  Location: [facility] CATH INVASIVE LOCATION;  Service: Cardiovascular;  Laterality: N/A;    CARDIAC CATHETERIZATION N/A 1/16/2025    Procedure: Left Heart Cath;  Surgeon: Kamar Wheeler MD;  Location: [facility] CATH INVASIVE LOCATION;  Service: Cardiology;  Laterality: N/A;    CAROTID ENDARTERECTOMY Right 11/10/2020    Procedure: CAROTID ENDARTERECTOMY;  Surgeon: Tavo Das MD;  Location: [facility] MAIN OR;  Service: Vascular;  Laterality: Right;    CHOLECYSTECTOMY      COLONOSCOPY      2016    CORONARY ARTERY BYPASS GRAFT N/A 5/6/2023    Procedure: CORONARY ARTERY BYPASS GRAFTING;  Surgeon: Errol Noonan MD;  Location: [facility];  Service: Cardiothoracic;  Laterality: N/A;  CABG X  3 using LIMA and right endospahenous vein;  2 coronary markers implanted.     FINGER SURGERY      KNEE ARTHROPLASTY      KNEE SURGERY Right     replaced    LAPAROSCOPIC GASTRIC BANDING      MITRAL VALVE REPAIR/REPLACEMENT N/A 5/6/2023    Procedure: MITRAL VALVE REPAIR/REPLACEMENT;  Surgeon: Errol Noonan MD;  Location: [facility];  Service: Cardiothoracic;  Laterality: N/A;  Mitral valve repaired using 28 mm Jorge Physio II Annuloplasty Ring.     ROTATOR CUFF REPAIR Right 2019    SHOULDER SURGERY Right 05/24/2019    scope/ cuff repair    TOTAL SHOULDER ARTHROPLASTY W/ DISTAL CLAVICLE EXCISION Left 11/8/2023    Procedure: TOTAL SHOULDER REVERSE ARTHROPLASTY;  Surgeon: Shubham Samaniego MD;  Location: [facility] MAIN OR;  Service:  Orthopedics;  Laterality: Left;    TRANSESOPHAGEAL ECHOCARDIOGRAM (GALA) N/A 5/6/2023    Procedure: TRANSESOPHAGEAL ECHOCARDIOGRAM WITH ANESTHESIA;  Surgeon: Errol Noonan MD;  Location: [facility];  Service: Cardiothoracic;  Laterality: N/A;    TUBAL ABDOMINAL LIGATION         Family History: family history includes Cancer in her maternal grandmother; Diabetes in her mother and another family member; Heart disease in her father and mother. Otherwise pertinent FHx was reviewed and not pertinent to current issue.    Social History:  reports that she has never smoked. She has never been exposed to tobacco smoke. She has never used smokeless tobacco. She reports that she does not drink alcohol and does not use drugs.    Home Medications:  Prior to Admission Medications       Prescriptions Last Dose Informant Patient Reported? Taking?    ALPRAZolam (XANAX) 2 MG tablet 4/6/2025  No Yes    TAKE 1 TABLET BY MOUTH NIGHTLY AS NEEDED FOR ANXIETY    Patient taking differently:  Take 0.5 tablets by mouth every night at bedtime.    apixaban (ELIQUIS) 5 MG tablet tablet 4/7/2025  No Yes    Take 1 tablet by mouth 2 (Two) Times a Day for 180 days.    atorvastatin (LIPITOR) 80 MG tablet 4/7/2025  No Yes    Take 1 tablet by mouth Daily.    bisoprolol (ZEBeta) 5 MG tablet 4/7/2025  No Yes    Take 1 tablet by mouth Daily.    cholecalciferol (VITAMIN D3) 25 MCG (1000 UT) tablet 4/7/2025  Yes Yes    Take 1 tablet by mouth Daily.    citalopram (CeleXA) 10 MG tablet 4/7/2025  No Yes    Take 1 tablet by mouth Daily.    clopidogrel (Plavix) 75 MG tablet 4/7/2025  No Yes    Take 1 tablet by mouth Daily for 180 days.    ferrous sulfate 325 (65 Fe) MG tablet 4/7/2025  No Yes    TAKE 1 TABLET BY MOUTH EVERY OTHER DAY    furosemide (LASIX) 20 MG tablet 4/7/2025  No Yes    Take 1 tablet by mouth Daily. Pt is out of this medication.    Patient taking differently:  Take 1 tablet by mouth Daily.    lamoTRIgine (LaMICtal) 100 MG tablet  4/7/2025  No Yes    Take 1 tablet by mouth twice daily    Mirabegron ER (MYRBETRIQ) 50 MG tablet sustained-release 24 hour 24 hr tablet 4/7/2025  No Yes    Take 50 mg by mouth Daily.    mirtazapine (REMERON) 15 MG tablet 4/6/2025  No Yes    Take 0.5 tablets by mouth Every Night.    omeprazole (priLOSEC) 40 MG capsule 4/6/2025  No Yes    Take 1 capsule by mouth Daily. Indications: Grimaldo's Esophagus    Patient taking differently:  Take 1 capsule by mouth every night at bedtime. Indications: Grimaldo's Esophagus    potassium chloride (KLOR-CON M20) 20 MEQ CR tablet 4/7/2025  No Yes    Take 1 tablet by mouth Daily.    risperiDONE (risperDAL) 1 MG tablet 4/7/2025  No Yes    Take 1 tablet by mouth Every 12 (Twelve) Hours.    vitamin C (ASCORBIC ACID) 500 MG tablet 4/7/2025  Yes Yes    Take 1 tablet by mouth Daily.              Allergies:  Allergies   Allergen Reactions    Desvenlafaxine Unknown - High Severity     ELEVATED BP       Objective      Vitals:   Temp:  [99 °F (37.2 °C)] 99 °F (37.2 °C)  Heart Rate:  [85-94] 86  Resp:  [16-22] 22  BP: (161-189)/() 161/85  Body mass index is 27.01 kg/m².    Physical Exam  General: Awake, alert, oriented, pleasant  HEENT, atraumatic prosthetic  Cardio, heart rate normal, rhythm irregular  Abdomen: Soft, nontender, no rebound or guarding  Respiratory: Breath sounds clear to auscultation, no significant crackles on my exam  Extremities: No remarkable edema the bilateral extremities      Diagnostic Data:  Lab Results (last 24 hours)       Procedure Component Value Units Date/Time    Magnesium [[id number]]  (Normal) Collected: 04/08/25 0027    Specimen: Blood Updated: 04/08/25 0108     Magnesium 2.0 mg/dL     High Sensitivity Troponin T 1Hr [[id number]]  (Abnormal) Collected: 04/08/25 0027    Specimen: Blood Updated: 04/08/25 0052     HS Troponin T 15 ng/L      Troponin T Numeric Delta -1 ng/L      Troponin T % Delta -6    Narrative:      High Sensitive Troponin T Reference  Range:  <14.0 ng/L- Negative Female for AMI  <22.0 ng/L- Negative Male for AMI  >=14 - Abnormal Female indicating possible myocardial injury.  >=22 - Abnormal Male indicating possible myocardial injury.   Clinicians would have to utilize clinical acumen, EKG, Troponin, and serial changes to determine if it is an Acute Myocardial Infarction or myocardial injury due to an underlying chronic condition.         Respiratory Panel PCR w/COVID-19(SARS-CoV-2) [facility] In-House, NP Swab in UTM/VTM, 2 HR TAT - Swab, Nasopharynx [[id number]]  (Abnormal) Collected: 04/07/25 2322    Specimen: Swab from Nasopharynx Updated: 04/08/25 0016     ADENOVIRUS, PCR Not Detected     Coronavirus 229E Not Detected     Coronavirus HKU1 Not Detected     Coronavirus NL63 Not Detected     Coronavirus OC43 Not Detected     COVID19 Not Detected     Human Metapneumovirus Not Detected     Human Rhinovirus/Enterovirus Not Detected     Influenza A PCR Not Detected     Influenza B PCR Not Detected     Parainfluenza Virus 1 Not Detected     Parainfluenza Virus 2 Not Detected     Parainfluenza Virus 3 Detected     Parainfluenza Virus 4 Not Detected     RSV, PCR Not Detected     Bordetella pertussis pcr Not Detected     Bordetella parapertussis PCR Not Detected     Chlamydophila pneumoniae PCR Not Detected     Mycoplasma pneumo by PCR Not Detected    Narrative:      In the setting of a positive respiratory panel with a viral infection PLUS a negative procalcitonin without other underlying concern for bacterial infection, consider observing off antibiotics or discontinuation of antibiotics and continue supportive care. If the respiratory panel is positive for atypical bacterial infection (Bordetella pertussis, Chlamydophila pneumoniae, or Mycoplasma pneumoniae), consider antibiotic de-escalation to target atypical bacterial infection.    Comprehensive Metabolic Panel [[id number]]  (Abnormal) Collected: 04/07/25 2304    Specimen: Blood  Updated: 04/07/25 2343     Glucose 93 mg/dL      BUN 7 mg/dL      Creatinine 0.82 mg/dL      Sodium 137 mmol/L      Potassium 4.1 mmol/L      Comment: Slight hemolysis detected by analyzer. Result may be falsely elevated.        Chloride 97 mmol/L      CO2 26.5 mmol/L      Calcium 9.7 mg/dL      Total Protein 8.5 g/dL      Albumin 4.3 g/dL      ALT (SGPT) 15 U/L      AST (SGOT) 33 U/L      Comment: Slight hemolysis detected by analyzer. Result may be falsely elevated.        Alkaline Phosphatase 121 U/L      Total Bilirubin 0.7 mg/dL      Globulin 4.2 gm/dL      A/G Ratio 1.0 g/dL      BUN/Creatinine Ratio 8.5     Anion Gap 13.5 mmol/L      eGFR 78.5 mL/min/1.73     Narrative:      GFR Categories in Chronic Kidney Disease (CKD)      GFR Category          GFR (mL/min/1.73)    Interpretation  G1                     90 or greater         Normal or high (1)  G2                      60-89                Mild decrease (1)  G3a                   45-59                Mild to moderate decrease  G3b                   30-44                Moderate to severe decrease  G4                    15-29                Severe decrease  G5                    14 or less           Kidney failure          (1)In the absence of evidence of kidney disease, neither GFR category G1 or G2 fulfill the criteria for CKD.    eGFR calculation 2021 CKD-EPI creatinine equation, which does not include race as a factor    BNP [[id number]]  (Normal) Collected: 04/07/25 2304    Specimen: Blood Updated: 04/07/25 2341     proBNP 581.0 pg/mL     Narrative:      This assay is used as an aid in the diagnosis of individuals suspected of having heart failure. It can be used as an aid in the diagnosis of acute decompensated heart failure (ADHF) in patients presenting with signs and symptoms of ADHF to the emergency department (ED). In addition, NT-proBNP of <300 pg/mL indicates ADHF is not likely.    Age Range Result Interpretation  NT-proBNP Concentration  (pg/mL:      <50             Positive            >450                   Gray                 300-450                    Negative             <300    50-75           Positive            >900                  Gray                300-900                  Negative            <300      >75             Positive            >1800                  Gray                300-1800                  Negative            <300    High Sensitivity Troponin T [[id number]]  (Abnormal) Collected: 04/07/25 2304    Specimen: Blood Updated: 04/07/25 2341     HS Troponin T 16 ng/L     Narrative:      High Sensitive Troponin T Reference Range:  <14.0 ng/L- Negative Female for AMI  <22.0 ng/L- Negative Male for AMI  >=14 - Abnormal Female indicating possible myocardial injury.  >=22 - Abnormal Male indicating possible myocardial injury.   Clinicians would have to utilize clinical acumen, EKG, Troponin, and serial changes to determine if it is an Acute Myocardial Infarction or myocardial injury due to an underlying chronic condition.         CBC & Differential [[id number]]  (Abnormal) Collected: 04/07/25 2304    Specimen: Blood Updated: 04/07/25 2323    Narrative:      The following orders were created for panel order CBC & Differential.  Procedure                               Abnormality         Status                     ---------                               -----------         ------                     CBC Auto Differential[[id number]]        Abnormal            Final result                 Please view results for these tests on the individual orders.    CBC Auto Differential [[id number]]  (Abnormal) Collected: 04/07/25 2304    Specimen: Blood Updated: 04/07/25 2323     WBC 7.00 10*3/mm3      RBC 4.73 10*6/mm3      Hemoglobin 13.7 g/dL      Hematocrit 44.2 %      MCV 93.4 fL      MCH 29.0 pg      MCHC 31.0 g/dL      RDW 14.5 %      RDW-SD 49.6 fl      MPV 10.9 fL      Platelets 202 10*3/mm3      Neutrophil % 73.9 %      Lymphocyte % 13.7 %       Monocyte % 9.3 %      Eosinophil % 2.0 %      Basophil % 1.0 %      Immature Grans % 0.1 %      Neutrophils, Absolute 5.17 10*3/mm3      Lymphocytes, Absolute 0.96 10*3/mm3      Monocytes, Absolute 0.65 10*3/mm3      Eosinophils, Absolute 0.14 10*3/mm3      Basophils, Absolute 0.07 10*3/mm3      Immature Grans, Absolute 0.01 10*3/mm3      nRBC 0.0 /100 WBC     [place] Draw [[id number]] Collected: 04/07/25 2304    Specimen: Blood Updated: 04/07/25 2315    Narrative:      The following orders were created for panel order [place] Draw.  Procedure                               Abnormality         Status                     ---------                               -----------         ------                     Green Top (Gel)[[id number]]                                  Final result               Lavender Top[[id number]]                                     Final result               Gold Top - SST[[id number]]                                   Final result               Light Blue Top[[id number]]                                   Final result                 Please view results for these tests on the individual orders.    Green Top (Gel) [[id number]] Collected: 04/07/25 2304    Specimen: Blood Updated: 04/07/25 2315     Extra Tube Hold for add-ons.     Comment: Auto resulted.       Lavender Top [[id number]] Collected: 04/07/25 2304    Specimen: Blood Updated: 04/07/25 2315     Extra Tube hold for add-on     Comment: Auto resulted       Gold Top - SST [[id number]] Collected: 04/07/25 2304    Specimen: Blood Updated: 04/07/25 2315     Extra Tube Hold for add-ons.     Comment: Auto resulted.       Light Blue Top [[id number]] Collected: 04/07/25 2304    Specimen: Blood Updated: 04/07/25 2315     Extra Tube Hold for add-ons.     Comment: Auto resulted                Imaging Results (Last 24 Hours)       Procedure Component Value Units Date/Time    XR Chest 1 View [[id number]] Collected: 04/07/25 2357     Updated: 04/08/25 0000     Narrative:      XR CHEST 1 VW    Date of Exam: 4/7/2025 11:15 PM EDT    Indication: CHF/COPD Protocol.    Comparison: 3/7/2025.    Findings:  There is a reverse left shoulder prosthesis in place. There is evidence of prior median sternotomy and heart valve replacement. Gastric band is in place. There is no pneumothorax, pleural effusion or focal airspace consolidation. The heart is enlarged.   Pulmonary vasculature appears within normal limits. Regional bones appear intact.      Impression:      Impression:  1.No acute cardiopulmonary abnormality.  2.Cardiomegaly.  3.Gastric band in place.          Electronically Signed: Robin Gregorio MD    4/7/2025 11:58 PM EDT    Workstation ID: [id number]              Assessment & Plan    Andree Deluna is a 69 y.o. female with a CMH of CAD status post CABG, A-fib on anticoagulation, CVA, hypertension, hyperlipidemia, obstructive sleep apnea, ADHD, who presented to [facility] on 4/7/2025 with shortness of breath and congestion.    Assessments  Acute Hypoxic Resp Failure likely due to parainfluenza virus  CAD status post CABG, status post recent stent placement on 2 months ago  A-fib on anticoagulation  Hypertension  Hyperlipidemia  Obstructive sleep apnea  ADHD    Plan  -Start DuoNebs, Pulmicort, wean oxygen as tolerated  -Resume her Plavix and Eliquis given she has recent stent placement  -Resume home medications once reconciled per pharmacy for medically appropriate  -Case management consult, 6-minute walk oxygen eval in the morning, may need oxygen to go home with  -AM labs      VTE Prophylaxis:  No VTE prophylaxis order currently exists.      CODE STATUS:  Full      I discussed the patient's findings and my recommendations with patient.      This document has been electronically signed by Sourav Freitas MD on April 8, 2025 02:54 EDT   [facility] Hospitalist Team

## 2025-04-08 NOTE — PLAN OF CARE
Goal Outcome Evaluation:   Pt alert and orient x 4. Pt makes needs known verbally. Pt medications reviewed and confirmed this shift. Pt complaint with plan of care at this time.

## 2025-04-08 NOTE — CASE MANAGEMENT/SOCIAL WORK
Discharge Planning Assessment   Eddy     Patient Name: Andree Deluna  MRN: [id number]  Today's Date: 4/8/2025    Admit Date: 4/7/2025    Plan: Routine home with spouse. Watch for new home O2.   Discharge Needs Assessment       Row Name 04/08/25 1509       Living Environment    People in Home spouse    Name(s) of People in Home -Buster    Current Living Arrangements home    Potentially Unsafe Housing Conditions none    In the past 12 months has the electric, gas, oil, or water company threatened to shut off services in your home? No    Primary Care Provided by self    Provides Primary Care For no one    Family Caregiver if Needed spouse;child(avery), adult    Family Caregiver Names -Buster    Quality of Family Relationships helpful;involved;supportive    Able to Return to Prior Arrangements yes       Resource/Environmental Concerns    Resource/Environmental Concerns none    Transportation Concerns none       Transportation Needs    In the past 12 months, has lack of transportation kept you from medical appointments or from getting medications? no    In the past 12 months, has lack of transportation kept you from meetings, work, or from getting things needed for daily living? No       Food Insecurity    Within the past 12 months, you worried that your food would run out before you got the money to buy more. Never true    Within the past 12 months, the food you bought just didn't last and you didn't have money to get more. Never true       Transition Planning    Patient/Family Anticipates Transition to home;home with family    Patient/Family Anticipated Services at Transition none    Transportation Anticipated family or friend will provide       Discharge Needs Assessment    Readmission Within the Last 30 Days no previous admission in last 30 days    Equipment Currently Used at Home bp cuff;pulse ox    Concerns to be Addressed denies needs/concerns at this time;no discharge needs identified    Do you  want help finding or keeping work or a job? Patient declined    Do you want help with school or training? For example, starting or completing job training or getting a high school diploma, GED or equivalent No    Anticipated Changes Related to Illness none    Equipment Needed After Discharge none    Provided Post Acute Provider List? N/A    Provided Post Acute Provider Quality & Resource List? N/A                   Discharge Plan       Row Name 04/08/25 1510       Plan    Plan Routine home with spouse. Watch for new home O2.    Patient/Family in Agreement with Plan yes    Plan Comments CM met with patient at bedside. Pt lives at home with . She can drive but  normally transports; independent with ADL's. PCP and pharmacy confirmed- declined [facility]. DME used at home includes BP cuff and pulse ox. Pt was previously seen by [facility] services but pt reports they are no longer seeing her. Pt comes to [facility] 2 days/week. Spouse to drive pt home at [place].              Demographic Summary       Row Name 04/08/25 1508       General Information    Admission Type inpatient    Arrived From emergency department    Required Notices Provided Important Message from Medicare    Referral Source admission list    Reason for Consult care coordination/care conference;discharge planning    Preferred Language English       Contact Information    Permission Granted to Share Info With                    Functional Status       Row Name 04/08/25 1508       Functional Status    Usual Activity Tolerance good    Current Activity Tolerance moderate       Functional Status, IADL    Medications independent    Meal Preparation independent    Housekeeping independent    Laundry independent    Shopping independent    If for any reason you need help with day-to-day activities such as bathing, preparing meals, shopping, managing finances, etc., do you get the help you need? I get all the help I need              Megan Naegele, RN     Office Phone: [phone]  Office Cell: [phone]

## 2025-04-08 NOTE — ED PROVIDER NOTES
Subjective   History of Present Illness  Chief complaint: Dyspnea, productive cough      Context: Patient is a 69-year-old female who presents to the ER with complaints of continued dyspnea, and productive cough for months.  Patient reports her dyspnea is all the time, states she is not on any home O2.  Denies any chest pain, or fever.  Patient reports some posttussive vomiting.  Patient denies any abdominal pain, nausea or diarrhea.    PCP: Naga Griffith    LMP: Postmenopausal          Review of Systems   Constitutional:  Negative for fever.       Past Medical History:   Diagnosis Date    ADHD unknown    Anemia     Anesthesia complication     difficult to wake up    Anxiety     Atrial fibrillation     Carotid artery disease     80% right internal carotid artery    Coronary artery disease     CVA (cerebral vascular accident)     right parietal right temporal    DDD (degenerative disc disease), lumbar     Depression     Extremity pain     Ankle pain    GERD (gastroesophageal reflux disease)     Hyperlipidemia unknown    Hypertension     Insomnia unknown    Laryngopharyngeal reflux     Low back pain     Mood disorder     Obesity unknown    Short of breath on exertion     Skin cancer of face     Sleep apnea     Suspected sleep apnea she has refused to be tested    Stroke (cerebrum)     Visual field defect     Left       Allergies   Allergen Reactions    Desvenlafaxine Unknown - High Severity     ELEVATED BP       Past Surgical History:   Procedure Laterality Date    CARDIAC CATHETERIZATION N/A 5/1/2023    Procedure: Left Heart Cath;  Surgeon: Kye Alcaraz MD;  Location: [facility] CATH INVASIVE LOCATION;  Service: Cardiovascular;  Laterality: N/A;    CARDIAC CATHETERIZATION N/A 1/16/2025    Procedure: Left Heart Cath;  Surgeon: Kamar Wheeler MD;  Location: [facility] CATH INVASIVE LOCATION;  Service: Cardiology;  Laterality: N/A;    CAROTID ENDARTERECTOMY Right 11/10/2020    Procedure: CAROTID  ENDARTERECTOMY;  Surgeon: Tavo Das MD;  Location: [facility] MAIN OR;  Service: Vascular;  Laterality: Right;    CHOLECYSTECTOMY      COLONOSCOPY      2016    CORONARY ARTERY BYPASS GRAFT N/A 5/6/2023    Procedure: CORONARY ARTERY BYPASS GRAFTING;  Surgeon: Errol Noonan MD;  Location: [facility];  Service: Cardiothoracic;  Laterality: N/A;  CABG X  3 using LIMA and right endospahenous vein;  2 coronary markers implanted.     FINGER SURGERY      KNEE ARTHROPLASTY      KNEE SURGERY Right     replaced    LAPAROSCOPIC GASTRIC BANDING      MITRAL VALVE REPAIR/REPLACEMENT N/A 5/6/2023    Procedure: MITRAL VALVE REPAIR/REPLACEMENT;  Surgeon: Errol Noonan MD;  Location: [facility];  Service: Cardiothoracic;  Laterality: N/A;  Mitral valve repaired using 28 mm Jorge Physio II Annuloplasty Ring.     ROTATOR CUFF REPAIR Right 2019    SHOULDER SURGERY Right 05/24/2019    scope/ cuff repair    TOTAL SHOULDER ARTHROPLASTY W/ DISTAL CLAVICLE EXCISION Left 11/8/2023    Procedure: TOTAL SHOULDER REVERSE ARTHROPLASTY;  Surgeon: Shubham Samaniego MD;  Location: [facility] MAIN OR;  Service: Orthopedics;  Laterality: Left;    TRANSESOPHAGEAL ECHOCARDIOGRAM (GALA) N/A 5/6/2023    Procedure: TRANSESOPHAGEAL ECHOCARDIOGRAM WITH ANESTHESIA;  Surgeon: Errol Noonan MD;  Location: [facility];  Service: Cardiothoracic;  Laterality: N/A;    TUBAL ABDOMINAL LIGATION         Family History   Problem Relation Age of Onset    Heart disease Mother     Diabetes Mother     Heart disease Father     Cancer Maternal Grandmother     Diabetes Other        Social History     Socioeconomic History    Marital status:    Tobacco Use    Smoking status: Never     Passive exposure: Never    Smokeless tobacco: Never   Vaping Use    Vaping status: Never Used   Substance and Sexual Activity    Alcohol use: No    Drug use: No    Sexual activity: Defer           Objective   Physical Exam  Vitals and nursing note  "reviewed.   Constitutional:       Appearance: She is well-developed.   HENT:      Head: Normocephalic.      Mouth/Throat:      Mouth: Mucous membranes are moist.   Eyes:      Extraocular Movements: Extraocular movements intact.   Neck:      Vascular: No hepatojugular reflux or JVD.   Cardiovascular:      Rate and Rhythm: Normal rate and regular rhythm.      Pulses: Normal pulses.   Pulmonary:      Effort: Pulmonary effort is normal.      Breath sounds: Examination of the right-upper field reveals decreased breath sounds. Examination of the left-upper field reveals decreased breath sounds. Examination of the right-lower field reveals decreased breath sounds. Decreased breath sounds present. No wheezing.   Chest:      Chest wall: No tenderness or crepitus.   Abdominal:      Palpations: Abdomen is soft. There is no hepatomegaly, splenomegaly or mass.      Tenderness: There is no abdominal tenderness. There is no guarding.   Musculoskeletal:         General: Normal range of motion.      Cervical back: Normal range of motion.      Right lower leg: No edema.      Left lower leg: No edema.   Skin:     General: Skin is warm and dry.      Capillary Refill: Capillary refill takes less than 2 seconds.   Neurological:      General: No focal deficit present.      Mental Status: She is alert and oriented to person, place, and time.   Psychiatric:         Mood and Affect: Mood normal.         Behavior: Behavior normal.         Procedures           ED Course  ED Course as of 04/08/25 0530   Tue Apr 08, 2025   0149 XR Chest 1 View [LT]   0156 Consulted hospitalist. [LT]   0258 Spoke with Dr. Freitas who agreed to admit patient and assume care. [LT]      ED Course User Index  [LT] Keysha Plata Ann, APRN            /85 (BP Location: Right arm, Patient Position: Lying)   Pulse 81   Temp 97.7 °F (36.5 °C) (Oral)   Resp 20   Ht 167.6 cm (66\")   Wt 75.9 kg (167 lb 5.3 oz)   SpO2 96%   BMI 27.01 kg/m²   Labs Reviewed "   RESPIRATORY PANEL PCR W/ COVID-19 (SARS-COV-2), NP SWAB IN UTM/VTP, 2 HR TAT - Abnormal; Notable for the following components:       Result Value    Parainfluenza Virus 3 Detected (*)     All other components within normal limits    Narrative:     In the setting of a positive respiratory panel with a viral infection PLUS a negative procalcitonin without other underlying concern for bacterial infection, consider observing off antibiotics or discontinuation of antibiotics and continue supportive care. If the respiratory panel is positive for atypical bacterial infection (Bordetella pertussis, Chlamydophila pneumoniae, or Mycoplasma pneumoniae), consider antibiotic de-escalation to target atypical bacterial infection.   COMPREHENSIVE METABOLIC PANEL - Abnormal; Notable for the following components:    BUN 7 (*)     Chloride 97 (*)     AST (SGOT) 33 (*)     Alkaline Phosphatase 121 (*)     All other components within normal limits    Narrative:     GFR Categories in Chronic Kidney Disease (CKD)      GFR Category          GFR (mL/min/1.73)    Interpretation  G1                     90 or greater         Normal or high (1)  G2                      60-89                Mild decrease (1)  G3a                   45-59                Mild to moderate decrease  G3b                   30-44                Moderate to severe decrease  G4                    15-29                Severe decrease  G5                    14 or less           Kidney failure          (1)In the absence of evidence of kidney disease, neither GFR category G1 or G2 fulfill the criteria for CKD.    eGFR calculation 2021 CKD-EPI creatinine equation, which does not include race as a factor   TROPONIN - Abnormal; Notable for the following components:    HS Troponin T 16 (*)     All other components within normal limits    Narrative:     High Sensitive Troponin T Reference Range:  <14.0 ng/L- Negative Female for AMI  <22.0 ng/L- Negative Male for AMI  >=14 -  Abnormal Female indicating possible myocardial injury.  >=22 - Abnormal Male indicating possible myocardial injury.   Clinicians would have to utilize clinical acumen, EKG, Troponin, and serial changes to determine if it is an Acute Myocardial Infarction or myocardial injury due to an underlying chronic condition.        CBC WITH AUTO DIFFERENTIAL - Abnormal; Notable for the following components:    MCHC 31.0 (*)     Lymphocyte % 13.7 (*)     All other components within normal limits   HIGH SENSITIVITIY TROPONIN T 1HR - Abnormal; Notable for the following components:    HS Troponin T 15 (*)     All other components within normal limits    Narrative:     High Sensitive Troponin T Reference Range:  <14.0 ng/L- Negative Female for AMI  <22.0 ng/L- Negative Male for AMI  >=14 - Abnormal Female indicating possible myocardial injury.  >=22 - Abnormal Male indicating possible myocardial injury.   Clinicians would have to utilize clinical acumen, EKG, Troponin, and serial changes to determine if it is an Acute Myocardial Infarction or myocardial injury due to an underlying chronic condition.        BNP (IN-HOUSE) - Normal    Narrative:     This assay is used as an aid in the diagnosis of individuals suspected of having heart failure. It can be used as an aid in the diagnosis of acute decompensated heart failure (ADHF) in patients presenting with signs and symptoms of ADHF to the emergency department (ED). In addition, NT-proBNP of <300 pg/mL indicates ADHF is not likely.    Age Range Result Interpretation  NT-proBNP Concentration (pg/mL:      <50             Positive            >450                   Gray                 300-450                    Negative             <300    50-75           Positive            >900                  Gray                300-900                  Negative            <300      >75             Positive            >1800                  Gray                300-1800                  Negative             <300   MAGNESIUM - Normal   RAINBOW DRAW    Narrative:     The following orders were created for panel order [place] Draw.  Procedure                               Abnormality         Status                     ---------                               -----------         ------                     Green Top (Gel)[[id number]]                                  Final result               Lavender Top[[id number]]                                     Final result               Gold Top - SST[[id number]]                                   Final result               Light Blue Top[[id number]]                                   Final result                 Please view results for these tests on the individual orders.   GREEN TOP   LAVENDER TOP   GOLD TOP - SST   LIGHT BLUE TOP   CBC AND DIFFERENTIAL    Narrative:     The following orders were created for panel order CBC & Differential.  Procedure                               Abnormality         Status                     ---------                               -----------         ------                     CBC Auto Differential[[id number]]        Abnormal            Final result                 Please view results for these tests on the individual orders.     Medications   sodium chloride 0.9 % flush 10 mL (has no administration in time range)   sodium chloride 0.9 % flush 10 mL (has no administration in time range)   sodium chloride 0.9 % flush 10 mL (has no administration in time range)   sodium chloride 0.9 % infusion 40 mL (has no administration in time range)   nitroglycerin (NITROSTAT) SL tablet 0.4 mg (has no administration in time range)   Potassium Replacement - Follow Nurse / BPA Driven Protocol (has no administration in time range)   Magnesium Standard Dose Replacement - Follow Nurse / BPA Driven Protocol (has no administration in time range)   Phosphorus Replacement - Follow Nurse / BPA Driven Protocol (has no administration in time range)   Calcium Replacement -  Follow Nurse / BPA Driven Protocol (has no administration in time range)   sennosides-docusate (PERICOLACE) 8.6-50 MG per tablet 2 tablet (has no administration in time range)     And   polyethylene glycol (MIRALAX) packet 17 g (has no administration in time range)     And   bisacodyl (DULCOLAX) EC tablet 5 mg (has no administration in time range)     And   bisacodyl (DULCOLAX) suppository 10 mg (has no administration in time range)   ipratropium-albuterol (DUO-NEB) nebulizer solution 3 mL (has no administration in time range)   budesonide (PULMICORT) nebulizer solution 0.5 mg (0.5 mg Nebulization Not Given 4/8/25 0332)   apixaban (ELIQUIS) tablet 5 mg (has no administration in time range)   atorvastatin (LIPITOR) tablet 80 mg (has no administration in time range)   bisoprolol (ZEBeta) tablet 5 mg (has no administration in time range)   clopidogrel (PLAVIX) tablet 75 mg (has no administration in time range)   citalopram (CeleXA) tablet 10 mg (has no administration in time range)   furosemide (LASIX) tablet 20 mg (has no administration in time range)   lamoTRIgine (LaMICtal) tablet 100 mg (100 mg Oral Given 4/8/25 0441)   oxybutynin XL (DITROPAN-XL) 24 hr tablet 5 mg (has no administration in time range)   ipratropium-albuterol (DUO-NEB) nebulizer solution 3 mL (3 mL Nebulization Given 4/8/25 0029)   methylPREDNISolone sodium succinate (SOLU-Medrol) injection 125 mg (125 mg Intravenous Given 4/7/25 0216)     XR Chest 1 View  Result Date: 4/7/2025  Impression: 1.No acute cardiopulmonary abnormality. 2.Cardiomegaly. 3.Gastric band in place. Electronically Signed: Robin Gregorio MD  4/7/2025 11:58 PM EDT  Workstation ID: [id number]                                               Medical Decision Making  Chart review:    Radiology interpretation:  X-rays reviewed and interpreted by Rafi: No acute cardiopulmonary abnormality.  2.Cardiomegaly.  3.Gastric band in place.  Further interpretation by radiologist as above    Lab  interpretation:  Labs all viewed by me and significant for: RPP- positive for parainfluenza.  White count 7.0, hemoglobin 13.7, BUN 7, creatinine 0.82, potassium 4.1, ALT 15, AST 33, , troponin 16, 1 hour troponin 15.    EKG viewed and interpreted by Dr. Mckee: Sinus rhythm, multiple ventricular premature complexes, rate 93, QTc 414.  comparison: Dated 11/17/2024, sinus rhythm, probable LVH with secondary repull abnormalities, rate 71, QTc 445.    IV was established and labs and scans were obtained to evaluate for infection, electrolyte abnormalities, anemia, hypo-/hypermagnesemia, viral infection,, this is not an all-inclusive list.  Patient is a 69-year-old female with a significant cardiac history who presents to the ER for above complaint.  On initial examination patient is resting comfortably in the bed, nontoxic in appearance with no signs and symptoms of distress with some increased work of breathing.  Physical examination revealed decreased breath sounds in the bilateral lower lobes and right upper lobe, no tenderness noted to abdomen on palpation, no swelling in bilateral lower extremities noted.  Patient reports she is not on home O2, but is on 3 L nasal cannula here satting at low 90s.  Patient was given IV Solu-Medrol and a DuoNeb.  RPP was positive for parainfluenza.  Patient's vital signs continue to be stable however patient has been satting in the low to mid 90s on 3 L nasal cannula.  Consulted hospitalist.  Spoke with Dr. Freitas who agreed to admit patient and assume care.  On reexamination patient is resting comfortably in the bed, nontoxic in appearance with no signs and symptoms of distress.  Patient reports feeling a little bit better after the DuoNeb and steroids.  Discussed findings and decision to admit patient.  Patient was agreeable to admission and plan of care.    Appropriate PPE worn during exam.  Discussed care with: Dr. Mckee, Dr. Freitas with hospitalist group.     Based  on the clinical findings at this time I anticipate the patient will require a 2 midnight stay    Problems Addressed:  Dyspnea, unspecified type: complicated acute illness or injury  Hypoxia: complicated acute illness or injury    Amount and/or Complexity of Data Reviewed  Labs: ordered.  Radiology: ordered. Decision-making details documented in ED Course.    Risk  Prescription drug management.  Decision regarding hospitalization.        Final diagnoses:   Dyspnea, unspecified type   Hypoxia       ED Disposition  ED Disposition       ED Disposition   Decision to Admit    Condition   --    Comment   Level of Care: Med/Surg [1]   Diagnosis: Hypoxic respiratory failure [[id number]]   Admitting Physician: SHERRY BALES [[id number]]                 No follow-up provider specified.       Medication List      No changes were made to your prescriptions during this visit.            Keysha Plata, APRN  04/08/25 [id number]

## 2025-04-08 NOTE — ED NOTES
Nursing report ED to floor  Andree Deluna  69 y.o.  female    HPI:   Chief Complaint   Patient presents with    Shortness of Breath    Nausea    Vomiting       Admitting doctor:   Sourav Freitas MD    Admitting diagnosis:   The primary encounter diagnosis was Dyspnea, unspecified type. A diagnosis of Hypoxia was also pertinent to this visit.    Code status:   Current Code Status       Date Active Code Status Order ID Comments User Context       Prior            Allergies:   Desvenlafaxine    Isolation:  No active isolations     Fall Risk:  Fall Risk Assessment was completed, and patient is at low risk for falls.   Predictive Model Details         9 (Low) Factor Value    Calculated 4/8/2025 04:23 Age 69    Risk of Fall Model Active Peripheral IV Present     Imaging order in this encounter Present     Respiratory Rate 22     Magnesium 2 mg/dL     Mervin Scale not on file     Number of Distinct Medication Classes administered 2     Chloride 97 mmol/L     Total Bilirubin 0.7 mg/dL     Diastolic BP 89     Gastrointestinal Assessment X     Duration of Current Encounter 0.234 days     Albumin 4.3 g/dL     Creatinine 0.82 mg/dL     Potassium 4.1 mmol/L     ALT 15 U/L     Calcium 9.7 mg/dL         Weight:       04/07/25  2231   Weight: 75.9 kg (167 lb 5.3 oz)       Intake and Output  No intake or output data in the 24 hours ending 04/08/25 0437    Diet:   Dietary Orders (From admission, onward)       Start     Ordered    04/08/25 0300  Diet: Diabetic; Consistent Carbohydrate; Fluid Consistency: Thin (IDDSI 0)  Diet Effective Now        References:    Diet Order Definitions   Question Answer Comment   Diets: Diabetic    Diabetic Diet: Consistent Carbohydrate    Fluid Consistency: Thin (IDDSI 0)        04/08/25 0259                     Most recent vitals:   Vitals:    04/08/25 0131 04/08/25 0146 04/08/25 0247 04/08/25 0302   BP: 170/95 161/85 148/86 147/89   Pulse: 89 86 86 86   Resp:       Temp:       SpO2: 95% 93%  92% 93%   Weight:       Height:           Active LDAs/IV Access:   Lines, Drains & Airways       Active LDAs       Name Placement date Placement time Site Days    Peripheral IV 04/07/25 2304 Right Antecubital 04/07/25 2304  Antecubital  less than 1                    Skin Condition:   Skin Assessments (last day)       Date/Time Skin WDL    04/07/25 23:28:58 WDL             Labs (abnormal labs have a star):   Labs Reviewed   RESPIRATORY PANEL PCR W/ COVID-19 (SARS-COV-2), NP SWAB IN UTM/VTP, 2 HR TAT - Abnormal; Notable for the following components:       Result Value    Parainfluenza Virus 3 Detected (*)     All other components within normal limits    Narrative:     In the setting of a positive respiratory panel with a viral infection PLUS a negative procalcitonin without other underlying concern for bacterial infection, consider observing off antibiotics or discontinuation of antibiotics and continue supportive care. If the respiratory panel is positive for atypical bacterial infection (Bordetella pertussis, Chlamydophila pneumoniae, or Mycoplasma pneumoniae), consider antibiotic de-escalation to target atypical bacterial infection.   COMPREHENSIVE METABOLIC PANEL - Abnormal; Notable for the following components:    BUN 7 (*)     Chloride 97 (*)     AST (SGOT) 33 (*)     Alkaline Phosphatase 121 (*)     All other components within normal limits    Narrative:     GFR Categories in Chronic Kidney Disease (CKD)      GFR Category          GFR (mL/min/1.73)    Interpretation  G1                     90 or greater         Normal or high (1)  G2                      60-89                Mild decrease (1)  G3a                   45-59                Mild to moderate decrease  G3b                   30-44                Moderate to severe decrease  G4                    15-29                Severe decrease  G5                    14 or less           Kidney failure          (1)In the absence of evidence of kidney disease,  neither GFR category G1 or G2 fulfill the criteria for CKD.    eGFR calculation 2021 CKD-EPI creatinine equation, which does not include race as a factor   TROPONIN - Abnormal; Notable for the following components:    HS Troponin T 16 (*)     All other components within normal limits    Narrative:     High Sensitive Troponin T Reference Range:  <14.0 ng/L- Negative Female for AMI  <22.0 ng/L- Negative Male for AMI  >=14 - Abnormal Female indicating possible myocardial injury.  >=22 - Abnormal Male indicating possible myocardial injury.   Clinicians would have to utilize clinical acumen, EKG, Troponin, and serial changes to determine if it is an Acute Myocardial Infarction or myocardial injury due to an underlying chronic condition.        CBC WITH AUTO DIFFERENTIAL - Abnormal; Notable for the following components:    MCHC 31.0 (*)     Lymphocyte % 13.7 (*)     All other components within normal limits   HIGH SENSITIVITIY TROPONIN T 1HR - Abnormal; Notable for the following components:    HS Troponin T 15 (*)     All other components within normal limits    Narrative:     High Sensitive Troponin T Reference Range:  <14.0 ng/L- Negative Female for AMI  <22.0 ng/L- Negative Male for AMI  >=14 - Abnormal Female indicating possible myocardial injury.  >=22 - Abnormal Male indicating possible myocardial injury.   Clinicians would have to utilize clinical acumen, EKG, Troponin, and serial changes to determine if it is an Acute Myocardial Infarction or myocardial injury due to an underlying chronic condition.        BNP (IN-HOUSE) - Normal    Narrative:     This assay is used as an aid in the diagnosis of individuals suspected of having heart failure. It can be used as an aid in the diagnosis of acute decompensated heart failure (ADHF) in patients presenting with signs and symptoms of ADHF to the emergency department (ED). In addition, NT-proBNP of <300 pg/mL indicates ADHF is not likely.    Age Range Result Interpretation   NT-proBNP Concentration (pg/mL:      <50             Positive            >450                   Gray                 300-450                    Negative             <300    50-75           Positive            >900                  Gray                300-900                  Negative            <300      >75             Positive            >1800                  Gray                300-1800                  Negative            <300   MAGNESIUM - Normal   RAINBOW DRAW    Narrative:     The following orders were created for panel order [place] Draw.  Procedure                               Abnormality         Status                     ---------                               -----------         ------                     Green Top (Gel)[[id number]]                                  Final result               Lavender Top[[id number]]                                     Final result               Gold Top - SST[[id number]]                                   Final result               Light Blue Top[[id number]]                                   Final result                 Please view results for these tests on the individual orders.   GREEN TOP   LAVENDER TOP   GOLD TOP - SST   LIGHT BLUE TOP   CBC AND DIFFERENTIAL    Narrative:     The following orders were created for panel order CBC & Differential.  Procedure                               Abnormality         Status                     ---------                               -----------         ------                     CBC Auto Differential[[id number]]        Abnormal            Final result                 Please view results for these tests on the individual orders.       LOC: Person, Place, Time, and Situation    Telemetry:  Med/Surg    Cardiac Monitoring Ordered: yes    EKG:   ECG 12 Lead Dyspnea   Preliminary Result   HEART RATE=93  bpm   RR Vhkxxjno=873  ms   IA Crndiqnm=900  ms   P Horizontal Axis=  deg   P Front Axis=43  deg   QRSD Interval=91  ms   QT Lzdqaeua=119   ms   TZrD=275  ms   QRS Axis=52  deg   T Wave Axis=201  deg   - ABNORMAL ECG -   Sinus rhythm   Multiple ventricular premature complexes   Repol abnrm suggests ischemia, lateral leads   Date and Time of Study:2025-04-07 22:35:38          Medications Given in the ED:   Medications   sodium chloride 0.9 % flush 10 mL (has no administration in time range)   sodium chloride 0.9 % flush 10 mL (has no administration in time range)   sodium chloride 0.9 % flush 10 mL (has no administration in time range)   sodium chloride 0.9 % infusion 40 mL (has no administration in time range)   nitroglycerin (NITROSTAT) SL tablet 0.4 mg (has no administration in time range)   Potassium Replacement - Follow Nurse / BPA Driven Protocol (has no administration in time range)   Magnesium Standard Dose Replacement - Follow Nurse / BPA Driven Protocol (has no administration in time range)   Phosphorus Replacement - Follow Nurse / BPA Driven Protocol (has no administration in time range)   Calcium Replacement - Follow Nurse / BPA Driven Protocol (has no administration in time range)   sennosides-docusate (PERICOLACE) 8.6-50 MG per tablet 2 tablet (has no administration in time range)     And   polyethylene glycol (MIRALAX) packet 17 g (has no administration in time range)     And   bisacodyl (DULCOLAX) EC tablet 5 mg (has no administration in time range)     And   bisacodyl (DULCOLAX) suppository 10 mg (has no administration in time range)   ipratropium-albuterol (DUO-NEB) nebulizer solution 3 mL (has no administration in time range)   budesonide (PULMICORT) nebulizer solution 0.5 mg (0.5 mg Nebulization Not Given 4/8/25 0332)   apixaban (ELIQUIS) tablet 5 mg (has no administration in time range)   atorvastatin (LIPITOR) tablet 80 mg (has no administration in time range)   bisoprolol (ZEBeta) tablet 5 mg (has no administration in time range)   clopidogrel (PLAVIX) tablet 75 mg (has no administration in time range)   citalopram (CeleXA) tablet  10 mg (has no administration in time range)   furosemide (LASIX) tablet 20 mg (has no administration in time range)   lamoTRIgine (LaMICtal) tablet 100 mg (has no administration in time range)   oxybutynin XL (DITROPAN-XL) 24 hr tablet 5 mg (has no administration in time range)   ipratropium-albuterol (DUO-NEB) nebulizer solution 3 mL (3 mL Nebulization Given 4/8/25 0029)   methylPREDNISolone sodium succinate (SOLU-Medrol) injection 125 mg (125 mg Intravenous Given 4/7/25 2336)       Imaging results:  XR Chest 1 View  Result Date: 4/7/2025  Impression: 1.No acute cardiopulmonary abnormality. 2.Cardiomegaly. 3.Gastric band in place. Electronically Signed: Robin Gregorio MD  4/7/2025 11:58 PM EDT  Workstation ID: [id number]      Social issues:   Social History     Socioeconomic History    Marital status:    Tobacco Use    Smoking status: Never     Passive exposure: Never    Smokeless tobacco: Never   Vaping Use    Vaping status: Never Used   Substance and Sexual Activity    Alcohol use: No    Drug use: No    Sexual activity: Defer       NIH Stroke Scale:  Interval: (not recorded)  1a. Level of Consciousness: (not recorded)  1b. LOC Questions: (not recorded)  1c. LOC Commands: (not recorded)  2. Best Gaze: (not recorded)  3. Visual: (not recorded)  4. Facial Palsy: (not recorded)  5a. Motor Arm, Left: (not recorded)  5b. Motor Arm, Right: (not recorded)  6a. Motor Leg, Left: (not recorded)  6b. Motor Leg, Right: (not recorded)  7. Limb Ataxia: (not recorded)  8. Sensory: (not recorded)  9. Best Language: (not recorded)  10. Dysarthria: (not recorded)  11. Extinction and Inattention (formerly Neglect): (not recorded)    Total (NIH Stroke Scale): (not recorded)     Additional notable assessment information:NA     Nursing report ED to floor:  RADHA Simms RN   04/08/25 04:37 EDT

## 2025-04-08 NOTE — PROGRESS NOTES
"Patient seen and examined.  We discussed CODE STATUS, she would like full CODE STATUS, however, states that she does not want to be a \"vegetable.\"  She states her family members are aware of her wishes.  "

## 2025-04-09 ENCOUNTER — READMISSION MANAGEMENT (OUTPATIENT)
Dept: CALL CENTER | Facility: HOSPITAL | Age: 70
End: 2025-04-09
Payer: MEDICARE

## 2025-04-09 VITALS
RESPIRATION RATE: 20 BRPM | HEART RATE: 76 BPM | SYSTOLIC BLOOD PRESSURE: 119 MMHG | WEIGHT: 167.33 LBS | DIASTOLIC BLOOD PRESSURE: 65 MMHG | HEIGHT: 66 IN | TEMPERATURE: 97.6 F | OXYGEN SATURATION: 90 % | BODY MASS INDEX: 26.89 KG/M2

## 2025-04-09 PROCEDURE — 94799 UNLISTED PULMONARY SVC/PX: CPT

## 2025-04-09 PROCEDURE — 94761 N-INVAS EAR/PLS OXIMETRY MLT: CPT

## 2025-04-09 PROCEDURE — 94664 DEMO&/EVAL PT USE INHALER: CPT

## 2025-04-09 PROCEDURE — 97161 PT EVAL LOW COMPLEX 20 MIN: CPT

## 2025-04-09 PROCEDURE — 25010000002 METHYLPREDNISOLONE PER 40 MG: Performed by: FAMILY MEDICINE

## 2025-04-09 PROCEDURE — 94618 PULMONARY STRESS TESTING: CPT

## 2025-04-09 RX ORDER — FUROSEMIDE 20 MG/1
20 TABLET ORAL DAILY
Qty: 30 TABLET | Refills: 0 | Status: SHIPPED | OUTPATIENT
Start: 2025-04-09

## 2025-04-09 RX ORDER — PREDNISONE 10 MG/1
40 TABLET ORAL DAILY
Qty: 20 TABLET | Refills: 0 | Status: SHIPPED | OUTPATIENT
Start: 2025-04-09

## 2025-04-09 RX ORDER — BUDESONIDE AND FORMOTEROL FUMARATE DIHYDRATE 160; 4.5 UG/1; UG/1
2 AEROSOL RESPIRATORY (INHALATION)
Qty: 10.2 G | Refills: 0 | Status: SHIPPED | OUTPATIENT
Start: 2025-04-09

## 2025-04-09 RX ORDER — IPRATROPIUM BROMIDE AND ALBUTEROL SULFATE 2.5; .5 MG/3ML; MG/3ML
3 SOLUTION RESPIRATORY (INHALATION)
Qty: 180 ML | Refills: 0 | Status: SHIPPED | OUTPATIENT
Start: 2025-04-09

## 2025-04-09 RX ADMIN — FUROSEMIDE 20 MG: 40 TABLET ORAL at 07:44

## 2025-04-09 RX ADMIN — FERROUS SULFATE TAB 325 MG (65 MG ELEMENTAL FE) 325 MG: 325 (65 FE) TAB at 07:44

## 2025-04-09 RX ADMIN — BISOPROLOL FUMARATE 5 MG: 5 TABLET ORAL at 07:44

## 2025-04-09 RX ADMIN — IPRATROPIUM BROMIDE AND ALBUTEROL SULFATE 3 ML: .5; 3 SOLUTION RESPIRATORY (INHALATION) at 07:12

## 2025-04-09 RX ADMIN — RISPERIDONE 1 MG: 1 TABLET, FILM COATED ORAL at 07:44

## 2025-04-09 RX ADMIN — OXYBUTYNIN CHLORIDE 5 MG: 5 TABLET, EXTENDED RELEASE ORAL at 07:44

## 2025-04-09 RX ADMIN — IPRATROPIUM BROMIDE AND ALBUTEROL SULFATE 3 ML: .5; 3 SOLUTION RESPIRATORY (INHALATION) at 11:47

## 2025-04-09 RX ADMIN — Medication 10 ML: at 07:45

## 2025-04-09 RX ADMIN — CLOPIDOGREL BISULFATE 75 MG: 75 TABLET, FILM COATED ORAL at 07:44

## 2025-04-09 RX ADMIN — OXYCODONE HYDROCHLORIDE AND ACETAMINOPHEN 500 MG: 500 TABLET ORAL at 07:45

## 2025-04-09 RX ADMIN — LAMOTRIGINE 100 MG: 100 TABLET ORAL at 07:44

## 2025-04-09 RX ADMIN — PANTOPRAZOLE SODIUM 40 MG: 40 TABLET, DELAYED RELEASE ORAL at 05:50

## 2025-04-09 RX ADMIN — CITALOPRAM 10 MG: 20 TABLET, FILM COATED ORAL at 07:44

## 2025-04-09 RX ADMIN — ATORVASTATIN CALCIUM 80 MG: 40 TABLET, FILM COATED ORAL at 07:44

## 2025-04-09 RX ADMIN — Medication 1000 UNITS: at 07:44

## 2025-04-09 RX ADMIN — METHYLPREDNISOLONE SODIUM SUCCINATE 40 MG: 40 INJECTION, POWDER, FOR SOLUTION INTRAMUSCULAR; INTRAVENOUS at 05:46

## 2025-04-09 RX ADMIN — BUDESONIDE 0.5 MG: 0.5 INHALANT RESPIRATORY (INHALATION) at 07:13

## 2025-04-09 RX ADMIN — POTASSIUM CHLORIDE 20 MEQ: 1500 TABLET, EXTENDED RELEASE ORAL at 07:44

## 2025-04-09 RX ADMIN — APIXABAN 5 MG: 5 TABLET, FILM COATED ORAL at 07:44

## 2025-04-09 NOTE — CASE MANAGEMENT/SOCIAL WORK
Continued Stay Note  REECE Kam     Patient Name: Andree Deluna  MRN: [id number]  Today's Date: 4/9/2025    Admit Date: 4/7/2025    Plan: Routine home with spouse. New home O2 and nebulizer with [employer].   Discharge Plan       Row Name 04/09/25 1331       Plan    Plan Routine home with spouse. New home O2 and nebulizer with [employer].    Patient/Family in Agreement with Plan yes    Plan Comments Pt has discharge orders in with orders for nebulizer machine. Pt also to complete walking ox test. Pt will need 2L O2. Added to [employer] basket and liaison Susy notified of orders. She confirmed processing and delivered items to pt and spouse at bedside.             Megan Naegele, RN     Office Phone: [phone]  Office Cell: [phone]

## 2025-04-09 NOTE — DISCHARGE PLACEMENT REQUEST
"Andree Sales (69 y.o. Female)       Date of Birth   1955    Social Security Number       Address   [address] [place] [address]    Home Phone   [phone]    MRN   [id number]       [employer]   [employer]    Marital Status                               Admission Date   4/7/2025    Admission Type   Emergency    Admitting Provider   Ant Dyer MD    Attending Provider   Ant Dyer MD    Department, Room/Bed   [facility] INPATIENT, 368/1       Discharge Date       Discharge Disposition   Home-Health Care [facility]    Discharge Destination                                 Attending Provider: Ant Dyer MD    Allergies: Desvenlafaxine    Isolation: Contact   Infection: None   Code Status: CPR    Ht: 167.6 cm (66\")   Wt: 75.9 kg (167 lb 5.3 oz)    Admission Cmt: None   Principal Problem: Hypoxic respiratory failure [J96.91]                   Active Insurance as of 4/7/2025       Primary Coverage       Payor Plan Insurance Group Employer/Plan Group    HU[employer]NA MEDICARE REPLACEMENT HU[employer] MEDICARE ADVANTAGE HMO [id number]       Payor Plan Address Payor Plan Phone Number Payor Plan Fax Number Effective Dates    [address] [address] [phone]  10/1/2024 - None Entered    [facility] [address]         Subscriber Name Subscriber Birth Date Member ID       ANDREE SALES 1955 [id number]                     Emergency Contacts        (Rel.) Home Phone Work Phone Mobile Phone    HENRRYAUSTIN \"Bryant\" (Spouse) [phone] -- [phone]    Eulalio Sales (Son) -- -- [phone]    Gold Sales (Son) -- -- [phone]          "

## 2025-04-09 NOTE — DISCHARGE SUMMARY
[facility] Medicine Services  Discharge Summary    Date of Service: 2025  Patient Name: Andree Deluna  : 1955  MRN: [id number]    Date of Admission: 2025  Discharge Diagnosis: Hypoxic respiratory failure  Date of Discharge: 2025  Primary Care Physician: Naga Griffith PA-C    Presenting Problem:   Hypoxia [R09.02]  Dyspnea, unspecified type [R06.00]  Hypoxic respiratory failure [J96.91]  Acute hypoxic respiratory failure [J96.01]    Active and Resolved Hospital Problems:  Active Hospital Problems    Diagnosis POA    **Hypoxic respiratory failure [J96.91] Yes    Acute hypoxic respiratory failure [J96.01] Yes    Stage 3a chronic kidney disease [N18.31] Yes    GERD without esophagitis [K21.9] Yes    Paroxysmal atrial fibrillation [I48.0] Yes    S/P CABG x 3 with LIMA per Dr. Noonan 2023 [Z95.1] Not Applicable    S/P mitral valve repair per Dr. Noonan 2023 [Z98.890] Not Applicable    Chronic cough [R05.3] Yes    History of stroke [Z86.73] Not Applicable    Hyperlipidemia [E78.5] Yes    Insomnia [G47.00] Yes    Hypertension [I10] Yes      Resolved Hospital Problems   No resolved problems to display.       Hospital Course     Hospital Course:  This is a very pleasant 69-year-old  female, with past medical history of CAD status post CABG, atrial fibrillation, on anticoagulation, CVA, hypertension, hyperlipidemia, obstructive sleep apnea, who presented to the hospital due to complaints of shortness of breath and congestion.  The patient was evaluated, and found to have evidence of possible COPD exacerbation secondary to Parainfluenza 3, started on corticosteroids, and has improved.  She reports that she is feeling well, and is requesting discharge home today.  6-minute walk test has been ordered and she qualifies for supplemental oxygen at home.  She is agreeable to be discharged taryn with O2.  Troponins elevated but flat, and suspect non MI troponin  elevation due to demand ischemia.    She is on Eliquis and Plavix at home.    DISCHARGE Follow Up Recommendations for labs and diagnostics: None    Day of Discharge     Vital Signs:  Temp:  [97.6 °F (36.4 °C)-99.2 °F (37.3 °C)] (P) 97.9 °F (36.6 °C)  Heart Rate:  [67-83] (P) 83  Resp:  [16-20] (P) 18  BP: (117-135)/(67-83) (P) 119/79  Flow (L/min) (Oxygen Therapy):  [1-3] (P) 1    Physical Exam:  General: Alert, cooperative, no distress, AAOx3  Lungs: Fine wheezes to auscultation bilaterally, respirations unlabored  Heart: Regular rate and rhythm, S1 and S2 normal, no murmurs, no rub or gallop.  No edema.  Abdomen: Soft, non-tender, bowel sounds active, no masses, no organomegaly  Neurologic: NFND  Psychiatric: Appropriate mood and affect.      Pertinent  and/or Most Recent Results     LAB RESULTS:        Lab 04/07/25  2304   WBC 7.00   HEMOGLOBIN 13.7   HEMATOCRIT 44.2   PLATELETS 202   NEUTROS ABS 5.17   IMMATURE GRANS (ABS) 0.01   LYMPHS ABS 0.96   MONOS ABS 0.65   EOS ABS 0.14   MCV 93.4         Lab 04/08/25  0027 04/07/25  2304   SODIUM  --  137   POTASSIUM  --  4.1   CHLORIDE  --  97*   CO2  --  26.5   ANION GAP  --  13.5   BUN  --  7*   CREATININE  --  0.82   EGFR  --  78.5   GLUCOSE  --  93   CALCIUM  --  9.7   MAGNESIUM 2.0  --          Lab 04/07/25  2304   TOTAL PROTEIN 8.5   ALBUMIN 4.3   GLOBULIN 4.2   ALT (SGPT) 15   AST (SGOT) 33*   BILIRUBIN 0.7   ALK PHOS 121*         Lab 04/08/25  0027 04/07/25  2304   PROBNP  --  581.0   HSTROP T 15* 16*                 Brief Urine Lab Results  (Last result in the past 365 days)        Color   Clarity   Blood   Leuk Est   Nitrite   Protein   CREAT   Urine HCG        01/18/25 0903 Dark Yellow   Cloudy   Negative   Small (1+)   Positive   Negative                 Microbiology Results (last 10 days)       Procedure Component Value - Date/Time    Respiratory Panel PCR w/COVID-19(SARS-CoV-2) [facility] In-House, NP Swab in UTM/VTM, 2 HR TAT - Swab,  Nasopharynx [[id number]]  (Abnormal) Collected: 04/07/25 2322    Lab Status: Final result Specimen: Swab from Nasopharynx Updated: 04/08/25 0016     ADENOVIRUS, PCR Not Detected     Coronavirus 229E Not Detected     Coronavirus HKU1 Not Detected     Coronavirus NL63 Not Detected     Coronavirus OC43 Not Detected     COVID19 Not Detected     Human Metapneumovirus Not Detected     Human Rhinovirus/Enterovirus Not Detected     Influenza A PCR Not Detected     Influenza B PCR Not Detected     Parainfluenza Virus 1 Not Detected     Parainfluenza Virus 2 Not Detected     Parainfluenza Virus 3 Detected     Parainfluenza Virus 4 Not Detected     RSV, PCR Not Detected     Bordetella pertussis pcr Not Detected     Bordetella parapertussis PCR Not Detected     Chlamydophila pneumoniae PCR Not Detected     Mycoplasma pneumo by PCR Not Detected    Narrative:      In the setting of a positive respiratory panel with a viral infection PLUS a negative procalcitonin without other underlying concern for bacterial infection, consider observing off antibiotics or discontinuation of antibiotics and continue supportive care. If the respiratory panel is positive for atypical bacterial infection (Bordetella pertussis, Chlamydophila pneumoniae, or Mycoplasma pneumoniae), consider antibiotic de-escalation to target atypical bacterial infection.            XR Chest 1 View  Result Date: 4/7/2025  Impression: Impression: 1.No acute cardiopulmonary abnormality. 2.Cardiomegaly. 3.Gastric band in place. Electronically Signed: Robin Gregorio MD  4/7/2025 11:58 PM EDT  Workstation ID: [id number]      Results for orders placed during the hospital encounter of 02/13/25    Duplex Carotid Ultrasound CAR    Interpretation Summary    Right internal carotid artery demonstrates normal flow without evidence of hemodynamically significant stenosis.    Antegrade right vertebral flow.    Left internal carotid artery demonstrates a less than 50% stenosis.     Antegrade left vertebral flow.      Results for orders placed during the hospital encounter of 02/13/25    Duplex Carotid Ultrasound CAR    Interpretation Summary    Right internal carotid artery demonstrates normal flow without evidence of hemodynamically significant stenosis.    Antegrade right vertebral flow.    Left internal carotid artery demonstrates a less than 50% stenosis.    Antegrade left vertebral flow.      Results for orders placed during the hospital encounter of 01/10/25    Adult Transthoracic Echo Complete W/ Color, Spectral and Contrast if Necessary Per Protocol    Interpretation Summary    Left ventricular systolic function is normal. Left ventricular ejection fraction appears to be 56 - 60%.    Left ventricular diastolic function is consistent with (grade II w/high LAP) pseudonormalization.    Mildly reduced right ventricular systolic function noted.    The right ventricular cavity is mildly dilated.    The left atrial cavity is severely dilated.    Left atrial volume is severely increased.    Saline test results are negative for right to left atrial level shunt.    There is moderate calcification of the aortic valve.    There is a mitral valve ring present.    Estimated right ventricular systolic pressure from tricuspid regurgitation is mildly elevated (35-45 mmHg).      Labs Pending at Discharge:  Pending Results       None            Procedures Performed           Consults:   Consults       Date and Time Order Name Status Description    4/8/2025  1:56 AM Hospitalist (on-call MD unless specified)              Discharge Details        Discharge Medications        New Medications        Instructions Start Date   ipratropium-albuterol 0.5-2.5 mg/3 ml nebulizer  Commonly known as: DUO-NEB   3 mL, Nebulization, 4 Times Daily - RT      predniSONE 10 MG tablet  Commonly known as: DELTASONE   40 mg, Oral, Daily      Symbicort 160-4.5 MCG/ACT inhaler  Generic drug: budesonide-formoterol   2 puffs,  Inhalation, 2 Times Daily - RT             Changes to Medications        Instructions Start Date   omeprazole 40 MG capsule  Commonly known as: priLOSEC  What changed: when to take this   40 mg, Oral, Daily             Continue These Medications        Instructions Start Date   ALPRAZolam 2 MG tablet  Commonly known as: XANAX   2 mg, Oral, Nightly PRN      apixaban 5 MG tablet tablet  Commonly known as: ELIQUIS   5 mg, Oral, 2 Times Daily      atorvastatin 80 MG tablet  Commonly known as: LIPITOR   80 mg, Oral, Daily      bisoprolol 5 MG tablet  Commonly known as: ZEBeta   5 mg, Oral, Daily      cholecalciferol 25 MCG (1000 UT) tablet  Commonly known as: VITAMIN D3   1,000 Units, Daily      citalopram 10 MG tablet  Commonly known as: CeleXA   10 mg, Oral, Daily      clopidogrel 75 MG tablet  Commonly known as: Plavix   75 mg, Oral, Daily      ferrous sulfate 325 (65 Fe) MG tablet   325 mg, Oral, Every 48 Hours Scheduled      furosemide 20 MG tablet  Commonly known as: LASIX   20 mg, Oral, Daily      lamoTRIgine 100 MG tablet  Commonly known as: LaMICtal   100 mg, Oral, 2 Times Daily      Mirabegron ER 50 MG tablet sustained-release 24 hour 24 hr tablet  Commonly known as: MYRBETRIQ   50 mg, Oral, Daily      mirtazapine 15 MG tablet  Commonly known as: REMERON   7.5 mg, Oral, Nightly      potassium chloride 20 MEQ CR tablet  Commonly known as: KLOR-CON M20   20 mEq, Oral, Daily      risperiDONE 1 MG tablet  Commonly known as: risperDAL   1 mg, Oral, Every 12 Hours Scheduled      vitamin C 500 MG tablet  Commonly known as: ASCORBIC ACID   500 mg, Daily               Allergies   Allergen Reactions    Desvenlafaxine Unknown - High Severity     ELEVATED BP       Discharge Disposition: [place]-Health Care [facility] [place]    Diet: Hospital:  Diet Order   Procedures    Diet: Regular/House; Fluid Consistency: Thin (IDDSI 0)       Discharge Activity:     CODE STATUS:   Code Status and Medical Interventions: CPR (Attempt to Resuscitate);  Full Support   Ordered at: 04/08/25 0933     Code Status (Patient has no pulse and is not breathing):    CPR (Attempt to Resuscitate)     Medical Interventions (Patient has pulse or is breathing):    Full Support     Level Of Support Discussed With:    Patient       Future Appointments   Date Time Provider Department Center   4/10/2025  2:30 PM PHASE II - CARD REHAB [facility] None   4/11/2025  2:30 PM PHASE II - CARD REHAB [facility] None   4/17/2025  2:30 PM PHASE II - CARD REHAB [facility] None   4/24/2025  2:30 PM PHASE II - CARD REHAB [facility] None   4/25/2025  2:30 PM PHASE II - CARD REHAB [facility] None   5/1/2025  2:30 PM PHASE II - CARD REHAB [facility] None   5/2/2025  2:30 PM PHASE II - CARD REHAB [facility] None   5/8/2025  2:30 PM PHASE II - CARD REHAB [facility] None   5/9/2025  2:30 PM PHASE II - CARD REHAB [facility] None   5/15/2025  2:30 PM PHASE II - CARD REHAB [facility] None   5/16/2025  2:30 PM PHASE II - CARD REHAB [facility] None   5/22/2025  2:30 PM PHASE II - CARD REHAB [facility] None   5/23/2025  2:30 PM PHASE II - CARD REHAB [facility] None   5/29/2025  1:00 PM Naga Griffith PA-C [facility]   5/29/2025  2:30 PM PHASE II - CARD REHAB [facility] None   5/30/2025  2:30 PM PHASE II - CARD REHAB [facility] None   6/5/2025  2:30 PM PHASE II - CARD REHAB [facility] None   6/6/2025  2:30 PM PHASE II - CARD REHAB [facility] None   6/12/2025  2:30 PM PHASE II - CARD REHAB [facility] None   6/13/2025  2:30 PM PHASE II - CARD REHAB [facility] None   6/19/2025  2:30 PM PHASE II - CARD REHAB [facility] None   6/20/2025  2:30 PM PHASE II - CARD REHAB [facility] None   6/26/2025  2:30 PM PHASE II - CARD REHAB [facility] None   6/27/2025  2:30 PM PHASE II - CARD REHAB [facility] None   7/3/2025  2:30 PM PHASE  II - CARD REHAB [facility] None   7/4/2025  2:30 PM PHASE II - CARD REHAB [facility] None   7/10/2025  2:15 PM Kamar Wheeler MD [facility] NA P [facility] NA   7/10/2025  2:30 PM PHASE II - CARD REHAB [facility] None   3/5/2026  1:30 PM [facility] VASC MACHINE 2 [facility]I [facility]   3/5/2026  2:30 PM Elena Mendiola APRN [facility]           Time spent on Discharge including face to face service:  31 minutes    Signature: Electronically signed by Ant Dyer MD, MD, 04/09/25, 11:31 EDT.  [facility] Hospitalist Team

## 2025-04-09 NOTE — PLAN OF CARE
Goal Outcome Evaluation:  Plan of Care Reviewed With: patient, spouse           Outcome Evaluation: 69 y.o. female with a CMH of CAD status post CABG, A-fib on anticoagulation, CVA, hypertension, hyperlipidemia, obstructive sleep apnea, ADHD, who presented to [facility] on 4/7/2025 with shortness of breath and congestion.  Pt lives at home with her spouse, normally independent, not on home O2.  This date, pt supine in bed, reports she has been getting up in room ad vani.  Pt is on 2L O2 at rest with sats 91%.  Pt demos independence with her mobility and amulates in kidd x 150' on room with sats 87-88%.  Quick recovery with seated rest on 2L to 91%.  Safe for home with spouse at d/c, pending 6 min walk test. No further skilled PT needs at this time.    Anticipated Discharge Disposition (PT): home with assist

## 2025-04-09 NOTE — OUTREACH NOTE
Prep Survey      Flowsheet Row Responses   [facility] patient discharged from? [facility]   Is LACE score < 7 ? No   Eligibility [facility]   Date of Admission 04/07/25   Date of Discharge 04/09/25   Discharge diagnosis Hypoxic respiratory failure   Does the patient have one of the following disease processes/diagnoses(primary or secondary)? Other   Is there a DME ordered? Yes   What DME was ordered? new home O2.   Prep survey completed? Yes            Chelsi BELTRAN - Registered Nurse

## 2025-04-09 NOTE — PLAN OF CARE
Goal Outcome Evaluation:  Plan of Care Reviewed With: patient   Pt had bowel mvmt today, able to ambulate to bathroom with standby assist.     Progress: improving

## 2025-04-09 NOTE — THERAPY EVALUATION
Patient Name: Andree Deluna  : 1955    MRN: [id number]                              Today's Date: 2025       Admit Date: 2025    Visit Dx:     ICD-10-CM ICD-9-CM   1. Dyspnea, unspecified type  R06.00 786.09   2. Hypoxia  R09.02 799.02     Patient Active Problem List   Diagnosis    Anemia in other chronic diseases classified elsewhere    Anxiety    B12 deficiency    Attention deficit disorder of adult with hyperactivity    Mood disorder    Complete rotator cuff tear or rupture of right shoulder, not specified as traumatic    Degeneration of intervertebral disc of cervical region    Degeneration of intervertebral disc of lumbosacral region    Fatigue    Hyperlipidemia    Hypertension    Insomnia    Obesity    Osteoarthritis of knee    CVA (cerebral vascular accident)    Visual field loss left    Acute midline low back pain without sciatica    GERD with esophagitis    Carotid stenosis, bilateral    Overactive bladder    History of stroke    Acute pain of right shoulder    Left cervical radiculopathy    Migraine headache    Medicare annual wellness visit, subsequent    Moderate episode of recurrent major depressive disorder    Piriformis syndrome, right    Precordial pain    History of Grimaldo's esophagus    Chronic cough    PATEL (generalized anxiety disorder)    Chest pain, unspecified type    Elevated troponin    Non-STEMI (non-ST elevated myocardial infarction)    Coronary artery disease involving native coronary artery of native heart with unstable angina pectoris    Abnormal findings on diagnostic imaging of heart and coronary circulation    S/P CABG x 3 with LIMA per Dr. Noonan 2023    S/P mitral valve repair per Dr. Noonan 2023    Postoperative atrial fibrillation    Atrial fibrillation with rapid ventricular response    Pleural effusion on left    Dyspnea    DJD of left shoulder    Osteoarthritis of left glenohumeral joint    Acute respiratory failure with hypoxia     Bilateral pneumonia    Acute renal insufficiency    Chronic coronary artery disease    NYHA class 1 heart failure with borderline preserved ejection fraction    Paroxysmal atrial fibrillation    GERD without esophagitis    Stage 3a chronic kidney disease    Weakness    Encephalopathy    CAD (coronary artery disease)    Generalized weakness    Hypoxic respiratory failure    Acute hypoxic respiratory failure     Past Medical History:   Diagnosis Date    ADHD unknown    Anemia     Anesthesia complication     difficult to wake up    Anxiety     Atrial fibrillation     Carotid artery disease     80% right internal carotid artery    Coronary artery disease     CVA (cerebral vascular accident)     right parietal right temporal    DDD (degenerative disc disease), lumbar     Depression     Extremity pain     Ankle pain    GERD (gastroesophageal reflux disease)     Hyperlipidemia unknown    Hypertension     Insomnia unknown    Laryngopharyngeal reflux     Low back pain     Mood disorder     Obesity unknown    Short of breath on exertion     Skin cancer of face     Sleep apnea     Suspected sleep apnea she has refused to be tested    Stroke (cerebrum)     Visual field defect     Left     Past Surgical History:   Procedure Laterality Date    CARDIAC CATHETERIZATION N/A 5/1/2023    Procedure: Left Heart Cath;  Surgeon: Kye Alcaraz MD;  Location: [facility] CATH INVASIVE LOCATION;  Service: Cardiovascular;  Laterality: N/A;    CARDIAC CATHETERIZATION N/A 1/16/2025    Procedure: Left Heart Cath;  Surgeon: Kamar Wheeler MD;  Location: [facility] CATH INVASIVE LOCATION;  Service: Cardiology;  Laterality: N/A;    CAROTID ENDARTERECTOMY Right 11/10/2020    Procedure: CAROTID ENDARTERECTOMY;  Surgeon: Tavo Das MD;  Location: [facility] MAIN OR;  Service: Vascular;  Laterality: Right;    CHOLECYSTECTOMY      COLONOSCOPY      2016    CORONARY ARTERY BYPASS GRAFT N/A 5/6/2023    Procedure: CORONARY ARTERY BYPASS  GRAFTING;  Surgeon: Errol Noonan MD;  Location: [facility];  Service: Cardiothoracic;  Laterality: N/A;  CABG X  3 using LIMA and right endospahenous vein;  2 coronary markers implanted.     FINGER SURGERY      KNEE ARTHROPLASTY      KNEE SURGERY Right     replaced    LAPAROSCOPIC GASTRIC BANDING      MITRAL VALVE REPAIR/REPLACEMENT N/A 5/6/2023    Procedure: MITRAL VALVE REPAIR/REPLACEMENT;  Surgeon: Errol Noonan MD;  Location: [facility];  Service: Cardiothoracic;  Laterality: N/A;  Mitral valve repaired using 28 mm Jorge Physio II Annuloplasty Ring.     ROTATOR CUFF REPAIR Right 2019    SHOULDER SURGERY Right 05/24/2019    scope/ cuff repair    TOTAL SHOULDER ARTHROPLASTY W/ DISTAL CLAVICLE EXCISION Left 11/8/2023    Procedure: TOTAL SHOULDER REVERSE ARTHROPLASTY;  Surgeon: Shubham Samaniego MD;  Location: [facility] OR;  Service: Orthopedics;  Laterality: Left;    TRANSESOPHAGEAL ECHOCARDIOGRAM (GALA) N/A 5/6/2023    Procedure: TRANSESOPHAGEAL ECHOCARDIOGRAM WITH ANESTHESIA;  Surgeon: Errol Noonan MD;  Location: [facility];  Service: Cardiothoracic;  Laterality: N/A;    TUBAL ABDOMINAL LIGATION        General Information       Row Name 04/09/25 1101          Physical Therapy Time and Intention    Document Type evaluation  -     Mode of Treatment physical therapy  -       Row Name 04/09/25 1101          General Information    Patient Profile Reviewed yes  -     Prior Level of Function independent:  -     Existing Precautions/Restrictions no known precautions/restrictions  -     Barriers to Rehab none identified  -       Row Name 04/09/25 1101          Living Environment    Current Living Arrangements home  -     People in Home spouse  -       Row Name 04/09/25 1101          Home Main Entrance    Number of Stairs, Main Entrance four  -       Row Name 04/09/25 1101          Stairs Within Home, Primary    Number of Stairs, Within Home, Primary none  -        Row Name 04/09/25 1101          Cognition    Orientation Status (Cognition) oriented x 4  -[facility] Name 04/09/25 1101          Safety Issues/Impairments Affecting Functional Mobility    Impairments Affecting Function (Mobility) shortness of breath;endurance/activity tolerance  -               User Key  (r) = Recorded By, (t) = Taken By, (c) = Cosigned By      Initials Name Provider Type     Anel Silva, PT Physical Therapist                   Mobility       Row Name 04/09/25 1107          Bed Mobility    Bed Mobility bed mobility (all) activities  -     All Activities, [place] (Bed Mobility) independent  -[facility] Name 04/09/25 1107          Sit-Stand Transfer    Sit-Stand [place] (Transfers) independent  -[facility] Name 04/09/25 1107          Gait/Stairs (Locomotion)    [place] Level (Gait) standby assist  -     Distance in Feet (Gait) 150  -     Comment, (Gait/Stairs) steady gait, no LOB  -               User Key  (r) = Recorded By, (t) = Taken By, (c) = Cosigned By      Initials Name Provider Type     Anel Silva, PT Physical Therapist                   Obj/Interventions       [facility] Name 04/09/25 1108          Range of Motion Comprehensive    General Range of Motion no range of motion deficits identified  -     Comment, General Range of Motion h/o R shoulder replacement, lacking end range flexion/abduction ~ 25%  -[facility] Name 04/09/25 1108          Strength Comprehensive (MMT)    General Manual Muscle Testing (MMT) Assessment no strength deficits identified  -[facility]       Row Name 04/09/25 1108          Balance    Balance Assessment sitting static balance;sitting dynamic balance;standing static balance;standing dynamic balance  -     Static Sitting Balance independent  -     Dynamic Sitting Balance independent  -     Static Standing Balance independent  -     Dynamic Standing Balance standby assist  -[facility] Name 04/09/25 1108          Sensory  Assessment (Somatosensory)    Sensory Assessment (Somatosensory) sensation intact  -               User Key  (r) = Recorded By, (t) = Taken By, (c) = Cosigned By      Initials Name Provider Type     Anel Silva, PT Physical Therapist                   Goals/Plan    No documentation.                  Clinical Impression       Row Name 04/09/25 1108          Pain    Pretreatment Pain Rating 0/10 - no pain  -     Posttreatment Pain Rating 0/10 - no pain  -       Row Name 04/09/25 1108          Plan of Care Review    Plan of Care Reviewed With patient;spouse  -     Outcome Evaluation 69 y.o. female with a CMH of CAD status post CABG, A-fib on anticoagulation, CVA, hypertension, hyperlipidemia, obstructive sleep apnea, ADHD, who presented to [facility] on 4/7/2025 with shortness of breath and congestion.  Pt lives at home with her spouse, normally independent, not on home O2.  This date, pt supine in bed, reports she has been getting up in room ad vani.  Pt is on 2L O2 at rest with sats 91%.  Pt demos independence with her mobility and amulates in kidd x 150' on room with sats 87-88%.  Quick recovery with seated rest on 2L to 91%.  Safe for home with spouse at d/c, pending 6 min walk test. No further skilled PT needs at this time.  -       Row Name 04/09/25 1108          Therapy Assessment/Plan (PT)    Criteria for Skilled Interventions Met (PT) no;does not meet criteria for skilled intervention  -     Therapy Frequency (PT) evaluation only  -       Row Name 04/09/25 1108          Vital Signs    Pre SpO2 (%) 91  -     O2 Delivery Pre Treatment nasal cannula  2L  -     Intra SpO2 (%) 87  -     O2 Delivery Intra Treatment room air  -     Post SpO2 (%) 91  -     O2 Delivery Post Treatment nasal cannula  -       Row Name 04/09/25 1108          Positioning and Restraints    Pre-Treatment Position in bed  -     Post Treatment Position bed  -     In Bed call light within reach;with  family/caregiver  -               User Key  (r) = Recorded By, (t) = Taken By, (c) = Cosigned By      Initials Name Provider Type     Anel Silva, PT Physical Therapist                   Outcome Measures       Row Name 04/09/25 1117 04/09/25 0800       How much help from another person do you currently need...    Turning from your back to your side while in flat bed without using bedrails? 4  - 4  -GH    Moving from lying on back to sitting on the side of a flat bed without bedrails? 4  - 4  -GH    Moving to and from a bed to a chair (including a wheelchair)? 4  - 4  -GH    Standing up from a chair using your arms (e.g., wheelchair, bedside chair)? 4  - 4  -GH    Climbing 3-5 steps with a railing? 3  - 3  -GH    To walk in hospital room? 4  - 3  -GH    AM-PAC 6 Clicks Score (PT) 23  - 22  -    Highest Level of Mobility Goal 7 --> Walk 25 feet or more  - 7 --> Walk 25 feet or more  -      Row Name 04/09/25 1117          Functional Assessment    Outcome Measure Options AM-PAC 6 Clicks Basic Mobility (PT)  -               User Key  (r) = Recorded By, (t) = Taken By, (c) = Cosigned By      Initials Name Provider Type     Anel Silva PT Physical Therapist    Eulalio Veliz, RN Registered Nurse                                 Physical Therapy Education       Title: PT OT SLP Therapies (Done)       Topic: Physical Therapy (Done)       Point: Mobility training (Done)       Learning Progress Summary            Patient Acceptance, E,TB, VU by  at 4/9/2025 1117   Family Acceptance, E,TB, VU by  at 4/9/2025 1117                                      User Key       Initials Effective Dates Name Provider Type [facility] 06/16/21 -  Anel Silva, PT Physical Therapist PT                  PT Recommendation and Plan     Outcome Evaluation: 69 y.o. female with a CMH of CAD status post CABG, A-fib on anticoagulation, CVA, hypertension, hyperlipidemia, obstructive sleep apnea,  ADHD, who presented to [facility] on 4/7/20[facility] with shortness of breath and congestion.  Pt lives at home with her spouse, normally independent, not on home O2.  This date, pt supine in bed, reports she has been getting up in room ad vani.  Pt is on 2L O2 at rest with sats 91%.  Pt demos independence with her mobility and amulates in kidd x 150' on room with sats 87-88%.  Quick recovery with seated rest on 2L to 91%.  Safe for home with spouse at d/c, pending 6 min walk test. No further skilled PT needs at this time.     Time Calculation:         PT Charges       Row Name 04/09/25 1118             Time Calculation    Start Time 1046  -      Stop Time 1102  -      Time Calculation (min) 16 min  -      PT Received On 04/09/25  -         Time Calculation- PT    Total Timed Code Minutes- PT 0 minute(s)  -                User Key  (r) = Recorded By, (t) = Taken By, (c) = Cosigned By      Initials Name Provider Type     Anel Silva, PT Physical Therapist                  Therapy Charges for Today       Code Description Service Date Service Provider Modifiers Qty    [id number]  PT EVAL LOW COMPLEXITY 3 4/9/2025 Anel Silva, PT GP 1            PT G-Codes  Outcome Measure Options: AM-PAC 6 Clicks Basic Mobility (PT)  AM-PAC 6 Clicks Score (PT): 23  PT Discharge Summary  Anticipated Discharge Disposition (PT): home with assist    Anel Silva PT  4/9/2025

## 2025-04-09 NOTE — PLAN OF CARE
Goal Outcome Evaluation:  Plan of Care Reviewed With: patient        Progress: improving  Outcome Evaluation: patient resting in bed, spouse at bedside. Patient continues IV steroids, oxygen on 2L via N/C. VSS, no concerns or complaints of pain at this time.

## 2025-04-09 NOTE — PROCEDURES
Exercise Oximetry    Patient Name:Andree Deluna   MRN: [id number]   Date: 04/09/25             ROOM AIR BASELINE   SpO2% 90   Heart Rate 73   Blood Pressure      EXERCISE ON ROOM AIR SpO2% EXERCISE ON O2 @ 2 LPM SpO2%   1 MINUTE 90 1 MINUTE    2 MINUTES 88 2 MINUTES    3 MINUTES 84 3 MINUTES 92   4 MINUTES  4 MINUTES 92   5 MINUTES  5 MINUTES 92   6 MINUTES  6 MINUTES 92              Distance Walked   Distance Walked 6 mins   Dyspnea (Lobo Scale)   Dyspnea (Lobo Scale)   Fatigue (Lobo Scale)  Fatigue (Lobo Scale)   SpO2% Post Exercise   SpO2% Post Exercise 92   HR Post Exercise   HR Post Exercise 78   Time to Recovery   Time to Recovery 1 min     Comments: Patient desat to 84% on room air after walking for approximately 3 mins, required 2L o2 to maintain 88% and above.

## 2025-04-09 NOTE — CASE MANAGEMENT/SOCIAL WORK
Case Management Discharge Note      Final Note: Home.      Selected Continued Care - Discharged on 4/9/2025 Admission date: 4/7/2025 - Discharge disposition: Home-Health Care Svc      Durable Medical Equipment Coordination complete.      Service Provider Services Address Phone Fax Patient Preferred    [facility] [address], [place] [id number] [phone] --             Transportation Services  Private: Car    Final Discharge Disposition Code: 01 - home or self-care

## 2025-04-10 ENCOUNTER — APPOINTMENT (OUTPATIENT)
Dept: CARDIAC REHAB | Facility: HOSPITAL | Age: 70
End: 2025-04-10
Payer: MEDICARE

## 2025-04-10 ENCOUNTER — TRANSITIONAL CARE MANAGEMENT TELEPHONE ENCOUNTER (OUTPATIENT)
Dept: CALL CENTER | Facility: HOSPITAL | Age: 70
End: 2025-04-10
Payer: MEDICARE

## 2025-04-10 NOTE — OUTREACH NOTE
Call Center TCM Note      Flowsheet Row Responses   [facility] patient discharged from? [facility]   Does the patient have one of the following disease processes/diagnoses(primary or secondary)? Other   TCM attempt successful? Yes   Call start time 1719   Call end time 1723   Person spoke with today (if not patient) and relationship Patient   Meds reviewed with patient/caregiver? Yes   Is the patient having any side effects they believe may be caused by any medication additions or changes? No   Does the patient have all medications ordered at discharge? Yes   Is the patient taking all medications as directed (includes completed medication regime)? Yes   Comments Patient has a hospital followup scheduled with PCP Naga Griffith on 4/14/2025   Does the patient have an appointment with their PCP within 7-14 days of discharge? Yes   What DME was ordered? new home O2.   Has all DME been delivered? Yes   Psychosocial issues? No   Did the patient receive a copy of their discharge instructions? Yes   Nursing interventions Reviewed instructions with patient   What is the patient's perception of their health status since discharge? Improving  [Still having SOB and not feeling very well]   Is the patient/caregiver able to teach back signs and symptoms related to disease process for when to call PCP? Yes   Is the patient/caregiver able to teach back signs and symptoms related to disease process for when to call 911? Yes   Is the patient/caregiver able to teach back the hierarchy of who to call/visit for symptoms/problems? PCP, Specialist, Home health nurse, Urgent Care, ED, 911 Yes   If the patient is a current smoker, are they able to teach back resources for cessation? Not a smoker   TCM call completed? Yes   Wrap up additional comments Patient is still not feeling the best. Still with SOB.   Call end time 1723   Would this patient benefit from a Referral to [facility] Social Work? No   Is the patient interested in additional  calls from an ambulatory ? No            Otilio GAYLE - Registered Nurse    4/10/2025, 17:24 EDT

## 2025-04-11 ENCOUNTER — APPOINTMENT (OUTPATIENT)
Dept: CARDIAC REHAB | Facility: HOSPITAL | Age: 70
End: 2025-04-11
Payer: MEDICARE

## 2025-04-11 ENCOUNTER — TELEPHONE (OUTPATIENT)
Dept: FAMILY MEDICINE CLINIC | Facility: CLINIC | Age: 70
End: 2025-04-11
Payer: MEDICARE

## 2025-04-11 NOTE — TELEPHONE ENCOUNTER
EMILY/NEL DAUGHERTY ADVISED PT WAS RELEASED FROM HOSP 4/9, STILL HAS HARSH NON-PRODUCTIVE COUGH, ASKS WHAT PROVIDER RECOMMENDS. PT IS COMING IN 4/14

## 2025-04-13 DIAGNOSIS — F41.9 ANXIETY: ICD-10-CM

## 2025-04-14 ENCOUNTER — OFFICE VISIT (OUTPATIENT)
Dept: FAMILY MEDICINE CLINIC | Facility: CLINIC | Age: 70
End: 2025-04-14
Payer: MEDICARE

## 2025-04-14 VITALS
BODY MASS INDEX: 26.36 KG/M2 | OXYGEN SATURATION: 96 % | DIASTOLIC BLOOD PRESSURE: 78 MMHG | WEIGHT: 164 LBS | SYSTOLIC BLOOD PRESSURE: 124 MMHG | HEART RATE: 89 BPM | RESPIRATION RATE: 14 BRPM | HEIGHT: 66 IN

## 2025-04-14 DIAGNOSIS — J44.1 COPD WITH EXACERBATION: Primary | ICD-10-CM

## 2025-04-14 DIAGNOSIS — J96.01 ACUTE RESPIRATORY FAILURE WITH HYPOXIA: ICD-10-CM

## 2025-04-14 RX ORDER — LAMOTRIGINE 100 MG/1
100 TABLET ORAL 2 TIMES DAILY
Qty: 180 TABLET | Refills: 0 | Status: SHIPPED | OUTPATIENT
Start: 2025-04-14

## 2025-04-14 RX ORDER — MIRTAZAPINE 15 MG/1
7.5 TABLET, FILM COATED ORAL NIGHTLY
Qty: 60 TABLET | Refills: 0 | Status: SHIPPED | OUTPATIENT
Start: 2025-04-14

## 2025-04-14 RX ORDER — ALPRAZOLAM 2 MG/1
1 TABLET ORAL
Qty: 30 TABLET | Refills: 0 | Status: SHIPPED | OUTPATIENT
Start: 2025-04-14

## 2025-04-14 NOTE — PROGRESS NOTES
"Transitional Care Follow Up Visit  Subjective     Andree Deluna is a 69 y.o. female who presents for a transitional care management visit.    Within 48 business hours after discharge our office contacted her via telephone to coordinate her care and needs.      I reviewed and discussed the details of that call along with the discharge summary, hospital problems, inpatient lab results, inpatient diagnostic studies, and consultation reports with Andree.     Current outpatient and discharge medications have been reconciled for the patient.  Reviewed by: Naga Griffith PA-C          4/9/2025     4:43 PM   Date of TCM Phone Call   [facility]   Date of Admission 4/7/2025   Date of Discharge 4/9/2025     Risk for Readmission (LACE) Score: 12 (4/9/2025  6:00 AM)      History of Present Illness   Course During Hospital Stay:  Was admitted for copd exacerbation due to parainfluenza. She had acute on chronic respiratory failure with hypoxia. She is on oxygen now and oxygen running around 88-91 when off the oxygen but if she exerts herself it will go down to 84 or so. She does not have fevers or chills. Oxygen is aroun d96% while on oxygen.      The following portions of the patient's history were reviewed and updated as appropriate: allergies, current medications, past family history, past medical history, past social history, past surgical history, and problem list.    Review of Systems    Objective   /78 (BP Location: Left arm, Patient Position: Sitting, Cuff Size: Adult)   Pulse 89   Resp 14   Ht 167.6 cm (66\")   Wt 74.4 kg (164 lb)   SpO2 96%   BMI 26.47 kg/m²   Physical Exam  Constitutional:       Appearance: Normal appearance.      Comments: On oxygen.    Eyes:      Extraocular Movements: Extraocular movements intact.      Pupils: Pupils are equal, round, and reactive to light.   Cardiovascular:      Rate and Rhythm: Normal rate.      Heart sounds: No murmur heard.  Pulmonary:      " Breath sounds: No wheezing or rhonchi.   Neurological:      General: No focal deficit present.      Mental Status: She is alert and oriented to person, place, and time.   Psychiatric:         Mood and Affect: Mood normal.         Behavior: Behavior normal.         Assessment & Plan   Diagnoses and all orders for this visit:    1. COPD with exacerbation (Primary)    2. Acute respiratory failure with hypoxia      Oxygen for now unitl oxygen improves to above 88 with exertion. Incentive spirometry. Finihsed steroids. Duonebs every 4-6 hours prn.

## 2025-04-16 ENCOUNTER — TELEPHONE (OUTPATIENT)
Dept: FAMILY MEDICINE CLINIC | Facility: CLINIC | Age: 70
End: 2025-04-16
Payer: MEDICARE

## 2025-04-16 NOTE — TELEPHONE ENCOUNTER
"Caller: [employer] \"[employer]\"    Relationship: Emergency Contact    Best call back number: [phone]     What medication are you requesting: COUGH SYRUP/ ANTIBIOTIC    What are your current symptoms: SEVERE COUGH     How long have you been experiencing symptoms:     If a prescription is needed, what is your preferred pharmacy and phone number: [facility] [address] [facility] [address] [facility] [phone] [facility] [phone]      Additional notes: PATIENT IS DEALING WITH A SEVERE COUGH, IS UNABLE TO GET RELIEF AND IS NEEDING A MEDICATION TO HELP    ALSO IS WANTING TO KNOW IF SHE IS ABLE TO TAKE A CHLORASEPTIC MEDICATION DUE TO BEING ON BLOOD THINNERS       "

## 2025-04-17 ENCOUNTER — TELEPHONE (OUTPATIENT)
Dept: FAMILY MEDICINE CLINIC | Facility: CLINIC | Age: 70
End: 2025-04-17
Payer: MEDICARE

## 2025-04-17 ENCOUNTER — APPOINTMENT (OUTPATIENT)
Dept: CARDIAC REHAB | Facility: HOSPITAL | Age: 70
End: 2025-04-17
Payer: MEDICARE

## 2025-04-17 DIAGNOSIS — J20.9 ACUTE BRONCHITIS, UNSPECIFIED ORGANISM: ICD-10-CM

## 2025-04-17 DIAGNOSIS — J44.1 COPD WITH EXACERBATION: Primary | ICD-10-CM

## 2025-04-17 RX ORDER — RISPERIDONE 1 MG/1
1 TABLET ORAL EVERY 12 HOURS SCHEDULED
Qty: 60 TABLET | Refills: 0 | Status: SHIPPED | OUTPATIENT
Start: 2025-04-17

## 2025-04-17 RX ORDER — CODEINE PHOSPHATE AND GUAIFENESIN 10; 100 MG/5ML; MG/5ML
5 SOLUTION ORAL 3 TIMES DAILY PRN
Qty: 237 ML | Refills: 0 | Status: SHIPPED | OUTPATIENT
Start: 2025-04-17

## 2025-04-17 NOTE — TELEPHONE ENCOUNTER
Caller: [facility] [address] [place] [address] [place] - [address] [facility] [phone] [facility] [phone]     Relationship: Pharmacy    Which medication are you concerned about:  guaiFENesin-codeine (ROMILAR-AC) 100-10 MG/5ML solution/syrup [64130] (Order [id number])     Who prescribed you this medication: CINDY    When did you start taking this medication: NA    What are your concerns:   SHE IS ON ALPRAZolam (XANAX) 2 MG tablet  THE ABOVE MEDICATION COULD CAUSE RESPIRATORY DEPRESSION

## 2025-04-18 ENCOUNTER — APPOINTMENT (OUTPATIENT)
Dept: CARDIAC REHAB | Facility: HOSPITAL | Age: 70
End: 2025-04-18
Payer: MEDICARE

## 2025-04-18 NOTE — TELEPHONE ENCOUNTER
"Caller: [employer] \"[employer]\"    Relationship: Emergency Contact    Best call back number: [phone]     Which medication are you concerned about: COUGH MEDICATION WITH CODIENE    Who prescribed you this medication: CINDY    What are your concerns: PATIENTS  STATED THE PHARMACY WILL NOT RELEASE THIS MEDICATION TO THE PATIENT UNTIL THEY SPEAK WITH SHAYLEE REYES AND HE SAYS ITS OKAY TO FILL THIS PRESCRIPTION AS THE PATIENT IS TAKING XANAX.    PATIENTS  STATED THEY KNOW SHE SHOULD NOT TAKE XANAX WITH THIS MEDICATION, HOWEVER THE PHARMACY NEEDS AN OKAY.    PLEASE CONTACT PHARMACY ASAP TO OKAY PRESCRIPTION SO PATIENT MAY START MEDICATION.  "

## 2025-04-18 NOTE — TELEPHONE ENCOUNTER
has called twice this morning asking for medicine to be approved. Pharmacy is reluctant to fill guaifenesin because pt is also taking xanax and could cause respiratory depression.    Pt's  asking for resolution on med issue before end of day.

## 2025-04-21 NOTE — TELEPHONE ENCOUNTER
They are stating its unsafe to take the quafinesin codeine with alprazolam      El needs to verify that he is ok to take together

## 2025-04-21 NOTE — TELEPHONE ENCOUNTER
"  Caller: [employer] \"[employer]\"    Relationship: Emergency Contact    Best call back number: [phone]     What is the best time to reach you: ASAP    Who are you requesting to speak with (clinical staff, provider,  specific staff member): CANDIDA    What was the call regarding: PATIENTS  STATED THE PATIENT HAS BEEN WITHOUT MEDICATION OVER THE WEEKEND FOR HER COUGH.    PATIENT IS REQUESTING THE PHARMACY RECEIVE AUTHORIZATION TO FILL THIS PRESCRIPTION FROM SHAYLEE CINDY ASAP.  "

## 2025-04-22 ENCOUNTER — READMISSION MANAGEMENT (OUTPATIENT)
Dept: CALL CENTER | Facility: HOSPITAL | Age: 70
End: 2025-04-22
Payer: MEDICARE

## 2025-04-22 NOTE — TELEPHONE ENCOUNTER
PT'S  STATES THEY UNDERSTAND PT IS NOT TO TAKE XANAX IN CONJUNCTION WITH GUAIFENESIN CODEINE. PHARMACY WILL NOT RELEASE RX UNTIL THEY HEAR FROM PROVIDER.

## 2025-04-22 NOTE — OUTREACH NOTE
Medical Week 2 Survey      Flowsheet Row Responses   [facility] patient discharged from? [facility]   Does the patient have one of the following disease processes/diagnoses(primary or secondary)? Other   Week 2 attempt successful? No   Unsuccessful attempts Attempt 1   oke Yanira Monge Registered Nurse

## 2025-04-24 ENCOUNTER — APPOINTMENT (OUTPATIENT)
Dept: CARDIAC REHAB | Facility: HOSPITAL | Age: 70
End: 2025-04-24
Payer: MEDICARE

## 2025-04-25 ENCOUNTER — APPOINTMENT (OUTPATIENT)
Dept: CARDIAC REHAB | Facility: HOSPITAL | Age: 70
End: 2025-04-25
Payer: MEDICARE

## 2025-04-28 ENCOUNTER — TELEPHONE (OUTPATIENT)
Dept: FAMILY MEDICINE CLINIC | Facility: CLINIC | Age: 70
End: 2025-04-28

## 2025-04-28 NOTE — TELEPHONE ENCOUNTER
Caller: Andree Deluna    Relationship: Self    Best call back number: [phone]    What medication are you requesting: PCP RECOMMENDATION     What are your current symptoms: CONSTIPATION     How long have you been experiencing symptoms: PT HAS NOT HAD BOWEL MOVEMENT IN 1 1/2 WEEKS.     If a prescription is needed, what is your preferred pharmacy and phone number: [facility] [address] [place], [place] - [address] [facility] [phone] [facility] [phone]

## 2025-05-02 ENCOUNTER — OFFICE VISIT (OUTPATIENT)
Dept: FAMILY MEDICINE CLINIC | Facility: CLINIC | Age: 70
End: 2025-05-02
Payer: MEDICARE

## 2025-05-02 VITALS
OXYGEN SATURATION: 97 % | HEART RATE: 62 BPM | WEIGHT: 168 LBS | DIASTOLIC BLOOD PRESSURE: 80 MMHG | RESPIRATION RATE: 14 BRPM | SYSTOLIC BLOOD PRESSURE: 132 MMHG | HEIGHT: 66 IN | BODY MASS INDEX: 27 KG/M2

## 2025-05-02 DIAGNOSIS — Z87.09 HISTORY OF ACUTE RESPIRATORY FAILURE: Primary | ICD-10-CM

## 2025-05-02 DIAGNOSIS — D50.9 IRON DEFICIENCY ANEMIA, UNSPECIFIED IRON DEFICIENCY ANEMIA TYPE: ICD-10-CM

## 2025-05-02 DIAGNOSIS — E55.9 VITAMIN D DEFICIENCY: ICD-10-CM

## 2025-05-02 DIAGNOSIS — E54 VITAMIN C DEFICIENCY: ICD-10-CM

## 2025-05-02 NOTE — PROGRESS NOTES
"Subjective   Andree Deluna is a 69 y.o. female.     Chief Complaint   Patient presents with    Breathing Problem     Wanting to come off oxygen, inhaler refill       /80 (BP Location: Left arm, Patient Position: Sitting, Cuff Size: Adult)   Pulse 62   Resp 14   Ht 167.6 cm (66\")   Wt 76.2 kg (168 lb)   SpO2 97%   BMI 27.12 kg/m²     BP Readings from Last 3 Encounters:   05/02/25 132/80   04/14/25 124/78   04/09/25 119/65       Wt Readings from Last 3 Encounters:   05/02/25 76.2 kg (168 lb)   04/14/25 74.4 kg (164 lb)   04/07/25 75.9 kg (167 lb 5.3 oz)       Breathing Problem  She complains of difficulty breathing.    Presents to the clinic to discuss the need for oxygen, vit d deficiency, vit c deficiency, and iron deficiency. She has been on oxygen since recently hospitalization for parainfluenza. She has tried to wean off of this and now is pretty much going throughout her day without the need for the oxygen. Oxygen at rest even after exerting herself is in the upper 90s. Oxygen today in the room is 97%. She denies soa on exertion and feels much better. Cough has improved. She does need a refill on the inhaler which does seem to help. She denies any chest pain. She also is wondering if she needs the vitamins and iron she is on now. She is eating again a normal diet but reduced from previous when she had the vit c deficiency. No bleeding that she is aware of. No dark stools or blood in the stool. No hematuria or vaginal bleeding.     The following portions of the patient's history were reviewed and updated as appropriate: allergies, current medications, past family history, past medical history, past social history, past surgical history, and problem list.    Review of Systems    Objective   Physical Exam  Constitutional:       Appearance: She is well-developed.   HENT:      Head: Normocephalic and atraumatic.   Eyes:      Conjunctiva/sclera: Conjunctivae normal.      Pupils: Pupils are equal, round, " and reactive to light.   Cardiovascular:      Rate and Rhythm: Normal rate and regular rhythm.      Heart sounds: No murmur heard.  Pulmonary:      Effort: Pulmonary effort is normal.      Breath sounds: Normal breath sounds.   Abdominal:      General: Bowel sounds are normal.      Palpations: Abdomen is soft.      Tenderness: There is no abdominal tenderness.   Musculoskeletal:         General: No deformity. Normal range of motion.      Cervical back: Normal range of motion and neck supple.   Lymphadenopathy:      Cervical: No cervical adenopathy.   Skin:     General: Skin is warm and dry.      Capillary Refill: Capillary refill takes less than 2 seconds.      Findings: No rash.   Neurological:      Mental Status: She is alert and oriented to person, place, and time.      Cranial Nerves: No cranial nerve deficit.      Coordination: Coordination normal.      Gait: Gait normal.   Psychiatric:         Behavior: Behavior normal.         Thought Content: Thought content normal.         Judgment: Judgment normal.           Diagnoses and all orders for this visit:    1. History of acute respiratory failure (Primary)  Comments:  can come off oxygen now. monitor oxygen at home and let me know if the numbers get below 90.    2. Vitamin C deficiency  Comments:  should be ok now to come off as long as diet is balanced as discussed.    3. Vitamin D deficiency  Comments:  would continue vitamin d especially during winter months at [id number] vit d3 daily.    4. Iron deficiency anemia, unspecified iron deficiency anemia type  Comments:  we will come off the iron and repeat labs in 2 months as discussed when she follows up.        Return in about 2 months (around 7/2/2025) for Recheck.

## 2025-05-06 RX ORDER — BUDESONIDE AND FORMOTEROL FUMARATE DIHYDRATE 160; 4.5 UG/1; UG/1
2 AEROSOL RESPIRATORY (INHALATION)
Qty: 10.2 G | Refills: 3 | Status: SHIPPED | OUTPATIENT
Start: 2025-05-06

## 2025-05-06 NOTE — TELEPHONE ENCOUNTER
"  Caller: [employer] \"[employer]ryant\"    Relationship: Emergency Contact    Best call back number: [phone]    Requested Prescriptions:   Requested Prescriptions     Pending Prescriptions Disp Refills    budesonide-formoterol (Symbicort) 160-4.5 MCG/ACT inhaler 10.2 g 0     Sig: Inhale 2 puffs 2 (Two) Times a Day.        Pharmacy where request should be sent: [facility] [id number] [place], [place] [phone] FX     Last office visit with prescribing clinician: 5/2/2025   Last telemedicine visit with prescribing clinician: Visit date not found   Next office visit with prescribing clinician: 6/26/2025     Additional details provided by patient: STATED THAT THEY ARE COMPLETELY OUT OF THE MEDICATION AND IT WAS SUPPOSED TO BE SENT IN AFTER THEIR APPOINTMENT ON FRIDAY.    Does the patient have less than a 3 day supply:  [x] Yes  [] No    Would you like a call back once the refill request has been completed: [] Yes [x] No    If the office needs to give you a call back, can they leave a voicemail: [] Yes [x] No    Tanisha Martinez Rep   05/06/25 09:37 EDT       "

## 2025-05-12 RX ORDER — RISPERIDONE 1 MG/1
1 TABLET ORAL EVERY 12 HOURS SCHEDULED
Qty: 60 TABLET | Refills: 3 | Status: SHIPPED | OUTPATIENT
Start: 2025-05-12

## 2025-05-23 ENCOUNTER — TELEPHONE (OUTPATIENT)
Dept: FAMILY MEDICINE CLINIC | Facility: CLINIC | Age: 70
End: 2025-05-23
Payer: MEDICARE

## 2025-05-23 DIAGNOSIS — Z98.84 HISTORY OF REMOVAL OF LAPAROSCOPIC GASTRIC BANDING DEVICE: Primary | ICD-10-CM

## 2025-05-23 DIAGNOSIS — Z98.84 LAP-BAND SURGERY STATUS: ICD-10-CM

## 2025-06-13 DIAGNOSIS — F41.9 ANXIETY: ICD-10-CM

## 2025-06-16 RX ORDER — ALPRAZOLAM 2 MG/1
TABLET ORAL
Qty: 30 TABLET | Refills: 0 | Status: SHIPPED | OUTPATIENT
Start: 2025-06-16

## 2025-06-17 ENCOUNTER — OFFICE VISIT (OUTPATIENT)
Dept: FAMILY MEDICINE CLINIC | Facility: CLINIC | Age: 70
End: 2025-06-17
Payer: MEDICARE

## 2025-06-17 VITALS
BODY MASS INDEX: 26.39 KG/M2 | HEIGHT: 66 IN | OXYGEN SATURATION: 97 % | HEART RATE: 67 BPM | SYSTOLIC BLOOD PRESSURE: 108 MMHG | RESPIRATION RATE: 16 BRPM | WEIGHT: 164.2 LBS | DIASTOLIC BLOOD PRESSURE: 64 MMHG

## 2025-06-17 DIAGNOSIS — R05.3 CHRONIC COUGH: ICD-10-CM

## 2025-06-17 DIAGNOSIS — R13.14 PHARYNGOESOPHAGEAL DYSPHAGIA: Primary | ICD-10-CM

## 2025-06-17 PROCEDURE — 99214 OFFICE O/P EST MOD 30 MIN: CPT | Performed by: PHYSICIAN ASSISTANT

## 2025-06-17 PROCEDURE — 3074F SYST BP LT 130 MM HG: CPT | Performed by: PHYSICIAN ASSISTANT

## 2025-06-17 PROCEDURE — 1125F AMNT PAIN NOTED PAIN PRSNT: CPT | Performed by: PHYSICIAN ASSISTANT

## 2025-06-17 PROCEDURE — 1160F RVW MEDS BY RX/DR IN RCRD: CPT | Performed by: PHYSICIAN ASSISTANT

## 2025-06-17 PROCEDURE — 1159F MED LIST DOCD IN RCRD: CPT | Performed by: PHYSICIAN ASSISTANT

## 2025-06-17 PROCEDURE — G2211 COMPLEX E/M VISIT ADD ON: HCPCS | Performed by: PHYSICIAN ASSISTANT

## 2025-06-17 PROCEDURE — 3078F DIAST BP <80 MM HG: CPT | Performed by: PHYSICIAN ASSISTANT

## 2025-06-17 RX ORDER — PANTOPRAZOLE SODIUM 40 MG/1
40 TABLET, DELAYED RELEASE ORAL 2 TIMES DAILY
Qty: 90 TABLET | Refills: 3 | Status: SHIPPED | OUTPATIENT
Start: 2025-06-17

## 2025-06-17 RX ORDER — POTASSIUM CHLORIDE 1500 MG/1
20 TABLET, EXTENDED RELEASE ORAL DAILY
Qty: 90 TABLET | Refills: 1 | Status: SHIPPED | OUTPATIENT
Start: 2025-06-17

## 2025-06-17 NOTE — PROGRESS NOTES
"Subjective   Andree Deluna is a 69 y.o. female.     Chief Complaint   Patient presents with    Cough     Mostly clear mucous x 4 months, worsening.       /64   Pulse 67   Resp 16   Ht 167.6 cm (66\")   Wt 74.5 kg (164 lb 3.2 oz)   SpO2 97%   BMI 26.50 kg/m²     BP Readings from Last 3 Encounters:   06/17/25 108/64   05/02/25 132/80   04/14/25 124/78       Wt Readings from Last 3 Encounters:   06/17/25 74.5 kg (164 lb 3.2 oz)   05/02/25 76.2 kg (168 lb)   04/14/25 74.4 kg (164 lb)       HPI presents to the clinic for chronic cough has been present over the past 4 months.  She feels like this has become progressively worsened.  She has a history of Grimaldo's esophagus.  She states that every time she eats she has a severe cough and has increased mucus production.  At times she does feel like food is getting stuck and things are very difficult to get to go down.  She denies having any fever associated with the cough.  She denies having any colored sputum.    The following portions of the patient's history were reviewed and updated as appropriate: allergies, current medications, past family history, past medical history, past social history, past surgical history, and problem list.    Review of Systems    Objective   Physical Exam  Constitutional:       Appearance: Normal appearance.   HENT:      Right Ear: Tympanic membrane normal.      Left Ear: Tympanic membrane normal.      Nose: No congestion.      Mouth/Throat:      Pharynx: No oropharyngeal exudate or posterior oropharyngeal erythema.   Eyes:      Extraocular Movements: Extraocular movements intact.      Pupils: Pupils are equal, round, and reactive to light.   Cardiovascular:      Rate and Rhythm: Normal rate.      Heart sounds: No murmur heard.  Pulmonary:      Effort: Pulmonary effort is normal.      Breath sounds: No wheezing.   Neurological:      General: No focal deficit present.      Mental Status: She is alert and oriented to person, place, " and time.   Psychiatric:         Mood and Affect: Mood normal.         Behavior: Behavior normal.           Diagnoses and all orders for this visit:    1. Pharyngoesophageal dysphagia (Primary)  -     Ambulatory Referral to Gastroenterology    2. Chronic cough  -     pantoprazole (Protonix) 40 MG EC tablet; Take 1 tablet by mouth 2 (Two) Times a Day.  Dispense: 90 tablet; Refill: 3  -     Ambulatory Referral to Gastroenterology    I do believe her cough is GI related.  With her history of Grimaldo's esophagus and worsening dysphagia we need to have her get a scope performed.  Her symptoms also sound similar to the possibility of having a Zenker diverticulum and so we need to have this evaluated as well regardless.  I want her to double her pantoprazole until she sees GI.  Discussed habits to reduce the chance of choking and to reduce acid production.  Report any worsening symptoms.    Return in about 4 months (around 10/17/2025), or if symptoms worsen or fail to improve, for Recheck.

## 2025-06-27 ENCOUNTER — TELEPHONE (OUTPATIENT)
Dept: CARDIAC REHAB | Facility: HOSPITAL | Age: 70
End: 2025-06-27
Payer: MEDICARE

## 2025-06-27 NOTE — TELEPHONE ENCOUNTER
Spoke to . Patient unable to return to cardiac rehab due patient not eating due to trouble swallowing and feels weak. Patient will be discharged from phase 2 cardiac rehab.  
29-Apr-2021

## 2025-07-09 RX ORDER — LAMOTRIGINE 100 MG/1
100 TABLET ORAL 2 TIMES DAILY
Qty: 180 TABLET | Refills: 0 | Status: SHIPPED | OUTPATIENT
Start: 2025-07-09

## 2025-07-09 NOTE — PROGRESS NOTES
"Date of Office Visit: 2025  Encounter Provider: Mariella Starks MD  Place of Service: [facility]   Patient Name: Andree Deluna  :1955  Naga Griffith PA-C    Chief Complaint   Patient presents with    Follow-up     6 mo        History of Present Illness:  The patient is a 69-year-old female with a history of coronary artery disease status post CABG 2023 and most recent PCI to the native LAD January 10, 2025, CVA.  Carotid artery disease status post endarterectomy, hypertension, hyperlipidemia.  Mitral valve ring repair here for 6-month follow-up.    The patient reports that her shortness of breath, which was the main issue before the stent procedure, has improved. She no longer requires oxygen, which she had been using for a few months post-procedure. The unsteadiness she experiences has been ongoing since her stroke but has not worsened recently. She describes feeling \"unsteady and wobbly.\"  She denies any chest pain, orthopnea, PND, edema, syncope or palpitations.    Regarding her cardiac rehabilitation, Sima had to discontinue in April due to low oxygen levels, which resulted in her being sent home with oxygen tanks. She has not returned to rehab since then, citing difficulties in carrying the oxygen and the presence of machinery at the facility. Additionally, the long distance (about a half-hour drive) from her home to the rehab center has been a barrier to resuming the program. She states that she was told that she could now return to cardiac rehab.     For physical activity, Sima reports walking until she feels shaky, sometimes while walking at [facility]. She denies any current chest pain or pressure. The patient also denies any bleeding issues, swelling in the legs, or numbness and tingling.    In terms of medication adherence, Sima is still taking Plavix as prescribed.    14 point review of systems negative except as stated above.    Prior history: "   2/7/2025- Andree Deluna is a 69 y.o. female patient of Dr. Wheeler. She was noted to have a history of multivessel bypass in May 2023, LIMA to LAD, SVG to obtuse marginal 1 and SVG to PDA with mitral valve repair with ring at that time.  Postoperatively, she did have atrial fibrillation.  Other history includes essential hypertension, hyperlipidemia, diastolic dysfunction with EF 60%, CVA in 2019 and obstructive carotid artery disease status post endarterectomy.  She was seen in office in October 2024 by Dr. Wheeler with complaints of shortness of breath with minimal activity, walking 30 feet. He ordered stress test for further evaluation at that time.     Previous cardiac monitor from July 2024 predominantly sinus rhythm with heart rate ranging 46 to 80 bpm, average 56 bpm.  Rare supraventricular ectopic and ventricular ectopic beats seen.  Patient event dizziness correlated to bradycardic rate 53.  On 1/10/25 stress testing was done which was abnormal with lateral inferolateral ischemia with preserved EF. Echocardiogram from January 10, 2025 normal left systolic function with EF 56 to 60%, mildly reduced right ventricular systolic function, grade 2 diastolic dysfunction, severe dilation of left atrial cavity, negative for right-to-left atrial shunt, moderate calcification of aortic valve, mitral valve ring present, estimated RVSP 35 to 45 mmHg. No significant valvular abnormality other than moderate mitral valve regurgitation, mild aortic valve sclerosis     Today, patient returns to clinic for follow-up. She states she has been feeling well.  She states that the shortness of breath that she initially had when she saw Dr. Wheeler is all but gone.  Occasionally she will get short of breath when she walks big distances.  She reports applications post catheterization.  Her site was examined and is healed over with no residual swelling, hematoma or bruising.  Neurovascular checks of right lower extremity intact.  She  has had some unsteadiness with ambulation.  She states that this has been present to some degree since her stroke, however she feels it has gotten worse with all the ice and snow recently had a she has been nervous about falling.  Cardiac rehab was ordered prior to her hospital discharge and she is anxious to begin as she feels this will help.Her EKG is sinus rhythm rate 64, blood pressure 131/87.              Past Medical History:   Diagnosis Date    ADHD unknown    Anemia     Anesthesia complication     difficult to wake up    Anxiety     Atrial fibrillation     Carotid artery disease     80% right internal carotid artery    Coronary artery disease     CVA (cerebral vascular accident)     right parietal right temporal    DDD (degenerative disc disease), lumbar     Depression     Extremity pain     Ankle pain    GERD (gastroesophageal reflux disease)     Hyperlipidemia unknown    Hypertension     Insomnia unknown    Laryngopharyngeal reflux     Low back pain     Mood disorder     Obesity unknown    Short of breath on exertion     Skin cancer of face     Sleep apnea     Suspected sleep apnea she has refused to be tested    Stroke (cerebrum)     Visual field defect     Left         Past Surgical History:   Procedure Laterality Date    CARDIAC CATHETERIZATION N/A 5/1/2023    Procedure: Left Heart Cath;  Surgeon: Kye Alcaraz MD;  Location: [facility] CATH INVASIVE LOCATION;  Service: Cardiovascular;  Laterality: N/A;    CARDIAC CATHETERIZATION N/A 1/16/2025    Procedure: Left Heart Cath;  Surgeon: Kamar Wheeler MD;  Location: [facility] CATH INVASIVE LOCATION;  Service: Cardiology;  Laterality: N/A;    CAROTID ENDARTERECTOMY Right 11/10/2020    Procedure: CAROTID ENDARTERECTOMY;  Surgeon: Tavo Das MD;  Location: [facility] MAIN OR;  Service: Vascular;  Laterality: Right;    CHOLECYSTECTOMY      COLONOSCOPY      2016    CORONARY ARTERY BYPASS GRAFT N/A 5/6/2023    Procedure: CORONARY ARTERY  BYPASS GRAFTING;  Surgeon: Errol Noonan MD;  Location: [facility] CVOR;  Service: Cardiothoracic;  Laterality: N/A;  CABG X  3 using LIMA and right endospahenous vein;  2 coronary markers implanted.     FINGER SURGERY      KNEE ARTHROPLASTY      KNEE SURGERY Right     replaced    LAPAROSCOPIC GASTRIC BANDING      MITRAL VALVE REPAIR/REPLACEMENT N/A 5/6/2023    Procedure: MITRAL VALVE REPAIR/REPLACEMENT;  Surgeon: Errol Noonan MD;  Location: [facility];  Service: Cardiothoracic;  Laterality: N/A;  Mitral valve repaired using 28 mm Jorge Physio II Annuloplasty Ring.     ROTATOR CUFF REPAIR Right 2019    SHOULDER SURGERY Right 05/24/2019    scope/ cuff repair    TOTAL SHOULDER ARTHROPLASTY W/ DISTAL CLAVICLE EXCISION Left 11/8/2023    Procedure: TOTAL SHOULDER REVERSE ARTHROPLASTY;  Surgeon: Shubham Samaniego MD;  Location: [facility] MAIN OR;  Service: Orthopedics;  Laterality: Left;    TRANSESOPHAGEAL ECHOCARDIOGRAM (GALA) N/A 5/6/2023    Procedure: TRANSESOPHAGEAL ECHOCARDIOGRAM WITH ANESTHESIA;  Surgeon: Errol Noonan MD;  Location: [facility];  Service: Cardiothoracic;  Laterality: N/A;    TUBAL ABDOMINAL LIGATION             Current Outpatient Medications:     ALPRAZolam (XANAX) 2 MG tablet, TAKE 1/2 (ONE-HALF) TABLET BY MOUTH ONCE DAILY AT NIGHT AT BEDTIME, Disp: 30 tablet, Rfl: 0    apixaban (ELIQUIS) 5 MG tablet tablet, Take 1 tablet by mouth 2 (Two) Times a Day for 180 days., Disp: 180 tablet, Rfl: 1    atorvastatin (LIPITOR) 80 MG tablet, Take 1 tablet by mouth Daily., Disp: 90 tablet, Rfl: 3    bisoprolol (ZEBeta) 5 MG tablet, Take 1 tablet by mouth Daily., Disp: 90 tablet, Rfl: 3    cholecalciferol (VITAMIN D3) 25 MCG (1000 UT) tablet, Take 1 tablet by mouth Daily., Disp: , Rfl:     citalopram (CeleXA) 10 MG tablet, Take 1 tablet by mouth Daily., Disp: 90 tablet, Rfl: 3    clopidogrel (Plavix) 75 MG tablet, Take 1 tablet by mouth Daily for 180 days., Disp: 90 tablet, Rfl:  "1    famotidine (Pepcid) 20 MG tablet, Take 1 tablet by mouth 2 (Two) Times a Day., Disp: 90 tablet, Rfl: 3    furosemide (LASIX) 20 MG tablet, Take 1 tablet by mouth Daily., Disp: 30 tablet, Rfl: 0    lamoTRIgine (LaMICtal) 100 MG tablet, Take 1 tablet by mouth twice daily, Disp: 180 tablet, Rfl: 0    Mirabegron ER (MYRBETRIQ) 50 MG tablet sustained-release 24 hour 24 hr tablet, Take 50 mg by mouth Daily., Disp: 30 tablet, Rfl: 5    mirtazapine (REMERON) 15 MG tablet, TAKE 1/2 (ONE-HALF) TABLET BY MOUTH ONCE DAILY AT NIGHT, Disp: 60 tablet, Rfl: 0    pantoprazole (Protonix) 40 MG EC tablet, Take 1 tablet by mouth 2 (Two) Times a Day., Disp: 90 tablet, Rfl: 3    potassium chloride (KLOR-CON M20) 20 MEQ CR tablet, Take 1 tablet by mouth Daily., Disp: 90 tablet, Rfl: 1    risperiDONE (risperDAL) 1 MG tablet, TAKE 1 TABLET BY MOUTH EVERY 12 HOURS, Disp: 60 tablet, Rfl: 3    vitamin C (ASCORBIC ACID) 500 MG tablet, Take 1 tablet by mouth Daily. (Patient not taking: Reported on 7/10/2025), Disp: , Rfl:       Social History     Socioeconomic History    Marital status:    Tobacco Use    Smoking status: Never     Passive exposure: Never    Smokeless tobacco: Never   Vaping Use    Vaping status: Never Used   Substance and Sexual Activity    Alcohol use: No    Drug use: No    Sexual activity: Defer         Procedures    Procedures    No orders to display           Objective:    /76 (BP Location: Right arm, Patient Position: Sitting, Cuff Size: Adult) Comment: manual BP  Pulse 67   Resp 16   Ht 167.6 cm (66\")   Wt 73.9 kg (163 lb)   SpO2 97%   BMI 26.31 kg/m²         Vitals reviewed.   Constitutional:       Appearance: Not in distress.   Pulmonary:      Effort: Pulmonary effort is normal.      Breath sounds: Normal breath sounds.   Cardiovascular:      PMI at left midclavicular line. Normal rate. Regular rhythm.      Murmurs: There is no murmur.      No gallop.  No click. No rub.   Pulses:     Intact distal " pulses.   Edema:     Peripheral edema absent.   Abdominal:      General: Bowel sounds are normal.      Palpations: Abdomen is soft.   Musculoskeletal: Normal range of motion. Skin:     General: Skin is dry.   Neurological:      General: No focal deficit present.      Mental Status: Oriented to person, place and time.       Carotid US 2/13/2025- Study Impression        Right ICA:  Imaging indicates normal flow without evidence of hemodynamically significant stenosis.            Left ICA:  Imaging indicates <50% stenosis.     Cardiac cath 1/16/2025-    Findings  Opening pressure of 154/75 mean 105  /3 with an EDP of 10-12  Closing pressure 134/62 with a mean of 91  Normal transaortic gradient  Normal LV systolic function of 50-55%              Angiography  1.  Left main, no disease  2.  Circumflex is nondominant with ostial 80% disease heavily calcified in the AV groove and  at the takeoff of the obtuse marginal branch, proximal obtuse marginal branches are also  along with distal branches, SVG to distal obtuse marginal branch widely patent antegradely however retrograde limb has 80% disease not amenable to intervention secondary to small vessel size less than 1.5 mm in diameter which perfuses an inferior branch which is terminal and small caliber and also has collaterals distally, this we treated medically  3.  LAD is a medium to large caliber vessel with diffuse disease in its midportion essentially subtotally occluded there is slight competitive flow distally, diagonal branch also with significant disease not amenable to intervention small vessel size less than 1.5 mm in diameter, LIMA to LAD is widely patent including anastomosis however distal apical LAD has a 99% lesion focally followed by diffuse small vessel disease at the wraparound portion but this lesion appears amenable to intervention  4.  RCA  at the ostium with only the conus branch apparent, SVG to distal RCA is patent, there is small  vessel disease in the PLV branch not amenable to intervention, PDA patent flow with small vessel diffuse disease     SVG to RCA widely patent throughout its course  SVG to obtuse marginal widely patent throughout its course with good anastomosis  LIMA to LAD widely patent throughout its course with good anastomosis     Conclusions recommendations  1 multivessel native CAD with progression since prior cath in 2023  Widely patent grafts LIMA to LAD, SVG to RCA and obtuse marginal branch  Apical native LAD disease minimal intervention as described, 2.0 x 15 Paterson drug-eluting stent postdilated 2.2 with good results 0% restenosis and BIPIN-3 flow  Small vessel disease obtuse marginal branch distal RCA distribution not amenable to intervention, diagonal branch not amenable to intervention  No further options for revascularization at this time     DAPT with aspirin Plavix, after 30 days stop aspirin as she will be on Eliquis  Stop Plavix at 6 months with return to low-dose aspirin combination with Eliquis     Second prevention goals, statin beta-blocker afterload reduction     Kamar Wheeler MD, PhD  Assessment & Plan:   Diagnoses and all orders for this visit:    1. Coronary artery disease involving native heart, unspecified vessel or lesion type, unspecified whether angina present (Primary)  Overview:  Patient underwent stent placement in January to the LAD which significantly improved her shortness of breath. She no longer requires oxygen supplementation, which was necessary prior to the procedure. The patient reports no chest pain or pressure. A carotid ultrasound performed post-stent showed normal findings on the right side and minimal, non-significant plaque on the left.    Assessment & Plan:  - Discontinue Plavix as planned at 6-month post-stent wanda  - Start aspirin 81 mg PO daily  - Continue on Eliquis 5 mg twice a day   - Work when the patient resume attending cardiac rehabilitation until she is cleared  -  Follow-up in 6 months      2. Primary hypertension  Overview:  Controlled    Assessment & Plan:  Continue on current medications  Encouraged increased activity and return to cardiac rehab       3. S/P mitral valve repair per Dr. Noonan 5/6/2023    4. S/P CABG x 3 with LIMA per Dr. Noonan 5/6/2023

## 2025-07-10 ENCOUNTER — OFFICE VISIT (OUTPATIENT)
Dept: FAMILY MEDICINE CLINIC | Facility: CLINIC | Age: 70
End: 2025-07-10
Payer: MEDICARE

## 2025-07-10 VITALS
DIASTOLIC BLOOD PRESSURE: 78 MMHG | WEIGHT: 162 LBS | HEIGHT: 66 IN | OXYGEN SATURATION: 96 % | SYSTOLIC BLOOD PRESSURE: 120 MMHG | HEART RATE: 69 BPM | RESPIRATION RATE: 14 BRPM | BODY MASS INDEX: 26.03 KG/M2

## 2025-07-10 DIAGNOSIS — Z12.31 ENCOUNTER FOR SCREENING MAMMOGRAM FOR MALIGNANT NEOPLASM OF BREAST: ICD-10-CM

## 2025-07-10 DIAGNOSIS — K21.00 GASTROESOPHAGEAL REFLUX DISEASE WITH ESOPHAGITIS, UNSPECIFIED WHETHER HEMORRHAGE: ICD-10-CM

## 2025-07-10 DIAGNOSIS — R05.3 CHRONIC COUGH: Primary | ICD-10-CM

## 2025-07-10 DIAGNOSIS — Z87.19 HISTORY OF BARRETT'S ESOPHAGUS: ICD-10-CM

## 2025-07-10 PROCEDURE — 3074F SYST BP LT 130 MM HG: CPT | Performed by: PHYSICIAN ASSISTANT

## 2025-07-10 PROCEDURE — 1159F MED LIST DOCD IN RCRD: CPT | Performed by: PHYSICIAN ASSISTANT

## 2025-07-10 PROCEDURE — 1160F RVW MEDS BY RX/DR IN RCRD: CPT | Performed by: PHYSICIAN ASSISTANT

## 2025-07-10 PROCEDURE — 99214 OFFICE O/P EST MOD 30 MIN: CPT | Performed by: PHYSICIAN ASSISTANT

## 2025-07-10 PROCEDURE — G2211 COMPLEX E/M VISIT ADD ON: HCPCS | Performed by: PHYSICIAN ASSISTANT

## 2025-07-10 PROCEDURE — 3078F DIAST BP <80 MM HG: CPT | Performed by: PHYSICIAN ASSISTANT

## 2025-07-10 PROCEDURE — 1125F AMNT PAIN NOTED PAIN PRSNT: CPT | Performed by: PHYSICIAN ASSISTANT

## 2025-07-10 RX ORDER — FAMOTIDINE 20 MG/1
20 TABLET, FILM COATED ORAL 2 TIMES DAILY
Qty: 90 TABLET | Refills: 3 | Status: SHIPPED | OUTPATIENT
Start: 2025-07-10

## 2025-07-10 NOTE — PROGRESS NOTES
"Subjective   Andree Deluna is a 69 y.o. female.     Chief Complaint   Patient presents with    COPD     F/u       /78   Pulse 69   Resp 14   Ht 167.6 cm (66\")   Wt 73.5 kg (162 lb)   SpO2 96%   BMI 26.15 kg/m²     BP Readings from Last 3 Encounters:   07/11/25 110/76   07/10/25 120/78   06/17/25 108/64       Wt Readings from Last 3 Encounters:   07/11/25 73.9 kg (163 lb)   07/10/25 73.5 kg (162 lb)   06/17/25 74.5 kg (164 lb 3.2 oz)       COPD      Presents to the clinic for follow up on chronic cough and soa. She has been on ppi for the past month. She has felt that she is having improvement with the cough but it is still present. She is not having any food get stuck but she has to be careful. She is eating later in the evening and still drinking carbonation at times. She has not had her upper gi yet with gastro but it is scheduled. She denies any chest pain or worsening soa on exertion.        The following portions of the patient's history were reviewed and updated as appropriate: allergies, current medications, past family history, past medical history, past social history, past surgical history, and problem list.    Review of Systems    Objective   Physical Exam  Constitutional:       Appearance: Normal appearance.   Eyes:      Extraocular Movements: Extraocular movements intact.      Pupils: Pupils are equal, round, and reactive to light.   Cardiovascular:      Rate and Rhythm: Normal rate.      Heart sounds: No murmur heard.  Pulmonary:      Effort: Pulmonary effort is normal.      Breath sounds: No wheezing.   Neurological:      General: No focal deficit present.      Mental Status: She is alert and oriented to person, place, and time.   Psychiatric:         Mood and Affect: Mood normal.         Behavior: Behavior normal.           Diagnoses and all orders for this visit:    1. Chronic cough (Primary)    2. Encounter for screening mammogram for malignant neoplasm of breast  -     Mammo " Screening Digital Tomosynthesis Bilateral With CAD; Future    3. Gastroesophageal reflux disease with esophagitis, unspecified whether hemorrhage    4. History of Grimaldo's esophagus    Other orders  -     famotidine (Pepcid) 20 MG tablet; Take 1 tablet by mouth 2 (Two) Times a Day.  Dispense: 90 tablet; Refill: 3      Continue the protonix. Add pepcid to the regimen. Avoid carbonation and avoid eating late at night as discussed. Get upper GI. Consider kun in the future.     Return in about 4 months (around 11/10/2025), or if symptoms worsen or fail to improve, for Recheck.

## 2025-07-11 ENCOUNTER — OFFICE VISIT (OUTPATIENT)
Dept: CARDIOLOGY | Facility: CLINIC | Age: 70
End: 2025-07-11
Payer: MEDICARE

## 2025-07-11 VITALS
HEART RATE: 67 BPM | SYSTOLIC BLOOD PRESSURE: 110 MMHG | BODY MASS INDEX: 26.2 KG/M2 | RESPIRATION RATE: 16 BRPM | DIASTOLIC BLOOD PRESSURE: 76 MMHG | WEIGHT: 163 LBS | OXYGEN SATURATION: 97 % | HEIGHT: 66 IN

## 2025-07-11 DIAGNOSIS — I10 PRIMARY HYPERTENSION: Chronic | ICD-10-CM

## 2025-07-11 DIAGNOSIS — Z98.890 S/P MVR (MITRAL VALVE REPAIR): ICD-10-CM

## 2025-07-11 DIAGNOSIS — I25.10 CORONARY ARTERY DISEASE INVOLVING NATIVE HEART, UNSPECIFIED VESSEL OR LESION TYPE, UNSPECIFIED WHETHER ANGINA PRESENT: Primary | ICD-10-CM

## 2025-07-11 DIAGNOSIS — Z95.1 S/P CABG X 3: ICD-10-CM

## 2025-07-11 NOTE — ASSESSMENT & PLAN NOTE
- Discontinue Plavix as planned at 6-month post-stent wanda  - Start aspirin 81 mg PO daily  - Continue on Eliquis 5 mg twice a day   - Work when the patient resume attending cardiac rehabilitation until she is cleared  - Follow-up in 6 months

## 2025-07-15 RX ORDER — MIRTAZAPINE 15 MG/1
7.5 TABLET, FILM COATED ORAL NIGHTLY
Qty: 60 TABLET | Refills: 3 | Status: SHIPPED | OUTPATIENT
Start: 2025-07-15

## 2025-07-21 PROBLEM — R06.00 DYSPNEA: Status: RESOLVED | Noted: 2023-05-18 | Resolved: 2025-07-21

## 2025-07-21 PROBLEM — R53.1 WEAKNESS: Status: RESOLVED | Noted: 2024-11-17 | Resolved: 2025-07-21

## 2025-07-21 PROBLEM — I97.89 POSTOPERATIVE ATRIAL FIBRILLATION: Status: RESOLVED | Noted: 2023-05-12 | Resolved: 2025-07-21

## 2025-07-21 PROBLEM — I25.10 CHRONIC CORONARY ARTERY DISEASE: Status: RESOLVED | Noted: 2023-11-10 | Resolved: 2025-07-21

## 2025-07-21 PROBLEM — M19.012 DJD OF LEFT SHOULDER: Status: RESOLVED | Noted: 2023-09-07 | Resolved: 2025-07-21

## 2025-07-21 PROBLEM — R07.9 CHEST PAIN, UNSPECIFIED TYPE: Status: RESOLVED | Noted: 2023-04-30 | Resolved: 2025-07-21

## 2025-07-21 PROBLEM — R93.1 ABNORMAL FINDINGS ON DIAGNOSTIC IMAGING OF HEART AND CORONARY CIRCULATION: Status: RESOLVED | Noted: 2023-04-30 | Resolved: 2025-07-21

## 2025-07-21 PROBLEM — N28.9 ACUTE RENAL INSUFFICIENCY: Status: RESOLVED | Noted: 2023-11-10 | Resolved: 2025-07-21

## 2025-07-21 PROBLEM — I48.91 ATRIAL FIBRILLATION WITH RAPID VENTRICULAR RESPONSE: Status: RESOLVED | Noted: 2023-05-18 | Resolved: 2025-07-21

## 2025-07-21 PROBLEM — I21.4 NON-STEMI (NON-ST ELEVATED MYOCARDIAL INFARCTION): Chronic | Status: RESOLVED | Noted: 2023-04-30 | Resolved: 2025-07-21

## 2025-07-21 PROBLEM — R53.1 GENERALIZED WEAKNESS: Status: RESOLVED | Noted: 2025-01-17 | Resolved: 2025-07-21

## 2025-07-21 PROBLEM — I48.91 POSTOPERATIVE ATRIAL FIBRILLATION: Status: RESOLVED | Noted: 2023-05-12 | Resolved: 2025-07-21

## 2025-07-21 PROBLEM — J96.01 ACUTE HYPOXIC RESPIRATORY FAILURE: Status: RESOLVED | Noted: 2025-04-08 | Resolved: 2025-07-21

## 2025-07-21 PROBLEM — R07.2 PRECORDIAL PAIN: Status: RESOLVED | Noted: 2022-11-07 | Resolved: 2025-07-21

## 2025-07-21 PROBLEM — K21.9 GERD WITHOUT ESOPHAGITIS: Status: RESOLVED | Noted: 2023-11-10 | Resolved: 2025-07-21

## 2025-07-21 PROBLEM — M25.511 ACUTE PAIN OF RIGHT SHOULDER: Status: RESOLVED | Noted: 2021-02-01 | Resolved: 2025-07-21

## 2025-07-21 PROBLEM — I25.10 CAD (CORONARY ARTERY DISEASE): Status: RESOLVED | Noted: 2025-01-16 | Resolved: 2025-07-21

## 2025-07-21 PROBLEM — Z86.73 HISTORY OF STROKE: Chronic | Status: RESOLVED | Noted: 2020-11-10 | Resolved: 2025-07-21

## 2025-07-21 PROBLEM — R79.89 ELEVATED TROPONIN: Chronic | Status: RESOLVED | Noted: 2023-04-30 | Resolved: 2025-07-21

## 2025-07-24 NOTE — TELEPHONE ENCOUNTER
REFERRAL WAS SENT TO [facility] BUT PATIENT WANTS TO GO TO [facility]. DOES THE LOCATION NEED TO BE CHANGED OR DOES IT NOT MATTER SINCE THEY ARE [facility]?   dry, intact, no bleeding and no hematoma.

## 2025-08-07 ENCOUNTER — HOSPITAL ENCOUNTER (OUTPATIENT)
Dept: MAMMOGRAPHY | Facility: HOSPITAL | Age: 70
Discharge: HOME | End: 2025-08-07
Admitting: PHYSICIAN ASSISTANT
Payer: MEDICARE

## 2025-08-07 DIAGNOSIS — Z12.31 ENCOUNTER FOR SCREENING MAMMOGRAM FOR MALIGNANT NEOPLASM OF BREAST: ICD-10-CM

## 2025-08-07 PROCEDURE — 77063 BREAST TOMOSYNTHESIS BI: CPT

## 2025-08-07 PROCEDURE — 77067 SCR MAMMO BI INCL CAD: CPT

## 2025-08-07 RX ORDER — OMEPRAZOLE 40 MG/1
40 CAPSULE, DELAYED RELEASE ORAL DAILY
Qty: 90 CAPSULE | Refills: 0 | OUTPATIENT
Start: 2025-08-07

## 2025-08-08 DIAGNOSIS — R92.8 ABNORMAL MAMMOGRAM: Primary | ICD-10-CM

## 2025-08-16 DIAGNOSIS — F41.9 ANXIETY: ICD-10-CM

## 2025-08-18 RX ORDER — ALPRAZOLAM 2 MG/1
2 TABLET ORAL NIGHTLY PRN
Qty: 30 TABLET | Refills: 0 | Status: SHIPPED | OUTPATIENT
Start: 2025-08-18

## 2025-08-21 ENCOUNTER — HOSPITAL ENCOUNTER (OUTPATIENT)
Dept: MAMMOGRAPHY | Facility: HOSPITAL | Age: 70
Discharge: HOME | End: 2025-08-21
Payer: MEDICARE

## 2025-08-21 ENCOUNTER — HOSPITAL ENCOUNTER (OUTPATIENT)
Dept: ULTRASOUND IMAGING | Facility: HOSPITAL | Age: 70
Discharge: HOME | End: 2025-08-21
Payer: MEDICARE

## 2025-08-21 DIAGNOSIS — R92.8 ABNORMAL MAMMOGRAM: ICD-10-CM

## 2025-08-21 PROCEDURE — 77065 DX MAMMO INCL CAD UNI: CPT

## 2025-08-21 PROCEDURE — G0279 TOMOSYNTHESIS, MAMMO: HCPCS

## 2025-08-21 PROCEDURE — 76642 ULTRASOUND BREAST LIMITED: CPT

## 2025-08-28 ENCOUNTER — TELEPHONE (OUTPATIENT)
Dept: LAB | Facility: HOSPITAL | Age: 70
End: 2025-08-28
Payer: MEDICARE

## 2025-08-28 ENCOUNTER — OFFICE (OUTPATIENT)
Dept: URBAN - METROPOLITAN AREA CLINIC 64 | Facility: CLINIC | Age: 70
End: 2025-08-28
Payer: MEDICARE

## 2025-08-28 VITALS
HEIGHT: 66 IN | WEIGHT: 176 LBS | DIASTOLIC BLOOD PRESSURE: 88 MMHG | SYSTOLIC BLOOD PRESSURE: 142 MMHG | HEART RATE: 65 BPM

## 2025-08-28 DIAGNOSIS — R13.10 DYSPHAGIA, UNSPECIFIED: ICD-10-CM

## 2025-08-28 DIAGNOSIS — R14.0 ABDOMINAL DISTENSION (GASEOUS): ICD-10-CM

## 2025-08-28 DIAGNOSIS — E66.9 OBESITY, UNSPECIFIED: ICD-10-CM

## 2025-08-28 DIAGNOSIS — R10.9 UNSPECIFIED ABDOMINAL PAIN: ICD-10-CM

## 2025-08-28 PROCEDURE — 99204 OFFICE O/P NEW MOD 45 MIN: CPT | Performed by: INTERNAL MEDICINE

## (undated) DEVICE — 3M(TM) TEGADERM(TM) IV ADAVANCED SECUREMENT DRESSING 1685: Brand: 3M™ TEGADERM™

## (undated) DEVICE — CATH DIAG IMPULSE MPA 6F 100CM

## (undated) DEVICE — UNDRGLV SURG BIOGEL PIMICROINDICATOR SYNTH SZ8 LF STRL

## (undated) DEVICE — UNDERGLV SURG BIOGEL INDICAT PI SZ8 BLU

## (undated) DEVICE — PK MINOR VASC 50

## (undated) DEVICE — PINNACLE INTRODUCER SHEATH: Brand: PINNACLE

## (undated) DEVICE — SUP ARMBRD HANDAID ART/LINE 9IN

## (undated) DEVICE — POSTN ARM CRDL LAMIN PK/2

## (undated) DEVICE — CANN ART EOPA 3D NV W/CONN 20F

## (undated) DEVICE — DEV INFL COMPAK W/ACCESSPLUS IN4530

## (undated) DEVICE — 500ML,PRESSURE INFUSER W/STOPCOCK: Brand: MEDLINE

## (undated) DEVICE — SLV SCD CALF HEMOFORCE DVT THERP REPROC MD

## (undated) DEVICE — KT SURG TURNOVER 050

## (undated) DEVICE — BNDG ELAS ELITE V/CLOSE 4IN 5YD LF STRL

## (undated) DEVICE — CORONARY ARTERY BYPASS GRAFT MARKERS, STAINLESS STEEL, DISTAL, WITHOUT HOLDER: Brand: ANASTOMARK CORONARY ARTERY BYPASS GRAFT MARKERS, STAINLESS STEEL, DISTAL

## (undated) DEVICE — DGW .035 FC J3MM 150CM TEF HEP: Brand: EMERALD

## (undated) DEVICE — CATHETER,URETHRAL,REDRUBBER,STERILE,20FR: Brand: MEDLINE

## (undated) DEVICE — Device

## (undated) DEVICE — TBG NAMIC PRESS MONTR A/ F/M 12IN

## (undated) DEVICE — BOOT SUT XRAY DETECT STD YEL/BLU CA/50

## (undated) DEVICE — SOL IRRIG NACL 1000ML

## (undated) DEVICE — TBG INSUFF MALE L/L W 12MM CON: Brand: MEDLINE INDUSTRIES, INC.

## (undated) DEVICE — SUT VIC 2/0 CT1 36IN

## (undated) DEVICE — PK TRY HEART CATH 50

## (undated) DEVICE — BIOPATCH™ ANTIMICROBIAL DRESSING WITH CHLORHEXIDINE GLUCONATE IS A HYDROPHILLIC POLYURETHANE ABSORPTIVE FOAM WITH CHLORHEXIDINE GLUCONATE (CHG) WHICH INHIBITS BACTERIAL GROWTH UNDER THE DRESSING. THE DRESSING IS INTENDED TO BE USED TO ABSORB EXUDATE, COVER A WOUND CAUSED BY VASCULAR AND NONVASCULAR PERCUTANEOUS MEDICAL DEVICES DURING SURGERY, AS WELL AS REDUCE LOCAL INFECTION AND COLONIZATION OF MICROORGANISMS.: Brand: BIOPATCH

## (undated) DEVICE — GAUZE,SPONGE,4"X4",32PLY,XRAY,STRL,LF: Brand: MEDLINE

## (undated) DEVICE — THE TURNPIKE CATHETERS ARE INTENDED TO BE USED TO ACCESS DISCRETE REGIONS OF THE CORONARY AND/OR PERIPHERAL VASCULATURE. THEY MAY BE USED TO FACILITATE PLACEMENT AND EXCHANGE OF GUIDEWIRES AND TO SUBSELECTIVELY INFUSE/DELIVER DIAGNOSTIC AND THERAPEUTIC AGENTS.: Brand: TURNPIKE® LP CATHETER

## (undated) DEVICE — DOC GUIDE WIRE EXTENSION: Brand: DOC

## (undated) DEVICE — SUT ETHIB 2/0 CV V7 30IN 10X72

## (undated) DEVICE — STPCK 3WY HP ROT

## (undated) DEVICE — HEMOCONCENTRATOR PERFUS LPS06

## (undated) DEVICE — MYNXGRIP 6F/7F: Brand: MYNXGRIP

## (undated) DEVICE — T-MAX DISPOSABLE FACE MASK 8 PER BOX

## (undated) DEVICE — ST IV BLD W/HANDPUMP YSITE 125IN

## (undated) DEVICE — DECANTER: Brand: UNBRANDED

## (undated) DEVICE — INTENDED FOR TISSUE SEPARATION, AND OTHER PROCEDURES THAT REQUIRE A SHARP SURGICAL BLADE TO PUNCTURE OR CUT.: Brand: BARD-PARKER ® CARBON RIB-BACK BLADES

## (undated) DEVICE — SYS PERFUS SEP PLATLT W TIPS CUST

## (undated) DEVICE — CANN RETRGR STYLET RSCP 15F

## (undated) DEVICE — BLOWER MISTER CLEARVIEW W/TBG

## (undated) DEVICE — SUT SILK 0 CT1 CR8 18IN C021D

## (undated) DEVICE — CONTRST ISOVUE300 61PCT 50ML

## (undated) DEVICE — SUT VIC 3/0 SH 27IN J416H

## (undated) DEVICE — DRN WND CH RND FUL/FLUT NO/TROC 3/8IN 28F

## (undated) DEVICE — CONNECT Y INTERSEPT W/LL 3/8 X 3/8 X 3/8IN

## (undated) DEVICE — TOWEL,OR,DSP,ST,WHITE,DLX,4/PK,20PK/CS: Brand: MEDLINE

## (undated) DEVICE — SOL IRR H2O BTL 1000ML STRL

## (undated) DEVICE — SYR LL TP 10ML STRL

## (undated) DEVICE — CANN AORT ROOT DLP VNT/8IN 14G 7F

## (undated) DEVICE — DRAPE SHEET ULTRAGARD: Brand: MEDLINE

## (undated) DEVICE — SUT PROLN 6/0 RB2 D/A 30IN 8711H

## (undated) DEVICE — BNDG ELAS ELITE V/CLOSE 6IN 5YD LF STRL

## (undated) DEVICE — PK PERFUS CUST W/CARDIOPLEGIA

## (undated) DEVICE — BLD SCLPL BEAVR MINI STR 2BVL 180D LF

## (undated) DEVICE — KT CATH CV ACC MAC 2L SFTY 9F 4 1/2IN

## (undated) DEVICE — SUT SILK 2 SUTUPAK TIE 60IN SA8H 2STRAND

## (undated) DEVICE — ANTIBACTERIAL UNDYED BRAIDED (POLYGLACTIN 910), SYNTHETIC ABSORBABLE SUTURE: Brand: COATED VICRYL

## (undated) DEVICE — SUT PROLN 6/0 BV1 D/A 30IN 8709H

## (undated) DEVICE — CATH DIAG IMPULSE FL4 6F 100CM

## (undated) DEVICE — CATH DIAG IMPULSE PIG .056 6F 110CM

## (undated) DEVICE — SOL NS 500ML

## (undated) DEVICE — SUT PROLN 3/0 V7 D/A 36IN 8976H

## (undated) DEVICE — SYS VASOVIEW HEMOPRO ENDOSCOPIC HARVST VESL

## (undated) DEVICE — SAFELINER OUTER SHELL SUCTION CANISTER: Brand: DEROYAL

## (undated) DEVICE — SUT PROLN 7/0 BV1 D/A 30IN 8703H

## (undated) DEVICE — SOL IRR NACL 0.9PCT ARTHROMATIC 3000ML

## (undated) DEVICE — STERILE COTTON BALLS LARGE 5/P: Brand: MEDLINE

## (undated) DEVICE — ADHS SKIN PREMIERPRO EXOFIN TOPICAL HI/VISC .5ML

## (undated) DEVICE — 28 FR STRAIGHT – SOFT PVC CATHETER: Brand: PVC THORACIC CATHETERS

## (undated) DEVICE — TEMP PACING WIRE: Brand: MYO/WIRE

## (undated) DEVICE — ST ACC MICROPUNCTURE STFF/CANN PLAT/TP 4F 21G 40CM

## (undated) DEVICE — SUT SILK 2/0 TIES 18IN A185H

## (undated) DEVICE — ELECTRD DEFIB M/FUNC PROPADZ STRL 2PK

## (undated) DEVICE — ELECTRD DEFIB M/FUNC PROPADZ RADIOL 2PK

## (undated) DEVICE — GLV SURG BIOGEL LTX PF 7 1/2

## (undated) DEVICE — PK ATS CUST W CARDIOTOMY RESEVOIR

## (undated) DEVICE — CATH DIAG IMPULSE FR4 6F 100CM

## (undated) DEVICE — SUT PROLN 4/0 V7 36IN 8975H

## (undated) DEVICE — ANGIO-SEAL VIP VASCULAR CLOSURE DEVICE: Brand: ANGIO-SEAL

## (undated) DEVICE — UNDYED BRAIDED (POLYGLACTIN 910), SYNTHETIC ABSORBABLE SUTURE: Brand: COATED VICRYL

## (undated) DEVICE — SUNDT™ EXTERNAL CAROTID ENDARTERECTOMY SHUNT: Brand: SUNDT™

## (undated) DEVICE — 3M™ TEGADERM™ IV TRANSPARENT FILM DRESSING WITH BORDER 100 BAGS/CARTON 4 CARTONS/CASE 1633: Brand: 3M™ TEGADERM™

## (undated) DEVICE — GUIDELINER CATHETERS ARE INTENDED TO BE USED IN CONJUNCTION WITH GUIDE CATHETERS TO ACCESS DISCRETE REGIONS OF THE CORONARY AND/OR PERIPHERAL VASCULATURE, AND TO FACILITATE PLACEMENT OF INTERVENTIONAL DEVICES.: Brand: GUIDELINER® V3 CATHETER

## (undated) DEVICE — TP UMB COTN 1/8X18 TU10T

## (undated) DEVICE — SUT PROLN 4/0 V5 36IN 8935H

## (undated) DEVICE — PK OPN HEART WHT WRP 50

## (undated) DEVICE — COUNT NDL MAG XLG

## (undated) DEVICE — 28 FR RIGHT ANGLE – SOFT PVC CATHETER: Brand: PVC THORACIC CATHETERS

## (undated) DEVICE — CATH TDILUT SWANGANZ VIP 7.5F 110CM

## (undated) DEVICE — GW RUNTHROUGH NS HYPERCOAT .014 3X180CM

## (undated) DEVICE — CABL BIPOL W/ALLGTR CLIP/SM 12FT

## (undated) DEVICE — TUBING, SUCTION, 1/4" X 12', STRAIGHT: Brand: MEDLINE

## (undated) DEVICE — SOL NACL INJ .9PCT 500ML

## (undated) DEVICE — BG BLD SYS

## (undated) DEVICE — TR BAND RADIAL ARTERY COMPRESSION DEVICE: Brand: TR BAND

## (undated) DEVICE — CONTAINER,SPECIMEN,OR STERILE,4OZ: Brand: MEDLINE

## (undated) DEVICE — ST TOURNI COMPL A/ 7IN

## (undated) DEVICE — SCANLAN® VASCU-STATT® II SINGLE-USE BULLDOG CLAMP W/FIRMER CLAMPING PRESS - MIDI ANGLED 45° (YELLOW), CLAMPING PRESSURE 75-80 G (2/STERILE PKG): Brand: SCANLAN® VASCU-STATT® II SINGLE-USE BULLDOG CLAMP W/FIRMER CLAMPING PRESS

## (undated) DEVICE — TP UMB COTN 1/8X36 U12T

## (undated) DEVICE — GLV SURG SIGNATURE ESSENTIAL PF LTX SZ7.5

## (undated) DEVICE — SOL IRR NACL 0.9PCT BT 1000ML

## (undated) DEVICE — NDL SUT CATGUT 1/2CIR TPR SZ5 2PK 1824-5D

## (undated) DEVICE — SUT SILK 4/0 TIES 18IN A183H

## (undated) DEVICE — SUT PROLN 6/0 C1 D/A 30IN 8706H

## (undated) DEVICE — SUT SILK 3/0 SH CR8 18IN C013D

## (undated) DEVICE — ROTATING SURGICAL PUNCHES, 1 PER POUCH: Brand: A&E MEDICAL / ROTATING SURGICAL PUNCHES

## (undated) DEVICE — SUT PROLN 5/0 V5 36IN 8934H

## (undated) DEVICE — OASIS DRAIN, SINGLE, INLINE & ATS COMPATIBLE: Brand: OASIS

## (undated) DEVICE — SPNG LAP PREWSH SFTPK 18X18IN STRL PK/5

## (undated) DEVICE — GLV SURG SIGNATURE ESSENTIAL PF LTX SZ8

## (undated) DEVICE — SUT SILK 2/0 SH CR8 18IN CR8 C012D

## (undated) DEVICE — GW PTFE EMERALD HEPCOAT FC J TIP STD .035 3MM 150CM

## (undated) DEVICE — MINI TREK CORONARY DILATATION CATHETER 1.20 MM X 12 MM / RAPID-EXCHANGE: Brand: MINI TREK

## (undated) DEVICE — IRRIGATOR BULB ASEPTO 60CC STRL

## (undated) DEVICE — SUT PROLENE PP 7/0 BV175-6 24IN

## (undated) DEVICE — GUIDE CATHETER: Brand: MACH1™

## (undated) DEVICE — DRSNG SLVR/ANTIBAC PRIMASEAL POST/OP ADHS 3.5X10IN

## (undated) DEVICE — SENSR CERBRL O2 PK/2

## (undated) DEVICE — MICRO TIP WIPE: Brand: DEVON

## (undated) DEVICE — CATH ART RADL 20GA 1 3/4IN LF

## (undated) DEVICE — SPNG GZ AVANT 6PLY 4X4IN STRL PK/2

## (undated) DEVICE — SPONGE,DISSECTOR,ROUND CHERRY,XR,ST,5/PK: Brand: MEDLINE

## (undated) DEVICE — CATH MPAC STP 6F: Brand: SUPER TORQUE

## (undated) DEVICE — ELECTRD BLD EZ CLN STD 6.5IN

## (undated) DEVICE — CVR HNDL LT SURG ACCSSRY BLU STRL

## (undated) DEVICE — DUAL CUT SAGITTAL BLADE

## (undated) DEVICE — BLOOD TRANSFUSION FILTER: Brand: HAEMONETICS

## (undated) DEVICE — SOLUTION,WATER,IRRIGATION,1000ML,STERILE: Brand: MEDLINE

## (undated) DEVICE — APPL COTN TP PLSTC 6IN STRL LF PK/2

## (undated) DEVICE — MARKR SKIN W/RULR

## (undated) DEVICE — PRESSURE TUBING: Brand: TRUWAVE

## (undated) DEVICE — PK TOTL SHLDR 50

## (undated) DEVICE — ST PERFUS M/

## (undated) DEVICE — SKIN AFFIX SURG ADHESIVE 72/CS 0.55ML: Brand: MEDLINE

## (undated) DEVICE — 3M™ IOBAN™ 2 ANTIMICROBIAL INCISE DRAPE 6650EZ: Brand: IOBAN™ 2

## (undated) DEVICE — UNDERGLV SURG BIOGEL/PI PF SYNTH SURG SZ8.5 BLU 50/BX

## (undated) DEVICE — GLV SURG SENSICARE PI ORTHO SZ8 LF STRL

## (undated) DEVICE — WRAP SHOULDER COLD THERAPY

## (undated) DEVICE — SUT MONOCRYL PLS ANTIB UND 3/0  PS1 27IN

## (undated) DEVICE — PRESSURE MONITORING SET: Brand: TRUWAVE, VAMP PLUS

## (undated) DEVICE — BALN NC/EUPHORA RX 2.00X12MM

## (undated) DEVICE — SOL NACL 0.9PCT 1000ML

## (undated) DEVICE — CVR PROB 96IN LF STRL

## (undated) DEVICE — SUT PDS 0 CT-1 Z340H